# Patient Record
Sex: FEMALE | Race: WHITE | NOT HISPANIC OR LATINO | Employment: OTHER | URBAN - METROPOLITAN AREA
[De-identification: names, ages, dates, MRNs, and addresses within clinical notes are randomized per-mention and may not be internally consistent; named-entity substitution may affect disease eponyms.]

---

## 2017-11-16 ENCOUNTER — HOSPITAL ENCOUNTER (OUTPATIENT)
Dept: OTHER | Facility: MEDICAL CENTER | Age: 72
End: 2017-11-16
Payer: MEDICARE

## 2017-11-16 PROCEDURE — 94726 PLETHYSMOGRAPHY LUNG VOLUMES: CPT

## 2017-11-16 PROCEDURE — 94726 PLETHYSMOGRAPHY LUNG VOLUMES: CPT | Mod: 26 | Performed by: INTERNAL MEDICINE

## 2017-11-16 PROCEDURE — 94060 EVALUATION OF WHEEZING: CPT | Mod: 26 | Performed by: INTERNAL MEDICINE

## 2017-11-16 PROCEDURE — 94060 EVALUATION OF WHEEZING: CPT

## 2017-11-16 PROCEDURE — 94729 DIFFUSING CAPACITY: CPT | Mod: 26 | Performed by: INTERNAL MEDICINE

## 2017-11-16 PROCEDURE — 94729 DIFFUSING CAPACITY: CPT

## 2017-11-16 ASSESSMENT — PULMONARY FUNCTION TESTS
FEV1/FVC_PERCENT_CHANGE: 73
FEV1: 1.92
FEV1/FVC: 82.24
FEV1/FVC_PERCENT_PREDICTED: 77
FEV1_PERCENT_CHANGE: -11
FVC: 2.41
FEV1_PREDICTED: 2.41
FVC_PREDICTED: 3.13
FEV1/FVC: 80
FEV1/FVC_PERCENT_PREDICTED: 106
FEV1_PERCENT_PREDICTED: 72
FEV1_PERCENT_PREDICTED: 79
FVC: 2.14
FEV1: 1.76
FEV1_PERCENT_CHANGE: -8
FVC_PERCENT_PREDICTED: 77
FEV1/FVC_PERCENT_PREDICTED: 103
FVC_PERCENT_PREDICTED: 68

## 2017-11-17 NOTE — PROCEDURES
REASON FOR STUDY:  Patient has pulmonary hypertension and dyspnea after any   exertion.    SPIROMETRY:  FVC is 2.41, 77% predicted.  FEV1 is 1.92, 79% predicted.  There   is no response to bronchodilators.  FEV1/FVC is 80.    LUNG VOLUMES:  Vital capacity is 86% predicted.  Total lung capacity is 93%   predicted.  ERV is 18% predicted.  DLCO is 98% predicted.  DL/VA is 120%   predicted.  Flow volume loops are normal.    IMPRESSION:  There is suggestion of mild restrictive disease.  Her low ERV   probably reflects the patient's obesity.  Oxygen transfer is normal.       ____________________________________     MD CHAD SAENZ / BREANNA    DD:  11/17/2017 09:01:09  DT:  11/17/2017 09:38:39    D#:  [id number]  Job#:  [id number]

## 2018-12-12 ENCOUNTER — HOSPITAL ENCOUNTER (OUTPATIENT)
Dept: LAB | Facility: MEDICAL CENTER | Age: 73
End: 2018-12-12
Attending: INTERNAL MEDICINE
Payer: MEDICARE

## 2018-12-12 LAB
INR PPP: 1.47 (ref 0.87–1.13)
PROTHROMBIN TIME: 17.9 SEC (ref 12–14.6)

## 2018-12-12 PROCEDURE — 36415 COLL VENOUS BLD VENIPUNCTURE: CPT

## 2018-12-12 PROCEDURE — 85610 PROTHROMBIN TIME: CPT

## 2019-01-25 ENCOUNTER — HOSPITAL ENCOUNTER (OUTPATIENT)
Dept: LAB | Facility: MEDICAL CENTER | Age: 74
End: 2019-01-25
Attending: INTERNAL MEDICINE
Payer: MEDICARE

## 2019-01-25 LAB
INR PPP: 2.07 (ref 0.87–1.13)
PROTHROMBIN TIME: 23.4 SEC (ref 12–14.6)

## 2019-01-25 PROCEDURE — 85610 PROTHROMBIN TIME: CPT

## 2019-01-25 PROCEDURE — 36415 COLL VENOUS BLD VENIPUNCTURE: CPT

## 2019-03-14 ENCOUNTER — HOSPITAL ENCOUNTER (OUTPATIENT)
Dept: LAB | Facility: MEDICAL CENTER | Age: 74
End: 2019-03-14
Attending: INTERNAL MEDICINE
Payer: MEDICARE

## 2019-03-14 ENCOUNTER — HOSPITAL ENCOUNTER (OUTPATIENT)
Dept: LAB | Facility: MEDICAL CENTER | Age: 74
End: 2019-03-14
Attending: FAMILY MEDICINE
Payer: MEDICARE

## 2019-03-14 LAB
ALBUMIN SERPL BCP-MCNC: 4.6 G/DL (ref 3.2–4.9)
ALBUMIN/GLOB SERPL: 1.8 G/DL
ALP SERPL-CCNC: 70 U/L (ref 30–99)
ALT SERPL-CCNC: 17 U/L (ref 2–50)
ANION GAP SERPL CALC-SCNC: 9 MMOL/L (ref 0–11.9)
AST SERPL-CCNC: 17 U/L (ref 12–45)
BASOPHILS # BLD AUTO: 0.6 % (ref 0–1.8)
BASOPHILS # BLD: 0.03 K/UL (ref 0–0.12)
BILIRUB SERPL-MCNC: 0.5 MG/DL (ref 0.1–1.5)
BUN SERPL-MCNC: 23 MG/DL (ref 8–22)
CALCIUM SERPL-MCNC: 11.2 MG/DL (ref 8.5–10.5)
CHLORIDE SERPL-SCNC: 97 MMOL/L (ref 96–112)
CHOLEST SERPL-MCNC: 194 MG/DL (ref 100–199)
CO2 SERPL-SCNC: 33 MMOL/L (ref 20–33)
CREAT SERPL-MCNC: 0.68 MG/DL (ref 0.5–1.4)
CREAT UR-MCNC: 160 MG/DL
EOSINOPHIL # BLD AUTO: 0.14 K/UL (ref 0–0.51)
EOSINOPHIL NFR BLD: 3 % (ref 0–6.9)
ERYTHROCYTE [DISTWIDTH] IN BLOOD BY AUTOMATED COUNT: 47.9 FL (ref 35.9–50)
EST. AVERAGE GLUCOSE BLD GHB EST-MCNC: 117 MG/DL
FASTING STATUS PATIENT QL REPORTED: NORMAL
GLOBULIN SER CALC-MCNC: 2.6 G/DL (ref 1.9–3.5)
GLUCOSE SERPL-MCNC: 113 MG/DL (ref 65–99)
HBA1C MFR BLD: 5.7 % (ref 0–5.6)
HCT VFR BLD AUTO: 42.8 % (ref 37–47)
HDLC SERPL-MCNC: 88 MG/DL
HGB BLD-MCNC: 13.9 G/DL (ref 12–16)
IMM GRANULOCYTES # BLD AUTO: 0.01 K/UL (ref 0–0.11)
IMM GRANULOCYTES NFR BLD AUTO: 0.2 % (ref 0–0.9)
INR PPP: 1.22 (ref 0.87–1.13)
LDLC SERPL CALC-MCNC: 92 MG/DL
LYMPHOCYTES # BLD AUTO: 1.2 K/UL (ref 1–4.8)
LYMPHOCYTES NFR BLD: 26 % (ref 22–41)
MCH RBC QN AUTO: 32.5 PG (ref 27–33)
MCHC RBC AUTO-ENTMCNC: 32.5 G/DL (ref 33.6–35)
MCV RBC AUTO: 100 FL (ref 81.4–97.8)
MICROALBUMIN UR-MCNC: 1.9 MG/DL
MICROALBUMIN/CREAT UR: 12 MG/G (ref 0–30)
MONOCYTES # BLD AUTO: 0.49 K/UL (ref 0–0.85)
MONOCYTES NFR BLD AUTO: 10.6 % (ref 0–13.4)
NEUTROPHILS # BLD AUTO: 2.75 K/UL (ref 2–7.15)
NEUTROPHILS NFR BLD: 59.6 % (ref 44–72)
NRBC # BLD AUTO: 0 K/UL
NRBC BLD-RTO: 0 /100 WBC
PLATELET # BLD AUTO: 227 K/UL (ref 164–446)
PMV BLD AUTO: 8.8 FL (ref 9–12.9)
POTASSIUM SERPL-SCNC: 3.8 MMOL/L (ref 3.6–5.5)
PROT SERPL-MCNC: 7.2 G/DL (ref 6–8.2)
PROTHROMBIN TIME: 15.5 SEC (ref 12–14.6)
RBC # BLD AUTO: 4.28 M/UL (ref 4.2–5.4)
SODIUM SERPL-SCNC: 139 MMOL/L (ref 135–145)
T4 SERPL-MCNC: 6.1 UG/DL (ref 4–12)
TRIGL SERPL-MCNC: 69 MG/DL (ref 0–149)
TSH SERPL DL<=0.005 MIU/L-ACNC: 1.23 UIU/ML (ref 0.38–5.33)
WBC # BLD AUTO: 4.6 K/UL (ref 4.8–10.8)

## 2019-03-14 PROCEDURE — 36415 COLL VENOUS BLD VENIPUNCTURE: CPT

## 2019-03-14 PROCEDURE — 80053 COMPREHEN METABOLIC PANEL: CPT

## 2019-03-14 PROCEDURE — 84436 ASSAY OF TOTAL THYROXINE: CPT

## 2019-03-14 PROCEDURE — 83036 HEMOGLOBIN GLYCOSYLATED A1C: CPT | Mod: GA

## 2019-03-14 PROCEDURE — 85025 COMPLETE CBC W/AUTO DIFF WBC: CPT

## 2019-03-14 PROCEDURE — 80061 LIPID PANEL: CPT

## 2019-03-14 PROCEDURE — 82043 UR ALBUMIN QUANTITATIVE: CPT

## 2019-03-14 PROCEDURE — 84443 ASSAY THYROID STIM HORMONE: CPT

## 2019-03-14 PROCEDURE — 82570 ASSAY OF URINE CREATININE: CPT

## 2019-03-14 PROCEDURE — 85610 PROTHROMBIN TIME: CPT

## 2019-03-29 ENCOUNTER — HOSPITAL ENCOUNTER (OUTPATIENT)
Dept: LAB | Facility: MEDICAL CENTER | Age: 74
End: 2019-03-29
Attending: INTERNAL MEDICINE
Payer: MEDICARE

## 2019-03-29 LAB
INR PPP: 1.43 (ref 0.87–1.13)
PROTHROMBIN TIME: 17.6 SEC (ref 12–14.6)

## 2019-03-29 PROCEDURE — 85610 PROTHROMBIN TIME: CPT

## 2019-03-29 PROCEDURE — 36415 COLL VENOUS BLD VENIPUNCTURE: CPT

## 2019-04-24 ENCOUNTER — HOSPITAL ENCOUNTER (OUTPATIENT)
Dept: LAB | Facility: MEDICAL CENTER | Age: 74
End: 2019-04-24
Attending: INTERNAL MEDICINE
Payer: MEDICARE

## 2019-04-24 LAB
INR PPP: 1.87 (ref 0.87–1.13)
PROTHROMBIN TIME: 21.6 SEC (ref 12–14.6)

## 2019-04-24 PROCEDURE — 85610 PROTHROMBIN TIME: CPT

## 2019-04-24 PROCEDURE — 36415 COLL VENOUS BLD VENIPUNCTURE: CPT

## 2019-05-17 ENCOUNTER — HOSPITAL ENCOUNTER (OUTPATIENT)
Dept: LAB | Facility: MEDICAL CENTER | Age: 74
End: 2019-05-17
Attending: INTERNAL MEDICINE
Payer: MEDICARE

## 2019-05-17 LAB
INR PPP: 1.56 (ref 0.87–1.13)
PROTHROMBIN TIME: 18.8 SEC (ref 12–14.6)

## 2019-05-17 PROCEDURE — 85610 PROTHROMBIN TIME: CPT

## 2019-05-17 PROCEDURE — 36415 COLL VENOUS BLD VENIPUNCTURE: CPT

## 2019-05-31 ENCOUNTER — HOSPITAL ENCOUNTER (OUTPATIENT)
Dept: LAB | Facility: MEDICAL CENTER | Age: 74
End: 2019-05-31
Attending: INTERNAL MEDICINE
Payer: MEDICARE

## 2019-05-31 LAB
INR PPP: 1.84 (ref 0.87–1.13)
PROTHROMBIN TIME: 21.8 SEC (ref 12–14.6)

## 2019-05-31 PROCEDURE — 36415 COLL VENOUS BLD VENIPUNCTURE: CPT

## 2019-05-31 PROCEDURE — 85610 PROTHROMBIN TIME: CPT

## 2019-06-27 ENCOUNTER — HOSPITAL ENCOUNTER (OUTPATIENT)
Dept: LAB | Facility: MEDICAL CENTER | Age: 74
End: 2019-06-27
Attending: INTERNAL MEDICINE
Payer: MEDICARE

## 2019-06-27 LAB
INR PPP: 1.69 (ref 0.87–1.13)
PROTHROMBIN TIME: 20.4 SEC (ref 12–14.6)

## 2019-06-27 PROCEDURE — 85610 PROTHROMBIN TIME: CPT

## 2019-07-05 ENCOUNTER — HOSPITAL ENCOUNTER (OUTPATIENT)
Facility: MEDICAL CENTER | Age: 74
DRG: 552 | End: 2019-07-10
Attending: EMERGENCY MEDICINE | Admitting: HOSPITALIST
Payer: MEDICARE

## 2019-07-05 ENCOUNTER — OFFICE VISIT (OUTPATIENT)
Dept: URGENT CARE | Facility: PHYSICIAN GROUP | Age: 74
End: 2019-07-05
Payer: MEDICARE

## 2019-07-05 VITALS
BODY MASS INDEX: 38.45 KG/M2 | HEART RATE: 83 BPM | HEIGHT: 67 IN | TEMPERATURE: 100.6 F | RESPIRATION RATE: 16 BRPM | WEIGHT: 245 LBS | OXYGEN SATURATION: 93 % | SYSTOLIC BLOOD PRESSURE: 124 MMHG | DIASTOLIC BLOOD PRESSURE: 62 MMHG

## 2019-07-05 DIAGNOSIS — M54.10 BACK PAIN WITH RADICULOPATHY: ICD-10-CM

## 2019-07-05 DIAGNOSIS — M54.41 ACUTE BILATERAL LOW BACK PAIN WITH BILATERAL SCIATICA: ICD-10-CM

## 2019-07-05 DIAGNOSIS — M54.42 ACUTE BILATERAL LOW BACK PAIN WITH BILATERAL SCIATICA: ICD-10-CM

## 2019-07-05 DIAGNOSIS — R50.9 FEVER, UNSPECIFIED FEVER CAUSE: ICD-10-CM

## 2019-07-05 DIAGNOSIS — R50.9 FEVER, LOW GRADE: ICD-10-CM

## 2019-07-05 DIAGNOSIS — R51.9 ACUTE NONINTRACTABLE HEADACHE, UNSPECIFIED HEADACHE TYPE: ICD-10-CM

## 2019-07-05 PROCEDURE — 99203 OFFICE O/P NEW LOW 30 MIN: CPT | Performed by: FAMILY MEDICINE

## 2019-07-05 PROCEDURE — 99285 EMERGENCY DEPT VISIT HI MDM: CPT

## 2019-07-05 RX ORDER — SOTALOL HYDROCHLORIDE 120 MG/1
80 TABLET ORAL DAILY
Status: ON HOLD | COMMUNITY
End: 2019-07-06

## 2019-07-05 RX ORDER — FUROSEMIDE 20 MG/1
40 TABLET ORAL 2 TIMES DAILY
COMMUNITY
End: 2019-08-20

## 2019-07-05 RX ORDER — AMBRISENTAN 10 MG/1
10 TABLET, FILM COATED ORAL DAILY
COMMUNITY
End: 2020-09-29

## 2019-07-05 RX ORDER — TADALAFIL 10 MG/1
40 TABLET ORAL DAILY
COMMUNITY
End: 2019-09-08

## 2019-07-05 ASSESSMENT — ENCOUNTER SYMPTOMS
NAUSEA: 1
HEADACHES: 1
SHORTNESS OF BREATH: 1
VOMITING: 0
FEVER: 1

## 2019-07-06 ENCOUNTER — APPOINTMENT (OUTPATIENT)
Dept: RADIOLOGY | Facility: MEDICAL CENTER | Age: 74
DRG: 552 | End: 2019-07-06
Attending: EMERGENCY MEDICINE
Payer: MEDICARE

## 2019-07-06 ENCOUNTER — APPOINTMENT (OUTPATIENT)
Dept: RADIOLOGY | Facility: MEDICAL CENTER | Age: 74
DRG: 552 | End: 2019-07-06
Attending: HOSPITALIST
Payer: MEDICARE

## 2019-07-06 PROBLEM — I48.91 AF (ATRIAL FIBRILLATION) (HCC): Status: ACTIVE | Noted: 2019-07-06

## 2019-07-06 PROBLEM — M54.10 BACK PAIN WITH RADICULOPATHY: Status: ACTIVE | Noted: 2019-07-06

## 2019-07-06 PROBLEM — J96.10 CHRONIC RESPIRATORY FAILURE (HCC): Status: ACTIVE | Noted: 2019-07-06

## 2019-07-06 PROBLEM — R50.9 FEVER, LOW GRADE: Status: ACTIVE | Noted: 2019-07-06

## 2019-07-06 PROBLEM — N17.9 AKI (ACUTE KIDNEY INJURY) (HCC): Status: ACTIVE | Noted: 2019-07-06

## 2019-07-06 PROBLEM — I27.21 PAH (PULMONARY ARTERY HYPERTENSION) (HCC): Status: ACTIVE | Noted: 2019-07-06

## 2019-07-06 LAB
ALBUMIN SERPL BCP-MCNC: 4.4 G/DL (ref 3.2–4.9)
ALBUMIN/GLOB SERPL: 1.7 G/DL
ALP SERPL-CCNC: 48 U/L (ref 30–99)
ALT SERPL-CCNC: 13 U/L (ref 2–50)
ANION GAP SERPL CALC-SCNC: 13 MMOL/L (ref 0–11.9)
AST SERPL-CCNC: 15 U/L (ref 12–45)
BILIRUB SERPL-MCNC: 0.5 MG/DL (ref 0.1–1.5)
BUN SERPL-MCNC: 54 MG/DL (ref 8–22)
CALCIUM SERPL-MCNC: 10.4 MG/DL (ref 8.5–10.5)
CHLORIDE SERPL-SCNC: 104 MMOL/L (ref 96–112)
CO2 SERPL-SCNC: 27 MMOL/L (ref 20–33)
CREAT SERPL-MCNC: 1.11 MG/DL (ref 0.5–1.4)
GLOBULIN SER CALC-MCNC: 2.6 G/DL (ref 1.9–3.5)
GLUCOSE SERPL-MCNC: 102 MG/DL (ref 65–99)
INR PPP: 1.88 (ref 0.87–1.13)
MAGNESIUM SERPL-MCNC: 2.5 MG/DL (ref 1.5–2.5)
POTASSIUM SERPL-SCNC: 4.9 MMOL/L (ref 3.6–5.5)
PROCALCITONIN SERPL-MCNC: <0.05 NG/ML
PROT SERPL-MCNC: 7 G/DL (ref 6–8.2)
PROTHROMBIN TIME: 22.3 SEC (ref 12–14.6)
SODIUM SERPL-SCNC: 144 MMOL/L (ref 135–145)

## 2019-07-06 PROCEDURE — 700111 HCHG RX REV CODE 636 W/ 250 OVERRIDE (IP): Performed by: EMERGENCY MEDICINE

## 2019-07-06 PROCEDURE — 700102 HCHG RX REV CODE 250 W/ 637 OVERRIDE(OP): Performed by: HOSPITALIST

## 2019-07-06 PROCEDURE — 700105 HCHG RX REV CODE 258: Performed by: HOSPITALIST

## 2019-07-06 PROCEDURE — 700105 HCHG RX REV CODE 258: Performed by: EMERGENCY MEDICINE

## 2019-07-06 PROCEDURE — 700102 HCHG RX REV CODE 250 W/ 637 OVERRIDE(OP): Mod: JG | Performed by: HOSPITALIST

## 2019-07-06 PROCEDURE — 99358 PROLONG SERVICE W/O CONTACT: CPT | Performed by: HOSPITALIST

## 2019-07-06 PROCEDURE — A9585 GADOBUTROL INJECTION: HCPCS | Performed by: EMERGENCY MEDICINE

## 2019-07-06 PROCEDURE — 700117 HCHG RX CONTRAST REV CODE 255: Performed by: EMERGENCY MEDICINE

## 2019-07-06 PROCEDURE — 302135 SEQUENTIAL COMPRESSION MACHINE: Performed by: HOSPITALIST

## 2019-07-06 PROCEDURE — 770006 HCHG ROOM/CARE - MED/SURG/GYN SEMI*

## 2019-07-06 PROCEDURE — 84145 PROCALCITONIN (PCT): CPT

## 2019-07-06 PROCEDURE — 83735 ASSAY OF MAGNESIUM: CPT

## 2019-07-06 PROCEDURE — 85610 PROTHROMBIN TIME: CPT

## 2019-07-06 PROCEDURE — 93005 ELECTROCARDIOGRAM TRACING: CPT | Performed by: HOSPITALIST

## 2019-07-06 PROCEDURE — 700111 HCHG RX REV CODE 636 W/ 250 OVERRIDE (IP): Performed by: HOSPITALIST

## 2019-07-06 PROCEDURE — 87040 BLOOD CULTURE FOR BACTERIA: CPT | Mod: 91

## 2019-07-06 PROCEDURE — A9270 NON-COVERED ITEM OR SERVICE: HCPCS | Performed by: HOSPITALIST

## 2019-07-06 PROCEDURE — 72158 MRI LUMBAR SPINE W/O & W/DYE: CPT

## 2019-07-06 PROCEDURE — 70450 CT HEAD/BRAIN W/O DYE: CPT

## 2019-07-06 PROCEDURE — G0378 HOSPITAL OBSERVATION PER HR: HCPCS

## 2019-07-06 PROCEDURE — 36415 COLL VENOUS BLD VENIPUNCTURE: CPT

## 2019-07-06 PROCEDURE — 99220 PR INITIAL OBSERVATION CARE,LEVL III: CPT | Mod: 25 | Performed by: HOSPITALIST

## 2019-07-06 PROCEDURE — 80053 COMPREHEN METABOLIC PANEL: CPT

## 2019-07-06 PROCEDURE — 96365 THER/PROPH/DIAG IV INF INIT: CPT | Mod: XU

## 2019-07-06 PROCEDURE — A9270 NON-COVERED ITEM OR SERVICE: HCPCS | Mod: JG | Performed by: HOSPITALIST

## 2019-07-06 RX ORDER — AMBRISENTAN 10 MG/1
10 TABLET, FILM COATED ORAL DAILY
Status: DISCONTINUED | OUTPATIENT
Start: 2019-07-06 | End: 2019-07-06

## 2019-07-06 RX ORDER — LISINOPRIL 20 MG/1
20 TABLET ORAL
Status: DISCONTINUED | OUTPATIENT
Start: 2019-07-06 | End: 2019-07-07

## 2019-07-06 RX ORDER — PAROXETINE HYDROCHLORIDE 20 MG/1
20 TABLET, FILM COATED ORAL DAILY
Status: DISCONTINUED | OUTPATIENT
Start: 2019-07-06 | End: 2019-07-10 | Stop reason: HOSPADM

## 2019-07-06 RX ORDER — BISACODYL 10 MG
10 SUPPOSITORY, RECTAL RECTAL
Status: DISCONTINUED | OUTPATIENT
Start: 2019-07-06 | End: 2019-07-10 | Stop reason: HOSPADM

## 2019-07-06 RX ORDER — SOTALOL HYDROCHLORIDE 80 MG/1
80 TABLET ORAL 2 TIMES DAILY
Status: DISCONTINUED | OUTPATIENT
Start: 2019-07-06 | End: 2019-07-07

## 2019-07-06 RX ORDER — WARFARIN SODIUM 7.5 MG/1
7.5 TABLET ORAL
Status: DISCONTINUED | OUTPATIENT
Start: 2019-07-06 | End: 2019-07-06

## 2019-07-06 RX ORDER — PROPAFENONE HYDROCHLORIDE 150 MG/1
150 TABLET, COATED ORAL EVERY 8 HOURS
Status: DISCONTINUED | OUTPATIENT
Start: 2019-07-06 | End: 2019-07-06

## 2019-07-06 RX ORDER — ROSUVASTATIN CALCIUM 10 MG/1
10 TABLET, COATED ORAL EVERY EVENING
COMMUNITY
End: 2020-10-12

## 2019-07-06 RX ORDER — AMOXICILLIN 250 MG
2 CAPSULE ORAL 2 TIMES DAILY
Status: DISCONTINUED | OUTPATIENT
Start: 2019-07-06 | End: 2019-07-10 | Stop reason: HOSPADM

## 2019-07-06 RX ORDER — OXYCODONE HYDROCHLORIDE 10 MG/1
10 TABLET ORAL
Status: DISCONTINUED | OUTPATIENT
Start: 2019-07-06 | End: 2019-07-09

## 2019-07-06 RX ORDER — DIPHENHYDRAMINE HCL 25 MG
25 TABLET ORAL EVERY 6 HOURS PRN
Status: DISCONTINUED | OUTPATIENT
Start: 2019-07-06 | End: 2019-07-10 | Stop reason: HOSPADM

## 2019-07-06 RX ORDER — SODIUM CHLORIDE 9 MG/ML
INJECTION, SOLUTION INTRAVENOUS CONTINUOUS
Status: DISCONTINUED | OUTPATIENT
Start: 2019-07-06 | End: 2019-07-09

## 2019-07-06 RX ORDER — LISINOPRIL 20 MG/1
20 TABLET ORAL DAILY
Status: DISCONTINUED | OUTPATIENT
Start: 2019-07-06 | End: 2019-07-06

## 2019-07-06 RX ORDER — GADOBUTROL 604.72 MG/ML
10 INJECTION INTRAVENOUS ONCE
Status: COMPLETED | OUTPATIENT
Start: 2019-07-06 | End: 2019-07-06

## 2019-07-06 RX ORDER — ROSUVASTATIN CALCIUM 5 MG/1
10 TABLET, COATED ORAL EVERY EVENING
Status: DISCONTINUED | OUTPATIENT
Start: 2019-07-06 | End: 2019-07-10 | Stop reason: HOSPADM

## 2019-07-06 RX ORDER — POLYETHYLENE GLYCOL 3350 17 G/17G
1 POWDER, FOR SOLUTION ORAL
Status: DISCONTINUED | OUTPATIENT
Start: 2019-07-06 | End: 2019-07-10 | Stop reason: HOSPADM

## 2019-07-06 RX ORDER — SOTALOL HYDROCHLORIDE 120 MG/1
120 TABLET ORAL 2 TIMES DAILY
Status: DISCONTINUED | OUTPATIENT
Start: 2019-07-06 | End: 2019-07-06

## 2019-07-06 RX ORDER — PAROXETINE HYDROCHLORIDE 20 MG/1
20 TABLET, FILM COATED ORAL EVERY MORNING
COMMUNITY

## 2019-07-06 RX ORDER — HEPARIN SODIUM 5000 [USP'U]/ML
5000 INJECTION, SOLUTION INTRAVENOUS; SUBCUTANEOUS EVERY 8 HOURS
Status: DISCONTINUED | OUTPATIENT
Start: 2019-07-06 | End: 2019-07-06

## 2019-07-06 RX ORDER — LEVOTHYROXINE SODIUM 0.07 MG/1
75 TABLET ORAL
Status: DISCONTINUED | OUTPATIENT
Start: 2019-07-06 | End: 2019-07-10 | Stop reason: HOSPADM

## 2019-07-06 RX ORDER — PROPAFENONE HYDROCHLORIDE 150 MG/1
150 TABLET, COATED ORAL EVERY 8 HOURS
Status: CANCELLED | OUTPATIENT
Start: 2019-07-06

## 2019-07-06 RX ORDER — TADALAFIL 10 MG/1
10 TABLET ORAL PRN
Status: DISCONTINUED | OUTPATIENT
Start: 2019-07-06 | End: 2019-07-06

## 2019-07-06 RX ORDER — OXYCODONE HYDROCHLORIDE 5 MG/1
5 TABLET ORAL
Status: DISCONTINUED | OUTPATIENT
Start: 2019-07-06 | End: 2019-07-09

## 2019-07-06 RX ORDER — AMLODIPINE BESYLATE 5 MG/1
5 TABLET ORAL DAILY
Status: DISCONTINUED | OUTPATIENT
Start: 2019-07-06 | End: 2019-07-06

## 2019-07-06 RX ORDER — ASPIRIN 325 MG
325 TABLET ORAL EVERY 6 HOURS PRN
Status: CANCELLED | OUTPATIENT
Start: 2019-07-06

## 2019-07-06 RX ORDER — MORPHINE SULFATE 4 MG/ML
4 INJECTION, SOLUTION INTRAMUSCULAR; INTRAVENOUS
Status: DISCONTINUED | OUTPATIENT
Start: 2019-07-06 | End: 2019-07-09

## 2019-07-06 RX ORDER — FUROSEMIDE 40 MG/1
20 TABLET ORAL 2 TIMES DAILY
Status: DISCONTINUED | OUTPATIENT
Start: 2019-07-06 | End: 2019-07-06

## 2019-07-06 RX ORDER — WARFARIN SODIUM 7.5 MG/1
7.5 TABLET ORAL DAILY
COMMUNITY
End: 2019-08-20

## 2019-07-06 RX ORDER — SODIUM CHLORIDE 9 MG/ML
INJECTION, SOLUTION INTRAVENOUS CONTINUOUS
Status: DISCONTINUED | OUTPATIENT
Start: 2019-07-06 | End: 2019-07-06

## 2019-07-06 RX ORDER — TADALAFIL 10 MG/1
40 TABLET ORAL DAILY
Status: DISCONTINUED | OUTPATIENT
Start: 2019-07-06 | End: 2019-07-10 | Stop reason: HOSPADM

## 2019-07-06 RX ORDER — AMBRISENTAN 10 MG/1
10 TABLET, FILM COATED ORAL DAILY
Status: DISCONTINUED | OUTPATIENT
Start: 2019-07-06 | End: 2019-07-10 | Stop reason: HOSPADM

## 2019-07-06 RX ORDER — SOTALOL HYDROCHLORIDE 80 MG/1
120 TABLET ORAL 2 TIMES DAILY
COMMUNITY
End: 2019-09-30

## 2019-07-06 RX ADMIN — OXYCODONE HYDROCHLORIDE 5 MG: 5 TABLET ORAL at 13:08

## 2019-07-06 RX ADMIN — LEVOTHYROXINE SODIUM 75 MCG: 75 TABLET ORAL at 06:16

## 2019-07-06 RX ADMIN — AMBRISENTAN 10 MG: 10 TABLET, FILM COATED ORAL at 11:15

## 2019-07-06 RX ADMIN — OXYCODONE HYDROCHLORIDE 5 MG: 5 TABLET ORAL at 01:46

## 2019-07-06 RX ADMIN — SOTALOL HYDROCHLORIDE 120 MG: 120 TABLET ORAL at 06:16

## 2019-07-06 RX ADMIN — CEFTRIAXONE SODIUM 2 G: 2 INJECTION, POWDER, FOR SOLUTION INTRAMUSCULAR; INTRAVENOUS at 17:30

## 2019-07-06 RX ADMIN — LISINOPRIL 20 MG: 20 TABLET ORAL at 12:01

## 2019-07-06 RX ADMIN — TADALAFIL 40 MG: 10 TABLET ORAL at 11:15

## 2019-07-06 RX ADMIN — VANCOMYCIN HYDROCHLORIDE 2800 MG: 500 INJECTION, POWDER, LYOPHILIZED, FOR SOLUTION INTRAVENOUS at 03:42

## 2019-07-06 RX ADMIN — MORPHINE SULFATE 4 MG: 4 INJECTION INTRAVENOUS at 03:42

## 2019-07-06 RX ADMIN — SODIUM CHLORIDE: 9 INJECTION, SOLUTION INTRAVENOUS at 17:14

## 2019-07-06 RX ADMIN — SOTALOL HYDROCHLORIDE 80 MG: 80 TABLET ORAL at 17:29

## 2019-07-06 RX ADMIN — DIPHENHYDRAMINE HCL 25 MG: 25 TABLET ORAL at 12:00

## 2019-07-06 RX ADMIN — OXYCODONE HYDROCHLORIDE 5 MG: 5 TABLET ORAL at 09:45

## 2019-07-06 RX ADMIN — OXYCODONE HYDROCHLORIDE 10 MG: 10 TABLET ORAL at 17:27

## 2019-07-06 RX ADMIN — SENNOSIDES, DOCUSATE SODIUM 2 TABLET: 50; 8.6 TABLET, FILM COATED ORAL at 17:29

## 2019-07-06 RX ADMIN — DIPHENHYDRAMINE HCL 25 MG: 25 TABLET ORAL at 17:29

## 2019-07-06 RX ADMIN — OXYCODONE HYDROCHLORIDE 5 MG: 5 TABLET ORAL at 06:22

## 2019-07-06 RX ADMIN — PAROXETINE HYDROCHLORIDE 20 MG: 20 TABLET, FILM COATED ORAL at 11:59

## 2019-07-06 RX ADMIN — SENNOSIDES, DOCUSATE SODIUM 2 TABLET: 50; 8.6 TABLET, FILM COATED ORAL at 06:17

## 2019-07-06 RX ADMIN — ROSUVASTATIN CALCIUM 10 MG: 5 TABLET, FILM COATED ORAL at 17:27

## 2019-07-06 RX ADMIN — GADOBUTROL 10 ML: 604.72 INJECTION INTRAVENOUS at 10:24

## 2019-07-06 RX ADMIN — CEFTRIAXONE SODIUM 2 G: 2 INJECTION, POWDER, FOR SOLUTION INTRAMUSCULAR; INTRAVENOUS at 01:46

## 2019-07-06 ASSESSMENT — ENCOUNTER SYMPTOMS
WHEEZING: 0
HEMOPTYSIS: 0
ABDOMINAL PAIN: 0
FEVER: 1
EYE DISCHARGE: 0
SHORTNESS OF BREATH: 0
CHILLS: 1
FEVER: 0
BACK PAIN: 1
SENSORY CHANGE: 0
DIZZINESS: 0
BRUISES/BLEEDS EASILY: 0
CHILLS: 0
NAUSEA: 0
PALPITATIONS: 0
FLANK PAIN: 0
FOCAL WEAKNESS: 0
EYE REDNESS: 0
SPEECH CHANGE: 0
STRIDOR: 0
NECK PAIN: 1
BLURRED VISION: 0
DEPRESSION: 0
WEAKNESS: 1
COUGH: 0
TREMORS: 0
HEARTBURN: 0
DOUBLE VISION: 0
DIAPHORESIS: 0
MYALGIAS: 0
PHOTOPHOBIA: 0
DIARRHEA: 0
VOMITING: 0
HALLUCINATIONS: 0
HEADACHES: 1

## 2019-07-06 ASSESSMENT — CHA2DS2 SCORE
CHA2DS2 VASC SCORE: 3
PRIOR STROKE OR TIA OR THROMBOEMBOLISM: NO
AGE 65 TO 74: YES
DIABETES: NO
CHF OR LEFT VENTRICULAR DYSFUNCTION: NO
VASCULAR DISEASE: NO
AGE 75 OR GREATER: NO
HYPERTENSION: YES
SEX: FEMALE

## 2019-07-06 ASSESSMENT — PATIENT HEALTH QUESTIONNAIRE - PHQ9
2. FEELING DOWN, DEPRESSED, IRRITABLE, OR HOPELESS: NOT AT ALL
1. LITTLE INTEREST OR PLEASURE IN DOING THINGS: NOT AT ALL
SUM OF ALL RESPONSES TO PHQ9 QUESTIONS 1 AND 2: 0

## 2019-07-06 ASSESSMENT — COGNITIVE AND FUNCTIONAL STATUS - GENERAL
SUGGESTED CMS G CODE MODIFIER MOBILITY: CJ
WALKING IN HOSPITAL ROOM: A LITTLE
DAILY ACTIVITIY SCORE: 24
MOBILITY SCORE: 22
CLIMB 3 TO 5 STEPS WITH RAILING: A LITTLE
SUGGESTED CMS G CODE MODIFIER DAILY ACTIVITY: CH

## 2019-07-06 ASSESSMENT — LIFESTYLE VARIABLES
HAVE YOU EVER FELT YOU SHOULD CUT DOWN ON YOUR DRINKING: NO
TOTAL SCORE: 0
ON A TYPICAL DAY WHEN YOU DRINK ALCOHOL HOW MANY DRINKS DO YOU HAVE: 2
EVER HAD A DRINK FIRST THING IN THE MORNING TO STEADY YOUR NERVES TO GET RID OF A HANGOVER: NO
ALCOHOL_USE: YES
TOTAL SCORE: 0
EVER_SMOKED: NEVER
SUBSTANCE_ABUSE: 0
AVERAGE NUMBER OF DAYS PER WEEK YOU HAVE A DRINK CONTAINING ALCOHOL: 1
TOTAL SCORE: 0
HAVE PEOPLE ANNOYED YOU BY CRITICIZING YOUR DRINKING: NO
CONSUMPTION TOTAL: NEGATIVE
HOW MANY TIMES IN THE PAST YEAR HAVE YOU HAD 5 OR MORE DRINKS IN A DAY: 0
EVER_SMOKED: NEVER
EVER FELT BAD OR GUILTY ABOUT YOUR DRINKING: NO

## 2019-07-06 NOTE — PROGRESS NOTES
Subjective:     Zeinab Loza is a 74 y.o. female who presents for Headache (on and off fever/ headache/ x6days)    HPI  Pt presents for evaluation of a new problem   Headache for the past 6 days   Having fevers on and off for the past 6 days  Feeling fatigued  Had an epidural a week before the headaches started  +Nausea   No vomiting   Temp 100.6 today in office today    Review of Systems   Constitutional: Positive for fever.   Respiratory: Positive for shortness of breath (Chronic).    Cardiovascular: Negative for chest pain.   Gastrointestinal: Positive for nausea. Negative for vomiting.   Skin: Negative for rash.   Neurological: Positive for headaches.       PMH:  has no past medical history on file.  MEDS:   Current Outpatient Prescriptions:   •  sotalol (BETAPACE) 120 MG tablet, Take 120 mg by mouth 2 times a day., Disp: , Rfl:   •  tadalafil (CIALIS) 10 MG tablet, Take 10 mg by mouth as needed for Erectile Dysfunction., Disp: , Rfl:   •  ambrisentan (LETAIRIS) 10 MG tablet, Take 10 mg by mouth every day., Disp: , Rfl:   •  furosemide (LASIX) 20 MG Tab, Take 20 mg by mouth 2 times a day., Disp: , Rfl:   •  levothyroxine (SYNTHROID) 75 MCG Tab, Take 75 mcg by mouth Every morning on an empty stomach., Disp: , Rfl:   •  lisinopril (PRINIVIL) 20 MG Tab, Take 20 mg by mouth every day., Disp: , Rfl:   •  vitamin D (CHOLECALCIFEROL) 1000 UNIT Tab, Take 1,000 Units by mouth every day., Disp: , Rfl:   •  lisinopril (PRINIVIL) 20 MG Tab, Take 20 mg by mouth every day., Disp: , Rfl:   •  metoprolol (LOPRESSOR) 25 MG Tab, Take 25 mg by mouth 2 times a day., Disp: , Rfl:   •  propafenone (RYTHMOL) 150 MG Tab, Take 150 mg by mouth every 8 hours., Disp: , Rfl:   •  amlodipine (NORVASC) 5 MG Tab, Take 5 mg by mouth every day., Disp: , Rfl:   •  aspirin (ASA) 325 MG Tab, Take 325 mg by mouth every 6 hours as needed., Disp: , Rfl:   •  oxyCODONE CR (OXYCONTIN) 10 MG Tablet Extended Release 12 hour Abuse-Deterrent, Take  "10 mg by mouth every 12 hours., Disp: , Rfl:   •  doxycycline (VIBRAMYCIN) 100 MG Tab, Take 1 Tab by mouth 2 times a day. (Patient not taking: Reported on 7/5/2019), Disp: 20 Tab, Rfl: 0  •  methylPREDNISolone (MEDROL DOSPACK) 4 MG Tab, Take as directed (Patient not taking: Reported on 7/5/2019), Disp: 21 Tab, Rfl: 0  ALLERGIES: No Known Allergies  SURGHX: No past surgical history on file.  SOCHX:  reports that she has never smoked. She has never used smokeless tobacco. She reports that she drinks about 4.2 oz of alcohol per week . She reports that she does not use drugs.  FH: Family history was reviewed, not contributing to acute illness     Objective:   /62 (BP Location: Right arm, Patient Position: Sitting, BP Cuff Size: Adult)   Pulse 83   Temp (!) 38.1 °C (100.6 °F) (Temporal)   Resp 16   Ht 1.702 m (5' 7\")   Wt 111.1 kg (245 lb)   SpO2 93%   BMI 38.37 kg/m²     Physical Exam   Constitutional: She is oriented to person, place, and time. She appears well-developed and well-nourished. No distress.   HENT:   Head: Normocephalic and atraumatic.   Right Ear: External ear normal.   Left Ear: External ear normal.   Nose: Nose normal.   Eyes: Conjunctivae and EOM are normal. Right eye exhibits no discharge. Left eye exhibits no discharge. No scleral icterus.   Neck: Normal range of motion. No tracheal deviation present.   Cardiovascular: Normal rate and regular rhythm.    Pulmonary/Chest: Effort normal and breath sounds normal. No respiratory distress. She has no wheezes. She has no rales.   Musculoskeletal:   Neck  General: No asymmetry, bruising, or erythema appreciated  ROM: FROM flex/extend/lateral bend/lateral rotation  Palpation: +TTP of spinous processes in the neck and thoracic spine, no step-offs appreciated, no TTP of paraspinal muscles, no significant scoliosis appreciated  Neuro: Sensation intact and equal bilaterally in UE's  Vascular: Radial pulse 2+    Neurological: She is alert and oriented " to person, place, and time.   Skin: Skin is warm and dry. She is not diaphoretic.   Psychiatric: She has a normal mood and affect. Her behavior is normal. Judgment and thought content normal.     Assessment/Plan:   Assessment    1. Acute nonintractable headache, unspecified headache type  2. Fever, unspecified fever cause  Patient is a 74-year-old man female with headaches for the past 6 days as well as fevers.  Had an epidural approximately 1 week prior to symptoms starting.  Her presentation is highly suspicious for meningitis versus epidural abscess.  Patient transferred to ER for further work-up and management.

## 2019-07-06 NOTE — PROGRESS NOTES
"Pharmacy Kinetics 74 y.o. female on vancomycin day # 1 2019    Received vancomycin loading dose    Indication for Treatment: Rule out epidural abscess     Pertinent history per medical record: Admitted on 2019. PMH of pulmonary artery hypertension, atrial fibrillation on Coumadin, hypertension, hyperlipidemia who presents with back pain with radiculopathy.  This patient has had low back pain with fevers for approximately 1 week.  She had an epidural 2 weeks ago for steroid injection of her lumbar spine.  She states that she is been having low-grade fevers up to 100 °F at home.  She is also had some chills.  Labs at outside facility. BUN 62 creatinine 1.4  Lasix, Lisinopril held due to MILAN    Other antibiotics: Ceftriaxone    Allergies: Patient has no known allergies.     List concerns for renal function: MILAN, BUN/SCr ratio > 20:1, age, obesity (41), will receive significant IV contrast with MRI    Pertinent cultures to date:   19 Blood cultures x2 - in process     No results for input(s): WBC, NEUTSPOLYS, BANDSSTABS in the last 72 hours.  No results for input(s): BUN, CREATININE, ALBUMIN in the last 72 hours.  No results for input(s): VANCOTROUGH, VANCOPEAK, VANCORANDOM in the last 72 hours.  Intake/Output Summary (Last 24 hours) at 19 0432  Last data filed at 19 0216   Gross per 24 hour   Intake              100 ml   Output                0 ml   Net              100 ml      /50   Pulse 62   Temp 36.7 °C (98.1 °F) (Temporal)   Resp 19   Ht 1.702 m (5' 7\")   Wt 114.1 kg (251 lb 8.7 oz)   SpO2 97%  Temp (24hrs), Av.7 °C (98 °F), Min:36.6 °C (97.9 °F), Max:36.7 °C (98.1 °F)      A/P   1. Vancomycin dose change: Start vancomycin pulse dosing   2. Next vancomycin level: 24 hour random level @0400 on   3. Goal trough: 18-22 mcg/ml - target lower end of goal for now.  4. Comments: Due to above risk factors will not start scheduled dosing at this time. Level as above. Monitor renal " closely. Pharmacy will follow. Narrow therapy as able. Consider ID consult if MRI is positive.    Derick sIsa, PharmD, BCPS

## 2019-07-06 NOTE — ED PROVIDER NOTES
ED Provider Note    ED Provider Note      Primary care provider: Ashia Tidwell M.D.    CHIEF COMPLAINT  Chief Complaint   Patient presents with   • Headache     pt transfer from [facility] for MRI to r/o epidural abscess,    • Back Pain       HPI  Zeinab Loza is a 74 y.o. female who presents to the Emergency Department with chief complaint of low back pain fevers chills.  Patient transferred from outside hospital for evaluation possible epidural abscess.  Patient had epidural injection approximately 2 weeks prior for chronic low back pain chronic radiculopathy.  Over the last couple days she states she is had worsening pain and also had subjective chills was noted to have a fever just over 100 this evening.  Went to outside hospital patient had obvious concern for epidural abscess and was transferred here due to in availability of MRI/neurosurgery at outside hospital she states that the pain has been severe at times she reports no numbness no tingling no weakness in her history extremities.  She reports urinary hesitancy at times feels as though she needs to urinate and then simply has dribbling when she goes to the bathroom she is had no fecal incontinence no other stooling complaints no cough congestion chest pain shortness of breath she does report bifrontal headache that somewhat radiates in the occiput she denies any neck pain at this time.  She also reports that over the last couple days she has had profound pruritus over the upper back.    REVIEW OF SYSTEMS  10 systems reviewed and otherwise negative, pertinent positives and negatives listed in the history of present illness.    PAST MEDICAL HISTORY   Chronic low back pain hypertension    SURGICAL HISTORY  patient denies any surgical history    SOCIAL HISTORY  Social History   Substance Use Topics   • Smoking status: Never Smoker   • Smokeless tobacco: Never Used   • Alcohol use 4.2 oz/week     7 Shots of liquor per week      Comment: 1 day  "     History   Drug Use No       FAMILY HISTORY  Non-Contributory    CURRENT MEDICATIONS  Home Medications     Reviewed by Blair Gillespie R.N. (Registered Nurse) on 07/05/19 at 2325  Med List Status: Not Addressed   Medication Last Dose Status   ambrisentan (LETAIRIS) 10 MG tablet  Active   amlodipine (NORVASC) 5 MG Tab  Active   aspirin (ASA) 325 MG Tab  Active   doxycycline (VIBRAMYCIN) 100 MG Tab  Active   furosemide (LASIX) 20 MG Tab  Active   levothyroxine (SYNTHROID) 75 MCG Tab  Active   lisinopril (PRINIVIL) 20 MG Tab  Active   lisinopril (PRINIVIL) 20 MG Tab  Active   methylPREDNISolone (MEDROL DOSPACK) 4 MG Tab  Active   metoprolol (LOPRESSOR) 25 MG Tab  Active   oxyCODONE CR (OXYCONTIN) 10 MG Tablet Extended Release 12 hour Abuse-Deterrent  Active   propafenone (RYTHMOL) 150 MG Tab  Active   sotalol (BETAPACE) 120 MG tablet  Active   tadalafil (CIALIS) 10 MG tablet  Active   vitamin D (CHOLECALCIFEROL) 1000 UNIT Tab  Active                ALLERGIES  No Known Allergies    PHYSICAL EXAM  VITAL SIGNS: /49   Pulse 65   Temp 36.6 °C (97.9 °F) (Oral)   Resp 18   Ht 1.702 m (5' 7\")   Wt 111.1 kg (245 lb)   SpO2 96%   BMI 38.37 kg/m²   Pulse ox interpretation: I interpret this pulse ox as normal.  Constitutional: Alert and oriented x 3, minimal distress  HEENT: Atraumatic normocephalic, pupils are equal round reactive to light extraocular movements are intact. The nares is clear, external ears are normal, mouth shows moist mucous membranes  Neck: Supple, no JVD no tracheal deviation  Cardiovascular: Regular rate and rhythm no murmur rub or gallop 2+ pulses peripherally x4  Thorax & Lungs: No respiratory distress, no wheezes rales or rhonchi, No chest tenderness.   GI: Soft nontender nondistended positive bowel sounds, no peritoneal signs  Skin: Warm dry no acute rash or lesion  Musculoskeletal: Moving all extremities with full range and 5 of 5 strength, no acute  deformity, patient has tenderness in " the lumbar distribution both paraspinally and midline there is no erythema no warmth no induration  Neurologic: Cranial nerves III through XII are grossly intact, no sensory deficit, no cerebellar dysfunction 2+ DTRs bilateral patellar and Achilles tendons  Psychiatric: Appropriate affect for situation at this time      DIAGNOSTIC STUDIES / PROCEDURES  LABS      Results for orders placed or performed during the hospital encounter of 19   PROCALCITONIN   Result Value Ref Range    Procalcitonin <0.05 <0.25 ng/mL   MAGNESIUM   Result Value Ref Range    Magnesium 2.5 1.5 - 2.5 mg/dL   PROTHROMBIN TIME   Result Value Ref Range    PT 22.3 (H) 12.0 - 14.6 sec    INR 1.88 (H) 0.87 - 1.13   EKG   Result Value Ref Range    Report       [facility] Emergency Dept.    Test Date:  2019  Pt Name:    NATALIE STAPLES                Department: ER  MRN:        [id number]                      Room:       [facility]  Gender:     Female                       Technician: [id number]  :        [id number]                   Requested By:MENDY PINTO  Order #:    [id number]                    Reading MD:    Measurements  Intervals                                Axis  Rate:       94                           P:  CT:                                      QRS:        -22  QRSD:       100                          T:          40  QT:         392  QTc:        491    Interpretive Statements  ATRIAL FIBRILLATION, V-RATE    MULTIFORM VENTRICULAR PREMATURE COMPLEXES  LEFT VENTRICULAR HYPERTROPHY  BORDERLINE PROLONGED QT INTERVAL  No previous ECG available for comparison         All labs reviewed by me.      RADIOLOGY  MR-LUMBAR SPINE-WITH & W/O    (Results Pending)     The radiologist's interpretation of all radiological studies have been reviewed by me.    COURSE & MEDICAL DECISION MAKING  Pertinent Labs & Imaging studies reviewed. (See chart for details)    12:32 AM - Patient seen and examined at bedside.     Patient noted  "to have slightly elevated blood pressure likely circumstantial secondary to presenting complaint. Referred to primary care physician for further evaluation.      Medical Decision Making: Patient with significant concern of epidural abscess MRI of the lumbar spine with and without contrast has been ordered on stat basis patient had elevated ESR and CRP at outside hospital she had a normal white blood cell count she is afebrile now with otherwise normal vital signs.  With elevated inflammatory markers and concerning history patient will be presumably covered with vancomycin and Rocephin until MRI has ruled out epidural abscess.  I discussed case with hospitalist Dr. Castrejon patient's admitted in guarded condition for further evaluation and treatment.    /49   Pulse 65   Temp 36.6 °C (97.9 °F) (Oral)   Resp 18   Ht 1.702 m (5' 7\")   Wt 111.1 kg (245 lb)   SpO2 96%   BMI 38.37 kg/m²             FINAL IMPRESSION  1. Acute bilateral low back pain with bilateral sciatica Active   2.  Elevated inflammatory markers  3.  Concern for spinal epidural abscess    This dictation has been created using voice recognition software and/or scribes. The accuracy of the dictation is limited by the abilities of the software and the expertise of the scribes. I expect there may be some errors of grammar and possibly content. I made every attempt to manually correct the errors within my dictation. However, errors related to voice recognition software and/or scribes may still exist and should be interpreted within the appropriate context.            "

## 2019-07-06 NOTE — CARE PLAN
Problem: Safety  Goal: Will remain free from injury  Outcome: PROGRESSING AS EXPECTED  Call bell within reach. Bed locked and in lowest position. Bed alarm on. Non skid socks on. Room located near nurses station. Rounding maintained.     Problem: Pain Management  Goal: Pain level will decrease to patient's comfort goal  Outcome: PROGRESSING AS EXPECTED  Pt c/o 7/10 low back pain. Medicated per MAR. Will monitor.

## 2019-07-06 NOTE — THERAPY
consult received; concern for epidural abscess and was transferred here due to in availability of MRI/neurosurgery; awaiting MRI and neuro POC/precuations

## 2019-07-06 NOTE — PROGRESS NOTES
Hospital Medicine Daily Progress Note    Date of Service  7/6/2019    Chief Complaint  74 y.o. female admitted 7/5/2019 with back pain, fevers, headache.  Hospital Course      74-year-old female history of pulmonary hypertension, chronic respiratory failure O2 dependent 5 L, A. fib on Coumadin, hypertension, dyslipidemia, chronic back pain, obesity, lumbar epidural steroid injection proxy 2 weeks ago admitted with back pain with headaches fever.  Started on empiric IV antibiotics for possible infection.    Interval Problem Update    MRI lumbar spine without acute findings, chronic spondylolisthesis osteoarthritis changes, arthropathy.  Reports headache improved.  No new fevers.  Increased BUN/creatinine consistent with acute kidney injury.  Findings, plan of care discussed with daughter at bedside.    CT head:  1.  Asymmetric low attenuation area in the white matter in the anterior aspect of left frontal convexity measuring approximately 2.2 cm in diameter. Differential diagnosis includes an area of prior ischemia or hemorrhage.    2.  Correlation with enhanced MRI or CT may be of value to exclude acute process.    3.  Underlying mild atrophy and small vessel ischemic changes.    4.  No hemorrhage or mass effect.    Consultants/Specialty    None     Code Status  Full     Disposition  Plan additional workup .    Review of Systems  Review of Systems   Constitutional: Positive for chills, fever and malaise/fatigue. Negative for diaphoresis.   HENT: Negative for congestion.    Eyes: Negative for blurred vision, double vision, photophobia, discharge and redness.   Respiratory: Negative for cough, shortness of breath, wheezing and stridor.    Cardiovascular: Negative for chest pain, palpitations and leg swelling.   Gastrointestinal: Negative for abdominal pain, diarrhea and vomiting.   Genitourinary: Negative for flank pain and hematuria.   Musculoskeletal: Positive for back pain and neck pain. Negative for joint pain.         Chronic pain    Neurological: Positive for headaches. Negative for tremors, sensory change, speech change and focal weakness.   Psychiatric/Behavioral: Negative for hallucinations and substance abuse.        Physical Exam  Temp:  [36.1 °C (96.9 °F)-36.9 °C (98.5 °F)] 36.1 °C (96.9 °F)  Pulse:  [62-84] 84  Resp:  [16-20] 16  BP: (104-121)/(42-58) 121/48  SpO2:  [93 %-97 %] 96 %    Physical Exam   Constitutional: She is oriented to person, place, and time. No distress.   HENT:   Head: Normocephalic and atraumatic.   Right Ear: External ear normal.   Left Ear: External ear normal.   Nose: Nose normal.   Eyes: EOM are normal. Right eye exhibits no discharge. Left eye exhibits no discharge. No scleral icterus.   Neck: Neck supple.   No nuchal rigidity    Cardiovascular: Normal rate and regular rhythm.    No murmur heard.  Pulmonary/Chest: Effort normal. No stridor. She has no wheezes. She has no rales.   Abdominal: Soft. Bowel sounds are normal. She exhibits no distension. There is no tenderness.   Musculoskeletal: She exhibits tenderness (lower back - no localized redness, warmth swelling or induration). She exhibits no edema.   Neurological: She is alert and oriented to person, place, and time. No cranial nerve deficit.   No focal extremity weakness   Skin: Skin is warm and dry. She is not diaphoretic. No pallor.   Psychiatric: She has a normal mood and affect. Her behavior is normal.   Vitals reviewed.      Fluids    Intake/Output Summary (Last 24 hours) at 07/06/19 1636  Last data filed at 07/06/19 0813   Gross per 24 hour   Intake              580 ml   Output                0 ml   Net              580 ml       Laboratory      Recent Labs      07/06/19   0146   SODIUM  144   POTASSIUM  4.9   CHLORIDE  104   CO2  27   GLUCOSE  102*   BUN  54*   CREATININE  1.11   CALCIUM  10.4     Recent Labs      07/06/19   0146   INR  1.88*               Imaging  CT-HEAD W/O   Final Result      1.  Asymmetric low attenuation  area in the white matter in the anterior aspect of left frontal convexity measuring approximately 2.2 cm in diameter. Differential diagnosis includes an area of prior ischemia or hemorrhage.      2.  Correlation with enhanced MRI or CT may be of value to exclude acute process.      3.  Underlying mild atrophy and small vessel ischemic changes.      4.  No hemorrhage or mass effect.      Findings were discussed with BOB GIANG M.D. on 7/6/2019 4:16 PM.      MR-LUMBAR SPINE-WITH & W/O   Final Result      1.  L3-4 grade 1 spondylolisthesis. L5-S1 slight grade 1 spondylolisthesis.   2.  Incidental hemangiomas in the T11 and L4 vertebral bodies. No clinical significance.   3.  L3-4 mild-moderate hypertrophic facet arthropathy. No central or foraminal stenosis.   4.  L4-5 minimal facet arthropathy.   5.  Incidental small perineural cyst at the S2 segment level. Doubtful clinical significance.   6.  No evidence of osteomyelitis, discitis, or epidural abscess.      MR-BRAIN-WITH & W/O    (Results Pending)        Assessment/Plan  * Fever, low grade   Assessment & Plan    Unclear source.  She had urinary symptoms of hesitancy  Check UA for signs of infection.  Follow up blood cultures.   Continue IV antibiotics for now.   Abnormal CT H scan with asymmetric low attenuation.-Check MRI head with and without contrast for ischemic process, acute inflammatory changes.  Although no photophobia, leukocytosis, meningismus signs or high-grade fevers hold warfarin for now in consideration of  LP if increasing clinical suspicion.       Back pain with radiculopathy   Assessment & Plan    High risk given recent epidural injection -MRI without acute inflammatory changes, signs of discitis, osteomyelitis or abscess.  Fever at .8, elevated esr/crp   Follow blood cultures.  Patient with headache and low-grade fever although without photophobia, elevated white count, meningismus signs.  Hold warfarin for now.  Follow-up INR.  Consider  LP if persistent headache, fevers.       Chronic respiratory failure ([facility])   Assessment & Plan    Due to pulmonary hypertension.  Stable respiratory symptoms.  Continue outpatient medications.     MILAN (acute kidney injury) ([facility])   Assessment & Plan    Start IV fluids.  Avoid nephrotoxic medications   Will hold ace and lasix for now  Follow renal function, electrolytes, urine output.     PAH (pulmonary artery hypertension) ([facility])   Assessment & Plan    Stable respiratory symptoms.    resume home letairis, cialis   O2 support.     AF (atrial fibrillation) ([facility])   Assessment & Plan    Sotalol dose incorrect.  I reviewed her home medication list.  Adjust to 80 mg twice daily.  Follow-up potassium, magnesium level.  Plan to hold warfarin for possible LP.  Discussed risks with patient, daughter   Monitor heart rate.     Hypothyroidism- (present on admission)   Assessment & Plan    Resume levothyroxine      Essential hypertension- (present on admission)   Assessment & Plan    Resume home amlodpine   Hold diuretics, ACE inhibitor.  Follow-up renal function.  Monitor blood pressure.          VTE prophylaxis: warfarin     Throughout day I reassessed patient, discussed findings, plan of care, ordered imaging and followed-up on studies, reviewed meds with patient..  Discussed with daughter, patient plan of care at bedside throughout the day.    Total additional face-to-face time spent 65 minutes

## 2019-07-06 NOTE — PROGRESS NOTES
Inpatient Anticoagulation Service Note    Date: 7/6/2019  Reason for Anticoagulation: Atrial Fibrillation (Chadsvasc 3, pulmonary arterial htn additional risk factor)  Target INR: 2.0 to 3.0  INR from last 7 days     Date/Time INR Value    07/06/19 0146 (!)  1.88        Dose from last 7 days     Date/Time Dose (mg)    07/06/19 0701  7.5        Average Dose (mg):  (10mg on Fri, 7.5mg AOD)  Significant Interactions: Antibiotics, Thyroid Medications  Bridge Therapy: No       Comments:   Patient on warfarin for afib. Patient confirmed the above home regimen. Last dose 10mg on 7/5 taken in the AM.  States INR tends to run low. Labs on file confirm this trend.  INR at outside facility 2.2. Repeat INR here 1.88, likely some lab variation. INR slightly subtherapeutic.   Expect INR will uptrend since patient is on antibiotics and diet is likely different inpatient.   Will give home dose tonight. May need higher dose tomorrow.  H/H 10.8 on outside hospital labs, no signs/symptoms of bleeding.  May need to hold warfarin for intervention if found to have epidural abscess.    Plan:  Warfairn 7.5mg po x1, trend INR. Pharmacy will follow.  Education Material Provided?: No  Pharmacist suggested discharge dosing: To be determined, likely home dose     Derick Issa, PharmD, BCPS, BCCCP

## 2019-07-06 NOTE — H&P
Hospital Medicine History & Physical Note    Date of Service  7/6/2019    Primary Care Physician  Ashia Tidwell M.D.    Consultants  none    Code Status  full    Chief Complaint  Back pain     History of Presenting Illness  74 y.o. female who presented 7/5/2019 with past medical history of pulmonary artery hypertension, atrial fibrillation on Coumadin, hypertension, hyperlipidemia who presents with back pain with radiculopathy.  This patient has had low back pain with fevers for approximately 1 week.  She had an epidural 2 weeks ago for steroid injection of her lumbar spine.  She states that she is been having low-grade fevers up to 100 °F at home.  She is also had some chills.  Her abdominal back pain radiates down her legs.  No weakness.  No numbness no tingling.  No saddle anesthesia.  No history of IV drug use.  Upon review of outside hospital records patient has a WBC count 4.5 hemoglobin 10.8 platelet count 187 ESR elevated at 33 INR 2.2 sodium 140 potassium 5.1 chloride 103 CO2 30 anion gap 7.0 glucose 187 BUN 62 creatinine 1.4 LFTs unremarkable CRP 9.0 urinalysis is unremarkable except for moderate bacteria 1+ leuk esterase outside hospital temperature 100.8 °F respiratory rate 18 pulse rate 82 blood pressure 114/54    Review of Systems  Review of Systems   Constitutional: Negative for chills and fever.   HENT: Negative for congestion, hearing loss and tinnitus.    Eyes: Negative for blurred vision, double vision and discharge.   Respiratory: Negative for cough, hemoptysis and shortness of breath.    Cardiovascular: Negative for chest pain, palpitations and leg swelling.   Gastrointestinal: Negative for abdominal pain, heartburn, nausea and vomiting.   Genitourinary: Negative for dysuria and flank pain.   Musculoskeletal: Positive for back pain. Negative for joint pain and myalgias.   Skin: Negative for rash.   Neurological: Positive for weakness. Negative for dizziness, sensory change, speech change and  focal weakness.   Endo/Heme/Allergies: Negative for environmental allergies. Does not bruise/bleed easily.   Psychiatric/Behavioral: Negative for depression, hallucinations and substance abuse.       Past Medical History  Pah, afibb, htn, hld     Surgical History  Reviewed and not pertinent     Family History  Reviewed and not pertinent    Social History   reports that she has never smoked. She has never used smokeless tobacco. She reports that she drinks about 4.2 oz of alcohol per week . She reports that she does not use drugs.    Allergies  No Known Allergies    Medications  Prior to Admission Medications   Prescriptions Last Dose Informant Patient Reported? Taking?   ambrisentan (LETAIRIS) 10 MG tablet   Yes No   Sig: Take 10 mg by mouth every day.   amlodipine (NORVASC) 5 MG Tab   Yes No   Sig: Take 5 mg by mouth every day.   aspirin (ASA) 325 MG Tab   Yes No   Sig: Take 325 mg by mouth every 6 hours as needed.   doxycycline (VIBRAMYCIN) 100 MG Tab   No No   Sig: Take 1 Tab by mouth 2 times a day.   Patient not taking: Reported on 7/5/2019   furosemide (LASIX) 20 MG Tab   Yes No   Sig: Take 20 mg by mouth 2 times a day.   levothyroxine (SYNTHROID) 75 MCG Tab   Yes No   Sig: Take 75 mcg by mouth Every morning on an empty stomach.   lisinopril (PRINIVIL) 20 MG Tab   Yes No   Sig: Take 20 mg by mouth every day.   lisinopril (PRINIVIL) 20 MG Tab   Yes No   Sig: Take 20 mg by mouth every day.   methylPREDNISolone (MEDROL DOSPACK) 4 MG Tab   No No   Sig: Take as directed   Patient not taking: Reported on 7/5/2019   metoprolol (LOPRESSOR) 25 MG Tab   Yes No   Sig: Take 25 mg by mouth 2 times a day.   oxyCODONE CR (OXYCONTIN) 10 MG Tablet Extended Release 12 hour Abuse-Deterrent   Yes No   Sig: Take 10 mg by mouth every 12 hours.   propafenone (RYTHMOL) 150 MG Tab   Yes No   Sig: Take 150 mg by mouth every 8 hours.   sotalol (BETAPACE) 120 MG tablet   Yes No   Sig: Take 120 mg by mouth 2 times a day.   tadalafil  (CIALIS) 10 MG tablet   Yes No   Sig: Take 10 mg by mouth as needed for Erectile Dysfunction.   vitamin D (CHOLECALCIFEROL) 1000 UNIT Tab   Yes No   Sig: Take 1,000 Units by mouth every day.      Facility-Administered Medications: None       Physical Exam  Temp:  [36.6 °C (97.9 °F)] 36.6 °C (97.9 °F)  Pulse:  [65] 65  Resp:  [18] 18  BP: (116)/(49) 116/49  SpO2:  [96 %] 96 %    Physical Exam   Constitutional: She is oriented to person, place, and time. She appears well-developed and well-nourished. She appears distressed.   HENT:   Head: Normocephalic and atraumatic.   Eyes: Pupils are equal, round, and reactive to light. Conjunctivae and EOM are normal.   Neck: Normal range of motion. Neck supple. No JVD present.   Cardiovascular: Normal rate, regular rhythm, normal heart sounds and intact distal pulses.    No murmur heard.  Pulmonary/Chest: Effort normal and breath sounds normal. No respiratory distress. She has no wheezes.   Abdominal: Soft. Bowel sounds are normal. She exhibits no distension. There is no tenderness.   Musculoskeletal: Normal range of motion. She exhibits edema.   Lumbar ttp with mild edema   Neurological: She is alert and oriented to person, place, and time. She exhibits normal muscle tone.   Skin: Skin is warm and dry.   Psychiatric: She has a normal mood and affect. Her behavior is normal. Judgment and thought content normal.   Nursing note and vitals reviewed.      Laboratory:     WBC count 4.5 hemoglobin 10.8 platelet count 187 ESR elevated at 33 INR 2.2 sodium 140 potassium 5.1 chloride 103 CO2 30 anion gap 7.0 glucose 187 BUN 62 creatinine 1.4 LFTs unremarkable CRP 9.0      No results for input(s): ALTSGPT, ASTSGOT, ALKPHOSPHAT, TBILIRUBIN, DBILIRUBIN, GAMMAGT, AMYLASE, LIPASE, ALB, PREALBUMIN, GLUCOSE in the last 72 hours.              No results for input(s): TROPONINI in the last 72 hours.    Urinalysis:    No results found     Imaging:  MR-LUMBAR SPINE-WITH & W/O    (Results Pending)          Assessment/Plan:  I anticipate this patient is appropriate for observation status at this time.    * Back pain with radiculopathy   Assessment & Plan    High risk given recent epidural injection   Fever at .8, elevated esr/crp   Admit for MRI stat  IV abx for now   Cultures  Consider NSG consultation as appropriate     MILAN (acute kidney injury) ([facility])   Assessment & Plan    Cont with IVF   Avoid nephrotoxic medications   Will hold ace and lasix for now  Monitor renal funcition closely as patient will receive contrast with MRI     PAH (pulmonary artery hypertension) ([facility])   Assessment & Plan    Resume home letairis, cialis      AF (atrial fibrillation) ([facility])   Assessment & Plan    On sotalol and coumadin will continue for now     Hypothyroidism- (present on admission)   Assessment & Plan    Resume levothyroxine      Essential hypertension- (present on admission)   Assessment & Plan    Resume home amlodpine   Hold home ace and lasix for now resume as appropriate         VTE prophylaxis: coumadin     I spent a total of 35 minutes of non face to face time performing additional research, reviewing old medical records, provided on going management by following up on labs, placing orders, discussing plan of care with other healthcare providers and nursing staff. Start time: 1:33 am. End time: 2:08 am

## 2019-07-07 ENCOUNTER — APPOINTMENT (OUTPATIENT)
Dept: RADIOLOGY | Facility: MEDICAL CENTER | Age: 74
DRG: 552 | End: 2019-07-07
Attending: HOSPITALIST
Payer: MEDICARE

## 2019-07-07 LAB
ALBUMIN SERPL BCP-MCNC: 3.5 G/DL (ref 3.2–4.9)
ALBUMIN/GLOB SERPL: 1.5 G/DL
ALP SERPL-CCNC: 45 U/L (ref 30–99)
ALT SERPL-CCNC: 13 U/L (ref 2–50)
ANION GAP SERPL CALC-SCNC: 6 MMOL/L (ref 0–11.9)
APPEARANCE UR: CLEAR
AST SERPL-CCNC: 13 U/L (ref 12–45)
BASOPHILS # BLD AUTO: 0.3 % (ref 0–1.8)
BASOPHILS # BLD AUTO: 0.6 % (ref 0–1.8)
BASOPHILS # BLD: 0.01 K/UL (ref 0–0.12)
BASOPHILS # BLD: 0.02 K/UL (ref 0–0.12)
BILIRUB SERPL-MCNC: 0.3 MG/DL (ref 0.1–1.5)
BILIRUB UR QL STRIP.AUTO: NEGATIVE
BUN SERPL-MCNC: 39 MG/DL (ref 8–22)
CALCIUM SERPL-MCNC: 9.9 MG/DL (ref 8.5–10.5)
CHLORIDE SERPL-SCNC: 107 MMOL/L (ref 96–112)
CO2 SERPL-SCNC: 29 MMOL/L (ref 20–33)
COLOR UR: YELLOW
CREAT SERPL-MCNC: 1.21 MG/DL (ref 0.5–1.4)
EOSINOPHIL # BLD AUTO: 0.1 K/UL (ref 0–0.51)
EOSINOPHIL # BLD AUTO: 0.11 K/UL (ref 0–0.51)
EOSINOPHIL NFR BLD: 2.6 % (ref 0–6.9)
EOSINOPHIL NFR BLD: 3.1 % (ref 0–6.9)
ERYTHROCYTE [DISTWIDTH] IN BLOOD BY AUTOMATED COUNT: 52.7 FL (ref 35.9–50)
ERYTHROCYTE [DISTWIDTH] IN BLOOD BY AUTOMATED COUNT: 53.1 FL (ref 35.9–50)
GLOBULIN SER CALC-MCNC: 2.3 G/DL (ref 1.9–3.5)
GLUCOSE SERPL-MCNC: 146 MG/DL (ref 65–99)
GLUCOSE UR STRIP.AUTO-MCNC: NEGATIVE MG/DL
HCT VFR BLD AUTO: 29.4 % (ref 37–47)
HCT VFR BLD AUTO: 29.6 % (ref 37–47)
HGB BLD-MCNC: 8.9 G/DL (ref 12–16)
HGB BLD-MCNC: 9.1 G/DL (ref 12–16)
IMM GRANULOCYTES # BLD AUTO: 0.01 K/UL (ref 0–0.11)
IMM GRANULOCYTES # BLD AUTO: 0.01 K/UL (ref 0–0.11)
IMM GRANULOCYTES NFR BLD AUTO: 0.3 % (ref 0–0.9)
IMM GRANULOCYTES NFR BLD AUTO: 0.3 % (ref 0–0.9)
INR PPP: 1.71 (ref 0.87–1.13)
KETONES UR STRIP.AUTO-MCNC: NEGATIVE MG/DL
LEUKOCYTE ESTERASE UR QL STRIP.AUTO: NEGATIVE
LYMPHOCYTES # BLD AUTO: 0.75 K/UL (ref 1–4.8)
LYMPHOCYTES # BLD AUTO: 0.86 K/UL (ref 1–4.8)
LYMPHOCYTES NFR BLD: 19.1 % (ref 22–41)
LYMPHOCYTES NFR BLD: 24 % (ref 22–41)
MCH RBC QN AUTO: 32.7 PG (ref 27–33)
MCH RBC QN AUTO: 32.9 PG (ref 27–33)
MCHC RBC AUTO-ENTMCNC: 30.3 G/DL (ref 33.6–35)
MCHC RBC AUTO-ENTMCNC: 30.7 G/DL (ref 33.6–35)
MCV RBC AUTO: 106.9 FL (ref 81.4–97.8)
MCV RBC AUTO: 108.1 FL (ref 81.4–97.8)
MICRO URNS: NORMAL
MONOCYTES # BLD AUTO: 0.41 K/UL (ref 0–0.85)
MONOCYTES # BLD AUTO: 0.42 K/UL (ref 0–0.85)
MONOCYTES NFR BLD AUTO: 10.7 % (ref 0–13.4)
MONOCYTES NFR BLD AUTO: 11.4 % (ref 0–13.4)
NEUTROPHILS # BLD AUTO: 2.18 K/UL (ref 2–7.15)
NEUTROPHILS # BLD AUTO: 2.63 K/UL (ref 2–7.15)
NEUTROPHILS NFR BLD: 60.6 % (ref 44–72)
NEUTROPHILS NFR BLD: 67 % (ref 44–72)
NITRITE UR QL STRIP.AUTO: NEGATIVE
NRBC # BLD AUTO: 0 K/UL
NRBC # BLD AUTO: 0 K/UL
NRBC BLD-RTO: 0 /100 WBC
NRBC BLD-RTO: 0 /100 WBC
PH UR STRIP.AUTO: 6 [PH]
PLATELET # BLD AUTO: 148 K/UL (ref 164–446)
PLATELET # BLD AUTO: 149 K/UL (ref 164–446)
PMV BLD AUTO: 8.8 FL (ref 9–12.9)
PMV BLD AUTO: 9 FL (ref 9–12.9)
POTASSIUM SERPL-SCNC: 4.5 MMOL/L (ref 3.6–5.5)
PROT SERPL-MCNC: 5.8 G/DL (ref 6–8.2)
PROT UR QL STRIP: NEGATIVE MG/DL
PROTHROMBIN TIME: 20.6 SEC (ref 12–14.6)
RBC # BLD AUTO: 2.72 M/UL (ref 4.2–5.4)
RBC # BLD AUTO: 2.77 M/UL (ref 4.2–5.4)
RBC UR QL AUTO: NEGATIVE
SODIUM SERPL-SCNC: 142 MMOL/L (ref 135–145)
SP GR UR STRIP.AUTO: 1.01
UROBILINOGEN UR STRIP.AUTO-MCNC: 0.2 MG/DL
VANCOMYCIN SERPL-MCNC: 34.9 UG/ML
WBC # BLD AUTO: 3.6 K/UL (ref 4.8–10.8)
WBC # BLD AUTO: 3.9 K/UL (ref 4.8–10.8)

## 2019-07-07 PROCEDURE — 80202 ASSAY OF VANCOMYCIN: CPT

## 2019-07-07 PROCEDURE — 700117 HCHG RX CONTRAST REV CODE 255: Performed by: HOSPITALIST

## 2019-07-07 PROCEDURE — 770006 HCHG ROOM/CARE - MED/SURG/GYN SEMI*

## 2019-07-07 PROCEDURE — A9270 NON-COVERED ITEM OR SERVICE: HCPCS | Mod: JG | Performed by: HOSPITALIST

## 2019-07-07 PROCEDURE — A9585 GADOBUTROL INJECTION: HCPCS | Performed by: HOSPITALIST

## 2019-07-07 PROCEDURE — G0378 HOSPITAL OBSERVATION PER HR: HCPCS

## 2019-07-07 PROCEDURE — 700102 HCHG RX REV CODE 250 W/ 637 OVERRIDE(OP): Mod: JG | Performed by: HOSPITALIST

## 2019-07-07 PROCEDURE — 36415 COLL VENOUS BLD VENIPUNCTURE: CPT

## 2019-07-07 PROCEDURE — A9270 NON-COVERED ITEM OR SERVICE: HCPCS | Performed by: HOSPITALIST

## 2019-07-07 PROCEDURE — 70553 MRI BRAIN STEM W/O & W/DYE: CPT

## 2019-07-07 PROCEDURE — 85025 COMPLETE CBC W/AUTO DIFF WBC: CPT

## 2019-07-07 PROCEDURE — 85610 PROTHROMBIN TIME: CPT

## 2019-07-07 PROCEDURE — 97165 OT EVAL LOW COMPLEX 30 MIN: CPT

## 2019-07-07 PROCEDURE — 81003 URINALYSIS AUTO W/O SCOPE: CPT

## 2019-07-07 PROCEDURE — 700111 HCHG RX REV CODE 636 W/ 250 OVERRIDE (IP): Performed by: HOSPITALIST

## 2019-07-07 PROCEDURE — 80053 COMPREHEN METABOLIC PANEL: CPT

## 2019-07-07 PROCEDURE — 99226 PR SUBSEQUENT OBSERVATION CARE,LEVEL III: CPT | Performed by: HOSPITALIST

## 2019-07-07 PROCEDURE — 700105 HCHG RX REV CODE 258: Performed by: HOSPITALIST

## 2019-07-07 PROCEDURE — 700102 HCHG RX REV CODE 250 W/ 637 OVERRIDE(OP): Performed by: HOSPITALIST

## 2019-07-07 PROCEDURE — 97161 PT EVAL LOW COMPLEX 20 MIN: CPT

## 2019-07-07 RX ORDER — SOTALOL HYDROCHLORIDE 80 MG/1
80 TABLET ORAL
Status: DISCONTINUED | OUTPATIENT
Start: 2019-07-08 | End: 2019-07-09

## 2019-07-07 RX ORDER — LISINOPRIL 5 MG/1
5 TABLET ORAL
Status: DISCONTINUED | OUTPATIENT
Start: 2019-07-08 | End: 2019-07-10 | Stop reason: HOSPADM

## 2019-07-07 RX ORDER — GADOBUTROL 604.72 MG/ML
10 INJECTION INTRAVENOUS ONCE
Status: COMPLETED | OUTPATIENT
Start: 2019-07-07 | End: 2019-07-07

## 2019-07-07 RX ADMIN — AMBRISENTAN 10 MG: 10 TABLET, FILM COATED ORAL at 06:00

## 2019-07-07 RX ADMIN — OXYCODONE HYDROCHLORIDE 10 MG: 10 TABLET ORAL at 09:09

## 2019-07-07 RX ADMIN — SODIUM CHLORIDE: 9 INJECTION, SOLUTION INTRAVENOUS at 05:14

## 2019-07-07 RX ADMIN — OXYCODONE HYDROCHLORIDE 10 MG: 10 TABLET ORAL at 01:57

## 2019-07-07 RX ADMIN — SENNOSIDES, DOCUSATE SODIUM 2 TABLET: 50; 8.6 TABLET, FILM COATED ORAL at 04:34

## 2019-07-07 RX ADMIN — OXYCODONE HYDROCHLORIDE 10 MG: 10 TABLET ORAL at 20:14

## 2019-07-07 RX ADMIN — DIPHENHYDRAMINE HCL 25 MG: 25 TABLET ORAL at 09:13

## 2019-07-07 RX ADMIN — SENNOSIDES, DOCUSATE SODIUM 2 TABLET: 50; 8.6 TABLET, FILM COATED ORAL at 17:44

## 2019-07-07 RX ADMIN — GADOBUTROL 10 ML: 604.72 INJECTION INTRAVENOUS at 11:39

## 2019-07-07 RX ADMIN — DIPHENHYDRAMINE HCL 25 MG: 25 TABLET ORAL at 03:35

## 2019-07-07 RX ADMIN — DIPHENHYDRAMINE HCL 25 MG: 25 TABLET ORAL at 21:38

## 2019-07-07 RX ADMIN — TADALAFIL 40 MG: 10 TABLET ORAL at 06:00

## 2019-07-07 RX ADMIN — PAROXETINE HYDROCHLORIDE 20 MG: 20 TABLET, FILM COATED ORAL at 04:35

## 2019-07-07 RX ADMIN — ROSUVASTATIN CALCIUM 10 MG: 5 TABLET, FILM COATED ORAL at 17:44

## 2019-07-07 RX ADMIN — DIPHENHYDRAMINE HCL 25 MG: 25 TABLET ORAL at 15:16

## 2019-07-07 RX ADMIN — CEFTRIAXONE SODIUM 2 G: 2 INJECTION, POWDER, FOR SOLUTION INTRAMUSCULAR; INTRAVENOUS at 17:44

## 2019-07-07 RX ADMIN — OXYCODONE HYDROCHLORIDE 10 MG: 10 TABLET ORAL at 15:16

## 2019-07-07 RX ADMIN — SOTALOL HYDROCHLORIDE 80 MG: 80 TABLET ORAL at 04:34

## 2019-07-07 RX ADMIN — LEVOTHYROXINE SODIUM 75 MCG: 75 TABLET ORAL at 04:35

## 2019-07-07 RX ADMIN — LISINOPRIL 20 MG: 20 TABLET ORAL at 04:35

## 2019-07-07 ASSESSMENT — COGNITIVE AND FUNCTIONAL STATUS - GENERAL
HELP NEEDED FOR BATHING: A LITTLE
DAILY ACTIVITIY SCORE: 23
SUGGESTED CMS G CODE MODIFIER DAILY ACTIVITY: CI
SUGGESTED CMS G CODE MODIFIER MOBILITY: CI
MOBILITY SCORE: 23
CLIMB 3 TO 5 STEPS WITH RAILING: A LITTLE

## 2019-07-07 ASSESSMENT — GAIT ASSESSMENTS
ASSISTIVE DEVICE: FRONT WHEEL WALKER
DISTANCE (FEET): 75
GAIT LEVEL OF ASSIST: SUPERVISED

## 2019-07-07 ASSESSMENT — ENCOUNTER SYMPTOMS
FEVER: 1
VOMITING: 0
EYE DISCHARGE: 0
PHOTOPHOBIA: 0
SHORTNESS OF BREATH: 0
NECK PAIN: 1
PALPITATIONS: 0
FOCAL WEAKNESS: 0
EYE REDNESS: 0
COUGH: 0
DIARRHEA: 0
HEADACHES: 1
ABDOMINAL PAIN: 0
BACK PAIN: 1
WHEEZING: 0
SPEECH CHANGE: 0
FLANK PAIN: 0
DOUBLE VISION: 0
STRIDOR: 0
CHILLS: 1
BLURRED VISION: 0
DIAPHORESIS: 0

## 2019-07-07 ASSESSMENT — ACTIVITIES OF DAILY LIVING (ADL): TOILETING: INDEPENDENT

## 2019-07-07 NOTE — PROGRESS NOTES
"Pharmacy Kinetics 74 y.o. female on vancomycin day # 2 2019    Received vancomycin loading dose     Indication for Treatment: Rule out epidural abscess      Pertinent history per medical record: Admitted on 2019. PMH of pulmonary artery hypertension, atrial fibrillation on Coumadin, hypertension, hyperlipidemia who presents with back pain with radiculopathy.  This patient has had low back pain with fevers for approximately 1 week.  She had an epidural 2 weeks ago for steroid injection of her lumbar spine.  She states that she is been having low-grade fevers up to 100 °F at home.  She is also had some chills.  Labs at outside facility. BUN 62 creatinine 1.4  Lasix, Lisinopril held due to MILAN     Other antibiotics: Ceftriaxone     Allergies: Patient has no known allergies.      List concerns for renal function: MILAN, BUN/SCr ratio > 20:1, age, obesity (39), will receive significant IV contrast with MRI     Pertinent cultures to date:   19 Blood cultures x2 - NGTD    MRSA nares swab if pneumonia is a concern (ordered/positive/negative/n-a): N/A    Recent Labs      19   0408  19   1329   WBC  3.9*  3.6*   NEUTSPOLYS  67.00  60.60     Recent Labs      19   0146  19   0408   BUN  54*  39*   CREATININE  1.11  1.21   ALBUMIN  4.4  3.5     Recent Labs      19   0408   VANCORANDOM  34.9     Intake/Output Summary (Last 24 hours) at 19 1600  Last data filed at 19 1300   Gross per 24 hour   Intake             1900 ml   Output               60 ml   Net             1840 ml      BP (!) 94/60   Pulse 63   Temp 36.7 °C (98.1 °F) (Temporal)   Resp 16   Ht 1.702 m (5' 7\")   Wt 114.1 kg (251 lb 8.7 oz)   SpO2 96%  Temp (24hrs), Av.1 °C (98.7 °F), Min:36.1 °C (96.9 °F), Max:37.7 °C (99.8 °F)      A/P   1. Vancomycin dose change: none; continue to pulse dose  2. Next vancomycin level: ~48hr random level at 0400 on   3. Goal trough: 18-22 mcg/mL  4. Comments: Vancomycin " continued as brain MRI continues to be pending.  MRI of spine negative for epidural abscess or osteomyelitis.  Due to BMI, patient is likely retaining vancomycin.  Will assess random 48hr level in AM.    Jb Rodarte, UrmilaD, BCPS

## 2019-07-07 NOTE — THERAPY
"Physical Therapy Evaluation completed.   Bed Mobility:  Supine to Sit: Supervised  Transfers: Sit to Stand: Supervised  Gait: Level Of Assist: Supervised with Front-Wheel Walker       Plan of Care: Patient with no further skilled PT needs in the acute care setting at this time and Patient demonstrates safety with mobility in this environment at this time.   Discharge Recommendations: Equipment: No Equipment Needed. Post-acute therapy Discharge to home with home health for additional skilled therapy services.    See \"Rehab Therapy-Acute\" Patient Summary Report for complete documentation.       Pt is a 75 y/o female presenting to acute setting with back pain, fevers and HA. Pt currently mobilizing well with a FWW and is able to perform household level mobility without SOB while on her prior level of 5L O2. Pt with no further acute PT needs at this time and appears safe to DC home and resume home health therapies once medically able to do so. PT to remain available for DC needs only.  "

## 2019-07-07 NOTE — PROGRESS NOTES
Hospital Medicine Daily Progress Note    Date of Service  7/7/2019    Chief Complaint  74 y.o. female admitted 7/5/2019 with back pain, fevers, headache.  Hospital Course      74-year-old female history of pulmonary hypertension, chronic respiratory failure O2 dependent 5 L, A. fib on Coumadin, hypertension, dyslipidemia, chronic back pain, obesity, lumbar epidural steroid injection proxy 2 weeks ago admitted with back pain with headaches fever.  Started on empiric IV antibiotics for possible infection.    Interval Problem Update    Mildly hypotensive sbp 90s. No fever this morning.  Blood cultures NGTD. [place] MRI pending.     Consultants/Specialty    None     Code Status  Full     Disposition  Plan additional workup .    Review of Systems  Review of Systems   Constitutional: Positive for chills, fever and malaise/fatigue. Negative for diaphoresis.   HENT: Negative for congestion.         Horse  Voice sore throat.    Eyes: Negative for blurred vision, double vision, photophobia, discharge and redness.   Respiratory: Negative for cough, shortness of breath, wheezing and stridor.    Cardiovascular: Negative for chest pain, palpitations and leg swelling.   Gastrointestinal: Negative for abdominal pain, diarrhea and vomiting.   Genitourinary: Negative for flank pain and hematuria.   Musculoskeletal: Positive for back pain and neck pain. Negative for joint pain.        Chronic pain    Neurological: Positive for headaches. Negative for speech change and focal weakness.        Physical Exam  Temp:  [36.1 °C (96.9 °F)-37.7 °C (99.8 °F)] 36.7 °C (98.1 °F)  Pulse:  [53-84] 63  Resp:  [16-20] 16  BP: ()/(39-65) 94/60  SpO2:  [91 %-96 %] 96 %    Physical Exam   Constitutional: She is oriented to person, place, and time. No distress.   HENT:   Head: Normocephalic and atraumatic.   Right Ear: External ear normal.   Left Ear: External ear normal.   Nose: Nose normal.   Eyes: EOM are normal. Right eye exhibits no discharge.  Left eye exhibits no discharge. No scleral icterus.   Neck:   No nuchal rigidity    Cardiovascular: Normal rate and regular rhythm.    No murmur heard.  Pulmonary/Chest: She has no wheezes. She has no rales.   Abdominal: Soft. Bowel sounds are normal. She exhibits no distension. There is no tenderness.   Musculoskeletal: She exhibits tenderness (lower back ). She exhibits no edema.   Neurological: She is alert and oriented to person, place, and time. No cranial nerve deficit.   No focal extremity weakness   Skin: Skin is warm and dry. She is not diaphoretic. No pallor.   Psychiatric: She has a normal mood and affect. Her behavior is normal.   Vitals reviewed.      Fluids    Intake/Output Summary (Last 24 hours) at 07/07/19 1133  Last data filed at 07/07/19 0600   Gross per 24 hour   Intake             1660 ml   Output               60 ml   Net             1600 ml       Laboratory  Recent Labs      07/07/19   0408   WBC  3.9*   RBC  2.77*   HEMOGLOBIN  9.1*   HEMATOCRIT  29.6*   MCV  106.9*   MCH  32.9   MCHC  30.7*   RDW  52.7*   PLATELETCT  149*   MPV  9.0     Recent Labs      07/06/19   0146  07/07/19   0408   SODIUM  144  142   POTASSIUM  4.9  4.5   CHLORIDE  104  107   CO2  27  29   GLUCOSE  102*  146*   BUN  54*  39*   CREATININE  1.11  1.21   CALCIUM  10.4  9.9     Recent Labs      07/06/19   0146  07/07/19   0408   INR  1.88*  1.71*               Imaging  CT-HEAD W/O   Final Result      1.  Asymmetric low attenuation area in the white matter in the anterior aspect of left frontal convexity measuring approximately 2.2 cm in diameter. Differential diagnosis includes an area of prior ischemia or hemorrhage.      2.  Correlation with enhanced MRI or CT may be of value to exclude acute process.      3.  Underlying mild atrophy and small vessel ischemic changes.      4.  No hemorrhage or mass effect.      Findings were discussed with BOB GIANG M.D. on 7/6/2019 4:16 PM.      MR-LUMBAR SPINE-WITH & W/O   Final  Result      1.  L3-4 grade 1 spondylolisthesis. L5-S1 slight grade 1 spondylolisthesis.   2.  Incidental hemangiomas in the T11 and L4 vertebral bodies. No clinical significance.   3.  L3-4 mild-moderate hypertrophic facet arthropathy. No central or foraminal stenosis.   4.  L4-5 minimal facet arthropathy.   5.  Incidental small perineural cyst at the S2 segment level. Doubtful clinical significance.   6.  No evidence of osteomyelitis, discitis, or epidural abscess.      MR-BRAIN-WITH & W/O    (Results Pending)        Assessment/Plan  * Fever, low grade   Assessment & Plan    Unclear source. UA neg.  Blood Cxs NGTD.  Continue IV antibiotics for now.   Abnormal CT H scan with asymmetric low attenuation.-fu MRI head with and without contrast for ischemic process, acute inflammatory changes.  Although no photophobia, leukocytosis, meningismus signs or high-grade fevers hold warfarin for now in consideration of  LP   Prn tylenol.      Back pain with radiculopathy   Assessment & Plan    High risk given recent epidural injection -MRI without acute inflammatory changes, signs of discitis, osteomyelitis or abscess.  Fever at .8, elevated esr/crp - Fevers resolving. CT head abnl- no reported hx of CVA-- fu MRI Brain.  Patient with headache and low-grade fever although without photophobia, elevated white count, meningismus signs.  Hold warfarin for now.  Follow-up INR 1.77. Consider LP if persistent headache, fevers and depending on results of brain MRI.       Chronic respiratory failure (HCC)   Assessment & Plan    Due to pulmonary hypertension.  Stable respiratory symptoms.  Continue outpatient medications.     MILAN (acute kidney injury) (HCC)   Assessment & Plan    Suspect hypovolemic   Holding diuretics / ace inh   Avoid nephrotoxic medications   Follow renal function, electrolytes, urine output.  Continue IVFs, encourage intake.      PAH (pulmonary artery hypertension) (HCC)   Assessment & Plan    Stable  respiratory symptoms.    resume home letairis, cialis   O2 support.     AF (atrial fibrillation) (HCC)   Assessment & Plan    Controlled   Mg, K ok- Continue Sotalol   Plan to hold warfarin for possible LP.  Discussed risks with patient, daughter   Monitor heart rate.     Hypothyroidism- (present on admission)   Assessment & Plan    Resume levothyroxine      Essential hypertension- (present on admission)   Assessment & Plan    BP low side, asympt   Holding  diuretics, ACE inh  Decrease lisinopril w holding parameters  IVF  Monitor BP      Anemia  w mild thrombocytopenia- fu hgb , plts     VTE prophylaxis: warfarin     Discussed with daughter and  plan of care.

## 2019-07-07 NOTE — PROGRESS NOTES
Incentive Spirometer education completed and pt observed for 10 inhalations. Pt able to reach 1500 meals. Also had patient TCDB to clear mild expiratory wheeze in lower right lobe.

## 2019-07-07 NOTE — CARE PLAN
Problem: Communication  Goal: The ability to communicate needs accurately and effectively will improve  Outcome: PROGRESSING AS EXPECTED  Discussed POC with patient and family at bedside, whiteboard updated, all questions answered and patient encouraged to express concerns and questions.     Problem: Respiratory:  Goal: Respiratory status will improve  Outcome: PROGRESSING AS EXPECTED  Pt educated on turning, coughing, deep breathing and IS use. Supplement oxygen as needed.

## 2019-07-07 NOTE — CARE PLAN
Problem: Communication  Goal: The ability to communicate needs accurately and effectively will improve  Outcome: PROGRESSING AS EXPECTED      Problem: Safety  Goal: Will remain free from injury  Outcome: PROGRESSING AS EXPECTED      Problem: Pain Management  Goal: Pain level will decrease to patient's comfort goal  Outcome: PROGRESSING AS EXPECTED  Educated pt on pain scale and appropriate pain management.

## 2019-07-07 NOTE — THERAPY
"Occupational Therapy Evaluation completed.   Functional Status:  SPV level BADLs in this setting  Plan of Care: Patient with no further skilled OT needs in the acute care setting at this time  Discharge Recommendations:  Equipment: No Equipment Needed. Post-acute therapy Recommend home health transitional care for continued occupational therapy services.     See \"Rehab Therapy-Acute\" Patient Summary Report for complete documentation.      Pt is a 73 y/o female who presents to acute due to back pain, fevers and headach. PMH includes pulmonary HTN, chronic respiratory failure 02 dependent, A-fib, and chronic back pain. Pt reports she has been receiving home health services w/ emphasis on energy conservation. Pt appears to be at functional baseline performing BADLs at SPV level. Recommend DC home w/ HH. No further Acute OT needs noted at this time.   "

## 2019-07-08 ENCOUNTER — APPOINTMENT (OUTPATIENT)
Dept: RADIOLOGY | Facility: MEDICAL CENTER | Age: 74
DRG: 552 | End: 2019-07-08
Attending: HOSPITALIST
Payer: MEDICARE

## 2019-07-08 ENCOUNTER — APPOINTMENT (OUTPATIENT)
Dept: RADIOLOGY | Facility: MEDICAL CENTER | Age: 74
DRG: 552 | End: 2019-07-08
Attending: NURSE PRACTITIONER
Payer: MEDICARE

## 2019-07-08 PROBLEM — G93.9 BRAIN LESION: Status: ACTIVE | Noted: 2019-07-08

## 2019-07-08 PROBLEM — N39.0 UTI (URINARY TRACT INFECTION): Status: ACTIVE | Noted: 2019-07-08

## 2019-07-08 LAB
ANION GAP SERPL CALC-SCNC: 5 MMOL/L (ref 0–11.9)
BASOPHILS # BLD AUTO: 0.3 % (ref 0–1.8)
BASOPHILS # BLD: 0.01 K/UL (ref 0–0.12)
BUN SERPL-MCNC: 30 MG/DL (ref 8–22)
CALCIUM SERPL-MCNC: 10.1 MG/DL (ref 8.5–10.5)
CHLORIDE SERPL-SCNC: 110 MMOL/L (ref 96–112)
CO2 SERPL-SCNC: 27 MMOL/L (ref 20–33)
CREAT SERPL-MCNC: 1.02 MG/DL (ref 0.5–1.4)
EOSINOPHIL # BLD AUTO: 0.12 K/UL (ref 0–0.51)
EOSINOPHIL NFR BLD: 3.5 % (ref 0–6.9)
ERYTHROCYTE [DISTWIDTH] IN BLOOD BY AUTOMATED COUNT: 52.6 FL (ref 35.9–50)
GLUCOSE SERPL-MCNC: 106 MG/DL (ref 65–99)
HCT VFR BLD AUTO: 28.1 % (ref 37–47)
HGB BLD-MCNC: 8.7 G/DL (ref 12–16)
IMM GRANULOCYTES # BLD AUTO: 0.02 K/UL (ref 0–0.11)
IMM GRANULOCYTES NFR BLD AUTO: 0.6 % (ref 0–0.9)
INR PPP: 1.3 (ref 0.87–1.13)
LYMPHOCYTES # BLD AUTO: 0.79 K/UL (ref 1–4.8)
LYMPHOCYTES NFR BLD: 23.1 % (ref 22–41)
MCH RBC QN AUTO: 33.3 PG (ref 27–33)
MCHC RBC AUTO-ENTMCNC: 31 G/DL (ref 33.6–35)
MCV RBC AUTO: 107.7 FL (ref 81.4–97.8)
MONOCYTES # BLD AUTO: 0.39 K/UL (ref 0–0.85)
MONOCYTES NFR BLD AUTO: 11.4 % (ref 0–13.4)
NEUTROPHILS # BLD AUTO: 2.09 K/UL (ref 2–7.15)
NEUTROPHILS NFR BLD: 61.1 % (ref 44–72)
NRBC # BLD AUTO: 0 K/UL
NRBC BLD-RTO: 0 /100 WBC
PLATELET # BLD AUTO: 141 K/UL (ref 164–446)
PMV BLD AUTO: 8.9 FL (ref 9–12.9)
POTASSIUM SERPL-SCNC: 4.8 MMOL/L (ref 3.6–5.5)
PROTHROMBIN TIME: 16.5 SEC (ref 12–14.6)
RBC # BLD AUTO: 2.61 M/UL (ref 4.2–5.4)
SODIUM SERPL-SCNC: 142 MMOL/L (ref 135–145)
VANCOMYCIN SERPL-MCNC: 13.3 UG/ML
WBC # BLD AUTO: 3.4 K/UL (ref 4.8–10.8)

## 2019-07-08 PROCEDURE — 770006 HCHG ROOM/CARE - MED/SURG/GYN SEMI*

## 2019-07-08 PROCEDURE — 700117 HCHG RX CONTRAST REV CODE 255: Performed by: HOSPITALIST

## 2019-07-08 PROCEDURE — 700102 HCHG RX REV CODE 250 W/ 637 OVERRIDE(OP): Performed by: HOSPITALIST

## 2019-07-08 PROCEDURE — 82042 OTHER SOURCE ALBUMIN QUAN EA: CPT

## 2019-07-08 PROCEDURE — 700111 HCHG RX REV CODE 636 W/ 250 OVERRIDE (IP): Performed by: HOSPITALIST

## 2019-07-08 PROCEDURE — A9270 NON-COVERED ITEM OR SERVICE: HCPCS | Performed by: HOSPITALIST

## 2019-07-08 PROCEDURE — 36415 COLL VENOUS BLD VENIPUNCTURE: CPT

## 2019-07-08 PROCEDURE — 85610 PROTHROMBIN TIME: CPT

## 2019-07-08 PROCEDURE — 99204 OFFICE O/P NEW MOD 45 MIN: CPT | Mod: GC | Performed by: PSYCHIATRY & NEUROLOGY

## 2019-07-08 PROCEDURE — 87798 DETECT AGENT NOS DNA AMP: CPT

## 2019-07-08 PROCEDURE — 99226 PR SUBSEQUENT OBSERVATION CARE,LEVEL III: CPT | Performed by: HOSPITALIST

## 2019-07-08 PROCEDURE — 82040 ASSAY OF SERUM ALBUMIN: CPT

## 2019-07-08 PROCEDURE — 700105 HCHG RX REV CODE 258: Performed by: HOSPITALIST

## 2019-07-08 PROCEDURE — 80202 ASSAY OF VANCOMYCIN: CPT

## 2019-07-08 PROCEDURE — 83916 OLIGOCLONAL BANDS: CPT

## 2019-07-08 PROCEDURE — 70496 CT ANGIOGRAPHY HEAD: CPT

## 2019-07-08 PROCEDURE — 82784 ASSAY IGA/IGD/IGG/IGM EACH: CPT

## 2019-07-08 PROCEDURE — 80048 BASIC METABOLIC PNL TOTAL CA: CPT

## 2019-07-08 PROCEDURE — 85025 COMPLETE CBC W/AUTO DIFF WBC: CPT

## 2019-07-08 PROCEDURE — G0378 HOSPITAL OBSERVATION PER HR: HCPCS

## 2019-07-08 RX ADMIN — CEFTRIAXONE SODIUM 2 G: 2 INJECTION, POWDER, FOR SOLUTION INTRAMUSCULAR; INTRAVENOUS at 10:46

## 2019-07-08 RX ADMIN — PAROXETINE HYDROCHLORIDE 20 MG: 20 TABLET, FILM COATED ORAL at 04:41

## 2019-07-08 RX ADMIN — SENNOSIDES, DOCUSATE SODIUM 2 TABLET: 50; 8.6 TABLET, FILM COATED ORAL at 04:40

## 2019-07-08 RX ADMIN — LISINOPRIL 5 MG: 5 TABLET ORAL at 04:40

## 2019-07-08 RX ADMIN — SOTALOL HYDROCHLORIDE 80 MG: 80 TABLET ORAL at 04:42

## 2019-07-08 RX ADMIN — IOHEXOL 100 ML: 350 INJECTION, SOLUTION INTRAVENOUS at 15:28

## 2019-07-08 RX ADMIN — DIPHENHYDRAMINE HCL 25 MG: 25 TABLET ORAL at 17:00

## 2019-07-08 RX ADMIN — OXYCODONE HYDROCHLORIDE 10 MG: 10 TABLET ORAL at 09:23

## 2019-07-08 RX ADMIN — ROSUVASTATIN CALCIUM 10 MG: 5 TABLET, FILM COATED ORAL at 18:41

## 2019-07-08 RX ADMIN — OXYCODONE HYDROCHLORIDE 10 MG: 10 TABLET ORAL at 12:48

## 2019-07-08 RX ADMIN — AMBRISENTAN 10 MG: 10 TABLET, FILM COATED ORAL at 04:44

## 2019-07-08 RX ADMIN — LEVOTHYROXINE SODIUM 75 MCG: 75 TABLET ORAL at 04:41

## 2019-07-08 RX ADMIN — DIPHENHYDRAMINE HCL 25 MG: 25 TABLET ORAL at 04:40

## 2019-07-08 RX ADMIN — OXYCODONE HYDROCHLORIDE 10 MG: 10 TABLET ORAL at 03:03

## 2019-07-08 RX ADMIN — DIPHENHYDRAMINE HCL 25 MG: 25 TABLET ORAL at 10:41

## 2019-07-08 RX ADMIN — OXYCODONE HYDROCHLORIDE 10 MG: 10 TABLET ORAL at 17:00

## 2019-07-08 RX ADMIN — SODIUM CHLORIDE: 9 INJECTION, SOLUTION INTRAVENOUS at 20:55

## 2019-07-08 RX ADMIN — OXYCODONE HYDROCHLORIDE 10 MG: 10 TABLET ORAL at 20:55

## 2019-07-08 RX ADMIN — TADALAFIL 40 MG: 10 TABLET ORAL at 04:43

## 2019-07-08 RX ADMIN — SODIUM CHLORIDE: 9 INJECTION, SOLUTION INTRAVENOUS at 06:16

## 2019-07-08 RX ADMIN — SENNOSIDES, DOCUSATE SODIUM 2 TABLET: 50; 8.6 TABLET, FILM COATED ORAL at 18:40

## 2019-07-08 ASSESSMENT — ENCOUNTER SYMPTOMS
EYE DISCHARGE: 0
BACK PAIN: 1
WEIGHT LOSS: 0
HEADACHES: 1
FEVER: 1
EYE REDNESS: 0
VOMITING: 0
COUGH: 0
FOCAL WEAKNESS: 0
SHORTNESS OF BREATH: 0
FLANK PAIN: 0
DIAPHORESIS: 0
NECK PAIN: 1
DIZZINESS: 0
SPEECH CHANGE: 0
ABDOMINAL PAIN: 0
DOUBLE VISION: 0
BLURRED VISION: 0
CHILLS: 1
HALLUCINATIONS: 0
STRIDOR: 0
PHOTOPHOBIA: 0
WHEEZING: 0
PALPITATIONS: 0
DIARRHEA: 0

## 2019-07-08 ASSESSMENT — LIFESTYLE VARIABLES: SUBSTANCE_ABUSE: 0

## 2019-07-08 NOTE — PROGRESS NOTES
RN from [facility] called with positive UA with Ecoli. Dr Nichols informed of results. No new orders at this time

## 2019-07-08 NOTE — PROGRESS NOTES
Mid back pain managed w/ PRN medication per MAR, good results. Reports itching when taking pain meds, PRN Benedryl given. AOx4, lung sounds clear. NS at 100cc/hr. Standby assist. Calls appropriately.

## 2019-07-08 NOTE — CARE PLAN
Problem: Safety  Goal: Will remain free from falls  Outcome: PROGRESSING AS EXPECTED  Reviewed patient's mobility status, discussed with care team of patient needs, verifying appropriate safety precautions in place, providing patient education, ensuring call lights are within reach, non-slip socks in use, evaluating needs alarms & monitoring every shift, continuing with current plan of care.      Problem: Knowledge Deficit  Goal: Knowledge of disease process/condition, treatment plan, diagnostic tests, and medications will improve  Outcome: PROGRESSING AS EXPECTED  Educated patient about POC, activities, encouraging patient to ask questions, providing answers to patient's questions, educating patient about medications, encouraging patient involvement in care process. Continuing with current POC.

## 2019-07-08 NOTE — DISCHARGE PLANNING
Care Transition Team Assessment    Information Source  Orientation : Oriented x 4  Information Given By: Patient  Informant's Name: Zeinab  Who is responsible for making decisions for patient? : Patient    Readmission Evaluation  Is this a readmission?: No    Elopement Risk  Legal Hold: No  Ambulatory or Self Mobile in Wheelchair: Yes  Disoriented: No  Psychiatric Symptoms: None  History of Wandering: No  Elopement this Admit: No  Vocalizing Wanting to Leave: No  Displays Behaviors, Body Language Wanting to Leave: No-Not at Risk for Elopement  Elopement Risk: Not at Risk for Elopement    Interdisciplinary Discharge Planning  Does Admitting Nurse Feel This Could be a Complex Discharge?: No  Primary Care Physician: Ashia Tidwell  Lives with - Patient's Self Care Capacity: Spouse, Adult Children  Patient or legal guardian wants to designate a caregiver (see row info): No  Support Systems: Spouse / Significant Other, Children  Housing / Facility: [address] [place] [address]  Do You Take your Prescribed Medications Regularly: Yes  Able to Return to Previous ADL's: No  Mobility Issues: Yes  Prior Services: Skilled Home Health Services, Other (Comments) (Home Oxygen)  Patient Expects to be Discharged to: Home with Home Health Care  Assistance Needed: Yes  Durable Medical Equipment: Home Oxygen, Other - Specify (physical therapy )  DME Provider / Phone: [facility] [phone] and [facility] for oxygen [phone]    Discharge Preparedness  What is your plan after discharge?: Home health care  What are your discharge supports?: Spouse, Child  Prior Functional Level: Needs Assist with Activities of Daily Living, Independent with Medication Management  Difficulity with ADLs: Walking  Difficulty with ADLs Comment: Using [facility] for physical therapy  Difficulity with IADLs: Laundry, Driving, Cooking  Difficulity with IADL Comments: Family helps with needs prior to admission    Functional Assesment  Prior Functional  Level: Needs Assist with Activities of Daily Living, Independent with Medication Management    Finances  Financial Barriers to Discharge: No  Prescription Coverage: Yes    Vision / Hearing Impairment  Vision Impairment : Yes  Right Eye Vision: Impaired, Wears Glasses  Left Eye Vision: Impaired, Wears Glasses  Hearing Impairment : No    Values / Beliefs / Concerns  Values / Beliefs Concerns : No    Advance Directive  Advance Directive?: Living Will    Domestic Abuse  Have you ever been the victim of abuse or violence?: No  Physical Abuse or Sexual Abuse: No  Verbal Abuse or Emotional Abuse: No  Possible Abuse Reported to:: Not Applicable    Psychological Assessment  History of Substance Abuse: None  History of Psychiatric Problems: No  Non-compliant with Treatment: No  Newly Diagnosed Illness: No    Discharge Risks or Barriers  Discharge risks or barriers?: No    Anticipated Discharge Information  Anticipated discharge disposition: Home, [facility]  Discharge Address: [address] [place] [id number]  Discharge Contact Phone Number: [phone]

## 2019-07-08 NOTE — CONSULTS
Neurology Consult Note:  Resident:  Lobo Sierra M.D.  Attending:  Dr Giles Holcomb    Date of Note:  2019    Referring MD:  Dr Luis Miguel Nichols    Patient ID:  Zeinab Jacome 1945   Age/Sex 74 y.o. female   MRN [id number]       Chief Complaint / Reason for Consult:  Acute on chronic worsening headaches with fevers for 7 days and back pain    History of Presenting Illness:  Zeinab Loza is a 74 y.o. with past medical history of pulmonary artery hypertension, atrial fibrillation on Coumadin, hypertension, hyperlipidemia, migraines who presented on 2019 with back pain associated with radiculopathy. Patient had reported low back pain with fevers for approximately 1 week, with epidural 2 weeks ago for steroid injection of her lumbar spine. Stated that she had been having low-grade fevers up to 100 °F at home with some chills. Reported that her abdominal back pain radiated down her legs, no weakness, no numbness no tingling, no saddle anesthesia, no history of IV drug use. Per chart review, outside hospital records patient showed WBC count 4.5, hemoglobin 10.8, platelet count 187, ESR elevated at 33, INR 2.2, sodium 140 potassium 5.1, chloride 103, CO2 30, anion gap 7.0, glucose 187, BUN 62, creatinine 1.4, LFTs unremarkable, CRP 9.0, urinalysis was unremarkable except for moderate bacteria 1+ leukocyte esterase, and outside hospital temperature was 100.8 °F, respiratory rate 18, pulse rate 82, blood pressure 114/54. Patient was admitted for back pain with headaches and fever and started on empiric IV antibiotics for possible infection.      Review of Symptoms  GEN/CONST:   States fevers for past 7 days with some chills.  H/E:     States worsening severity of headaches for past 7 days duration, front of her head and throbbing in character. Denies blurry vision or eye pain.   ENT:   Denies nasal congestion, sore throat, or ear pain.   CARDIO:   Denies chest pain, palpitations,  orthopnea, or edema.  RESP:  Denies shortness of breath, wheezing, or coughing.   GI:    Denies nausea, vomiting, diarrhea, constipation, or abdominal pain.   :   Denies dysuria or frequency.    HEME:  Denies easy bruising or bleeding  ALLG:  Denies allergies, asthma, or hives.    MSK:  Denies focal weakness, joint pains, or swellings,   SKIN:  Denies rashes, lumps/bumps, or sores.   NEURO:  Denies headache, confusion, memory loss, or paresthesias.   ENDO:  Denies heat or cold intolerance, polyuria, or polydipsia.  PSYCH:  Denies mood disorders or substance abuse.      Past Medical History:   History reviewed. No pertinent past medical history.    Past Surgical History:  History reviewed. No pertinent surgical history.      Family History:  History reviewed. No pertinent family history.    Social History:  Social History     Social History   • Marital status:      Spouse name: N/A   • Number of children: N/A   • Years of education: N/A     Occupational History   • Not on file.     Social History Main Topics   • Smoking status: Never Smoker   • Smokeless tobacco: Never Used   • Alcohol use 4.2 oz/week     7 Shots of liquor per week      Comment: 1 day   • Drug use: No   • Sexual activity: Not on file     Other Topics Concern   • Not on file     Social History Narrative   • No narrative on file       Medication Allergy/Sensitivities:  No Known Allergies      Current Outpatient Medications:  Home Medications     Reviewed by Jb Rodarte, PharmD (Pharmacist) on 07/06/19 at 1106  Med List Status: Complete   Medication Last Dose Status   ambrisentan (LETAIRIS) 10 MG tablet 7/5/2019 Active   furosemide (LASIX) 20 MG Tab 7/5/2019 Active   levothyroxine (SYNTHROID) 75 MCG Tab 7/5/2019 Active   lisinopril (PRINIVIL) 20 MG Tab 7/5/2019 Active   metoprolol (LOPRESSOR) 25 MG Tab 7/5/2019 Active   PARoxetine (PAXIL) 20 MG Tab 7/5/2019 Active   rosuvastatin (CRESTOR) 10 MG Tab 7/5/2019 Active   sotalol (BETAPACE) 80  MG Tab  Active   tadalafil (CIALIS) 10 MG tablet  Active   vitamin D (CHOLECALCIFEROL) 1000 UNIT Tab  Active   warfarin (COUMADIN) 7.5 MG Tab 7/5/2019 Active                Current Hospital Medications:    Current Facility-Administered Medications:   •  cefTRIAXone (ROCEPHIN) 2 g in  mL IVPB, 2 g, Intravenous, Q24HRS, Luis Miguel Nichols M.D., Last Rate: 200 mL/hr at 07/08/19 1046, 2 g at 07/08/19 1046  •  lisinopril (PRINIVIL) tablet 5 mg, 5 mg, Oral, Q DAY, Luis Miguel Nichols M.D., 5 mg at 07/08/19 0440  •  sotalol (BETAPACE) tablet 80 mg, 80 mg, Oral, Q DAY, Luis Miguel Nichols M.D., 80 mg at 07/08/19 0442  •  senna-docusate (PERICOLACE or SENOKOT S) 8.6-50 MG per tablet 2 Tab, 2 Tab, Oral, BID, 2 Tab at 07/08/19 0440 **AND** polyethylene glycol/lytes (MIRALAX) PACKET 1 Packet, 1 Packet, Oral, QDAY PRN **AND** magnesium hydroxide (MILK OF MAGNESIA) suspension 30 mL, 30 mL, Oral, QDAY PRN **AND** bisacodyl (DULCOLAX) suppository 10 mg, 10 mg, Rectal, QDAY PRN, Gilma Castrejon M.D.  •  Notify provider if pain remains uncontrolled, , , CONTINUOUS **AND** Use the numeric rating scale (NRS-11) on regular floors and Critical-Care Pain Observation Tool (CPOT) on ICUs/Trauma to assess pain, , , CONTINUOUS **AND** Pulse Ox (Oximetry), , , CONTINUOUS **AND** Pharmacy Consult Request ...Pain Management Review 1 Each, 1 Each, Other, PHARMACY TO DOSE **AND** If patient difficult to arouse and/or has respiratory depression, stop any opiates that are currently infusing and call a Rapid Response., , , CONTINUOUS **AND** oxyCODONE immediate-release (ROXICODONE) tablet 5 mg, 5 mg, Oral, Q3HRS PRN, 5 mg at 07/06/19 1308 **AND** oxyCODONE immediate-release (ROXICODONE) tablet 10 mg, 10 mg, Oral, Q3HRS PRN, 10 mg at 07/08/19 0923 **AND** morphine (pf) 4 mg/ml injection 4 mg, 4 mg, Intravenous, Q3HRS PRN, Gilma Castrejon M.D., 4 mg at 07/06/19 0342  •  levothyroxine (SYNTHROID) tablet 75 mcg, 75 mcg, Oral, AM ES, Gilma Castrejon M.D., 75  "mcg at 07/08/19 0441  •  tadalafil (CIALIS) TABS 40 mg, 40 mg, Oral, DAILY, Gilma Castrejon M.D., 40 mg at 07/08/19 0443  •  ambrisentan (LETAIRIS) TABS 10 mg, 10 mg, Oral, DAILY, Gilma Castrejon M.D., 10 mg at 07/08/19 0444  •  PARoxetine (PAXIL) tablet 20 mg, 20 mg, Oral, DAILY, Luis Miguel Nichols M.D., 20 mg at 07/08/19 0441  •  rosuvastatin (CRESTOR) tablet 10 mg, 10 mg, Oral, Q EVENING, Luis Miguel Nichols M.D., 10 mg at 07/07/19 1744  •  diphenhydrAMINE (BENADRYL) tablet/capsule 25 mg, 25 mg, Oral, Q6HRS PRN, Luis Miguel Nichols M.D., 25 mg at 07/08/19 1041  •  NS infusion, , Intravenous, Continuous, Luis Miguel Nichols M.D., Last Rate: 100 mL/hr at 07/08/19 0616        Physical Exam     Vitals:    07/07/19 1900 07/07/19 2000 07/08/19 0400 07/08/19 0800   BP:   110/50 (!) 100/37   Pulse:  79 70 68   Resp:  16 17 17   Temp:  37.4 °C (99.3 °F) 37.1 °C (98.8 °F) 37.2 °C (99 °F)   TempSrc:  Temporal Temporal Temporal   SpO2:  92% 95% 95%   Weight: 117.7 kg (259 lb 7.7 oz)      Height:         Body mass index is 40.64 kg/m².  BP (!) 100/37 Comment: nurse notified  Pulse 68   Temp 37.2 °C (99 °F) (Temporal)   Resp 17   Ht 1.702 m (5' 7\")   Wt 117.7 kg (259 lb 7.7 oz)   SpO2 95%   BMI 40.64 kg/m²   O2 therapy: Pulse Oximetry: 95 %, O2 (LPM): 5, O2 Delivery: Silicone Nasal Cannula    Physical Exam  GEN:   Alert and oriented, No apparent distress.   H / E:   Normocephalic, Atraumatic.  No scleral icterus.   ENT:   No erythema.  No exudates or discharges.   Trachea midline.  No stridor.  Supple neck.   HEART:  Regular rate and rhythm. No murmurs, rubs or gallops.   LUNGS:   Clear to auscultation and percussion bilaterally.   ABD/GI:  Benign. No rebound or guarding noted.  EXT/MSK:  No clubbing, cyanosis, edema.    NEURO:    Mental status: Awake, alert, oriented x3.  Fund of knowledge - within normal.   Cranial nerves:  CN II-XII - intact.  PERRLA.  EOMI.  No nystagmus   Motor - UE - 5/5 , bilaterally symmetrical.  LE - 5/5, " bilaterally symmetrical.   Sensory - UE-  good sensation, bilaterally.  LE -  Left foot toes reduced to minimal sensation, RLE normal sensation.  Coordination/Gait - Normal coordination   Reflexes - DTR - UE - normal, and bilaterally symmetrical.  LE - absent ankle reflexes bilaterally  Babinski - Negative.  No ataxia, No tremor  PSYCH:  Normal thought process.         Data Review       Records Reviewed       Lab Data Review:  Recent Results (from the past 24 hour(s))   CBC WITH DIFFERENTIAL    Collection Time: 07/07/19  1:29 PM   Result Value Ref Range    WBC 3.6 (L) 4.8 - 10.8 K/uL    RBC 2.72 (L) 4.20 - 5.40 M/uL    Hemoglobin 8.9 (L) 12.0 - 16.0 g/dL    Hematocrit 29.4 (L) 37.0 - 47.0 %    .1 (H) 81.4 - 97.8 fL    MCH 32.7 27.0 - 33.0 pg    MCHC 30.3 (L) 33.6 - 35.0 g/dL    RDW 53.1 (H) 35.9 - 50.0 fL    Platelet Count 148 (L) 164 - 446 K/uL    MPV 8.8 (L) 9.0 - 12.9 fL    Neutrophils-Polys 60.60 44.00 - 72.00 %    Lymphocytes 24.00 22.00 - 41.00 %    Monocytes 11.40 0.00 - 13.40 %    Eosinophils 3.10 0.00 - 6.90 %    Basophils 0.60 0.00 - 1.80 %    Immature Granulocytes 0.30 0.00 - 0.90 %    Nucleated RBC 0.00 /100 WBC    Neutrophils (Absolute) 2.18 2.00 - 7.15 K/uL    Lymphs (Absolute) 0.86 (L) 1.00 - 4.80 K/uL    Monos (Absolute) 0.41 0.00 - 0.85 K/uL    Eos (Absolute) 0.11 0.00 - 0.51 K/uL    Baso (Absolute) 0.02 0.00 - 0.12 K/uL    Immature Granulocytes (abs) 0.01 0.00 - 0.11 K/uL    NRBC (Absolute) 0.00 K/uL   CBC WITH DIFFERENTIAL    Collection Time: 07/08/19  3:57 AM   Result Value Ref Range    WBC 3.4 (L) 4.8 - 10.8 K/uL    RBC 2.61 (L) 4.20 - 5.40 M/uL    Hemoglobin 8.7 (L) 12.0 - 16.0 g/dL    Hematocrit 28.1 (L) 37.0 - 47.0 %    .7 (H) 81.4 - 97.8 fL    MCH 33.3 (H) 27.0 - 33.0 pg    MCHC 31.0 (L) 33.6 - 35.0 g/dL    RDW 52.6 (H) 35.9 - 50.0 fL    Platelet Count 141 (L) 164 - 446 K/uL    MPV 8.9 (L) 9.0 - 12.9 fL    Neutrophils-Polys 61.10 44.00 - 72.00 %    Lymphocytes 23.10 22.00 -  "41.00 %    Monocytes 11.40 0.00 - 13.40 %    Eosinophils 3.50 0.00 - 6.90 %    Basophils 0.30 0.00 - 1.80 %    Immature Granulocytes 0.60 0.00 - 0.90 %    Nucleated RBC 0.00 /100 WBC    Neutrophils (Absolute) 2.09 2.00 - 7.15 K/uL    Lymphs (Absolute) 0.79 (L) 1.00 - 4.80 K/uL    Monos (Absolute) 0.39 0.00 - 0.85 K/uL    Eos (Absolute) 0.12 0.00 - 0.51 K/uL    Baso (Absolute) 0.01 0.00 - 0.12 K/uL    Immature Granulocytes (abs) 0.02 0.00 - 0.11 K/uL    NRBC (Absolute) 0.00 K/uL   VANCOMYCIN TIMED (RANDOM) LEVEL    Collection Time: 07/08/19  3:57 AM   Result Value Ref Range    Vancomycin Unknown Level 13.3 ug/mL   Basic Metabolic Panel    Collection Time: 07/08/19  3:57 AM   Result Value Ref Range    Sodium 142 135 - 145 mmol/L    Potassium 4.8 3.6 - 5.5 mmol/L    Chloride 110 96 - 112 mmol/L    Co2 27 20 - 33 mmol/L    Glucose 106 (H) 65 - 99 mg/dL    Bun 30 (H) 8 - 22 mg/dL    Creatinine 1.02 0.50 - 1.40 mg/dL    Calcium 10.1 8.5 - 10.5 mg/dL    Anion Gap 5.0 0.0 - 11.9   ESTIMATED GFR    Collection Time: 07/08/19  3:57 AM   Result Value Ref Range    GFR If African American >60 >60 mL/min/1.73 m 2    GFR If Non  53 (A) >60 mL/min/1.73 m 2   Prothrombin Time    Collection Time: 07/08/19  8:29 AM   Result Value Ref Range    PT 16.5 (H) 12.0 - 14.6 sec    INR 1.30 (H) 0.87 - 1.13       Imaging/Procedures Review:    Imaging Review: Completed  MR-BRAIN-WITH & W/O   Final Result      1.  Mild supratentorial white matter disease most consistent with microvascular ischemic change.   2.  27 mm geographic area of FLAIR hyperintensity in the left frontal white matter. No enhancement, hemorrhage, or restricted diffusion. Among the differential considerations would be low-grade/nonenhancing primary brain tumor (however, high-grade    gliomas can also be nonenhancing), CNS lymphoma (which may not enhance if the patient has recently received steroids), a so-called \"tumefactive\" demyelinating lesion, atypical or " opportunistic infection such as PML, or perhaps a rare inflammatory    condition such as lymphocytic vasculitis or other vasculopathy. There is no restricted diffusion to suggest acute infarction. Subacute infarction would likely show some enhancement. As mentioned above, ancillary findings to support cortical venous    thrombosis/venous infarct are not present.   3.  Follow-up imaging in the short-term (in the next week or so) may be of interest to determine whether this is an evolving active lesion. MR spectroscopy may or may not be helpful.      CT-HEAD W/O   Final Result      1.  Asymmetric low attenuation area in the white matter in the anterior aspect of left frontal convexity measuring approximately 2.2 cm in diameter. Differential diagnosis includes an area of prior ischemia or hemorrhage.      2.  Correlation with enhanced MRI or CT may be of value to exclude acute process.      3.  Underlying mild atrophy and small vessel ischemic changes.      4.  No hemorrhage or mass effect.      Findings were discussed with BOB GIANG M.D. on 7/6/2019 4:16 PM.      MR-LUMBAR SPINE-WITH & W/O   Final Result      1.  L3-4 grade 1 spondylolisthesis. L5-S1 slight grade 1 spondylolisthesis.   2.  Incidental hemangiomas in the T11 and L4 vertebral bodies. No clinical significance.   3.  L3-4 mild-moderate hypertrophic facet arthropathy. No central or foraminal stenosis.   4.  L4-5 minimal facet arthropathy.   5.  Incidental small perineural cyst at the S2 segment level. Doubtful clinical significance.   6.  No evidence of osteomyelitis, discitis, or epidural abscess.      DX-LUMBAR PUNCTURE FOR DIAGNOSIS    (Results Pending)   CT-CTA HEAD WITH & W/O-POST PROCESS    (Results Pending)        EKG:   EKG  Review: Completed  Results for orders placed or performed during the hospital encounter of 07/05/19   EKG   Result Value Ref Range    Report       [facility]    Test Date:  2019-07-06  Pt  Name:    NATALIE STAPLES                Department: ER  MRN:        [id number]                      Room:       [facility]  Gender:     Female                       Technician: [id number]  :        [id number]                   Requested By:MENDY PINTO  Order #:    [id number]                    Reading MD:    Measurements  Intervals                                Axis  Rate:       94                           P:  TN:                                      QRS:        -22  QRSD:       100                          T:          40  QT:         392  QTc:        491    Interpretive Statements  ATRIAL FIBRILLATION, V-RATE    MULTIFORM VENTRICULAR PREMATURE COMPLEXES  LEFT VENTRICULAR HYPERTROPHY  BORDERLINE PROLONGED QT INTERVAL  No previous ECG available for comparison                Assessment & Plan     On 19, neurology team was consulted. Patient seen and examined at bedside by neurology team. Patient states back pain with headaches and fevers for past 7 days duration, went to outside hospital, CT scan brain showed lesion, was sent to [facility] for further management. Of note, patient states she had a prolonged episode of unconsciousness at age 18 which lasted for 3 days duration. Denies any history of diabetes, but states uncontrolled hypertension history and that the highest she remembers her blood pressure being was 180s/110s in past. She denies any previous strokes, TIAs, or seizures. Admitted for back pain with headaches and fever and started on empiric IV antibiotics for possible infection. Blood cultures have been negative.    MRI brain on 19 demonstrated mild supratentorial white matter disease most consistent with microvascular ischemic change. 27 mm geographic area of FLAIR hyperintensity in the left frontal white matter. No enhancement, hemorrhage, or restricted diffusion. Among the differential considerations would be low-grade/nonenhancing primary brain tumor (however, high-grade gliomas can also be  "nonenhancing), CNS lymphoma (which may not enhance if the patient has recently received steroids), a so-called \"tumefactive\" demyelinating lesion, atypical or opportunistic infection such as PML, or perhaps a rare inflammatory condition such as lymphocytic vasculitis or other vasculopathy. There is no restricted diffusion to suggest acute infarction. Subacute infarction would likely show some enhancement. As mentioned above, ancillary findings to support cortical venous thrombosis/venous infarct are not present.    At this time, differential diagnoses include  infectious etiology (however unknown source at this time given negative blood cultures to date, important to rule out endocarditis), primary CNS tumor (which is likely chronic, but incidentally discovered due to presentation of headaches, vasculitis given chronic history of headaches, multiple sclerosis (however unlikely given lack of consistent signs and symptoms). Given patient's history of headaches and migraines, a secondary infectious cause, could exacerbate headache symptoms, therefore important to rule out all infectious causes. History of uncontrolled hypertension may have contributed to small vessel changes seen on MRI imaging.      Recommendation/Plan  - Lumbar puncture to rule out underlying infectious etiology  - CTA brain study (with Iodine contrast) to look for small vessel changes  - Recommend TTE study to rule out cardiac etiology (i.e infectious endocarditis)  - Recommend repeat MRI brain with and without contrast in 3 months after discharge  - Follow-up neurology appointment outpatient after discharge in 2-4 weeks      A computerized dictation system may have been used for this note.    Despite review, there may be some spelling or grammatical errors.    Lobo Sierra M.D.  7/8/2019   Service:  Neurology   Attending: Dr Giles Holcomb        "

## 2019-07-08 NOTE — PROGRESS NOTES
"Hospital Medicine Daily Progress Note    Date of Service  7/8/2019    Chief Complaint  74 y.o. female admitted 7/5/2019 with back pain, fevers, headache.  Hospital Course      74-year-old female history of pulmonary hypertension, chronic respiratory failure O2 dependent 5 L, A. fib on Coumadin, hypertension, dyslipidemia, chronic back pain, obesity, lumbar epidural steroid injection proxy 2 weeks ago admitted with back pain with headaches fever.  Started on empiric IV antibiotics for possible infection.    Interval Problem Update    Reports occasional low-grade fevers.  Headache and back pain slightly improved.  Received call from [facility]-urine culture there return E. coli, pansensitive.  MRI with left frontal brain lesion.      MRI brain  1.  Mild supratentorial white matter disease most consistent with microvascular ischemic change.  2.  27 mm geographic area of FLAIR hyperintensity in the left frontal white matter. No enhancement, hemorrhage, or restricted diffusion. Among the differential considerations would be low-grade/nonenhancing primary brain tumor (however, high-grade   gliomas can also be nonenhancing), CNS lymphoma (which may not enhance if the patient has recently received steroids), a so-called \"tumefactive\" demyelinating lesion, atypical or opportunistic infection such as PML, or perhaps a rare inflammatory   condition such as lymphocytic vasculitis or other vasculopathy. There is no restricted diffusion to suggest acute infarction. Subacute infarction would likely show some enhancement. As mentioned above, ancillary findings to support cortical venous   thrombosis/venous infarct are not present.  3.  Follow-up imaging in the short-term (in the next week or so) may be of interest to determine whether this is an evolving active lesion. MR spectroscopy may or may not be helpful.    Consultants/Specialty    Neurology    Code Status  Full     Disposition  Plan additional workup " .    Review of Systems  Review of Systems   Constitutional: Positive for chills, fever and malaise/fatigue. Negative for diaphoresis and weight loss.   HENT: Negative for congestion.         Horse  Voice sore throat.    Eyes: Negative for blurred vision, double vision, photophobia, discharge and redness.   Respiratory: Negative for cough, shortness of breath, wheezing and stridor.    Cardiovascular: Negative for chest pain, palpitations and leg swelling.   Gastrointestinal: Negative for abdominal pain, diarrhea and vomiting.   Genitourinary: Negative for flank pain and hematuria.   Musculoskeletal: Positive for back pain and neck pain. Negative for joint pain.        Chronic pain    Skin: Negative for itching and rash.   Neurological: Positive for headaches. Negative for dizziness, speech change and focal weakness.   Psychiatric/Behavioral: Negative for hallucinations and substance abuse.        Physical Exam  Temp:  [37.1 °C (98.8 °F)-37.7 °C (99.8 °F)] 37.1 °C (98.8 °F)  Pulse:  [66-79] 70  Resp:  [16-18] 17  BP: (110-112)/(50-57) 110/50  SpO2:  [92 %-95 %] 95 %    Physical Exam   Constitutional: She is oriented to person, place, and time. No distress.   HENT:   Head: Normocephalic and atraumatic.   Right Ear: External ear normal.   Left Ear: External ear normal.   Nose: Nose normal.   Eyes: EOM are normal. Right eye exhibits no discharge. Left eye exhibits no discharge. No scleral icterus.   Neck:   Neck no rigidity   Cardiovascular: Normal rate and regular rhythm.    No murmur heard.  Pulmonary/Chest: No stridor. She has no wheezes. She has no rales.   Abdominal: Soft. Bowel sounds are normal. She exhibits no distension. There is no tenderness.   Musculoskeletal: She exhibits tenderness (lower back ). She exhibits no edema.   Neurological: She is alert and oriented to person, place, and time. No cranial nerve deficit.   No focal extremity weakness   Skin: Skin is warm and dry. She is not diaphoretic. No pallor.  "  Psychiatric: She has a normal mood and affect. Her behavior is normal.   Vitals reviewed.      Fluids    Intake/Output Summary (Last 24 hours) at 07/08/19 0831  Last data filed at 07/07/19 2200   Gross per 24 hour   Intake              240 ml   Output              600 ml   Net             -360 ml       Laboratory  Recent Labs      07/07/19   0408  07/07/19   1329  07/08/19   0357   WBC  3.9*  3.6*  3.4*   RBC  2.77*  2.72*  2.61*   HEMOGLOBIN  9.1*  8.9*  8.7*   HEMATOCRIT  29.6*  29.4*  28.1*   MCV  106.9*  108.1*  107.7*   MCH  32.9  32.7  33.3*   MCHC  30.7*  30.3*  31.0*   RDW  52.7*  53.1*  52.6*   PLATELETCT  149*  148*  141*   MPV  9.0  8.8*  8.9*     Recent Labs      07/06/19   0146  07/07/19   0408  07/08/19   0357   SODIUM  144  142  142   POTASSIUM  4.9  4.5  4.8   CHLORIDE  104  107  110   CO2  27  29  27   GLUCOSE  102*  146*  106*   BUN  54*  39*  30*   CREATININE  1.11  1.21  1.02   CALCIUM  10.4  9.9  10.1     Recent Labs      07/06/19   0146  07/07/19   0408   INR  1.88*  1.71*               Imaging  MR-BRAIN-WITH & W/O   Final Result      1.  Mild supratentorial white matter disease most consistent with microvascular ischemic change.   2.  27 mm geographic area of FLAIR hyperintensity in the left frontal white matter. No enhancement, hemorrhage, or restricted diffusion. Among the differential considerations would be low-grade/nonenhancing primary brain tumor (however, high-grade    gliomas can also be nonenhancing), CNS lymphoma (which may not enhance if the patient has recently received steroids), a so-called \"tumefactive\" demyelinating lesion, atypical or opportunistic infection such as PML, or perhaps a rare inflammatory    condition such as lymphocytic vasculitis or other vasculopathy. There is no restricted diffusion to suggest acute infarction. Subacute infarction would likely show some enhancement. As mentioned above, ancillary findings to support cortical venous    thrombosis/venous " infarct are not present.   3.  Follow-up imaging in the short-term (in the next week or so) may be of interest to determine whether this is an evolving active lesion. MR spectroscopy may or may not be helpful.      CT-HEAD W/O   Final Result      1.  Asymmetric low attenuation area in the white matter in the anterior aspect of left frontal convexity measuring approximately 2.2 cm in diameter. Differential diagnosis includes an area of prior ischemia or hemorrhage.      2.  Correlation with enhanced MRI or CT may be of value to exclude acute process.      3.  Underlying mild atrophy and small vessel ischemic changes.      4.  No hemorrhage or mass effect.      Findings were discussed with BOB GIANG M.D. on 7/6/2019 4:16 PM.      MR-LUMBAR SPINE-WITH & W/O   Final Result      1.  L3-4 grade 1 spondylolisthesis. L5-S1 slight grade 1 spondylolisthesis.   2.  Incidental hemangiomas in the T11 and L4 vertebral bodies. No clinical significance.   3.  L3-4 mild-moderate hypertrophic facet arthropathy. No central or foraminal stenosis.   4.  L4-5 minimal facet arthropathy.   5.  Incidental small perineural cyst at the S2 segment level. Doubtful clinical significance.   6.  No evidence of osteomyelitis, discitis, or epidural abscess.           Assessment/Plan  * Fever, low grade   Assessment & Plan    Unclear source, blood Cxs NGTD.  U culture at outlying facility with E. coli although would not explain headache back pain  Abnormal CT H scan with asymmetric low attenuation.  Follow-up MRI with not enhancing left frontal lobe lesion-consider l demyelinating disease, old CVA, TBI, lymphoma or other mass or vasculitis, viral.  No signs of other bacterial infection-DC vancomycin.  Continue IV Rocephin for E. coli UTI  Discussed with Dr. Holcomb - neurology to consult--check CT angio to evaluate for signs of vasculitis.   Obtain diagnostic LP  Discussed with IR -we will plan for IR LP as anticipate difficult procedure due to prior  back surgeries, body habitus and degenerative disease.   Prn tylenol.   Will need follow-up MRI of the brain outpatient for interval comparison  Neurology input appreciated     Back pain with radiculopathy   Assessment & Plan    High risk given recent epidural injection -MRI without acute inflammatory changes, signs of discitis, osteomyelitis or abscess.  Fever at .8, elevated esr/crp - Fevers resolving. CT head abnl- no reported hx of CVA-- fu MRI Brain.  Patient with headache and low-grade fever although without photophobia, elevated white count, meningismus signs.  Less likely bacterial meningitis. Hold warfarin . Fu INR.  Plan diagnostic LP-to evaluate brain lesion       Brain lesion   Assessment & Plan    Unclear cause  Plan workup as above  Neurology input appreciated  Check CT chest / abd / pelvis for primary source.      UTI (urinary tract infection)   Assessment & Plan    Reported E. coli urine growth from [facility].  Initially had urinary hesitancy.  Continue IV Rocephin, complete 3-day course.     Chronic respiratory failure (HCC)   Assessment & Plan    Due to pulmonary hypertension.  Stable respiratory symptoms.  Continue outpatient medications.     MILAN (acute kidney injury) ([facility])   Assessment & Plan    Suspect hypovolemic .  Patient was on diuretics  Holding Lasix  Avoid nephrotoxic medications   Renal function improved-- Continue IVFs, encourage intake.      PAH (pulmonary artery hypertension) ([facility])   Assessment & Plan    Stable respiratory symptoms.    resume home letairis, cialis   O2 support.     AF (atrial fibrillation) ([facility])   Assessment & Plan    Controlled   Mg, K ok- Continue Sotalol   Plan to hold warfarin for possible LP.  Discussed risks with patient, daughter .   Monitor telemetry     Hypothyroidism- (present on admission)   Assessment & Plan    Resume levothyroxine      Essential hypertension- (present on admission)   Assessment & Plan    Controlled  Continue  lisinopril , beta-blocker  Monitor BP     Anemia w mild thrombocytopenia-  .  Check CT chest / abd/ pelvis for signs of lymphoma/ malignancy. Fu CBC.  Consider Heme onc eval. depending on findings.     VTE prophylaxis: warfarin     Discussed with daughter plan of care.

## 2019-07-09 ENCOUNTER — APPOINTMENT (OUTPATIENT)
Dept: RADIOLOGY | Facility: MEDICAL CENTER | Age: 74
DRG: 552 | End: 2019-07-09
Attending: HOSPITALIST
Payer: MEDICARE

## 2019-07-09 ENCOUNTER — APPOINTMENT (OUTPATIENT)
Dept: RADIOLOGY | Facility: MEDICAL CENTER | Age: 74
DRG: 552 | End: 2019-07-09
Attending: INTERNAL MEDICINE
Payer: MEDICARE

## 2019-07-09 DIAGNOSIS — R90.89 ABNORMAL FINDING ON MRI OF BRAIN: ICD-10-CM

## 2019-07-09 LAB
ANION GAP SERPL CALC-SCNC: 7 MMOL/L (ref 0–11.9)
ANISOCYTOSIS BLD QL SMEAR: ABNORMAL
BASOPHILS # BLD AUTO: 0.5 % (ref 0–1.8)
BASOPHILS # BLD: 0.02 K/UL (ref 0–0.12)
BUN SERPL-MCNC: 21 MG/DL (ref 8–22)
BURR CELLS/RBC NFR CSF MANUAL: 0 %
CALCIUM SERPL-MCNC: 10.7 MG/DL (ref 8.5–10.5)
CHLORIDE SERPL-SCNC: 108 MMOL/L (ref 96–112)
CLARITY CSF: CLEAR
CO2 SERPL-SCNC: 25 MMOL/L (ref 20–33)
COLOR CSF: COLORLESS
COLOR SPUN CSF: COLORLESS
COMMENT 1642: NORMAL
CREAT SERPL-MCNC: 0.98 MG/DL (ref 0.5–1.4)
CYTOLOGY REG CYTOL: NORMAL
EOSINOPHIL # BLD AUTO: 0.17 K/UL (ref 0–0.51)
EOSINOPHIL NFR BLD: 4.4 % (ref 0–6.9)
ERYTHROCYTE [DISTWIDTH] IN BLOOD BY AUTOMATED COUNT: 51.1 FL (ref 35.9–50)
GLUCOSE CSF-MCNC: 68 MG/DL (ref 40–80)
GLUCOSE SERPL-MCNC: 115 MG/DL (ref 65–99)
GRAM STN SPEC: NORMAL
HCT VFR BLD AUTO: 33.2 % (ref 37–47)
HGB BLD-MCNC: 9.9 G/DL (ref 12–16)
HSV1 DNA SPEC QL NAA+PROBE: NEGATIVE
HSV2 DNA SPEC QL NAA+PROBE: NEGATIVE
IMM GRANULOCYTES # BLD AUTO: 0.01 K/UL (ref 0–0.11)
IMM GRANULOCYTES NFR BLD AUTO: 0.3 % (ref 0–0.9)
LYMPHOCYTES # BLD AUTO: 0.9 K/UL (ref 1–4.8)
LYMPHOCYTES NFR BLD: 23.3 % (ref 22–41)
LYMPHOCYTES NFR CSF: 16 %
MACROCYTES BLD QL SMEAR: ABNORMAL
MCH RBC QN AUTO: 31.7 PG (ref 27–33)
MCHC RBC AUTO-ENTMCNC: 29.8 G/DL (ref 33.6–35)
MCV RBC AUTO: 106.4 FL (ref 81.4–97.8)
MONOCYTES # BLD AUTO: 0.42 K/UL (ref 0–0.85)
MONOCYTES NFR BLD AUTO: 10.9 % (ref 0–13.4)
MONONUC CELLS NFR CSF: 11 %
MORPHOLOGY BLD-IMP: NORMAL
NEUTROPHILS # BLD AUTO: 2.34 K/UL (ref 2–7.15)
NEUTROPHILS NFR BLD: 60.6 % (ref 44–72)
NRBC # BLD AUTO: 0 K/UL
NRBC BLD-RTO: 0 /100 WBC
OVALOCYTES BLD QL SMEAR: NORMAL
PLATELET # BLD AUTO: 174 K/UL (ref 164–446)
PLATELET BLD QL SMEAR: NORMAL
PMV BLD AUTO: 8.7 FL (ref 9–12.9)
POIKILOCYTOSIS BLD QL SMEAR: NORMAL
POTASSIUM SERPL-SCNC: 4.4 MMOL/L (ref 3.6–5.5)
PROT CSF-MCNC: 38 MG/DL (ref 15–45)
RBC # BLD AUTO: 3.12 M/UL (ref 4.2–5.4)
RBC # CSF: 73 CELLS/UL
RBC BLD AUTO: PRESENT
SIGNIFICANT IND 70042: NORMAL
SITE SITE: NORMAL
SODIUM SERPL-SCNC: 140 MMOL/L (ref 135–145)
SOURCE SOURCE: NORMAL
SPECIMEN SOURCE: NORMAL
SPECIMEN VOL CSF: 12 ML
TUBE # CSF: 3
TUBE # CSF: 3
WBC # BLD AUTO: 3.9 K/UL (ref 4.8–10.8)
WBC # CSF: <1 CELLS/UL (ref 0–10)

## 2019-07-09 PROCEDURE — 99225 PR SUBSEQUENT OBSERVATION CARE,LEVEL II: CPT | Performed by: INTERNAL MEDICINE

## 2019-07-09 PROCEDURE — 87205 SMEAR GRAM STAIN: CPT

## 2019-07-09 PROCEDURE — 87529 HSV DNA AMP PROBE: CPT

## 2019-07-09 PROCEDURE — 87483 CNS DNA AMP PROBE TYPE 12-25: CPT

## 2019-07-09 PROCEDURE — 71260 CT THORAX DX C+: CPT

## 2019-07-09 PROCEDURE — 84157 ASSAY OF PROTEIN OTHER: CPT

## 2019-07-09 PROCEDURE — 80048 BASIC METABOLIC PNL TOTAL CA: CPT

## 2019-07-09 PROCEDURE — 700102 HCHG RX REV CODE 250 W/ 637 OVERRIDE(OP): Performed by: PSYCHIATRY & NEUROLOGY

## 2019-07-09 PROCEDURE — A9270 NON-COVERED ITEM OR SERVICE: HCPCS | Performed by: INTERNAL MEDICINE

## 2019-07-09 PROCEDURE — 62270 DX LMBR SPI PNXR: CPT

## 2019-07-09 PROCEDURE — 700102 HCHG RX REV CODE 250 W/ 637 OVERRIDE(OP): Performed by: HOSPITALIST

## 2019-07-09 PROCEDURE — 84166 PROTEIN E-PHORESIS/URINE/CSF: CPT

## 2019-07-09 PROCEDURE — 89051 BODY FLUID CELL COUNT: CPT

## 2019-07-09 PROCEDURE — 86788 WEST NILE VIRUS AB IGM: CPT

## 2019-07-09 PROCEDURE — 770006 HCHG ROOM/CARE - MED/SURG/GYN SEMI*

## 2019-07-09 PROCEDURE — A9270 NON-COVERED ITEM OR SERVICE: HCPCS | Performed by: HOSPITALIST

## 2019-07-09 PROCEDURE — 700117 HCHG RX CONTRAST REV CODE 255: Performed by: HOSPITALIST

## 2019-07-09 PROCEDURE — 700111 HCHG RX REV CODE 636 W/ 250 OVERRIDE (IP): Performed by: HOSPITALIST

## 2019-07-09 PROCEDURE — 88108 CYTOPATH CONCENTRATE TECH: CPT

## 2019-07-09 PROCEDURE — 700102 HCHG RX REV CODE 250 W/ 637 OVERRIDE(OP): Performed by: INTERNAL MEDICINE

## 2019-07-09 PROCEDURE — 86789 WEST NILE VIRUS ANTIBODY: CPT

## 2019-07-09 PROCEDURE — 85025 COMPLETE CBC W/AUTO DIFF WBC: CPT

## 2019-07-09 PROCEDURE — 87070 CULTURE OTHR SPECIMN AEROBIC: CPT

## 2019-07-09 PROCEDURE — 36415 COLL VENOUS BLD VENIPUNCTURE: CPT

## 2019-07-09 PROCEDURE — 700105 HCHG RX REV CODE 258: Performed by: HOSPITALIST

## 2019-07-09 PROCEDURE — A9270 NON-COVERED ITEM OR SERVICE: HCPCS | Performed by: PSYCHIATRY & NEUROLOGY

## 2019-07-09 PROCEDURE — G0378 HOSPITAL OBSERVATION PER HR: HCPCS

## 2019-07-09 PROCEDURE — 82945 GLUCOSE OTHER FLUID: CPT

## 2019-07-09 PROCEDURE — 99215 OFFICE O/P EST HI 40 MIN: CPT | Mod: GC | Performed by: PSYCHIATRY & NEUROLOGY

## 2019-07-09 RX ORDER — METHYLPREDNISOLONE 4 MG/1
4 TABLET ORAL
Status: DISCONTINUED | OUTPATIENT
Start: 2019-07-12 | End: 2019-07-10 | Stop reason: HOSPADM

## 2019-07-09 RX ORDER — METHYLPREDNISOLONE 4 MG/1
4 TABLET ORAL
Status: DISCONTINUED | OUTPATIENT
Start: 2019-07-11 | End: 2019-07-10 | Stop reason: HOSPADM

## 2019-07-09 RX ORDER — METHYLPREDNISOLONE 4 MG/1
4 TABLET ORAL DAILY
Status: DISCONTINUED | OUTPATIENT
Start: 2019-07-09 | End: 2019-07-09

## 2019-07-09 RX ORDER — METHYLPREDNISOLONE 4 MG/1
8 TABLET ORAL
Status: DISCONTINUED | OUTPATIENT
Start: 2019-07-10 | End: 2019-07-10 | Stop reason: HOSPADM

## 2019-07-09 RX ORDER — METHYLPREDNISOLONE 4 MG/1
4 TABLET ORAL
Status: DISCONTINUED | OUTPATIENT
Start: 2019-07-13 | End: 2019-07-10 | Stop reason: HOSPADM

## 2019-07-09 RX ORDER — GABAPENTIN 100 MG/1
200 CAPSULE ORAL 2 TIMES DAILY
Status: DISCONTINUED | OUTPATIENT
Start: 2019-07-09 | End: 2019-07-10 | Stop reason: HOSPADM

## 2019-07-09 RX ORDER — METHYLPREDNISOLONE 4 MG/1
4 TABLET ORAL
Status: DISCONTINUED | OUTPATIENT
Start: 2019-07-14 | End: 2019-07-10 | Stop reason: HOSPADM

## 2019-07-09 RX ORDER — OXYCODONE HYDROCHLORIDE 5 MG/1
5 TABLET ORAL
Status: DISCONTINUED | OUTPATIENT
Start: 2019-07-09 | End: 2019-07-10 | Stop reason: HOSPADM

## 2019-07-09 RX ORDER — SOTALOL HYDROCHLORIDE 80 MG/1
80 TABLET ORAL TWICE DAILY
Status: DISCONTINUED | OUTPATIENT
Start: 2019-07-09 | End: 2019-07-10 | Stop reason: HOSPADM

## 2019-07-09 RX ORDER — OXYCODONE HYDROCHLORIDE 10 MG/1
10 TABLET ORAL
Status: DISCONTINUED | OUTPATIENT
Start: 2019-07-09 | End: 2019-07-10 | Stop reason: HOSPADM

## 2019-07-09 RX ORDER — METHYLPREDNISOLONE 4 MG/1
4 TABLET ORAL
Status: COMPLETED | OUTPATIENT
Start: 2019-07-10 | End: 2019-07-10

## 2019-07-09 RX ORDER — MORPHINE SULFATE 4 MG/ML
2 INJECTION, SOLUTION INTRAMUSCULAR; INTRAVENOUS EVERY 4 HOURS PRN
Status: DISCONTINUED | OUTPATIENT
Start: 2019-07-09 | End: 2019-07-10 | Stop reason: HOSPADM

## 2019-07-09 RX ORDER — WARFARIN SODIUM 10 MG/1
10 TABLET ORAL
Status: COMPLETED | OUTPATIENT
Start: 2019-07-09 | End: 2019-07-09

## 2019-07-09 RX ADMIN — ROSUVASTATIN CALCIUM 10 MG: 5 TABLET, FILM COATED ORAL at 16:29

## 2019-07-09 RX ADMIN — MORPHINE SULFATE 4 MG: 4 INJECTION INTRAVENOUS at 11:25

## 2019-07-09 RX ADMIN — SENNOSIDES, DOCUSATE SODIUM 2 TABLET: 50; 8.6 TABLET, FILM COATED ORAL at 18:36

## 2019-07-09 RX ADMIN — LISINOPRIL 5 MG: 5 TABLET ORAL at 05:08

## 2019-07-09 RX ADMIN — SOTALOL HYDROCHLORIDE 80 MG: 80 TABLET ORAL at 18:36

## 2019-07-09 RX ADMIN — DIPHENHYDRAMINE HCL 25 MG: 25 TABLET ORAL at 23:02

## 2019-07-09 RX ADMIN — OXYCODONE HYDROCHLORIDE 10 MG: 10 TABLET ORAL at 10:15

## 2019-07-09 RX ADMIN — WARFARIN SODIUM 10 MG: 10 TABLET ORAL at 18:36

## 2019-07-09 RX ADMIN — GABAPENTIN 200 MG: 100 CAPSULE ORAL at 18:35

## 2019-07-09 RX ADMIN — OXYCODONE HYDROCHLORIDE 10 MG: 10 TABLET ORAL at 03:40

## 2019-07-09 RX ADMIN — TADALAFIL 40 MG: 10 TABLET ORAL at 05:09

## 2019-07-09 RX ADMIN — IOHEXOL 100 ML: 350 INJECTION, SOLUTION INTRAVENOUS at 08:41

## 2019-07-09 RX ADMIN — SOTALOL HYDROCHLORIDE 80 MG: 80 TABLET ORAL at 05:07

## 2019-07-09 RX ADMIN — OXYCODONE HYDROCHLORIDE 10 MG: 10 TABLET ORAL at 21:11

## 2019-07-09 RX ADMIN — PAROXETINE HYDROCHLORIDE 20 MG: 20 TABLET, FILM COATED ORAL at 05:08

## 2019-07-09 RX ADMIN — CEFTRIAXONE SODIUM 2 G: 2 INJECTION, POWDER, FOR SOLUTION INTRAMUSCULAR; INTRAVENOUS at 05:10

## 2019-07-09 RX ADMIN — AMBRISENTAN 10 MG: 10 TABLET, FILM COATED ORAL at 06:00

## 2019-07-09 RX ADMIN — LEVOTHYROXINE SODIUM 75 MCG: 75 TABLET ORAL at 05:08

## 2019-07-09 RX ADMIN — OXYCODONE HYDROCHLORIDE 10 MG: 10 TABLET ORAL at 13:23

## 2019-07-09 RX ADMIN — OXYCODONE HYDROCHLORIDE 10 MG: 10 TABLET ORAL at 00:20

## 2019-07-09 RX ADMIN — OXYCODONE HYDROCHLORIDE 10 MG: 10 TABLET ORAL at 16:30

## 2019-07-09 RX ADMIN — METHYLPREDNISOLONE 24 MG: 16 TABLET ORAL at 18:36

## 2019-07-09 RX ADMIN — DIPHENHYDRAMINE HCL 25 MG: 25 TABLET ORAL at 00:20

## 2019-07-09 RX ADMIN — SENNOSIDES, DOCUSATE SODIUM 2 TABLET: 50; 8.6 TABLET, FILM COATED ORAL at 05:08

## 2019-07-09 ASSESSMENT — ENCOUNTER SYMPTOMS
DIZZINESS: 0
CONSTIPATION: 0
EYE PAIN: 0
SENSORY CHANGE: 0
DIARRHEA: 0
SPUTUM PRODUCTION: 0
WEIGHT LOSS: 0
FOCAL WEAKNESS: 0
TINGLING: 0
NAUSEA: 0
NECK PAIN: 0
HEADACHES: 1
SPEECH CHANGE: 0
SHORTNESS OF BREATH: 0
MYALGIAS: 0
HALLUCINATIONS: 0
FEVER: 0
PHOTOPHOBIA: 0
PALPITATIONS: 0
BLURRED VISION: 0
TREMORS: 0
BACK PAIN: 1
VOMITING: 0
DOUBLE VISION: 0
COUGH: 0
CHILLS: 0
ABDOMINAL PAIN: 0
ORTHOPNEA: 0

## 2019-07-09 ASSESSMENT — LIFESTYLE VARIABLES: SUBSTANCE_ABUSE: 0

## 2019-07-09 NOTE — CONSULTS
Neurology Consult - Progress Note:  Resident:  Lobo Sierra M.D.  Attending:  Dr Giles Holcomb  Date:  2019     Patient ID:  Zeinab Jacome 1945   Age/Sex 74 y.o. female   MRN [id number]       Reason for Consult:  Acute on chronic worsening headaches with fevers for 7 days and back pain      Interval Problem, Daily Status Update:  Patient seen and examined at bedside by neurology team, has no new complaints, improving clinically, but states persistent headaches.  CTA imaging performed 19 was unremarkable and negative for vasculitis, no acute intracranial findings, exam unchanged from recent CT imaging, no large vessel occlusion, and was negative.  Lumbar puncture was negative for meningitis and had no other findings.      Review of Symptoms  GEN/CONST:    No new fever spikes or chills overnight  H/E:      States persistent headaches. Denies blurry vision or eye pain.   ENT:   Denies nasal congestion, sore throat, or ear pain.   CARDIO:   Denies chest pain, palpitations, orthopnea, or edema.  RESP:  Denies shortness of breath, wheezing, or coughing.   GI:    Denies nausea, vomiting, diarrhea, constipation, or abdominal pain.   :   Denies dysuria or frequency.    HEME:  Denies easy bruising or bleeding  ALLG:  Denies allergies, asthma, or hives.    MSK:  Denies focal weakness, joint pains, or swellings,   SKIN:  Denies rashes, lumps/bumps, or sores.   NEURO:  Denies headache, confusion, memory loss, or paresthesias.   ENDO:  Denies heat or cold intolerance, polyuria, or polydipsia.  PSYCH:  Denies mood disorders or substance abuse.      Physical Exam     Vitals:    19 2000 19 2100 19 0400 19 0800   BP: (!) 95/43 (!) 116/37 140/61 116/61   Pulse: 90  96 93   Resp: 18  18 17   Temp: 36.1 °C (97 °F)  36.7 °C (98 °F) 37.3 °C (99.2 °F)   TempSrc: Temporal  Temporal Temporal   SpO2: 94%  95% 93%   Weight:       Height:         Body mass index is 40.64 kg/m².     Oxygen Therapy:  Pulse Oximetry: 93 %, O2 (LPM): 5, O2 Delivery: Silicone Nasal Cannula    Physical Exam  GEN:   Alert and oriented, No apparent distress.   H / E:   Normocephalic, Atraumatic.  No scleral icterus.   ENT:   No erythema.  No exudates or discharges.   Trachea midline.  No stridor.  Supple neck.   HEART:  Regular rate and rhythm. No murmurs, rubs or gallops.   LUNGS:   Clear to auscultation and percussion bilaterally.   ABD/GI:  Benign. No rebound or guarding noted.  EXT/MSK:  No clubbing, cyanosis, edema.    NEURO:    Mental status: Awake, alert, oriented x3.  Fund of knowledge - within normal.   Cranial nerves:  CN II-XII - intact.  PERRLA.  EOMI.  No nystagmus   Motor - UE - 5/5 , bilaterally symmetrical.  LE - 5/5, bilaterally symmetrical.   Sensory - UE-  good sensation, bilaterally.  LE -  Left foot toes reduced to minimal sensation, RLE normal sensation.  Coordination/Gait - Normal coordination   Reflexes - DTR - UE - normal, and bilaterally symmetrical.  LE - absent ankle reflexes bilaterally  Babinski - Negative.  No ataxia, No tremor  PSYCH:  Normal thought process.     Lab Data Review:   7/9/2019  1:11 PM    Recent Labs      07/07/19 0408 07/07/19 1329 07/08/19 0357 07/09/19   0411   WBC  3.9*  3.6*  3.4*  3.9*   NEUTSPOLYS  67.00  60.60  61.10  60.60   LYMPHOCYTES  19.10*  24.00  23.10  23.30   MONOCYTES  10.70  11.40  11.40  10.90   EOSINOPHILS  2.60  3.10  3.50  4.40   BASOPHILS  0.30  0.60  0.30  0.50   ASTSGOT  13   --    --    --    ALTSGPT  13   --    --    --    ALKPHOSPHAT  45   --    --    --    TBILIRUBIN  0.3   --    --    --      Recent Labs      07/07/19 0408 07/07/19 1329 07/08/19   0357  07/08/19   0829  07/09/19   0411   RBC  2.77*  2.72*  2.61*   --   3.12*   HEMOGLOBIN  9.1*  8.9*  8.7*   --   9.9*   HEMATOCRIT  29.6*  29.4*  28.1*   --   33.2*   PLATELETCT  149*  148*  141*   --   174   PROTHROMBTM  20.6*   --    --   16.5*   --    INR  1.71*   --    --    1.30*   --        Recent Labs      07/07/19   0408  07/08/19   0357  07/09/19   0411   SODIUM  142  142  140   POTASSIUM  4.5  4.8  4.4   CHLORIDE  107  110  108   CO2  29  27  25   BUN  39*  30*  21   CREATININE  1.21  1.02  0.98   CALCIUM  9.9  10.1  10.7*       Recent Labs      07/07/19   0408  07/08/19   0357  07/09/19   0411   ALTSGPT  13   --    --    ASTSGOT  13   --    --    ALKPHOSPHAT  45   --    --    TBILIRUBIN  0.3   --    --    GLUCOSE  146*  106*  115*             Assessment & Plan     On 7/9/19, neurology follow-up, patient seen and examined at bedside. Has no new complaints, improving clinically, states persistent headaches.  CTA imaging performed 7/8/19 was unremarkable and negative for vasculitis, no acute intracranial findings, exam unchanged from recent CT imaging, no large vessel occlusion, and was negative.  Lumbar puncture was negative for meningitis and had no other findings. Patient's work-up demonstrates no evidence of vasculitis or CNS infection.  Patient would like to be started on medication to help with her headaches, agreed to be started on Neurontin, in addition to Medrol Dosepak steroid taper for 6 days to help acutely for her headaches.  At this time, it is important for patient to follow-up with MRI brain with and without contrast in 3 months after discharge, and neurology appointment outpatient as scheduled after MRI brain imaging in 3 months.      Recommendation/Plan  - Neurontin 200 mg twice daily for headaches  - Medrol Dosepak PO taper for 6 days to help acutely for her headaches  - Follow-up MRI brain with and without contrast in 3 months  - Neurology appointment outpatient follow-up after discharge in 3 to 3-1/2 months after MRI brain imaging as scheduled        A computerized dictation system may have been used for this note.    Despite review, there may be some spelling or grammatical errors.    Lobo Sierra M.D.  7/9/2019   Service:  Neurology   Attending:    Giles Aicha

## 2019-07-09 NOTE — PROGRESS NOTES
Discussed POC and CT w/ contrast of abdomen and LP for today, pt understands procedures. H/A and mid back pain managed w/ PRN medication per MAR, good results. Reports itching when taking pain meds, PRN Benedryl given. AOx4, lung sounds clear. NS at 75cc/hr. Standby assist. Calls appropriately.

## 2019-07-09 NOTE — CARE PLAN
Problem: Infection  Goal: Will remain free from infection  Outcome: PROGRESSING AS EXPECTED  No s/s of infection. Standard precautions in place.     Problem: Pain Management  Goal: Pain level will decrease to patient's comfort goal  Outcome: PROGRESSING AS EXPECTED  Assess pain every two hours and PRN.  Alternative interventions implemented if needed to reduce pain level.  Will continue to assess and monitor

## 2019-07-09 NOTE — OR SURGEON
Immediate Postoperative Note    PostOp Diagnosis: Brain lesions, possible MS    Procedure(s): lumbar puncture    Estimated Blood Loss: Less than 5 ml    Complications: None

## 2019-07-09 NOTE — CARE PLAN
Problem: Communication  Goal: The ability to communicate needs accurately and effectively will improve    Intervention: [place] patient and significant other/support system to call light to alert staff of needs  Call light in reach of patient. Remind patient and family to call with any needs. Remind patient to not get out of bed with staff present       Problem: Pain Management  Goal: Pain level will decrease to patient's comfort goal    Intervention: Follow pain managment plan developed in collaboration with patient and Interdisciplinary Team  Pain assessed every two hours and PRN.  Alternative interventions implemented if needed to reduce pain level.  PRN medications also an intervention if needed to reduce pain.  Will continue to assess and monitor.

## 2019-07-10 ENCOUNTER — APPOINTMENT (OUTPATIENT)
Dept: RADIOLOGY | Facility: MEDICAL CENTER | Age: 74
DRG: 552 | End: 2019-07-10
Attending: INTERNAL MEDICINE
Payer: MEDICARE

## 2019-07-10 ENCOUNTER — PATIENT OUTREACH (OUTPATIENT)
Dept: HEALTH INFORMATION MANAGEMENT | Facility: OTHER | Age: 74
End: 2019-07-10

## 2019-07-10 VITALS
HEART RATE: 70 BPM | WEIGHT: 259.48 LBS | BODY MASS INDEX: 40.73 KG/M2 | RESPIRATION RATE: 17 BRPM | TEMPERATURE: 99 F | OXYGEN SATURATION: 94 % | HEIGHT: 67 IN | SYSTOLIC BLOOD PRESSURE: 104 MMHG | DIASTOLIC BLOOD PRESSURE: 50 MMHG

## 2019-07-10 DIAGNOSIS — I71.40 ABDOMINAL AORTIC ANEURYSM (AAA) WITHOUT RUPTURE (HCC): Primary | ICD-10-CM

## 2019-07-10 PROBLEM — R50.9 FEVER, LOW GRADE: Status: RESOLVED | Noted: 2019-07-06 | Resolved: 2019-07-10

## 2019-07-10 PROBLEM — N39.0 UTI (URINARY TRACT INFECTION): Status: RESOLVED | Noted: 2019-07-08 | Resolved: 2019-07-10

## 2019-07-10 PROBLEM — M54.10 BACK PAIN WITH RADICULOPATHY: Status: RESOLVED | Noted: 2019-07-06 | Resolved: 2019-07-10

## 2019-07-10 PROBLEM — N17.9 AKI (ACUTE KIDNEY INJURY) (HCC): Status: RESOLVED | Noted: 2019-07-06 | Resolved: 2019-07-10

## 2019-07-10 PROBLEM — G93.9 BRAIN LESION: Status: RESOLVED | Noted: 2019-07-08 | Resolved: 2019-07-10

## 2019-07-10 LAB
ALBUMIN CSF ELPH-MCNC: 19.4 MG/DL (ref 8.4–34.2)
ALPHA1 GLOB CSF ELPH-MCNC: 1.3 MG/DL (ref 0–3.1)
ALPHA2 GLOB CSF ELPH-MCNC: 2.5 MG/DL (ref 0–5.4)
ANION GAP SERPL CALC-SCNC: 4 MMOL/L (ref 0–11.9)
B-GLOBULIN CSF ELPH-MCNC: 4.7 MG/DL (ref 0–8.1)
BUN SERPL-MCNC: 22 MG/DL (ref 8–22)
C GATTII+NEOFOR DNA CSF QL NAA+NON-PROBE: NOT DETECTED
CALCIUM SERPL-MCNC: 10.2 MG/DL (ref 8.5–10.5)
CHLORIDE SERPL-SCNC: 107 MMOL/L (ref 96–112)
CMV DNA CSF QL NAA+NON-PROBE: NOT DETECTED
CO2 SERPL-SCNC: 26 MMOL/L (ref 20–33)
CREAT SERPL-MCNC: 0.96 MG/DL (ref 0.5–1.4)
E COLI K1 DNA CSF QL NAA+NON-PROBE: NOT DETECTED
ERYTHROCYTE [DISTWIDTH] IN BLOOD BY AUTOMATED COUNT: 50.1 FL (ref 35.9–50)
EV RNA CSF QL NAA+NON-PROBE: NOT DETECTED
FOLATE SERPL-MCNC: 23.8 NG/ML
GAMMA GLOB CSF ELPH-MCNC: 0.9 MG/DL (ref 0–5.4)
GLUCOSE SERPL-MCNC: 180 MG/DL (ref 65–99)
GP B STREP DNA CSF QL NAA+NON-PROBE: NOT DETECTED
HAEM INFLU DNA CSF QL NAA+NON-PROBE: NOT DETECTED
HCT VFR BLD AUTO: 26 % (ref 37–47)
HCT VFR BLD AUTO: 26.7 % (ref 37–47)
HGB BLD-MCNC: 8.1 G/DL (ref 12–16)
HGB BLD-MCNC: 8.2 G/DL (ref 12–16)
HHV6 DNA CSF QL NAA+NON-PROBE: NOT DETECTED
HSV1 DNA CSF QL NAA+NON-PROBE: NOT DETECTED
HSV2 DNA CSF QL NAA+NON-PROBE: NOT DETECTED
INR PPP: 1.22 (ref 0.87–1.13)
L MONOCYTOG DNA CSF QL NAA+NON-PROBE: NOT DETECTED
MAGNESIUM SERPL-MCNC: 2.1 MG/DL (ref 1.5–2.5)
MCH RBC QN AUTO: 32.9 PG (ref 27–33)
MCHC RBC AUTO-ENTMCNC: 31.2 G/DL (ref 33.6–35)
MCV RBC AUTO: 105.7 FL (ref 81.4–97.8)
N MEN DNA CSF QL NAA+NON-PROBE: NOT DETECTED
PARECHOVIRUS A RNA CSF QL NAA+NON-PROBE: NOT DETECTED
PLATELET # BLD AUTO: 131 K/UL (ref 164–446)
PMV BLD AUTO: 8.8 FL (ref 9–12.9)
POTASSIUM SERPL-SCNC: 4.8 MMOL/L (ref 3.6–5.5)
PREALB CSF ELPH-MCNC: 1.4 MG/DL (ref 0–3.1)
PROT CSF-MCNC: 30.2 MG/DL (ref 15–45)
PROTHROMBIN TIME: 15.7 SEC (ref 12–14.6)
RBC # BLD AUTO: 2.46 M/UL (ref 4.2–5.4)
S PNEUM DNA CSF QL NAA+NON-PROBE: NOT DETECTED
SODIUM SERPL-SCNC: 137 MMOL/L (ref 135–145)
TSH SERPL DL<=0.005 MIU/L-ACNC: 0.41 UIU/ML (ref 0.38–5.33)
VIT B12 SERPL-MCNC: 348 PG/ML (ref 211–911)
VZV DNA CSF QL NAA+NON-PROBE: NOT DETECTED
WBC # BLD AUTO: 3.9 K/UL (ref 4.8–10.8)

## 2019-07-10 PROCEDURE — 700102 HCHG RX REV CODE 250 W/ 637 OVERRIDE(OP): Performed by: INTERNAL MEDICINE

## 2019-07-10 PROCEDURE — 85610 PROTHROMBIN TIME: CPT

## 2019-07-10 PROCEDURE — 85014 HEMATOCRIT: CPT | Mod: XU

## 2019-07-10 PROCEDURE — 700102 HCHG RX REV CODE 250 W/ 637 OVERRIDE(OP): Performed by: PSYCHIATRY & NEUROLOGY

## 2019-07-10 PROCEDURE — 84443 ASSAY THYROID STIM HORMONE: CPT

## 2019-07-10 PROCEDURE — 700102 HCHG RX REV CODE 250 W/ 637 OVERRIDE(OP): Performed by: HOSPITALIST

## 2019-07-10 PROCEDURE — 74183 MRI ABD W/O CNTR FLWD CNTR: CPT

## 2019-07-10 PROCEDURE — A9270 NON-COVERED ITEM OR SERVICE: HCPCS | Performed by: HOSPITALIST

## 2019-07-10 PROCEDURE — 85018 HEMOGLOBIN: CPT | Mod: XU

## 2019-07-10 PROCEDURE — 82607 VITAMIN B-12: CPT

## 2019-07-10 PROCEDURE — G0378 HOSPITAL OBSERVATION PER HR: HCPCS

## 2019-07-10 PROCEDURE — 36415 COLL VENOUS BLD VENIPUNCTURE: CPT

## 2019-07-10 PROCEDURE — A9270 NON-COVERED ITEM OR SERVICE: HCPCS | Performed by: INTERNAL MEDICINE

## 2019-07-10 PROCEDURE — 82746 ASSAY OF FOLIC ACID SERUM: CPT

## 2019-07-10 PROCEDURE — 83735 ASSAY OF MAGNESIUM: CPT

## 2019-07-10 PROCEDURE — 99217 PR OBSERVATION CARE DISCHARGE: CPT | Performed by: INTERNAL MEDICINE

## 2019-07-10 PROCEDURE — 80048 BASIC METABOLIC PNL TOTAL CA: CPT

## 2019-07-10 PROCEDURE — A9270 NON-COVERED ITEM OR SERVICE: HCPCS | Performed by: PSYCHIATRY & NEUROLOGY

## 2019-07-10 PROCEDURE — 85027 COMPLETE CBC AUTOMATED: CPT

## 2019-07-10 RX ORDER — WARFARIN SODIUM 10 MG/1
10 TABLET ORAL
Status: COMPLETED | OUTPATIENT
Start: 2019-07-10 | End: 2019-07-10

## 2019-07-10 RX ORDER — METHYLPREDNISOLONE 4 MG/1
4 TABLET ORAL DAILY
Qty: 1 KIT | Refills: 0 | Status: SHIPPED | OUTPATIENT
Start: 2019-07-10 | End: 2019-08-20

## 2019-07-10 RX ORDER — WARFARIN SODIUM 10 MG/1
10 TABLET ORAL
Status: DISCONTINUED | OUTPATIENT
Start: 2019-07-12 | End: 2019-07-10 | Stop reason: HOSPADM

## 2019-07-10 RX ORDER — WARFARIN SODIUM 7.5 MG/1
7.5 TABLET ORAL
Status: DISCONTINUED | OUTPATIENT
Start: 2019-07-11 | End: 2019-07-10 | Stop reason: HOSPADM

## 2019-07-10 RX ORDER — GABAPENTIN 100 MG/1
200 CAPSULE ORAL 2 TIMES DAILY
Qty: 90 CAP | Refills: 0 | Status: SHIPPED | OUTPATIENT
Start: 2019-07-10 | End: 2019-08-20

## 2019-07-10 RX ORDER — OXYCODONE HYDROCHLORIDE 5 MG/1
5 TABLET ORAL EVERY 8 HOURS PRN
Qty: 15 TAB | Refills: 0 | Status: SHIPPED | OUTPATIENT
Start: 2019-07-10 | End: 2019-07-15

## 2019-07-10 RX ADMIN — LEVOTHYROXINE SODIUM 75 MCG: 75 TABLET ORAL at 04:14

## 2019-07-10 RX ADMIN — OXYCODONE HYDROCHLORIDE 10 MG: 10 TABLET ORAL at 08:32

## 2019-07-10 RX ADMIN — METHYLPREDNISOLONE 4 MG: 4 TABLET ORAL at 13:03

## 2019-07-10 RX ADMIN — SENNOSIDES, DOCUSATE SODIUM 2 TABLET: 50; 8.6 TABLET, FILM COATED ORAL at 17:03

## 2019-07-10 RX ADMIN — POLYETHYLENE GLYCOL 3350 1 PACKET: 17 POWDER, FOR SOLUTION ORAL at 08:40

## 2019-07-10 RX ADMIN — AMBRISENTAN 10 MG: 10 TABLET, FILM COATED ORAL at 04:15

## 2019-07-10 RX ADMIN — ROSUVASTATIN CALCIUM 10 MG: 5 TABLET, FILM COATED ORAL at 17:03

## 2019-07-10 RX ADMIN — OXYCODONE HYDROCHLORIDE 10 MG: 10 TABLET ORAL at 04:14

## 2019-07-10 RX ADMIN — SENNOSIDES, DOCUSATE SODIUM 2 TABLET: 50; 8.6 TABLET, FILM COATED ORAL at 04:21

## 2019-07-10 RX ADMIN — GABAPENTIN 200 MG: 100 CAPSULE ORAL at 17:03

## 2019-07-10 RX ADMIN — OXYCODONE HYDROCHLORIDE 10 MG: 10 TABLET ORAL at 13:03

## 2019-07-10 RX ADMIN — METHYLPREDNISOLONE 4 MG: 4 TABLET ORAL at 06:24

## 2019-07-10 RX ADMIN — WARFARIN SODIUM 10 MG: 10 TABLET ORAL at 17:04

## 2019-07-10 RX ADMIN — GABAPENTIN 200 MG: 100 CAPSULE ORAL at 04:14

## 2019-07-10 RX ADMIN — LISINOPRIL 5 MG: 5 TABLET ORAL at 04:16

## 2019-07-10 RX ADMIN — METHYLPREDNISOLONE 4 MG: 4 TABLET ORAL at 17:03

## 2019-07-10 RX ADMIN — TADALAFIL 40 MG: 10 TABLET ORAL at 04:16

## 2019-07-10 RX ADMIN — SOTALOL HYDROCHLORIDE 80 MG: 80 TABLET ORAL at 04:17

## 2019-07-10 RX ADMIN — OXYCODONE HYDROCHLORIDE 10 MG: 10 TABLET ORAL at 15:56

## 2019-07-10 RX ADMIN — PAROXETINE HYDROCHLORIDE 20 MG: 20 TABLET, FILM COATED ORAL at 04:14

## 2019-07-10 RX ADMIN — SOTALOL HYDROCHLORIDE 80 MG: 80 TABLET ORAL at 17:03

## 2019-07-10 NOTE — PROGRESS NOTES
"Asked by Dr. 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  With patient having multiple medical problems, she is not a candidate for open repair.    If patient would like to have intervention done for the aneurysm, I recommend IR consultation for possible coil embolization.    Discussed with Dr. Paez.  "

## 2019-07-10 NOTE — CARE PLAN
Problem: Safety  Goal: Will remain free from falls    Intervention: Implement fall precautions  Up ad juanpablo with steady gait.       Problem: Pain Management  Goal: Pain level will decrease to patient's comfort goal    Intervention: Follow pain managment plan developed in collaboration with patient and Interdisciplinary Team  Medicated per MAR.

## 2019-07-10 NOTE — DISCHARGE PLANNING
Received Choice form at 8410  Agency/Facility Name: [facility]  Referral sent per Choice form @ [id number]

## 2019-07-10 NOTE — DISCHARGE PLANNING
Anticipated Discharge Disposition:   Home with [facility]    Action:    Spoke with patient and she would like to resume home health services with [facility].  Choice obtained and faxed to [facility].    Pt receiving O25LNC and uses oxygen at home through [facility].       Barriers to Discharge:    Home health acceptance  Medical clearance    Plan:    Wait for home health determination.

## 2019-07-10 NOTE — PROGRESS NOTES
Inpatient Anticoagulation Service Note    Date: 7/9/2019    Reason for Anticoagulation: Atrial Fibrillation   Target INR: 2.0 to 3.0  [id number] VASc Score: 3  HAS-BLED Score: 2   Hemoglobin Value: (!) 9.9  Hematocrit Value: (!) 33.2    INR from last 7 days     Date/Time INR Value    07/08/19 0829 (!)  1.3    07/07/19 0408 (!)  1.71    07/06/19 0146 (!)  1.88        Dose from last 7 days     Date/Time Dose (mg)    07/09/19 1713  10    07/06/19 0701  7.5        Average Dose (mg):  (10mg on Fri, 7.5mg AOD)  Significant Interactions: Antibiotics, Thyroid Medications, Corticosteroids  Bridge Therapy: No     Reversal Agent Administered: Not Applicable  Comments: Resuming home warfarin for afib management after LP was completed earlier today on 7/9/19.  INR has been subtherapeutic d/t being held for past 3 days.  DDI continued, along with new start Medrol.  H/H low but stable with no s/sx of bleeding.  CHADSVASC 3 does not indicate need for bridge therapy at this time.  Will give 10mg tonight and assess INR in AM.    Plan:  Warfarin 10mg.  INR in AM.  Education Material Provided?: No  Pharmacist suggested discharge dosing: Resume home dosing of 10mg on Fri, 7.5mg AOD with follow up within 4-5 days of discharge.     Jb Rodarte

## 2019-07-10 NOTE — PROGRESS NOTES
Hospital Medicine Daily Progress Note    Date of Service  7/9/2019    Chief Complaint  74 y.o. female admitted 7/5/2019 with back pain, fevers, headache.  Hospital Course      74-year-old female history of pulmonary hypertension, chronic respiratory failure O2 dependent 5 L, A. fib on Coumadin, hypertension, dyslipidemia, chronic back pain, obesity, lumbar epidural steroid injection proxy 2 weeks ago admitted with back pain with headaches fever.  Started on empiric IV antibiotics for possible infection.    Interval Problem Update  I evaluated and examined this patient at bedside.  She reported that she continued to have headache and back pain.  Neurology evaluated her and made recommendations.  She underwent lumbar puncture which did not show any significant acute abnormalities.  She found to have lesion in her pancreas and liver and I ordered MRI to evaluate it further.  Consultants/Specialty    Neurology    Code Status  Full     Disposition  Home with home health when medically stable.    Review of Systems  Review of Systems   Constitutional: Positive for malaise/fatigue. Negative for chills, fever and weight loss.   HENT: Negative for hearing loss and tinnitus.    Eyes: Negative for blurred vision, double vision, photophobia and pain.   Respiratory: Negative for cough, sputum production and shortness of breath.    Cardiovascular: Negative for chest pain, palpitations, orthopnea and leg swelling.   Gastrointestinal: Negative for abdominal pain, constipation, diarrhea, nausea and vomiting.   Genitourinary: Negative for dysuria, frequency and urgency.   Musculoskeletal: Positive for back pain. Negative for joint pain, myalgias and neck pain.   Skin: Negative for rash.   Neurological: Positive for headaches. Negative for dizziness, tingling, tremors, sensory change, speech change and focal weakness.   Psychiatric/Behavioral: Negative for hallucinations and substance abuse.   All other systems reviewed and are  negative.       Physical Exam  Temp:  [36.1 °C (97 °F)-37.3 °C (99.2 °F)] 37.3 °C (99.2 °F)  Pulse:  [87-96] 87  Resp:  [17-18] 18  BP: ()/(37-66) 106/66  SpO2:  [91 %-95 %] 91 %    Physical Exam   Constitutional: She is oriented to person, place, and time.   Obese  She is laying in bed without any acute distress.   HENT:   Head: Normocephalic and atraumatic.   Eyes: Pupils are equal, round, and reactive to light. Right eye exhibits no discharge. Left eye exhibits no discharge.   Neck: Normal range of motion. Neck supple.   Cardiovascular: Normal rate, regular rhythm and normal heart sounds.  Exam reveals no friction rub.    No murmur heard.  Pulmonary/Chest: Effort normal and breath sounds normal. No respiratory distress. She has no wheezes. She has no rales.   Abdominal: Soft. Bowel sounds are normal. She exhibits no distension. There is no tenderness. There is no rebound.   Musculoskeletal: Normal range of motion. She exhibits no edema or tenderness.   Neurological: She is alert and oriented to person, place, and time. No cranial nerve deficit.   No focal neurological deficit.   Skin: Skin is warm and dry. No rash noted. She is not diaphoretic. No erythema.   Psychiatric: She has a normal mood and affect. Her behavior is normal.       Fluids    Intake/Output Summary (Last 24 hours) at 07/09/19 1845  Last data filed at 07/09/19 0000   Gross per 24 hour   Intake              240 ml   Output                0 ml   Net              240 ml       Laboratory  Recent Labs      07/07/19   1329  07/08/19   0357  07/09/19   0411   WBC  3.6*  3.4*  3.9*   RBC  2.72*  2.61*  3.12*   HEMOGLOBIN  8.9*  8.7*  9.9*   HEMATOCRIT  29.4*  28.1*  33.2*   MCV  108.1*  107.7*  106.4*   MCH  32.7  33.3*  31.7   MCHC  30.3*  31.0*  29.8*   RDW  53.1*  52.6*  51.1*   PLATELETCT  148*  141*  174   MPV  8.8*  8.9*  8.7*     Recent Labs      07/07/19   0408  07/08/19   0357  07/09/19   0411   SODIUM  142  142  140   POTASSIUM  4.5  4.8   4.4   CHLORIDE  107  110  108   CO2  29  27  25   GLUCOSE  146*  106*  115*   BUN  39*  30*  21   CREATININE  1.21  1.02  0.98   CALCIUM  9.9  10.1  10.7*     Recent Labs      07/07/19   0408  07/08/19   0829   INR  1.71*  1.30*               Imaging  IR-US GUIDED PIV   Final Result    Ultrasound-guided PERIPHERAL IV INSERTION performed by    qualified nursing staff as above.            DX-LUMBAR PUNCTURE FOR DIAGNOSIS   Final Result      Fluoroscopic-guided lumbar puncture as described above.      CT-CHEST,ABDOMEN,PELVIS WITH   Final Result      1.  Mildly enlarged subcarinal lymph node   2.  BILATERAL dependent atelectasis with superimposed pulmonary edema or pneumonia not excluded   3.  10 and 16 mm hepatic lesions, incompletely characterized. These do not have the appearance of simple cysts. They could be hemangiomas or neoplasms. Further assessment could be performed with multiphase MRI.   4.  16 and 5 mm hypodense pancreatic lesions which could be IPMNs, cysts or cystic masses. These could also be further assessed with multiphase MRI.   5.  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 This would be an unusual appearance for a pancreatic head tumor.   6.  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  Prior hysterectomy      CT-CTA HEAD WITH & W/O-POST PROCESS   Final Result      1.  No acute intracranial findings. Exam is unchanged from the recent CT.      2.  No large vessel occlusion.      MR-BRAIN-WITH & W/O   Final Result      1.  Mild supratentorial white matter disease most consistent with microvascular ischemic change.   2.  27 mm geographic area of FLAIR hyperintensity in the left frontal white matter. No enhancement, hemorrhage, or restricted diffusion. Among the differential considerations would be low-grade/nonenhancing primary brain tumor (however, high-grade    gliomas can also be nonenhancing), CNS lymphoma (which  "may not enhance if the patient has recently received steroids), a so-called \"tumefactive\" demyelinating lesion, atypical or opportunistic infection such as PML, or perhaps a rare inflammatory    condition such as lymphocytic vasculitis or other vasculopathy. There is no restricted diffusion to suggest acute infarction. Subacute infarction would likely show some enhancement. As mentioned above, ancillary findings to support cortical venous    thrombosis/venous infarct are not present.   3.  Follow-up imaging in the short-term (in the next week or so) may be of interest to determine whether this is an evolving active lesion. MR spectroscopy may or may not be helpful.      CT-HEAD W/O   Final Result      1.  Asymmetric low attenuation area in the white matter in the anterior aspect of left frontal convexity measuring approximately 2.2 cm in diameter. Differential diagnosis includes an area of prior ischemia or hemorrhage.      2.  Correlation with enhanced MRI or CT may be of value to exclude acute process.      3.  Underlying mild atrophy and small vessel ischemic changes.      4.  No hemorrhage or mass effect.      Findings were discussed with BOB GIANG M.D. on 7/6/2019 4:16 PM.      MR-LUMBAR SPINE-WITH & W/O   Final Result      1.  L3-4 grade 1 spondylolisthesis. L5-S1 slight grade 1 spondylolisthesis.   2.  Incidental hemangiomas in the T11 and L4 vertebral bodies. No clinical significance.   3.  L3-4 mild-moderate hypertrophic facet arthropathy. No central or foraminal stenosis.   4.  L4-5 minimal facet arthropathy.   5.  Incidental small perineural cyst at the S2 segment level. Doubtful clinical significance.   6.  No evidence of osteomyelitis, discitis, or epidural abscess.           Assessment/Plan  * Fever, low grade   Assessment & Plan    Unclear source, blood Cxs NGTD.  U culture at outlying facility with E. coli although would not explain headache back pain  Abnormal CT H scan with asymmetric low " attenuation.  Follow-up MRI with not enhancing left frontal lobe lesion-consider l demyelinating disease, old CVA, TBI, lymphoma or other mass or vasculitis, viral.  No signs of other bacterial infection-DC vancomycin.  Continue IV Rocephin for E. coli UTI  Neurology evaluated her and recommended outpatient follow-up.  She underwent lumbar puncture on July 9, 2019 and fluid analysis did not show signs of acute infection.  Neurology would like to follow her as outpatient.  Appreciate neurology recommendations.     Back pain with radiculopathy   Assessment & Plan    High risk given recent epidural injection   She underwent MRI lumbar spine on July 6, 2019 and it showed without acute inflammatory changes, signs of discitis, osteomyelitis or abscess.  She underwent lumbar puncture and fluid analysis did not show any signs of meningitis.  Neurology evaluated her and made recommendations.     Brain lesion   Assessment & Plan    Unclear cause  Plan workup as above  Neurology evaluated her and recommended outpatient follow-up  CT scan abdomen pelvis showed possible lesion in pancreas and liver.  Ordered MRI to evaluate it further.     UTI (urinary tract infection)   Assessment & Plan    Reported E. coli urine growth from [facility].  Initially had urinary hesitancy.  Continue IV Rocephin, complete 3-day course.  She denies any acute symptoms of urinary tract infection.     Chronic respiratory failure (HCC)   Assessment & Plan    Due to pulmonary hypertension.  Stable respiratory symptoms.  Continue outpatient medications.  Continue to provide her oxygen as needed.     MILAN (acute kidney injury) ([facility])   Assessment & Plan    Suspect hypovolemic .  Patient was on diuretics  Holding Lasix  Avoid nephrotoxic medications   Renal function has been improving.  Continue to monitor     PAH (pulmonary artery hypertension) ([facility])   Assessment & Plan    Stable respiratory symptoms.    resume home letairis, cialis   Continue  to provide her oxygen.     AF (atrial fibrillation) (HCC)   Assessment & Plan    Controlled   Continue Coumadin and dose adjustment as per INR  Continue cardiac monitoring     Hypothyroidism- (present on admission)   Assessment & Plan    Resume levothyroxine      Essential hypertension- (present on admission)   Assessment & Plan    Well-controlled  Continue lisinopril , beta-blocker  Continue to monitor       I discussed plan of care during multidisciplinary rounds.    VTE prophylaxis: warfarin

## 2019-07-10 NOTE — DISCHARGE INSTRUCTIONS
Discharge Instructions    Discharged to home by car with relative. Discharged via wheelchair, hospital escort: Yes.  Special equipment needed: Not Applicable    Be sure to schedule a follow-up appointment with your primary care doctor or any specialists as instructed.     Discharge Plan:   Diet Plan: Discussed  Activity Level: Discussed  Confirmed Follow up Appointment: Patient to Call and Schedule Appointment  Confirmed Symptoms Management: Discussed  Medication Reconciliation Updated: Yes  Pneumococcal Vaccine Administered/Refused: Not given - Patient refused pneumococcal vaccine  Influenza Vaccine Indication: Not indicated: Previously immunized this influenza season and > 8 years of age    I understand that a diet low in cholesterol, fat, and sodium is recommended for good health. Unless I have been given specific instructions below for another diet, I accept this instruction as my diet prescription.       Special Instructions:     Abdominal Aortic Aneurysm  Blood pumps away from the heart through tubes (blood vessels) called arteries. Aneurysms are weak or damaged places in the wall of an artery. It bulges out like a balloon. An abdominal aortic aneurysm happens in the main artery of the body (aorta). It can burst or tear, causing bleeding inside the body. This is an emergency. It needs treatment right away.  What are the causes?  The exact cause is unknown. Things that could cause this problem include:  · Fat and other substances building up in the lining of a tube.  · Swelling of the walls of a blood vessel.  · Certain tissue diseases.  · Belly (abdominal) trauma.  · An infection in the main artery of the body.  What increases the risk?  There are things that make it more likely for you to have an aneurysm. These include:  · Being over the age of 60 years old.  · Having high blood pressure (hypertension).  · Being a male.  · Being white.  · Being very overweight (obese).  · Having a family history of  aneurysm.  · Using tobacco products.  What are the signs or symptoms?  Symptoms depend on the size of the aneurysm and how fast it grows. There may not be symptoms. If symptoms occur, they can include:  · Pain (belly, side, lower back, or groin).  · Feeling full after eating a small amount of food.  · Feeling sick to your stomach (nauseous), throwing up (vomiting), or both.  · Feeling a lump in your belly that feels like it is beating (pulsating).  · Feeling like you will pass out (faint).  How is this treated?  · Medicine to control blood pressure and pain.  · Imaging tests to see if the aneurysm gets bigger.  · Surgery.  How is this prevented?  To lessen your chance of getting this condition:  · Stop smoking. Stop chewing tobacco.  · Limit or avoid alcohol.  · Keep your blood pressure, blood sugar, and cholesterol within normal limits.  · Eat less salt.  · Eat foods low in saturated fats and cholesterol. These are found in animal and whole dairy products.  · Eat more fiber. Fiber is found in whole grains, vegetables, and fruits.  · Keep a healthy weight.  · Stay active and exercise often.  This information is not intended to replace advice given to you by your health care provider. Make sure you discuss any questions you have with your health care provider.  Document Released: 04/14/2014 Document Revised: 05/25/2017 Document Reviewed: 01/17/2014  [employer] Patient Education © 2017 [employer]      · Is patient discharged on Warfarin / Coumadin?   Yes    You are receiving the drug warfarin. Please understand the importance of monitoring warfarin with scheduled PT/INR blood draws.  Follow-up with a call to your personal Doctor's office in 3 days to schedule a PT/INR. .    IMPORTANT: HOW TO USE THIS INFORMATION:  This is a summary and does NOT have all possible information about this product. This information does not assure that this product is safe, effective, or appropriate for you. This information  "is not individual medical advice and does not substitute for the advice of your health care professional. Always ask your health care professional for complete information about this product and your specific health needs.      WARFARIN - ORAL (WARF-uh-rin)      COMMON BRAND NAME(S): Coumadin      WARNING:  Warfarin can cause very serious (possibly fatal) bleeding. This is more likely to occur when you first start taking this medication or if you take too much warfarin. To decrease your risk for bleeding, your doctor or other health care provider will monitor you closely and check your lab results (INR test) to make sure you are not taking too much warfarin. Keep all medical and laboratory appointments. Tell your doctor right away if you notice any signs of serious bleeding. See also Side Effects section.      USES:  This medication is used to treat blood clots (such as in deep vein thrombosis-DVT or pulmonary embolus-PE) and/or to prevent new clots from forming in your body. Preventing harmful blood clots helps to reduce the risk of a stroke or heart attack. Conditions that increase your risk of developing blood clots include a certain type of irregular heart rhythm (atrial fibrillation), heart valve replacement, recent heart attack, and certain surgeries (such as hip/knee replacement). Warfarin is commonly called a \"blood thinner,\" but the more correct term is \"anticoagulant.\" It helps to keep blood flowing smoothly in your body by decreasing the amount of certain substances (clotting proteins) in your blood.      HOW TO USE:  Read the Medication Guide provided by your pharmacist before you start taking warfarin and each time you get a refill. If you have any questions, ask your doctor or pharmacist. Take this medication by mouth with or without food as directed by your doctor or other health care professional, usually once a day. It is very important to take it exactly as directed. Do not increase the dose, take " it more frequently, or stop using it unless directed by your doctor. Dosage is based on your medical condition, laboratory tests (such as INR), and response to treatment. Your doctor or other health care provider will monitor you closely while you are taking this medication to determine the right dose for you. Use this medication regularly to get the most benefit from it. To help you remember, take it at the same time each day. It is important to eat a balanced, consistent diet while taking warfarin. Some foods can affect how warfarin works in your body and may affect your treatment and dose. Avoid sudden large increases or decreases in your intake of foods high in vitamin K (such as broccoli, cauliflower, cabbage, brussels sprouts, kale, spinach, and other green leafy vegetables, liver, green tea, certain vitamin supplements). If you are trying to lose weight, check with your doctor before you try to go on a diet. Cranberry products may also affect how your warfarin works. Limit the amount of cranberry juice (16 ounces/480 milliliters a day) or other cranberry products you may drink or eat.      SIDE EFFECTS:  Nausea, loss of appetite, or stomach/abdominal pain may occur. If any of these effects persist or worsen, tell your doctor or pharmacist promptly. Remember that your doctor has prescribed this medication because he or she has judged that the benefit to you is greater than the risk of side effects. Many people using this medication do not have serious side effects. This medication can cause serious bleeding if it affects your blood clotting proteins too much (shown by unusually high INR lab results). Even if your doctor stops your medication, this risk of bleeding can continue for up to a week. Tell your doctor right away if you have any signs of serious bleeding, including: unusual pain/swelling/discomfort, unusual/easy bruising, prolonged bleeding from cuts or gums, persistent/frequent nosebleeds, unusually  heavy/prolonged menstrual flow, pink/dark urine, coughing up blood, vomit that is bloody or looks like coffee grounds, severe headache, dizziness/fainting, unusual or persistent tiredness/weakness, bloody/black/tarry stools, chest pain, shortness of breath, difficulty swallowing. Tell your doctor right away if any of these unlikely but serious side effects occur: persistent nausea/vomiting, severe stomach/abdominal pain, yellowing eyes/skin. This drug rarely has caused very serious (possibly fatal) problems if its effects lead to small blood clots (usually at the beginning of treatment). This can lead to severe skin/tissue damage that may require surgery or amputation if left untreated. Patients with certain blood conditions (protein C or S deficiency) may be at greater risk. Get medical help right away if any of these rare but serious side effects occur: painful/red/purplish patches on the skin (such as on the toe, breast, abdomen), change in the amount of urine, vision changes, confusion, slurred speech, weakness on one side of the body. A very serious allergic reaction to this drug is rare. However, get medical help right away if you notice any symptoms of a serious allergic reaction, including: rash, itching/swelling (especially of the face/tongue/throat), severe dizziness, trouble breathing. This is not a complete list of possible side effects. If you notice other effects not listed above, contact your doctor or pharmacist. In the [place] - Call your doctor for medical advice about side effects. You may report side effects to FDA at 7-382-SSL-3909. In [place] - Call your doctor for medical advice about side effects. You may report side effects to [facility] [place] at 1-746.411.8638.      PRECAUTIONS:  Before taking warfarin, tell your doctor or pharmacist if you are allergic to it; or if you have any other allergies. This product may contain inactive ingredients, which can cause allergic reactions or other problems. Talk  to your pharmacist for more details. Before using this medication, tell your doctor or pharmacist your medical history, especially of: blood disorders (such as anemia, hemophilia), bleeding problems (such as bleeding of the stomach/intestines, bleeding in the brain), blood vessel disorders (such as aneurysms), recent major injury/surgery, liver disease, alcohol use, mental/mood disorders (including memory problems), frequent falls/injuries. It is important that all your doctors and dentists know that you take warfarin. Before having surgery or any medical/dental procedures, tell your doctor or dentist that you are taking this medication and about all the products you use (including prescription drugs, nonprescription drugs, and herbal products). Avoid getting injections into the muscles. If you must have an injection into a muscle (for example, a flu shot), it should be given in the arm. This way, it will be easier to check for bleeding and/or apply pressure bandages. This medication may cause stomach bleeding. Daily use of alcohol while using this medicine will increase your risk for stomach bleeding and may also affect how this medication works. Limit or avoid alcoholic beverages. If you have not been eating well, if you have an illness or infection that causes fever, vomiting, or diarrhea for more than 2 days, or if you start using any antibiotic medications, contact your doctor or pharmacist immediately because these conditions can affect how warfarin works. This medication can cause heavy bleeding. To lower the chance of getting cut, bruised, or injured, use great caution with sharp objects like safety razors and nail cutters. Use an electric razor when shaving and a soft toothbrush when brushing your teeth. Avoid activities such as contact sports. If you fall or injure yourself, especially if you hit your head, call your doctor immediately. Your doctor may need to check you. The Food & Drug Administration has  "stated that generic warfarin products are interchangeable. However, consult your doctor or pharmacist before switching warfarin products. Be careful not to take more than one medication that contains warfarin unless specifically directed by the doctor or health care provider who is monitoring your warfarin treatment. Older adults may be at greater risk for bleeding while using this drug. This medication is not recommended for use during pregnancy because of serious (possibly fatal) harm to an unborn baby. Discuss the use of reliable forms of birth control with your doctor. If you become pregnant or think you may be pregnant, tell your doctor immediately. If you are planning pregnancy, discuss a plan for managing your condition with your doctor before you become pregnant. Your doctor may switch the type of medication you use during pregnancy. Very small amounts of this medication may pass into breast milk but is unlikely to harm a nursing infant. Consult your doctor before breast-feeding.      DRUG INTERACTIONS:  Drug interactions may change how your medications work or increase your risk for serious side effects. This document does not contain all possible drug interactions. Keep a list of all the products you use (including prescription/nonprescription drugs and herbal products) and share it with your doctor and pharmacist. Do not start, stop, or change the dosage of any medicines without your doctor's approval. Warfarin interacts with many prescription, nonprescription, vitamin, and herbal products. This includes medications that are applied to the skin or inside the vagina or rectum. The interactions with warfarin usually result in an increase or decrease in the \"blood-thinning\" (anticoagulant) effect. Your doctor or other health care professional should closely monitor you to prevent serious bleeding or clotting problems. While taking warfarin, it is very important to tell your doctor or pharmacist of any " changes in medications, vitamins, or herbal products that you are taking. Some products that may interact with this drug include: capecitabine, imatinib, mifepristone. Aspirin, aspirin-like drugs (salicylates), and nonsteroidal anti-inflammatory drugs (NSAIDs such as ibuprofen, naproxen, celecoxib) may have effects similar to warfarin. These drugs may increase the risk of bleeding problems if taken during treatment with warfarin. Carefully check all prescription/nonprescription product labels (including drugs applied to the skin such as pain-relieving creams) since the products may contain NSAIDs or salicylates. Talk to your doctor about using a different medication (such as acetaminophen) to treat pain/fever. Low-dose aspirin and related drugs (such as clopidogrel, ticlopidine) should be continued if prescribed by your doctor for specific medical reasons such as heart attack or stroke prevention. Consult your doctor or pharmacist for more details. Many herbal products interact with warfarin. Tell your doctor before taking any herbal products, especially bromelains, coenzyme Q10, cranberry, danshen, dong quai, fenugreek, garlic, ginkgo biloba, ginseng, and Luis Enrique's wort, among others. This medication may interfere with a certain laboratory test to measure theophylline levels, possibly causing false test results. Make sure laboratory personnel and all your doctors know you use this drug.      OVERDOSE:  If overdose is suspected, contact a poison control center or emergency room immediately. [place] residents can call the [place] National Poison Hotline at 1-671.748.7633. [place] residents can call a provincial poison control center. Symptoms of overdose may include: bloody/black/tarry stools, pink/dark urine, unusual/prolonged bleeding.      NOTES:  Do not share this medication with others. Laboratory and/or medical tests (such as INR, complete blood count) must be performed periodically to monitor your progress or check for  side effects. Consult your doctor for more details.      MISSED DOSE:  For the best possible benefit, do not miss any doses. If you do miss a dose and remember on the same day, take it as soon as you remember. If you remember on the next day, skip the missed dose and resume your usual dosing schedule. Do not double the dose to catch up because this could increase your risk for bleeding. Keep a record of missed doses to give to your doctor or pharmacist. Contact your doctor or pharmacist if you miss 2 or more doses in a row.      STORAGE:  Store at room temperature away from light and moisture. Do not store in the bathroom. Keep all medications away from children and pets. Do not flush medications down the toilet or pour them into a drain unless instructed to do so. Properly discard this product when it is  or no longer needed. Consult your pharmacist or local waste disposal company for more details about how to safely discard your product.      MEDICAL ALERT:  Your condition and medication can cause complications in a medical emergency. For information about enrolling in MedicAlert, call 1-378.431.4143 ([place]) or 1-265.708.1160 ([place]).      Information last revised 2010 Copyright(c) 2010 [employer]             Depression / Suicide Risk    As you are discharged from this [facility] facility, it is important to learn how to keep safe from harming yourself.    Recognize the warning signs:  · Abrupt changes in personality, positive or negative- including increase in energy   · Giving away possessions  · Change in eating patterns- significant weight changes-  positive or negative  · Change in sleeping patterns- unable to sleep or sleeping all the time   · Unwillingness or inability to communicate  · Depression  · Unusual sadness, discouragement and loneliness  · Talk of wanting to die  · Neglect of personal appearance   · Rebelliousness- reckless behavior  · Withdrawal from people/activities  they love  · Confusion- inability to concentrate     If you or a loved one observes any of these behaviors or has concerns about self-harm, here's what you can do:  · Talk about it- your feelings and reasons for harming yourself  · Remove any means that you might use to hurt yourself (examples: pills, rope, extension cords, firearm)  · Get professional help from the community (Mental Health, Substance Abuse, psychological counseling)  · Do not be alone:Call your Safe Contact- someone whom you trust who will be there for you.  · Call your local CRISIS HOTLINE 005-4867 or 816-106-2127  · Call your local Children's Mobile Crisis Response Team [place] (690) 771-5947 or www.farmbuy  · Call the toll free National Suicide Prevention Hotlines   · National Suicide Prevention Lifeline 474-688-HPZG (0788)  · National Hope Line Network 800-SUICIDE (832-3134)              Please follow-up with interventional radiology for aneurysm as I explained you that it is very important due to potential rupture of aneurysm.  Also please follow-up with primary care provider, neurology and with pulmonologist.    Roxana Ocasio M.D.

## 2019-07-10 NOTE — PROGRESS NOTES
Discussed POC and MRI for today, pt understands procedure. H/A pain managed w/ PRN medication per MAR, good results. RN educated pt on gabapentin and solumedrol. AOx4, lung sounds clear. Standby assist. Calls appropriately.

## 2019-07-10 NOTE — PROGRESS NOTES
Inpatient Anticoagulation Service Note    Date: 7/10/2019    Reason for Anticoagulation: Atrial Fibrillation   Target INR: 2.0 to 3.0  [id number] VASc Score: 3  HAS-BLED Score: 2   Hemoglobin Value: (!) 8.1  Hematocrit Value: (!) 26    INR from last 7 days     Date/Time INR Value    07/10/19 0405 (!)  1.22    07/08/19 0829 (!)  1.3    07/07/19 0408 (!)  1.71    07/06/19 0146 (!)  1.88        Dose from last 7 days     Date/Time Dose (mg)    07/10/19 0917  10    07/09/19 1713  10    07/06/19 0701  7.5        Average Dose (mg):  (10mg on Fri, 7.5mg AOD)  Significant Interactions: Antibiotics, Thyroid Medications, Corticosteroids  Bridge Therapy: No   Reversal Agent Administered: Not Applicable  Comments: Resuming warfarin for afib after previously being held for LP that was completed on 7/9/19.  No new DDI other than Medrol dose alexander started yesterday.  No bridge therapy as CHADSVASC 3.  Will bolus again tonight then resume home regimen of 7.5mg.    Plan:  Warfarin 10mg. INR in AM.  Education Material Provided?: No  Pharmacist suggested discharge dosing: Warfarin 10mg on Fri, 7.5mg AOD with follow up within 1 week of discharge.     Jb Rodarte

## 2019-07-10 NOTE — PROGRESS NOTES
Aox4. YANI. Reports back pain and headache. Medicated with Oxycodone. Voiding. Miralax given for BM. Updated on plan of care, MRI. Able to make needs known.

## 2019-07-10 NOTE — PROGRESS NOTES
Pt AAOx4, ambulatory x1.   No acute changes. VSS.   Pt complaining of headache, medicated as needed.   Nonpharmacologic techniques in place.   Dicussed POC w/pt and family. All questions/needs met at this time.   Fall precautions in place.   Call bell within reach.

## 2019-07-10 NOTE — FACE TO FACE
Face to Face Supporting Documentation - Home Health    The encounter with this patient was in whole or in part the primary reason for home health admission.    Date of encounter:   Patient:                    MRN:                       YOB: 2017  Zeinab Loza  [id number]  1945     Home health to see patient for:  Skilled Nursing care for assessment, interventions & education, Physical Therapy evaluation and treatment and Occupational therapy evaluation and treatment    Skilled need for:  Comment: Headache and generalized weakness    Skilled nursing interventions to include:  Comment: Physical therapy    Homebound status evidenced by:  Need the aid of supportive devices such as crutches, canes, wheelchairs or walkers. Leaving home requires a considerable and taxing effort. There is a normal inability to leave the home.    Community Physician to provide follow up care: Ashia Tidwell M.D.     Optional Interventions? No      I certify the face to face encounter for this home health care referral meets the CMS requirements and the encounter/clinical assessment with the patient was, in whole, or in part, for the medical condition(s) listed above, which is the primary reason for home health care. Based on my clinical findings: the service(s) are medically necessary, support the need for home health care, and the homebound criteria are met.  I certify that this patient has had a face to face encounter by myself.  Roxana Ocasio M.D. - NPI: [id number]

## 2019-07-10 NOTE — DISCHARGE SUMMARY
Discharge Summary    CHIEF COMPLAINT ON ADMISSION  Chief Complaint   Patient presents with   • Headache     pt transfer from [facility] for MRI to r/o epidural abscess,    • Back Pain       Reason for Admission  EMS     Admission Date  7/5/2019    CODE STATUS  Full Code    HPI & HOSPITAL COURSE  This is a 74-year-old female admitted to the hospital on July 5, 2019 with past medical history of history of pulmonary hypertension, chronic respiratory failure O2 dependent 5 L, A. fib on Coumadin, hypertension, dyslipidemia, chronic back pain, obesity, lumbar epidural steroid injection proxy 2 weeks ago admitted with back pain with headaches fever.  She was Started on empiric IV antibiotics for possible infection.  She found to have urine culture positive for E. coli and she received ceftriaxone.  She was started on vancomycin on presentation and later it was discontinued as she did not show signs of acute bacterial infection.  She underwent lumbar puncture which did not show any signs of acute infection.  She underwent MRI lumbar spine on July 6, 2019 and it did not show signs of acute inflammatory changes, signs of discitis, osteomyelitis or abscess.  Neurology was consulted and they recommended to start her on gabapentin for her headache and pain.  Her symptoms of headache and pain has improved during the course of her hospitalization.  She underwent CT scan abdominal pelvis and she found to have possible lesion in her liver and pancreas and to evaluate it further I ordered MRI abdomen with contrast and it showed cystic lesion and she found to have aneurysm.  Due to her aneurysm I discussed with vascular surgeon Dr. Chu and he recommended that patient is too high risk for open surgical procedure and he recommended IR consult for intervention.  I spoke with Dr. Negro interventional radiologist who recommended outpatient consult with interventional radiology with Dr. Lynch.  I called the  to arrange an  appointment with outpatient with interventional radiologist.  I had a lengthy discussion with patient and her daughter at the bedside and regarding importance of close follow-up with interventional radiology and I explained them by making diagrams that aneurysm can rupture and can cause bleeding and there is life-threatening condition.  Also, patient requested oxycodone for her pain control and I prescribed her 5 days of oxycodone I recommended her not to drive and not to take it with alcohol and she expressed understanding.  I called the  to schedule an appointment with primary care provider as well.  I had multiple discussion on the day of discharge with patient and her daughter at the bedside.  I ordered home health for patient and it was arranged prior to her discharge.  I discharged her on Medrol Doxy pack as recommended by neurology.         Therefore, she is discharged in fair and stable condition to home with organized home healthcare and close outpatient follow-up.    The patient met 2-midnight criteria for an inpatient stay at the time of discharge.    Discharge Date  07/10/19      FOLLOW UP ITEMS POST DISCHARGE  Interventional radiology.  Neurology  Primary care provider    DISCHARGE DIAGNOSES  Principal Problem (Resolved):    Fever, low grade POA: Unknown  Active Problems:    Essential hypertension POA: Yes      Overview: Overview:       IMO Load 2014 1.1    Hypothyroidism POA: Yes      Overview: Overview:       IMO Load 2014 1.1    AF (atrial fibrillation) (HCC) POA: Unknown    PAH (pulmonary artery hypertension) (HCC) POA: Unknown    Chronic respiratory failure (HCC) POA: Unknown  Resolved Problems:    Back pain with radiculopathy POA: Unknown    MILAN (acute kidney injury) (HCC) POA: Unknown    UTI (urinary tract infection) POA: Unknown    Brain lesion POA: Unknown      FOLLOW UP    Ashia Tidwell M.D.  [address] [facility] [facility]  [place] [address]  [phone]    Schedule an appointment as  soon as possible for a visit in 1 week  Hospital follow-up appointment with PCP      MEDICATIONS ON DISCHARGE     Medication List      START taking these medications      Instructions   gabapentin 100 MG Caps  Commonly known as:  NEURONTIN   Take 2 Caps by mouth 2 Times a Day.  Dose:  200 mg     methylPREDNISolone 4 MG Tbpk  Commonly known as:  MEDROL   Take 1 Tab by mouth every day.  Dose:  4 mg        CONTINUE taking these medications      Instructions   ambrisentan 10 MG tablet  Commonly known as:  LETAIRIS   Take 10 mg by mouth every day.  Dose:  10 mg     furosemide 20 MG Tabs  Commonly known as:  LASIX   Take 40 mg by mouth 2 times a day.  Dose:  40 mg     levothyroxine 75 MCG Tabs  Commonly known as:  SYNTHROID   Take 75 mcg by mouth Every morning on an empty stomach.  Dose:  75 mcg     lisinopril 20 MG Tabs  Commonly known as:  PRINIVIL   Take 20 mg by mouth every day.  Dose:  20 mg     metoprolol 25 MG Tabs  Commonly known as:  LOPRESSOR   Take 25 mg by mouth 2 times a day.  Dose:  25 mg     PARoxetine 20 MG Tabs  Commonly known as:  PAXIL   Take 20 mg by mouth every day.  Dose:  20 mg     rosuvastatin 10 MG Tabs  Commonly known as:  CRESTOR   Take 10 mg by mouth every evening.  Dose:  10 mg     sotalol 80 MG Tabs  Commonly known as:  BETAPACE   Take 80 mg by mouth 2 times a day.  Dose:  80 mg     tadalafil 10 MG tablet  Commonly known as:  CIALIS   Take 40 mg by mouth every day.  Dose:  40 mg     vitamin D 1000 UNIT Tabs  Commonly known as:  cholecalciferol   Take 1,000 Units by mouth every day.  Dose:  1000 Units     warfarin 7.5 MG Tabs  Commonly known as:  COUMADIN   Take 7.5 mg by mouth every day. Reports taking 7.5mg all days except 10mg on Fridays  Dose:  7.5 mg            Allergies  No Known Allergies    DIET  Orders Placed This Encounter   Procedures   • Diet Order Regular     Standing Status:   Standing     Number of Occurrences:   1     Order Specific Question:   Diet:     Answer:   Regular [1]        ACTIVITY  As tolerated.  Weight bearing as tolerated    CONSULTATIONS  Neurology  Vascular surgery  Interventional radiology    PROCEDURES  Lumbar puncture    LABORATORY  Lab Results   Component Value Date    SODIUM 137 07/10/2019    POTASSIUM 4.8 07/10/2019    CHLORIDE 107 07/10/2019    CO2 26 07/10/2019    GLUCOSE 180 (H) 07/10/2019    BUN 22 07/10/2019    CREATININE 0.96 07/10/2019        Lab Results   Component Value Date    WBC 3.9 (L) 07/10/2019    HEMOGLOBIN 8.2 (L) 07/10/2019    HEMATOCRIT 26.7 (L) 07/10/2019    PLATELETCT 131 (L) 07/10/2019      MR-ABDOMEN-WITH & W/O   Final Result         1. The 1.5 cm lesion in the left hepatic lobe and 1.0 cm lesion in the right posterior hepatic lobe are cysts.      2. A 2.5 cm pedunculated aneurysm arising from the celiac/common hepatic artery      3. A 1.7 cm pedunculated right renal aneurysm.      4. A cystic lesion without solid component in the uncinate process of the pancreas. No pancreatic duct dilatation.      5. Cholelithiasis.      IR-US GUIDED PIV   Final Result    Ultrasound-guided PERIPHERAL IV INSERTION performed by    qualified nursing staff as above.            DX-LUMBAR PUNCTURE FOR DIAGNOSIS   Final Result      Fluoroscopic-guided lumbar puncture as described above.      CT-CHEST,ABDOMEN,PELVIS WITH   Final Result      1.  Mildly enlarged subcarinal lymph node   2.  BILATERAL dependent atelectasis with superimposed pulmonary edema or pneumonia not excluded   3.  10 and 16 mm hepatic lesions, incompletely characterized. These do not have the appearance of simple cysts. They could be hemangiomas or neoplasms. Further assessment could be performed with multiphase MRI.   4.  16 and 5 mm hypodense pancreatic lesions which could be IPMNs, cysts or cystic masses. These could also be further assessed with multiphase MRI.   5.  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  "This would be an unusual appearance for a pancreatic head tumor.   6.  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  Prior hysterectomy      CT-CTA HEAD WITH & W/O-POST PROCESS   Final Result      1.  No acute intracranial findings. Exam is unchanged from the recent CT.      2.  No large vessel occlusion.      MR-BRAIN-WITH & W/O   Final Result      1.  Mild supratentorial white matter disease most consistent with microvascular ischemic change.   2.  27 mm geographic area of FLAIR hyperintensity in the left frontal white matter. No enhancement, hemorrhage, or restricted diffusion. Among the differential considerations would be low-grade/nonenhancing primary brain tumor (however, high-grade    gliomas can also be nonenhancing), CNS lymphoma (which may not enhance if the patient has recently received steroids), a so-called \"tumefactive\" demyelinating lesion, atypical or opportunistic infection such as PML, or perhaps a rare inflammatory    condition such as lymphocytic vasculitis or other vasculopathy. There is no restricted diffusion to suggest acute infarction. Subacute infarction would likely show some enhancement. As mentioned above, ancillary findings to support cortical venous    thrombosis/venous infarct are not present.   3.  Follow-up imaging in the short-term (in the next week or so) may be of interest to determine whether this is an evolving active lesion. MR spectroscopy may or may not be helpful.      CT-HEAD W/O   Final Result      1.  Asymmetric low attenuation area in the white matter in the anterior aspect of left frontal convexity measuring approximately 2.2 cm in diameter. Differential diagnosis includes an area of prior ischemia or hemorrhage.      2.  Correlation with enhanced MRI or CT may be of value to exclude acute process.      3.  Underlying mild atrophy and small vessel ischemic changes.      4.  No hemorrhage or mass effect.      Findings were discussed with BOB GIANG M.D. on 7/6/2019 " 4:16 PM.      MR-LUMBAR SPINE-WITH & W/O   Final Result      1.  L3-4 grade 1 spondylolisthesis. L5-S1 slight grade 1 spondylolisthesis.   2.  Incidental hemangiomas in the T11 and L4 vertebral bodies. No clinical significance.   3.  L3-4 mild-moderate hypertrophic facet arthropathy. No central or foraminal stenosis.   4.  L4-5 minimal facet arthropathy.   5.  Incidental small perineural cyst at the S2 segment level. Doubtful clinical significance.   6.  No evidence of osteomyelitis, discitis, or epidural abscess.          Total time of the discharge process exceeds 47 minutes.

## 2019-07-10 NOTE — DISCHARGE PLANNING
Agency/Facility Name: [facility]  Spoke To: Kristie  Outcome: Patient accepted. Roseanne(ABDOUL CM) notified.

## 2019-07-11 LAB
ALB CSF/SERPL: 4.7 RATIO (ref 0–9)
ALBUMIN CSF-MCNC: 19 MG/DL (ref 0–35)
ALBUMIN SERPL-MCNC: 4080 MG/DL (ref 3500–5200)
BACTERIA BLD CULT: NORMAL
BACTERIA BLD CULT: NORMAL
IGG CSF-MCNC: 1.3 MG/DL (ref 0–6)
IGG SERPL-MCNC: 599 MG/DL (ref 768–1632)
IGG SYNTH RATE SER+CSF CALC-MRATE: <0 MG/D
IGG/ALB CLEAR SER+CSF-RTO: 0.47 RATIO (ref 0.28–0.66)
IGG/ALB CSF: 0.07 RATIO (ref 0.09–0.25)
JC VIRUS SOURCE  Q4278: NORMAL
JCPYV DNA SERPL QL NAA+PROBE: NOT DETECTED
SIGNIFICANT IND 70042: NORMAL
SIGNIFICANT IND 70042: NORMAL
SITE SITE: NORMAL
SITE SITE: NORMAL
SOURCE SOURCE: NORMAL
SOURCE SOURCE: NORMAL

## 2019-07-11 NOTE — PROGRESS NOTES
Pt and family educated on home care instructions, follow up, home health, seeking medical attention. Per Dr. Ocasio IR scheduling to contact pt for appointment.  Discharged via WC. Belongings with pt. Pt has home O2 already.

## 2019-07-12 LAB
BACTERIA CSF CULT: NORMAL
GRAM STN SPEC: NORMAL
OLIGOCLONAL BANDS CSF ELPH-IMP: NORMAL
OLIGOCLONAL BANDS CSF IEF: 0 BANDS (ref 0–1)
OLIGOCLONAL BANDS.IT SER+CSF QL: NEGATIVE
SIGNIFICANT IND 70042: NORMAL
SITE SITE: NORMAL
SOURCE SOURCE: NORMAL
WNV IGG CSF IA-ACNC: 0.07 IV
WNV IGM CSF IA-ACNC: 0 IV

## 2019-07-22 ENCOUNTER — HOSPITAL ENCOUNTER (OUTPATIENT)
Dept: RADIOLOGY | Facility: MEDICAL CENTER | Age: 74
End: 2019-07-22
Attending: INTERNAL MEDICINE
Payer: MEDICARE

## 2019-07-22 DIAGNOSIS — I71.40 ABDOMINAL AORTIC ANEURYSM (AAA) WITHOUT RUPTURE (HCC): ICD-10-CM

## 2019-07-22 ASSESSMENT — ENCOUNTER SYMPTOMS
BACK PAIN: 1
BRUISES/BLEEDS EASILY: 1
FEVER: 0
BLOOD IN STOOL: 0
SHORTNESS OF BREATH: 1
HEMOPTYSIS: 0

## 2019-07-22 NOTE — CONSULTS
Neuro Interventional Service Consultation      Re: Zeinab Loza     MRN: [id number]   : 1945    Zeinab Loza was referred to our service by oRxana Ocasio MD.  She is a 74 y.o. female seen in clinic for evaluation and possible intervention for a celiac artery aneurysm. She is also under the care of Oliverio John DO, Ashia Tidwell MD, Michele Fuller MD, Giles Holcomb MD, and Joey Fuentes MD.    History of Present Illness:   presented to the [facility] ED on  with complaints of back pain, headaches, and fever 2 weeks after an epidural steroid injection. Additional clinical history includes pulmonary hypertension on 5 lpm oxygen, diastolic heart failure, atrial fibrillation on warfarin, essential hypertension, and obesity. Imaging revealed no evidence of osteomyelitis, however an incidental 2.5 cm celiac artery aneurysm was noted as well as an incidental 1.7 cm right renal artery aneurysm. She was evaluated by a vascular surgeon and is not a surgical candidate due to her comorbid conditions. The workup for the abdominal findings as well as an abnormal brain MRI requiring a lumbar puncture were performed on an inpatient basis and no acute findings were noted. She was ultimately diagnosed with a UTI and released to home on July 10. She has been referred to the Neurointerventional Service for evaluation and management of the celiac artery aneurysm. Today, she reports dyspnea with exertion. She is on oxygen 5 lpm at home but on a lesser amount today due to the level in her portable tank. She reports easy bruising on warfarin but denies significant bleeding with epistaxis, gastrointestinal bleeding, and hemoptysis. Her warfarin is managed by [facility].     She is seen today for review of imaging studies and discussion of possible celiac artery aneurysm intervention.     No past medical history on file.  No past surgical history on file.  Social History     Social History      • Marital status:      Spouse name: N/A   • Number of children: N/A   • Years of education: N/A     Occupational History   • Not on file.     Social History Main Topics   • Smoking status: Never Smoker   • Smokeless tobacco: Never Used   • Alcohol use 4.2 oz/week     7 Shots of liquor per week      Comment: 1 day   • Drug use: No   • Sexual activity: Not on file     Other Topics Concern   • Not on file     Social History Narrative   • No narrative on file     No family history on file.    Review of Systems   Constitutional: Negative for fever.   Respiratory: Positive for shortness of breath. Negative for hemoptysis.    Gastrointestinal: Negative for blood in stool and melena.   Genitourinary: Negative for dysuria and hematuria.   Musculoskeletal: Positive for back pain.   Endo/Heme/Allergies: Bruises/bleeds easily (on warfarin).     A comprehensive 14-point review of systems was negative except as described above.     Labs:      Ref. Range 7/10/2019 04:05   WBC Latest Ref Range: 4.8 - 10.8 K/uL 3.9 (L)   RBC Latest Ref Range: 4.20 - 5.40 M/uL 2.46 (L)   Hemoglobin Latest Ref Range: 12.0 - 16.0 g/dL 8.1 (L)   Hematocrit Latest Ref Range: 37.0 - 47.0 % 26.0 (L)   MCV Latest Ref Range: 81.4 - 97.8 fL 105.7 (H)   MCH Latest Ref Range: 27.0 - 33.0 pg 32.9   MCHC Latest Ref Range: 33.6 - 35.0 g/dL 31.2 (L)   RDW Latest Ref Range: 35.9 - 50.0 fL 50.1 (H)   Platelet Count Latest Ref Range: 164 - 446 K/uL 131 (L)   MPV Latest Ref Range: 9.0 - 12.9 fL 8.8 (L)   Sodium Latest Ref Range: 135 - 145 mmol/L 137   Potassium Latest Ref Range: 3.6 - 5.5 mmol/L 4.8   Chloride Latest Ref Range: 96 - 112 mmol/L 107   Co2 Latest Ref Range: 20 - 33 mmol/L 26   Anion Gap Latest Ref Range: 0.0 - 11.9  4.0   Glucose Latest Ref Range: 65 - 99 mg/dL 180 (H)   Bun Latest Ref Range: 8 - 22 mg/dL 22   Creatinine Latest Ref Range: 0.50 - 1.40 mg/dL 0.96   GFR If  Latest Ref Range: >60 mL/min/1.73 m 2 >60   GFR If Non   Latest Ref Range: >60 mL/min/1.73 m 2 57 (A)   Calcium Latest Ref Range: 8.5 - 10.5 mg/dL 10.2   Magnesium Latest Ref Range: 1.5 - 2.5 mg/dL 2.1   INR Latest Ref Range: 0.87 - 1.13  1.22 (H)       Radiology:   MRI abdomen on July 10, 2019 at [facility]:  1. The 1.5 cm lesion in the left hepatic lobe and 1.0 cm lesion in the right posterior hepatic lobe are cysts.  2. A 2.5 cm pedunculated aneurysm arising from the celiac/common hepatic artery  3. A 1.7 cm pedunculated right renal aneurysm.  4. A cystic lesion without solid component in the uncinate process of the pancreas. No pancreatic duct dilatation.  5. Cholelithiasis.      CT CAP July 9, 2019 at [facility]:  1.  Mildly enlarged subcarinal lymph node  2.  BILATERAL dependent atelectasis with superimposed pulmonary edema or pneumonia not excluded  3.  10 and 16 mm hepatic lesions, incompletely characterized. These do not have the appearance of simple cysts. They could be hemangiomas or neoplasms. Further assessment could be performed with multiphase MRI.  4.  16 and 5 mm hypodense pancreatic lesions which could be IPMNs, cysts or cystic masses. These could also be further assessed with multiphase MRI.  5.  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 This would be an unusual appearance for a pancreatic head tumor.  6.  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  Prior hysterectomy      Current Outpatient Prescriptions   Medication Sig Dispense Refill   • gabapentin (NEURONTIN) 100 MG Cap Take 2 Caps by mouth 2 Times a Day. 90 Cap 0   • methylPREDNISolone (MEDROL) 4 MG Tablet Therapy Pack Take 1 Tab by mouth every day. 1 Kit 0   • warfarin (COUMADIN) 7.5 MG Tab Take 7.5 mg by mouth every day. Reports taking 7.5mg all days except 10mg on Fridays     • PARoxetine (PAXIL) 20 MG Tab Take 20 mg by mouth every day.     • rosuvastatin (CRESTOR) 10 MG Tab Take 10 mg by mouth  every evening.     • sotalol (BETAPACE) 80 MG Tab Take 80 mg by mouth 2 times a day.     • tadalafil (CIALIS) 10 MG tablet Take 40 mg by mouth every day.     • ambrisentan (LETAIRIS) 10 MG tablet Take 10 mg by mouth every day.     • furosemide (LASIX) 20 MG Tab Take 40 mg by mouth 2 times a day.     • levothyroxine (SYNTHROID) 75 MCG Tab Take 75 mcg by mouth Every morning on an empty stomach.     • lisinopril (PRINIVIL) 20 MG Tab Take 20 mg by mouth every day.     • metoprolol (LOPRESSOR) 25 MG Tab Take 25 mg by mouth 2 times a day.     • vitamin D (CHOLECALCIFEROL) 1000 UNIT Tab Take 1,000 Units by mouth every day.       No current facility-administered medications for this encounter.        No Known Allergies    Physical Exam   Constitutional: She is oriented to person, place, and time and well-developed, well-nourished, and in no distress. No distress.   HENT:   Head: Normocephalic.   Eyes: No scleral icterus.   Pulmonary/Chest: Effort normal. No respiratory distress.   Abdominal: Soft.   Neurological: She is alert and oriented to person, place, and time. She has normal sensation and normal strength. She is not agitated and not disoriented. She displays no weakness, no tremor, facial symmetry, normal stance and normal speech. No cranial nerve deficit. Gait normal. Coordination and gait normal.   Skin: Skin is warm and dry. No rash noted. She is not diaphoretic. No erythema. No pallor.   Psychiatric: Mood, memory, affect and judgment normal.     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  2. 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  3. Pulmonary hypertension, on oxygen 5 lpm.  4. Chronic diastolic heart failure.  5. Hypertension.  6. Atrial fibrillation, on warfarin.  7. Hypothyroid.    Plan:   John Pappas MD has reviewed 's history and imaging studies, examined the patient, and discussed  treatment options. We explained that consideration for intervention of visceral aneurysms is indicated when they either increase in size on surveillance or are greater than 2 cm in size, at which point bleeding risk outweighs risk of intervention.  is a candidate for coil embolization of the celiac artery aneurysm. It will likely require a stent. We recommend surveillance of the renal artery aneurysm, which can be monitored along with post intervention surveillance of the celiac artery aneurysm.     We discussed the method of the procedure at length including the possibility of the use of stents or balloons to facilitate aneurysm coiling and associated risks of those devices. We additionally discussed the procedure risks, including bleeding and infection, damage to the arteries, reaction to any medications given during the procedure, side effects of contrast, radiation exposure, in stent stenosis, coil or stent migration, mechanical failure, thrombus and visceral ischemia or infarct, hemorrhage, and death. There is a chance the aneurysm may ultimately not be amenable to endovascular intervention. After embolization, there is a chance that the aneurysm could recur. We discussed alternatives of the procedure including surveillance. She is not a surgical candidate per the inpatient vascular surgery consultation. The patient verbalizes understanding of the plan and elects to proceed. We discussed the need for aspirin and Plavix prior to the coiling procedure and for 3 months post procedure if a stent is placed. Side effects of antiplatelet therapy, specifically bleeding, were discussed with instructions given should the patient develop minor or major bleeding. We explained that she may need to be on both clopidogrel and warfarin post procedure. Written pre- and post- procedure care instructions were provided. We discussed signs of a visceral aneurysm rupture with instructions to call an ambulance for the  onset of a sudden severe abdominal pain. The patient will be scheduled pending coordination of medical therapy with clopidogrel and warfarin. We will call in clopidogrel #90 to her pharmacy of choice [facility] on [address].     DAVID Burnette with John Pappas MD  Neuro Interventional Service   [address] ([id number])  [place] [address]  [phone]

## 2019-07-30 ENCOUNTER — HOSPITAL ENCOUNTER (OUTPATIENT)
Dept: LAB | Facility: MEDICAL CENTER | Age: 74
End: 2019-07-30
Attending: FAMILY MEDICINE
Payer: MEDICARE

## 2019-07-30 ENCOUNTER — TELEPHONE (OUTPATIENT)
Dept: NEUROLOGY | Facility: MEDICAL CENTER | Age: 74
End: 2019-07-30

## 2019-07-30 LAB
ALBUMIN SERPL BCP-MCNC: 4.1 G/DL (ref 3.2–4.9)
ALBUMIN/GLOB SERPL: 1.9 G/DL
ALP SERPL-CCNC: 46 U/L (ref 30–99)
ALT SERPL-CCNC: 12 U/L (ref 2–50)
ANION GAP SERPL CALC-SCNC: 8 MMOL/L (ref 0–11.9)
AST SERPL-CCNC: 14 U/L (ref 12–45)
BASOPHILS # BLD AUTO: 0.5 % (ref 0–1.8)
BASOPHILS # BLD: 0.02 K/UL (ref 0–0.12)
BILIRUB SERPL-MCNC: 0.4 MG/DL (ref 0.1–1.5)
BUN SERPL-MCNC: 33 MG/DL (ref 8–22)
CALCIUM SERPL-MCNC: 10 MG/DL (ref 8.5–10.5)
CHLORIDE SERPL-SCNC: 108 MMOL/L (ref 96–112)
CHOLEST SERPL-MCNC: 154 MG/DL (ref 100–199)
CO2 SERPL-SCNC: 28 MMOL/L (ref 20–33)
CREAT SERPL-MCNC: 1.06 MG/DL (ref 0.5–1.4)
CREAT UR-MCNC: 112.9 MG/DL
EOSINOPHIL # BLD AUTO: 0.08 K/UL (ref 0–0.51)
EOSINOPHIL NFR BLD: 1.9 % (ref 0–6.9)
ERYTHROCYTE [DISTWIDTH] IN BLOOD BY AUTOMATED COUNT: 49.5 FL (ref 35.9–50)
FASTING STATUS PATIENT QL REPORTED: NORMAL
GLOBULIN SER CALC-MCNC: 2.2 G/DL (ref 1.9–3.5)
GLUCOSE SERPL-MCNC: 116 MG/DL (ref 65–99)
HCT VFR BLD AUTO: 30.7 % (ref 37–47)
HDLC SERPL-MCNC: 51 MG/DL
HGB BLD-MCNC: 9.5 G/DL (ref 12–16)
IMM GRANULOCYTES # BLD AUTO: 0.01 K/UL (ref 0–0.11)
IMM GRANULOCYTES NFR BLD AUTO: 0.2 % (ref 0–0.9)
LDLC SERPL CALC-MCNC: 81 MG/DL
LYMPHOCYTES # BLD AUTO: 0.74 K/UL (ref 1–4.8)
LYMPHOCYTES NFR BLD: 17.9 % (ref 22–41)
MCH RBC QN AUTO: 32.8 PG (ref 27–33)
MCHC RBC AUTO-ENTMCNC: 30.9 G/DL (ref 33.6–35)
MCV RBC AUTO: 105.9 FL (ref 81.4–97.8)
MICROALBUMIN UR-MCNC: 1.3 MG/DL
MICROALBUMIN/CREAT UR: 12 MG/G (ref 0–30)
MONOCYTES # BLD AUTO: 0.35 K/UL (ref 0–0.85)
MONOCYTES NFR BLD AUTO: 8.5 % (ref 0–13.4)
NEUTROPHILS # BLD AUTO: 2.94 K/UL (ref 2–7.15)
NEUTROPHILS NFR BLD: 71 % (ref 44–72)
NRBC # BLD AUTO: 0 K/UL
NRBC BLD-RTO: 0 /100 WBC
PLATELET # BLD AUTO: 186 K/UL (ref 164–446)
PMV BLD AUTO: 9.1 FL (ref 9–12.9)
POTASSIUM SERPL-SCNC: 4.7 MMOL/L (ref 3.6–5.5)
PROT SERPL-MCNC: 6.3 G/DL (ref 6–8.2)
RBC # BLD AUTO: 2.9 M/UL (ref 4.2–5.4)
SODIUM SERPL-SCNC: 144 MMOL/L (ref 135–145)
T4 SERPL-MCNC: 7.5 UG/DL (ref 4–12)
TRIGL SERPL-MCNC: 110 MG/DL (ref 0–149)
TSH SERPL DL<=0.005 MIU/L-ACNC: 0.74 UIU/ML (ref 0.38–5.33)
WBC # BLD AUTO: 4.1 K/UL (ref 4.8–10.8)

## 2019-07-30 PROCEDURE — 82570 ASSAY OF URINE CREATININE: CPT

## 2019-07-30 PROCEDURE — 85025 COMPLETE CBC W/AUTO DIFF WBC: CPT

## 2019-07-30 PROCEDURE — 80053 COMPREHEN METABOLIC PANEL: CPT

## 2019-07-30 PROCEDURE — 82043 UR ALBUMIN QUANTITATIVE: CPT

## 2019-07-30 PROCEDURE — 80061 LIPID PANEL: CPT

## 2019-07-30 PROCEDURE — 36415 COLL VENOUS BLD VENIPUNCTURE: CPT

## 2019-07-30 PROCEDURE — 84436 ASSAY OF TOTAL THYROXINE: CPT

## 2019-07-30 PROCEDURE — 84443 ASSAY THYROID STIM HORMONE: CPT

## 2019-08-06 ENCOUNTER — HOSPITAL ENCOUNTER (OUTPATIENT)
Dept: RADIOLOGY | Facility: MEDICAL CENTER | Age: 74
End: 2019-08-06
Attending: PSYCHIATRY & NEUROLOGY
Payer: MEDICARE

## 2019-08-06 DIAGNOSIS — R90.89 ABNORMAL FINDING ON MRI OF BRAIN: ICD-10-CM

## 2019-08-06 PROCEDURE — 70553 MRI BRAIN STEM W/O & W/DYE: CPT

## 2019-08-06 PROCEDURE — 700117 HCHG RX CONTRAST REV CODE 255: Performed by: PSYCHIATRY & NEUROLOGY

## 2019-08-06 PROCEDURE — A9585 GADOBUTROL INJECTION: HCPCS | Performed by: PSYCHIATRY & NEUROLOGY

## 2019-08-06 RX ORDER — GADOBUTROL 604.72 MG/ML
10 INJECTION INTRAVENOUS ONCE
Status: DISCONTINUED | OUTPATIENT
Start: 2019-08-06 | End: 2019-08-07 | Stop reason: HOSPADM

## 2019-08-06 RX ADMIN — GADOBUTROL 10 ML: 604.72 INJECTION INTRAVENOUS at 16:00

## 2019-08-20 ENCOUNTER — OFFICE VISIT (OUTPATIENT)
Dept: URGENT CARE | Facility: PHYSICIAN GROUP | Age: 74
End: 2019-08-20
Payer: MEDICARE

## 2019-08-20 ENCOUNTER — HOSPITAL ENCOUNTER (OUTPATIENT)
Facility: MEDICAL CENTER | Age: 74
End: 2019-08-22
Attending: EMERGENCY MEDICINE | Admitting: INTERNAL MEDICINE
Payer: MEDICARE

## 2019-08-20 VITALS
HEART RATE: 70 BPM | RESPIRATION RATE: 16 BRPM | BODY MASS INDEX: 38.45 KG/M2 | SYSTOLIC BLOOD PRESSURE: 96 MMHG | DIASTOLIC BLOOD PRESSURE: 58 MMHG | TEMPERATURE: 98.5 F | HEIGHT: 67 IN | OXYGEN SATURATION: 92 % | WEIGHT: 245 LBS

## 2019-08-20 DIAGNOSIS — D64.9 CHRONIC ANEMIA: ICD-10-CM

## 2019-08-20 DIAGNOSIS — I95.1 ORTHOSTATIC HYPOTENSION: ICD-10-CM

## 2019-08-20 DIAGNOSIS — R55 NEAR SYNCOPE: ICD-10-CM

## 2019-08-20 DIAGNOSIS — Z79.01 CHRONIC ANTICOAGULATION: ICD-10-CM

## 2019-08-20 DIAGNOSIS — R23.1 PALLOR: ICD-10-CM

## 2019-08-20 DIAGNOSIS — R03.1 LOW BLOOD PRESSURE READING: ICD-10-CM

## 2019-08-20 DIAGNOSIS — R40.0 SOMNOLENCE: ICD-10-CM

## 2019-08-20 DIAGNOSIS — R53.1 WEAKNESS: ICD-10-CM

## 2019-08-20 PROBLEM — K59.00 CONSTIPATION: Status: ACTIVE | Noted: 2019-08-20

## 2019-08-20 PROBLEM — D53.9 MACROCYTIC ANEMIA: Status: ACTIVE | Noted: 2019-08-20

## 2019-08-20 PROBLEM — N30.01 ACUTE CYSTITIS WITH HEMATURIA: Status: ACTIVE | Noted: 2019-08-20

## 2019-08-20 PROBLEM — I48.0 PAROXYSMAL ATRIAL FIBRILLATION (HCC): Status: ACTIVE | Noted: 2019-07-06

## 2019-08-20 LAB
ALBUMIN SERPL BCP-MCNC: 3.4 G/DL (ref 3.2–4.9)
ALBUMIN/GLOB SERPL: 1.5 G/DL
ALP SERPL-CCNC: 34 U/L (ref 30–99)
ALT SERPL-CCNC: 7 U/L (ref 2–50)
ANION GAP SERPL CALC-SCNC: 7 MMOL/L (ref 0–11.9)
APPEARANCE UR: CLEAR
AST SERPL-CCNC: 13 U/L (ref 12–45)
BACTERIA #/AREA URNS HPF: ABNORMAL /HPF
BASOPHILS # BLD AUTO: 0.3 % (ref 0–1.8)
BASOPHILS # BLD: 0.02 K/UL (ref 0–0.12)
BILIRUB SERPL-MCNC: 0.4 MG/DL (ref 0.1–1.5)
BILIRUB UR QL STRIP.AUTO: NEGATIVE
BUN SERPL-MCNC: 45 MG/DL (ref 8–22)
CALCIUM SERPL-MCNC: 9.4 MG/DL (ref 8.5–10.5)
CHLORIDE SERPL-SCNC: 110 MMOL/L (ref 96–112)
CO2 SERPL-SCNC: 24 MMOL/L (ref 20–33)
COLOR UR: YELLOW
CREAT SERPL-MCNC: 0.99 MG/DL (ref 0.5–1.4)
EKG IMPRESSION: NORMAL
EKG IMPRESSION: NORMAL
EOSINOPHIL # BLD AUTO: 0.07 K/UL (ref 0–0.51)
EOSINOPHIL NFR BLD: 1.1 % (ref 0–6.9)
EPI CELLS #/AREA URNS HPF: ABNORMAL /HPF
ERYTHROCYTE [DISTWIDTH] IN BLOOD BY AUTOMATED COUNT: 50.7 FL (ref 35.9–50)
GLOBULIN SER CALC-MCNC: 2.2 G/DL (ref 1.9–3.5)
GLUCOSE SERPL-MCNC: 97 MG/DL (ref 65–99)
GLUCOSE UR STRIP.AUTO-MCNC: NEGATIVE MG/DL
HCT VFR BLD AUTO: 28 % (ref 37–47)
HGB BLD-MCNC: 8.6 G/DL (ref 12–16)
HYALINE CASTS #/AREA URNS LPF: ABNORMAL /LPF
IMM GRANULOCYTES # BLD AUTO: 0.03 K/UL (ref 0–0.11)
IMM GRANULOCYTES NFR BLD AUTO: 0.5 % (ref 0–0.9)
INR PPP: 1.98 (ref 0.87–1.13)
KETONES UR STRIP.AUTO-MCNC: NEGATIVE MG/DL
LEUKOCYTE ESTERASE UR QL STRIP.AUTO: ABNORMAL
LYMPHOCYTES # BLD AUTO: 0.88 K/UL (ref 1–4.8)
LYMPHOCYTES NFR BLD: 14.4 % (ref 22–41)
MCH RBC QN AUTO: 33 PG (ref 27–33)
MCHC RBC AUTO-ENTMCNC: 30.7 G/DL (ref 33.6–35)
MCV RBC AUTO: 107.3 FL (ref 81.4–97.8)
MICRO URNS: ABNORMAL
MONOCYTES # BLD AUTO: 0.55 K/UL (ref 0–0.85)
MONOCYTES NFR BLD AUTO: 9 % (ref 0–13.4)
NEUTROPHILS # BLD AUTO: 4.55 K/UL (ref 2–7.15)
NEUTROPHILS NFR BLD: 74.7 % (ref 44–72)
NITRITE UR QL STRIP.AUTO: POSITIVE
NRBC # BLD AUTO: 0 K/UL
NRBC BLD-RTO: 0 /100 WBC
PH UR STRIP.AUTO: 5 [PH] (ref 5–8)
PLATELET # BLD AUTO: 179 K/UL (ref 164–446)
PMV BLD AUTO: 9.2 FL (ref 9–12.9)
POTASSIUM SERPL-SCNC: 4.1 MMOL/L (ref 3.6–5.5)
PROT SERPL-MCNC: 5.6 G/DL (ref 6–8.2)
PROT UR QL STRIP: NEGATIVE MG/DL
PROTHROMBIN TIME: 23.1 SEC (ref 12–14.6)
RBC # BLD AUTO: 2.61 M/UL (ref 4.2–5.4)
RBC UR QL AUTO: NEGATIVE
SODIUM SERPL-SCNC: 141 MMOL/L (ref 135–145)
SP GR UR STRIP.AUTO: 1.01
TROPONIN T SERPL-MCNC: 13 NG/L (ref 6–19)
UROBILINOGEN UR STRIP.AUTO-MCNC: 0.2 MG/DL
WBC # BLD AUTO: 6.1 K/UL (ref 4.8–10.8)
WBC #/AREA URNS HPF: ABNORMAL /HPF

## 2019-08-20 PROCEDURE — 700111 HCHG RX REV CODE 636 W/ 250 OVERRIDE (IP): Performed by: INTERNAL MEDICINE

## 2019-08-20 PROCEDURE — 87086 URINE CULTURE/COLONY COUNT: CPT

## 2019-08-20 PROCEDURE — 96375 TX/PRO/DX INJ NEW DRUG ADDON: CPT

## 2019-08-20 PROCEDURE — 99220 PR INITIAL OBSERVATION CARE,LEVL III: CPT | Performed by: INTERNAL MEDICINE

## 2019-08-20 PROCEDURE — G0378 HOSPITAL OBSERVATION PER HR: HCPCS

## 2019-08-20 PROCEDURE — 87186 SC STD MICRODIL/AGAR DIL: CPT

## 2019-08-20 PROCEDURE — 85610 PROTHROMBIN TIME: CPT

## 2019-08-20 PROCEDURE — 93005 ELECTROCARDIOGRAM TRACING: CPT | Performed by: EMERGENCY MEDICINE

## 2019-08-20 PROCEDURE — 96365 THER/PROPH/DIAG IV INF INIT: CPT

## 2019-08-20 PROCEDURE — 700105 HCHG RX REV CODE 258: Performed by: INTERNAL MEDICINE

## 2019-08-20 PROCEDURE — 81001 URINALYSIS AUTO W/SCOPE: CPT

## 2019-08-20 PROCEDURE — 80053 COMPREHEN METABOLIC PANEL: CPT

## 2019-08-20 PROCEDURE — 700111 HCHG RX REV CODE 636 W/ 250 OVERRIDE (IP): Performed by: STUDENT IN AN ORGANIZED HEALTH CARE EDUCATION/TRAINING PROGRAM

## 2019-08-20 PROCEDURE — 87077 CULTURE AEROBIC IDENTIFY: CPT

## 2019-08-20 PROCEDURE — 700102 HCHG RX REV CODE 250 W/ 637 OVERRIDE(OP): Performed by: INTERNAL MEDICINE

## 2019-08-20 PROCEDURE — 85025 COMPLETE CBC W/AUTO DIFF WBC: CPT

## 2019-08-20 PROCEDURE — A9270 NON-COVERED ITEM OR SERVICE: HCPCS | Performed by: INTERNAL MEDICINE

## 2019-08-20 PROCEDURE — 99215 OFFICE O/P EST HI 40 MIN: CPT | Performed by: NURSE PRACTITIONER

## 2019-08-20 PROCEDURE — 99285 EMERGENCY DEPT VISIT HI MDM: CPT

## 2019-08-20 PROCEDURE — 93005 ELECTROCARDIOGRAM TRACING: CPT

## 2019-08-20 PROCEDURE — 84484 ASSAY OF TROPONIN QUANT: CPT

## 2019-08-20 RX ORDER — ONDANSETRON 2 MG/ML
4 INJECTION INTRAMUSCULAR; INTRAVENOUS EVERY 4 HOURS PRN
Status: DISCONTINUED | OUTPATIENT
Start: 2019-08-20 | End: 2019-08-22 | Stop reason: HOSPADM

## 2019-08-20 RX ORDER — AMOXICILLIN 250 MG
2 CAPSULE ORAL 2 TIMES DAILY
Status: DISCONTINUED | OUTPATIENT
Start: 2019-08-20 | End: 2019-08-22 | Stop reason: HOSPADM

## 2019-08-20 RX ORDER — SODIUM CHLORIDE, SODIUM LACTATE, POTASSIUM CHLORIDE, CALCIUM CHLORIDE 600; 310; 30; 20 MG/100ML; MG/100ML; MG/100ML; MG/100ML
250 INJECTION, SOLUTION INTRAVENOUS ONCE
Status: DISCONTINUED | OUTPATIENT
Start: 2019-08-20 | End: 2019-08-20

## 2019-08-20 RX ORDER — OXYCODONE AND ACETAMINOPHEN 10; 325 MG/1; MG/1
1 TABLET ORAL 4 TIMES DAILY
Status: DISCONTINUED | OUTPATIENT
Start: 2019-08-20 | End: 2019-08-22 | Stop reason: HOSPADM

## 2019-08-20 RX ORDER — FUROSEMIDE 40 MG/1
40 TABLET ORAL 2 TIMES DAILY
COMMUNITY
End: 2020-01-27

## 2019-08-20 RX ORDER — AMBRISENTAN 10 MG/1
10 TABLET, FILM COATED ORAL DAILY
Status: DISCONTINUED | OUTPATIENT
Start: 2019-08-21 | End: 2019-08-21

## 2019-08-20 RX ORDER — OXYCODONE AND ACETAMINOPHEN 10; 325 MG/1; MG/1
1 TABLET ORAL 4 TIMES DAILY
Refills: 0 | COMMUNITY
Start: 2019-08-17 | End: 2019-09-08

## 2019-08-20 RX ORDER — FUROSEMIDE 40 MG/1
40 TABLET ORAL 2 TIMES DAILY
Status: DISCONTINUED | OUTPATIENT
Start: 2019-08-20 | End: 2019-08-22 | Stop reason: HOSPADM

## 2019-08-20 RX ORDER — TADALAFIL 10 MG/1
40 TABLET ORAL DAILY
Status: DISCONTINUED | OUTPATIENT
Start: 2019-08-21 | End: 2019-08-21

## 2019-08-20 RX ORDER — WARFARIN SODIUM 5 MG/1
5-10 TABLET ORAL EVERY EVENING
Status: DISCONTINUED | OUTPATIENT
Start: 2019-08-20 | End: 2019-08-20

## 2019-08-20 RX ORDER — POTASSIUM CHLORIDE 20 MEQ/1
20 TABLET, EXTENDED RELEASE ORAL DAILY
Status: DISCONTINUED | OUTPATIENT
Start: 2019-08-20 | End: 2019-08-22 | Stop reason: HOSPADM

## 2019-08-20 RX ORDER — KETOROLAC TROMETHAMINE 30 MG/ML
15 INJECTION, SOLUTION INTRAMUSCULAR; INTRAVENOUS ONCE
Status: COMPLETED | OUTPATIENT
Start: 2019-08-20 | End: 2019-08-20

## 2019-08-20 RX ORDER — PAROXETINE HYDROCHLORIDE 20 MG/1
20 TABLET, FILM COATED ORAL DAILY
Status: DISCONTINUED | OUTPATIENT
Start: 2019-08-21 | End: 2019-08-22 | Stop reason: HOSPADM

## 2019-08-20 RX ORDER — ROSUVASTATIN CALCIUM 20 MG/1
10 TABLET, COATED ORAL EVERY EVENING
Status: DISCONTINUED | OUTPATIENT
Start: 2019-08-20 | End: 2019-08-22 | Stop reason: HOSPADM

## 2019-08-20 RX ORDER — LEVOTHYROXINE SODIUM 0.07 MG/1
75 TABLET ORAL
Status: DISCONTINUED | OUTPATIENT
Start: 2019-08-20 | End: 2019-08-22 | Stop reason: HOSPADM

## 2019-08-20 RX ORDER — WARFARIN SODIUM 5 MG/1
5-10 TABLET ORAL EVERY EVENING
Status: ON HOLD | COMMUNITY
End: 2019-09-11

## 2019-08-20 RX ORDER — M-VIT,TX,IRON,MINS/CALC/FOLIC 27MG-0.4MG
1 TABLET ORAL DAILY
COMMUNITY
End: 2021-01-13

## 2019-08-20 RX ORDER — GABAPENTIN 100 MG/1
200 CAPSULE ORAL 2 TIMES DAILY
COMMUNITY
End: 2019-08-26

## 2019-08-20 RX ORDER — ONDANSETRON 4 MG/1
4 TABLET, ORALLY DISINTEGRATING ORAL EVERY 4 HOURS PRN
Status: DISCONTINUED | OUTPATIENT
Start: 2019-08-20 | End: 2019-08-22 | Stop reason: HOSPADM

## 2019-08-20 RX ORDER — POTASSIUM CHLORIDE 20 MEQ/1
20 TABLET, EXTENDED RELEASE ORAL DAILY
Status: ON HOLD | COMMUNITY
End: 2020-01-16 | Stop reason: SDUPTHER

## 2019-08-20 RX ORDER — ACETAMINOPHEN 325 MG/1
650 TABLET ORAL EVERY 6 HOURS PRN
Status: DISCONTINUED | OUTPATIENT
Start: 2019-08-20 | End: 2019-08-22 | Stop reason: HOSPADM

## 2019-08-20 RX ORDER — GABAPENTIN 100 MG/1
200 CAPSULE ORAL 2 TIMES DAILY
Status: DISCONTINUED | OUTPATIENT
Start: 2019-08-20 | End: 2019-08-22 | Stop reason: HOSPADM

## 2019-08-20 RX ORDER — POLYETHYLENE GLYCOL 3350 17 G/17G
1 POWDER, FOR SOLUTION ORAL
Status: DISCONTINUED | OUTPATIENT
Start: 2019-08-20 | End: 2019-08-22 | Stop reason: HOSPADM

## 2019-08-20 RX ORDER — SOTALOL HYDROCHLORIDE 80 MG/1
80 TABLET ORAL 2 TIMES DAILY
Status: DISCONTINUED | OUTPATIENT
Start: 2019-08-20 | End: 2019-08-22 | Stop reason: HOSPADM

## 2019-08-20 RX ORDER — BISACODYL 10 MG
10 SUPPOSITORY, RECTAL RECTAL
Status: DISCONTINUED | OUTPATIENT
Start: 2019-08-20 | End: 2019-08-22 | Stop reason: HOSPADM

## 2019-08-20 RX ADMIN — SENNOSIDES, DOCUSATE SODIUM 2 TABLET: 50; 8.6 TABLET, FILM COATED ORAL at 19:54

## 2019-08-20 RX ADMIN — POTASSIUM CHLORIDE 20 MEQ: 20 TABLET, EXTENDED RELEASE ORAL at 19:12

## 2019-08-20 RX ADMIN — CEFTRIAXONE SODIUM 2 G: 2 INJECTION, POWDER, FOR SOLUTION INTRAMUSCULAR; INTRAVENOUS at 19:54

## 2019-08-20 RX ADMIN — ROSUVASTATIN CALCIUM 10 MG: 20 TABLET, FILM COATED ORAL at 19:12

## 2019-08-20 RX ADMIN — KETOROLAC TROMETHAMINE 15 MG: 30 INJECTION, SOLUTION INTRAMUSCULAR; INTRAVENOUS at 16:41

## 2019-08-20 RX ADMIN — FUROSEMIDE 40 MG: 40 TABLET ORAL at 19:11

## 2019-08-20 RX ADMIN — GABAPENTIN 200 MG: 100 CAPSULE ORAL at 19:12

## 2019-08-20 RX ADMIN — OXYCODONE HYDROCHLORIDE AND ACETAMINOPHEN 1 TABLET: 10; 325 TABLET ORAL at 19:11

## 2019-08-20 RX ADMIN — SOTALOL HYDROCHLORIDE 80 MG: 80 TABLET ORAL at 19:54

## 2019-08-20 ASSESSMENT — LIFESTYLE VARIABLES
EVER HAD A DRINK FIRST THING IN THE MORNING TO STEADY YOUR NERVES TO GET RID OF A HANGOVER: NO
HOW MANY TIMES IN THE PAST YEAR HAVE YOU HAD 5 OR MORE DRINKS IN A DAY: 0
EVER FELT BAD OR GUILTY ABOUT YOUR DRINKING: NO
TOTAL SCORE: 0
DOES PATIENT WANT TO STOP DRINKING: NO
CONSUMPTION TOTAL: NEGATIVE
TOTAL SCORE: 0
HAVE PEOPLE ANNOYED YOU BY CRITICIZING YOUR DRINKING: NO
EVER_SMOKED: NEVER
AVERAGE NUMBER OF DAYS PER WEEK YOU HAVE A DRINK CONTAINING ALCOHOL: 0
TOTAL SCORE: 0
ALCOHOL_USE: NO
ON A TYPICAL DAY WHEN YOU DRINK ALCOHOL HOW MANY DRINKS DO YOU HAVE: 0
HAVE YOU EVER FELT YOU SHOULD CUT DOWN ON YOUR DRINKING: NO

## 2019-08-20 ASSESSMENT — PATIENT HEALTH QUESTIONNAIRE - PHQ9
1. LITTLE INTEREST OR PLEASURE IN DOING THINGS: NOT AT ALL
2. FEELING DOWN, DEPRESSED, IRRITABLE, OR HOPELESS: NOT AT ALL
SUM OF ALL RESPONSES TO PHQ9 QUESTIONS 1 AND 2: 0

## 2019-08-20 ASSESSMENT — CHA2DS2 SCORE
AGE 75 OR GREATER: NO
HYPERTENSION: YES
PRIOR STROKE OR TIA OR THROMBOEMBOLISM: NO
CHA2DS2 VASC SCORE: 3
AGE 75 OR GREATER: NO
HYPERTENSION: YES
PRIOR STROKE OR TIA OR THROMBOEMBOLISM: NO
CHA2DS2 VASC SCORE: 3
VASCULAR DISEASE: NO
AGE 65 TO 74: YES
SEX: FEMALE
VASCULAR DISEASE: NO
DIABETES: NO
AGE 65 TO 74: YES
SEX: FEMALE
DIABETES: NO
CHF OR LEFT VENTRICULAR DYSFUNCTION: NO

## 2019-08-20 ASSESSMENT — ENCOUNTER SYMPTOMS
COUGH: 0
MYALGIAS: 0
INSOMNIA: 0
DIARRHEA: 0
CONSTIPATION: 0
COUGH: 0
EYE REDNESS: 0
WEAKNESS: 1
HEARTBURN: 0
PALPITATIONS: 0
MYALGIAS: 0
SEIZURES: 0
NERVOUS/ANXIOUS: 0
FEVER: 1
VOMITING: 0
NECK PAIN: 0
HEADACHES: 0
DIZZINESS: 0
FOCAL WEAKNESS: 0
SEIZURES: 0
DIARRHEA: 0
VOMITING: 0
CONSTIPATION: 1
WEIGHT LOSS: 0
BLURRED VISION: 0
ABDOMINAL PAIN: 0
NAUSEA: 0
NAUSEA: 0
SPUTUM PRODUCTION: 0
CHILLS: 0
BLOOD IN STOOL: 0
EYE PAIN: 0
BACK PAIN: 0
SHORTNESS OF BREATH: 0
FEVER: 0
DEPRESSION: 0
FLANK PAIN: 0
SHORTNESS OF BREATH: 0
TINGLING: 0
ABDOMINAL PAIN: 0
SENSORY CHANGE: 0
SPEECH CHANGE: 0
ORTHOPNEA: 0
ORTHOPNEA: 0
PALPITATIONS: 0
EYE DISCHARGE: 0
STRIDOR: 0

## 2019-08-20 NOTE — ED NOTES
Pt resting quietly.  Updated on POC,  Requesting percocet for clavicle pain- ERP notified.    Deny other needs at this time.

## 2019-08-20 NOTE — ED TRIAGE NOTES
Zeinab Loza  Chief Complaint   Patient presents with   • Weakness     generalized   • Dizziness   • Hypotension     Pt biba from UC, generalized weakness and dizziness increasing since yesterday.  VSS, no acute distress.  (R) UE in sling from clavicle fx last week.   EKG done on arrival.    Pt received approx 500cc IVF PTA  On monitor.  Chart up for ERP

## 2019-08-20 NOTE — ED NOTES
Med rec updated and complete. Allergies reviewed.  Pt denies antibiotic use in last 14 days.  Home pharmacy [facility].

## 2019-08-20 NOTE — ED PROVIDER NOTES
ED Provider Note    Scribed for Stephanie Warren M.D. by Светлана Ross. 8/20/2019, 2:53 PM.    Primary care provider: Ashia Tidwell M.D.  Means of arrival: EMS  History obtained from: Patient  History limited by: Drowsiness    CHIEF COMPLAINT  Chief Complaint   Patient presents with   • Weakness     generalized   • Dizziness   • Hypotension       HPI  Zeinab Loza is a 74 y.o. female who presents to the Emergency Department for dizziness and generalized weakness increasing over the past 2 days. She reports associated light-headedness without loss of consciousness. Her daughter notes that she has been more drowsy. Patient has history of pulmonary hypertension and atrial fibrillation currently prescribed Gabapentin, multiple hypertensive medications, and beta blockers. Patient notes that she broke her clavicle 5 days ago and has also been prescribed oxycodone. She denies dysuria.     Patient was given IV fluids prior to arrival for hypotension.    REVIEW OF SYSTEMS  Pertinent positives include weakness, dizziness, and drowsiness. Pertinent negatives include no dysuria or loss of conciousness. As above, all other systems reviewed and are negative.   See HPI for further details.     PAST MEDICAL HISTORY  Past Medical History:   Diagnosis Date   • Aneurysm artery, celiac (HCC) 2019   • Aneurysm of right renal artery (HCC)    • Atrial fibrillation (HCC)    • Diastolic heart failure (HCC)    • Hypertension, essential    • Pulmonary hypertension (HCC)    • Warfarin anticoagulation        SURGICAL HISTORY  History reviewed. No pertinent surgical history.    SOCIAL HISTORY  Social History     Tobacco Use   • Smoking status: Never Smoker   • Smokeless tobacco: Never Used   Substance Use Topics   • Alcohol use: Yes     Alcohol/week: 4.2 oz     Types: 7 Shots of liquor per week     Comment: 1 day   • Drug use: No      Social History     Substance and Sexual Activity   Drug Use No       FAMILY HISTORY  Not  "pertinent.    CURRENT MEDICATIONS  Home Medications     Reviewed by Kalie Moulton (Pharmacy Tech) on 08/20/19 at 1550  Med List Status: Complete   Medication Last Dose Status   ambrisentan (LETAIRIS) 10 MG tablet 8/20/2019 Active   furosemide (LASIX) 40 MG Tab 8/20/2019 Active   gabapentin (NEURONTIN) 100 MG Cap 8/20/2019 Active   levothyroxine (SYNTHROID) 75 MCG Tab 8/20/2019 Active   oxyCODONE-acetaminophen (PERCOCET-10)  MG Tab 8/20/2019 Active   PARoxetine (PAXIL) 20 MG Tab 8/20/2019 Active   potassium chloride SA (KDUR) 20 MEQ Tab CR 8/20/2019 Active   rosuvastatin (CRESTOR) 10 MG Tab 8/19/2019 Active   sotalol (BETAPACE) 80 MG Tab 8/20/2019 Active   tadalafil (CIALIS) 10 MG tablet 8/20/2019 Active   therapeutic multivitamin-minerals (THERAGRAN-M) Tab 8/20/2019 Active   vitamin D (CHOLECALCIFEROL) 1000 UNIT Tab 8/20/2019 Active   warfarin (COUMADIN) 5 MG Tab 8/19/2019 Active                ALLERGIES  No Known Allergies    PHYSICAL EXAM  VITAL SIGNS: /66   Pulse (!) 112   Temp 37.2 °C (99 °F) (Temporal)   Resp 18   Ht 1.702 m (5' 7\")   Wt 111.1 kg (245 lb)   SpO2 94%   BMI 38.37 kg/m²   Vitals reviewed.    Consitutional: Well-developed, obese. Negative for: distress.  HENT: Normocephalic, right external ear normal, left external ear normal, oropharynx clear and moist.  Eyes: Conjunctivae normal, extraocular movements normal. Negative for: discharge in right and left eye, icterus.  Neck: Range of motion normal, supple. Negative for cervical adenopathy.  Cardiovascular: Tachycardic, regular rhythm, heart sounds normal, intact distal pulses. Negative for: murmur, rub, gallop.  Pulmonary/Chest Wall: Decreased breath sounds throughout.  No rales or rhonchi.  Abdominal: Soft, bowel sounds normal. Negative for: distention, tenderness, rebound, guarding.  Musculoskeletal: Normal range of motion. Negative for edema.  Neurological: Alert and oriented x3. unable to cooperate with a full exam  Skin: " Warm, dry. Negative for rash.  Psych: Mildly drowsy.  Questionable judgment      DIAGNOSTIC STUDIES / PROCEDURES    LABS  Results for orders placed or performed during the hospital encounter of 08/20/19   CBC WITH DIFFERENTIAL   Result Value Ref Range    WBC 6.1 4.8 - 10.8 K/uL    RBC 2.61 (L) 4.20 - 5.40 M/uL    Hemoglobin 8.6 (L) 12.0 - 16.0 g/dL    Hematocrit 28.0 (L) 37.0 - 47.0 %    .3 (H) 81.4 - 97.8 fL    MCH 33.0 27.0 - 33.0 pg    MCHC 30.7 (L) 33.6 - 35.0 g/dL    RDW 50.7 (H) 35.9 - 50.0 fL    Platelet Count 179 164 - 446 K/uL    MPV 9.2 9.0 - 12.9 fL    Neutrophils-Polys 74.70 (H) 44.00 - 72.00 %    Lymphocytes 14.40 (L) 22.00 - 41.00 %    Monocytes 9.00 0.00 - 13.40 %    Eosinophils 1.10 0.00 - 6.90 %    Basophils 0.30 0.00 - 1.80 %    Immature Granulocytes 0.50 0.00 - 0.90 %    Nucleated RBC 0.00 /100 WBC    Neutrophils (Absolute) 4.55 2.00 - 7.15 K/uL    Lymphs (Absolute) 0.88 (L) 1.00 - 4.80 K/uL    Monos (Absolute) 0.55 0.00 - 0.85 K/uL    Eos (Absolute) 0.07 0.00 - 0.51 K/uL    Baso (Absolute) 0.02 0.00 - 0.12 K/uL    Immature Granulocytes (abs) 0.03 0.00 - 0.11 K/uL    NRBC (Absolute) 0.00 K/uL   COMP METABOLIC PANEL   Result Value Ref Range    Sodium 141 135 - 145 mmol/L    Potassium 4.1 3.6 - 5.5 mmol/L    Chloride 110 96 - 112 mmol/L    Co2 24 20 - 33 mmol/L    Anion Gap 7.0 0.0 - 11.9    Glucose 97 65 - 99 mg/dL    Bun 45 (H) 8 - 22 mg/dL    Creatinine 0.99 0.50 - 1.40 mg/dL    Calcium 9.4 8.5 - 10.5 mg/dL    AST(SGOT) 13 12 - 45 U/L    ALT(SGPT) 7 2 - 50 U/L    Alkaline Phosphatase 34 30 - 99 U/L    Total Bilirubin 0.4 0.1 - 1.5 mg/dL    Albumin 3.4 3.2 - 4.9 g/dL    Total Protein 5.6 (L) 6.0 - 8.2 g/dL    Globulin 2.2 1.9 - 3.5 g/dL    A-G Ratio 1.5 g/dL   TROPONIN   Result Value Ref Range    Troponin T 13 6 - 19 ng/L   PROTHROMBIN TIME (INR)   Result Value Ref Range    PT 23.1 (H) 12.0 - 14.6 sec    INR 1.98 (H) 0.87 - 1.13   ESTIMATED GFR   Result Value Ref Range    GFR If   American >60 >60 mL/min/1.73 m 2    GFR If Non African American 55 (A) >60 mL/min/1.73 m 2   URINALYSIS,CULTURE IF INDICATED   Result Value Ref Range    Color Yellow     Character Clear     Specific Gravity 1.009 <1.035    Ph 5.0 5.0 - 8.0    Glucose Negative Negative mg/dL    Ketones Negative Negative mg/dL    Protein Negative Negative mg/dL    Bilirubin Negative Negative    Urobilinogen, Urine 0.2 Negative    Nitrite Positive (A) Negative    Leukocyte Esterase Moderate (A) Negative    Occult Blood Negative Negative    Micro Urine Req Microscopic    URINE MICROSCOPIC (W/UA)   Result Value Ref Range    WBC  (A) /hpf    Bacteria Many (A) None /hpf    Epithelial Cells Few /hpf    Hyaline Cast 6-10 (A) /lpf   EKG (NOW)   Result Value Ref Range    Report       [facility] Emergency Dept.    Test Date:  2019  Pt Name:    NATALIE STAPLES                Department: ER  MRN:        [id number]                      Room:       TRAUMA - EXAM 1  Gender:     Female                       Technician: [id number]  :        [id number]                   Requested By:ER TRIAGE PROTOCOL  Order #:    [id number]                    Reading MD: KOBY ASHFORD MD    Measurements  Intervals                                Axis  Rate:       113                          P:  MN:                                      QRS:        -16  QRSD:       78                           T:          89  QT:         336  QTc:        461    Interpretive Statements  ATRIAL FIBRILLATION  MULTIPLE PREMATURE COMPLEXES, VENT & SUPRAVEN  BORDERLINE LEFT AXIS DEVIATION  NONSPECIFIC T ABNORMALITIES, LATERAL LEADS  Compared to ECG 2019 02:14:33  T-wave abnormality now present  Ventricular premature complex(es) no longer present      Electronically Signed On 2019 14: 00:06 PDT by KOBY ASHFORD MD         All labs reviewed by me.    EKG Interpretation:  EKG interpreted by me as stated above.  Twelve-lead with no ST segment changes to  indicate ischemia or infarct      COURSE & MEDICAL DECISION MAKING  Nursing notes, VS, PMSFHx reviewed in chart.    2:53 PM Patient seen and examined at bedside. I informed the patient the need for labs to rule out any emergent processes. Currently awaiting labs results before deciding if intervention is necessary. Patient verbalizes understanding and agreement to this plan of care. The patient presents with dizziness, weakness, and drowsiness and the differential diagnosis includes but is not limited to pleural effusion, CHF, MI, anemia, hypovolemia. Ordered INR, CBC, DMP, and troponin.  Gentle IV fluid hydration given for near syncope and likely orthostasis.    4:54 PM Patient was reevaluated by medical resident and plan for admission was discussed. Spoke with Dr. Porter, CDU hospitalist, about the patient's condition. Agrees to admit for observation.      DISPOSITION:  Patient will be admitted to CDU in stable condition.    FINAL IMPRESSION  1. Near syncope    2. Orthostatic hypotension    3. Somnolence    4. Chronic anticoagulation    5. Chronic anemia          Светлана FONTANA (Sharon), am scribing for, and in the presence of, Stephanie Warren M.D..    Electronically signed by: Светлана Ross (Sharon), 8/20/2019    IStephanie M.D. personally performed the services described in this documentation, as scribed by Светлана Ross in my presence, and it is both accurate and complete. C    The note accurately reflects work and decisions made by me.  Stephanie Warren  8/20/2019  7:30 PM

## 2019-08-20 NOTE — PROGRESS NOTES
Subjective:      Zeinab Loza is a 74 y.o. female who presents with Weakness (shakey, fever, low blood pressure, heart rate elevated xyesterday)            HPI  States low blood pressure, weakness, pale, slight abdominal pain, nausea. Denies HA, dizziness, confusion. 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 MRI from 7/9/19. H/o PAH, a-fib. Takes warfarin. Denies CP/pressure, SOB or recent cough. Recent fall with fracture to clavicle 1 week ago, arm sling. Recent hospitalization on 7/5/19. Last blood work in chart shows H/H to be 9.5/30.7 in hospital. Denies changes in abdominal or back pain. No changes in meds. H/o HTN, hyperlipidemia, DHF, hypothyroidism. Denies excessive bruising, epistaxis. Family present. States sleeping more.    PMH:  has a past medical history of Aneurysm artery, celiac (HCC) (2019), Aneurysm of right renal artery (HCC), Atrial fibrillation (HCC), Diastolic heart failure (HCC), Hypertension, essential, Pulmonary hypertension (HCC), and Warfarin anticoagulation.  MEDS:   Current Outpatient Medications:   •  oxyCODONE-acetaminophen (PERCOCET-10)  MG Tab, Take 1 Tab by mouth 4 times a day., Disp: , Rfl: 0  •  gabapentin (NEURONTIN) 100 MG Cap, Take 2 Caps by mouth 2 Times a Day., Disp: 90 Cap, Rfl: 0  •  methylPREDNISolone (MEDROL) 4 MG Tablet Therapy Pack, Take 1 Tab by mouth every day., Disp: 1 Kit, Rfl: 0  •  warfarin (COUMADIN) 7.5 MG Tab, Take 7.5 mg by mouth every day. Reports taking 7.5mg all days except 10mg on Fridays, Disp: , Rfl:   •  PARoxetine (PAXIL) 20 MG Tab, Take 20 mg by mouth every day., Disp: , Rfl:   •  rosuvastatin (CRESTOR) 10 MG Tab, Take 10 mg by mouth every evening., Disp: , Rfl:   •  sotalol (BETAPACE) 80 MG Tab, Take 80 mg by mouth 2 times a day., Disp: , Rfl:   •  tadalafil (CIALIS) 10 MG tablet, Take 40 mg by mouth every day., Disp: , Rfl:   •  ambrisentan (LETAIRIS) 10 MG tablet, Take 10 mg by mouth every day., Disp: , Rfl:  "  •  furosemide (LASIX) 20 MG Tab, Take 40 mg by mouth 2 times a day., Disp: , Rfl:   •  levothyroxine (SYNTHROID) 75 MCG Tab, Take 75 mcg by mouth Every morning on an empty stomach., Disp: , Rfl:   •  lisinopril (PRINIVIL) 20 MG Tab, Take 20 mg by mouth every day., Disp: , Rfl:   •  metoprolol (LOPRESSOR) 25 MG Tab, Take 25 mg by mouth 2 times a day., Disp: , Rfl:   •  vitamin D (CHOLECALCIFEROL) 1000 UNIT Tab, Take 1,000 Units by mouth every day., Disp: , Rfl:   ALLERGIES: No Known Allergies  SURGHX: History reviewed. No pertinent surgical history.  SOCHX:  reports that she has never smoked. She has never used smokeless tobacco. She reports that she drinks about 4.2 oz of alcohol per week. She reports that she does not use drugs.  FH: Family history was reviewed, no pertinent findings to report    Review of Systems   Constitutional: Positive for fever and malaise/fatigue.   Respiratory: Negative for cough and shortness of breath.    Cardiovascular: Negative for chest pain, palpitations, orthopnea and leg swelling.   Gastrointestinal: Negative for abdominal pain, blood in stool, constipation, diarrhea, nausea and vomiting.   Genitourinary: Negative for dysuria, flank pain, frequency, hematuria and urgency.   Musculoskeletal: Negative for myalgias.   Neurological: Positive for weakness. Negative for tingling, sensory change, speech change and seizures.   All other systems reviewed and are negative.         Objective:     BP (!) 96/58 (BP Location: Left arm, Patient Position: Sitting, BP Cuff Size: Large adult)   Pulse 70   Temp 36.9 °C (98.5 °F) (Temporal)   Resp 16   Ht 1.702 m (5' 7\")   Wt 111.1 kg (245 lb)   SpO2 92%   BMI 38.37 kg/m²      Physical Exam   Constitutional: She is oriented to person, place, and time. She appears well-developed and well-nourished. She is active and cooperative.  Non-toxic appearance. She does not have a sickly appearance. She appears ill. No distress.   Pallor.   HENT:   Head: " Normocephalic.   Eyes: Pupils are equal, round, and reactive to light. Conjunctivae and EOM are normal.   Cardiovascular: Normal rate and normal pulses. A regularly irregular rhythm present. Exam reveals no gallop and no friction rub.   No murmur heard.  Pulses:       Radial pulses are 2+ on the right side, and 2+ on the left side.   Pulmonary/Chest: Effort normal and breath sounds normal. No accessory muscle usage or stridor. No tachypnea and no bradypnea. No respiratory distress. She has no decreased breath sounds. She has no wheezes. She has no rhonchi. She has no rales.   Neurological: She is alert and oriented to person, place, and time. No cranial nerve deficit or sensory deficit. GCS eye subscore is 4. GCS verbal subscore is 5. GCS motor subscore is 6.   Patient in wheelchair.   Skin: Skin is warm and dry. She is not diaphoretic.   Psychiatric: She has a normal mood and affect. Her speech is normal and behavior is normal. Judgment and thought content normal. She is not actively hallucinating. Cognition and memory are normal. She is attentive.   Vitals reviewed.              Assessment/Plan:     1. Weakness    2. Pallor    3. Low blood pressure reading    Refer to ER for further evaluation, family requests [facility] transport  Patient stable, hypotensive

## 2019-08-21 ENCOUNTER — APPOINTMENT (OUTPATIENT)
Dept: CARDIOLOGY | Facility: MEDICAL CENTER | Age: 74
End: 2019-08-21
Attending: NURSE PRACTITIONER
Payer: MEDICARE

## 2019-08-21 PROBLEM — K59.03 DRUG-INDUCED CONSTIPATION: Status: ACTIVE | Noted: 2019-08-20

## 2019-08-21 PROBLEM — Z79.01 CHRONIC ANTICOAGULATION: Status: ACTIVE | Noted: 2019-08-21

## 2019-08-21 PROBLEM — N30.00 ACUTE CYSTITIS WITHOUT HEMATURIA: Status: ACTIVE | Noted: 2019-08-20

## 2019-08-21 LAB
ALBUMIN SERPL BCP-MCNC: 3.7 G/DL (ref 3.2–4.9)
ALBUMIN/GLOB SERPL: 1.6 G/DL
ALP SERPL-CCNC: 40 U/L (ref 30–99)
ALT SERPL-CCNC: 6 U/L (ref 2–50)
ANION GAP SERPL CALC-SCNC: 7 MMOL/L (ref 0–11.9)
AST SERPL-CCNC: 9 U/L (ref 12–45)
BILIRUB SERPL-MCNC: 0.4 MG/DL (ref 0.1–1.5)
BUN SERPL-MCNC: 46 MG/DL (ref 8–22)
CALCIUM SERPL-MCNC: 10 MG/DL (ref 8.5–10.5)
CHLORIDE SERPL-SCNC: 109 MMOL/L (ref 96–112)
CO2 SERPL-SCNC: 26 MMOL/L (ref 20–33)
CREAT SERPL-MCNC: 1.25 MG/DL (ref 0.5–1.4)
ERYTHROCYTE [DISTWIDTH] IN BLOOD BY AUTOMATED COUNT: 50.5 FL (ref 35.9–50)
GLOBULIN SER CALC-MCNC: 2.3 G/DL (ref 1.9–3.5)
GLUCOSE SERPL-MCNC: 109 MG/DL (ref 65–99)
HCT VFR BLD AUTO: 31.4 % (ref 37–47)
HGB BLD-MCNC: 9.4 G/DL (ref 12–16)
LV EJECT FRACT  99904: 55
LV EJECT FRACT MOD 2C 99903: 60.8
LV EJECT FRACT MOD 4C 99902: 52.92
LV EJECT FRACT MOD BP 99901: 56.46
MCH RBC QN AUTO: 32 PG (ref 27–33)
MCHC RBC AUTO-ENTMCNC: 29.9 G/DL (ref 33.6–35)
MCV RBC AUTO: 106.8 FL (ref 81.4–97.8)
PLATELET # BLD AUTO: 169 K/UL (ref 164–446)
PMV BLD AUTO: 8.8 FL (ref 9–12.9)
POTASSIUM SERPL-SCNC: 4.1 MMOL/L (ref 3.6–5.5)
PROT SERPL-MCNC: 6 G/DL (ref 6–8.2)
RBC # BLD AUTO: 2.94 M/UL (ref 4.2–5.4)
SODIUM SERPL-SCNC: 142 MMOL/L (ref 135–145)
WBC # BLD AUTO: 4.9 K/UL (ref 4.8–10.8)

## 2019-08-21 PROCEDURE — 96366 THER/PROPH/DIAG IV INF ADDON: CPT

## 2019-08-21 PROCEDURE — 99226 PR SUBSEQUENT OBSERVATION CARE,LEVEL III: CPT | Performed by: INTERNAL MEDICINE

## 2019-08-21 PROCEDURE — A9270 NON-COVERED ITEM OR SERVICE: HCPCS | Performed by: INTERNAL MEDICINE

## 2019-08-21 PROCEDURE — 700105 HCHG RX REV CODE 258: Performed by: INTERNAL MEDICINE

## 2019-08-21 PROCEDURE — 93306 TTE W/DOPPLER COMPLETE: CPT | Mod: 26 | Performed by: INTERNAL MEDICINE

## 2019-08-21 PROCEDURE — 93306 TTE W/DOPPLER COMPLETE: CPT

## 2019-08-21 PROCEDURE — G0378 HOSPITAL OBSERVATION PER HR: HCPCS

## 2019-08-21 PROCEDURE — 700111 HCHG RX REV CODE 636 W/ 250 OVERRIDE (IP): Performed by: INTERNAL MEDICINE

## 2019-08-21 PROCEDURE — 700102 HCHG RX REV CODE 250 W/ 637 OVERRIDE(OP): Performed by: INTERNAL MEDICINE

## 2019-08-21 PROCEDURE — 85027 COMPLETE CBC AUTOMATED: CPT

## 2019-08-21 PROCEDURE — 80053 COMPREHEN METABOLIC PANEL: CPT

## 2019-08-21 RX ORDER — AMBRISENTAN 10 MG/1
10 TABLET, FILM COATED ORAL DAILY
Status: DISCONTINUED | OUTPATIENT
Start: 2019-08-21 | End: 2019-08-22 | Stop reason: HOSPADM

## 2019-08-21 RX ORDER — WARFARIN SODIUM 7.5 MG/1
7.5 TABLET ORAL
Status: COMPLETED | OUTPATIENT
Start: 2019-08-21 | End: 2019-08-21

## 2019-08-21 RX ORDER — WARFARIN SODIUM 5 MG/1
5 TABLET ORAL
Status: DISCONTINUED | OUTPATIENT
Start: 2019-08-22 | End: 2019-08-22 | Stop reason: HOSPADM

## 2019-08-21 RX ORDER — WARFARIN SODIUM 7.5 MG/1
7.5 TABLET ORAL
Status: DISCONTINUED | OUTPATIENT
Start: 2019-08-24 | End: 2019-08-22 | Stop reason: HOSPADM

## 2019-08-21 RX ORDER — TADALAFIL 10 MG/1
40 TABLET ORAL DAILY
Status: DISCONTINUED | OUTPATIENT
Start: 2019-08-21 | End: 2019-08-22 | Stop reason: HOSPADM

## 2019-08-21 RX ORDER — LISINOPRIL 10 MG/1
10 TABLET ORAL 2 TIMES DAILY
Status: ON HOLD | COMMUNITY
End: 2019-08-22

## 2019-08-21 RX ORDER — WARFARIN SODIUM 10 MG/1
10 TABLET ORAL
Status: DISCONTINUED | OUTPATIENT
Start: 2019-08-23 | End: 2019-08-22 | Stop reason: HOSPADM

## 2019-08-21 RX ADMIN — OXYCODONE HYDROCHLORIDE AND ACETAMINOPHEN 1 TABLET: 10; 325 TABLET ORAL at 09:51

## 2019-08-21 RX ADMIN — SOTALOL HYDROCHLORIDE 80 MG: 80 TABLET ORAL at 17:32

## 2019-08-21 RX ADMIN — LEVOTHYROXINE SODIUM 75 MCG: 75 TABLET ORAL at 05:06

## 2019-08-21 RX ADMIN — GABAPENTIN 200 MG: 100 CAPSULE ORAL at 05:06

## 2019-08-21 RX ADMIN — GABAPENTIN 200 MG: 100 CAPSULE ORAL at 17:33

## 2019-08-21 RX ADMIN — SOTALOL HYDROCHLORIDE 80 MG: 80 TABLET ORAL at 05:06

## 2019-08-21 RX ADMIN — OXYCODONE HYDROCHLORIDE AND ACETAMINOPHEN 1 TABLET: 10; 325 TABLET ORAL at 21:16

## 2019-08-21 RX ADMIN — ROSUVASTATIN CALCIUM 10 MG: 20 TABLET, FILM COATED ORAL at 17:33

## 2019-08-21 RX ADMIN — FUROSEMIDE 40 MG: 40 TABLET ORAL at 06:59

## 2019-08-21 RX ADMIN — OXYCODONE HYDROCHLORIDE AND ACETAMINOPHEN 1 TABLET: 10; 325 TABLET ORAL at 17:33

## 2019-08-21 RX ADMIN — SENNOSIDES, DOCUSATE SODIUM 2 TABLET: 50; 8.6 TABLET, FILM COATED ORAL at 05:05

## 2019-08-21 RX ADMIN — CEFTRIAXONE SODIUM 2 G: 2 INJECTION, POWDER, FOR SOLUTION INTRAMUSCULAR; INTRAVENOUS at 05:08

## 2019-08-21 RX ADMIN — WARFARIN SODIUM 7.5 MG: 7.5 TABLET ORAL at 09:51

## 2019-08-21 RX ADMIN — SENNOSIDES, DOCUSATE SODIUM 2 TABLET: 50; 8.6 TABLET, FILM COATED ORAL at 17:33

## 2019-08-21 RX ADMIN — OXYCODONE HYDROCHLORIDE AND ACETAMINOPHEN 1 TABLET: 10; 325 TABLET ORAL at 13:28

## 2019-08-21 RX ADMIN — POTASSIUM CHLORIDE 20 MEQ: 20 TABLET, EXTENDED RELEASE ORAL at 05:06

## 2019-08-21 RX ADMIN — AMBRISENTAN 10 MG: 10 TABLET, FILM COATED ORAL at 18:00

## 2019-08-21 RX ADMIN — PAROXETINE HYDROCHLORIDE 20 MG: 20 TABLET, FILM COATED ORAL at 05:08

## 2019-08-21 RX ADMIN — TADALAFIL 40 MG: 10 TABLET ORAL at 18:00

## 2019-08-21 RX ADMIN — FUROSEMIDE 40 MG: 40 TABLET ORAL at 17:33

## 2019-08-21 ASSESSMENT — ENCOUNTER SYMPTOMS
ABDOMINAL PAIN: 0
SPEECH CHANGE: 0
WEAKNESS: 0
DIZZINESS: 1
INSOMNIA: 0
PALPITATIONS: 0
NERVOUS/ANXIOUS: 0
PHOTOPHOBIA: 0
CONSTIPATION: 0
SPUTUM PRODUCTION: 0
DIARRHEA: 0
FOCAL WEAKNESS: 0
CHILLS: 0
NAUSEA: 0
FEVER: 0
DOUBLE VISION: 0
MEMORY LOSS: 0
VOMITING: 0
SHORTNESS OF BREATH: 1
FALLS: 1
BLURRED VISION: 0
FLANK PAIN: 1
COUGH: 0
WHEEZING: 0

## 2019-08-21 ASSESSMENT — PATIENT HEALTH QUESTIONNAIRE - PHQ9
SUM OF ALL RESPONSES TO PHQ9 QUESTIONS 1 AND 2: 0
1. LITTLE INTEREST OR PLEASURE IN DOING THINGS: NOT AT ALL
2. FEELING DOWN, DEPRESSED, IRRITABLE, OR HOPELESS: NOT AT ALL

## 2019-08-21 NOTE — PROGRESS NOTES
PT ARRIVED TO THE  UNIT ORIENTED TO ROOM DISCUSSED PLAN OF CARE  NOTIFIED OF ARRIVAL TO ROOM CALL LIGHT WITHIN REACH NURSING HISTORY AND ASSESSMENT DONE CONDITION STABLE

## 2019-08-21 NOTE — DISCHARGE PLANNING
Care Transition Team Assessment    Spoke with patient at bedside. Patient is on service with [facility]. Spoke with Grace @ [facility] and she confirmed. Has Home O2 24/7 @ 5L with [facility].  Spouse will be ride @ D/C.      Information Source  Orientation : Oriented x 4  Information Given By: Patient    Readmission Evaluation  Is this a readmission?: No    Interdisciplinary Discharge Planning  Does Admitting Nurse Feel This Could be a Complex Discharge?: No  Primary Care Physician: None  Lives with - Patient's Self Care Capacity: Spouse  Patient or legal guardian wants to designate a caregiver (see row info): No  Support Systems: Spouse / Significant Other  Housing / Facility: [address]  Do You Take your Prescribed Medications Regularly: Yes  Able to Return to Previous ADL's: Yes  Mobility Issues: No  Prior Services: Skilled Home Health Services  Patient Expects to be Discharged to:: Home  Assistance Needed: No  Durable Medical Equipment: Home Oxygen  DME Provider / Phone: [facility]     Discharge Preparedness  What are your discharge supports?: Child, Spouse  Prior Functional Level: Uses Cane, Uses Walker    Functional Assesment  Prior Functional Level: Uses Cane, Uses Walker    Finances  Prescription Coverage: Yes      Anticipated Discharge Information  Anticipated discharge disposition: [facility], Home  Discharge Address: [address]  Discharge Contact Phone Number: [phone]

## 2019-08-21 NOTE — CARE PLAN
Problem: Safety  Goal: Will remain free from injury  Outcome: PROGRESSING AS EXPECTED  Goal: Will remain free from falls  Outcome: PROGRESSING AS EXPECTED     Problem: Communication  Goal: The ability to communicate needs accurately and effectively will improve  Outcome: PROGRESSING AS EXPECTED     Problem: Pain Management  Goal: Pain level will decrease to patient's comfort goal  Outcome: PROGRESSING AS EXPECTED

## 2019-08-21 NOTE — PROGRESS NOTES
"Hospital Medicine Daily Progress Note    Date of Service  8/21/2019    Chief Complaint  Weakness and constipation    Hospital Course   This is a 74-year-old female with pulmonary hypertension followed by pulmonary at [facility], chronic hypoxia on 5 L home O2, PAF rate controlled with sotalol and anticoagulated with warfarin, and chronic anemia who presented 8/20/2019 and constipation.  She recently sustained a fall at home 1 week ago resulting in right clavicle fracture. She has been taking narcotics and developed worsening constipation.  She was hospitalized here in July for possible epidural abscess following epidural injection.  Work-up was negative for epidural abscess but positive for UTI.  Urine culture grew out E. coli at that time.      Interval Problem Update  -Episodes of tachycardia, A. fib, during ambulation.  She does have some increased SOB from her baseline as well as increased pedal edema from her baseline. ECHO pending.  -She has had 2 small bowel movements.  -urine growing lactose fermenting gram-negative sofia    Consultants/Specialty  -NONE    Code Status  FULL    Disposition  Likely to DC home tomorrow pending final urine culture results.    Review of Systems  Review of Systems   Constitutional: Positive for malaise/fatigue. Negative for chills and fever.   HENT: Negative.    Eyes: Negative for blurred vision, double vision and photophobia.   Respiratory: Positive for shortness of breath. Negative for cough, sputum production and wheezing.    Cardiovascular: Positive for leg swelling (\"a little worse than normal\"). Negative for chest pain and palpitations.   Gastrointestinal: Negative for abdominal pain, constipation, diarrhea, nausea and vomiting.   Genitourinary: Positive for dysuria and flank pain. Negative for hematuria.   Musculoskeletal: Positive for falls (one week ago).   Neurological: Positive for dizziness (intermittent). Negative for speech change, focal weakness and weakness. "   Psychiatric/Behavioral: Negative for memory loss. The patient is not nervous/anxious and does not have insomnia.    All other systems reviewed and are negative.       Physical Exam  Temp:  [35.8 °C (96.4 °F)-36.8 °C (98.2 °F)] 36.6 °C (97.8 °F)  Pulse:  [] 117  Resp:  [16-19] 19  BP: ()/(50-85) 105/70  SpO2:  [92 %-95 %] 94 %    Physical Exam   Constitutional: She is oriented to person, place, and time. Vital signs are normal. She appears well-developed and well-nourished. She is cooperative.  Non-toxic appearance. No distress. Nasal cannula in place.   HENT:   Head: Normocephalic.   Right Ear: Hearing normal.   Left Ear: Hearing normal.   Nose: Nose normal.   Mouth/Throat: Oropharynx is clear and moist and mucous membranes are normal.   Eyes: Conjunctivae and EOM are normal. No scleral icterus.   Neck: Phonation normal.   Cardiovascular: Normal heart sounds and intact distal pulses. An irregularly irregular rhythm present. Tachycardia present.   1+ ankle/pedal edema   Pulmonary/Chest: Effort normal. Tachypnea (with exertion) noted. She has decreased breath sounds. She has no wheezes.   Abdominal:   Obese, soft, nontender, (+) BS   Genitourinary:   Genitourinary Comments: Voiding    Musculoskeletal: Normal range of motion.   Generalized weakness   Neurological: She is alert and oriented to person, place, and time. She has normal strength. She displays a negative Romberg sign. GCS eye subscore is 4. GCS verbal subscore is 5. GCS motor subscore is 6.   Skin: Skin is warm and dry. No cyanosis. Nails show no clubbing.   Psychiatric: She has a normal mood and affect. Her behavior is normal. Judgment normal. Cognition and memory are normal.   Nursing note and vitals reviewed.      Fluids  No intake or output data in the 24 hours ending 08/21/19 1544    Laboratory  Recent Labs     08/20/19  1340 08/21/19  0520   WBC 6.1 4.9   RBC 2.61* 2.94*   HEMOGLOBIN 8.6* 9.4*   HEMATOCRIT 28.0* 31.4*   .3* 106.8*    MCH 33.0 32.0   MCHC 30.7* 29.9*   RDW 50.7* 50.5*   PLATELETCT 179 169   MPV 9.2 8.8*     Recent Labs     08/20/19  1340 08/21/19  0520   SODIUM 141 142   POTASSIUM 4.1 4.1   CHLORIDE 110 109   CO2 24 26   GLUCOSE 97 109*   BUN 45* 46*   CREATININE 0.99 1.25   CALCIUM 9.4 10.0     Recent Labs     08/20/19  1340   INR 1.98*               Imaging  EC-ECHOCARDIOGRAM COMPLETE W/O CONT              Assessment/Plan  Chronic anticoagulation- (present on admission)  Assessment & Plan  -For PAF  -Warfarin  -Subtherapeutic INR on presentation.  Patient reports compliance  -Pharmacy following    Macrocytic anemia- (present on admission)  Assessment & Plan  -Chronic and stable at her baseline    Drug-induced constipation- (present on admission)  Assessment & Plan  -She had a fall 1 week ago sustaining clavicle fracture.  She has been taking narcotics.  -Normal bowel movements only every 2 to 4 days.  She has had 2 bowel movements today  -Continue bowel protocol      Acute cystitis without hematuria- (present on admission)  Assessment & Plan  -(+) UA. (+) dysuria (-) hematuria (+) flank pain  -urine culture growing lactose fermenting gram-negative rods.  Final culture pending.  Urine culture in July grew out E. Coli  -Dysuria improving.  Pain improving.  No hematuria.  -Hemodynamically stable  -Afebrile.  No leukocytosis      Chronic respiratory failure (HCC)- (present on admission)  Assessment & Plan  -She is on 5 L home O2 for pulmonary hypertension    PAH (pulmonary artery hypertension) (HCC)- (present on admission)  Assessment & Plan  -With home O2 dependence  -Ambrisentan and Cialis  -Follow[facility] at [facility] with pulmonology      Paroxysmal atrial fibrillation (HCC)- (present on admission)  Assessment & Plan  -With episodes of tachycardia during activity  -Continue home sotalol for rate control  -Warfarin for anticoagulation  -Telemetry         VTE prophylaxis: Warfarin and SCD    KIRILL Lake

## 2019-08-21 NOTE — ASSESSMENT & PLAN NOTE
-For PAF  -Warfarin  -Subtherapeutic INR on presentation.  Patient reports compliance  -Pharmacy following

## 2019-08-21 NOTE — ASSESSMENT & PLAN NOTE
-With home O2 dependence  -Ambrisentan and Cialis  -Follow-up at [facility] with pulmonology

## 2019-08-21 NOTE — ASSESSMENT & PLAN NOTE
-She had a fall 1 week ago sustaining clavicle fracture.  She has been taking narcotics.  -Normal bowel movements only every 2 to 4 days.  She has had 2 bowel movements today  -Continue bowel protocol

## 2019-08-21 NOTE — ASSESSMENT & PLAN NOTE
-With episodes of tachycardia during activity  -Continue home sotalol for rate control  -Warfarin for anticoagulation  -Telemetry

## 2019-08-21 NOTE — PROGRESS NOTES
Inpatient Anticoagulation Service Note    Date: 8/20/2019  Reason for Anticoagulation: Atrial Fibrillation   Target INR: 2.0 to 3.0  [id number] VASc Score: 3  HAS-BLED Score: 2   Hemoglobin Value: (!) 8.6  Hematocrit Value: (!) 28    INR from last 7 days     Date/Time INR Value    08/20/19 1340  (!) 1.98        Dose from last 7 days     Date/Time Dose (mg)    08/20/19 2005  5        Average Dose (mg): (5 mg on Tues, Thurs, Sun. 7.5 mg on Mon, Wed, Sat. 10 mg on Fri)  Significant Interactions: Thyroid Medications  Bridge Therapy: No     Comments/plan: Patient admitted for observation due to syncopal episode and UTI. INR relatively stable on admission. H/H low but right around baseline. No concerns for bleeding at this time. No new meds or concerns for DDI. Next INR to be drawn on 8/22/19 due to time of recent INR.     Education Material Provided?: No (chronic therapy)    Pharmacist suggested discharge dosing: Discharge on home regimen on 5 mg Tuesday, Thursday, Sunday. 7.5 mg Monday, Wednesday, Saturday. 10 mg on Friday.    Salo Arzola, PharmD   D/w POC and a/w POC.   Tyrell Rodríguez, UrmilaD, BCPS

## 2019-08-21 NOTE — ASSESSMENT & PLAN NOTE
-(+) UA. (+) dysuria (-) hematuria (+) flank pain  -urine culture growing lactose fermenting gram-negative rods.  Final culture pending.  Urine culture in July grew out E. Coli  -Dysuria improving.  Pain improving.  No hematuria.  -Hemodynamically stable  -Afebrile.  No leukocytosis

## 2019-08-21 NOTE — H&P
Hospital Medicine History & Physical Note    Date of Service  8/20/2019    Primary Care Physician  Ashai Tidwell M.D.    Consultants  none    Code Status  full    Chief Complaint  Weakness, constipation    History of Presenting Illness  74 y.o. female with recent history of clavicle fracture, Afib, HTN who presented 8/20/2019 with above.  Its been going on for the past 2 days associated with lightheadedness.   The patient has history of pulmonary hypertension on 5L of home o2. She also complaint about urinary frequency and urgency. In the ER. She was found to have UTI.  In addition, she has been having constipation and her last BM was 3 days ago. Explained to her about narcotics associated constipation.  She will be admitted for observation.    Review of Systems  Review of Systems   Constitutional: Negative for chills, fever and weight loss.   HENT: Negative for congestion and nosebleeds.    Eyes: Negative for blurred vision, pain, discharge and redness.   Respiratory: Negative for cough, sputum production, shortness of breath and stridor.    Cardiovascular: Negative for chest pain, palpitations and orthopnea.   Gastrointestinal: Positive for constipation. Negative for abdominal pain, diarrhea, heartburn, nausea and vomiting.   Genitourinary: Negative for dysuria, frequency and urgency.   Musculoskeletal: Negative for back pain, myalgias and neck pain.   Skin: Negative for itching and rash.   Neurological: Negative for dizziness, focal weakness, seizures and headaches.   Psychiatric/Behavioral: Negative for depression. The patient is not nervous/anxious and does not have insomnia.        Past Medical History   has a past medical history of Aneurysm artery, celiac (HCC) (2019), Aneurysm of right renal artery (HCC), Atrial fibrillation (HCC), Diastolic heart failure (HCC), Hypertension, essential, Pulmonary hypertension (HCC), and Warfarin anticoagulation.    Surgical History  Reviewed, no pertinent past surgical  history     Family History  Reviewed, no pertinent family history     Social History   reports that she has never smoked. She has never used smokeless tobacco. She reports that she drinks about 4.2 oz of alcohol per week. She reports that she does not use drugs.    Allergies  No Known Allergies    Medications  Prior to Admission Medications   Prescriptions Last Dose Informant Patient Reported? Taking?   PARoxetine (PAXIL) 20 MG Tab 8/20/2019 at 0800 Patient Yes No   Sig: Take 20 mg by mouth every day.   ambrisentan (LETAIRIS) 10 MG tablet 8/20/2019 at 0800 Patient Yes No   Sig: Take 10 mg by mouth every day.   furosemide (LASIX) 40 MG Tab 8/20/2019 at 0800 Patient Yes Yes   Sig: Take 40 mg by mouth 2 Times a Day.   gabapentin (NEURONTIN) 100 MG Cap 8/20/2019 at 0800 Patient Yes Yes   Sig: Take 200 mg by mouth 2 Times a Day.   levothyroxine (SYNTHROID) 75 MCG Tab 8/20/2019 at 0700 Patient Yes No   Sig: Take 75 mcg by mouth Every morning on an empty stomach.   oxyCODONE-acetaminophen (PERCOCET-10)  MG Tab 8/20/2019 at 0800 Patient Yes No   Sig: Take 1 Tab by mouth 4 times a day.   potassium chloride SA (KDUR) 20 MEQ Tab CR 8/20/2019 at 0800 Patient Yes Yes   Sig: Take 20 mEq by mouth every day.   rosuvastatin (CRESTOR) 10 MG Tab 8/19/2019 at 1930 Patient Yes No   Sig: Take 10 mg by mouth every evening.   sotalol (BETAPACE) 80 MG Tab 8/20/2019 at 0800 Patient Yes No   Sig: Take 80 mg by mouth 2 times a day.   tadalafil (CIALIS) 10 MG tablet 8/20/2019 at 0800 Patient Yes No   Sig: Take 40 mg by mouth every day. Pulmonary hypertension   therapeutic multivitamin-minerals (THERAGRAN-M) Tab 8/20/2019 at 0800 Patient Yes Yes   Sig: Take 1 Tab by mouth every day.   vitamin D (CHOLECALCIFEROL) 1000 UNIT Tab 8/20/2019 at 0800 Patient Yes No   Sig: Take 1,000 Units by mouth every day.   warfarin (COUMADIN) 5 MG Tab 8/19/2019 at 1900 Patient Yes Yes   Sig: Take 5-10 mg by mouth every evening. 5 mg on Tuesday, Thursday ,  Sunday  7.5 mg on Monday, Wednesday, Saturday  10 mg on Friday      Facility-Administered Medications: None       Physical Exam  Temp:  [37.2 °C (99 °F)] 37.2 °C (99 °F)  Pulse:  [] 95  Resp:  [16-18] 16  BP: ()/(50-66) 114/55  SpO2:  [93 %-96 %] 94 %    Physical Exam   Constitutional: She is oriented to person, place, and time. No distress.   HENT:   Head: Normocephalic and atraumatic.   Mouth/Throat: Oropharynx is clear and moist.   Eyes: Pupils are equal, round, and reactive to light. Conjunctivae and EOM are normal.   Neck: Normal range of motion. Neck supple. No tracheal deviation present. No thyromegaly present.   Cardiovascular: Normal rate and regular rhythm.   No murmur heard.  Pulmonary/Chest: Effort normal and breath sounds normal. No respiratory distress. She has no wheezes.   Abdominal: Soft. Bowel sounds are normal. She exhibits no distension. There is no tenderness.   Musculoskeletal: She exhibits no edema or tenderness.   Neurological: She is alert and oriented to person, place, and time. No cranial nerve deficit.   Skin: Skin is warm and dry. She is not diaphoretic. No erythema.   Psychiatric: She has a normal mood and affect. Her behavior is normal. Thought content normal.   Nursing note and vitals reviewed.      Laboratory:  Recent Labs     08/20/19  1340   WBC 6.1   RBC 2.61*   HEMOGLOBIN 8.6*   HEMATOCRIT 28.0*   .3*   MCH 33.0   MCHC 30.7*   RDW 50.7*   PLATELETCT 179   MPV 9.2     Recent Labs     08/20/19  1340   SODIUM 141   POTASSIUM 4.1   CHLORIDE 110   CO2 24   GLUCOSE 97   BUN 45*   CREATININE 0.99   CALCIUM 9.4     Recent Labs     08/20/19  1340   ALTSGPT 7   ASTSGOT 13   ALKPHOSPHAT 34   TBILIRUBIN 0.4   GLUCOSE 97     Recent Labs     08/20/19  1340   INR 1.98*     No results for input(s): NTPROBNP in the last 72 hours.      Recent Labs     08/20/19  1340   TROPONINT 13       Urinalysis:    Recent Labs     08/20/19  1350   SPECGRAVITY 1.009   GLUCOSEUR Negative    KETONES Negative   NITRITE Positive*   LEUKESTERAS Moderate*   WBCURINE *   BACTERIA Many*   EPITHELCELL Few        Imaging:  No orders to display         Assessment/Plan:  I anticipate this patient is appropriate for observation status at this time.    Macrocytic anemia  Assessment & Plan  Stable at baseline    Constipation  Assessment & Plan  Related to narcotics  Bowel regime  Educated about narcotics complication    Acute cystitis with hematuria- (present on admission)  Assessment & Plan  Symptomatic  On IV ceftriaxone  Follow culture    PAH (pulmonary artery hypertension) (HCC)- (present on admission)  Assessment & Plan  On 5L of o2  Continue ambrisentan, cialis  Follow up with pulmonologist    Paroxysmal atrial fibrillation (HCC)- (present on admission)  Assessment & Plan  Rate controlled  Continue sotalol, warfarin      VTE prophylaxis: lovenox

## 2019-08-22 ENCOUNTER — PATIENT OUTREACH (OUTPATIENT)
Dept: HEALTH INFORMATION MANAGEMENT | Facility: OTHER | Age: 74
End: 2019-08-22

## 2019-08-22 VITALS
RESPIRATION RATE: 18 BRPM | DIASTOLIC BLOOD PRESSURE: 51 MMHG | HEART RATE: 115 BPM | WEIGHT: 246.25 LBS | SYSTOLIC BLOOD PRESSURE: 92 MMHG | OXYGEN SATURATION: 94 % | HEIGHT: 67 IN | TEMPERATURE: 97.3 F | BODY MASS INDEX: 38.65 KG/M2

## 2019-08-22 PROBLEM — N39.0 E. COLI UTI: Status: ACTIVE | Noted: 2019-08-22

## 2019-08-22 PROBLEM — N30.00 ACUTE CYSTITIS WITHOUT HEMATURIA: Status: RESOLVED | Noted: 2019-08-20 | Resolved: 2019-08-22

## 2019-08-22 PROBLEM — B96.20 E. COLI UTI: Status: ACTIVE | Noted: 2019-08-22

## 2019-08-22 LAB
ANION GAP SERPL CALC-SCNC: 7 MMOL/L (ref 0–11.9)
ANISOCYTOSIS BLD QL SMEAR: ABNORMAL
BACTERIA UR CULT: ABNORMAL
BACTERIA UR CULT: ABNORMAL
BASO STIPL BLD QL SMEAR: NORMAL
BASOPHILS # BLD AUTO: 0.4 % (ref 0–1.8)
BASOPHILS # BLD: 0.02 K/UL (ref 0–0.12)
BUN SERPL-MCNC: 38 MG/DL (ref 8–22)
CALCIUM SERPL-MCNC: 9.2 MG/DL (ref 8.5–10.5)
CHLORIDE SERPL-SCNC: 110 MMOL/L (ref 96–112)
CO2 SERPL-SCNC: 25 MMOL/L (ref 20–33)
COMMENT 1642: NORMAL
CREAT SERPL-MCNC: 0.95 MG/DL (ref 0.5–1.4)
EOSINOPHIL # BLD AUTO: 0.13 K/UL (ref 0–0.51)
EOSINOPHIL NFR BLD: 2.9 % (ref 0–6.9)
ERYTHROCYTE [DISTWIDTH] IN BLOOD BY AUTOMATED COUNT: 50.2 FL (ref 35.9–50)
GLUCOSE SERPL-MCNC: 136 MG/DL (ref 65–99)
HCT VFR BLD AUTO: 29.1 % (ref 37–47)
HGB BLD-MCNC: 8.6 G/DL (ref 12–16)
IMM GRANULOCYTES # BLD AUTO: 0.01 K/UL (ref 0–0.11)
IMM GRANULOCYTES NFR BLD AUTO: 0.2 % (ref 0–0.9)
INR PPP: 1.88 (ref 0.87–1.13)
LYMPHOCYTES # BLD AUTO: 0.6 K/UL (ref 1–4.8)
LYMPHOCYTES NFR BLD: 13.2 % (ref 22–41)
MACROCYTES BLD QL SMEAR: ABNORMAL
MAGNESIUM SERPL-MCNC: 2.2 MG/DL (ref 1.5–2.5)
MCH RBC QN AUTO: 31.5 PG (ref 27–33)
MCHC RBC AUTO-ENTMCNC: 29.6 G/DL (ref 33.6–35)
MCV RBC AUTO: 106.6 FL (ref 81.4–97.8)
MONOCYTES # BLD AUTO: 0.45 K/UL (ref 0–0.85)
MONOCYTES NFR BLD AUTO: 9.9 % (ref 0–13.4)
MORPHOLOGY BLD-IMP: NORMAL
NEUTROPHILS # BLD AUTO: 3.35 K/UL (ref 2–7.15)
NEUTROPHILS NFR BLD: 73.4 % (ref 44–72)
NRBC # BLD AUTO: 0 K/UL
NRBC BLD-RTO: 0 /100 WBC
OVALOCYTES BLD QL SMEAR: NORMAL
PLATELET # BLD AUTO: 163 K/UL (ref 164–446)
PLATELET BLD QL SMEAR: NORMAL
PMV BLD AUTO: 9.1 FL (ref 9–12.9)
POIKILOCYTOSIS BLD QL SMEAR: NORMAL
POTASSIUM SERPL-SCNC: 4.5 MMOL/L (ref 3.6–5.5)
PROTHROMBIN TIME: 22.2 SEC (ref 12–14.6)
RBC # BLD AUTO: 2.73 M/UL (ref 4.2–5.4)
RBC BLD AUTO: PRESENT
SIGNIFICANT IND 70042: ABNORMAL
SITE SITE: ABNORMAL
SODIUM SERPL-SCNC: 142 MMOL/L (ref 135–145)
SOURCE SOURCE: ABNORMAL
WBC # BLD AUTO: 4.6 K/UL (ref 4.8–10.8)

## 2019-08-22 PROCEDURE — 80048 BASIC METABOLIC PNL TOTAL CA: CPT

## 2019-08-22 PROCEDURE — 99217 PR OBSERVATION CARE DISCHARGE: CPT | Performed by: INTERNAL MEDICINE

## 2019-08-22 PROCEDURE — 700111 HCHG RX REV CODE 636 W/ 250 OVERRIDE (IP): Performed by: INTERNAL MEDICINE

## 2019-08-22 PROCEDURE — 83735 ASSAY OF MAGNESIUM: CPT

## 2019-08-22 PROCEDURE — 85025 COMPLETE CBC W/AUTO DIFF WBC: CPT

## 2019-08-22 PROCEDURE — G0378 HOSPITAL OBSERVATION PER HR: HCPCS

## 2019-08-22 PROCEDURE — 85610 PROTHROMBIN TIME: CPT

## 2019-08-22 PROCEDURE — 700102 HCHG RX REV CODE 250 W/ 637 OVERRIDE(OP): Performed by: INTERNAL MEDICINE

## 2019-08-22 PROCEDURE — 700105 HCHG RX REV CODE 258: Performed by: INTERNAL MEDICINE

## 2019-08-22 PROCEDURE — 96366 THER/PROPH/DIAG IV INF ADDON: CPT

## 2019-08-22 PROCEDURE — A9270 NON-COVERED ITEM OR SERVICE: HCPCS | Performed by: INTERNAL MEDICINE

## 2019-08-22 RX ADMIN — SOTALOL HYDROCHLORIDE 80 MG: 80 TABLET ORAL at 05:27

## 2019-08-22 RX ADMIN — POTASSIUM CHLORIDE 20 MEQ: 20 TABLET, EXTENDED RELEASE ORAL at 05:27

## 2019-08-22 RX ADMIN — PAROXETINE HYDROCHLORIDE 20 MG: 20 TABLET, FILM COATED ORAL at 05:27

## 2019-08-22 RX ADMIN — OXYCODONE HYDROCHLORIDE AND ACETAMINOPHEN 1 TABLET: 10; 325 TABLET ORAL at 08:40

## 2019-08-22 RX ADMIN — GABAPENTIN 200 MG: 100 CAPSULE ORAL at 05:27

## 2019-08-22 RX ADMIN — CEFTRIAXONE SODIUM 2 G: 2 INJECTION, POWDER, FOR SOLUTION INTRAMUSCULAR; INTRAVENOUS at 05:27

## 2019-08-22 RX ADMIN — FUROSEMIDE 40 MG: 40 TABLET ORAL at 05:35

## 2019-08-22 RX ADMIN — LEVOTHYROXINE SODIUM 75 MCG: 75 TABLET ORAL at 05:27

## 2019-08-22 NOTE — DISCHARGE INSTRUCTIONS
Discharge Instructions    Discharged to home by car with relative. Discharged via wheelchair, hospital escort: Yes.  Special equipment needed: Not Applicable    Be sure to schedule a follow-up appointment with your primary care doctor or any specialists as instructed.     Discharge Plan:   Diet Plan: Discussed  Activity Level: Discussed  Confirmed Follow up Appointment: Patient to Call and Schedule Appointment  Confirmed Symptoms Management: Discussed  Medication Reconciliation Updated: Yes  Influenza Vaccine Indication: Indicated: Not available from distributor/    I understand that a diet low in cholesterol, fat, and sodium is recommended for good health. Unless I have been given specific instructions below for another diet, I accept this instruction as my diet prescription.   Other diet: Regular    Special Instructions:   FU with PCP   FU with Pulmonologist at [facility]    · Is patient discharged on Warfarin / Coumadin?   No     Depression / Suicide Risk    As you are discharged from this [facility] facility, it is important to learn how to keep safe from harming yourself.    Recognize the warning signs:  · Abrupt changes in personality, positive or negative- including increase in energy   · Giving away possessions  · Change in eating patterns- significant weight changes-  positive or negative  · Change in sleeping patterns- unable to sleep or sleeping all the time   · Unwillingness or inability to communicate  · Depression  · Unusual sadness, discouragement and loneliness  · Talk of wanting to die  · Neglect of personal appearance   · Rebelliousness- reckless behavior  · Withdrawal from people/activities they love  · Confusion- inability to concentrate     If you or a loved one observes any of these behaviors or has concerns about self-harm, here's what you can do:  · Talk about it- your feelings and reasons for harming yourself  · Remove any means that you might use to hurt yourself (examples:  pills, rope, extension cords, firearm)  · Get professional help from the community (Mental Health, Substance Abuse, psychological counseling)  · Do not be alone:Call your Safe Contact- someone whom you trust who will be there for you.  · Call your local CRISIS HOTLINE 453-0237 or 309-854-8473  · Call your local Children's Mobile Crisis Response Team [place] (519) 402-3697 or www.Labs on the Go  · Call the toll free National Suicide Prevention Hotlines   · National Suicide Prevention Lifeline 123-672-IUKQ (3797)  · [employer] Line Network 800-SUICIDE (640-8000)        Discharge Instructions per Sharmin Rodarte, FARIDEH.R.N.    -You have completed 3 day course of ABX for UTI. If your symptoms return or worsen (painful urination, blood in urine, flank pain) return to the nearest UC or ED.  -Resume medications as previously prescribed. RX recommends Warfarin 5 mg Tuesday, Thursday, Sunday; 7.5 mg Monday, Wednesday, Saturday and 10 mg Friday    DIET: As Tolerated    ACTIVITY: As tolerated    DIAGNOSIS: UTI (Urinary Tract Infection)    Return to ER if your symptoms return or worsen.

## 2019-08-22 NOTE — DISCHARGE SUMMARY
Discharge Summary    CHIEF COMPLAINT ON ADMISSION  Chief Complaint   Patient presents with   • Weakness     generalized   • Dizziness   • Hypotension       Reason for Admission  Transfer     Admission Date  8/20/2019    Discharge Date  08/22/19    CODE STATUS  Prior    HPI & HOSPITAL COURSE  This is a 74-year-old female with pulmonary hypertension followed by pulmonary at [facility], CKD 3, chronic hypoxia on 5 L home O2, PAF rate controlled with sotalol and anticoagulated with warfarin, and chronic anemia who presented 8/20/2019 weakness and constipation.  She recently sustained a fall at home 1 week ago resulting in right clavicle fracture. She has been taking narcotics and developed worsening constipation.  She was hospitalized here in July for possible epidural abscess following epidural injection.  Work-up was negative for epidural abscess but positive for UTI.  Urine culture grew out E. coli at that time.      She was noted to have episodes of tachycardia, A. fib, during ambulation with associated SOB and increased pedal edema from her baseline.  ECHO showed EF 55% (down from 65% in March) with mild to moderate mitral regurg and RVSP 45 mmHg.  Agitated bubble study showed no evidence of right to left shunt.    Urine culture grew out E. coli again this hospitalization.  She did complain of dysuria and flank pain without hematuria.  She completed 3-day course of IV Rocephin.    She was treated with bowel protocol and had multiple bowel movements with resolution of her constipation.    She was seen and examined prior to discharge.  She is ambulatory in her room on her home 5 L O2 without any increased SOB, chest pain, palpitations, dizziness or weakness.  She reports feeling back to her baseline level of function and is eager for discharge home today.    Therefore, she is discharged in good and stable condition to home with close outpatient follow-up.    FOLLOW UP ITEMS POST DISCHARGE  Discharge Instructions per  TEODORO [place].    -You have completed 3 day course of ABX for UTI. If your symptoms return or worsen (painful urination, blood in urine, flank pain) return to the nearest UC or ED.  -Resume medications as previously prescribed. RX recommends Warfarin 5 mg Tuesday, Thursday, Sunday; 7.5 mg Monday, Wednesday, Saturday and 10 mg Friday    DIET: As Tolerated    ACTIVITY: As tolerated    DIAGNOSIS: UTI (Urinary Tract Infection)    Return to ER if your symptoms return or worsen.     DISCHARGE DIAGNOSES  Active Problems:    Paroxysmal atrial fibrillation (HCC) POA: Yes    PAH (pulmonary artery hypertension) (HCC) POA: Yes    Chronic respiratory failure (HCC) POA: Yes    Drug-induced constipation POA: Yes    Macrocytic anemia POA: Yes    Chronic anticoagulation POA: Yes    E. coli UTI POA: Unknown  Resolved Problems:    Acute cystitis without hematuria POA: Yes      FOLLOW UP  Future Appointments   Date Time Provider Department Center   8/26/2019 11:00 AM [facility] None     Ashia Tidwell M.D.  [address] [facility] [address]  [phone]      Please set up an appointment with  within 1-2 weeks of discharge.Thank you       MEDICATIONS ON DISCHARGE     Medication List      CONTINUE taking these medications      Instructions   ambrisentan 10 MG tablet  Commonly known as:  LETAIRIS   Take 10 mg by mouth every day.  Dose:  10 mg     furosemide 40 MG Tabs  Commonly known as:  LASIX   Take 40 mg by mouth 2 Times a Day.  Dose:  40 mg     gabapentin 100 MG Caps  Commonly known as:  NEURONTIN   Take 200 mg by mouth 2 Times a Day.  Dose:  200 mg     levothyroxine 75 MCG Tabs  Commonly known as:  SYNTHROID   Take 75 mcg by mouth Every morning on an empty stomach.  Dose:  75 mcg     oxyCODONE-acetaminophen  MG Tabs  Commonly known as:  PERCOCET-10   Take 1 Tab by mouth 4 times a day.  Dose:  1 Tab     PARoxetine 20 MG Tabs  Commonly known as:  PAXIL   Take 20 mg by mouth  every day.  Dose:  20 mg     potassium chloride SA 20 MEQ Tbcr  Commonly known as:  Kdur   Take 20 mEq by mouth every day.  Dose:  20 mEq     rosuvastatin 10 MG Tabs  Commonly known as:  CRESTOR   Take 10 mg by mouth every evening.  Dose:  10 mg     sotalol 80 MG Tabs  Commonly known as:  BETAPACE   Take 80 mg by mouth 2 times a day.  Dose:  80 mg     tadalafil 10 MG tablet  Commonly known as:  CIALIS   Take 40 mg by mouth every day. Pulmonary hypertension  Dose:  40 mg     therapeutic multivitamin-minerals Tabs   Take 1 Tab by mouth every day.  Dose:  1 Tab     vitamin D 1000 UNIT Tabs  Commonly known as:  cholecalciferol   Take 1,000 Units by mouth every day.  Dose:  1,000 Units     warfarin 5 MG Tabs  Commonly known as:  COUMADIN   Take 5-10 mg by mouth every evening. 5 mg on Tuesday, Thursday , Sunday  7.5 mg on Monday, Wednesday, Saturday  10 mg on Friday  Dose:  5-10 mg        STOP taking these medications    lisinopril 10 MG Tabs  Commonly known as:  PRINIVIL            Allergies  No Known Allergies    DIET  No orders of the defined types were placed in this encounter.      ACTIVITY  As tolerated.  Weight bearing as tolerated    CONSULTATIONS  NONE    PROCEDURES  NONE    LABORATORY  Lab Results   Component Value Date    SODIUM 142 08/22/2019    POTASSIUM 4.5 08/22/2019    CHLORIDE 110 08/22/2019    CO2 25 08/22/2019    GLUCOSE 136 (H) 08/22/2019    BUN 38 (H) 08/22/2019    CREATININE 0.95 08/22/2019        Lab Results   Component Value Date    WBC 4.6 (L) 08/22/2019    HEMOGLOBIN 8.6 (L) 08/22/2019    HEMATOCRIT 29.1 (L) 08/22/2019    PLATELETCT 163 (L) 08/22/2019        [place]O [place].

## 2019-08-22 NOTE — PROGRESS NOTES
Discharge instructions, medications and follow-up reviewed with pt, pt verbalized understanding and denies questions. Discharge paperwork given to pt. PIV removed, TeleBox removed, armband removed. Pt daughter at bedside and is pt ride home. Pt transported off unit via wheelchair with hospital escort.

## 2019-08-22 NOTE — PROGRESS NOTES
Pt resting in bed eating dinner with no c/o pain, cp, or sob at this time. Family at bedside. Will CTM.

## 2019-08-22 NOTE — PROGRESS NOTES
Inpatient Anticoagulation Service Note    Date: 2019    Reason for Anticoagulation: Atrial Fibrillation   Target INR: 2.0 to 3.0  [id number] VASc Score: 3  HAS-BLED Score: 2   Hemoglobin Value: (!) 9.4  Hematocrit Value: (!) 31.4    INR from last 7 days     Date/Time INR Value    19 0616  (!) 1.88    19 1340  (!) 1.98        Dose from last 7 days     Date/Time Dose (mg)    19 0657  5    19 0835  7.5    19 2005  5    19 1959  5        Average Dose (mg): (5 mg on Tues, Thurs, Sun. 7.5 mg on Mon, Wed, Sat. 10 mg on Fri)  Significant Interactions: Thyroid Medications  Bridge Therapy: No     Comments/ plan: Patient admitted for observation due to syncopal episode and UTI. INR today 1.88, possibly reflective of missed dose on . No concerns for bleeding at this time. No new meds or concerns for DDI.  No new INR scheduled, will continue to monitor. Continue home regimen of 5 mg on Tues, Thurs, Sun. 7.5 mg on Mon, Wed, Sat. 10 mg on Fri      Education Material Provided?: No    Pharmacist suggested discharge dosin mg on Tues, Thurs, Sun.   7.5 mg on Mon, Wed, Sat.  10 mg on Fri.     Donna William, PharmD  Reviewed by Gloria Torres

## 2019-08-26 ENCOUNTER — OFFICE VISIT (OUTPATIENT)
Dept: NEUROLOGY | Facility: MEDICAL CENTER | Age: 74
End: 2019-08-26
Payer: MEDICARE

## 2019-08-26 VITALS
HEIGHT: 67 IN | HEART RATE: 86 BPM | OXYGEN SATURATION: 96 % | WEIGHT: 244 LBS | BODY MASS INDEX: 38.3 KG/M2 | DIASTOLIC BLOOD PRESSURE: 68 MMHG | SYSTOLIC BLOOD PRESSURE: 122 MMHG | TEMPERATURE: 96.6 F | RESPIRATION RATE: 14 BRPM

## 2019-08-26 DIAGNOSIS — G62.9 NEUROPATHY: ICD-10-CM

## 2019-08-26 DIAGNOSIS — R51.9 CHRONIC DAILY HEADACHE: ICD-10-CM

## 2019-08-26 DIAGNOSIS — R41.3 MEMORY LOSS: ICD-10-CM

## 2019-08-26 DIAGNOSIS — R93.0 ABNORMAL MRI OF HEAD: ICD-10-CM
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
## 2019-08-26 DIAGNOSIS — E53.8 VITAMIN B12 DEFICIENCY: ICD-10-CM

## 2019-08-26 DIAGNOSIS — M54.17 RADICULOPATHY, LUMBOSACRAL REGION: ICD-10-CM

## 2019-08-26 PROCEDURE — 99205 OFFICE O/P NEW HI 60 MIN: CPT | Performed by: NURSE PRACTITIONER

## 2019-08-26 RX ORDER — GABAPENTIN 400 MG/1
400 CAPSULE ORAL 2 TIMES DAILY
Qty: 60 CAP | Refills: 11 | Status: SHIPPED | OUTPATIENT
Start: 2019-08-26 | End: 2019-09-08

## 2019-08-26 RX ORDER — PREDNISONE 10 MG/1
TABLET ORAL
Qty: 20 TAB | Refills: 0 | Status: SHIPPED | OUTPATIENT
Start: 2019-08-26 | End: 2019-09-07

## 2019-08-26 ASSESSMENT — PATIENT HEALTH QUESTIONNAIRE - PHQ9: CLINICAL INTERPRETATION OF PHQ2 SCORE: 0
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
## 2019-08-27 ENCOUNTER — HOSPITAL ENCOUNTER (OUTPATIENT)
Dept: LAB | Facility: MEDICAL CENTER | Age: 74
End: 2019-08-27
Attending: NURSE PRACTITIONER
Payer: MEDICARE

## 2019-08-27 DIAGNOSIS — R93.0 ABNORMAL MRI OF HEAD: ICD-10-CM

## 2019-08-27 DIAGNOSIS — R51.9 CHRONIC DAILY HEADACHE: ICD-10-CM

## 2019-08-27 LAB
CRP SERPL HS-MCNC: 0.69 MG/DL (ref 0–0.75)
ERYTHROCYTE [SEDIMENTATION RATE] IN BLOOD BY WESTERGREN METHOD: 19 MM/HOUR (ref 0–30)

## 2019-08-27 PROCEDURE — 86140 C-REACTIVE PROTEIN: CPT

## 2019-08-27 PROCEDURE — 85652 RBC SED RATE AUTOMATED: CPT

## 2019-08-27 PROCEDURE — 36415 COLL VENOUS BLD VENIPUNCTURE: CPT

## 2019-09-06 ENCOUNTER — APPOINTMENT (OUTPATIENT)
Dept: RADIOLOGY | Facility: MEDICAL CENTER | Age: 74
End: 2019-09-06
Attending: NURSE PRACTITIONER
Payer: MEDICARE

## 2019-09-08 ENCOUNTER — APPOINTMENT (OUTPATIENT)
Dept: RADIOLOGY | Facility: MEDICAL CENTER | Age: 74
DRG: 309 | End: 2019-09-08
Attending: EMERGENCY MEDICINE
Payer: MEDICARE

## 2019-09-08 ENCOUNTER — HOSPITAL ENCOUNTER (EMERGENCY)
Facility: MEDICAL CENTER | Age: 74
DRG: 309 | End: 2019-09-09
Attending: EMERGENCY MEDICINE
Payer: MEDICARE

## 2019-09-08 DIAGNOSIS — Z86.79 HX OF PULMONARY HYPERTENSION: ICD-10-CM

## 2019-09-08 DIAGNOSIS — I48.0 PAROXYSMAL ATRIAL FIBRILLATION (HCC): ICD-10-CM

## 2019-09-08 DIAGNOSIS — R53.1 WEAKNESS: ICD-10-CM

## 2019-09-08 DIAGNOSIS — E86.0 DEHYDRATION: ICD-10-CM

## 2019-09-08 LAB
ALBUMIN SERPL BCP-MCNC: 4.1 G/DL (ref 3.2–4.9)
ALBUMIN/GLOB SERPL: 1.9 G/DL
ALP SERPL-CCNC: 59 U/L (ref 30–99)
ALT SERPL-CCNC: 11 U/L (ref 2–50)
ANION GAP SERPL CALC-SCNC: 11 MMOL/L (ref 0–11.9)
APPEARANCE UR: CLEAR
AST SERPL-CCNC: 14 U/L (ref 12–45)
BACTERIA #/AREA URNS HPF: NEGATIVE /HPF
BASOPHILS # BLD AUTO: 0.4 % (ref 0–1.8)
BASOPHILS # BLD: 0.02 K/UL (ref 0–0.12)
BILIRUB SERPL-MCNC: 0.2 MG/DL (ref 0.1–1.5)
BILIRUB UR QL STRIP.AUTO: NEGATIVE
BUN SERPL-MCNC: 35 MG/DL (ref 8–22)
CALCIUM SERPL-MCNC: 10 MG/DL (ref 8.5–10.5)
CHLORIDE SERPL-SCNC: 104 MMOL/L (ref 96–112)
CO2 SERPL-SCNC: 27 MMOL/L (ref 20–33)
COLOR UR: YELLOW
CREAT SERPL-MCNC: 0.99 MG/DL (ref 0.5–1.4)
EKG IMPRESSION: NORMAL
EOSINOPHIL # BLD AUTO: 0.13 K/UL (ref 0–0.51)
EOSINOPHIL NFR BLD: 2.4 % (ref 0–6.9)
EPI CELLS #/AREA URNS HPF: NEGATIVE /HPF
ERYTHROCYTE [DISTWIDTH] IN BLOOD BY AUTOMATED COUNT: 48.9 FL (ref 35.9–50)
GLOBULIN SER CALC-MCNC: 2.2 G/DL (ref 1.9–3.5)
GLUCOSE SERPL-MCNC: 94 MG/DL (ref 65–99)
GLUCOSE UR STRIP.AUTO-MCNC: NEGATIVE MG/DL
HCT VFR BLD AUTO: 35.4 % (ref 37–47)
HGB BLD-MCNC: 11 G/DL (ref 12–16)
HYALINE CASTS #/AREA URNS LPF: NORMAL /LPF
IMM GRANULOCYTES # BLD AUTO: 0.02 K/UL (ref 0–0.11)
IMM GRANULOCYTES NFR BLD AUTO: 0.4 % (ref 0–0.9)
INR PPP: 1.44 (ref 0.87–1.13)
KETONES UR STRIP.AUTO-MCNC: NEGATIVE MG/DL
LACTATE BLD-SCNC: 2.3 MMOL/L (ref 0.5–2)
LACTATE BLD-SCNC: 2.3 MMOL/L (ref 0.5–2)
LEUKOCYTE ESTERASE UR QL STRIP.AUTO: ABNORMAL
LYMPHOCYTES # BLD AUTO: 1.7 K/UL (ref 1–4.8)
LYMPHOCYTES NFR BLD: 31.3 % (ref 22–41)
MCH RBC QN AUTO: 31.9 PG (ref 27–33)
MCHC RBC AUTO-ENTMCNC: 31.1 G/DL (ref 33.6–35)
MCV RBC AUTO: 102.6 FL (ref 81.4–97.8)
MICRO URNS: ABNORMAL
MONOCYTES # BLD AUTO: 0.72 K/UL (ref 0–0.85)
MONOCYTES NFR BLD AUTO: 13.2 % (ref 0–13.4)
NEUTROPHILS # BLD AUTO: 2.85 K/UL (ref 2–7.15)
NEUTROPHILS NFR BLD: 52.3 % (ref 44–72)
NITRITE UR QL STRIP.AUTO: NEGATIVE
NRBC # BLD AUTO: 0 K/UL
NRBC BLD-RTO: 0 /100 WBC
PH UR STRIP.AUTO: 5.5 [PH] (ref 5–8)
PLATELET # BLD AUTO: 208 K/UL (ref 164–446)
PMV BLD AUTO: 8.5 FL (ref 9–12.9)
POTASSIUM SERPL-SCNC: 3.9 MMOL/L (ref 3.6–5.5)
PROT SERPL-MCNC: 6.3 G/DL (ref 6–8.2)
PROT UR QL STRIP: NEGATIVE MG/DL
PROTHROMBIN TIME: 17.9 SEC (ref 12–14.6)
RBC # BLD AUTO: 3.45 M/UL (ref 4.2–5.4)
RBC # URNS HPF: NORMAL /HPF
RBC UR QL AUTO: NEGATIVE
SODIUM SERPL-SCNC: 142 MMOL/L (ref 135–145)
SP GR UR STRIP.AUTO: 1.01
TROPONIN T SERPL-MCNC: 17 NG/L (ref 6–19)
UROBILINOGEN UR STRIP.AUTO-MCNC: 0.2 MG/DL
WBC # BLD AUTO: 5.4 K/UL (ref 4.8–10.8)
WBC #/AREA URNS HPF: NORMAL /HPF

## 2019-09-08 PROCEDURE — 85025 COMPLETE CBC W/AUTO DIFF WBC: CPT

## 2019-09-08 PROCEDURE — 99285 EMERGENCY DEPT VISIT HI MDM: CPT

## 2019-09-08 PROCEDURE — 94760 N-INVAS EAR/PLS OXIMETRY 1: CPT

## 2019-09-08 PROCEDURE — 304561 HCHG STAT O2

## 2019-09-08 PROCEDURE — 85610 PROTHROMBIN TIME: CPT

## 2019-09-08 PROCEDURE — 87086 URINE CULTURE/COLONY COUNT: CPT

## 2019-09-08 PROCEDURE — 36415 COLL VENOUS BLD VENIPUNCTURE: CPT

## 2019-09-08 PROCEDURE — 93005 ELECTROCARDIOGRAM TRACING: CPT | Performed by: EMERGENCY MEDICINE

## 2019-09-08 PROCEDURE — 83605 ASSAY OF LACTIC ACID: CPT

## 2019-09-08 PROCEDURE — 83880 ASSAY OF NATRIURETIC PEPTIDE: CPT

## 2019-09-08 PROCEDURE — 81001 URINALYSIS AUTO W/SCOPE: CPT

## 2019-09-08 PROCEDURE — 84484 ASSAY OF TROPONIN QUANT: CPT

## 2019-09-08 PROCEDURE — 93005 ELECTROCARDIOGRAM TRACING: CPT

## 2019-09-08 PROCEDURE — 700105 HCHG RX REV CODE 258: Performed by: EMERGENCY MEDICINE

## 2019-09-08 PROCEDURE — 80053 COMPREHEN METABOLIC PANEL: CPT

## 2019-09-08 PROCEDURE — 71045 X-RAY EXAM CHEST 1 VIEW: CPT

## 2019-09-08 RX ORDER — TADALAFIL 20 MG/1
20 TABLET ORAL 2 TIMES DAILY
COMMUNITY
End: 2019-09-08

## 2019-09-08 RX ORDER — LISINOPRIL 10 MG/1
10 TABLET ORAL 2 TIMES DAILY
Status: ON HOLD | COMMUNITY
End: 2019-09-11

## 2019-09-08 RX ORDER — GABAPENTIN 400 MG/1
400 CAPSULE ORAL 2 TIMES DAILY
COMMUNITY
End: 2019-09-09

## 2019-09-08 RX ORDER — SODIUM CHLORIDE, SODIUM LACTATE, POTASSIUM CHLORIDE, AND CALCIUM CHLORIDE .6; .31; .03; .02 G/100ML; G/100ML; G/100ML; G/100ML
1000 INJECTION, SOLUTION INTRAVENOUS ONCE
Status: COMPLETED | OUTPATIENT
Start: 2019-09-08 | End: 2019-09-08

## 2019-09-08 RX ORDER — TADALAFIL 20 MG/1
40 TABLET ORAL
Status: SHIPPED | COMMUNITY
End: 2022-04-05

## 2019-09-08 RX ADMIN — SODIUM CHLORIDE, POTASSIUM CHLORIDE, SODIUM LACTATE AND CALCIUM CHLORIDE 1000 ML: 600; 310; 30; 20 INJECTION, SOLUTION INTRAVENOUS at 21:19

## 2019-09-09 ENCOUNTER — HOSPITAL ENCOUNTER (INPATIENT)
Facility: MEDICAL CENTER | Age: 74
LOS: 2 days | DRG: 309 | End: 2019-09-11
Attending: EMERGENCY MEDICINE | Admitting: HOSPITALIST
Payer: MEDICARE

## 2019-09-09 VITALS
WEIGHT: 239 LBS | HEIGHT: 67 IN | TEMPERATURE: 98.4 F | RESPIRATION RATE: 20 BRPM | HEART RATE: 94 BPM | SYSTOLIC BLOOD PRESSURE: 109 MMHG | OXYGEN SATURATION: 96 % | BODY MASS INDEX: 37.51 KG/M2 | DIASTOLIC BLOOD PRESSURE: 59 MMHG

## 2019-09-09 DIAGNOSIS — I48.21 PERMANENT ATRIAL FIBRILLATION (HCC): ICD-10-CM

## 2019-09-09 DIAGNOSIS — I48.91 ATRIAL FIBRILLATION WITH RVR (HCC): ICD-10-CM

## 2019-09-09 PROBLEM — G47.39 OTHER SLEEP APNEA: Status: ACTIVE | Noted: 2019-09-09

## 2019-09-09 PROBLEM — I27.20 PULMONARY HYPERTENSION (HCC): Status: ACTIVE | Noted: 2019-09-09

## 2019-09-09 LAB
ALBUMIN SERPL BCP-MCNC: 4 G/DL (ref 3.2–4.9)
ALBUMIN/GLOB SERPL: 1.7 G/DL
ALP SERPL-CCNC: 50 U/L (ref 30–99)
ALT SERPL-CCNC: 11 U/L (ref 2–50)
ANION GAP SERPL CALC-SCNC: 9 MMOL/L (ref 0–11.9)
AST SERPL-CCNC: 14 U/L (ref 12–45)
BASOPHILS # BLD AUTO: 0.4 % (ref 0–1.8)
BASOPHILS # BLD: 0.02 K/UL (ref 0–0.12)
BILIRUB SERPL-MCNC: 0.5 MG/DL (ref 0.1–1.5)
BUN SERPL-MCNC: 27 MG/DL (ref 8–22)
CALCIUM SERPL-MCNC: 9.9 MG/DL (ref 8.5–10.5)
CHLORIDE SERPL-SCNC: 108 MMOL/L (ref 96–112)
CO2 SERPL-SCNC: 29 MMOL/L (ref 20–33)
CREAT SERPL-MCNC: 0.74 MG/DL (ref 0.5–1.4)
EKG IMPRESSION: NORMAL
EOSINOPHIL # BLD AUTO: 0.11 K/UL (ref 0–0.51)
EOSINOPHIL NFR BLD: 2.3 % (ref 0–6.9)
ERYTHROCYTE [DISTWIDTH] IN BLOOD BY AUTOMATED COUNT: 49.1 FL (ref 35.9–50)
GLOBULIN SER CALC-MCNC: 2.4 G/DL (ref 1.9–3.5)
GLUCOSE SERPL-MCNC: 100 MG/DL (ref 65–99)
HCT VFR BLD AUTO: 35.5 % (ref 37–47)
HGB BLD-MCNC: 11.2 G/DL (ref 12–16)
IMM GRANULOCYTES # BLD AUTO: 0.01 K/UL (ref 0–0.11)
IMM GRANULOCYTES NFR BLD AUTO: 0.2 % (ref 0–0.9)
INR PPP: 1.49 (ref 0.87–1.13)
LYMPHOCYTES # BLD AUTO: 1.23 K/UL (ref 1–4.8)
LYMPHOCYTES NFR BLD: 25.6 % (ref 22–41)
MAGNESIUM SERPL-MCNC: 2.4 MG/DL (ref 1.5–2.5)
MCH RBC QN AUTO: 32.3 PG (ref 27–33)
MCHC RBC AUTO-ENTMCNC: 31.5 G/DL (ref 33.6–35)
MCV RBC AUTO: 102.3 FL (ref 81.4–97.8)
MONOCYTES # BLD AUTO: 0.65 K/UL (ref 0–0.85)
MONOCYTES NFR BLD AUTO: 13.5 % (ref 0–13.4)
NEUTROPHILS # BLD AUTO: 2.79 K/UL (ref 2–7.15)
NEUTROPHILS NFR BLD: 58 % (ref 44–72)
NRBC # BLD AUTO: 0 K/UL
NRBC BLD-RTO: 0 /100 WBC
NT-PROBNP SERPL IA-MCNC: 2428 PG/ML (ref 0–125)
PLATELET # BLD AUTO: 202 K/UL (ref 164–446)
PMV BLD AUTO: 8.9 FL (ref 9–12.9)
POTASSIUM SERPL-SCNC: 4.7 MMOL/L (ref 3.6–5.5)
PROT SERPL-MCNC: 6.4 G/DL (ref 6–8.2)
PROTHROMBIN TIME: 18.4 SEC (ref 12–14.6)
RBC # BLD AUTO: 3.47 M/UL (ref 4.2–5.4)
SODIUM SERPL-SCNC: 146 MMOL/L (ref 135–145)
TROPONIN T SERPL-MCNC: 17 NG/L (ref 6–19)
WBC # BLD AUTO: 4.8 K/UL (ref 4.8–10.8)

## 2019-09-09 PROCEDURE — 700111 HCHG RX REV CODE 636 W/ 250 OVERRIDE (IP): Performed by: EMERGENCY MEDICINE

## 2019-09-09 PROCEDURE — 93005 ELECTROCARDIOGRAM TRACING: CPT

## 2019-09-09 PROCEDURE — 80053 COMPREHEN METABOLIC PANEL: CPT

## 2019-09-09 PROCEDURE — 304561 HCHG STAT O2

## 2019-09-09 PROCEDURE — 99285 EMERGENCY DEPT VISIT HI MDM: CPT

## 2019-09-09 PROCEDURE — A9270 NON-COVERED ITEM OR SERVICE: HCPCS | Performed by: HOSPITALIST

## 2019-09-09 PROCEDURE — 700111 HCHG RX REV CODE 636 W/ 250 OVERRIDE (IP): Performed by: STUDENT IN AN ORGANIZED HEALTH CARE EDUCATION/TRAINING PROGRAM

## 2019-09-09 PROCEDURE — 770020 HCHG ROOM/CARE - TELE (206)

## 2019-09-09 PROCEDURE — 99221 1ST HOSP IP/OBS SF/LOW 40: CPT | Mod: GC | Performed by: INTERNAL MEDICINE

## 2019-09-09 PROCEDURE — 85025 COMPLETE CBC W/AUTO DIFF WBC: CPT

## 2019-09-09 PROCEDURE — 96374 THER/PROPH/DIAG INJ IV PUSH: CPT

## 2019-09-09 PROCEDURE — 93010 ELECTROCARDIOGRAM REPORT: CPT | Performed by: INTERNAL MEDICINE

## 2019-09-09 PROCEDURE — 700102 HCHG RX REV CODE 250 W/ 637 OVERRIDE(OP): Performed by: HOSPITALIST

## 2019-09-09 PROCEDURE — 84484 ASSAY OF TROPONIN QUANT: CPT

## 2019-09-09 PROCEDURE — 93005 ELECTROCARDIOGRAM TRACING: CPT | Performed by: EMERGENCY MEDICINE

## 2019-09-09 PROCEDURE — 99223 1ST HOSP IP/OBS HIGH 75: CPT | Mod: AI | Performed by: HOSPITALIST

## 2019-09-09 PROCEDURE — 83735 ASSAY OF MAGNESIUM: CPT

## 2019-09-09 PROCEDURE — 93005 ELECTROCARDIOGRAM TRACING: CPT | Performed by: STUDENT IN AN ORGANIZED HEALTH CARE EDUCATION/TRAINING PROGRAM

## 2019-09-09 PROCEDURE — 85610 PROTHROMBIN TIME: CPT

## 2019-09-09 PROCEDURE — 94660 CPAP INITIATION&MGMT: CPT

## 2019-09-09 RX ORDER — TRAMADOL HYDROCHLORIDE 50 MG/1
50-100 TABLET ORAL EVERY 6 HOURS PRN
Refills: 0 | COMMUNITY
Start: 2019-08-27 | End: 2019-09-09

## 2019-09-09 RX ORDER — TADALAFIL 20 MG/1
40 TABLET ORAL DAILY
Status: DISCONTINUED | OUTPATIENT
Start: 2019-09-09 | End: 2019-09-09

## 2019-09-09 RX ORDER — BISACODYL 10 MG
10 SUPPOSITORY, RECTAL RECTAL
Status: DISCONTINUED | OUTPATIENT
Start: 2019-09-09 | End: 2019-09-11 | Stop reason: HOSPADM

## 2019-09-09 RX ORDER — PAROXETINE 10 MG/1
20 TABLET, FILM COATED ORAL DAILY
Status: DISCONTINUED | OUTPATIENT
Start: 2019-09-10 | End: 2019-09-11 | Stop reason: HOSPADM

## 2019-09-09 RX ORDER — DILTIAZEM HYDROCHLORIDE 5 MG/ML
10 INJECTION INTRAVENOUS EVERY 4 HOURS PRN
Status: DISCONTINUED | OUTPATIENT
Start: 2019-09-09 | End: 2019-09-10

## 2019-09-09 RX ORDER — DIGOXIN 0.25 MG/ML
250 INJECTION INTRAMUSCULAR; INTRAVENOUS ONCE
Status: COMPLETED | OUTPATIENT
Start: 2019-09-09 | End: 2019-09-09

## 2019-09-09 RX ORDER — FLUTICASONE PROPIONATE 50 MCG
1 SPRAY, SUSPENSION (ML) NASAL
COMMUNITY
End: 2020-01-15

## 2019-09-09 RX ORDER — SOTALOL HYDROCHLORIDE 80 MG/1
80 TABLET ORAL 2 TIMES DAILY
Status: DISCONTINUED | OUTPATIENT
Start: 2019-09-09 | End: 2019-09-11 | Stop reason: HOSPADM

## 2019-09-09 RX ORDER — ONDANSETRON 2 MG/ML
4 INJECTION INTRAMUSCULAR; INTRAVENOUS EVERY 4 HOURS PRN
Status: DISCONTINUED | OUTPATIENT
Start: 2019-09-09 | End: 2019-09-10

## 2019-09-09 RX ORDER — AMOXICILLIN 250 MG
2 CAPSULE ORAL 2 TIMES DAILY
Status: DISCONTINUED | OUTPATIENT
Start: 2019-09-09 | End: 2019-09-11 | Stop reason: HOSPADM

## 2019-09-09 RX ORDER — PREDNISONE 10 MG/1
10 TABLET ORAL DAILY
Status: ON HOLD | COMMUNITY
End: 2019-09-11

## 2019-09-09 RX ORDER — ROSUVASTATIN CALCIUM 10 MG/1
10 TABLET, COATED ORAL EVERY EVENING
Status: DISCONTINUED | OUTPATIENT
Start: 2019-09-09 | End: 2019-09-11 | Stop reason: HOSPADM

## 2019-09-09 RX ORDER — FUROSEMIDE 40 MG/1
40 TABLET ORAL 2 TIMES DAILY
Status: DISCONTINUED | OUTPATIENT
Start: 2019-09-10 | End: 2019-09-10

## 2019-09-09 RX ORDER — ACETAMINOPHEN 325 MG/1
650 TABLET ORAL EVERY 6 HOURS PRN
Status: DISCONTINUED | OUTPATIENT
Start: 2019-09-09 | End: 2019-09-11 | Stop reason: HOSPADM

## 2019-09-09 RX ORDER — DIGOXIN 0.25 MG/ML
500 INJECTION INTRAMUSCULAR; INTRAVENOUS ONCE
Status: COMPLETED | OUTPATIENT
Start: 2019-09-09 | End: 2019-09-09

## 2019-09-09 RX ORDER — DILTIAZEM HYDROCHLORIDE 5 MG/ML
10 INJECTION INTRAVENOUS ONCE
Status: DISCONTINUED | OUTPATIENT
Start: 2019-09-09 | End: 2019-09-10

## 2019-09-09 RX ORDER — POLYETHYLENE GLYCOL 3350 17 G/17G
1 POWDER, FOR SOLUTION ORAL
Status: DISCONTINUED | OUTPATIENT
Start: 2019-09-09 | End: 2019-09-11 | Stop reason: HOSPADM

## 2019-09-09 RX ORDER — POTASSIUM CHLORIDE 20 MEQ/1
20 TABLET, EXTENDED RELEASE ORAL DAILY
Status: DISCONTINUED | OUTPATIENT
Start: 2019-09-10 | End: 2019-09-11 | Stop reason: HOSPADM

## 2019-09-09 RX ORDER — WARFARIN SODIUM 2.5 MG/1
2.5 TABLET ORAL
Status: COMPLETED | OUTPATIENT
Start: 2019-09-09 | End: 2019-09-09

## 2019-09-09 RX ORDER — DIGOXIN 0.25 MG/ML
250 INJECTION INTRAMUSCULAR; INTRAVENOUS ONCE
Status: COMPLETED | OUTPATIENT
Start: 2019-09-10 | End: 2019-09-10

## 2019-09-09 RX ORDER — LEVOTHYROXINE SODIUM 0.07 MG/1
75 TABLET ORAL
Status: DISCONTINUED | OUTPATIENT
Start: 2019-09-10 | End: 2019-09-11 | Stop reason: HOSPADM

## 2019-09-09 RX ORDER — FUROSEMIDE 40 MG/1
40 TABLET ORAL 2 TIMES DAILY
Status: DISCONTINUED | OUTPATIENT
Start: 2019-09-09 | End: 2019-09-09

## 2019-09-09 RX ORDER — FUROSEMIDE 10 MG/ML
10 INJECTION INTRAMUSCULAR; INTRAVENOUS ONCE
Status: COMPLETED | OUTPATIENT
Start: 2019-09-09 | End: 2019-09-09

## 2019-09-09 RX ORDER — ONDANSETRON 4 MG/1
4 TABLET, ORALLY DISINTEGRATING ORAL EVERY 4 HOURS PRN
Status: DISCONTINUED | OUTPATIENT
Start: 2019-09-09 | End: 2019-09-11 | Stop reason: HOSPADM

## 2019-09-09 RX ORDER — ACETAMINOPHEN 500 MG
1000 TABLET ORAL EVERY 6 HOURS PRN
Status: ON HOLD | COMMUNITY
End: 2020-10-24

## 2019-09-09 RX ORDER — AMBRISENTAN 10 MG/1
10 TABLET, FILM COATED ORAL DAILY
Status: DISCONTINUED | OUTPATIENT
Start: 2019-09-09 | End: 2019-09-09

## 2019-09-09 RX ADMIN — VITAMIN D, TAB 1000IU (100/BT) 2000 UNITS: 25 TAB at 17:54

## 2019-09-09 RX ADMIN — DILTIAZEM HYDROCHLORIDE 30 MG: 30 TABLET, FILM COATED ORAL at 17:55

## 2019-09-09 RX ADMIN — ACETAMINOPHEN 650 MG: 325 TABLET, FILM COATED ORAL at 18:25

## 2019-09-09 RX ADMIN — WARFARIN SODIUM 2.5 MG: 2.5 TABLET ORAL at 20:50

## 2019-09-09 RX ADMIN — ROSUVASTATIN CALCIUM 10 MG: 10 TABLET, FILM COATED ORAL at 17:56

## 2019-09-09 RX ADMIN — SOTALOL HYDROCHLORIDE 80 MG: 80 TABLET ORAL at 17:55

## 2019-09-09 RX ADMIN — ENOXAPARIN SODIUM 100 MG: 100 INJECTION SUBCUTANEOUS at 18:25

## 2019-09-09 RX ADMIN — DIGOXIN 250 MCG: 0.25 INJECTION INTRAMUSCULAR; INTRAVENOUS at 20:53

## 2019-09-09 RX ADMIN — FUROSEMIDE 10 MG: 10 INJECTION, SOLUTION INTRAMUSCULAR; INTRAVENOUS at 18:26

## 2019-09-09 RX ADMIN — DIGOXIN 500 MCG: 0.25 INJECTION INTRAMUSCULAR; INTRAVENOUS at 14:07

## 2019-09-09 ASSESSMENT — CHA2DS2 SCORE
AGE 65 TO 74: YES
PRIOR STROKE OR TIA OR THROMBOEMBOLISM: NO
AGE 75 OR GREATER: NO
DIABETES: NO
CHA2DS2 VASC SCORE: 3
VASCULAR DISEASE: NO
SEX: FEMALE
HYPERTENSION: YES
CHF OR LEFT VENTRICULAR DYSFUNCTION: NO

## 2019-09-09 ASSESSMENT — COGNITIVE AND FUNCTIONAL STATUS - GENERAL
DAILY ACTIVITIY SCORE: 24
SUGGESTED CMS G CODE MODIFIER DAILY ACTIVITY: CH
MOBILITY SCORE: 23
MOVING TO AND FROM BED TO CHAIR: A LITTLE
SUGGESTED CMS G CODE MODIFIER MOBILITY: CI

## 2019-09-09 ASSESSMENT — LIFESTYLE VARIABLES
AVERAGE NUMBER OF DAYS PER WEEK YOU HAVE A DRINK CONTAINING ALCOHOL: 5
CONSUMPTION TOTAL: NEGATIVE
EVER HAD A DRINK FIRST THING IN THE MORNING TO STEADY YOUR NERVES TO GET RID OF A HANGOVER: NO
EVER FELT BAD OR GUILTY ABOUT YOUR DRINKING: NO
HAVE PEOPLE ANNOYED YOU BY CRITICIZING YOUR DRINKING: NO
HAVE YOU EVER FELT YOU SHOULD CUT DOWN ON YOUR DRINKING: NO
TOTAL SCORE: 0
ALCOHOL_USE: YES
HOW MANY TIMES IN THE PAST YEAR HAVE YOU HAD 5 OR MORE DRINKS IN A DAY: 0
TOTAL SCORE: 0
ON A TYPICAL DAY WHEN YOU DRINK ALCOHOL HOW MANY DRINKS DO YOU HAVE: 1
DOES PATIENT WANT TO STOP DRINKING: NO
EVER_SMOKED: NEVER
TOTAL SCORE: 0

## 2019-09-09 ASSESSMENT — PATIENT HEALTH QUESTIONNAIRE - PHQ9
SUM OF ALL RESPONSES TO PHQ9 QUESTIONS 1 AND 2: 0
2. FEELING DOWN, DEPRESSED, IRRITABLE, OR HOPELESS: NOT AT ALL
1. LITTLE INTEREST OR PLEASURE IN DOING THINGS: NOT AT ALL

## 2019-09-09 NOTE — ED NOTES
Patient for discharge. Unable to discharge at this time due to oxygen issue. Daughter going back home to get patient's oxygen.

## 2019-09-09 NOTE — ED TRIAGE NOTES
Corrina Dago  74 y.o.  female  Chief Complaint   Patient presents with   • Weakness     Brought in by germánsa from home. C/o weakness and not feeling well for the past 2 days. Denies N/V/D. Denies CP. Feeling tired. Hx of Afib.on 5 liters of oxygen 24/7 for pulmonary HTN.  Alert and oriented.

## 2019-09-09 NOTE — ED NOTES
Daughter arrived with oxygen with her. Patient discharged with instructions. Verbalized understanding.

## 2019-09-09 NOTE — ASSESSMENT & PLAN NOTE
HR is better controlled now, defer DCCV, unlikely to be successful long term  -on eliquis  Cards following  -on digoxin loading  -continue sotalol  -continue dilt 120 daily

## 2019-09-09 NOTE — ED NOTES
Med Rec completed per patient   Allergies reviewed  No ORAL antibiotics in last 14 days    Patient completed a 13 day course of Prednisone a few days ago

## 2019-09-09 NOTE — ED PROVIDER NOTES
ED Provider Note    Scribed for Rodrigo Lopez M.D. by Jong Cuellar. 9/8/2019, 8:53 PM.    Primary care provider: Ashia Tidwell M.D.  Means of arrival: EMS  History obtained from: Patient  History limited by: None    CHIEF COMPLAINT  Chief Complaint   Patient presents with   • Weakness       HPI  Zeinab Loza is a 74 y.o. female with a history of pulmonary hypertension who presents to the Emergency Department for generalized aches and malaise onset this morning. Patient states that she has felt especially unwell today, and her daughter was prompted to check her vitals at around 6:45 when the patient wanted to go to bed abnormally early. Daughter reports BP was 75/53 and pulse was , and called her nursing service, [facility], who advised her to come here. There are no known alleviating or exacerbating factors. Patient denies any associated chest pain, acute shortness of breath, acute leg pain, nausea, dysuria, or hematuria. She has a medical history of Afib, heart surgery, and chronic pain secondary to arthritis. Patient follow with Dr. John, cardiology at [facility], and is scheduled to see an electrophysiologist for her Afib on Wednesday.     Review of records show that: She was admitted from 8/20-8/22 for UTI and afib. She was treated with 3 days rocephin for UTI.    REVIEW OF SYSTEMS  Pertinent positives include generalized aches and malaise. Pertinent negatives include chest pain, acute shortness of breath, acute leg pain, nausea, dysuria, or hematuria. All other systems negative.    PAST MEDICAL HISTORY   has a past medical history of Aneurysm artery, celiac (HCC) (2019), Aneurysm of right renal artery (HCC), Atrial fibrillation (HCC), Diastolic heart failure (HCC), Hypertension, essential, Pulmonary hypertension (HCC), and Warfarin anticoagulation.    SURGICAL HISTORY  patient denies any surgical history    SOCIAL HISTORY  Social History     Tobacco Use   • Smoking status:  "Never Smoker   • Smokeless tobacco: Never Used   Substance Use Topics   • Alcohol use: Yes     Alcohol/week: 4.2 oz     Types: 7 Shots of liquor per week     Comment: 1 day   • Drug use: No      Social History     Substance and Sexual Activity   Drug Use No       FAMILY HISTORY  History reviewed. No pertinent family history.    CURRENT MEDICATIONS  Current Outpatient Medications:   •  furosemide (LASIX) 40 MG Tab, Take 40 mg by mouth 2 Times a Day., Disp: , Rfl:   •  potassium chloride SA (KDUR) 20 MEQ Tab CR, Take 20 mEq by mouth every day., Disp: , Rfl:   •  warfarin (COUMADIN) 5 MG Tab, Take 5-10 mg by mouth every evening. 5 mg on Tuesday, Thursday , Sunday 7.5 mg on Monday, Wednesday, Saturday 10 mg on Friday, Disp: , Rfl:   •  therapeutic multivitamin-minerals (THERAGRAN-M) Tab, Take 1 Tab by mouth every day., Disp: , Rfl:   •  PARoxetine (PAXIL) 20 MG Tab, Take 20 mg by mouth every day., Disp: , Rfl:   •  rosuvastatin (CRESTOR) 10 MG Tab, Take 10 mg by mouth every evening., Disp: , Rfl:   •  sotalol (BETAPACE) 80 MG Tab, Take 80 mg by mouth 2 times a day., Disp: , Rfl:   •  tadalafil (CIALIS) 10 MG tablet, Take 40 mg by mouth every day. Pulmonary hypertension, Disp: , Rfl:   •  ambrisentan (LETAIRIS) 10 MG tablet, Take 10 mg by mouth every day., Disp: , Rfl:   •  levothyroxine (SYNTHROID) 75 MCG Tab, Take 75 mcg by mouth Every morning on an empty stomach., Disp: , Rfl:   •  vitamin D (CHOLECALCIFEROL) 1000 UNIT Tab, Take 1,000 Units by mouth every day., Disp: , Rfl:     ALLERGIES  No Known Allergies    PHYSICAL EXAM  VITAL SIGNS: BP (!) 97/57   Pulse (!) 106   Temp 36.9 °C (98.4 °F) (Temporal)   Resp (!) 98   Ht 1.702 m (5' 7\")   Wt 108.4 kg (239 lb)   SpO2 92%   BMI 37.43 kg/m²     Constitutional: Well developed, Well nourished, mild distress.   HENT: Normocephalic, Atraumatic, Oropharynx moist.   Eyes: Conjunctiva normal, No discharge.    Cardiovascular: Normal heart rate, Irregularly irregular " rhythm, No murmurs, equal pulses.   Pulmonary: Slightly diminished breath sounds, No respiratory distress, No wheezing, No rales, No rhonchi.  Chest: No chest wall tenderness or deformity.   Abdomen:Soft, morbidly obese, No tenderness, No masses, no rebound, no guarding.   Back: No CVA tenderness.   Musculoskeletal: No major deformities noted, No tenderness, no edema, no calf tenderness  Skin: Warm, Dry, No erythema, No rash.   Neurologic: Alert & oriented x 3, Normal motor function,  No focal deficits noted.   Psychiatric: Affect normal, Judgment normal, Mood normal.       LABS  Results for orders placed or performed during the hospital encounter of 09/08/19   CBC WITH DIFFERENTIAL   Result Value Ref Range    WBC 5.4 4.8 - 10.8 K/uL    RBC 3.45 (L) 4.20 - 5.40 M/uL    Hemoglobin 11.0 (L) 12.0 - 16.0 g/dL    Hematocrit 35.4 (L) 37.0 - 47.0 %    .6 (H) 81.4 - 97.8 fL    MCH 31.9 27.0 - 33.0 pg    MCHC 31.1 (L) 33.6 - 35.0 g/dL    RDW 48.9 35.9 - 50.0 fL    Platelet Count 208 164 - 446 K/uL    MPV 8.5 (L) 9.0 - 12.9 fL    Neutrophils-Polys 52.30 44.00 - 72.00 %    Lymphocytes 31.30 22.00 - 41.00 %    Monocytes 13.20 0.00 - 13.40 %    Eosinophils 2.40 0.00 - 6.90 %    Basophils 0.40 0.00 - 1.80 %    Immature Granulocytes 0.40 0.00 - 0.90 %    Nucleated RBC 0.00 /100 WBC    Neutrophils (Absolute) 2.85 2.00 - 7.15 K/uL    Lymphs (Absolute) 1.70 1.00 - 4.80 K/uL    Monos (Absolute) 0.72 0.00 - 0.85 K/uL    Eos (Absolute) 0.13 0.00 - 0.51 K/uL    Baso (Absolute) 0.02 0.00 - 0.12 K/uL    Immature Granulocytes (abs) 0.02 0.00 - 0.11 K/uL    NRBC (Absolute) 0.00 K/uL   COMP METABOLIC PANEL   Result Value Ref Range    Sodium 142 135 - 145 mmol/L    Potassium 3.9 3.6 - 5.5 mmol/L    Chloride 104 96 - 112 mmol/L    Co2 27 20 - 33 mmol/L    Anion Gap 11.0 0.0 - 11.9    Glucose 94 65 - 99 mg/dL    Bun 35 (H) 8 - 22 mg/dL    Creatinine 0.99 0.50 - 1.40 mg/dL    Calcium 10.0 8.5 - 10.5 mg/dL    AST(SGOT) 14 12 - 45 U/L     ALT(SGPT) 11 2 - 50 U/L    Alkaline Phosphatase 59 30 - 99 U/L    Total Bilirubin 0.2 0.1 - 1.5 mg/dL    Albumin 4.1 3.2 - 4.9 g/dL    Total Protein 6.3 6.0 - 8.2 g/dL    Globulin 2.2 1.9 - 3.5 g/dL    A-G Ratio 1.9 g/dL   LACTIC ACID   Result Value Ref Range    Lactic Acid 2.3 (H) 0.5 - 2.0 mmol/L   LACTIC ACID   Result Value Ref Range    Lactic Acid 2.3 (H) 0.5 - 2.0 mmol/L   TROPONIN   Result Value Ref Range    Troponin T 17 6 - 19 ng/L   URINALYSIS   Result Value Ref Range    Color Yellow     Character Clear     Specific Gravity 1.009 <1.035    Ph 5.5 5.0 - 8.0    Glucose Negative Negative mg/dL    Ketones Negative Negative mg/dL    Protein Negative Negative mg/dL    Bilirubin Negative Negative    Urobilinogen, Urine 0.2 Negative    Nitrite Negative Negative    Leukocyte Esterase Trace (A) Negative    Occult Blood Negative Negative    Micro Urine Req Microscopic    ESTIMATED GFR   Result Value Ref Range    GFR If African American >60 >60 mL/min/1.73 m 2    GFR If Non African American 55 (A) >60 mL/min/1.73 m 2   URINE MICROSCOPIC (W/UA)   Result Value Ref Range    WBC 2-5 /hpf    RBC 0-2 /hpf    Bacteria Negative None /hpf    Epithelial Cells Negative /hpf    Hyaline Cast 0-2 /lpf   proBrain Natriuretic Peptide, NT   Result Value Ref Range    NT-proBNP 2428 (H) 0 - 125 pg/mL   PT/INR   Result Value Ref Range    PT 17.9 (H) 12.0 - 14.6 sec    INR 1.44 (H) 0.87 - 1.13   EKG   Result Value Ref Range    Report       [facility] Emergency Dept.    Test Date:  2019  Pt Name:    NATALIE STAPLES                Department: ER  MRN:        [id number]                      Room:        27  Gender:     Female                       Technician: [id number]  :        [id number]                   Requested By:ER TRIAGE PROTOCOL  Order #:    [id number]                    Reading MD: ASHWIN DAVIS MD    Measurements  Intervals                                Axis  Rate:       110                           P:  ID:                                      QRS:        -29  QRSD:       90                           T:          32  QT:         356  QTc:        482    Interpretive Statements  ATRIAL FLUTTER, A-RATE 272  LEFT VENTRICULAR HYPERTROPHY  PROBABLE INFERIOR INFARCT, AGE INDETERMINATE  Compared to ECG 08/20/2019 13:29:07  Left ventricular hypertrophy now present  Myocardial infarct finding now present  Atrial fibrillation no longer present  T-wave abnormality no longer present    Electronic ally Signed On 9-8-2019 22:52:38 PDT by ASHWIN DAVIS MD       All labs reviewed by me.    EKG  12 Lead EKG interpreted by me as shown above.    RADIOLOGY  DX-CHEST-PORTABLE (1 VIEW)   Final Result      1.  Persistently enlarged cardiac silhouette   2.  Atherosclerosis   3.  Bibasilar and perihilar underinflation atelectasis which could obscure an additional process.        The radiologist's interpretation of all radiological studies have been reviewed by me.    COURSE & MEDICAL DECISION MAKING  Pertinent Labs & Imaging studies reviewed. (See chart for details)    8:53 PM - Patient seen and examined at bedside. Patient will be treated with Bolus. Ordered DX-chest, lactic acid, CBC with diff, CMP, is negative, urinalysis, urine culture, and EKG to evaluate her symptoms. The differential diagnoses include but are not limited to: Sepsis, electrolyte abnormality, dehydration, myocardial infarction, pneumonia    10:51 PM - Patient was reevaluated at bedside. Discussed lab and radiology results with the patient and informed them they are reassuring, but I would consult with cardiology before discharging her.    10:57 PM - Cardiology called.     11:18 PM - I discussed the patient's case and the above findings with Dr. Eng (Cardiology) who informs me that he will come evaluated the patient.    12:02 AM - Patient seen at bedside. I recommended that she follow up with Dr. Eng and her electrophysiologist, and that she was clear for  discharge at this time. Patient was understanding and agreeable to discharge.    Medical Decision Making: At this point time I think the patient most likely was dehydrated.  After 1 L of fluid her blood pressure is much improved.  At this point in time her A. fib rate is more controlled.  I think she can be discharged home to follow-up with cardiology.  Patient was given a liter of IV fluids and that feels much better.  I do not find any signs of an infectious process.    The patient will return for new or worsening symptoms and is stable at the time of discharge.    The patient is referred to a primary physician for blood pressure management, diabetic screening, and for all other preventative health concerns.    DISPOSITION:  Patient will be discharged home in stable condition.    FOLLOW UP:  Ashia Tidwell M.D.  [address] [facility] [id number] [phone]    Schedule an appointment as soon as possible for a visit in 3 days      Ramiro Eng M.D.  [address]   [address]  [facility] [id number] [phone]    Schedule an appointment as soon as possible for a visit         OUTPATIENT MEDICATIONS:  Discharge Medication List as of 9/9/2019 12:07 AM           FINAL IMPRESSION  1. Weakness    2. Paroxysmal atrial fibrillation (HCC)    3. Dehydration    4. Hx of pulmonary hypertension          Jong FONTANA (Sharon), am scribing for, and in the presence of, Rodrigo Lopez M.D.    Electronically signed by: Jong Hopkins), 9/8/2019    Rodrigo FONTANA M.D. personally performed the services described in this documentation, as scribed by Jong Cuellar in my presence, and it is both accurate and complete.    C.    The note accurately reflects work and decisions made by me.  Rodrigo Lopez  9/9/2019  2:43 AM

## 2019-09-09 NOTE — H&P
Hospital Medicine History & Physical Note    Date of Service  9/9/2019    Primary Care Physician  Ashia Tidwell M.D.    Consultants  Cardiology    Code Status  Full code    Chief Complaint  Palpitations dyspnea    History of Presenting Illness  74 y.o. female who presented 9/9/2019 with history of idiopathic pulmonary hypertension sleep apnea paroxysmal atrial fibrillation presented to the emergency room last night for evaluation of generalized malaise and fatigue was noted to be in A. fib with RVR and had low blood pressure responded to IV fluid bolus she was evaluated by Dr. Eng and discharged home.  She continued to have palpitations and increased dyspnea from her baseline when her home health physical therapist presented today she was noted to be tachycardic with a heart rate in the 130s to 140s sustained and was transferred to the emergency room.  At the time of my evaluation she is in A. fib with a heart rate of 135 sustained.  She denies any chest pain.  No orthopnea.  She has been compliant with all her medications including warfarin.  She reports that her last INR was 2.1 one week ago last night her INR was 1.4.  She was previously on Cardizem and subsequently switched to sotalol by her cardiologist  about a year ago.  She sees a pulmonology specialist at [facility] and has been compliant with her pulmonary hypertension medications.  She has a BiPAP and uses it nightly.  She is on oxygen chronically at 4 to 5 L.  She fell 3 weeks ago and broke her right clavicle.  She attributes her fall to dizziness from gabapentin which has been discontinued.  She denies any loss of consciousness or head injuries.    Review of Systems  Review of Systems   All other systems reviewed and are negative.      Past Medical History   has a past medical history of Aneurysm artery, celiac (HCC) (2019), Aneurysm of right renal artery (HCC), Atrial fibrillation (HCC), Diastolic heart failure (HCC), Hypertension,  essential, Pulmonary hypertension (HCC), and Warfarin anticoagulation.    Surgical History  Reviewed not pertinent to the presenting problem    Family History  Reviewed and not pertinent to the presenting problem    Social History   reports that she has never smoked. She has never used smokeless tobacco. She reports that she drinks about 4.2 oz of alcohol per week. She reports that she does not use drugs.    Allergies  No Known Allergies    Medications  Prior to Admission Medications   Prescriptions Last Dose Informant Patient Reported? Taking?   Cholecalciferol (VITAMIN D) 2000 units Cap 9/9/2019 at AM Patient Yes No   Sig: Take 2,000 Units by mouth every day.   PARoxetine (PAXIL) 20 MG Tab 9/9/2019 at AM Patient Yes No   Sig: Take 20 mg by mouth every day.   Tadalafil, PAH, (ADCIRCA) 20 MG Tab 9/8/2019 at PM Patient Yes No   Sig: Take 40 mg by mouth every day. Indications: Pulmonary Arterial Hypertension   acetaminophen (TYLENOL) 500 MG Tab 9/9/2019 at 1000 Patient Yes Yes   Sig: Take 500-1,000 mg by mouth every four hours as needed.   ambrisentan (LETAIRIS) 10 MG tablet 9/8/2019 at PM Patient Yes No   Sig: Take 10 mg by mouth every day.   fluticasone (FLONASE) 50 MCG/ACT nasal spray PRN at PRN Patient Yes Yes   Sig: Spray 1 Spray in nose 1 time daily as needed.   furosemide (LASIX) 40 MG Tab 9/9/2019 at AM Patient Yes No   Sig: Take 40 mg by mouth 2 Times a Day.   levothyroxine (SYNTHROID) 75 MCG Tab 9/9/2019 at AM Patient Yes No   Sig: Take 75 mcg by mouth Every morning on an empty stomach.   lisinopril (PRINIVIL) 10 MG Tab 9/9/2019 at AM Patient Yes No   Sig: Take 10 mg by mouth 2 times a day.   potassium chloride SA (KDUR) 20 MEQ Tab CR 9/9/2019 at AM Patient Yes No   Sig: Take 20 mEq by mouth every day.   predniSONE (DELTASONE) 10 MG Tab FEW DAYS AGO at COMPLETED Patient Yes Yes   Sig: Take 10 mg by mouth every day. 13 day course   rosuvastatin (CRESTOR) 10 MG Tab 9/8/2019 at PM Patient Yes No   Sig: Take 10  mg by mouth every evening.   sotalol (BETAPACE) 80 MG Tab 9/9/2019 at AM Patient Yes No   Sig: Take 80 mg by mouth 2 times a day.   therapeutic multivitamin-minerals (THERAGRAN-M) Tab 9/9/2019 at AM Patient Yes No   Sig: Take 1 Tab by mouth every day.   warfarin (COUMADIN) 5 MG Tab 9/9/2019 at AM Patient Yes No   Sig: Take 5-10 mg by mouth every evening. 10 mg Monday, Wednesday, and Friday   5 mg all other days      Facility-Administered Medications: None       Physical Exam  Temp:  [36.9 °C (98.4 °F)-37 °C (98.6 °F)] 37 °C (98.6 °F)  Pulse:  [] 85  Resp:  [13-24] 18  BP: ()/(48-91) 121/72  SpO2:  [92 %-97 %] 95 %    Physical Exam   Constitutional: She is oriented to person, place, and time. She appears well-developed and well-nourished.   HENT:   Head: Normocephalic and atraumatic.   Right Ear: External ear normal.   Left Ear: External ear normal.   Mouth/Throat: No oropharyngeal exudate.   Eyes: Conjunctivae are normal. Right eye exhibits no discharge. Left eye exhibits no discharge. No scleral icterus.   Neck: Neck supple. No JVD present. No tracheal deviation present.   Cardiovascular: Exam reveals no gallop and no friction rub.   Murmur heard.  Tachycardia  Irregularly irregular   Pulmonary/Chest: Effort normal. No stridor. No respiratory distress. She has decreased breath sounds. She has no wheezes. She has no rales. She exhibits no tenderness.   Abdominal: Soft. Bowel sounds are normal. She exhibits no distension and no mass. There is no tenderness. There is no rebound and no guarding.   Musculoskeletal: She exhibits edema. She exhibits no tenderness.   Neurological: She is alert and oriented to person, place, and time. No cranial nerve deficit. She exhibits normal muscle tone.   Skin: Skin is warm and dry. She is not diaphoretic. No cyanosis. Nails show no clubbing.   Psychiatric: She has a normal mood and affect. Her behavior is normal. Thought content normal.   Nursing note and vitals  reviewed.      Laboratory:  Recent Labs     09/08/19 2112 09/09/19  1311   WBC 5.4 4.8   RBC 3.45* 3.47*   HEMOGLOBIN 11.0* 11.2*   HEMATOCRIT 35.4* 35.5*   .6* 102.3*   MCH 31.9 32.3   MCHC 31.1* 31.5*   RDW 48.9 49.1   PLATELETCT 208 202   MPV 8.5* 8.9*     Recent Labs     09/08/19 2112 09/09/19  1311   SODIUM 142 146*   POTASSIUM 3.9 4.7   CHLORIDE 104 108   CO2 27 29   GLUCOSE 94 100*   BUN 35* 27*   CREATININE 0.99 0.74   CALCIUM 10.0 9.9     Recent Labs     09/08/19 2112 09/09/19  1311   ALTSGPT 11 11   ASTSGOT 14 14   ALKPHOSPHAT 59 50   TBILIRUBIN 0.2 0.5   GLUCOSE 94 100*     Recent Labs     09/08/19 2112 09/09/19  1311   INR 1.44* 1.49*     Recent Labs     09/08/19 2112   NTPROBNP 2428*         Recent Labs     09/08/19 2112 09/09/19  1311   TROPONINT 17 17       Urinalysis:    Recent Labs     09/08/19  2213   SPECGRAVITY 1.009   GLUCOSEUR Negative   KETONES Negative   NITRITE Negative   LEUKESTERAS Trace*   WBCURINE 2-5   RBCURINE 0-2   BACTERIA Negative   EPITHELCELL Negative        Imaging:  No orders to display         Assessment/Plan:  I anticipate this patient will require at least two midnights for appropriate medical management, necessitating inpatient admission.    * Paroxysmal atrial fibrillation (HCC)- (present on admission)  Assessment & Plan  With RVR    I will give her Cardizem 10 mg IV x1 now and start her on oral Cardizem  We will adjust her warfarin and monitor her INR  We will order as needed IV Cardizem and consider Cardizem drip if remains tachycardic  Dr. Liao from cardiology has been consulted will await for his evaluation and further recommendations  Consider switching sotalol to another beta-blocker or trial of another antiarrhythmic agent but will defer to cardiology  I reviewed her echocardiogram from August 2019 revealing mild to moderate MR EF 55% RVSP 45     Pulmonary hypertension (HCC)  Assessment & Plan  Continue Lasix ambrisentan and tadalafil  Continue  anticoagulation    Other sleep apnea  Assessment & Plan  Continue nocturnal BiPAP    Chronic anticoagulation- (present on admission)  Assessment & Plan  INR is subtherapeutic  Increase warfarin and monitor daily    Chronic respiratory failure (HCC)- (present on admission)  Assessment & Plan  Continue home oxygen    Hypothyroidism- (present on admission)  Assessment & Plan  Continue levothyroxine  Recent TSH 1 month ago was therapeutic 0.7    Essential hypertension- (present on admission)  Assessment & Plan  I will hold her lisinopril for now as we are adding Cardizem and since her blood pressure was low on presentation yesterday  Monitor blood pressure and adjust accordingly      Of care reviewed with patient and daughter at bedside and questions answered    VTE prophylaxis: warfarin SCD

## 2019-09-09 NOTE — ED NOTES
Med rec updated and complete. Allergies reviewed.  Pt denies antibiotic use in last  14 days.  All morning doses taken.  Home pharmacy [facility]

## 2019-09-09 NOTE — ASSESSMENT & PLAN NOTE
I will hold her lisinopril for now as we are adding Cardizem and since her blood pressure was low on presentation yesterday  Monitor blood pressure and adjust accordingly

## 2019-09-09 NOTE — ED PROVIDER NOTES
"ED Provider Note    CHIEF COMPLAINT  Chief Complaint   Patient presents with   • Palpitations       HPI  Zeinab Loza is a 74 y.o. female who presents with a fast heart rate again.  The patient has a history of atrial fibrillation.  However typically she is rate controlled.  She was seen last night for a low blood pressure and atrial fibrillation in the setting of \"not feeling great.\"  She was given IV fluids, and her symptoms improved.  She returns today really feeling unchanged but having found to have tachycardia.  She was sent here for further evaluation.  She denies any chest pain.  She has no change in her shortness of breath, she is maintained on nasal cannula all the time.  She denies any leg pain or swelling.  There is no other complaint.  Feels that she has had A. fib for quite a while, has been anticoagulated with Coumadin for some time.  There is no fever.  No belly pain.  No change in bowel or bladder.  There is no other complaint.  Last night it sounds as if there is some discussion about adding a rate control agent to what she is already on, sotalol.    PAST MEDICAL HISTORY  Past Medical History:   Diagnosis Date   • Aneurysm artery, celiac (HCC) 2019   • Aneurysm of right renal artery (HCC)    • Atrial fibrillation (HCC)    • Diastolic heart failure (HCC)    • Hypertension, essential    • Pulmonary hypertension (HCC)    • Warfarin anticoagulation        FAMILY HISTORY  No family history on file.    SOCIAL HISTORY  Social History     Tobacco Use   • Smoking status: Never Smoker   • Smokeless tobacco: Never Used   Substance Use Topics   • Alcohol use: Yes     Alcohol/week: 4.2 oz     Types: 7 Shots of liquor per week     Comment: 1 day   • Drug use: No         SURGICAL HISTORY  History reviewed. No pertinent surgical history.    CURRENT MEDICATIONS  Home Medications     Reviewed by Kalie Zee (Pharmacy Tech) on 09/09/19 at 1407  Med List Status: Complete   Medication Last Dose Status " "  acetaminophen (TYLENOL) 500 MG Tab 9/9/2019 Active   ambrisentan (LETAIRIS) 10 MG tablet 9/8/2019 Active   Cholecalciferol (VITAMIN D) 2000 units Cap 9/9/2019 Active   fluticasone (FLONASE) 50 MCG/ACT nasal spray PRN Active   furosemide (LASIX) 40 MG Tab 9/9/2019 Active   levothyroxine (SYNTHROID) 75 MCG Tab 9/9/2019 Active   lisinopril (PRINIVIL) 10 MG Tab 9/9/2019 Active   PARoxetine (PAXIL) 20 MG Tab 9/9/2019 Active   potassium chloride SA (KDUR) 20 MEQ Tab CR 9/9/2019 Active   predniSONE (DELTASONE) 10 MG Tab FEW DAYS AGO Active   rosuvastatin (CRESTOR) 10 MG Tab 9/8/2019 Active   sotalol (BETAPACE) 80 MG Tab 9/9/2019 Active   Tadalafil, PAH, (ADCIRCA) 20 MG Tab 9/8/2019 Active   therapeutic multivitamin-minerals (THERAGRAN-M) Tab 9/9/2019 Active   warfarin (COUMADIN) 5 MG Tab 9/9/2019 Active                I have reviewed the nurses notes and/or the list brought with the patient.    ALLERGIES  No Known Allergies    REVIEW OF SYSTEMS  See HPI for further details. Review of systems as above, otherwise all other systems are negative.     PHYSICAL EXAM  VITAL SIGNS: /70   Pulse (!) 116   Temp 37 °C (98.6 °F) (Temporal)   Resp 18   Ht 1.702 m (5' 7\")   Wt 108 kg (238 lb 1.6 oz)   SpO2 93%   BMI 37.29 kg/m²     Constitutional: Well appearing patient in no acute distress.  Not toxic, nor ill in appearance.  HENT: Mucus membranes moist.  Oropharynx is clear.  Eyes: Pupils equally round.  No scleral icterus.   Neck: Full nontender range of motion.  Lymphatic: No cervical lymphadenopathy noted.   Cardiovascular: Regular heart rate and rhythm.  No murmurs, rubs, nor gallop appreciated.   Thorax & Lungs: Chest is nontender.  Lungs are clear to auscultation with good air movement bilaterally.  No wheeze, rhonchi, nor rales.   Abdomen: Soft, with no tenderness, rebound nor guarding.  No mass, pulsatile mass, nor hepatosplenomegaly appreciated.  Skin: No purpura nor petechia noted.  Extremities/Musculoskeletal: " No sign of trauma.  Calves are nontender with no cords nor edema.  No Houston's sign.  Pulses are intact all around.   Neurologic: Alert & oriented.  Strength and sensation is intact all around.  Gait is normal.  Psychiatric: Normal affect appropriate for the clinical situation.    EKG  I interpreted this EKG myself.  This is a 12-lead study.  There is a poor baseline but the rhythm is likely atrial fibrillation with a rate of 133.  There are no ST segment nor T wave abnormalities.  Interpretation: No ST segment elevation myocardial infarction.  A. fib with RVR.    LABS  Labs Reviewed   CBC WITH DIFFERENTIAL - Abnormal; Notable for the following components:       Result Value    RBC 3.47 (*)     Hemoglobin 11.2 (*)     Hematocrit 35.5 (*)     .3 (*)     MCHC 31.5 (*)     MPV 8.9 (*)     Monocytes 13.50 (*)     All other components within normal limits   COMP METABOLIC PANEL - Abnormal; Notable for the following components:    Sodium 146 (*)     Glucose 100 (*)     Bun 27 (*)     All other components within normal limits   PROTHROMBIN TIME - Abnormal; Notable for the following components:    PT 18.4 (*)     INR 1.49 (*)     All other components within normal limits    Narrative:     Indicate which anticoagulants the patient is on:->COUMADIN   TROPONIN   ESTIMATED GFR         MEDICAL RECORD  I have reviewed patient's medical record and pertinent results are listed above.    COURSE & MEDICAL DECISION MAKING  I have reviewed any medical record information, laboratory studies and radiographic results as noted above.  This patient presents with ongoing tachycardia, what appears to be atrial fibrillation with RVR.  I do note that her INR is subtherapeutic.  I discussed the patient's case with Dr. Liao, cardiology.  We will start her on digoxin for rate control.  Put in the hospital for further work-up.  Case discussed with the [facility] hospitalist, Dr. Linares, and she is admitted.    FINAL IMPRESSION  1. Permanent  atrial fibrillation (HCC)    2. Atrial fibrillation with RVR (HCC)    3.  Subtherapeutic anticoagulation       This dictation was created using voice recognition software.    Electronically signed by: Darion Owen, 9/9/2019 3:34 PM

## 2019-09-09 NOTE — ED TRIAGE NOTES
Chief Complaint   Patient presents with   • Palpitations     Had symptoms last night and seen at this ED for same complaint.  States they have not resolved.  Denies c/p, sob, n/v, diaphoresis.  Triage process explained to patient.  Pt back to waiting room.  Pt instructed to inform RN if any changes or questions arise.

## 2019-09-10 PROBLEM — G89.29 CHRONIC BACK PAIN: Status: ACTIVE | Noted: 2019-07-06

## 2019-09-10 PROBLEM — M54.9 CHRONIC BACK PAIN: Status: ACTIVE | Noted: 2019-07-06

## 2019-09-10 LAB
ANION GAP SERPL CALC-SCNC: 7 MMOL/L (ref 0–11.9)
BACTERIA UR CULT: NORMAL
BASOPHILS # BLD AUTO: 0.4 % (ref 0–1.8)
BASOPHILS # BLD: 0.02 K/UL (ref 0–0.12)
BUN SERPL-MCNC: 29 MG/DL (ref 8–22)
CALCIUM SERPL-MCNC: 10.1 MG/DL (ref 8.5–10.5)
CHLORIDE SERPL-SCNC: 106 MMOL/L (ref 96–112)
CO2 SERPL-SCNC: 31 MMOL/L (ref 20–33)
CREAT SERPL-MCNC: 0.84 MG/DL (ref 0.5–1.4)
EKG IMPRESSION: NORMAL
EOSINOPHIL # BLD AUTO: 0.14 K/UL (ref 0–0.51)
EOSINOPHIL NFR BLD: 2.8 % (ref 0–6.9)
ERYTHROCYTE [DISTWIDTH] IN BLOOD BY AUTOMATED COUNT: 49.8 FL (ref 35.9–50)
GLUCOSE SERPL-MCNC: 151 MG/DL (ref 65–99)
HCT VFR BLD AUTO: 34.9 % (ref 37–47)
HGB BLD-MCNC: 10.7 G/DL (ref 12–16)
IMM GRANULOCYTES # BLD AUTO: 0.02 K/UL (ref 0–0.11)
IMM GRANULOCYTES NFR BLD AUTO: 0.4 % (ref 0–0.9)
INR PPP: 1.59 (ref 0.87–1.13)
LYMPHOCYTES # BLD AUTO: 1.31 K/UL (ref 1–4.8)
LYMPHOCYTES NFR BLD: 26.1 % (ref 22–41)
MCH RBC QN AUTO: 31.9 PG (ref 27–33)
MCHC RBC AUTO-ENTMCNC: 30.7 G/DL (ref 33.6–35)
MCV RBC AUTO: 104.2 FL (ref 81.4–97.8)
MONOCYTES # BLD AUTO: 0.61 K/UL (ref 0–0.85)
MONOCYTES NFR BLD AUTO: 12.2 % (ref 0–13.4)
NEUTROPHILS # BLD AUTO: 2.92 K/UL (ref 2–7.15)
NEUTROPHILS NFR BLD: 58.1 % (ref 44–72)
NRBC # BLD AUTO: 0 K/UL
NRBC BLD-RTO: 0 /100 WBC
PLATELET # BLD AUTO: 166 K/UL (ref 164–446)
PMV BLD AUTO: 8.7 FL (ref 9–12.9)
POTASSIUM SERPL-SCNC: 4.1 MMOL/L (ref 3.6–5.5)
PROTHROMBIN TIME: 19.4 SEC (ref 12–14.6)
RBC # BLD AUTO: 3.35 M/UL (ref 4.2–5.4)
SIGNIFICANT IND 70042: NORMAL
SITE SITE: NORMAL
SODIUM SERPL-SCNC: 144 MMOL/L (ref 135–145)
SOURCE SOURCE: NORMAL
WBC # BLD AUTO: 5 K/UL (ref 4.8–10.8)

## 2019-09-10 PROCEDURE — 80048 BASIC METABOLIC PNL TOTAL CA: CPT

## 2019-09-10 PROCEDURE — 700102 HCHG RX REV CODE 250 W/ 637 OVERRIDE(OP): Performed by: INTERNAL MEDICINE

## 2019-09-10 PROCEDURE — 99233 SBSQ HOSP IP/OBS HIGH 50: CPT | Mod: GC | Performed by: INTERNAL MEDICINE

## 2019-09-10 PROCEDURE — 700111 HCHG RX REV CODE 636 W/ 250 OVERRIDE (IP): Performed by: STUDENT IN AN ORGANIZED HEALTH CARE EDUCATION/TRAINING PROGRAM

## 2019-09-10 PROCEDURE — 700102 HCHG RX REV CODE 250 W/ 637 OVERRIDE(OP): Performed by: STUDENT IN AN ORGANIZED HEALTH CARE EDUCATION/TRAINING PROGRAM

## 2019-09-10 PROCEDURE — 700102 HCHG RX REV CODE 250 W/ 637 OVERRIDE(OP): Performed by: HOSPITALIST

## 2019-09-10 PROCEDURE — A9270 NON-COVERED ITEM OR SERVICE: HCPCS | Performed by: HOSPITALIST

## 2019-09-10 PROCEDURE — 85610 PROTHROMBIN TIME: CPT

## 2019-09-10 PROCEDURE — 85025 COMPLETE CBC W/AUTO DIFF WBC: CPT

## 2019-09-10 PROCEDURE — 94660 CPAP INITIATION&MGMT: CPT

## 2019-09-10 PROCEDURE — A9270 NON-COVERED ITEM OR SERVICE: HCPCS | Performed by: INTERNAL MEDICINE

## 2019-09-10 PROCEDURE — A9270 NON-COVERED ITEM OR SERVICE: HCPCS | Performed by: STUDENT IN AN ORGANIZED HEALTH CARE EDUCATION/TRAINING PROGRAM

## 2019-09-10 PROCEDURE — 770020 HCHG ROOM/CARE - TELE (206)

## 2019-09-10 PROCEDURE — 36415 COLL VENOUS BLD VENIPUNCTURE: CPT

## 2019-09-10 PROCEDURE — 99232 SBSQ HOSP IP/OBS MODERATE 35: CPT | Performed by: INTERNAL MEDICINE

## 2019-09-10 RX ORDER — HYDROCODONE BITARTRATE AND ACETAMINOPHEN 5; 325 MG/1; MG/1
1 TABLET ORAL
Status: DISCONTINUED | OUTPATIENT
Start: 2019-09-10 | End: 2019-09-11 | Stop reason: HOSPADM

## 2019-09-10 RX ORDER — DILTIAZEM HYDROCHLORIDE 120 MG/1
120 CAPSULE, COATED, EXTENDED RELEASE ORAL
Status: DISCONTINUED | OUTPATIENT
Start: 2019-09-11 | End: 2019-09-11 | Stop reason: HOSPADM

## 2019-09-10 RX ORDER — WARFARIN SODIUM 7.5 MG/1
7.5 TABLET ORAL
Status: DISCONTINUED | OUTPATIENT
Start: 2019-09-12 | End: 2019-09-10

## 2019-09-10 RX ORDER — ACETAMINOPHEN 500 MG
1000 TABLET ORAL ONCE
Status: COMPLETED | OUTPATIENT
Start: 2019-09-10 | End: 2019-09-10

## 2019-09-10 RX ORDER — TADALAFIL 20 MG/1
40 TABLET ORAL DAILY
Status: DISCONTINUED | OUTPATIENT
Start: 2019-09-10 | End: 2019-09-11 | Stop reason: HOSPADM

## 2019-09-10 RX ORDER — AMBRISENTAN 10 MG/1
10 TABLET, FILM COATED ORAL DAILY
Status: DISCONTINUED | OUTPATIENT
Start: 2019-09-10 | End: 2019-09-11 | Stop reason: HOSPADM

## 2019-09-10 RX ORDER — WARFARIN SODIUM 10 MG/1
10 TABLET ORAL
Status: DISCONTINUED | OUTPATIENT
Start: 2019-09-10 | End: 2019-09-10

## 2019-09-10 RX ORDER — FUROSEMIDE 40 MG/1
40 TABLET ORAL 2 TIMES DAILY
Status: DISCONTINUED | OUTPATIENT
Start: 2019-09-11 | End: 2019-09-11 | Stop reason: HOSPADM

## 2019-09-10 RX ORDER — FUROSEMIDE 10 MG/ML
40 INJECTION INTRAMUSCULAR; INTRAVENOUS ONCE
Status: COMPLETED | OUTPATIENT
Start: 2019-09-10 | End: 2019-09-10

## 2019-09-10 RX ORDER — DIGOXIN 125 MCG
125 TABLET ORAL DAILY
Status: DISCONTINUED | OUTPATIENT
Start: 2019-09-10 | End: 2019-09-11 | Stop reason: HOSPADM

## 2019-09-10 RX ORDER — DILTIAZEM HYDROCHLORIDE 120 MG/1
120 CAPSULE, COATED, EXTENDED RELEASE ORAL
Status: DISCONTINUED | OUTPATIENT
Start: 2019-09-10 | End: 2019-09-10

## 2019-09-10 RX ORDER — TRAZODONE HYDROCHLORIDE 50 MG/1
25 TABLET ORAL
Status: DISCONTINUED | OUTPATIENT
Start: 2019-09-10 | End: 2019-09-11 | Stop reason: HOSPADM

## 2019-09-10 RX ADMIN — VITAMIN D, TAB 1000IU (100/BT) 2000 UNITS: 25 TAB at 05:01

## 2019-09-10 RX ADMIN — PAROXETINE HYDROCHLORIDE 20 MG: 10 TABLET, FILM COATED ORAL at 05:01

## 2019-09-10 RX ADMIN — SOTALOL HYDROCHLORIDE 80 MG: 80 TABLET ORAL at 05:01

## 2019-09-10 RX ADMIN — ENOXAPARIN SODIUM 100 MG: 100 INJECTION SUBCUTANEOUS at 05:00

## 2019-09-10 RX ADMIN — ACETAMINOPHEN 650 MG: 325 TABLET, FILM COATED ORAL at 14:36

## 2019-09-10 RX ADMIN — LEVOTHYROXINE SODIUM 75 MCG: 75 TABLET ORAL at 05:01

## 2019-09-10 RX ADMIN — TADALAFIL 40 MG: 20 TABLET ORAL at 14:45

## 2019-09-10 RX ADMIN — ACETAMINOPHEN 650 MG: 325 TABLET, FILM COATED ORAL at 21:13

## 2019-09-10 RX ADMIN — ACETAMINOPHEN 1000 MG: 500 TABLET ORAL at 01:08

## 2019-09-10 RX ADMIN — FUROSEMIDE 40 MG: 40 TABLET ORAL at 05:01

## 2019-09-10 RX ADMIN — POTASSIUM CHLORIDE 20 MEQ: 20 TABLET, EXTENDED RELEASE ORAL at 05:01

## 2019-09-10 RX ADMIN — SENNOSIDES, DOCUSATE SODIUM 2 TABLET: 50; 8.6 TABLET, FILM COATED ORAL at 05:01

## 2019-09-10 RX ADMIN — DILTIAZEM HYDROCHLORIDE 30 MG: 30 TABLET, FILM COATED ORAL at 05:01

## 2019-09-10 RX ADMIN — DILTIAZEM HYDROCHLORIDE 30 MG: 30 TABLET, FILM COATED ORAL at 00:20

## 2019-09-10 RX ADMIN — FUROSEMIDE 40 MG: 10 INJECTION, SOLUTION INTRAMUSCULAR; INTRAVENOUS at 14:31

## 2019-09-10 RX ADMIN — SOTALOL HYDROCHLORIDE 80 MG: 80 TABLET ORAL at 17:20

## 2019-09-10 RX ADMIN — DIGOXIN 250 MCG: 0.25 INJECTION INTRAMUSCULAR; INTRAVENOUS at 09:18

## 2019-09-10 RX ADMIN — SENNOSIDES, DOCUSATE SODIUM 2 TABLET: 50; 8.6 TABLET, FILM COATED ORAL at 17:20

## 2019-09-10 RX ADMIN — DILTIAZEM HYDROCHLORIDE 30 MG: 30 TABLET, FILM COATED ORAL at 12:45

## 2019-09-10 RX ADMIN — TRAZODONE HYDROCHLORIDE 25 MG: 50 TABLET ORAL at 20:30

## 2019-09-10 RX ADMIN — AMBRISENTAN 10 MG: 10 TABLET, FILM COATED ORAL at 14:45

## 2019-09-10 RX ADMIN — ROSUVASTATIN CALCIUM 10 MG: 10 TABLET, FILM COATED ORAL at 17:20

## 2019-09-10 RX ADMIN — APIXABAN 5 MG: 5 TABLET, FILM COATED ORAL at 17:20

## 2019-09-10 RX ADMIN — DILTIAZEM HYDROCHLORIDE 30 MG: 30 TABLET, FILM COATED ORAL at 17:21

## 2019-09-10 RX ADMIN — DIGOXIN 125 MCG: 125 TABLET ORAL at 17:20

## 2019-09-10 ASSESSMENT — ENCOUNTER SYMPTOMS
PALPITATIONS: 0
CONFUSION: 0
DIARRHEA: 0
HEADACHES: 0
NAUSEA: 1
WHEEZING: 0
FEVER: 0
BACK PAIN: 1
NAUSEA: 0
SHORTNESS OF BREATH: 0
DIZZINESS: 0
LIGHT-HEADEDNESS: 0
CHILLS: 0
ABDOMINAL PAIN: 0
VOMITING: 0
COUGH: 0
CHEST TIGHTNESS: 0

## 2019-09-10 NOTE — PROGRESS NOTES
Cardiology Consult Note:  Resident:  Tello Monroe M.D.  Attending:  Dr. Liao    Date of Note:  2019   Referring MD:  Dr. Wilson    Patient ID:  Zeinab Jacome 1945   Age/Sex 74 y.o. female   MRN [id number]       Chief Complaint / Reason for Consult:  Palpitations    History of Presenting Illness:  Zeinab Loza is a 74 y.o. female with prior history of A. fib (on warfarin), with a history of 3 ablations and one cardioversion, idiopathic pulmonary hypertension (on continuous home O2 5 L), FEI (on BiPAP), mitral valve repair and possible ASD, hypertension, and hypothyroidism, was sent to the ED today by home health nurse for BP 75/53, pulse 120.  Patient reports feeling unwell over the past few days, and endorsed palpitations.  She denied recent illness, no sick contacts or recent travel.  Reports taking all medications as prescribed, no missed doses.    RE cardiology: Follows , but is in the process to switching to Dr. Eng.  Was on diltiazem, but switched to sotalol MG twice daily.    RE pulmonary hypertension: Sees specialist at [facility].  BiPAP at night.  Takes tadalafil and Lateris.     Initial Troponin: 17  Follow-up Troponin:  17  BNP: proBNP at ED visit yesterday (2019) was 2428      Review of Symptoms  GEN/CONST:   Denies fever, or significant weight change.   H/E:     Denies blurry vision or eye pain.  ENT:   Denies nasal congestion, sore throat, or ear pain.   CARDIO:  Denies chest pain, orthopnea.  Endorses palpitations and lower extremity edema  RESP: Denies shortness of breath, wheezing, or coughing.  GI:    Denies nausea, vomiting, diarrhea, constipation, or abdominal pain.   :   Denies dysuria or frequency.    HEME:  Denies easy bruising or bleeding,    ALLG:  Denies allergies, asthma, or hives.    MSK:  Denies focal weakness, joint pains, or swellings.  Recent right clavicle fracture  SKIN:  Denies rashes, lumps/bumps, or sores.   NEURO:  Denies  headache, confusion, memory loss, or paresthesias.   ENDO:  Denies heat or cold intolerance, polyuria, or polydipsia.  PSYCH:  Denies mood disorders or substance abuse.    Past Medical History:   Past Medical History:   Diagnosis Date   • Aneurysm artery, celiac (HCC) 2019   • Aneurysm of right renal artery (HCC)    • Atrial fibrillation (HCC)    • Diastolic heart failure (HCC)    • Hypertension, essential    • Pulmonary hypertension (HCC)    • Warfarin anticoagulation        Past Surgical History:  History reviewed. No pertinent surgical history.      Family History:  No family history on file.    Social History:  Social History     Socioeconomic History   • Marital status:      Spouse name: Not on file   • Number of children: Not on file   • Years of education: Not on file   • Highest education level: Not on file   Occupational History   • Not on file   Social Needs   • Financial resource strain: Not on file   • Food insecurity:     Worry: Not on file     Inability: Not on file   • Transportation needs:     Medical: Not on file     Non-medical: Not on file   Tobacco Use   • Smoking status: Never Smoker   • Smokeless tobacco: Never Used   Substance and Sexual Activity   • Alcohol use: Yes     Alcohol/week: 4.2 oz     Types: 7 Shots of liquor per week     Comment: 1 day   • Drug use: No   • Sexual activity: Not on file   Lifestyle   • Physical activity:     Days per week: Not on file     Minutes per session: Not on file   • Stress: Not on file   Relationships   • Social connections:     Talks on phone: Not on file     Gets together: Not on file     Attends Holiness service: Not on file     Active member of club or organization: Not on file     Attends meetings of clubs or organizations: Not on file     Relationship status: Not on file   • Intimate partner violence:     Fear of current or ex partner: Not on file     Emotionally abused: Not on file     Physically abused: Not on file     Forced sexual activity:  Not on file   Other Topics Concern   • Not on file   Social History Narrative   • Not on file       Medication Allergy/Sensitivities:  No Known Allergies      Current Outpatient Medications:  Home Medications     Reviewed by Amina Sommer PhT (Pharmacy Tech) on 09/09/19 at 1407  Med List Status: Complete   Medication Last Dose Status   acetaminophen (TYLENOL) 500 MG Tab 9/9/2019 Active   ambrisentan (LETAIRIS) 10 MG tablet 9/8/2019 Active   Cholecalciferol (VITAMIN D) 2000 units Cap 9/9/2019 Active   fluticasone (FLONASE) 50 MCG/ACT nasal spray PRN Active   furosemide (LASIX) 40 MG Tab 9/9/2019 Active   levothyroxine (SYNTHROID) 75 MCG Tab 9/9/2019 Active   lisinopril (PRINIVIL) 10 MG Tab 9/9/2019 Active   PARoxetine (PAXIL) 20 MG Tab 9/9/2019 Active   potassium chloride SA (KDUR) 20 MEQ Tab CR 9/9/2019 Active   predniSONE (DELTASONE) 10 MG Tab FEW DAYS AGO Active   rosuvastatin (CRESTOR) 10 MG Tab 9/8/2019 Active   sotalol (BETAPACE) 80 MG Tab 9/9/2019 Active   Tadalafil, PAH, (ADCIRCA) 20 MG Tab 9/8/2019 Active   therapeutic multivitamin-minerals (THERAGRAN-M) Tab 9/9/2019 Active   warfarin (COUMADIN) 5 MG Tab 9/9/2019 Active                Current Hospital Medications:    Current Facility-Administered Medications:   •  DILTIAZem (CARDIZEM) injection 10 mg, 10 mg, Intravenous, Once, Jaren Cotter M.D., Stopped at 09/09/19 1515  •  vitamin D (cholecalciferol) tablet 2,000 Units, 2,000 Units, Oral, DAILY, Jaren Cotter M.D.  •  [START ON 9/10/2019] levothyroxine (SYNTHROID) tablet 75 mcg, 75 mcg, Oral, AM ES, Jaren Cotter M.D.  •  [START ON 9/10/2019] PARoxetine (PAXIL) tablet 20 mg, 20 mg, Oral, DAILY, Jaren Cotter M.D.  •  [START ON 9/10/2019] potassium chloride SA (Kdur) tablet 20 mEq, 20 mEq, Oral, DAILY, Jaren Cotter M.D.  •  rosuvastatin (CRESTOR) tablet 10 mg, 10 mg, Oral, Q EVENING, Jaren Cotter M.D.  •  sotalol (BETAPACE) tablet 80 mg, 80 mg, Oral, BID, Jaren  SUSHANT Cotter M.D.  •  MD Alert...Warfarin per Pharmacy, , Other, PHARMACY TO DOSE, Jaren Cotter M.D.  •  senna-docusate (PERICOLACE or SENOKOT S) 8.6-50 MG per tablet 2 Tab, 2 Tab, Oral, BID **AND** polyethylene glycol/lytes (MIRALAX) PACKET 1 Packet, 1 Packet, Oral, QDAY PRN **AND** magnesium hydroxide (MILK OF MAGNESIA) suspension 30 mL, 30 mL, Oral, QDAY PRN **AND** bisacodyl (DULCOLAX) suppository 10 mg, 10 mg, Rectal, QDAY PRN, Jaren Cotter M.D.  •  acetaminophen (TYLENOL) tablet 650 mg, 650 mg, Oral, Q6HRS PRN, Jaren Cotter M.D.  •  ondansetron (ZOFRAN) syringe/vial injection 4 mg, 4 mg, Intravenous, Q4HRS PRN, Jaren Cotter M.D.  •  ondansetron (ZOFRAN ODT) dispertab 4 mg, 4 mg, Oral, Q4HRS PRN, Jaren Cotter M.D.  •  DILTIAZem (CARDIZEM) tablet 30 mg, 30 mg, Oral, Q6HRS, Jaren Cotter M.D.  •  DILTIAZem (CARDIZEM) injection 10 mg, 10 mg, Intravenous, Q4HRS PRN, Jaren Cotter M.D.  •  warfarin (COUMADIN) tablet 2.5 mg, 2.5 mg, Oral, COUMADIN-ONCE, Jaren Cotter M.D.  •  furosemide (LASIX) injection 10 mg, 10 mg, Intravenous, Once, Tello Monroe M.D.  •  [START ON 9/10/2019] furosemide (LASIX) tablet 40 mg, 40 mg, Oral, BID, Tello Monroe M.D.  •  digoxin (LANOXIN) injection 250 mcg, 250 mcg, Intravenous, Once, Tello Monroe M.D.  •  [START ON 9/10/2019] digoxin (LANOXIN) injection 250 mcg, 250 mcg, Intravenous, Once, Tello Monroe M.D.    Current Outpatient Medications:   •  acetaminophen (TYLENOL) 500 MG Tab, Take 500-1,000 mg by mouth every four hours as needed., Disp: , Rfl:   •  fluticasone (FLONASE) 50 MCG/ACT nasal spray, Spray 1 Spray in nose 1 time daily as needed., Disp: , Rfl:   •  predniSONE (DELTASONE) 10 MG Tab, Take 10 mg by mouth every day. 13 day course, Disp: , Rfl:   •  lisinopril (PRINIVIL) 10 MG Tab, Take 10 mg by mouth 2 times a day., Disp: , Rfl:   •  Tadalafil, PAH, (ADCIRCA) 20 MG Tab, Take 40 mg by mouth every day. Indications:  "Pulmonary Arterial Hypertension, Disp: , Rfl:   •  furosemide (LASIX) 40 MG Tab, Take 40 mg by mouth 2 Times a Day., Disp: , Rfl:   •  potassium chloride SA (KDUR) 20 MEQ Tab CR, Take 20 mEq by mouth every day., Disp: , Rfl:   •  warfarin (COUMADIN) 5 MG Tab, Take 5-10 mg by mouth every evening. 10 mg Monday, Wednesday, and Friday  5 mg all other days, Disp: , Rfl:   •  therapeutic multivitamin-minerals (THERAGRAN-M) Tab, Take 1 Tab by mouth every day., Disp: , Rfl:   •  PARoxetine (PAXIL) 20 MG Tab, Take 20 mg by mouth every day., Disp: , Rfl:   •  rosuvastatin (CRESTOR) 10 MG Tab, Take 10 mg by mouth every evening., Disp: , Rfl:   •  sotalol (BETAPACE) 80 MG Tab, Take 80 mg by mouth 2 times a day., Disp: , Rfl:   •  ambrisentan (LETAIRIS) 10 MG tablet, Take 10 mg by mouth every day., Disp: , Rfl:   •  levothyroxine (SYNTHROID) 75 MCG Tab, Take 75 mcg by mouth Every morning on an empty stomach., Disp: , Rfl:   •  Cholecalciferol (VITAMIN D) 2000 units Cap, Take 2,000 Units by mouth every day., Disp: , Rfl:         Physical Exam     Vitals:    09/09/19 1457 09/09/19 1530 09/09/19 1600 09/09/19 1630   BP:  124/70 111/73 126/66   Pulse: 85 (!) 116 (!) 109 99   Resp: 16 16 16 16   Temp:       TempSrc:       SpO2: 95% 93% 96% 93%   Weight:       Height:         Body mass index is 37.29 kg/m².  /66   Pulse 99   Temp 37 °C (98.6 °F) (Temporal)   Resp 16   Ht 1.702 m (5' 7\")   Wt 108 kg (238 lb 1.6 oz)   SpO2 93%   BMI 37.29 kg/m²    O2 therapy: Pulse Oximetry: 93 %, O2 (LPM): 4, O2 Delivery: Nasal Cannula    Physical Exam  GEN:   Alert and oriented, No apparent distress.  Sitting up in bed with oxygen nasal cannula.  Daughter present at bedside  H / E:   Normocephalic, Atraumatic.  EOMI. No scleral icterus.    ENT:   No erythema.  No exudates or discharges.     Trachea midline.  No stridor.  Supple neck.   HEART: Tachycardia.  No murmurs.  Apical impulse nondisplaced.  No heaves on palpation.    LUNGS:   " Clear to auscultation bilaterally, but limited airflow.  Decreased in both bases.  No crackles or wheezing.  No cough.  ABD/GI:  Benign. No rebound or guarding noted.  No hepatojugular reflux  EXT/MSK:  No clubbing, cyanosis.  1+ bilateral pitting edema lower extremities up to the midshin  Good general strength in all four extremities.  NEURO:  No focal weakness, bilaterally symmetrical sensation.   Gait not assessed  SKIN: Clear. No rash or suspicious skin lesions noted.  PSYCH:  Normal thought process.  Normal mood      Data Review       Records Reviewed       Lab Data Review:  Recent Results (from the past 24 hour(s))   EKG    Collection Time: 19  8:22 PM   Result Value Ref Range    Report       [facility] Emergency Dept.    Test Date:  2019  Pt Name:    NATALIE STAPLES                Department: ER  MRN:        [id number]                      Room:       [facility]  Gender:     Female                       Technician: [id number]  :        1945                   Requested By:ER TRIAGE PROTOCOL  Order #:    [id number]                    Reading MD: ASHWIN DAVIS MD    Measurements  Intervals                                Axis  Rate:       110                          P:  AZ:                                      QRS:        -29  QRSD:       90                           T:          32  QT:         356  QTc:        482    Interpretive Statements  ATRIAL FLUTTER, A-RATE 272  LEFT VENTRICULAR HYPERTROPHY  PROBABLE INFERIOR INFARCT, AGE INDETERMINATE  Compared to ECG 2019 13:29:07  Left ventricular hypertrophy now present  Myocardial infarct finding now present  Atrial fibrillation no longer present  T-wave abnormality no longer present    Electronic ally Signed On 2019 22:52:38 PDT by ASHWIN DAVIS MD     CBC WITH DIFFERENTIAL    Collection Time: 19  9:12 PM   Result Value Ref Range    WBC 5.4 4.8 - 10.8 K/uL    RBC 3.45 (L) 4.20 - 5.40 M/uL    Hemoglobin 11.0  (L) 12.0 - 16.0 g/dL    Hematocrit 35.4 (L) 37.0 - 47.0 %    .6 (H) 81.4 - 97.8 fL    MCH 31.9 27.0 - 33.0 pg    MCHC 31.1 (L) 33.6 - 35.0 g/dL    RDW 48.9 35.9 - 50.0 fL    Platelet Count 208 164 - 446 K/uL    MPV 8.5 (L) 9.0 - 12.9 fL    Neutrophils-Polys 52.30 44.00 - 72.00 %    Lymphocytes 31.30 22.00 - 41.00 %    Monocytes 13.20 0.00 - 13.40 %    Eosinophils 2.40 0.00 - 6.90 %    Basophils 0.40 0.00 - 1.80 %    Immature Granulocytes 0.40 0.00 - 0.90 %    Nucleated RBC 0.00 /100 WBC    Neutrophils (Absolute) 2.85 2.00 - 7.15 K/uL    Lymphs (Absolute) 1.70 1.00 - 4.80 K/uL    Monos (Absolute) 0.72 0.00 - 0.85 K/uL    Eos (Absolute) 0.13 0.00 - 0.51 K/uL    Baso (Absolute) 0.02 0.00 - 0.12 K/uL    Immature Granulocytes (abs) 0.02 0.00 - 0.11 K/uL    NRBC (Absolute) 0.00 K/uL   COMP METABOLIC PANEL    Collection Time: 09/08/19  9:12 PM   Result Value Ref Range    Sodium 142 135 - 145 mmol/L    Potassium 3.9 3.6 - 5.5 mmol/L    Chloride 104 96 - 112 mmol/L    Co2 27 20 - 33 mmol/L    Anion Gap 11.0 0.0 - 11.9    Glucose 94 65 - 99 mg/dL    Bun 35 (H) 8 - 22 mg/dL    Creatinine 0.99 0.50 - 1.40 mg/dL    Calcium 10.0 8.5 - 10.5 mg/dL    AST(SGOT) 14 12 - 45 U/L    ALT(SGPT) 11 2 - 50 U/L    Alkaline Phosphatase 59 30 - 99 U/L    Total Bilirubin 0.2 0.1 - 1.5 mg/dL    Albumin 4.1 3.2 - 4.9 g/dL    Total Protein 6.3 6.0 - 8.2 g/dL    Globulin 2.2 1.9 - 3.5 g/dL    A-G Ratio 1.9 g/dL   LACTIC ACID    Collection Time: 09/08/19  9:12 PM   Result Value Ref Range    Lactic Acid 2.3 (H) 0.5 - 2.0 mmol/L   TROPONIN    Collection Time: 09/08/19  9:12 PM   Result Value Ref Range    Troponin T 17 6 - 19 ng/L   ESTIMATED GFR    Collection Time: 09/08/19  9:12 PM   Result Value Ref Range    GFR If African American >60 >60 mL/min/1.73 m 2    GFR If Non African American 55 (A) >60 mL/min/1.73 m 2   proBrain Natriuretic Peptide, NT    Collection Time: 09/08/19  9:12 PM   Result Value Ref Range    NT-proBNP 2428 (H) 0 - 125  pg/mL   PT/INR    Collection Time: 19  9:12 PM   Result Value Ref Range    PT 17.9 (H) 12.0 - 14.6 sec    INR 1.44 (H) 0.87 - 1.13   URINALYSIS    Collection Time: 19 10:13 PM   Result Value Ref Range    Color Yellow     Character Clear     Specific Gravity 1.009 <1.035    Ph 5.5 5.0 - 8.0    Glucose Negative Negative mg/dL    Ketones Negative Negative mg/dL    Protein Negative Negative mg/dL    Bilirubin Negative Negative    Urobilinogen, Urine 0.2 Negative    Nitrite Negative Negative    Leukocyte Esterase Trace (A) Negative    Occult Blood Negative Negative    Micro Urine Req Microscopic    URINE CULTURE(NEW)    Collection Time: 19 10:13 PM   Result Value Ref Range    Significant Indicator NEG     Source UR     Site URINE, CLEAN CATCH     Culture Result Mixed skin matias 10-50,000 cfu/mL    URINE MICROSCOPIC (W/UA)    Collection Time: 19 10:13 PM   Result Value Ref Range    WBC 2-5 /hpf    RBC 0-2 /hpf    Bacteria Negative None /hpf    Epithelial Cells Negative /hpf    Hyaline Cast 0-2 /lpf   LACTIC ACID    Collection Time: 19 11:05 PM   Result Value Ref Range    Lactic Acid 2.3 (H) 0.5 - 2.0 mmol/L   EKG    Collection Time: 19  1:08 PM   Result Value Ref Range    Report       [facility] Emergency Dept.    Test Date:  2019  Pt Name:    NATALIE STAPLES                Department: ER  MRN:        [id number]                      Room:       [facility]  Gender:     Female                       Technician: [id number]  :        1945                   Requested By:ER TRIAGE PROTOCOL  Order #:    [id number]                    Reading MD:    Measurements  Intervals                                Axis  Rate:       133                          P:          0  LA:         152                          QRS:        -22  QRSD:       90                           T:          46  QT:         300  QTc:        447    Interpretive Statements  SINUS TACHYCARDIA  PROBABLE INFERIOR  INFARCT, AGE INDETERMINATE  CONSIDER POSTERIOR WALL INVOLVEMENT  Compared to ECG 09/08/2019 20:22:43  Atrial flutter no longer present  Left ventricular hypertrophy no longer present  Myocardial infarct finding still present     CBC with Differential    Collection Time: 09/09/19  1:11 PM   Result Value Ref Range    WBC 4.8 4.8 - 10.8 K/uL    RBC 3.47 (L) 4.20 - 5.40 M/uL    Hemoglobin 11.2 (L) 12.0 - 16.0 g/dL    Hematocrit 35.5 (L) 37.0 - 47.0 %    .3 (H) 81.4 - 97.8 fL    MCH 32.3 27.0 - 33.0 pg    MCHC 31.5 (L) 33.6 - 35.0 g/dL    RDW 49.1 35.9 - 50.0 fL    Platelet Count 202 164 - 446 K/uL    MPV 8.9 (L) 9.0 - 12.9 fL    Neutrophils-Polys 58.00 44.00 - 72.00 %    Lymphocytes 25.60 22.00 - 41.00 %    Monocytes 13.50 (H) 0.00 - 13.40 %    Eosinophils 2.30 0.00 - 6.90 %    Basophils 0.40 0.00 - 1.80 %    Immature Granulocytes 0.20 0.00 - 0.90 %    Nucleated RBC 0.00 /100 WBC    Neutrophils (Absolute) 2.79 2.00 - 7.15 K/uL    Lymphs (Absolute) 1.23 1.00 - 4.80 K/uL    Monos (Absolute) 0.65 0.00 - 0.85 K/uL    Eos (Absolute) 0.11 0.00 - 0.51 K/uL    Baso (Absolute) 0.02 0.00 - 0.12 K/uL    Immature Granulocytes (abs) 0.01 0.00 - 0.11 K/uL    NRBC (Absolute) 0.00 K/uL   Complete Metabolic Panel (CMP)    Collection Time: 09/09/19  1:11 PM   Result Value Ref Range    Sodium 146 (H) 135 - 145 mmol/L    Potassium 4.7 3.6 - 5.5 mmol/L    Chloride 108 96 - 112 mmol/L    Co2 29 20 - 33 mmol/L    Anion Gap 9.0 0.0 - 11.9    Glucose 100 (H) 65 - 99 mg/dL    Bun 27 (H) 8 - 22 mg/dL    Creatinine 0.74 0.50 - 1.40 mg/dL    Calcium 9.9 8.5 - 10.5 mg/dL    AST(SGOT) 14 12 - 45 U/L    ALT(SGPT) 11 2 - 50 U/L    Alkaline Phosphatase 50 30 - 99 U/L    Total Bilirubin 0.5 0.1 - 1.5 mg/dL    Albumin 4.0 3.2 - 4.9 g/dL    Total Protein 6.4 6.0 - 8.2 g/dL    Globulin 2.4 1.9 - 3.5 g/dL    A-G Ratio 1.7 g/dL   Troponin STAT    Collection Time: 09/09/19  1:11 PM   Result Value Ref Range    Troponin T 17 6 - 19 ng/L   Prothrombin Time     Collection Time: 09/09/19  1:11 PM   Result Value Ref Range    PT 18.4 (H) 12.0 - 14.6 sec    INR 1.49 (H) 0.87 - 1.13   ESTIMATED GFR    Collection Time: 09/09/19  1:11 PM   Result Value Ref Range    GFR If African American >60 >60 mL/min/1.73 m 2    GFR If Non African American >60 >60 mL/min/1.73 m 2   MAGNESIUM    Collection Time: 09/09/19  1:11 PM   Result Value Ref Range    Magnesium 2.4 1.5 - 2.5 mg/dL       Imaging/Procedures Review:    Imaging Review: Completed  No orders to display            EKG:   EKG  Review: Completed  QTc:447, HR: 133, atypical a flutter, nonspecific ST/T changes            Assessment & Plan     No new Assessment & Plan notes have been filed under this hospital service since the last note was generated.  Service: Cardiology    1.  Atypical flutter/long-standing atrial fibrillation  Multiple ablations and cardioversion in the past.  -Will start digoxin with loading dose 500 MCG, and 250×2, to complete 1000 MCG in 24hrs. repeat EKG for reassessment  -N.p.o. after midnight for possible TELLO, cardioversion  -Lasix 10 mg IV once for mild volume up.  Reassess in the a.m.  -Continue home diltiazem, sotalol    2.  Subtherapeutic INR  -On admission INR 1.49  -Will start lovenox and recommend transition to DOAC upon DC    3.  Pulmonary hypertension  -Continue home meds    4.  History of mitral valve repair  5.  Mild/moderate mitral regurg  6.  LVH  7.  Hypothyroidism  8.  EFI on BPAP      A computerized dictation system may have been used for this note.    Despite review, there may be some spelling or grammatical errors.    Tello Monroe M.D.  9/9/2019   Service:  Cardiology    Attending: Keshawn

## 2019-09-10 NOTE — PROGRESS NOTES
Inpatient Anticoagulation Service Note    Date: 9/10/2019    Reason for Anticoagulation: Atrial Fibrillation   Target INR: 2.0 to 3.0  [id number] VASc Score: 3  HAS-BLED Score: 2   Hemoglobin Value: (!) 10.7  Hematocrit Value: (!) 34.9    INR from last 7 days     Date/Time INR Value    09/10/19 0027  (!) 1.59    09/09/19 1311  (!) 1.49        Dose from last 7 days     Date/Time Dose (mg)    09/09/19 2300  12.5        Average Dose (mg): 10 mg PO on M,W,F and 5 mg PO all other days   Significant Interactions: Thyroid Medications, Statin, Paroxetine   Bridge Therapy: Yes  Bridge Therapy Start Date: 09/09/19  Days of Overlap Therapy: 0   INR Value Greater than 2 Prior to Discontinuation of Parenteral Anticoagulation: Yes   Reversal Agent Administered: Not Applicable    Comments: Pt on home warfarin for hx of AFIb. INR is subtherapeutic today and an enoxaparin 1mg/kg SQ Q12h bridge was initiated 9/9 by Cardiology, as patient may undergo possible HANNAH cardioversion. The patient's H/H is low but appears to be at baseline. She took her last dose of warfarin 10 mg on 9/9 in AM. No new DDIs noted.     Plan:  Will give an additional 2.5 mg PO warfarin on 9/9 PM (total daily dose for 9/9 12.5 mg) and obtain an INR with AM labs. Pharmacy will continue to follow.      Pharmacist suggested discharge dosing: TBD based on subsequent INR readings. May be able to resume aforementioned home dose of 10 mg PO on M,W,F and 5 mg PO all other days with a follow up INR ~72h after discharge.         Yancy Hernandez, PharmD, BCPS

## 2019-09-10 NOTE — PROGRESS NOTES
Has declined flu and pneumo vac. Report received from ED RN. Updated on POC.  Assumed care of patient upon arrival to unit. Patient currently A & O x 4; on 4 L O2 nasal cannula; up standby assist; without complaints of acute pain. Pt placed on monitor, monitor room notified, A-fib 88. Patient oriented to unit and to call light system. Call light within reach. Pt educated to fall risk. Fall precautions in place. Pt provided with personal grooming items. Bed locked and in lowest position. All questions answered. No other needs indicated at this time.

## 2019-09-10 NOTE — ED NOTES
How Severe Is Your Skin Lesion?: moderate Orthostatic VS done and charted,  Urine sent  Pt and family updated on POC,  Denies needs.  Will cont to monitor.    Has Your Skin Lesion Been Treated?: not been treated Is This A New Presentation, Or A Follow-Up?: Skin Lesion

## 2019-09-10 NOTE — PROGRESS NOTES
Hospital Medicine Daily Progress Note    Date of Service  9/10/2019    Chief Complaint  74 y.o. female admitted 9/9/2019 with pAF with RVR     Hospital Course    see below      Interval Problem Update  A fib-HR is well controlled. Defer DCCV at this time. Patient feels less anxious now    CBP-has been that way for 30 years. Takes 4 grams of APAP at home, is interested in seeking chronic pain doctor outpatient. No new neurological issues    Consultants/Specialty  Cards    Code Status  TELE    Disposition  FCFC    Review of Systems  Review of Systems   Constitutional: Negative for chills and fever.   Respiratory: Negative for cough and shortness of breath.    Cardiovascular: Negative for chest pain and palpitations.   Gastrointestinal: Negative for abdominal pain, nausea and vomiting.   Musculoskeletal: Positive for back pain (chronic, worsened with beds here).   Neurological: Negative for dizziness and headaches.   All other systems reviewed and are negative.       Physical Exam  Temp:  [36.2 °C (97.1 °F)-36.9 °C (98.5 °F)] 36.9 °C (98.5 °F)  Pulse:  [] 94  Resp:  [14-16] 15  BP: (109-138)/(52-81) 121/64  SpO2:  [93 %-97 %] 95 %    Physical Exam   Constitutional: She is oriented to person, place, and time. She appears well-developed and well-nourished. No distress.   HENT:   Head: Normocephalic and atraumatic.   Mouth/Throat: No oropharyngeal exudate.   Eyes: Pupils are equal, round, and reactive to light. No scleral icterus.   Neck: Normal range of motion. Neck supple.   Cardiovascular: Normal rate, normal heart sounds and intact distal pulses.   No murmur heard.  Irregular irregular   Pulmonary/Chest: Effort normal and breath sounds normal. No stridor. No respiratory distress. She has no wheezes.   Abdominal: Soft. Bowel sounds are normal. She exhibits no distension. There is no tenderness.   Musculoskeletal: Normal range of motion. She exhibits no edema or tenderness.   Neurological: She is alert and  oriented to person, place, and time. No cranial nerve deficit.   Skin: Skin is warm and dry. No rash noted.   Psychiatric: She has a normal mood and affect.   Nursing note and vitals reviewed.      Fluids  No intake or output data in the 24 hours ending 09/10/19 1440    Laboratory  Recent Labs     09/08/19 2112 09/09/19  1311 09/10/19  0027   WBC 5.4 4.8 5.0   RBC 3.45* 3.47* 3.35*   HEMOGLOBIN 11.0* 11.2* 10.7*   HEMATOCRIT 35.4* 35.5* 34.9*   .6* 102.3* 104.2*   MCH 31.9 32.3 31.9   MCHC 31.1* 31.5* 30.7*   RDW 48.9 49.1 49.8   PLATELETCT 208 202 166   MPV 8.5* 8.9* 8.7*     Recent Labs     09/08/19 2112 09/09/19  1311 09/10/19  0027   SODIUM 142 146* 144   POTASSIUM 3.9 4.7 4.1   CHLORIDE 104 108 106   CO2 27 29 31   GLUCOSE 94 100* 151*   BUN 35* 27* 29*   CREATININE 0.99 0.74 0.84   CALCIUM 10.0 9.9 10.1     Recent Labs     09/08/19 2112 09/09/19  1311 09/10/19  0027   INR 1.44* 1.49* 1.59*               Imaging  No orders to display        Assessment/Plan  * Paroxysmal atrial fibrillation (HCC)- (present on admission)  Assessment & Plan  HR is better controlled now, defer DCCV, unlikely to be successful long term  -on eliquis  Cards following  -on digoxin loading  -continue sotalol  -continue dilt 120 daily    Chronic back pain- (present on admission)  Assessment & Plan  -worsened with bedding here but no red flag symptoms  -aPAP PRN  -avoid narcotics    Pulmonary hypertension (HCC)- (present on admission)  Assessment & Plan  Continue Lasix ambrisentan and tadalafil  Continue anticoagulation    Other sleep apnea- (present on admission)  Assessment & Plan  Continue nocturnal BiPAP    Chronic anticoagulation- (present on admission)  Assessment & Plan  -on eliquis    Chronic respiratory failure (HCC)- (present on admission)  Assessment & Plan  Continue home oxygen    Hypothyroidism- (present on admission)  Assessment & Plan  Continue levothyroxine  Recent TSH 1 month ago was therapeutic 0.7    Essential  hypertension- (present on admission)  Assessment & Plan  I will hold her lisinopril for now as we are adding Cardizem and since her blood pressure was low on presentation yesterday  Monitor blood pressure and adjust accordingly       VTE prophylaxis: eliquis

## 2019-09-10 NOTE — PROGRESS NOTES
Assumed care of pt, she is alert and oriented. Discussed POC with pt and NOC RN. Discussed safety. Bed is locked in lowest position and call light/personal belongings are within reach.

## 2019-09-10 NOTE — PROGRESS NOTES
Inpatient Anticoagulation Service Note    Date: 9/10/2019    Reason for Anticoagulation: Atrial Fibrillation   Target INR: 2.0 to 3.0  [id number] VASc Score: 3  HAS-BLED Score: 2   Hemoglobin Value: (!) 10.7  Hematocrit Value: (!) 34.9    INR from last 7 days     Date/Time INR Value    09/10/19 0027  (!) 1.59    09/09/19 1311  (!) 1.49        Dose from last 7 days     Date/Time Dose (mg)    09/10/19 0840  10    09/09/19 2300  12.5        Average Dose (mg): 10 mg PO on M,W,F and 5 mg PO all other days  Significant Interactions: Statin, Thyroid Medications, Paroxetine  Bridge Therapy: Yes  Bridge Therapy Start Date: 09/09/19  Days of Overlap Therapy: 1  INR Value Greater than 2 Prior to Discontinuation of Parenteral Anticoagulation: No  Reversal Agent Administered: Not Applicable    Comments: Warfarin continued from home for afib. INR sub-therapeutic. Bridge remains in place for anticipation of HANNAH cardioversion today. Will give another dose of warfarin 10 mg today and tomorrow and monitor with an INR tomorrow AM. No new DDIs, H/H low but stable with no s/s of overt bleeding, and renal function stable. Pharmacy will continue to monitor.    Plan:  Warfarin 10 mg  Education Material Provided?: No(Chronic warfarin patient)  Pharmacist suggested discharge dosing: Warfarin 5 mg PO MWF and 10 mg AOD with close follow-up post-discharge with an INR within 48 to 72 hours.    Camille Keita, PharmD

## 2019-09-10 NOTE — PROGRESS NOTES
Cardiology follow-up:      Progress Note:  Resident:  Tello Monroe M.D.  Attending:  Keshawn  Date:  9/10/2019     Patient ID:  Zeinab Jacome 1945   Age/Sex 74 y.o. female   MRN [id number]       Reason for Consult:  Afib      Interval Problem, Daily Status Update:    This is a 74-year-old female with prior history of A. fib (on home warfarin), with history of 3 ablations one cardioversion, idiopathic pulmonary hypertension (on continuous home O2 5 L), FEI (on BiPAP), admitted 1 day ago for A. fib sustained to the 130s.    Overnight, A. fib 84-93.  No acute events.  N.p.o. anticipating TELLO then cardioversion  This morning, patient denied palpitations, chest pain, dyspnea.  She requested pain medication for chronic back pain.      Review of Symptoms  GEN/CONST:   Denies fever, or significant weight change.   H/E:     Denies blurry vision or eye pain.  ENT:   Denies nasal congestion, sore throat, or ear pain.   CARDIO:    Denies chest pain, palpitations, orthopnea, or edema.   RESP:    Denies shortness of breath, wheezing, or coughing.    GI:    Denies nausea, vomiting, diarrhea, constipation, or abdominal pain.   :   Denies dysuria or frequency.    HEME:  Denies easy bruising or bleeding,    ALLG:  Denies allergies, asthma, or hives.    MSK: Chronic back and neck pain  SKIN:  Denies rashes, lumps/bumps, or sores.   NEURO:  Denies headache, confusion, memory loss, or paresthesias.   ENDO:  Denies heat or cold intolerance, polyuria, or polydipsia.  PSYCH:  Denies mood disorders or substance abuse.        Physical Exam     Vitals:    09/10/19 0003 09/10/19 0357 09/10/19 0813 09/10/19 1224   BP: 109/60 138/81 128/59 121/64   Pulse: 95 77 67 94   Resp: 16 16 14 15   Temp: 36.4 °C (97.6 °F) 36.2 °C (97.1 °F) 36.9 °C (98.4 °F) 36.9 °C (98.5 °F)   TempSrc: Temporal Temporal Temporal Temporal   SpO2: 97% 97% 96% 95%   Weight:       Height:         Body mass index is 37.46 kg/m². Weight: 108.5 kg (239  lb 3.2 oz)  Oxygen Therapy:  Pulse Oximetry: 95 %, O2 (LPM): 5, O2 Delivery: Nasal Cannula    GEN:   Alert and oriented, No apparent distress.  Pleasant and friendly.  Milk tip.  No family members present at this time.  H / E:   Normocephalic, Atraumatic.  No scleral icterus.     ENT:   No erythema.  No exudates or discharges.     Trachea midline.  No stridor.  Supple neck.   HEART: Irregular rate.  No murmur.  No appreciatable JVD.   LUNGS:   Clear to auscultation and percussion bilaterally.  No crackles  ABD/GI:  Benign. No rebound or guarding noted.  EXT/MSK:  No clubbing, cyanosis.  Trace pedal edema  NEURO:  Alert and oriented x3.  No obvious focal deficits.  Gait not assessed  PSYCH:  Normal thought process.  Normal mood    Lab Data Review:     Recent Labs     09/08/19  2112 09/09/19  1311 09/10/19  0027   WBC 5.4 4.8 5.0   NEUTSPOLYS 52.30 58.00 58.10   LYMPHOCYTES 31.30 25.60 26.10   MONOCYTES 13.20 13.50* 12.20   EOSINOPHILS 2.40 2.30 2.80   BASOPHILS 0.40 0.40 0.40   ASTSGOT 14 14  --    ALTSGPT 11 11  --    ALKPHOSPHAT 59 50  --    TBILIRUBIN 0.2 0.5  --      Recent Labs     09/08/19  2112 09/09/19  1311 09/10/19  0027   RBC 3.45* 3.47* 3.35*   HEMOGLOBIN 11.0* 11.2* 10.7*   HEMATOCRIT 35.4* 35.5* 34.9*   PLATELETCT 208 202 166   PROTHROMBTM 17.9* 18.4* 19.4*   INR 1.44* 1.49* 1.59*       Recent Labs     09/08/19  2112 09/09/19  1311 09/10/19  0027   SODIUM 142 146* 144   POTASSIUM 3.9 4.7 4.1   CHLORIDE 104 108 106   CO2 27 29 31   BUN 35* 27* 29*   CREATININE 0.99 0.74 0.84   MAGNESIUM  --  2.4  --    CALCIUM 10.0 9.9 10.1       Recent Labs     09/08/19  2112 09/09/19  1311 09/10/19  0027   ALTSGPT 11 11  --    ASTSGOT 14 14  --    ALKPHOSPHAT 59 50  --    TBILIRUBIN 0.2 0.5  --    GLUCOSE 94 100* 151*             Assessment & Plan     1.  A. fib/atypical flutter  -Loading dose of digoxin given.  Start digoxin 125 MCG at night  -Used shared decision-making and decided to forego the HANNAH and  cardioversion, in light of previous cardioversions which have been unsuccessful  -Continue diuresis with Lasix 40 mg IV 1 time today, then back to home dose 40 mg p.o. twice daily  -Recheck BMP in the a.m.  Replete electrolytes if indicated  -Continue diltiazem.  Will switch over to extended release 120 mg daily starting tomorrow morning  -Continue home sotalol 80 mg twice daily  -Ordered digoxin level for the morning, also need to check 1 week post DC    2.  Subtherapeutic INR  -INR on admission 1.49, on home warfarin.  Added Lovenox yesterday.  -We will DC Lovenox and start the patient on Eliquis 5 mg twice daily with the first dose tonight.  -Of note patient does have a history of mitral valve repair, which is not a contraindication for NOAC    3.  Pulmonary hypertension  -Continue home tadalafil 40 mg daily, Letaris 10 mg daily    4.  Mitral valve repair (2010)  5.  Moderate mitral regurg  6.  LVH  7.  Hypothyroidism  -Continue home dose levothyroxine 75 MCG daily  8.  FEI   -Continue BiPAP  9.  Microcytic anemia  10.  Thrombocytopenia, mild  11.  Hyperglycemia  12.  Hepatic nodules  13.  Pancreatic mass  14.  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  Status post hysterectomy  17.  Chronic neck and back pain      A computerized dictation system may have been used for this note.    Despite review, there may be some spelling or grammatical errors.    Tello Monroe M.D.  9/10/2019   Service:  Cardiology   Attending: Dr. Liao

## 2019-09-10 NOTE — CARE PLAN
Problem: Communication  Goal: The ability to communicate needs accurately and effectively will improve  Outcome: PROGRESSING AS EXPECTED  Intervention: [place] patient and significant other/support system to call light to alert staff of needs  Flowsheets (Taken 9/9/2019 8250)  Oriented to:: All of the Following : Location of Bathroom, Visiting Policy, Unit Routine, Call Light and Bedside Controls, Bedside Rail Policy, Smoking Policy, Rights and Responsibilities, Bedside Report, and Patient Education Notebook  Intervention: Educate patient and significant other/support system about the plan of care, procedures, treatments, medications and allow for questions  Note:   Discussed daily POC with pt and family. Whiteboard updated.

## 2019-09-10 NOTE — PROGRESS NOTES
· 2 RN skin check complete with ABDOUL Almazan.  · Devices in place: O2 nasal cannula; tele-leads; PIV  · Skin assessed under devices: Intact and blanching  · Confirmed pressure ulcers found on: NONE  · New potential pressure ulcers noted on: NONE  · Skin Issues:  · Partial thickness wounds noted R buttock; R gluteal fold; and sacrum   ·  Wound consult placed; pictures uploaded  · The following interventions in place: Q2 prompting; moisturizer offered at bedside; barrier cream offered

## 2019-09-10 NOTE — NON-PROVIDER
Cardiology Follow Up Progress Note    Date of Service  9/10/2019    Attending Physician  Cail Elliott M.D.    Chief Complaint   Palpitations, malaise    HPI  Zeinab Loza is a 74 y.o. female admitted 9/9/2019 for paroxysmal a-fib, with rate in 120's and hypotensive. Pt has longstanding hx of a-fib, with prior ablations x3 and cardioversion x1, has been followed by Dr. John but is switching to Dr. Eng, her symptoms had been moderately controlled with sotolol prior to this event.  She has been on warfarin and reports close monitoring of her levels, but INR was subtherapeutic on presentation.     PMHx also significant for pulmonary htn, FEI, HTN, hyperthyroid.    Interim Events    No acute events overnight, tele reported a-fib with rate of 80-90, BP stable b/t 140/80 to 130/60. Pt reports she feels well this morning though does note mild nausea since yesterday PM. This morning she denies any CP, palpitations, SOB, vomiting, lightheadedness.     Review of Systems  Review of Systems   Constitutional: Negative for chills and fever.   Respiratory: Negative for cough, chest tightness, shortness of breath and wheezing.    Cardiovascular: Negative for chest pain, palpitations and leg swelling.   Gastrointestinal: Positive for nausea. Negative for abdominal pain, diarrhea and vomiting.   Neurological: Negative for light-headedness.   Psychiatric/Behavioral: Negative for confusion.       Vital signs in last 24 hours  Temp:  [36.2 °C (97.1 °F)-37 °C (98.6 °F)] 36.9 °C (98.4 °F)  Pulse:  [] 67  Resp:  [14-18] 14  BP: (109-138)/(52-91) 128/59  SpO2:  [93 %-97 %] 96 %    Physical Exam  Physical Exam   Constitutional: She is oriented to person, place, and time. She appears well-nourished. No distress.   HENT:   Head: Normocephalic and atraumatic.   Neck: Normal range of motion. No JVD present.   JVP estimated 13 cmH2O   Cardiovascular: Normal rate, normal heart sounds and intact distal pulses. Exam reveals no  gallop and no friction rub.   No murmur heard.  No LV/RV heaves.   Pulmonary/Chest: Effort normal and breath sounds normal. No respiratory distress. She has no wheezes. She has no rales.   Abdominal: There is no tenderness.   Musculoskeletal: Edema: trace, bilateral.   Neurological: She is alert and oriented to person, place, and time.   Skin: Skin is warm and dry. She is not diaphoretic.       Lab Review  Lab Results   Component Value Date/Time    WBC 5.0 09/10/2019 12:27 AM    RBC 3.35 (L) 09/10/2019 12:27 AM    HEMOGLOBIN 10.7 (L) 09/10/2019 12:27 AM    HEMATOCRIT 34.9 (L) 09/10/2019 12:27 AM    .2 (H) 09/10/2019 12:27 AM    MCH 31.9 09/10/2019 12:27 AM    MCHC 30.7 (L) 09/10/2019 12:27 AM    MPV 8.7 (L) 09/10/2019 12:27 AM      Lab Results   Component Value Date/Time    SODIUM 144 09/10/2019 12:27 AM    POTASSIUM 4.1 09/10/2019 12:27 AM    CHLORIDE 106 09/10/2019 12:27 AM    CO2 31 09/10/2019 12:27 AM    GLUCOSE 151 (H) 09/10/2019 12:27 AM    BUN 29 (H) 09/10/2019 12:27 AM    CREATININE 0.84 09/10/2019 12:27 AM      Lab Results   Component Value Date/Time    ASTSGOT 14 09/09/2019 01:11 PM    ALTSGPT 11 09/09/2019 01:11 PM     Lab Results   Component Value Date/Time    CHOLSTRLTOT 154 07/30/2019 10:03 AM    LDL 81 07/30/2019 10:03 AM    HDL 51 07/30/2019 10:03 AM    TRIGLYCERIDE 110 07/30/2019 10:03 AM    TROPONINT 17 09/09/2019 01:11 PM       Recent Labs     09/08/19  2112   NTPROBNP 2428*        Ref. Range 9/8/2019 23:05 9/9/2019 13:08 9/9/2019 13:11 9/9/2019 17:40 9/10/2019 00:27   INR Latest Ref Range: 0.87 - 1.13    1.49 (H)  1.59 (H)   PT Latest Ref Range: 12.0 - 14.6 sec   18.4 (H)  19.4 (H)       Cardiac Imaging and Procedures Review  EKG:    Results for orders placed or performed during the hospital encounter of 09/09/19   EKG   Result Value Ref Range    Report       [facility] Emergency Dept.    Test Date:  2019-09-09  Pt Name:    NATALIE STAPLES                Department:  ER  MRN:        [id number]                      Room:       RD 03  Gender:     Female                       Technician: [id number]  :        [id number]                   Requested By:ER TRIAGE PROTOCOL  Order #:    [id number]                    Reading MD:    Measurements  Intervals                                Axis  Rate:       133                          P:          0  CT:         152                          QRS:        -22  QRSD:       90                           T:          46  QT:         300  QTc:        447    Interpretive Statements  SINUS TACHYCARDIA  PROBABLE INFERIOR INFARCT, AGE INDETERMINATE  CONSIDER POSTERIOR WALL INVOLVEMENT  Compared to ECG 2019 20:22:43  Atrial flutter no longer present  Left ventricular hypertrophy no longer present  Myocardial infarct finding still present     EKG   Result Value Ref Range    Report       [facility]    Test Date:  2019  Pt Name:    NATALIE STAPLES                Department: ER  MRN:        [id number]                      Room:       T804  Gender:     Female                       Technician: [id number]  :        [id number]                   Requested By:HANNAH DEL REAL  Order #:    [id number]                    Reading MD: Roger Tejeda MD    Measurements  Intervals                                Axis  Rate:       96                           P:  CT:                                      QRS:        -23  QRSD:       98                           T:          41  QT:         376  QTc:        476    Interpretive Statements  ATRIAL FIBRILLATION  BORDERLINE LEFT AXIS DEVIATION  Compared to ECG 2019 13:08:05  AF IS NOW PRESENT  Electronically Signed On 9- 5:50:47 PDT by Roger Tejeda MD               Assessment/Plan  Service: Cardiology     1.  Atypical flutter/long-standing atrial fibrillation  Multiple ablations and cardioversion in the past, pt reports she is feeling well on digoxin and diltiazem and would prefer to forego the HANNAH and  cardioversion if possible. We will continue to monitor her today to ensure she is symptom free on medical therapy, with plan to sign off tomorrow AM.   -Recommend Digoxin 0.125 mg PO at night, start tonight.  Also recommend f/u lab morning digoxin level in 1 wk.   - Recommend transition from Diltiazem 30 mg q6 to Diltiazem  mg qD prior to d/c.  -Continue Sotolol 80 mg BID  -Lasix 40 mg IV with K+ supplement x1 this afternoon for mild volume up, then continue home lasix (40mg BID)       2.  Subtherapeutic INR  -On admission INR 1.49, last was 1.59 today AM.  -Recommend transition from warfarin to Eliquis 5mg BID, with first dose tonight.      3.  Pulmonary hypertension  -Continue home meds     4.  History of mitral valve repair  5.  Mild/moderate mitral regurg  6.  LVH  7.  Hypothyroidism  8.  FEI on BPAP    Cardiology will continue to follow this patient.     Thank you for allowing me to participate in the care of this patient.      Please contact me with any questions.    Robert Moser, Student   Cardiologist, [facility]  [phone]

## 2019-09-11 ENCOUNTER — TELEPHONE (OUTPATIENT)
Dept: CARDIOLOGY | Facility: MEDICAL CENTER | Age: 74
End: 2019-09-11

## 2019-09-11 VITALS
RESPIRATION RATE: 18 BRPM | DIASTOLIC BLOOD PRESSURE: 64 MMHG | HEART RATE: 80 BPM | SYSTOLIC BLOOD PRESSURE: 120 MMHG | BODY MASS INDEX: 36.85 KG/M2 | WEIGHT: 234.79 LBS | HEIGHT: 67 IN | OXYGEN SATURATION: 96 % | TEMPERATURE: 98 F

## 2019-09-11 DIAGNOSIS — I48.0 PAROXYSMAL ATRIAL FIBRILLATION (HCC): ICD-10-CM

## 2019-09-11 LAB
ANION GAP SERPL CALC-SCNC: 7 MMOL/L (ref 0–11.9)
BUN SERPL-MCNC: 24 MG/DL (ref 8–22)
CALCIUM SERPL-MCNC: 10.3 MG/DL (ref 8.5–10.5)
CHLORIDE SERPL-SCNC: 104 MMOL/L (ref 96–112)
CO2 SERPL-SCNC: 31 MMOL/L (ref 20–33)
CREAT SERPL-MCNC: 0.95 MG/DL (ref 0.5–1.4)
EKG IMPRESSION: NORMAL
GLUCOSE SERPL-MCNC: 111 MG/DL (ref 65–99)
MAGNESIUM SERPL-MCNC: 2.2 MG/DL (ref 1.5–2.5)
POTASSIUM SERPL-SCNC: 4 MMOL/L (ref 3.6–5.5)
SODIUM SERPL-SCNC: 142 MMOL/L (ref 135–145)

## 2019-09-11 PROCEDURE — 80048 BASIC METABOLIC PNL TOTAL CA: CPT

## 2019-09-11 PROCEDURE — 90471 IMMUNIZATION ADMIN: CPT

## 2019-09-11 PROCEDURE — 99239 HOSP IP/OBS DSCHRG MGMT >30: CPT | Performed by: INTERNAL MEDICINE

## 2019-09-11 PROCEDURE — 83735 ASSAY OF MAGNESIUM: CPT

## 2019-09-11 PROCEDURE — A9270 NON-COVERED ITEM OR SERVICE: HCPCS | Performed by: STUDENT IN AN ORGANIZED HEALTH CARE EDUCATION/TRAINING PROGRAM

## 2019-09-11 PROCEDURE — 94660 CPAP INITIATION&MGMT: CPT

## 2019-09-11 PROCEDURE — 90662 IIV NO PRSV INCREASED AG IM: CPT | Performed by: INTERNAL MEDICINE

## 2019-09-11 PROCEDURE — 36415 COLL VENOUS BLD VENIPUNCTURE: CPT

## 2019-09-11 PROCEDURE — 700102 HCHG RX REV CODE 250 W/ 637 OVERRIDE(OP): Performed by: HOSPITALIST

## 2019-09-11 PROCEDURE — 700102 HCHG RX REV CODE 250 W/ 637 OVERRIDE(OP): Performed by: STUDENT IN AN ORGANIZED HEALTH CARE EDUCATION/TRAINING PROGRAM

## 2019-09-11 PROCEDURE — 93005 ELECTROCARDIOGRAM TRACING: CPT | Performed by: STUDENT IN AN ORGANIZED HEALTH CARE EDUCATION/TRAINING PROGRAM

## 2019-09-11 PROCEDURE — 700111 HCHG RX REV CODE 636 W/ 250 OVERRIDE (IP): Performed by: INTERNAL MEDICINE

## 2019-09-11 PROCEDURE — A9270 NON-COVERED ITEM OR SERVICE: HCPCS | Performed by: HOSPITALIST

## 2019-09-11 PROCEDURE — 99232 SBSQ HOSP IP/OBS MODERATE 35: CPT | Mod: GC | Performed by: INTERNAL MEDICINE

## 2019-09-11 RX ORDER — DIGOXIN 125 MCG
125 TABLET ORAL DAILY
Qty: 30 TAB | Refills: 1 | Status: SHIPPED | OUTPATIENT
Start: 2019-09-11 | End: 2019-10-04 | Stop reason: SDUPTHER

## 2019-09-11 RX ORDER — DILTIAZEM HYDROCHLORIDE 120 MG/1
120 CAPSULE, COATED, EXTENDED RELEASE ORAL DAILY
Qty: 30 CAP | Refills: 1 | Status: SHIPPED | OUTPATIENT
Start: 2019-09-12 | End: 2019-10-04 | Stop reason: SDUPTHER

## 2019-09-11 RX ADMIN — APIXABAN 5 MG: 5 TABLET, FILM COATED ORAL at 04:39

## 2019-09-11 RX ADMIN — DIGOXIN 125 MCG: 125 TABLET ORAL at 17:31

## 2019-09-11 RX ADMIN — APIXABAN 5 MG: 5 TABLET, FILM COATED ORAL at 17:31

## 2019-09-11 RX ADMIN — VITAMIN D, TAB 1000IU (100/BT) 2000 UNITS: 25 TAB at 04:39

## 2019-09-11 RX ADMIN — ACETAMINOPHEN 650 MG: 325 TABLET, FILM COATED ORAL at 04:45

## 2019-09-11 RX ADMIN — LEVOTHYROXINE SODIUM 75 MCG: 75 TABLET ORAL at 04:39

## 2019-09-11 RX ADMIN — FUROSEMIDE 40 MG: 40 TABLET ORAL at 04:40

## 2019-09-11 RX ADMIN — INFLUENZA A VIRUS A/MICHIGAN/45/2015 X-275 (H1N1) ANTIGEN (FORMALDEHYDE INACTIVATED), INFLUENZA A VIRUS A/SINGAPORE/INFIMH-16-0019/2016 IVR-186 (H3N2) ANTIGEN (FORMALDEHYDE INACTIVATED), AND INFLUENZA B VIRUS B/MARYLAND/15/2016 BX-69A (A B/COLORADO/6/2017-LIKE VIRUS) ANTIGEN (FORMALDEHYDE INACTIVATED) 0.5 ML: 60; 60; 60 INJECTION, SUSPENSION INTRAMUSCULAR at 12:44

## 2019-09-11 RX ADMIN — SOTALOL HYDROCHLORIDE 80 MG: 80 TABLET ORAL at 04:40

## 2019-09-11 RX ADMIN — PAROXETINE HYDROCHLORIDE 20 MG: 10 TABLET, FILM COATED ORAL at 04:38

## 2019-09-11 RX ADMIN — ROSUVASTATIN CALCIUM 10 MG: 10 TABLET, FILM COATED ORAL at 17:31

## 2019-09-11 RX ADMIN — FUROSEMIDE 40 MG: 40 TABLET ORAL at 17:32

## 2019-09-11 RX ADMIN — SOTALOL HYDROCHLORIDE 80 MG: 80 TABLET ORAL at 17:32

## 2019-09-11 RX ADMIN — DILTIAZEM HYDROCHLORIDE 120 MG: 120 CAPSULE, COATED, EXTENDED RELEASE ORAL at 04:40

## 2019-09-11 RX ADMIN — TADALAFIL 40 MG: 20 TABLET ORAL at 06:00

## 2019-09-11 RX ADMIN — POTASSIUM CHLORIDE 20 MEQ: 20 TABLET, EXTENDED RELEASE ORAL at 04:39

## 2019-09-11 RX ADMIN — ACETAMINOPHEN 650 MG: 325 TABLET, FILM COATED ORAL at 15:16

## 2019-09-11 RX ADMIN — AMBRISENTAN 10 MG: 10 TABLET, FILM COATED ORAL at 06:00

## 2019-09-11 RX ADMIN — SENNOSIDES, DOCUSATE SODIUM 2 TABLET: 50; 8.6 TABLET, FILM COATED ORAL at 04:38

## 2019-09-11 NOTE — DISCHARGE PLANNING
Hospital Care Management Discharge Planning       Action:   · This RN CM spoke to patient's pharmacy.  · Eliquis Co-Pay is $23.34  · BS RN and MD updated.      Thank you for allowing me the pleasure of participating in this patient's care coordination and discharge planning.

## 2019-09-11 NOTE — PROGRESS NOTES
Assumed care of patient and beside report received from ABDOUL Case. Patient aaox4 and daughter at bedside. VSS. Safety precautions in place

## 2019-09-11 NOTE — DISCHARGE SUMMARY
Discharge Summary    CHIEF COMPLAINT ON ADMISSION  Chief Complaint   Patient presents with   • Palpitations       Reason for Admission  EMS     Admission Date  9/9/2019    CODE STATUS  Full Code    HPI & HOSPITAL COURSE  This is a 74 y.o. female here with above medical issues. She was found to be in atrial fibrillation with RVR. She was admitted to the telemetry unit and cardiology was consulted. Patient had some outpatient cardiac medications stopped.She was digoxin loaded and then placed on maintenance digoxin dosing along with transition from coumadin to eliquis which she tolerated well. She was also started on diltiazem with good conversion to SR. DCCV deferred given low likelihood of sustaining SR. Patient has failed multiple ablations in the past.    Additionally patient had a mild shingles outbreak with minimal symptoms. She would benefit from outpatient valtrex treatment for this issue.    see below     Therefore, she is discharged in fair and stable condition to home with close outpatient follow-up.    The patient met 2-midnight criteria for an inpatient stay at the time of discharge.    Discharge Date  9/11/19    FOLLOW UP ITEMS POST DISCHARGE  Repeat digoxin serum level in 1 week and F/U with cards in 2 weeks    DISCHARGE DIAGNOSES  Principal Problem:    Paroxysmal atrial fibrillation (HCC) POA: Yes  Active Problems:    Chronic back pain POA: Yes    Essential hypertension POA: Yes      Overview: Overview:       IMO Load 2014 1.1    Hypothyroidism POA: Yes      Overview: Overview:       IMO Load 2014 1.1    Chronic respiratory failure (HCC) POA: Yes    Chronic anticoagulation POA: Yes    Other sleep apnea POA: Yes    Pulmonary hypertension (HCC) POA: Yes  Resolved Problems:    * No resolved hospital problems. *      FOLLOW UP  Future Appointments   Date Time Provider Department Center   10/29/2019  1:00 PM Ramiro Eng M.D. [facility] None   11/1/2019 10:00 AM NEURODIAGNOSTIC [facility] None    11/15/2019 11:00 AM KIRILL Charles [facility] None   12/5/2019 10:30 AM NEURODIAGNOSTIC LAB [facility] None   2/3/2020  1:00 PM [facility]     No follow-up provider specified.    MEDICATIONS ON DISCHARGE     Medication List      START taking these medications      Instructions   apixaban 5mg Tabs  Commonly known as:  ELIQUIS   Take 1 Tab by mouth 2 Times a Day.  Dose:  5 mg     digoxin 125 MCG Tabs  Commonly known as:  LANOXIN   Take 1 Tab by mouth every day at 6 PM.  Dose:  125 mcg     DILTIAZem  MG Cp24  Start taking on:  9/12/2019  Commonly known as:  CARDIZEM CD   Take 1 Cap by mouth every day.  Dose:  120 mg        CONTINUE taking these medications      Instructions   acetaminophen 500 MG Tabs  Commonly known as:  TYLENOL   Take 500-1,000 mg by mouth every four hours as needed.  Dose:  500-1,000 mg     ADCIRCA 20 MG Tabs  Generic drug:  Tadalafil (PAH)   Take 40 mg by mouth every day. Indications: Pulmonary Arterial Hypertension  Dose:  40 mg     ambrisentan 10 MG tablet  Commonly known as:  LETAIRIS   Take 10 mg by mouth every day.  Dose:  10 mg     fluticasone 50 MCG/ACT nasal spray  Commonly known as:  FLONASE   Spray 1 Spray in nose 1 time daily as needed.  Dose:  1 Spray     furosemide 40 MG Tabs  Commonly known as:  LASIX   Take 40 mg by mouth 2 Times a Day.  Dose:  40 mg     levothyroxine 75 MCG Tabs  Commonly known as:  SYNTHROID   Take 75 mcg by mouth Every morning on an empty stomach.  Dose:  75 mcg     PARoxetine 20 MG Tabs  Commonly known as:  PAXIL   Take 20 mg by mouth every day.  Dose:  20 mg     potassium chloride SA 20 MEQ Tbcr  Commonly known as:  Kdur   Take 20 mEq by mouth every day.  Dose:  20 mEq     rosuvastatin 10 MG Tabs  Commonly known as:  CRESTOR   Take 10 mg by mouth every evening.  Dose:  10 mg     sotalol 80 MG Tabs  Commonly known as:  BETAPACE   Take 80 mg by mouth 2 times a day.  Dose:  80 mg     therapeutic multivitamin-minerals Tabs   Take 1 Tab by mouth  every day.  Dose:  1 Tab     Vitamin D 2000 units Caps   Take 2,000 Units by mouth every day.  Dose:  2,000 Units        STOP taking these medications    lisinopril 10 MG Tabs  Commonly known as:  PRINIVIL     predniSONE 10 MG Tabs  Commonly known as:  DELTASONE     warfarin 5 MG Tabs  Commonly known as:  COUMADIN            Allergies  Allergies   Allergen Reactions   • Zolpidem Tartrate Unspecified     Lightheaded and confusion       DIET  Orders Placed This Encounter   Procedures   • Diet Order Cardiac     Standing Status:   Standing     Number of Occurrences:   1     Order Specific Question:   Diet:     Answer:   Cardiac [6]       ACTIVITY  As tolerated.  Weight bearing as tolerated    CONSULTATIONS  Cards    PROCEDURES  No orders to display         LABORATORY  Lab Results   Component Value Date    SODIUM 142 09/11/2019    POTASSIUM 4.0 09/11/2019    CHLORIDE 104 09/11/2019    CO2 31 09/11/2019    GLUCOSE 111 (H) 09/11/2019    BUN 24 (H) 09/11/2019    CREATININE 0.95 09/11/2019        Lab Results   Component Value Date    WBC 5.0 09/10/2019    HEMOGLOBIN 10.7 (L) 09/10/2019    HEMATOCRIT 34.9 (L) 09/10/2019    PLATELETCT 166 09/10/2019        Total time of the discharge process exceeds 45 minutes.

## 2019-09-11 NOTE — TELEPHONE ENCOUNTER
Julio C Liao M.D.  [facility] Schedulers Pool   Cc: Padmini Membreno R.N.             Good afternoon! Can we schedule a new patient EP appointment for Ms. Loza? Thank you!   -Julio C        Task sent to 's pool and Nicole Turner to call pt and schedule appt

## 2019-09-11 NOTE — PROGRESS NOTES
Assumed care of pt, she is alert and oriented. Discussed POC with pt and NOC RN. Discussed safety. Bed is locked in lowest position and call light/personal belongings are within reach.    Droplet precautions in place. Open blisters on buttocks. Will address with MD.

## 2019-09-11 NOTE — DISCHARGE PLANNING
Hospital Care Management Assessment    In the case of an emergency, pt's DPOA is Eder Loza (Spouse) [phone].     This RNCM spoke with pt at bedside and obtained the information used in this assessment. Pt verified accuracy of facesheet. Pt lives in a single story house with her  and their adult daughter. Pt uses the [facility] pharmacy on [address]. Prior to current hospitalization, pt was completely independent with ADLS/IADLS and utilizes a cane when necessary. Pt wears 5 liters oxygen via NC at baseline and her DME is provided by [facility]. Pt drives herself to and from her doctors appoitnments. Pt collects approximately $1700/month in social security. Pt denies hx of SA or MH. Pt reports having an incredible support system at home. Pt has filed and AD and appointed Eder as her primary POA with Vernell, her daughter, as her secondary. Pt's plan is to discharge home when medically cleared.     Upon D/C, pt states that her  Eder will provide transport home and bring her portable O2 tanks in with him for the transport.     Information Source  Orientation : Oriented x 4  Information Given By: Patient  Informant's Name: Zeinab  Who is responsible for making decisions for patient? : Patient    Readmission Evaluation  Is this a readmission?: Yes - unplanned readmission  Why do you think you were readmitted?: Atrial Fibrillation uncontrolled.  Were there new prescriptions you were supposed to fill after you were discharged?: Yes, prescriptions filled  Did you understand your discharge instructions?: Yes  Did you have enough support after your last discharge?: Yes    Elopement Risk  Legal Hold: No  Ambulatory or Self Mobile in Wheelchair: Yes  Disoriented: No  Psychiatric Symptoms: None  History of Wandering: No  Elopement this Admit: No  Vocalizing Wanting to Leave: No  Displays Behaviors, Body Language Wanting to Leave: No-Not at Risk for Elopement  Elopement Risk: Not at Risk for  Elopement    Interdisciplinary Discharge Planning  Primary Care Physician: Ashia Tidwell  Lives with - Patient's Self Care Capacity: Spouse, Adult Children  Patient or legal guardian wants to designate a caregiver (see row info): Yes  Caregiver name: Vernell Loza  Caregiver relationship to patient: daughter  Caregiver contact info: [phone]  ([facility] [place] Authorization for Release of Health Information has been completed: Yes  Support Systems: Spouse / Significant Other, Children  Housing / Facility: [address]  Do You Take your Prescribed Medications Regularly: Yes  Able to Return to Previous ADL's: Yes  Mobility Issues: Yes(Uses Cane.)  Prior Services: None  Patient Expects to be Discharged to:: Home  Assistance Needed: No  Durable Medical Equipment: Home Oxygen(5 liters continuous)  DME Provider / Phone: [facility]    Discharge Preparedness  What is your plan after discharge?: Home with help  What are your discharge supports?: Spouse, Child  Prior Functional Level: Uses Cane, Ambulatory, Drives Self, Independent with Activities of Daily Living, Independent with Medication Management  Difficulity with ADLs: None  Difficulity with IADLs: None    Functional Assesment  Prior Functional Level: Uses Cane, Ambulatory, Drives Self, Independent with Activities of Daily Living, Independent with Medication Management    Finances  Financial Barriers to Discharge: No  Prescription Coverage: Yes    Advance Directive  Advance Directive?: DPOA for Health Care  Durable Power of  Name and Contact : Eder Loza, [phone]    Domestic Abuse  Have you ever been the victim of abuse or violence?: No  Physical Abuse or Sexual Abuse: No  Verbal Abuse or Emotional Abuse: No  Possible Abuse Reported to:: Not Applicable    Psychological Assessment  History of Substance Abuse: None  History of Psychiatric Problems: No  Non-compliant with Treatment: No  Newly Diagnosed Illness: Yes    Discharge Risks or Barriers  Discharge  risks or barriers?: No    Anticipated Discharge Information  Anticipated discharge disposition: DME, Home  Discharge Address: [address] [place] [address] maureen [place], [id number]  Discharge Contact Phone Number: [phone]

## 2019-09-11 NOTE — PROGRESS NOTES
Cardiology follow-up:      Progress Note:  Resident:  Tello Monroe M.D.  Attending:  Keshawn  Date:  9/10/2019     Patient ID:  Name Zeinab Loza 1945   Age/Sex 74 y.o. female   MRN [id number]       Reason for Consult:  Afib      Interval Problem, Daily Status Update:  This is a 74-year-old female with prior history of A. fib (on home warfarin), with history of 3 ablations one cardioversion, idiopathic pulmonary hypertension (on continuous home O2 5 L), FEI (on BiPAP), admitted 19  for A. Fib with HR sustained to the 130s.    Overnight: Patient converted to SR with rate of 61-78.     This AM: Patient placed on precautions for possible zoster.  Complaints of exquisite pain right gluteal region.  Otherwise, denies palpitations, chest pain or shortness of breath.  Limited ambulation secondary to dyspnea, but reports at baseline.    Review of Symptoms  GEN/CONST:   Denies fever, or significant weight change.   H/E:     Denies blurry vision or eye pain.  ENT:   Denies nasal congestion, sore throat, or ear pain.   CARDIO:    Denies chest pain, palpitations, orthopnea, or edema.   RESP:    Denies shortness of breath, wheezing, or coughing.    GI:    Denies nausea, vomiting, diarrhea, constipation, or abdominal pain.   :   Denies dysuria or frequency.    HEME:  Denies easy bruising or bleeding,    ALLG:  Denies allergies, asthma, or hives.    MSK: Chronic back and neck pain  SKIN:  Denies rashes, lumps/bump.  Pain in gluteal region with lesions  NEURO:  Denies headache, confusion, memory loss, or paresthesias.   ENDO:  Denies heat or cold intolerance, polyuria, or polydipsia.  PSYCH:  Denies mood disorders or substance abuse.      Physical Exam     Vitals:    09/10/19 2026 09/10/19 2300 19 0000 19 0432   BP: 101/49  119/52 115/60   Pulse: 89  (!) 59 67   Resp: 16  16 17   Temp: 36.6 °C (97.9 °F)  36.8 °C (98.3 °F) 36.5 °C (97.7 °F)   TempSrc: Temporal  Temporal Temporal   SpO2: 94%  95%  97%   Weight:  106.5 kg (234 lb 12.6 oz)     Height:         Body mass index is 36.77 kg/m². Weight: 106.5 kg (234 lb 12.6 oz)  Oxygen Therapy:  Pulse Oximetry: 97 %, O2 (LPM): 4, O2 Delivery: Nasal CPAP    GEN:   Alert and oriented, No apparent distress.  Pleasant and friendly.  Milk tip.  No family members present at this time.  H / E:   Normocephalic, Atraumatic.  No scleral icterus.     ENT:   No erythema.  No exudates or discharges.     Trachea midline.  No stridor.  Supple neck.   HEART: Irregular rate.  No murmur.  No appreciatable JVD.   LUNGS:   Clear to auscultation and percussion bilaterally.  No crackles  ABD/GI:  Benign. No rebound or guarding noted.  EXT/MSK:  No clubbing, cyanosis.  Trace pedal edema  NEURO:  Alert and oriented x3.  No obvious focal deficits.  Gait not assessed  PSYCH:  Normal thought process.  Normal mood    Lab Data Review:     Recent Labs     09/08/19  2112 09/09/19  1311 09/10/19  0027   WBC 5.4 4.8 5.0   NEUTSPOLYS 52.30 58.00 58.10   LYMPHOCYTES 31.30 25.60 26.10   MONOCYTES 13.20 13.50* 12.20   EOSINOPHILS 2.40 2.30 2.80   BASOPHILS 0.40 0.40 0.40   ASTSGOT 14 14  --    ALTSGPT 11 11  --    ALKPHOSPHAT 59 50  --    TBILIRUBIN 0.2 0.5  --      Recent Labs     09/08/19  2112 09/09/19  1311 09/10/19  0027   RBC 3.45* 3.47* 3.35*   HEMOGLOBIN 11.0* 11.2* 10.7*   HEMATOCRIT 35.4* 35.5* 34.9*   PLATELETCT 208 202 166   PROTHROMBTM 17.9* 18.4* 19.4*   INR 1.44* 1.49* 1.59*       Recent Labs     09/09/19  1311 09/10/19  0027 09/11/19  0155   SODIUM 146* 144 142   POTASSIUM 4.7 4.1 4.0   CHLORIDE 108 106 104   CO2 29 31 31   BUN 27* 29* 24*   CREATININE 0.74 0.84 0.95   MAGNESIUM 2.4  --  2.2   CALCIUM 9.9 10.1 10.3       Recent Labs     09/08/19  2112 09/09/19  1311 09/10/19  0027 09/11/19  0155   ALTSGPT 11 11  --   --    ASTSGOT 14 14  --   --    ALKPHOSPHAT 59 50  --   --    TBILIRUBIN 0.2 0.5  --   --    GLUCOSE 94 100* 151* 111*             Assessment & Plan     1.  A.  fib/atypical flutter, paroxysmal  -Patient has converted to normal sinus rhythm, rate controlled  - Upon DC, continue Lasix 40 mg p.o. twice daily, start digoxin 125 MCG taken once at night, start diltiazem 125 mg daily, continue sotalol 80 mg twice daily  - 7 days post DC, need digoxin trough  -Follow-up with EP    2.  Subtherapeutic INR  -INR on admission 1.49, on home warfarin.    -New med, continue upon dc: Eliquis 5 mg twice daily   -Of note patient does have a history of mitral valve repair, which is not a contraindication for NOAC    3.  Pulmonary hypertension  -Continue home tadalafil 40 mg daily, Letaris 10 mg daily    4.  Mitral valve repair (2010)  5.  Moderate mitral regurg  6.  LVH  7.  Hypothyroidism  -Continue home dose levothyroxine 75 MCG daily  8.  FEI   -Continue BiPAP  9.  Microcytic anemia  10.  Thrombocytopenia, mild  11.  Hyperglycemia  12.  Hepatic nodules  13.  Pancreatic mass  14.  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  Status post hysterectomy  17.  Chronic neck and back pain  18.  Probable zoster      A computerized dictation system may have been used for this note.    Despite review, there may be some spelling or grammatical errors.    Tello Monroe M.D.  9/10/2019   Service:  Cardiology   Attending: Dr. Liao

## 2019-09-11 NOTE — NON-PROVIDER
Cardiology Follow Up Progress Note    Date of Service  9/11/2019    Attending Physician  Cali Elliott M.D.    Chief Complaint   Paroxysmal a-fib    HPI  Zeinab Loza is a 74 y.o. female admitted 9/9/2019 for a-fib with HR in 130's and hypotension, with prior hx of ablations and one cardioversion, on home warfarin.     PMHx significant for idiopathic pulmonary hypertension (on continuous home O2 5 L), FEI (on BiPAP).   Interim Events  Overnight there were no acute events. Tele showed afib with rate of  yesterday evening, but pt converted to SR with rate of 61-78 at 21:22 last night.  At 10am today tele showed MAR with rate in 80s.     This morning pt was resting comfortably and states she feels well and wants to go home. She does note moderate SOB with getting out of bed to go to the restroom, which is somewhat worse than her baseline. She has also noted pain and ulcers to R gluteal region and has been placed on precautions for possible zoster, + prior hx of zoster.     Review of Systems  Gen:  No fever or chills  ENT:  No congestion  CVS:  No CP/ palpitations/ orthopnea/ edema;   + mild LAST  Resp:  No SOB at rest; BS equal bilaterally, no wheezes/rales/rhonchi  GI: No n/v/d, no abd pain, no distension  MSK:  No edema  SKIN: warm and dry; multiple small painful vesicular lesions to R gluteal region  NEURO: A&Ox4    Vital signs in last 24 hours  Temp:  [36.5 °C (97.7 °F)-36.9 °C (98.5 °F)] 36.7 °C (98 °F)  Pulse:  [59-89] 80  Resp:  [15-18] 18  BP: (101-126)/(7-64) 120/64  SpO2:  [94 %-97 %] 96 %    Physical Exam  Physical Exam   Constitutional: She is oriented to person, place, and time. She appears well-developed and well-nourished. No distress.   HENT:   Head: Normocephalic.   Neck: No JVD present. No tracheal deviation present.   JVP 8 cm H2O   Cardiovascular: Normal rate, regular rhythm and normal heart sounds. Exam reveals no gallop and no friction rub.   No murmur heard.  Pulmonary/Chest:  Effort normal and breath sounds normal. No respiratory distress. She has no wheezes. She has no rales.   Abdominal: She exhibits no distension. There is no tenderness.   Musculoskeletal: She exhibits no edema.   Neurological: She is alert and oriented to person, place, and time.   Skin: Skin is warm and dry. Rash noted. She is not diaphoretic.       Lab Review  Results for NATALIE STAPLES (MRN [id number]) as of 9/11/2019 13:01   Ref. Range 9/9/2019 13:11 9/9/2019 17:40 9/10/2019 00:27 9/11/2019 01:01 9/11/2019 01:55   Sodium Latest Ref Range: 135 - 145 mmol/L 146 (H)  144  142   Potassium Latest Ref Range: 3.6 - 5.5 mmol/L 4.7  4.1  4.0   Chloride Latest Ref Range: 96 - 112 mmol/L 108  106  104   Co2 Latest Ref Range: 20 - 33 mmol/L 29  31  31   Anion Gap Latest Ref Range: 0.0 - 11.9  9.0  7.0  7.0   Glucose Latest Ref Range: 65 - 99 mg/dL 100 (H)  151 (H)  111 (H)   Bun Latest Ref Range: 8 - 22 mg/dL 27 (H)  29 (H)  24 (H)   Creatinine Latest Ref Range: 0.50 - 1.40 mg/dL 0.74  0.84  0.95   GFR If  Latest Ref Range: >60 mL/min/1.73 m 2 >60  >60  >60   GFR If Non  Latest Ref Range: >60 mL/min/1.73 m 2 >60  >60  58 (A)   Calcium Latest Ref Range: 8.5 - 10.5 mg/dL 9.9  10.1  10.3     Magnesium Latest Ref Range: 1.5 - 2.5 mg/dL 2.4    2.2     Cardiac Imaging and Procedures Review  EKG:  My personal interpretation of the EKG dated 9/11/19 0101 is sinus rhythm @60bpm with occasional PAC. SOO wnl.  Q waves in III, aVR, aVF, V1. QRS wnl.  Mild ST changes not indicative of ischemia. QTc wnl.       Assessment/Plan  Service: Cardiology  1. Paroxysmal afib/atypical fultter   - Rate is well controlled on current regimen, patient denies CP or palpitations; cardiology will sign off on pt.    - On DC, please continue the following meds:     Digoxin 125mcg PO q night    Diltiazem 125g PO qD    Sotalol 80mg PO BID    Lasix 40mg PO BID  - Schedule digoxin level check in 1wk in the AM (need trough)    - Follow up with EP for further management of a-fib    2. Subtherapeutic INR  - Pt was on home warfarin prior to admin and was subtherapeutic at INR 1.49 despite reporting she thought her INR was well controlled.  - Patient has been switched to Eliquis 5mg BID, please continue this on DC.     3. Pulmonary hypertension   - continue home medical regime (tadalafil 40mg qD, Letaris 10mg qD)   4.  Mitral valve repair (2010)  5.  Moderate mitral regurg  6.  LVH  7.  Hypothyroidism  -Continue home dose levothyroxine 75 MCG daily  8.  FEI   -Continue BiPAP  9.  Microcytic anemia  10.  Thrombocytopenia, mild  11.  Hyperglycemia  12.  Hepatic nodules  13.  Pancreatic mass  14.  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  Status post hysterectomy  17.  Chronic neck and back pain  18.  Probable zoster-  Recommend start valacyclovir 1000mg TID x 7 days.      Thank you for allowing me to participate in the care of this patient.    Cardiology will sign off on pt.     Please contact me with any questions.    Robert Moser, Student   Cardiologist, [facility]  [phone]

## 2019-09-11 NOTE — PROGRESS NOTES
Monitor summary    Afib   (F) PVC    Converted to SR at 21:22  SR 61-78  (R) PVC  0.20/0.08/0.44      12 hour CC

## 2019-09-12 RX ORDER — VALACYCLOVIR HYDROCHLORIDE 1 G/1
1000 TABLET, FILM COATED ORAL 3 TIMES DAILY
Qty: 21 TAB | Refills: 0 | Status: SHIPPED | OUTPATIENT
Start: 2019-09-12 | End: 2019-09-19

## 2019-09-12 NOTE — DISCHARGE INSTRUCTIONS
Discharge Instructions    Discharged to home by car with relative. Discharged via wheelchair, hospital escort: Yes.  Special equipment needed: Oxygen    Be sure to schedule a follow-up appointment with your primary care doctor or any specialists as instructed.     Discharge Plan:   Diet Plan: Discussed  Activity Level: Discussed  Confirmed Follow up Appointment: Appointment Scheduled  Confirmed Symptoms Management: Discussed  Medication Reconciliation Updated: Yes  Influenza Vaccine Indication: Indicated: 65 years and older  Influenza Vaccine Given - only chart on this line when given: Influenza Vaccine Given (See MAR)    I understand that a diet low in cholesterol, fat, and sodium is recommended for good health. Unless I have been given specific instructions below for another diet, I accept this instruction as my diet prescription.   Other diet: Cardiac     Special Instructions:N/a    · Is patient discharged on Warfarin / Coumadin?   No     Depression / Suicide Risk    As you are discharged from this [facility] facility, it is important to learn how to keep safe from harming yourself.    Recognize the warning signs:  · Abrupt changes in personality, positive or negative- including increase in energy   · Giving away possessions  · Change in eating patterns- significant weight changes-  positive or negative  · Change in sleeping patterns- unable to sleep or sleeping all the time   · Unwillingness or inability to communicate  · Depression  · Unusual sadness, discouragement and loneliness  · Talk of wanting to die  · Neglect of personal appearance   · Rebelliousness- reckless behavior  · Withdrawal from people/activities they love  · Confusion- inability to concentrate     If you or a loved one observes any of these behaviors or has concerns about self-harm, here's what you can do:  · Talk about it- your feelings and reasons for harming yourself  · Remove any means that you might use to hurt yourself (examples:  pills, rope, extension cords, firearm)  · Get professional help from the community (Mental Health, Substance Abuse, psychological counseling)  · Do not be alone:Call your Safe Contact- someone whom you trust who will be there for you.  · Call your local CRISIS HOTLINE 291-9171 or 084-672-5953  · Call your local Children's Mobile Crisis Response Team [place] (675) 613-7581 or www.[employer]  · Call the toll free National Suicide Prevention Hotlines   · National Suicide Prevention Lifeline 991-635-SHYP (9201)  · [employer] Hope Line Network 800-SUICIDE (556-9586)          Atrial Fibrillation  Introduction  Atrial fibrillation is a type of heartbeat that is irregular or fast (rapid). If you have this condition, your heart keeps quivering in a weird (chaotic) way. This condition can make it so your heart cannot pump blood normally. Having this condition gives a person more risk for stroke, heart failure, and other heart problems. There are different types of atrial fibrillation. Talk with your doctor to learn about the type that you have.  Follow these instructions at home:  · Take over-the-counter and prescription medicines only as told by your doctor.  · If your doctor prescribed a blood-thinning medicine, take it exactly as told. Taking too much of it can cause bleeding. If you do not take enough of it, you will not have the protection that you need against stroke and other problems.  · Do not use any tobacco products. These include cigarettes, chewing tobacco, and e-cigarettes. If you need help quitting, ask your doctor.  · If you have apnea (obstructive sleep apnea), manage it as told by your doctor.  · Do not drink alcohol.  · Do not drink beverages that have caffeine. These include coffee, soda, and tea.  · Maintain a healthy weight. Do not use diet pills unless your doctor says they are safe for you. Diet pills may make heart problems worse.  · Follow diet instructions as told by your  doctor.  · Exercise regularly as told by your doctor.  · Keep all follow-up visits as told by your doctor. This is important.  Contact a doctor if:  · You notice a change in the speed, rhythm, or strength of your heartbeat.  · You are taking a blood-thinning medicine and you notice more bruising.  · You get tired more easily when you move or exercise.  Get help right away if:  · You have pain in your chest or your belly (abdomen).  · You have sweating or weakness.  · You feel sick to your stomach (nauseous).  · You notice blood in your throw up (vomit), poop (stool), or pee (urine).  · You are short of breath.  · You suddenly have swollen feet and ankles.  · You feel dizzy.  · Your suddenly get weak or numb in your face, arms, or legs, especially if it happens on one side of your body.  · You have trouble talking, trouble understanding, or both.  · Your face or your eyelid droops on one side.  These symptoms may be an emergency. Do not wait to see if the symptoms will go away. Get medical help right away. Call your local emergency services (911 in the [place] Do not drive yourself to the hospital.   This information is not intended to replace advice given to you by your health care provider. Make sure you discuss any questions you have with your health care provider.  Document Released: 09/26/2009 Document Revised: 05/25/2017 Document Reviewed: 04/13/2016  © 2017 Elsevier      Apixaban oral tablets  What is this medicine?  APIXABAN (a PIX a ban) is an anticoagulant (blood thinner). It is used to lower the chance of stroke in people with a medical condition called atrial fibrillation. It is also used to treat or prevent blood clots in the lungs or in the veins.  This medicine may be used for other purposes; ask your health care provider or pharmacist if you have questions.  COMMON BRAND NAME(S): Eliquis  What should I tell my health care provider before I take this medicine?  They need to know if you have any of these  conditions:  -bleeding disorders  -bleeding in the brain  -blood in your stools (black or tarry stools) or if you have blood in your vomit  -history of stomach bleeding  -kidney disease  -liver disease  -mechanical heart valve  -an unusual or allergic reaction to apixaban, other medicines, foods, dyes, or preservatives  -pregnant or trying to get pregnant  -breast-feeding  How should I use this medicine?  Take this medicine by mouth with a glass of water. Follow the directions on the prescription label. You can take it with or without food. If it upsets your stomach, take it with food. Take your medicine at regular intervals. Do not take it more often than directed. Do not stop taking except on your doctor's advice. Stopping this medicine may increase your risk of a blot clot. Be sure to refill your prescription before you run out of medicine.  Talk to your pediatrician regarding the use of this medicine in children. Special care may be needed.  Overdosage: If you think you have taken too much of this medicine contact a poison control center or emergency room at once.  NOTE: This medicine is only for you. Do not share this medicine with others.  What if I miss a dose?  If you miss a dose, take it as soon as you can. If it is almost time for your next dose, take only that dose. Do not take double or extra doses.  What may interact with this medicine?  This medicine may interact with the following:  -aspirin and aspirin-like medicines  -certain medicines for fungal infections like ketoconazole and itraconazole  -certain medicines for seizures like carbamazepine and phenytoin  -certain medicines that treat or prevent blood clots like warfarin, enoxaparin, and dalteparin  -clarithromycin  -NSAIDs, medicines for pain and inflammation, like ibuprofen or naproxen  -rifampin  -ritonavir  -Hester's wort  This list may not describe all possible interactions. Give your health care provider a list of all the medicines, herbs,  non-prescription drugs, or dietary supplements you use. Also tell them if you smoke, drink alcohol, or use illegal drugs. Some items may interact with your medicine.  What should I watch for while using this medicine?  Visit your doctor or health care professional for regular checks on your progress.  Notify your doctor or health care professional and seek emergency treatment if you develop breathing problems; changes in vision; chest pain; severe, sudden headache; pain, swelling, warmth in the leg; trouble speaking; sudden numbness or weakness of the face, arm or leg. These can be signs that your condition has gotten worse.  If you are going to have surgery or other procedure, tell your doctor that you are taking this medicine.  What side effects may I notice from receiving this medicine?  Side effects that you should report to your doctor or health care professional as soon as possible:  -allergic reactions like skin rash, itching or hives, swelling of the face, lips, or tongue  -signs and symptoms of bleeding such as bloody or black, tarry stools; red or dark-brown urine; spitting up blood or brown material that looks like coffee grounds; red spots on the skin; unusual bruising or bleeding from the eye, gums, or nose  This list may not describe all possible side effects. Call your doctor for medical advice about side effects. You may report side effects to FDA at 6-480-QRB-1445.  Where should I keep my medicine?  Keep out of the reach of children.  Store at room temperature between 20 and 25 degrees C (68 and 77 degrees F). Throw away any unused medicine after the expiration date.  NOTE: This sheet is a summary. It may not cover all possible information. If you have questions about this medicine, talk to your doctor, pharmacist, or health care provider.  © 2018 Elsevier/[employer] (2017-07-10 11:54:23)        Digoxin tablets or capsules  What is this medicine?  DIGOXIN (di JOX in) is used to treat congestive  heart failure and heart rhythm problems.  This medicine may be used for other purposes; ask your health care provider or pharmacist if you have questions.  COMMON BRAND NAME(S): Digitek, Lanoxicaps, Lanoxin  What should I tell my health care provider before I take this medicine?  They need to know if you have any of these conditions:  -certain heart rhythm disorders  -heart disease or recent heart attack  -kidney or liver disease  -an unusual or allergic reaction to digoxin, other medicines, foods, dyes, or preservatives  -pregnant or trying to get pregnant  -breast-feeding  How should I use this medicine?  Take this medicine by mouth with a glass of water. Follow the directions on the prescription label. Take your doses at regular intervals. Do not take your medicine more often than directed.  Talk to your pediatrician regarding the use of this medicine in children. Special care may be needed.  Overdosage: If you think you have taken too much of this medicine contact a poison control center or emergency room at once.  NOTE: This medicine is only for you. Do not share this medicine with others.  What if I miss a dose?  If you miss a dose, take it as soon as you can. If it is almost time for your next dose, take only that dose. Do not take double or extra doses.  What may interact with this medicine?  -activated charcoal  -albuterol  -alprazolam  -antacids  -antiviral medicines for HIV or AIDS like ritonavir and saquinavir  -calcium  -certain antibiotics like azithromycin, clarithromycin, erythromycin, gentamicin, neomycin, trimethoprim, and tetracycline  -certain medicines for blood pressure, heart disease, irregular heart beat  -certain medicines for cancer  -certain medicines for cholesterol like atorvastatin, cholestyramine, and colestipol  -certain medicines for diabetes, like acarbose, exenatide, miglitol, and metformin  -certain medicines for fungal infections like ketoconazole and itraconazole  -certain  medicines for stomach problems like omeprazole, esomeprazole, lansoprazole, rabeprazole, metoclopramide, and sucralfate  -cyclosporine  -diphenoxylate  -epinephrine  -kaolin; pectin  -nefazodone  -NSAIDS, medicines for pain and inflammation, like celecoxib, ibuprofen, or naproxen  -penicillamine  -phenytoin  -propantheline  -quinine  -phenytoin  -rifampin  -succinylcholine  -Luis Enrique's Wort  -sulfasalazine  -teriparatide  -thyroid hormones  -tolvaptan  This list may not describe all possible interactions. Give your health care provider a list of all the medicines, herbs, non-prescription drugs, or dietary supplements you use. Also tell them if you smoke, drink alcohol, or use illegal drugs. Some items may interact with your medicine.  What should I watch for while using this medicine?  Visit your doctor or health care professional for regular checks on your progress. Do not stop taking this medicine without the advice of your doctor or health care professional, even if you feel better. Do not change the brand you are taking, other brands may affect you differently.  Check your heart rate and blood pressure regularly while you are taking this medicine. Ask your doctor or health care professional what your heart rate and blood pressure should be, and when you should contact him or her. Your doctor or health care professional also may schedule regular blood tests and electrocardiograms to check your progress.  Watch your diet. Less digoxin may be absorbed from the stomach if you have a diet high in bran fiber.  Do not treat yourself for coughs, colds or allergies without asking your doctor or health care professional for advice. Some ingredients can increase possible side effects.  What side effects may I notice from receiving this medicine?  Side effects that you should report to your doctor or health care professional as soon as possible:  -allergic reactions like skin rash, itching or hives, swelling of the face,  lips, or tongue  -changes in behavior, mood, or mental ability  -changes in vision  -confusion  -fast, irregular heartbeat  -feeling faint or lightheaded, falls  -headache  -nausea, vomiting  -unusual bleeding, bruising  -unusually weak or tired  Side effects that usually do not require medical attention (report to your doctor or health care professional if they continue or are bothersome):  -breast enlargement in men and women  -diarrhea  This list may not describe all possible side effects. Call your doctor for medical advice about side effects. You may report side effects to FDA at 4-426-FDA-1376.  Where should I keep my medicine?  Keep out of the reach of children.  Store at room temperature between 15 and 30 degrees C (59 and 86 degrees F). Protect from light and moisture. Throw away any unused medicine after the expiration date.  NOTE: This sheet is a summary. It may not cover all possible information. If you have questions about this medicine, talk to your doctor, pharmacist, or health care provider.  © 2018 [employer] (2014-02-27 12:40:27)

## 2019-09-12 NOTE — PROGRESS NOTES
Pt dc'd home. IV and monitor removed; monitor room notified. Pt left unit via wheelchair with . Personal belongings with pt when leaving unit. Pt given discharge instructions prior to leaving unit including prescription and when to visit with physician; verbalizes understanding. Copy of discharge instructions with pt and in the chart.

## 2019-09-17 ENCOUNTER — TELEPHONE (OUTPATIENT)
Dept: CARDIOLOGY | Facility: MEDICAL CENTER | Age: 74
End: 2019-09-17

## 2019-09-17 DIAGNOSIS — I48.0 PAROXYSMAL ATRIAL FIBRILLATION (HCC): ICD-10-CM

## 2019-09-17 NOTE — TELEPHONE ENCOUNTER
Padmini Taylor, RN with [facility] calling, pt was rounded on JM while in hospital. Claudia wants to know if she should check pt's digoxin level, as she does not appt in office until 10/29. Please call Claudia to advise at [phone].

## 2019-09-17 NOTE — TELEPHONE ENCOUNTER
Called Claudia SCHREIBER from [facility] back and informed blood test will be ordered, she requested the order to be faxed to her at [phone].     BMP and Digoxin level ordered faxed to Claudia SCHREIBER

## 2019-09-17 NOTE — TELEPHONE ENCOUNTER
Nicole Turner, Med Ass't  Padmini Membreno, R.N.   Cc: Rosie Reed, Med Ass't             Sorry I thought I sent the message back. I need a referral entered before I can schedule with EP.     Thanks      Referral to EP placed

## 2019-09-20 ENCOUNTER — HOSPITAL ENCOUNTER (OUTPATIENT)
Facility: MEDICAL CENTER | Age: 74
End: 2019-09-20
Attending: INTERNAL MEDICINE
Payer: MEDICARE

## 2019-09-20 LAB
ANION GAP SERPL CALC-SCNC: 11 MMOL/L (ref 0–11.9)
BUN SERPL-MCNC: 17 MG/DL (ref 8–22)
CALCIUM SERPL-MCNC: 10.8 MG/DL (ref 8.5–10.5)
CHLORIDE SERPL-SCNC: 106 MMOL/L (ref 96–112)
CO2 SERPL-SCNC: 26 MMOL/L (ref 20–33)
CREAT SERPL-MCNC: 0.88 MG/DL (ref 0.5–1.4)
DIGOXIN SERPL-MCNC: 0.6 NG/ML (ref 0.8–2)
GLUCOSE SERPL-MCNC: 120 MG/DL (ref 65–99)
POTASSIUM SERPL-SCNC: 3.9 MMOL/L (ref 3.6–5.5)
SODIUM SERPL-SCNC: 143 MMOL/L (ref 135–145)

## 2019-09-20 PROCEDURE — 80162 ASSAY OF DIGOXIN TOTAL: CPT

## 2019-09-20 PROCEDURE — 80048 BASIC METABOLIC PNL TOTAL CA: CPT

## 2019-09-23 ENCOUNTER — TELEPHONE (OUTPATIENT)
Dept: CARDIOLOGY | Facility: MEDICAL CENTER | Age: 74
End: 2019-09-23

## 2019-09-23 NOTE — TELEPHONE ENCOUNTER
Labs stable, no changes to labs at this time. Let's ensure EP f/u is scheduled by the end of this week. Also, we should offer a sooner NP f/u within the next 1-2 weeks if she can't be seen by EP within 2 weeks. Thanks!

## 2019-09-27 NOTE — TELEPHONE ENCOUNTER
Nicole Turner, Med Ass't  You 13 minutes ago (12:50 PM)      Patient is scheduled with  on Monday 9/30       Noted.

## 2019-09-30 ENCOUNTER — OFFICE VISIT (OUTPATIENT)
Dept: CARDIOLOGY | Facility: MEDICAL CENTER | Age: 74
End: 2019-09-30
Payer: MEDICARE

## 2019-09-30 VITALS
DIASTOLIC BLOOD PRESSURE: 82 MMHG | HEART RATE: 72 BPM | WEIGHT: 240 LBS | SYSTOLIC BLOOD PRESSURE: 154 MMHG | OXYGEN SATURATION: 93 % | HEIGHT: 67 IN | BODY MASS INDEX: 37.67 KG/M2

## 2019-09-30 DIAGNOSIS — I48.0 PAROXYSMAL ATRIAL FIBRILLATION (HCC): Primary | ICD-10-CM

## 2019-09-30 DIAGNOSIS — I27.20 PULMONARY HYPERTENSION (HCC): ICD-10-CM

## 2019-09-30 DIAGNOSIS — I48.0 PAF (PAROXYSMAL ATRIAL FIBRILLATION) (HCC): ICD-10-CM

## 2019-09-30 LAB — EKG IMPRESSION: NORMAL

## 2019-09-30 PROCEDURE — 93000 ELECTROCARDIOGRAM COMPLETE: CPT | Performed by: INTERNAL MEDICINE

## 2019-09-30 PROCEDURE — 99205 OFFICE O/P NEW HI 60 MIN: CPT | Performed by: INTERNAL MEDICINE

## 2019-09-30 RX ORDER — SOTALOL HYDROCHLORIDE 120 MG/1
120 TABLET ORAL 2 TIMES DAILY
Qty: 60 TAB | Refills: 11 | Status: SHIPPED | OUTPATIENT
Start: 2019-09-30 | End: 2019-09-30 | Stop reason: SDUPTHER

## 2019-09-30 RX ORDER — SOTALOL HYDROCHLORIDE 80 MG/1
120 TABLET ORAL 2 TIMES DAILY
Qty: 60 TAB | Refills: 11 | Status: SHIPPED | OUTPATIENT
Start: 2019-09-30 | End: 2019-09-30

## 2019-09-30 NOTE — PROGRESS NOTES
"Arrhythmia Clinic Note (New patient)     DOS: 9/30/2019    Referring physician: Dr. Liao palpitations    Chief complaint/Reason for consult: Palpitations    HPI: Patient is a 74-year-old female with a history of paroxysmal atrial fibrillation status post 2 ablations, mitral valve repair in 2011 with left atrial maze, pulmonary hypertension followed at [facility] on home oxygen, who was admitted to the hospital with RVR, shortness of breath, and palpitations.  She was referred to EP clinic upon discharge.  Digoxin was added to her regimen during her hospitalization.  She says since discharge her heart rate has been \"all over the place\".  It can be in the 80s over the 130s.  Wh O did that at 1240 not sure en she was initially presented to the hospital, it was in the 140s and was \"stuck at that\".  She has no chest pain.  She does get short of breath with minimal exertion.  She has no syncope.    ROS (+ highlighted in bold):  Constitutional: Fevers/chills/fatigue/weightloss  HEENT: Blurry vision/eye pain/sore throat/hearing loss  Respiratory: Shortness of breath/cough  Cardiovascular: Chest pain/palpitations/edema/orthopnea/syncope  GI: Nausea/vomitting/diarrhea  MSK: Arthralgias/myagias/muscle weakness  Skin: Rash/sores  Neurological: Numbness/tremors/vertigo  Endocrine: Excessive thirst/polyuria/cold intolerance/heat intolerance  Psych: Depression/anxiety    Past Medical History:   Diagnosis Date   • Aneurysm artery, celiac (HCC) 2019   • Aneurysm of right renal artery (HCC)    • Atrial fibrillation (HCC)    • Diastolic heart failure (HCC)    • Hypertension, essential    • Pulmonary hypertension (HCC)    • Warfarin anticoagulation        History reviewed. No pertinent surgical history.    Social History     Socioeconomic History   • Marital status:      Spouse name: Not on file   • Number of children: Not on file   • Years of education: Not on file   • Highest education level: Not on file   Occupational History "   • Not on file   Social Needs   • Financial resource strain: Not on file   • Food insecurity:     Worry: Not on file     Inability: Not on file   • Transportation needs:     Medical: Not on file     Non-medical: Not on file   Tobacco Use   • Smoking status: Never Smoker   • Smokeless tobacco: Never Used   Substance and Sexual Activity   • Alcohol use: Yes     Alcohol/week: 4.2 oz     Types: 7 Shots of liquor per week     Comment: 1 day   • Drug use: No   • Sexual activity: Not on file   Lifestyle   • Physical activity:     Days per week: Not on file     Minutes per session: Not on file   • Stress: Not on file   Relationships   • Social connections:     Talks on phone: Not on file     Gets together: Not on file     Attends Yazidism service: Not on file     Active member of club or organization: Not on file     Attends meetings of clubs or organizations: Not on file     Relationship status: Not on file   • Intimate partner violence:     Fear of current or ex partner: Not on file     Emotionally abused: Not on file     Physically abused: Not on file     Forced sexual activity: Not on file   Other Topics Concern   • Not on file   Social History Narrative   • Not on file       History reviewed. No pertinent family history.   No family history of sudden cardiac death    Allergies   Allergen Reactions   • Zolpidem Tartrate Unspecified     Lightheaded and confusion       Current Outpatient Medications   Medication Sig Dispense Refill   • sotalol (BETAPACE) 120 MG tablet Take 1 Tab by mouth 2 times a day. 60 Tab 11   • apixaban (ELIQUIS) 5mg Tab Take 1 Tab by mouth 2 Times a Day. 60 Tab 1   • digoxin (LANOXIN) 125 MCG Tab Take 1 Tab by mouth every day at 6 PM. 30 Tab 1   • DILTIAZem CD (CARDIZEM CD) 120 MG CAPSULE SR 24 HR Take 1 Cap by mouth every day. 30 Cap 1   • acetaminophen (TYLENOL) 500 MG Tab Take 500-1,000 mg by mouth every four hours as needed.     • fluticasone (FLONASE) 50 MCG/ACT nasal spray Spray 1 Spray in  "nose 1 time daily as needed.     • Tadalafil, PAH, (ADCIRCA) 20 MG Tab Take 40 mg by mouth every day. Indications: Pulmonary Arterial Hypertension     • furosemide (LASIX) 40 MG Tab Take 40 mg by mouth 2 Times a Day.     • potassium chloride SA (KDUR) 20 MEQ Tab CR Take 20 mEq by mouth every day.     • therapeutic multivitamin-minerals (THERAGRAN-M) Tab Take 1 Tab by mouth every day.     • PARoxetine (PAXIL) 20 MG Tab Take 20 mg by mouth every day.     • rosuvastatin (CRESTOR) 10 MG Tab Take 10 mg by mouth every evening.     • ambrisentan (LETAIRIS) 10 MG tablet Take 10 mg by mouth every day.     • levothyroxine (SYNTHROID) 75 MCG Tab Take 75 mcg by mouth Every morning on an empty stomach.     • Cholecalciferol (VITAMIN D) 2000 units Cap Take 2,000 Units by mouth every day.       No current facility-administered medications for this visit.        Physical Exam:  Vitals:    09/30/19 1110   BP: 154/82   BP Location: Left arm   Patient Position: Sitting   BP Cuff Size: Large adult   Pulse: 72   SpO2: 93%   Weight: 108.9 kg (240 lb)   Height: 1.702 m (5' 7\")     General appearance: NAD, conversant   Eyes: anicteric sclerae, moist conjunctivae; no lid-lag; PERRLA  HENT: Atraumatic; oropharynx clear with moist mucous membranes and no mucosal ulcerations; normal hard and soft palate  Neck: Trachea midline; FROM, supple, no thyromegaly or lymphadenopathy  Lungs: CTA, with normal respiratory effort and no intercostal retractions  CV: Tachycardic, irregular, no MRGs, no JVD   Abdomen: Soft, non-tender; no masses or HSM  Extremities: No peripheral edema or extremity lymphadenopathy  Skin: Normal temperature, turgor and texture; no rash, ulcers or subcutaneous nodules  Psych: Appropriate affect, alert and oriented to person, place and time    Data:  Lipids:   Lab Results   Component Value Date/Time    CHOLSTRLTOT 154 07/30/2019 10:03 AM    TRIGLYCERIDE 110 07/30/2019 10:03 AM    HDL 51 07/30/2019 10:03 AM    LDL 81 07/30/2019 " 10:03 AM        BMP:  Lab Results   Component Value Date/Time    SODIUM 143 09/20/2019 1230    POTASSIUM 3.9 09/20/2019 1230    CHLORIDE 106 09/20/2019 1230    CO2 26 09/20/2019 1230    GLUCOSE 120 (H) 09/20/2019 1230    BUN 17 09/20/2019 1230    CREATININE 0.88 09/20/2019 1230    CALCIUM 10.8 (H) 09/20/2019 1230    ANION 11.0 09/20/2019 1230        TSH:   Lab Results   Component Value Date/Time    TSHULTRASEN 0.740 07/30/2019 1003        THYROXINE (T4):   No results found for: OGIR     CBC:   Lab Results   Component Value Date/Time    WBC 5.0 09/10/2019 12:27 AM    RBC 3.35 (L) 09/10/2019 12:27 AM    HEMOGLOBIN 10.7 (L) 09/10/2019 12:27 AM    HEMATOCRIT 34.9 (L) 09/10/2019 12:27 AM    .2 (H) 09/10/2019 12:27 AM    MCH 31.9 09/10/2019 12:27 AM    MCHC 30.7 (L) 09/10/2019 12:27 AM    RDW 49.8 09/10/2019 12:27 AM    PLATELETCT 166 09/10/2019 12:27 AM    MPV 8.7 (L) 09/10/2019 12:27 AM    NEUTSPOLYS 58.10 09/10/2019 12:27 AM    LYMPHOCYTES 26.10 09/10/2019 12:27 AM    MONOCYTES 12.20 09/10/2019 12:27 AM    EOSINOPHILS 2.80 09/10/2019 12:27 AM    BASOPHILS 0.40 09/10/2019 12:27 AM    IMMGRAN 0.40 09/10/2019 12:27 AM    NRBC 0.00 09/10/2019 12:27 AM    NEUTS 2.92 09/10/2019 12:27 AM    LYMPHS 1.31 09/10/2019 12:27 AM    LYMPHS 16 07/09/2019 09:30 AM    MONOS 0.61 09/10/2019 12:27 AM    EOS 0.14 09/10/2019 12:27 AM    BASO 0.02 09/10/2019 12:27 AM    IMMGRANAB 0.02 09/10/2019 12:27 AM    NRBCAB 0.00 09/10/2019 12:27 AM        CBC w/o DIFF  Lab Results   Component Value Date/Time    WBC 5.0 09/10/2019 12:27 AM    RBC 3.35 (L) 09/10/2019 12:27 AM    HEMOGLOBIN 10.7 (L) 09/10/2019 12:27 AM    .2 (H) 09/10/2019 12:27 AM    MCH 31.9 09/10/2019 12:27 AM    MCHC 30.7 (L) 09/10/2019 12:27 AM    RDW 49.8 09/10/2019 12:27 AM    MPV 8.7 (L) 09/10/2019 12:27 AM       Prior echo/stress results reviewed: Normal ejection fraction, normal right ventricle, mitral valve replaced with mild to moderate regurgitation, dilated  atria    EKG interpreted by me: Atrial fibrillation with RVR, possiblly in and out of sinus rhythm    Impression/Plan:  1. PAF (paroxysmal atrial fibrillation) ([facility])  EKG   2. Pulmonary hypertension (HCC)       1.  Atrial fibrillation with RVR.  This is becoming difficult to control with sotalol.  Addition of rate control agents has not helped very much.  We did discuss a repeat ablation, which I believe would be beneficial.  However this is complicated by her pulmonary arterial hypertension.  I would like her physicians at [facility] to evaluate her pulmonary arterial hypertension and if she is safe to go undergo general anesthesia.  A repeat A. fib/atypical flutter ablation may take 2 to 4 hours.  We discussed the risks of this procedure including a 1 to 2% risk of major cardiac complications, including but not limited to stroke, heart attack, dangerous heart rhythms, perforation, tamponade, major bleeding, and death.  Patient understands these risks and is willing to undergo the procedure.  However, the risks of general anesthesia in this patient with pulmonary arterial hypertension are higher than the average patient.  As such, I would like her evaluated for her surgical risk by her pulmonary hypertension team before scheduling an ablation procedure.  In the meantime, I am going to increase her sotalol to 120 mg twice daily.  I will check serial EKGs for the next 3 days to make sure her QTC is within acceptable limits.    2.  Pulmonary hypertension.  She is on tadalafil and ambrisentan for management.  Right heart cath seen on care everywhere from 2019 looks to show well treated pulmonary hypertension.  It is a little perplexing however that she still has such a high oxygen requirement despite well treated pulmonary hypertension.  I am not sure of the status of her pulmonary work-up otherwise.  I do wonder, however, if she has some sort of primary lung pathology that is otherwise explaining her significant  hypoxia.    Follow-up in 6 weeks.  At that time, we will see how her arrhythmias are controlled on increased sotalol.  If her pulmonary hypertension team has evaluated her as a an acceptable risk for general anesthesia, we can tentatively discuss scheduling an ablation procedure at that time.    Thee Galindo MD  Cardiac Electrophysiology

## 2019-10-01 ENCOUNTER — NON-PROVIDER VISIT (OUTPATIENT)
Dept: CARDIOLOGY | Facility: MEDICAL CENTER | Age: 74
End: 2019-10-01
Payer: MEDICARE

## 2019-10-01 VITALS
BODY MASS INDEX: 37.98 KG/M2 | SYSTOLIC BLOOD PRESSURE: 138 MMHG | HEIGHT: 67 IN | WEIGHT: 242 LBS | DIASTOLIC BLOOD PRESSURE: 78 MMHG | OXYGEN SATURATION: 92 % | HEART RATE: 72 BPM

## 2019-10-01 DIAGNOSIS — I48.0 PAROXYSMAL ATRIAL FIBRILLATION (HCC): ICD-10-CM

## 2019-10-01 DIAGNOSIS — I48.0 PAROXYSMAL ATRIAL FIBRILLATION (HCC): Primary | ICD-10-CM

## 2019-10-01 DIAGNOSIS — I27.20 PULMONARY HYPERTENSION (HCC): ICD-10-CM

## 2019-10-01 LAB — EKG IMPRESSION: NORMAL

## 2019-10-01 PROCEDURE — 93000 ELECTROCARDIOGRAM COMPLETE: CPT | Performed by: INTERNAL MEDICINE

## 2019-10-01 RX ORDER — SOTALOL HYDROCHLORIDE 80 MG/1
TABLET ORAL
Refills: 11 | OUTPATIENT
Start: 2019-10-01

## 2019-10-01 RX ORDER — SOTALOL HYDROCHLORIDE 120 MG/1
TABLET ORAL
Qty: 180 TAB | Refills: 3 | Status: SHIPPED | OUTPATIENT
Start: 2019-10-01 | End: 2019-11-08

## 2019-10-02 ENCOUNTER — NON-PROVIDER VISIT (OUTPATIENT)
Dept: CARDIOLOGY | Facility: MEDICAL CENTER | Age: 74
End: 2019-10-02
Payer: MEDICARE

## 2019-10-02 VITALS
WEIGHT: 242 LBS | BODY MASS INDEX: 37.98 KG/M2 | DIASTOLIC BLOOD PRESSURE: 72 MMHG | OXYGEN SATURATION: 95 % | SYSTOLIC BLOOD PRESSURE: 114 MMHG | HEIGHT: 67 IN | HEART RATE: 76 BPM

## 2019-10-02 DIAGNOSIS — I48.0 PAF (PAROXYSMAL ATRIAL FIBRILLATION) (HCC): ICD-10-CM

## 2019-10-02 PROCEDURE — 93000 ELECTROCARDIOGRAM COMPLETE: CPT | Performed by: INTERNAL MEDICINE

## 2019-10-02 RX ORDER — SOTALOL HYDROCHLORIDE 80 MG/1
TABLET ORAL
Refills: 11 | COMMUNITY
Start: 2019-09-30 | End: 2019-11-08 | Stop reason: SDUPTHER

## 2019-10-02 RX ORDER — POTASSIUM CHLORIDE 20 MEQ/1
TABLET, EXTENDED RELEASE ORAL
COMMUNITY
Start: 2019-09-30 | End: 2019-11-26

## 2019-10-02 NOTE — NON-PROVIDER
PT states sh is feeling good but alittle tired this morning. She also states her Pulse was 56 in the AM.  EKG ready for review in cardioserver and vitals in chart.  No change in Rx other than Sotalol Increase per [facility] in chart.

## 2019-10-03 ENCOUNTER — NON-PROVIDER VISIT (OUTPATIENT)
Dept: CARDIOLOGY | Facility: MEDICAL CENTER | Age: 74
End: 2019-10-03
Payer: MEDICARE

## 2019-10-03 DIAGNOSIS — I48.0 PAROXYSMAL ATRIAL FIBRILLATION (HCC): ICD-10-CM

## 2019-10-03 LAB
EKG IMPRESSION: NORMAL
EKG IMPRESSION: NORMAL

## 2019-10-03 PROCEDURE — 93000 ELECTROCARDIOGRAM COMPLETE: CPT | Performed by: INTERNAL MEDICINE

## 2019-10-04 DIAGNOSIS — I10 ESSENTIAL HYPERTENSION: ICD-10-CM

## 2019-10-04 DIAGNOSIS — I48.0 PAROXYSMAL ATRIAL FIBRILLATION (HCC): ICD-10-CM

## 2019-10-04 RX ORDER — DILTIAZEM HYDROCHLORIDE 120 MG/1
120 CAPSULE, COATED, EXTENDED RELEASE ORAL DAILY
Qty: 90 CAP | Refills: 3 | Status: SHIPPED | OUTPATIENT
Start: 2019-10-04 | End: 2020-01-15

## 2019-10-04 RX ORDER — DIGOXIN 125 MCG
125 TABLET ORAL DAILY
Qty: 90 TAB | Refills: 3 | Status: SHIPPED | OUTPATIENT
Start: 2019-10-04 | End: 2020-01-15

## 2019-10-15 ENCOUNTER — HOSPITAL ENCOUNTER (OUTPATIENT)
Dept: LAB | Facility: MEDICAL CENTER | Age: 74
End: 2019-10-15
Attending: FAMILY MEDICINE
Payer: MEDICARE

## 2019-10-15 ENCOUNTER — HOSPITAL ENCOUNTER (OUTPATIENT)
Dept: LAB | Facility: MEDICAL CENTER | Age: 74
End: 2019-10-15
Attending: SURGERY
Payer: MEDICARE

## 2019-10-15 LAB
ALBUMIN SERPL BCP-MCNC: 4.2 G/DL (ref 3.2–4.9)
ALBUMIN/GLOB SERPL: 1.3 G/DL
ALP SERPL-CCNC: 67 U/L (ref 30–99)
ALT SERPL-CCNC: 17 U/L (ref 2–50)
ANION GAP SERPL CALC-SCNC: 12 MMOL/L (ref 0–11.9)
ANION GAP SERPL CALC-SCNC: 9 MMOL/L (ref 0–11.9)
AST SERPL-CCNC: 19 U/L (ref 12–45)
BASOPHILS # BLD AUTO: 0.4 % (ref 0–1.8)
BASOPHILS # BLD: 0.02 K/UL (ref 0–0.12)
BILIRUB SERPL-MCNC: 0.5 MG/DL (ref 0.1–1.5)
BUN SERPL-MCNC: 16 MG/DL (ref 8–22)
BUN SERPL-MCNC: 17 MG/DL (ref 8–22)
CALCIUM SERPL-MCNC: 11.6 MG/DL (ref 8.5–10.5)
CALCIUM SERPL-MCNC: 11.7 MG/DL (ref 8.5–10.5)
CHLORIDE SERPL-SCNC: 101 MMOL/L (ref 96–112)
CHLORIDE SERPL-SCNC: 102 MMOL/L (ref 96–112)
CO2 SERPL-SCNC: 29 MMOL/L (ref 20–33)
CO2 SERPL-SCNC: 29 MMOL/L (ref 20–33)
CREAT SERPL-MCNC: 0.84 MG/DL (ref 0.5–1.4)
CREAT SERPL-MCNC: 0.87 MG/DL (ref 0.5–1.4)
EOSINOPHIL # BLD AUTO: 0.14 K/UL (ref 0–0.51)
EOSINOPHIL NFR BLD: 2.8 % (ref 0–6.9)
ERYTHROCYTE [DISTWIDTH] IN BLOOD BY AUTOMATED COUNT: 50.4 FL (ref 35.9–50)
ERYTHROCYTE [DISTWIDTH] IN BLOOD BY AUTOMATED COUNT: 50.7 FL (ref 35.9–50)
GLOBULIN SER CALC-MCNC: 3.2 G/DL (ref 1.9–3.5)
GLUCOSE SERPL-MCNC: 108 MG/DL (ref 65–99)
GLUCOSE SERPL-MCNC: 110 MG/DL (ref 65–99)
HCT VFR BLD AUTO: 39.7 % (ref 37–47)
HCT VFR BLD AUTO: 39.8 % (ref 37–47)
HGB BLD-MCNC: 12.6 G/DL (ref 12–16)
HGB BLD-MCNC: 12.7 G/DL (ref 12–16)
IMM GRANULOCYTES # BLD AUTO: 0.03 K/UL (ref 0–0.11)
IMM GRANULOCYTES NFR BLD AUTO: 0.6 % (ref 0–0.9)
IRON SATN MFR SERPL: 17 % (ref 15–55)
IRON SERPL-MCNC: 71 UG/DL (ref 40–170)
LYMPHOCYTES # BLD AUTO: 1.34 K/UL (ref 1–4.8)
LYMPHOCYTES NFR BLD: 26.6 % (ref 22–41)
MCH RBC QN AUTO: 32.3 PG (ref 27–33)
MCH RBC QN AUTO: 32.5 PG (ref 27–33)
MCHC RBC AUTO-ENTMCNC: 31.7 G/DL (ref 33.6–35)
MCHC RBC AUTO-ENTMCNC: 31.9 G/DL (ref 33.6–35)
MCV RBC AUTO: 101.8 FL (ref 81.4–97.8)
MCV RBC AUTO: 101.8 FL (ref 81.4–97.8)
MONOCYTES # BLD AUTO: 0.57 K/UL (ref 0–0.85)
MONOCYTES NFR BLD AUTO: 11.3 % (ref 0–13.4)
NEUTROPHILS # BLD AUTO: 2.93 K/UL (ref 2–7.15)
NEUTROPHILS NFR BLD: 58.3 % (ref 44–72)
NRBC # BLD AUTO: 0 K/UL
NRBC BLD-RTO: 0 /100 WBC
PLATELET # BLD AUTO: 226 K/UL (ref 164–446)
PLATELET # BLD AUTO: 229 K/UL (ref 164–446)
PMV BLD AUTO: 9.3 FL (ref 9–12.9)
PMV BLD AUTO: 9.4 FL (ref 9–12.9)
POTASSIUM SERPL-SCNC: 4.2 MMOL/L (ref 3.6–5.5)
POTASSIUM SERPL-SCNC: 4.2 MMOL/L (ref 3.6–5.5)
PROT SERPL-MCNC: 7.4 G/DL (ref 6–8.2)
RBC # BLD AUTO: 3.9 M/UL (ref 4.2–5.4)
RBC # BLD AUTO: 3.91 M/UL (ref 4.2–5.4)
SODIUM SERPL-SCNC: 140 MMOL/L (ref 135–145)
SODIUM SERPL-SCNC: 142 MMOL/L (ref 135–145)
TIBC SERPL-MCNC: 430 UG/DL (ref 250–450)
WBC # BLD AUTO: 4.9 K/UL (ref 4.8–10.8)
WBC # BLD AUTO: 5 K/UL (ref 4.8–10.8)

## 2019-10-15 PROCEDURE — 80053 COMPREHEN METABOLIC PANEL: CPT

## 2019-10-15 PROCEDURE — 83550 IRON BINDING TEST: CPT

## 2019-10-15 PROCEDURE — 36415 COLL VENOUS BLD VENIPUNCTURE: CPT

## 2019-10-15 PROCEDURE — 80048 BASIC METABOLIC PNL TOTAL CA: CPT

## 2019-10-15 PROCEDURE — 85025 COMPLETE CBC W/AUTO DIFF WBC: CPT

## 2019-10-15 PROCEDURE — 83540 ASSAY OF IRON: CPT

## 2019-10-15 PROCEDURE — 85027 COMPLETE CBC AUTOMATED: CPT | Mod: XU

## 2019-10-28 ENCOUNTER — HOSPITAL ENCOUNTER (OUTPATIENT)
Dept: LAB | Facility: MEDICAL CENTER | Age: 74
End: 2019-10-28
Attending: SURGERY
Payer: MEDICARE

## 2019-10-28 LAB
ANION GAP SERPL CALC-SCNC: 9 MMOL/L (ref 0–11.9)
BUN SERPL-MCNC: 19 MG/DL (ref 8–22)
CALCIUM SERPL-MCNC: 11 MG/DL (ref 8.5–10.5)
CHLORIDE SERPL-SCNC: 102 MMOL/L (ref 96–112)
CO2 SERPL-SCNC: 34 MMOL/L (ref 20–33)
CREAT SERPL-MCNC: 0.7 MG/DL (ref 0.5–1.4)
GLUCOSE SERPL-MCNC: 126 MG/DL (ref 65–99)
POTASSIUM SERPL-SCNC: 3.8 MMOL/L (ref 3.6–5.5)
SODIUM SERPL-SCNC: 145 MMOL/L (ref 135–145)

## 2019-10-28 PROCEDURE — 36415 COLL VENOUS BLD VENIPUNCTURE: CPT

## 2019-10-28 PROCEDURE — 80048 BASIC METABOLIC PNL TOTAL CA: CPT

## 2019-10-31 ENCOUNTER — TELEPHONE (OUTPATIENT)
Dept: CARDIOLOGY | Facility: MEDICAL CENTER | Age: 74
End: 2019-10-31

## 2019-10-31 NOTE — TELEPHONE ENCOUNTER
Pt was on sotalol, digoxin, and diltiazem prior to my visit with her last month. Only change made was increasing sotalol, it appears. That may be contributing to her fatigue, and we can discuss decreasing this at the time of our next visit.    If she is feeling unwell, it may be related to her pulmonary hypertension. She should follow up with her pulmonary hypertension doctors to see if she needs to be more urgently evaluated.

## 2019-10-31 NOTE — TELEPHONE ENCOUNTER
[facility]    Hello, patient has some questions re her medications digoxin (LANOXIN) 125 MCG & DILTIAZem CD (CARDIZEM CD) 120 MG. Her blood pressure is low and she hasn't been feeling well, she would like a call back at [phone]

## 2019-10-31 NOTE — TELEPHONE ENCOUNTER
Called pt, spoke with daughter and pt, pt and daughter reports that pt's SBP is been in the low 100s with reported range between 104-110, today it is 125/63, reassure them that this BP is normal and where we want it to be, daughter reports that pt has been no energy, tired, fatigue ever since she started the Diltiazem, Sotalol and Digoxin. Pt denies being back on Afib. Daughter also reports that pt takes Lasix 20mg BID and Metalazone 5mg PRN daily ordered by pt's Pulmonologist from [facility]. Advise daughter to cont monitoring pt's BP and if systolic dropped in the 90s to let us know, she is mainly concern on the symptoms pt is having.     To Dr Galindo, please advice. Thank You!

## 2019-11-01 ENCOUNTER — NON-PROVIDER VISIT (OUTPATIENT)
Dept: NEUROLOGY | Facility: MEDICAL CENTER | Age: 74
End: 2019-11-01
Payer: MEDICARE

## 2019-11-01 ENCOUNTER — HOSPITAL ENCOUNTER (OUTPATIENT)
Dept: LAB | Facility: MEDICAL CENTER | Age: 74
End: 2019-11-01
Attending: INTERNAL MEDICINE
Payer: MEDICARE

## 2019-11-01 DIAGNOSIS — M54.17 RADICULOPATHY, LUMBOSACRAL REGION: ICD-10-CM

## 2019-11-01 DIAGNOSIS — G62.9 NEUROPATHY: ICD-10-CM

## 2019-11-01 LAB
ALBUMIN SERPL BCP-MCNC: 4.4 G/DL (ref 3.2–4.9)
ALBUMIN/GLOB SERPL: 1.8 G/DL
ALP SERPL-CCNC: 67 U/L (ref 30–99)
ALT SERPL-CCNC: 18 U/L (ref 2–50)
ANION GAP SERPL CALC-SCNC: 9 MMOL/L (ref 0–11.9)
AST SERPL-CCNC: 21 U/L (ref 12–45)
BASOPHILS # BLD AUTO: 0.6 % (ref 0–1.8)
BASOPHILS # BLD: 0.03 K/UL (ref 0–0.12)
BILIRUB SERPL-MCNC: 0.4 MG/DL (ref 0.1–1.5)
BUN SERPL-MCNC: 13 MG/DL (ref 8–22)
CALCIUM SERPL-MCNC: 10.2 MG/DL (ref 8.5–10.5)
CHLORIDE SERPL-SCNC: 100 MMOL/L (ref 96–112)
CO2 SERPL-SCNC: 31 MMOL/L (ref 20–33)
CREAT SERPL-MCNC: 0.79 MG/DL (ref 0.5–1.4)
EOSINOPHIL # BLD AUTO: 0.15 K/UL (ref 0–0.51)
EOSINOPHIL NFR BLD: 2.9 % (ref 0–6.9)
ERYTHROCYTE [DISTWIDTH] IN BLOOD BY AUTOMATED COUNT: 46.5 FL (ref 35.9–50)
FASTING STATUS PATIENT QL REPORTED: NORMAL
GLOBULIN SER CALC-MCNC: 2.4 G/DL (ref 1.9–3.5)
GLUCOSE SERPL-MCNC: 117 MG/DL (ref 65–99)
HCT VFR BLD AUTO: 36.2 % (ref 37–47)
HGB BLD-MCNC: 11.7 G/DL (ref 12–16)
IMM GRANULOCYTES # BLD AUTO: 0.02 K/UL (ref 0–0.11)
IMM GRANULOCYTES NFR BLD AUTO: 0.4 % (ref 0–0.9)
LYMPHOCYTES # BLD AUTO: 1.06 K/UL (ref 1–4.8)
LYMPHOCYTES NFR BLD: 20.3 % (ref 22–41)
MCH RBC QN AUTO: 31.9 PG (ref 27–33)
MCHC RBC AUTO-ENTMCNC: 32.3 G/DL (ref 33.6–35)
MCV RBC AUTO: 98.6 FL (ref 81.4–97.8)
MONOCYTES # BLD AUTO: 0.58 K/UL (ref 0–0.85)
MONOCYTES NFR BLD AUTO: 11.1 % (ref 0–13.4)
NEUTROPHILS # BLD AUTO: 3.37 K/UL (ref 2–7.15)
NEUTROPHILS NFR BLD: 64.7 % (ref 44–72)
NRBC # BLD AUTO: 0 K/UL
NRBC BLD-RTO: 0 /100 WBC
NT-PROBNP SERPL IA-MCNC: 455 PG/ML (ref 0–125)
PLATELET # BLD AUTO: 268 K/UL (ref 164–446)
PMV BLD AUTO: 9.1 FL (ref 9–12.9)
POTASSIUM SERPL-SCNC: 3.7 MMOL/L (ref 3.6–5.5)
PROT SERPL-MCNC: 6.8 G/DL (ref 6–8.2)
RBC # BLD AUTO: 3.67 M/UL (ref 4.2–5.4)
SODIUM SERPL-SCNC: 140 MMOL/L (ref 135–145)
WBC # BLD AUTO: 5.2 K/UL (ref 4.8–10.8)

## 2019-11-01 PROCEDURE — 95909 NRV CNDJ TST 5-6 STUDIES: CPT | Performed by: PSYCHIATRY & NEUROLOGY

## 2019-11-01 PROCEDURE — 83880 ASSAY OF NATRIURETIC PEPTIDE: CPT

## 2019-11-01 PROCEDURE — 95886 MUSC TEST DONE W/N TEST COMP: CPT | Mod: LT | Performed by: PSYCHIATRY & NEUROLOGY

## 2019-11-01 PROCEDURE — 80053 COMPREHEN METABOLIC PANEL: CPT

## 2019-11-01 PROCEDURE — 36415 COLL VENOUS BLD VENIPUNCTURE: CPT

## 2019-11-01 PROCEDURE — 85025 COMPLETE CBC W/AUTO DIFF WBC: CPT

## 2019-11-01 NOTE — PROCEDURES
"NERVE CONDUCTION STUDIES AND ELECTROMYOGRAPHY REPORT  [facility]  11/01/19           IMPRESSION:  This is an abnormal yet limited electrodiagnostic study due to body habitus and anticoagulation.  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  Abnormalities on nerve conduction study are likely secondary to technical factors more so than evidence of a peripheral neuropathy.  Recommend clinical correlation of the above.    Delai Kasper MD  Neurology - Neurophysiology  [facility]        REASON FOR REFERRAL:  Ms. Zeinab Loza 74 y.o. referred by DAVID Charles for an evaluation of pain in both hips possibly from her back.  She has associated left first through third toe numbness for approximately 1 year.  The right leg is without numbness.  There is a concern for neuropathy versus radiculopathy.  She has lower extremity edema, left worse than right.    Height: 5'7\"  Weight: 231 lbs      ELECTRODIAGNOSTIC EXAMINATION:  Nerve conduction studies (NCS) and electromyography (EMG) are utilized to evaluate direct or indirect damage to the peripheral nervous system. NCS are performed to measure the nerve(s) response(s) to electrostimulation across a given nerve segment. EMG evaluates the passive and active electrical activity of the muscle(s) in question.  Muscles are innervated by specific peripheral nerves and roots. Often times, several nerves the muscle to be examined in order to determine the presence or absence of the disease process. Furthermore, nerves and muscles may need to be tested in a [id number] comparison, as well as in additional extremities, as this may be crucial in characterizing the extent of the disease process, which may be diffuse or isolated and of varying degree of severity. The extent of the neurodiagnostic exam is justified as it may " help arrive to a proper diagnosis, which ultimately may contribute to better management of the patient. Therefore, the nerves to muscles examined during the study were medically necessary.    Unless otherwise noted, temperature of the extremity(s) study was monitored before and during the examination and remained between 32 and 36 degrees C for the upper extremities, and between 30 and 36 degrees C for the lower extremities. The patient tolerated testing well, without any complications.       NERVE CONDUCTION STUDY SUMMARY:  Selected nerves of the bilateral lower extremity are studied.    Unobtainable bilateral sural sensory responses.  Abnormal bilateral common peroneal motor responses at the extensor digitorum brevis due to low amplitude.  This is likely technical secondary to body habitus.  The left common peroneal motor response below the fibular head was unobtainable due to technical factors.  Normal bilateral common peroneal motor responses at the tibialis anteriors.  Abnormal or unobtainable motor responses with distal stimulation likely secondary to body habitus.  Abnormal left tibial motor response at the abductor hallucis brevis due to low amplitude and minimal slowing which is likely secondary to technical factors.    Left tibial F wave is within normal limits.  Abnormal right tibial motor response at the abductor hallucis brevis due to low amplitude and mildly prolonged distal latency of uncertain significance.  The low amplitude is likely secondary to technical factors.    NEEDLE EMG SUMMARY:  Concentric needle study of selected left lower extremity muscles is performed.     Insertion is normal in all muscles sampled unless otherwise documented below.   Large amplitude prolonged duration motor unit potentials in the left vastus lateralis and medialis.  EMG study of the left gluteus medius was attempted however due to limitations in needle size (patient anticoagulated) and body habitus it was difficult  to adequately appreciate motor unit potentials and insertion activity.  Otherwise with activation, there are normal morphology (amplitude/duration) motor unit action potentials firing with normal recruitment and remaining muscles tested.       PATIENT DATA TABLES     Nerve Conduction Studies      Stim Site NR Onset (ms) Norm Onset (ms) O-P Amp (µV) Norm O-P Amp Site1 Site2 Delta-P (ms) Dist (cm) Burt (m/s) Norm Burt (m/s)   Left Sural Anti Sensory (Lat Mall)   Calf *NR   <4.6   >3 Calf Lat Mall   14.0   >40   Right Sural Anti Sensory (Lat Mall)   Calf *NR   <4.6   >3 Calf Lat Mall   14.0   >40          Stim Site NR Onset (ms) Norm Onset (ms) O-P Amp (mV) Norm O-P Amp Site1 Site2 Delta-0 (ms) Dist (cm) Burt (m/s) Norm Burt (m/s)   Left Peroneal EDB Motor (Ext Dig Brev)   Ankle    4.1 <6 *1.6 >2.5 B Fib Ankle   34.0   >40   B Fib *NR         Poplt B Fib   10.0       Poplt    12.7   1.5                 Right Peroneal EDB Motor (Ext Dig Brev)   Ankle    3.5 <6 *1.2 >2.5 B Fib Ankle 7.8 34.0 44 >40   B Fib    11.3   0.8   Poplt B Fib 1.8 10.0 56     Poplt    13.1   0.8                 Left Peroneal TA Motor (AntTibialis)   Fib Head *NR   <4.5   >3 Poplit Fib Head   10.0   >40   Poplit    3.8   3.5                 Right Peroneal TA Motor (AntTibialis)   Fib Head    2.9 <4.5 *2.5 >3 Poplit Fib Head 1.3 10.0 77 >40   Poplit    4.2   4.6                 Left Tibial Motor (Abd Gillespie Brev)   Ankle    5.5 <6 *2.8 >4 Knee Ankle 10.3 39.0 *38 >40   Knee    15.8   2.4                 Right Tibial Motor (Abd Gillespie Brev)   Ankle    *6.6 <6 *1.7 >4 Knee Ankle 7.9 39.0 49 >40   Knee    14.5   1.4                    F Wave Studies      NR F-Lat (ms) Lat Norm (ms)   Left Tibial (Abd Hallucis)      55.54 <57                                    Electromyography      Side Muscle Nerve Root Ins Act Fibs Psw Amp Dur Poly Recrt Int Pat Comment   Left AntTibialis Dp Br Fibular L4-5 Nml Nml Nml Nml Nml 0 Nml Nml     Left Gastroc Tibial S1-2 Nml Nml  Nml Nml Nml 0 Nml Nml     Left VastusLat Femoral L2-4 Nml Nml Nml Incr Incr 0 Nml Nml     Left GluteusMed SupGluteal L5-S1                 Attempted  Body habitus   Left VastusMed Femoral L2-4 Nml Nml Nml Incr Incr 0 Nml Nml

## 2019-11-07 ENCOUNTER — TELEPHONE (OUTPATIENT)
Dept: CARDIOLOGY | Facility: MEDICAL CENTER | Age: 74
End: 2019-11-07

## 2019-11-07 DIAGNOSIS — I48.0 PAROXYSMAL ATRIAL FIBRILLATION (HCC): ICD-10-CM

## 2019-11-08 RX ORDER — SOTALOL HYDROCHLORIDE 80 MG/1
80 TABLET ORAL 2 TIMES DAILY
Qty: 60 TAB | Refills: 11 | Status: ON HOLD | COMMUNITY
Start: 2019-11-08 | End: 2020-10-01 | Stop reason: SDUPTHER

## 2019-11-08 NOTE — TELEPHONE ENCOUNTER
Thee Galindo M.D.  You 14 hours ago (5:25 PM)      If they want to just continue with 80mg that's fine, we increased because pt and family were worried about RVR episodes. If her HRs are fine then it isn't mandatory to increase it. But if they call worried about AF with RVR and want to increase to 120mg, then we would need to do serial ECGs.    Routing comment      Attempted to call pt's daughter (Vernell) back, no answer, left vm to call back     Called pt, discussed Dr Galindo's recommendations, she agreed and would like to stay on Sotalol 80mg BID for now, she will give us a call if there is any changed on her condition, pt reports she still have Rx for Sotalol 80mg and does not need a new one.     MAR updated

## 2019-11-08 NOTE — TELEPHONE ENCOUNTER
Patients daughter Vernell has some questions re pt medication sotalol (BETAPACE) 120 MG tablet. She would like a call back at [phone]

## 2019-11-08 NOTE — TELEPHONE ENCOUNTER
Called pt, spoke with daughter (Vernell), she reports that they realized that pt never increased her Sotalol dose, she has been taking Sotalol 80mg BID, they didn't realized until they  the new prescription. Daughter reports pt is been doing good, heart rate wise, denies any palpitations or heart racing. Daughter reports pt did the serial EKGs and was told all looks good, she is wondering now if pt should increased the dose. Informed daughter will discussed with Dr Galindo and will let them know of his recommendations. Daughter verbalizes understanding    To Dr Galindo, please advice. Thank You!

## 2019-11-15 ENCOUNTER — APPOINTMENT (OUTPATIENT)
Dept: NEUROLOGY | Facility: MEDICAL CENTER | Age: 74
End: 2019-11-15
Payer: MEDICARE

## 2019-11-18 ENCOUNTER — TELEPHONE (OUTPATIENT)
Dept: CARDIOLOGY | Facility: MEDICAL CENTER | Age: 74
End: 2019-11-18

## 2019-11-19 NOTE — TELEPHONE ENCOUNTER
Received clearance request from [facility] for pt's upcoming Bilateral SI joint injection with IV sedation scheduled on 12/10/19, they are also requesting to hold pt's Eliquis 3 days prior to procedure.     To Dr Galindo, please advice. Thank You!

## 2019-11-19 NOTE — TELEPHONE ENCOUNTER
Dr Galindo signed clearance form, clearance form faxed back to [facility] w/ Dr Galindo recommendations, F#[phone].     Called pt and notified, pt verbalizes understanding    Copy of clearance to scanning
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
## 2019-11-26 ENCOUNTER — OFFICE VISIT (OUTPATIENT)
Dept: NEUROLOGY | Facility: MEDICAL CENTER | Age: 74
End: 2019-11-26
Payer: MEDICARE

## 2019-11-26 VITALS
DIASTOLIC BLOOD PRESSURE: 68 MMHG | WEIGHT: 218 LBS | RESPIRATION RATE: 16 BRPM | BODY MASS INDEX: 34.14 KG/M2 | HEART RATE: 76 BPM | OXYGEN SATURATION: 96 % | SYSTOLIC BLOOD PRESSURE: 132 MMHG | TEMPERATURE: 95 F

## 2019-11-26 DIAGNOSIS — R41.3 MEMORY LOSS: ICD-10-CM

## 2019-11-26 DIAGNOSIS — R93.0 ABNORMAL MRI OF HEAD: ICD-10-CM

## 2019-11-26 DIAGNOSIS — R51.9 CHRONIC DAILY HEADACHE: ICD-10-CM

## 2019-11-26 DIAGNOSIS — G62.9 NEUROPATHY: ICD-10-CM

## 2019-11-26 DIAGNOSIS — M54.41 CHRONIC MIDLINE LOW BACK PAIN WITH BILATERAL SCIATICA: ICD-10-CM

## 2019-11-26 DIAGNOSIS — M54.42 CHRONIC MIDLINE LOW BACK PAIN WITH BILATERAL SCIATICA: ICD-10-CM

## 2019-11-26 DIAGNOSIS — G89.29 CHRONIC MIDLINE LOW BACK PAIN WITH BILATERAL SCIATICA: ICD-10-CM

## 2019-11-26 DIAGNOSIS — M54.17 RADICULOPATHY, LUMBOSACRAL REGION: ICD-10-CM

## 2019-11-26 PROCEDURE — 99215 OFFICE O/P EST HI 40 MIN: CPT | Performed by: NURSE PRACTITIONER

## 2019-11-26 RX ORDER — AMITRIPTYLINE HYDROCHLORIDE 10 MG/1
10 TABLET, FILM COATED ORAL
Qty: 30 TAB | Refills: 5 | Status: SHIPPED | OUTPATIENT
Start: 2019-11-26 | End: 2020-01-14

## 2019-12-05 ENCOUNTER — NON-PROVIDER VISIT (OUTPATIENT)
Dept: NEUROLOGY | Facility: MEDICAL CENTER | Age: 74
End: 2019-12-05
Payer: MEDICARE

## 2019-12-05 DIAGNOSIS — R93.0 ABNORMAL HEAD MRI: ICD-10-CM

## 2019-12-05 DIAGNOSIS — R41.3 MEMORY LOSS: ICD-10-CM

## 2019-12-05 PROCEDURE — 95951 EEG: CPT | Mod: 52 | Performed by: PSYCHIATRY & NEUROLOGY

## 2019-12-05 NOTE — PROCEDURES
VIDEO ELECTROENCEPHALOGRAM REPORT      Referring provider: SUKI Charles.     DOS:12/5/2019  (total recording of 23 minutes).     INDICATION:  Zeinab Loza 74 y.o. female presenting with headache, dizziness, memory loss, abnormal mri brain.     CURRENT ANTIEPILEPTIC REGIMEN: None.     TECHNIQUE: 30 channel video electroencephalogram (EEG) was performed in accordance with the international 10-20 system. The study was reviewed in bipolar and referential montages. The recording examined the patient during wakeful and drowsy state(s).     DESCRIPTION OF THE RECORD:  During the wakefulness, the background showed a symmetrical 9 Hz alpha activity posteriorly with amplitude of 70 mV.  There was reactivity to eye closure/opening.  A normal anterior-posterior gradient was noted with faster beta frequencies seen anteriorly.  During drowsiness, theta/delta frequencies were seen.    ACTIVATION PROCEDURES:    Intermittent Photic stimulation was performed in a stepwise fashion from 1 to 30 Hz and elicited a normal response (photic driving), most noticeable in the posterior leads.      ICTAL AND/OR INTERICTAL FINDINGS:   There is intermittent slowing in the left frontotemporal region.  No clinical events or seizures were reported or recorded during the study.     EKG: sampling of the EKG recording demonstrated sinus rhythm.     EVENTS:  None.     INTERPRETATION:  This is an abnormal video EEG recording in the awake, and drowsy state(s). There is intermittent slowing in the left frontotemporal region, likely to suggest a structural abnormality. No seizures captured. Clinical and radiological correlation is recommended.    Note: this EEG does not rule out epilepsy.  If the clinical suspicion remains high for seizures, a prolonged recording to capture clinical or subclinical events may be helpful.        Giles Holcomb MD   Epilepsy and Neurodiagnostics.   Clinical  of Neurology [place]  [facility].   Diplomate in Neurology, Epilepsy, and Electrodiagnostic Medicine.   Office: [phone]  Fax: [phone]

## 2019-12-10 ENCOUNTER — OFFICE VISIT (OUTPATIENT)
Dept: CARDIOLOGY | Facility: MEDICAL CENTER | Age: 74
End: 2019-12-10
Payer: MEDICARE

## 2019-12-10 VITALS
DIASTOLIC BLOOD PRESSURE: 80 MMHG | WEIGHT: 228 LBS | HEART RATE: 71 BPM | HEIGHT: 67 IN | SYSTOLIC BLOOD PRESSURE: 128 MMHG | OXYGEN SATURATION: 94 % | BODY MASS INDEX: 35.79 KG/M2

## 2019-12-10 DIAGNOSIS — I48.0 PAROXYSMAL ATRIAL FIBRILLATION (HCC): ICD-10-CM

## 2019-12-10 DIAGNOSIS — I27.20 PULMONARY HYPERTENSION (HCC): ICD-10-CM

## 2019-12-10 LAB — EKG IMPRESSION: NORMAL

## 2019-12-10 PROCEDURE — 99214 OFFICE O/P EST MOD 30 MIN: CPT | Performed by: INTERNAL MEDICINE

## 2019-12-10 PROCEDURE — 93000 ELECTROCARDIOGRAM COMPLETE: CPT | Performed by: INTERNAL MEDICINE

## 2019-12-10 RX ORDER — HYDROCODONE BITARTRATE AND ACETAMINOPHEN 7.5; 325 MG/1; MG/1
1 TABLET ORAL 2 TIMES DAILY
Refills: 0 | Status: ON HOLD | COMMUNITY
Start: 2019-12-06 | End: 2020-08-09 | Stop reason: SDUPTHER

## 2019-12-10 RX ORDER — METHOCARBAMOL 500 MG/1
500 TABLET, FILM COATED ORAL DAILY
Refills: 0 | COMMUNITY
End: 2020-09-29

## 2019-12-10 RX ORDER — METOLAZONE 5 MG/1
5 TABLET ORAL DAILY
COMMUNITY
End: 2020-09-29

## 2019-12-10 NOTE — PROGRESS NOTES
Arrhythmia Clinic Note (Established patient)    DOS: 12/10/2019    Chief complaint/Reason for consult: Atrial fibrillation/flutter    Interval History: 74-year-old female with a history of paroxysmal atrial fibrillation status post multiple ablations including a surgical left atrial maze, severe pulmonary hypertension on triple therapy, who I followed for recurrent paroxysmal atrial fibrillation and flutter.  We discussed that she is too high risk for repeat ablation procedure given her severe pulmonary hypertension for undergoing general anesthesia.  She is on sotalol 80 mg twice daily.  Last visit we discussed increasing this to 120 mg twice daily, but this prescription never made its way to her and she remains on 80 mg twice daily.  However, since last visit she feels much better and has had far fewer palpitations.  She denies chest pain syncope, or presyncope.  Her shortness of breath is at baseline.  She continues to follow with pulmonary hypertension at [facility].  She is requesting to see a general cardiologist and requests Dr. Eng.    ROS (+ highlighted in bold):  Constitutional: Fevers/chills/fatigue/weightloss  HEENT: Blurry vision/eye pain/sore throat/hearing loss  Respiratory: Shortness of breath/cough  Cardiovascular: Chest pain/palpitations/edema/orthopnea/syncope  GI: Nausea/vomitting/diarrhea  MSK: Arthralgias/myagias/muscle weakness  Skin: Rash/sores  Neurological: Numbness/tremors/vertigo  Endocrine: Excessive thirst/polyuria/cold intolerance/heat intolerance  Psych: Depression/anxiety    Past Medical History:   Diagnosis Date   • Aneurysm artery, celiac (HCC) 2019   • Aneurysm of right renal artery (HCC)    • Atrial fibrillation (HCC)    • Diastolic heart failure (HCC)    • Hypertension, essential    • Pulmonary hypertension (HCC)    • Warfarin anticoagulation        No past surgical history on file.    Social History     Socioeconomic History   • Marital status:      Spouse name: Not on  file   • Number of children: Not on file   • Years of education: Not on file   • Highest education level: Not on file   Occupational History   • Not on file   Social Needs   • Financial resource strain: Not on file   • Food insecurity:     Worry: Not on file     Inability: Not on file   • Transportation needs:     Medical: Not on file     Non-medical: Not on file   Tobacco Use   • Smoking status: Never Smoker   • Smokeless tobacco: Never Used   Substance and Sexual Activity   • Alcohol use: Yes     Alcohol/week: 4.2 oz     Types: 7 Shots of liquor per week     Comment: 1 day   • Drug use: No   • Sexual activity: Not on file   Lifestyle   • Physical activity:     Days per week: Not on file     Minutes per session: Not on file   • Stress: Not on file   Relationships   • Social connections:     Talks on phone: Not on file     Gets together: Not on file     Attends Orthodoxy service: Not on file     Active member of club or organization: Not on file     Attends meetings of clubs or organizations: Not on file     Relationship status: Not on file   • Intimate partner violence:     Fear of current or ex partner: Not on file     Emotionally abused: Not on file     Physically abused: Not on file     Forced sexual activity: Not on file   Other Topics Concern   • Not on file   Social History Narrative   • Not on file       No family history on file.    Allergies   Allergen Reactions   • Zolpidem Tartrate Unspecified     Lightheaded and confusion       Current Outpatient Medications   Medication Sig Dispense Refill   • HYDROcodone-acetaminophen (NORCO) 7.5-325 MG per tablet 2 Times a Day.  0   • methocarbamol (ROBAXIN) 500 MG Tab TK 1 TO 2 TS PO TID  0   • metOLazone (ZAROXOLYN) 5 MG Tab Take 5 mg by mouth.     • amitriptyline (ELAVIL) 10 MG Tab Take 1 Tab by mouth every bedtime. 30 Tab 5   • sotalol (BETAPACE) 80 MG Tab Take 1 Tab by mouth 2 times a day. 60 Tab 11   • digoxin (LANOXIN) 125 MCG Tab Take 1 Tab by mouth every day  "at 6 PM. 90 Tab 3   • DILTIAZem CD (CARDIZEM CD) 120 MG CAPSULE SR 24 HR Take 1 Cap by mouth every day. 90 Cap 3   • apixaban (ELIQUIS) 5mg Tab Take 1 Tab by mouth 2 Times a Day. 180 Tab 3   • acetaminophen (TYLENOL) 500 MG Tab Take 500-1,000 mg by mouth every four hours as needed.     • fluticasone (FLONASE) 50 MCG/ACT nasal spray Spray 1 Spray in nose 1 time daily as needed.     • Tadalafil, PAH, (ADCIRCA) 20 MG Tab Take 40 mg by mouth every day. Indications: Pulmonary Arterial Hypertension     • furosemide (LASIX) 40 MG Tab Take 40 mg by mouth 2 Times a Day.     • potassium chloride SA (KDUR) 20 MEQ Tab CR Take 20 mEq by mouth every day.     • therapeutic multivitamin-minerals (THERAGRAN-M) Tab Take 1 Tab by mouth every day.     • PARoxetine (PAXIL) 20 MG Tab Take 20 mg by mouth every day.     • rosuvastatin (CRESTOR) 10 MG Tab Take 10 mg by mouth every evening.     • ambrisentan (LETAIRIS) 10 MG tablet Take 10 mg by mouth every day.     • levothyroxine (SYNTHROID) 75 MCG Tab Take 75 mcg by mouth Every morning on an empty stomach.     • Cholecalciferol (VITAMIN D) 2000 units Cap Take 2,000 Units by mouth every day.       No current facility-administered medications for this visit.        Physical Exam:  Vitals:    12/10/19 1125   BP: 128/80   BP Location: Left arm   Patient Position: Sitting   BP Cuff Size: Adult   Pulse: 71   SpO2: 94%   Weight: 103.4 kg (228 lb)   Height: 1.702 m (5' 7\")     General appearance: NAD, conversant   Eyes: anicteric sclerae, moist conjunctivae; no lid-lag; PERRLA  HENT: Atraumatic; oropharynx clear with moist mucous membranes and no mucosal ulcerations; normal hard and soft palate  Neck: Trachea midline; FROM, supple, no thyromegaly or lymphadenopathy  Lungs: CTA, with normal respiratory effort and no intercostal retractions  CV: RRR, no MRGs, no JVD  Abdomen: Soft, non-tender; no masses or HSM  Extremities: No peripheral edema or extremity lymphadenopathy  Skin: Normal temperature, " turgor and texture; no rash, ulcers or subcutaneous nodules  Psych: Appropriate affect, alert and oriented to person, place and time    Data:  Lipids:   Lab Results   Component Value Date/Time    CHOLSTRLTOT 154 07/30/2019 10:03 AM    TRIGLYCERIDE 110 07/30/2019 10:03 AM    HDL 51 07/30/2019 10:03 AM    LDL 81 07/30/2019 10:03 AM        BMP:  Lab Results   Component Value Date/Time    SODIUM 140 11/01/2019 1429    POTASSIUM 3.7 11/01/2019 1429    CHLORIDE 100 11/01/2019 1429    CO2 31 11/01/2019 1429    GLUCOSE 117 (H) 11/01/2019 1429    BUN 13 11/01/2019 1429    CREATININE 0.79 11/01/2019 1429    CALCIUM 10.2 11/01/2019 1429    ANION 9.0 11/01/2019 1429        TSH:   Lab Results   Component Value Date/Time    TSHULTRASEN 0.740 07/30/2019 1003        THYROXINE (T4):   No results found for: JANETTE     CBC:   Lab Results   Component Value Date/Time    WBC 5.2 11/01/2019 02:29 PM    RBC 3.67 (L) 11/01/2019 02:29 PM    HEMOGLOBIN 11.7 (L) 11/01/2019 02:29 PM    HEMATOCRIT 36.2 (L) 11/01/2019 02:29 PM    MCV 98.6 (H) 11/01/2019 02:29 PM    MCH 31.9 11/01/2019 02:29 PM    MCHC 32.3 (L) 11/01/2019 02:29 PM    RDW 46.5 11/01/2019 02:29 PM    PLATELETCT 268 11/01/2019 02:29 PM    MPV 9.1 11/01/2019 02:29 PM    NEUTSPOLYS 64.70 11/01/2019 02:29 PM    LYMPHOCYTES 20.30 (L) 11/01/2019 02:29 PM    MONOCYTES 11.10 11/01/2019 02:29 PM    EOSINOPHILS 2.90 11/01/2019 02:29 PM    BASOPHILS 0.60 11/01/2019 02:29 PM    IMMGRAN 0.40 11/01/2019 02:29 PM    NRBC 0.00 11/01/2019 02:29 PM    NEUTS 3.37 11/01/2019 02:29 PM    LYMPHS 1.06 11/01/2019 02:29 PM    LYMPHS 16 07/09/2019 09:30 AM    MONOS 0.58 11/01/2019 02:29 PM    EOS 0.15 11/01/2019 02:29 PM    BASO 0.03 11/01/2019 02:29 PM    IMMGRANAB 0.02 11/01/2019 02:29 PM    NRBCAB 0.00 11/01/2019 02:29 PM        CBC w/o DIFF  Lab Results   Component Value Date/Time    WBC 5.2 11/01/2019 02:29 PM    RBC 3.67 (L) 11/01/2019 02:29 PM    HEMOGLOBIN 11.7 (L) 11/01/2019 02:29 PM    MCV 98.6  (H) 11/01/2019 02:29 PM    MCH 31.9 11/01/2019 02:29 PM    MCHC 32.3 (L) 11/01/2019 02:29 PM    RDW 46.5 11/01/2019 02:29 PM    MPV 9.1 11/01/2019 02:29 PM       EKG interpreted by me: Sinus rhythm, QTC is around 500 ms    Impression/Plan:  1. Paroxysmal atrial fibrillation (HCC)  EKG   2. Pulmonary hypertension (HCC)  REFERRAL TO CARDIOLOGY     1.  Paroxysmal atrial fibrillation/flutter.  Too high risk for ablation.  Sotalol is managing arrhythmia well at this time.  Continue 80 mg twice daily.  Continue anticoagulation with Eliquis.    2.  Pulmonary hypertension.  Managed at [facility].  She would like a referral to general cardiology and would like to see Dr. Eng in clinic to continue to help follow her pulmonary hypertension.  I will place this referral.    Annual follow-up, return in 12 months.    Thee Galindo MD  Cardiac Electrophysiology

## 2019-12-11 NOTE — ASSESSMENT & PLAN NOTE
Controlled   Continue Coumadin and dose adjustment as per INR  Continue cardiac monitoring  
Due to pulmonary hypertension.  Stable respiratory symptoms.  Continue outpatient medications.  Continue to provide her oxygen as needed.  
High risk given recent epidural injection   She underwent MRI lumbar spine on July 6, 2019 and it showed without acute inflammatory changes, signs of discitis, osteomyelitis or abscess.  She underwent lumbar puncture and fluid analysis did not show any signs of meningitis.  Neurology evaluated her and made recommendations.  
Reported E. coli urine growth from [facility].  Initially had urinary hesitancy.  Continue IV Rocephin, complete 3-day course.  She denies any acute symptoms of urinary tract infection.  
Resume home statin therapy   
Resume levothyroxine   
Stable respiratory symptoms.    resume home letairis, cialis   Continue to provide her oxygen.  
Suspect hypovolemic .  Patient was on diuretics  Holding Lasix  Avoid nephrotoxic medications   Renal function has been improving.  Continue to monitor  
Unclear cause  Plan workup as above  Neurology evaluated her and recommended outpatient follow-up  CT scan abdomen pelvis showed possible lesion in pancreas and liver.  Ordered MRI to evaluate it further.  
Unclear source, blood Cxs NGTD.  U culture at outlying facility with E. coli although would not explain headache back pain  Abnormal CT H scan with asymmetric low attenuation.  Follow-up MRI with not enhancing left frontal lobe lesion-consider l demyelinating disease, old CVA, TBI, lymphoma or other mass or vasculitis, viral.  No signs of other bacterial infection-DC vancomycin.  Continue IV Rocephin for E. coli UTI  Neurology evaluated her and recommended outpatient follow-up.  She underwent lumbar puncture on July 9, 2019 and fluid analysis did not show signs of acute infection.  Neurology would like to follow her as outpatient.  Appreciate neurology recommendations.  
Well-controlled  Continue lisinopril , beta-blocker  Continue to monitor  
2

## 2020-01-09 ENCOUNTER — TELEPHONE (OUTPATIENT)
Dept: NEUROLOGY | Facility: MEDICAL CENTER | Age: 75
End: 2020-01-09

## 2020-01-09 DIAGNOSIS — I48.0 PAROXYSMAL ATRIAL FIBRILLATION (HCC): ICD-10-CM

## 2020-01-09 NOTE — TELEPHONE ENCOUNTER
Working on patient assistance application for Eliquis    Patient requested samples however there are no samples available at this time please advise

## 2020-01-09 NOTE — TELEPHONE ENCOUNTER
EEG was abnormal. We can discuss more about this during her f/u visit. Thanks     JH    I left a message with pt's  to return my call.

## 2020-01-09 NOTE — TELEPHONE ENCOUNTER
Called [facility] if we could use coupon to pt, per [facility], coupon does not work because pt has medicare.     Called pt's daughter, informed her Patient Assistance paper works are in process and [facility] is out of samples, advise daughter pt would need to pay 3% out of pocket anyway before patient assistance program kicks in, daughter will discussed it w/ patient, she requested a short script to be send to [facility] for a week for now and they will find out how much that will cost, if they are unable to afford it, per daughter pt might need to switch to Coumadin.
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
## 2020-01-14 ENCOUNTER — OFFICE VISIT (OUTPATIENT)
Dept: NEUROLOGY | Facility: MEDICAL CENTER | Age: 75
End: 2020-01-14
Payer: MEDICARE

## 2020-01-14 VITALS
TEMPERATURE: 97 F | SYSTOLIC BLOOD PRESSURE: 120 MMHG | WEIGHT: 217 LBS | RESPIRATION RATE: 16 BRPM | HEIGHT: 67 IN | OXYGEN SATURATION: 98 % | HEART RATE: 90 BPM | DIASTOLIC BLOOD PRESSURE: 72 MMHG | BODY MASS INDEX: 34.06 KG/M2

## 2020-01-14 DIAGNOSIS — R51.9 CHRONIC DAILY HEADACHE: ICD-10-CM

## 2020-01-14 DIAGNOSIS — R41.3 MEMORY LOSS: ICD-10-CM

## 2020-01-14 DIAGNOSIS — R94.01 ABNORMAL EEG: ICD-10-CM

## 2020-01-14 DIAGNOSIS — R93.0 ABNORMAL HEAD MRI: ICD-10-CM

## 2020-01-14 DIAGNOSIS — G62.9 NEUROPATHY: ICD-10-CM

## 2020-01-14 PROCEDURE — 99215 OFFICE O/P EST HI 40 MIN: CPT | Performed by: NURSE PRACTITIONER

## 2020-01-14 RX ORDER — AMITRIPTYLINE HYDROCHLORIDE 10 MG/1
20 TABLET, FILM COATED ORAL
Qty: 60 TAB | Refills: 11 | Status: SHIPPED | OUTPATIENT
Start: 2020-01-14 | End: 2020-01-15

## 2020-01-15 ENCOUNTER — HOSPITAL ENCOUNTER (OUTPATIENT)
Facility: MEDICAL CENTER | Age: 75
End: 2020-01-16
Attending: EMERGENCY MEDICINE | Admitting: HOSPITALIST
Payer: MEDICARE

## 2020-01-15 ENCOUNTER — APPOINTMENT (OUTPATIENT)
Dept: RADIOLOGY | Facility: MEDICAL CENTER | Age: 75
End: 2020-01-15
Attending: HOSPITALIST
Payer: MEDICARE

## 2020-01-15 ENCOUNTER — APPOINTMENT (OUTPATIENT)
Dept: RADIOLOGY | Facility: MEDICAL CENTER | Age: 75
End: 2020-01-15
Attending: EMERGENCY MEDICINE
Payer: MEDICARE

## 2020-01-15 DIAGNOSIS — E87.6 HYPOKALEMIA: ICD-10-CM

## 2020-01-15 DIAGNOSIS — G44.89 OTHER HEADACHE SYNDROME: ICD-10-CM

## 2020-01-15 DIAGNOSIS — R23.0 CYANOSIS: ICD-10-CM

## 2020-01-15 DIAGNOSIS — R11.2 NON-INTRACTABLE VOMITING WITH NAUSEA, UNSPECIFIED VOMITING TYPE: ICD-10-CM

## 2020-01-15 DIAGNOSIS — E83.52 HYPERCALCEMIA: ICD-10-CM

## 2020-01-15 PROBLEM — R06.89 HYPERCAPNIA: Status: ACTIVE | Noted: 2020-01-15

## 2020-01-15 PROBLEM — I73.89 ACROCYANOSIS (HCC): Status: ACTIVE | Noted: 2020-01-15

## 2020-01-15 LAB
ALBUMIN SERPL BCP-MCNC: 4.3 G/DL (ref 3.2–4.9)
ALBUMIN/GLOB SERPL: 1.4 G/DL
ALP SERPL-CCNC: 75 U/L (ref 30–99)
ALT SERPL-CCNC: 30 U/L (ref 2–50)
ANION GAP SERPL CALC-SCNC: 12 MMOL/L (ref 0–11.9)
APTT PPP: 40.9 SEC (ref 24.7–36)
AST SERPL-CCNC: 36 U/L (ref 12–45)
BASE EXCESS BLDA CALC-SCNC: 11 MMOL/L (ref -4–3)
BASOPHILS # BLD AUTO: 0.4 % (ref 0–1.8)
BASOPHILS # BLD: 0.03 K/UL (ref 0–0.12)
BILIRUB SERPL-MCNC: 0.8 MG/DL (ref 0.1–1.5)
BODY TEMPERATURE: ABNORMAL CENTIGRADE
BUN SERPL-MCNC: 30 MG/DL (ref 8–22)
CALCIUM SERPL-MCNC: 10.7 MG/DL (ref 8.5–10.5)
CHLORIDE SERPL-SCNC: 90 MMOL/L (ref 96–112)
CO2 SERPL-SCNC: 36 MMOL/L (ref 20–33)
CREAT SERPL-MCNC: 1.16 MG/DL (ref 0.5–1.4)
EOSINOPHIL # BLD AUTO: 0.07 K/UL (ref 0–0.51)
EOSINOPHIL NFR BLD: 1 % (ref 0–6.9)
ERYTHROCYTE [DISTWIDTH] IN BLOOD BY AUTOMATED COUNT: 47.2 FL (ref 35.9–50)
ERYTHROCYTE [SEDIMENTATION RATE] IN BLOOD BY WESTERGREN METHOD: 55 MM/HOUR (ref 0–30)
GLOBULIN SER CALC-MCNC: 3 G/DL (ref 1.9–3.5)
GLUCOSE SERPL-MCNC: 122 MG/DL (ref 65–99)
HCO3 BLDA-SCNC: 36 MMOL/L (ref 17–25)
HCT VFR BLD AUTO: 42.3 % (ref 37–47)
HGB BLD-MCNC: 14.2 G/DL (ref 12–16)
IMM GRANULOCYTES # BLD AUTO: 0.03 K/UL (ref 0–0.11)
IMM GRANULOCYTES NFR BLD AUTO: 0.4 % (ref 0–0.9)
INHALED O2 FLOW RATE: 4.5 L/MIN (ref 2–10)
INR PPP: 1.31 (ref 0.87–1.13)
LYMPHOCYTES # BLD AUTO: 1.36 K/UL (ref 1–4.8)
LYMPHOCYTES NFR BLD: 18.5 % (ref 22–41)
MAGNESIUM SERPL-MCNC: 2 MG/DL (ref 1.5–2.5)
MCH RBC QN AUTO: 32.6 PG (ref 27–33)
MCHC RBC AUTO-ENTMCNC: 33.6 G/DL (ref 33.6–35)
MCV RBC AUTO: 97 FL (ref 81.4–97.8)
METHGB MFR BLD: 0.5 % (ref 0.4–1.5)
MONOCYTES # BLD AUTO: 0.69 K/UL (ref 0–0.85)
MONOCYTES NFR BLD AUTO: 9.4 % (ref 0–13.4)
NEUTROPHILS # BLD AUTO: 5.17 K/UL (ref 2–7.15)
NEUTROPHILS NFR BLD: 70.3 % (ref 44–72)
NRBC # BLD AUTO: 0 K/UL
NRBC BLD-RTO: 0 /100 WBC
NT-PROBNP SERPL IA-MCNC: 289 PG/ML (ref 0–125)
PCO2 BLDA: 49.4 MMHG (ref 26–37)
PH BLDA: 7.48 [PH] (ref 7.4–7.5)
PLATELET # BLD AUTO: 254 K/UL (ref 164–446)
PMV BLD AUTO: 8.8 FL (ref 9–12.9)
PO2 BLDA: 86 MMHG (ref 64–87)
POTASSIUM SERPL-SCNC: 2.8 MMOL/L (ref 3.6–5.5)
PROT SERPL-MCNC: 7.3 G/DL (ref 6–8.2)
PROTHROMBIN TIME: 16.6 SEC (ref 12–14.6)
RBC # BLD AUTO: 4.36 M/UL (ref 4.2–5.4)
SAO2 % BLDA: 95.9 % (ref 93–99)
SODIUM SERPL-SCNC: 138 MMOL/L (ref 135–145)
TROPONIN T SERPL-MCNC: 19 NG/L (ref 6–19)
WBC # BLD AUTO: 7.4 K/UL (ref 4.8–10.8)

## 2020-01-15 PROCEDURE — 94660 CPAP INITIATION&MGMT: CPT

## 2020-01-15 PROCEDURE — 700102 HCHG RX REV CODE 250 W/ 637 OVERRIDE(OP)

## 2020-01-15 PROCEDURE — A9270 NON-COVERED ITEM OR SERVICE: HCPCS | Performed by: HOSPITALIST

## 2020-01-15 PROCEDURE — A9270 NON-COVERED ITEM OR SERVICE: HCPCS

## 2020-01-15 PROCEDURE — 93922 UPR/L XTREMITY ART 2 LEVELS: CPT

## 2020-01-15 PROCEDURE — G0378 HOSPITAL OBSERVATION PER HR: HCPCS

## 2020-01-15 PROCEDURE — 71045 X-RAY EXAM CHEST 1 VIEW: CPT

## 2020-01-15 PROCEDURE — 83880 ASSAY OF NATRIURETIC PEPTIDE: CPT

## 2020-01-15 PROCEDURE — 85025 COMPLETE CBC W/AUTO DIFF WBC: CPT

## 2020-01-15 PROCEDURE — 85730 THROMBOPLASTIN TIME PARTIAL: CPT

## 2020-01-15 PROCEDURE — 36415 COLL VENOUS BLD VENIPUNCTURE: CPT

## 2020-01-15 PROCEDURE — 700102 HCHG RX REV CODE 250 W/ 637 OVERRIDE(OP): Performed by: HOSPITALIST

## 2020-01-15 PROCEDURE — 99285 EMERGENCY DEPT VISIT HI MDM: CPT

## 2020-01-15 PROCEDURE — 94760 N-INVAS EAR/PLS OXIMETRY 1: CPT

## 2020-01-15 PROCEDURE — 99220 PR INITIAL OBSERVATION CARE,LEVL III: CPT | Performed by: HOSPITALIST

## 2020-01-15 PROCEDURE — 84484 ASSAY OF TROPONIN QUANT: CPT

## 2020-01-15 PROCEDURE — 85610 PROTHROMBIN TIME: CPT

## 2020-01-15 PROCEDURE — 82803 BLOOD GASES ANY COMBINATION: CPT

## 2020-01-15 PROCEDURE — 83735 ASSAY OF MAGNESIUM: CPT

## 2020-01-15 PROCEDURE — 700105 HCHG RX REV CODE 258: Performed by: HOSPITALIST

## 2020-01-15 PROCEDURE — 85652 RBC SED RATE AUTOMATED: CPT

## 2020-01-15 PROCEDURE — 70450 CT HEAD/BRAIN W/O DYE: CPT

## 2020-01-15 PROCEDURE — 83050 HGB METHEMOGLOBIN QUAN: CPT

## 2020-01-15 PROCEDURE — 80053 COMPREHEN METABOLIC PANEL: CPT

## 2020-01-15 PROCEDURE — 93005 ELECTROCARDIOGRAM TRACING: CPT | Performed by: EMERGENCY MEDICINE

## 2020-01-15 RX ORDER — TADALAFIL 20 MG/1
40 TABLET ORAL
Status: DISCONTINUED | OUTPATIENT
Start: 2020-01-16 | End: 2020-01-16 | Stop reason: HOSPADM

## 2020-01-15 RX ORDER — TADALAFIL 20 MG/1
20 TABLET ORAL
Status: DISCONTINUED | OUTPATIENT
Start: 2020-01-15 | End: 2020-01-15

## 2020-01-15 RX ORDER — DILTIAZEM HYDROCHLORIDE 120 MG/1
120 CAPSULE, COATED, EXTENDED RELEASE ORAL DAILY
Status: DISCONTINUED | OUTPATIENT
Start: 2020-01-16 | End: 2020-01-16 | Stop reason: HOSPADM

## 2020-01-15 RX ORDER — DIGOXIN 125 MCG
125 TABLET ORAL EVERY EVENING
Status: DISCONTINUED | OUTPATIENT
Start: 2020-01-15 | End: 2020-01-16 | Stop reason: HOSPADM

## 2020-01-15 RX ORDER — AMBRISENTAN 10 MG/1
10 TABLET, FILM COATED ORAL DAILY
Status: DISCONTINUED | OUTPATIENT
Start: 2020-01-15 | End: 2020-01-16 | Stop reason: HOSPADM

## 2020-01-15 RX ORDER — SODIUM CHLORIDE 9 MG/ML
1000 INJECTION, SOLUTION INTRAVENOUS ONCE
Status: COMPLETED | OUTPATIENT
Start: 2020-01-15 | End: 2020-01-16

## 2020-01-15 RX ORDER — DILTIAZEM HYDROCHLORIDE 120 MG/1
120 CAPSULE, COATED, EXTENDED RELEASE ORAL DAILY
COMMUNITY
End: 2020-03-20 | Stop reason: SDUPTHER

## 2020-01-15 RX ORDER — BISACODYL 10 MG
10 SUPPOSITORY, RECTAL RECTAL
Status: DISCONTINUED | OUTPATIENT
Start: 2020-01-15 | End: 2020-01-16 | Stop reason: HOSPADM

## 2020-01-15 RX ORDER — METOLAZONE 5 MG/1
5 TABLET ORAL DAILY
Status: DISCONTINUED | OUTPATIENT
Start: 2020-01-16 | End: 2020-01-16 | Stop reason: HOSPADM

## 2020-01-15 RX ORDER — POTASSIUM CHLORIDE 20 MEQ/1
40 TABLET, EXTENDED RELEASE ORAL ONCE
Status: COMPLETED | OUTPATIENT
Start: 2020-01-15 | End: 2020-01-15

## 2020-01-15 RX ORDER — SOTALOL HYDROCHLORIDE 80 MG/1
80 TABLET ORAL 2 TIMES DAILY
Status: DISCONTINUED | OUTPATIENT
Start: 2020-01-16 | End: 2020-01-16 | Stop reason: HOSPADM

## 2020-01-15 RX ORDER — AMITRIPTYLINE HYDROCHLORIDE 10 MG/1
20 TABLET, FILM COATED ORAL
Status: DISCONTINUED | OUTPATIENT
Start: 2020-01-15 | End: 2020-01-16 | Stop reason: HOSPADM

## 2020-01-15 RX ORDER — HYDROCODONE BITARTRATE AND ACETAMINOPHEN 5; 325 MG/1; MG/1
1 TABLET ORAL ONCE
Status: COMPLETED | OUTPATIENT
Start: 2020-01-15 | End: 2020-01-15

## 2020-01-15 RX ORDER — FUROSEMIDE 40 MG/1
40 TABLET ORAL 2 TIMES DAILY
Status: DISCONTINUED | OUTPATIENT
Start: 2020-01-16 | End: 2020-01-16 | Stop reason: HOSPADM

## 2020-01-15 RX ORDER — ONDANSETRON 2 MG/ML
4 INJECTION INTRAMUSCULAR; INTRAVENOUS EVERY 4 HOURS PRN
Status: DISCONTINUED | OUTPATIENT
Start: 2020-01-15 | End: 2020-01-16 | Stop reason: HOSPADM

## 2020-01-15 RX ORDER — METHOCARBAMOL 500 MG/1
500 TABLET, FILM COATED ORAL 3 TIMES DAILY
Status: DISCONTINUED | OUTPATIENT
Start: 2020-01-15 | End: 2020-01-16 | Stop reason: HOSPADM

## 2020-01-15 RX ORDER — ROSUVASTATIN CALCIUM 20 MG/1
10 TABLET, COATED ORAL EVERY EVENING
Status: DISCONTINUED | OUTPATIENT
Start: 2020-01-15 | End: 2020-01-16 | Stop reason: HOSPADM

## 2020-01-15 RX ORDER — AMITRIPTYLINE HYDROCHLORIDE 10 MG/1
20 TABLET, FILM COATED ORAL
Status: ON HOLD | COMMUNITY
End: 2020-10-24

## 2020-01-15 RX ORDER — LEVOTHYROXINE SODIUM 0.07 MG/1
75 TABLET ORAL
Status: DISCONTINUED | OUTPATIENT
Start: 2020-01-16 | End: 2020-01-16 | Stop reason: HOSPADM

## 2020-01-15 RX ORDER — PAROXETINE HYDROCHLORIDE 20 MG/1
20 TABLET, FILM COATED ORAL DAILY
Status: DISCONTINUED | OUTPATIENT
Start: 2020-01-16 | End: 2020-01-16 | Stop reason: HOSPADM

## 2020-01-15 RX ORDER — POTASSIUM CHLORIDE 20 MEQ/1
20 TABLET, EXTENDED RELEASE ORAL DAILY
Status: DISCONTINUED | OUTPATIENT
Start: 2020-01-16 | End: 2020-01-16 | Stop reason: HOSPADM

## 2020-01-15 RX ORDER — ACETAMINOPHEN 500 MG
1000 TABLET ORAL EVERY 6 HOURS PRN
Status: DISCONTINUED | OUTPATIENT
Start: 2020-01-15 | End: 2020-01-16 | Stop reason: HOSPADM

## 2020-01-15 RX ORDER — ONDANSETRON 4 MG/1
4 TABLET, ORALLY DISINTEGRATING ORAL EVERY 4 HOURS PRN
Status: DISCONTINUED | OUTPATIENT
Start: 2020-01-15 | End: 2020-01-16 | Stop reason: HOSPADM

## 2020-01-15 RX ORDER — POLYETHYLENE GLYCOL 3350 17 G/17G
1 POWDER, FOR SOLUTION ORAL
Status: DISCONTINUED | OUTPATIENT
Start: 2020-01-15 | End: 2020-01-16 | Stop reason: HOSPADM

## 2020-01-15 RX ORDER — DIGOXIN 125 MCG
125 TABLET ORAL EVERY EVENING
COMMUNITY
End: 2020-04-28 | Stop reason: SDUPTHER

## 2020-01-15 RX ORDER — AMOXICILLIN 250 MG
2 CAPSULE ORAL 2 TIMES DAILY
Status: DISCONTINUED | OUTPATIENT
Start: 2020-01-15 | End: 2020-01-16 | Stop reason: HOSPADM

## 2020-01-15 RX ORDER — HYDROCODONE BITARTRATE AND ACETAMINOPHEN 5; 325 MG/1; MG/1
1 TABLET ORAL EVERY 8 HOURS PRN
Status: DISCONTINUED | OUTPATIENT
Start: 2020-01-15 | End: 2020-01-16 | Stop reason: HOSPADM

## 2020-01-15 RX ADMIN — HYDROCODONE BITARTRATE AND ACETAMINOPHEN 1 TABLET: 5; 325 TABLET ORAL at 16:25

## 2020-01-15 RX ADMIN — DIGOXIN 125 MCG: 125 TABLET ORAL at 20:59

## 2020-01-15 RX ADMIN — POTASSIUM CHLORIDE 40 MEQ: 1500 TABLET, EXTENDED RELEASE ORAL at 19:55

## 2020-01-15 RX ADMIN — ACETAMINOPHEN 1000 MG: 500 TABLET, FILM COATED ORAL at 20:59

## 2020-01-15 RX ADMIN — AMITRIPTYLINE HYDROCHLORIDE 20 MG: 10 TABLET, FILM COATED ORAL at 21:11

## 2020-01-15 RX ADMIN — AMBRISENTAN 10 MG: 10 TABLET, FILM COATED ORAL at 21:04

## 2020-01-15 RX ADMIN — APIXABAN 5 MG: 5 TABLET, FILM COATED ORAL at 21:10

## 2020-01-15 RX ADMIN — TADALAFIL 40 MG: 20 TABLET ORAL at 22:05

## 2020-01-15 RX ADMIN — METHOCARBAMOL TABLETS 500 MG: 500 TABLET, COATED ORAL at 20:59

## 2020-01-15 RX ADMIN — ROSUVASTATIN CALCIUM 10 MG: 20 TABLET, FILM COATED ORAL at 19:55

## 2020-01-15 RX ADMIN — SODIUM CHLORIDE 1000 ML: 9 INJECTION, SOLUTION INTRAVENOUS at 21:00

## 2020-01-15 ASSESSMENT — LIFESTYLE VARIABLES
EVER FELT BAD OR GUILTY ABOUT YOUR DRINKING: NO
ON A TYPICAL DAY WHEN YOU DRINK ALCOHOL HOW MANY DRINKS DO YOU HAVE: 1
TOTAL SCORE: 0
ALCOHOL_USE: YES
DOES PATIENT WANT TO STOP DRINKING: NO
HAVE PEOPLE ANNOYED YOU BY CRITICIZING YOUR DRINKING: NO
HOW MANY TIMES IN THE PAST YEAR HAVE YOU HAD 5 OR MORE DRINKS IN A DAY: 0
EVER_SMOKED: NEVER
EVER_SMOKED: NEVER
HAVE YOU EVER FELT YOU SHOULD CUT DOWN ON YOUR DRINKING: NO
CONSUMPTION TOTAL: NEGATIVE
TOTAL SCORE: 0
TOTAL SCORE: 0
EVER HAD A DRINK FIRST THING IN THE MORNING TO STEADY YOUR NERVES TO GET RID OF A HANGOVER: NO
AVERAGE NUMBER OF DAYS PER WEEK YOU HAVE A DRINK CONTAINING ALCOHOL: 5

## 2020-01-15 ASSESSMENT — ENCOUNTER SYMPTOMS
FEVER: 0
SHORTNESS OF BREATH: 0
BLURRED VISION: 0
HEADACHES: 1
DIZZINESS: 0
HEMOPTYSIS: 0
CHILLS: 0
STRIDOR: 0
SORE THROAT: 0
BACK PAIN: 1
BLOOD IN STOOL: 0
ABDOMINAL PAIN: 0
SPEECH CHANGE: 0
NERVOUS/ANXIOUS: 0
COUGH: 0
VOMITING: 1
TINGLING: 0
NAUSEA: 1
EYE PAIN: 0
FOCAL WEAKNESS: 0
DIARRHEA: 0

## 2020-01-15 ASSESSMENT — COPD QUESTIONNAIRES
DURING THE PAST 4 WEEKS HOW MUCH DID YOU FEEL SHORT OF BREATH: NONE/LITTLE OF THE TIME
DO YOU EVER COUGH UP ANY MUCUS OR PHLEGM?: NO/ONLY WITH OCCASIONAL COLDS OR INFECTIONS
HAVE YOU SMOKED AT LEAST 100 CIGARETTES IN YOUR ENTIRE LIFE: NO/DON'T KNOW
COPD SCREENING SCORE: 2

## 2020-01-15 NOTE — ED PROVIDER NOTES
ED Provider Note    Scribed for Melchor Dalal M.D. by Janny Brunner. 1/15/2020, 1:46 PM.    Primary care provider: Ashia Tidwell M.D.  Means of arrival: Walk In   History obtained from: Patient   History limited by: None     CHIEF COMPLAINT  Chief Complaint   Patient presents with   • Cyanosis     (B) hands, since yesterday   • Sent by MD Woods   • N/V     intermittent over last 3 days       HPI  Zeinab Loza is a 74 y.o. female who presents to the Emergency Department for evaluation of persistent cyanosis in bilateral hands onset yesterday. Patient reports she was sent here from [facility] to get a doppler of bilateral hands done. She adds that she has history of cyanosis which lasts approximately two hours and resolves, however this episode has been persistent. Presents associated symptoms of intermittent nausea, intermittent emesis, dizziness with exertion, headache for the last 3 days. Denies upper extremity weakness or diarrhea.  Patient is anticoagulated on Eliquis.  History of cyanosis and Pulmonary arterial hypertension. Last summer, patient had a spinal tap and she began having headaches soon after. Surgical history of arterial catheter for mitral valve repair. Patient follows neurology which diagnosed her with a frontal lesion, she will have a 24 hour EEG done in March. Patient also follows up with Electrophysiologist and Cardiologist.     REVIEW OF SYSTEMS  Pertinent positives include cyanosis of bilateral hands, intermittent nausea, intermittent emesis, dizziness with exertion, headache for the last 3 days. Pertinent negatives include no upper extremity weakness, or diarrhea. As above, all other systems reviewed and are negative.   See HPI for further details.     PAST MEDICAL HISTORY   has a past medical history of Aneurysm artery, celiac (HCC) (2019), Aneurysm of right renal artery (HCC), Atrial fibrillation (HCC), Diastolic heart failure (HCC), Hypertension,  "essential, Pulmonary hypertension (HCC), and Warfarin anticoagulation.    SURGICAL HISTORY  patient denies any surgical history    SOCIAL HISTORY  Social History     Tobacco Use   • Smoking status: Never Smoker   • Smokeless tobacco: Never Used   Substance Use Topics   • Alcohol use: Yes     Alcohol/week: 4.2 oz     Types: 7 Shots of liquor per week     Comment: 1 day   • Drug use: No      Social History     Substance and Sexual Activity   Drug Use No       FAMILY HISTORY  No family history on file.    CURRENT MEDICATIONS  Home Medications     Reviewed by Kalie Moulton (Pharmacy Tech) on 01/15/20 at 1554  Med List Status: Complete   Medication Last Dose Status   acetaminophen (TYLENOL) 500 MG Tab 1/14/2020 Active   ambrisentan (LETAIRIS) 10 MG tablet 1/15/2020 Active   amitriptyline (ELAVIL) 10 MG Tab 1/14/2020 Active   apixaban (ELIQUIS) 5mg Tab 1/15/2020 Active   Cholecalciferol (VITAMIN D) 2000 units Cap 1/15/2020 Active   digoxin (LANOXIN) 125 MCG Tab 1/14/2020 Active   DILTIAZem CD (CARDIZEM CD) 120 MG CAPSULE SR 24 HR 1/15/2020 Active   furosemide (LASIX) 40 MG Tab 1/15/2020 Active   HYDROcodone-acetaminophen (NORCO) 7.5-325 MG per tablet 1/15/2020 Active   levothyroxine (SYNTHROID) 75 MCG Tab 1/15/2020 Active   methocarbamol (ROBAXIN) 500 MG Tab 1/15/2020 Active   metOLazone (ZAROXOLYN) 5 MG Tab 1/15/2020 Active   PARoxetine (PAXIL) 20 MG Tab 1/15/2020 Active   potassium chloride SA (KDUR) 20 MEQ Tab CR 1/15/2020 Active   rosuvastatin (CRESTOR) 10 MG Tab 1/14/2020 Active   sotalol (BETAPACE) 80 MG Tab 1/15/2020 Active   Tadalafil, PAH, (ADCIRCA) 20 MG Tab 1/15/2020 Active   therapeutic multivitamin-minerals (THERAGRAN-M) Tab 1/15/2020 Active                ALLERGIES  Allergies   Allergen Reactions   • Zolpidem Tartrate Unspecified     Lightheaded and confusion       PHYSICAL EXAM  VITAL SIGNS: /72   Pulse 80   Temp 36.7 °C (98.1 °F) (Temporal)   Resp 18   Ht 1.702 m (5' 7\")   Wt 99.2 kg (218 lb " 11.1 oz)   SpO2 93%   BMI 34.25 kg/m²   Vitals reviewed.  Constitutional: Alert.  HENT: No signs of trauma, Bilateral external ears normal, Nose normal.   Eyes: Pupils are equal and reactive, Conjunctiva normal, Non-icteric.   Neck: Normal range of motion, No tenderness, Supple, No stridor.   Lymphatic: No lymphadenopathy noted.   Cardiovascular: Irregularly irregular, no murmurs. Well healed sternotomy scar from mitral valve repair.   Thorax & Lungs: Normal breath sounds, No respiratory distress, No wheezing, No chest tenderness.   Abdomen: Bowel sounds normal, Soft, No tenderness, No peritoneal signs, No masses, No pulsatile masses.   Extremities: Severe cyanosis of bilateral hands.  Musculoskeletal: Good range of motion in all major joints. No tenderness to palpation or major deformities noted.   Neurologic: Alert , Normal motor function, Normal sensory function, No focal deficits noted.   Psychiatric: Affect normal, Judgment normal, Mood normal.     DIAGNOSTIC STUDIES / PROCEDURES    LABS  Labs Reviewed   CBC WITH DIFFERENTIAL - Abnormal; Notable for the following components:       Result Value    MPV 8.8 (*)     Lymphocytes 18.50 (*)     All other components within normal limits    Narrative:     Indicate which anticoagulants the patient is on:->UNKNOWN   COMP METABOLIC PANEL - Abnormal; Notable for the following components:    Potassium 2.8 (*)     Chloride 90 (*)     Co2 36 (*)     Anion Gap 12.0 (*)     Glucose 122 (*)     Bun 30 (*)     Calcium 10.7 (*)     All other components within normal limits    Narrative:     Indicate which anticoagulants the patient is on:->UNKNOWN   PROTHROMBIN TIME - Abnormal; Notable for the following components:    PT 16.6 (*)     INR 1.31 (*)     All other components within normal limits    Narrative:     Indicate which anticoagulants the patient is on:->UNKNOWN   APTT - Abnormal; Notable for the following components:    APTT 40.9 (*)     All other components within normal  limits    Narrative:     Indicate which anticoagulants the patient is on:->UNKNOWN   PROBRAIN NATRIURETIC PEPTIDE, NT - Abnormal; Notable for the following components:    NT-proBNP 289 (*)     All other components within normal limits    Narrative:     Indicate which anticoagulants the patient is on:->UNKNOWN   ARTERIAL BLOOD GAS - Abnormal; Notable for the following components:    Pco2 49.4 (*)     Hco3 36 (*)     Base Excess 11 (*)     All other components within normal limits   ESTIMATED GFR - Abnormal; Notable for the following components:    GFR If  55 (*)     GFR If Non  46 (*)     All other components within normal limits    Narrative:     Indicate which anticoagulants the patient is on:->UNKNOWN   TROPONIN    Narrative:     Indicate which anticoagulants the patient is on:->UNKNOWN      All labs reviewed by me.    EKG Interpretation:  Interpreted by me    12 Lead EKG interpreted by me to show:  AFIB  Rate 70  Axis: LAD -25  T wave inversion inferiorly  Normal ST segments  QT interval prolonged  New AFIB from previous EKG on 12/10/2019  LVH  My impression of this EKG: Nonspecific ST change does not meet STEMI requirements at this time.     RADIOLOGY  CT-HEAD W/O   Final Result      1.  No evidence of acute territorial infarct, intracranial hemorrhage or mass lesion.   2.  Mild diffuse cerebral substance loss.   3.  Moderate microangiopathic ischemic change versus demyelination or gliosis.   4.  Left posterior frontal encephalomalacia consistent with chronic infarct.      DX-CHEST-PORTABLE (1 VIEW)   Final Result      1.  Stable mild cardiomegaly.   2.  No evidence of pneumonia or pulmonary edema.        The radiologist's interpretation of all radiological studies have been reviewed by me.    COURSE & MEDICAL DECISION MAKING  Nursing notes, VS, PMSFHx reviewed in chart.          1:46 PM Patient seen and examined at bedside. Ordered for CT-head, DX-chest, PT/INR, APTT, BNP, CBC  with diff., CMP, Troponin, and EKG to evaluate.  Discussed with patient that her cyanosis is more likely due to pulmonary arterial hypertension and not a blood clot. Informed patient that I will consult with Dr. Eng, patient's cardiologist, for more information about patient's condition. Informed patient about lab work and radiology to rule out possible diagnoses. Patient does not want any medication at this time. Patient understands and agrees with plan of care.      The patient's head CT is unremarkable.    2:14 PM Paged Hospitalist     2:23 PM - I discussed the patient's case and the above findings with Dr. Owen ([facility]). Informed him about the conversation I had with Dr. Serrato. He will assess the patient for further evaluation.                FINAL IMPRESSION  1. Cyanosis    2. Other headache syndrome    3. Non-intractable vomiting with nausea, unspecified vomiting type          I, Janny Brunner (Sharon), am scribing for, and in the presence of, Melchor Dalal M.D..    Electronically signed by: Janny Brunner (Sharon), 1/15/2020    IMelchor M.D. personally performed the services described in this documentation, as scribed by Janny Brunner in my presence, and it is both accurate and complete.    C    The note accurately reflects work and decisions made by me.  Melchor Dalal M.D.  1/15/2020  4:49 PM

## 2020-01-15 NOTE — ED NOTES
Med rec updated and complete. Allergies reviewed. Met with pt at bedside. Dicussed current medications and last doses taken.  No antibiotic use in last 14 days.      Home pharmacy  [facility]

## 2020-01-15 NOTE — ED TRIAGE NOTES
Zeinab Yang Dago  Chief Complaint   Patient presents with   • Cyanosis     (B) hands, since yesterday   • Sent by MD Woods   • N/V     intermittent over last 3 days     Pt ambulatory to triage with above complaint.  VSS, no acute distress, although dizzy with exertion,  Hx of PAH,  And see [facility] who sent pt to ER for doppler/ U/S of (B) hands.  Pt has hx of cyanosis that usually only lasts an hour or two,  And never has lasted this long.  CMS intact.    Pt returned to lobby, educated on triage process, and to inform staff of any changes or concerns.

## 2020-01-16 ENCOUNTER — APPOINTMENT (OUTPATIENT)
Dept: CARDIOLOGY | Facility: MEDICAL CENTER | Age: 75
End: 2020-01-16
Attending: HOSPITALIST
Payer: MEDICARE

## 2020-01-16 ENCOUNTER — PATIENT OUTREACH (OUTPATIENT)
Dept: HEALTH INFORMATION MANAGEMENT | Facility: OTHER | Age: 75
End: 2020-01-16

## 2020-01-16 VITALS
HEIGHT: 67 IN | HEART RATE: 64 BPM | SYSTOLIC BLOOD PRESSURE: 116 MMHG | DIASTOLIC BLOOD PRESSURE: 55 MMHG | WEIGHT: 217.37 LBS | OXYGEN SATURATION: 95 % | TEMPERATURE: 98.1 F | BODY MASS INDEX: 34.12 KG/M2 | RESPIRATION RATE: 20 BRPM

## 2020-01-16 LAB
ANION GAP SERPL CALC-SCNC: 9 MMOL/L (ref 0–11.9)
BASOPHILS # BLD AUTO: 0.5 % (ref 0–1.8)
BASOPHILS # BLD: 0.03 K/UL (ref 0–0.12)
BUN SERPL-MCNC: 35 MG/DL (ref 8–22)
CALCIUM SERPL-MCNC: 10.8 MG/DL (ref 8.5–10.5)
CHLORIDE SERPL-SCNC: 95 MMOL/L (ref 96–112)
CO2 SERPL-SCNC: 36 MMOL/L (ref 20–33)
CREAT SERPL-MCNC: 1.2 MG/DL (ref 0.5–1.4)
EOSINOPHIL # BLD AUTO: 0.12 K/UL (ref 0–0.51)
EOSINOPHIL NFR BLD: 2.1 % (ref 0–6.9)
ERYTHROCYTE [DISTWIDTH] IN BLOOD BY AUTOMATED COUNT: 48.6 FL (ref 35.9–50)
GLUCOSE SERPL-MCNC: 121 MG/DL (ref 65–99)
HCT VFR BLD AUTO: 38.9 % (ref 37–47)
HGB BLD-MCNC: 12.8 G/DL (ref 12–16)
IMM GRANULOCYTES # BLD AUTO: 0.03 K/UL (ref 0–0.11)
IMM GRANULOCYTES NFR BLD AUTO: 0.5 % (ref 0–0.9)
LV EJECT FRACT  99904: 65
LV EJECT FRACT MOD 2C 99903: 53.41
LV EJECT FRACT MOD 4C 99902: 74.93
LV EJECT FRACT MOD BP 99901: 66.29
LYMPHOCYTES # BLD AUTO: 1.57 K/UL (ref 1–4.8)
LYMPHOCYTES NFR BLD: 27.3 % (ref 22–41)
MAGNESIUM SERPL-MCNC: 2.1 MG/DL (ref 1.5–2.5)
MCH RBC QN AUTO: 32.8 PG (ref 27–33)
MCHC RBC AUTO-ENTMCNC: 32.9 G/DL (ref 33.6–35)
MCV RBC AUTO: 99.7 FL (ref 81.4–97.8)
MONOCYTES # BLD AUTO: 0.66 K/UL (ref 0–0.85)
MONOCYTES NFR BLD AUTO: 11.5 % (ref 0–13.4)
NEUTROPHILS # BLD AUTO: 3.35 K/UL (ref 2–7.15)
NEUTROPHILS NFR BLD: 58.1 % (ref 44–72)
NRBC # BLD AUTO: 0 K/UL
NRBC BLD-RTO: 0 /100 WBC
PLATELET # BLD AUTO: 192 K/UL (ref 164–446)
PMV BLD AUTO: 8.5 FL (ref 9–12.9)
POTASSIUM SERPL-SCNC: 2.9 MMOL/L (ref 3.6–5.5)
POTASSIUM SERPL-SCNC: 3 MMOL/L (ref 3.6–5.5)
POTASSIUM SERPL-SCNC: 3.5 MMOL/L (ref 3.6–5.5)
RBC # BLD AUTO: 3.9 M/UL (ref 4.2–5.4)
SODIUM SERPL-SCNC: 140 MMOL/L (ref 135–145)
WBC # BLD AUTO: 5.8 K/UL (ref 4.8–10.8)

## 2020-01-16 PROCEDURE — 93306 TTE W/DOPPLER COMPLETE: CPT

## 2020-01-16 PROCEDURE — A9270 NON-COVERED ITEM OR SERVICE: HCPCS | Performed by: NURSE PRACTITIONER

## 2020-01-16 PROCEDURE — 84132 ASSAY OF SERUM POTASSIUM: CPT | Mod: 91,XU

## 2020-01-16 PROCEDURE — 93306 TTE W/DOPPLER COMPLETE: CPT | Mod: 26 | Performed by: INTERNAL MEDICINE

## 2020-01-16 PROCEDURE — 83735 ASSAY OF MAGNESIUM: CPT

## 2020-01-16 PROCEDURE — 700102 HCHG RX REV CODE 250 W/ 637 OVERRIDE(OP): Performed by: HOSPITALIST

## 2020-01-16 PROCEDURE — A9270 NON-COVERED ITEM OR SERVICE: HCPCS | Performed by: HOSPITALIST

## 2020-01-16 PROCEDURE — 99217 PR OBSERVATION CARE DISCHARGE: CPT | Performed by: INTERNAL MEDICINE

## 2020-01-16 PROCEDURE — 94660 CPAP INITIATION&MGMT: CPT

## 2020-01-16 PROCEDURE — 85025 COMPLETE CBC W/AUTO DIFF WBC: CPT

## 2020-01-16 PROCEDURE — 700102 HCHG RX REV CODE 250 W/ 637 OVERRIDE(OP): Performed by: NURSE PRACTITIONER

## 2020-01-16 PROCEDURE — 80048 BASIC METABOLIC PNL TOTAL CA: CPT

## 2020-01-16 PROCEDURE — G0378 HOSPITAL OBSERVATION PER HR: HCPCS

## 2020-01-16 RX ORDER — POTASSIUM CHLORIDE 20 MEQ/1
40 TABLET, EXTENDED RELEASE ORAL ONCE
Status: COMPLETED | OUTPATIENT
Start: 2020-01-16 | End: 2020-01-16

## 2020-01-16 RX ORDER — POTASSIUM CHLORIDE 20 MEQ/1
40 TABLET, EXTENDED RELEASE ORAL DAILY
Status: DISCONTINUED | OUTPATIENT
Start: 2020-01-16 | End: 2020-01-16

## 2020-01-16 RX ORDER — POTASSIUM CHLORIDE 20 MEQ/1
20 TABLET, EXTENDED RELEASE ORAL 2 TIMES DAILY
Qty: 60 TAB | Refills: 0 | Status: SHIPPED | OUTPATIENT
Start: 2020-01-16 | End: 2020-07-09

## 2020-01-16 RX ADMIN — POTASSIUM CHLORIDE 20 MEQ: 1500 TABLET, EXTENDED RELEASE ORAL at 05:50

## 2020-01-16 RX ADMIN — FUROSEMIDE 40 MG: 40 TABLET ORAL at 05:50

## 2020-01-16 RX ADMIN — DILTIAZEM HYDROCHLORIDE 120 MG: 120 CAPSULE, COATED, EXTENDED RELEASE ORAL at 05:52

## 2020-01-16 RX ADMIN — POTASSIUM CHLORIDE 40 MEQ: 1500 TABLET, EXTENDED RELEASE ORAL at 06:43

## 2020-01-16 RX ADMIN — HYDROCODONE BITARTRATE AND ACETAMINOPHEN 1 TABLET: 5; 325 TABLET ORAL at 00:52

## 2020-01-16 RX ADMIN — APIXABAN 5 MG: 5 TABLET, FILM COATED ORAL at 05:51

## 2020-01-16 RX ADMIN — POTASSIUM CHLORIDE 40 MEQ: 1500 TABLET, EXTENDED RELEASE ORAL at 10:03

## 2020-01-16 RX ADMIN — SOTALOL HYDROCHLORIDE 80 MG: 80 TABLET ORAL at 05:52

## 2020-01-16 RX ADMIN — LEVOTHYROXINE SODIUM 75 MCG: 75 TABLET ORAL at 05:51

## 2020-01-16 RX ADMIN — PAROXETINE HYDROCHLORIDE 20 MG: 20 TABLET, FILM COATED ORAL at 05:51

## 2020-01-16 RX ADMIN — METHOCARBAMOL TABLETS 500 MG: 500 TABLET, COATED ORAL at 14:16

## 2020-01-16 RX ADMIN — HYDROCODONE BITARTRATE AND ACETAMINOPHEN 1 TABLET: 5; 325 TABLET ORAL at 11:07

## 2020-01-16 RX ADMIN — SENNOSIDES AND DOCUSATE SODIUM 2 TABLET: 8.6; 5 TABLET ORAL at 05:50

## 2020-01-16 NOTE — DISCHARGE INSTRUCTIONS
Discharge Instructions    Discharged to home by car with relative. Discharged via wheelchair, hospital escort: Yes.  Special equipment needed: Not Applicable    Be sure to schedule a follow-up appointment with your primary care doctor or any specialists as instructed.     Discharge Plan:   Diet Plan: Discussed  Activity Level: Discussed  Confirmed Follow up Appointment: Appointment Scheduled  Confirmed Symptoms Management: Discussed  Medication Reconciliation Updated: Yes  Influenza Vaccine Indication: Not indicated: Previously immunized this influenza season and > 8 years of age    I understand that a diet low in cholesterol, fat, and sodium is recommended for good health. Unless I have been given specific instructions below for another diet, I accept this instruction as my diet prescription.   Other diet: Heart healthy    Special Instructions: None    · Is patient discharged on Warfarin / Coumadin?   No     Depression / Suicide Risk    As you are discharged from this [facility] facility, it is important to learn how to keep safe from harming yourself.    Recognize the warning signs:  · Abrupt changes in personality, positive or negative- including increase in energy   · Giving away possessions  · Change in eating patterns- significant weight changes-  positive or negative  · Change in sleeping patterns- unable to sleep or sleeping all the time   · Unwillingness or inability to communicate  · Depression  · Unusual sadness, discouragement and loneliness  · Talk of wanting to die  · Neglect of personal appearance   · Rebelliousness- reckless behavior  · Withdrawal from people/activities they love  · Confusion- inability to concentrate     If you or a loved one observes any of these behaviors or has concerns about self-harm, here's what you can do:  · Talk about it- your feelings and reasons for harming yourself  · Remove any means that you might use to hurt yourself (examples: pills, rope, extension cords,  firearm)  · Get professional help from the community (Mental Health, Substance Abuse, psychological counseling)  · Do not be alone:Call your Safe Contact- someone whom you trust who will be there for you.  · Call your local CRISIS HOTLINE 053-1370 or 877-307-0210  · Call your local Children's Mobile Crisis Response Team [place] (429) 277-6461 or www.Company  · Call the toll free National Suicide Prevention Hotlines   · National Suicide Prevention Lifeline 363-570-EXGU (1551)  · National Hope Line Network 800-SUICIDE (513-7811)

## 2020-01-16 NOTE — PROGRESS NOTES
Pt with potassium of 2.9 with AM labs. MD Gandhi was made aware of this. Per MD, ok to give additional dose of 40 MEQ of potassium and check her magnesium. Will monitor.

## 2020-01-16 NOTE — ASSESSMENT & PLAN NOTE
Rule out methemoglobinemia  Check echocardiogram to rule out thrombotic event (despite patient being on apixaban).  Check Upper extremity arterial flow.  Supportive oxygen via nasal canula.  I have ordered and reviewed an ABG

## 2020-01-16 NOTE — ED NOTES
Arterial studies in progress at bedside. Patient in no acute distress. Daughter remains at bedside. Will continue to monitor

## 2020-01-16 NOTE — DISCHARGE SUMMARY
Discharge Summary    CHIEF COMPLAINT ON ADMISSION  Chief Complaint   Patient presents with   • Cyanosis     (B) hands, since yesterday   • Sent by MD Woods   • N/V     intermittent over last 3 days     Reason for Admission  Blue hands    Admission Date  1/15/2020    CODE STATUS  Full Code    HPI & HOSPITAL COURSE  Ms. Loza is a 74-year-old female who presented to the emergency department on 1/15/2020 with bilateral hand cyanosis x1 day.  She states this has occurred in the past but usually lasts for approximately 2 hours and then resolves.  She does not have a history of Reynaud's syndrome.  On exam she had normal gross and fine motor skills, 2+ pulses bilaterally, and both hands were warm to the touch.  Her lab work was unremarkable outside of hypokalemia which resolved after supplementation.  Her methemoglobin level was also normal.  An echocardiogram was done and was unremarkable.  An ultrasound of both hands was also performed and showed normal brachial ABIs.  Medications were also reviewed and no adverse reactions related to her symptoms were found.  She is therefore being discharged today with a recommendation of close PCP follow-up. Of note, we have increased her daily amount of potassium supplementation and ordered a repeat potassium level in 2 days (1/18/2020). Results should be sent to her PCP.     Therefore, she is discharged in good and stable condition to home with close outpatient follow-up.    Discharge Date  1/16/2020    FOLLOW UP ITEMS POST DISCHARGE  PCP within 7 days    DISCHARGE DIAGNOSES  Active Problems:    Chronic back pain POA: Yes    Acrocyanosis (HCC) POA: Yes    Hypokalemia POA: Yes    Essential hypertension POA: Yes      Overview: Overview:       IMO Load 2014 1.1    Hypothyroidism POA: Yes      Overview: Overview:       IMO Load 2014 1.1    Paroxysmal atrial fibrillation (HCC) POA: Yes    PAH (pulmonary artery hypertension) (HCC) POA: Yes    Chronic respiratory failure (HCC)  POA: Yes    Hypercalcemia POA: Yes    Hypercapnia POA: Yes  Resolved Problems:    * No resolved hospital problems. *    FOLLOW UP  Future Appointments   Date Time Provider Department Center   1/27/2020  1:00 PM Ramiro Eng M.D. [facility] None   2/3/2020  1:00 PM [facility]   3/4/2020  2:30 PM NEURODIAGNOSTIC LAB [facility] None   3/5/2020  2:30 PM NEURODIAGNOSTIC LAB [facility] None   3/18/2020  2:00 PM [facility] None     MEDICATIONS ON DISCHARGE     Medication List      Change how you take these medications      Instructions   potassium chloride SA 20 MEQ Tbcr  What changed:  when to take this  Commonly known as:  Kdur   Take 1 Tab by mouth 2 times a day.  Dose:  20 mEq        Continue taking these medications      Instructions   acetaminophen 500 MG Tabs  Commonly known as:  TYLENOL   Take 1,000 mg by mouth every 6 hours as needed for Mild Pain.  Dose:  1,000 mg     ADCIRCA 20 MG Tabs  Generic drug:  Tadalafil (PAH)   Take 40 mg by mouth every bedtime. Indications: Pulmonary Arterial Hypertension  Dose:  40 mg     ambrisentan 10 MG tablet  Commonly known as:  LETAIRIS   Take 10 mg by mouth every day.  Dose:  10 mg     amitriptyline 10 MG Tabs  Commonly known as:  ELAVIL   Take 20 mg by mouth every bedtime.  Dose:  20 mg     apixaban 5mg Tabs  Commonly known as:  ELIQUIS   Take 5 mg by mouth 2 Times a Day.  Dose:  5 mg     digoxin 125 MCG Tabs  Commonly known as:  LANOXIN   Take 125 mcg by mouth every evening.  Dose:  125 mcg     DILTIAZem  MG Cp24  Commonly known as:  CARDIZEM CD   Take 120 mg by mouth every day.  Dose:  120 mg     furosemide 40 MG Tabs  Commonly known as:  LASIX   Take 40 mg by mouth 2 Times a Day.  Dose:  40 mg     HYDROcodone-acetaminophen 7.5-325 MG per tablet  Commonly known as:  NORCO   Take 1 Tab by mouth 2 Times a Day.  Dose:  1 Tab     levothyroxine 75 MCG Tabs  Commonly known as:  SYNTHROID   Take 75 mcg by mouth Every morning on an empty  stomach.  Dose:  75 mcg     methocarbamol 500 MG Tabs  Commonly known as:  ROBAXIN   500 mg 3 times a day.  Dose:  500 mg     metOLazone 5 MG Tabs  Commonly known as:  ZAROXOLYN   Take 5 mg by mouth every day.  Dose:  5 mg     PARoxetine 20 MG Tabs  Commonly known as:  PAXIL   Take 20 mg by mouth every day.  Dose:  20 mg     rosuvastatin 10 MG Tabs  Commonly known as:  CRESTOR   Take 10 mg by mouth every evening.  Dose:  10 mg     sotalol 80 MG Tabs  Commonly known as:  BETAPACE   Take 1 Tab by mouth 2 times a day.  Dose:  80 mg     therapeutic multivitamin-minerals Tabs   Take 1 Tab by mouth every day.  Dose:  1 Tab     Vitamin D 2000 units Caps   Take 2,000 Units by mouth every day.  Dose:  2,000 Units          Allergies  Allergies   Allergen Reactions   • Zolpidem Tartrate Unspecified     Lightheaded and confusion     DIET  Orders Placed This Encounter   Procedures   • Diet Order Regular     Standing Status:   Standing     Number of Occurrences:   1     Order Specific Question:   Diet:     Answer:   Regular [1]     ACTIVITY  As tolerated.  Exercise encouraged.  Weight bearing as tolerated    CONSULTATIONS  None    PROCEDURES  None    LABORATORY  Lab Results   Component Value Date    SODIUM 140 01/16/2020    POTASSIUM 3.0 (L) 01/16/2020    CHLORIDE 95 (L) 01/16/2020    CO2 36 (H) 01/16/2020    GLUCOSE 121 (H) 01/16/2020    BUN 35 (H) 01/16/2020    CREATININE 1.20 01/16/2020      Lab Results   Component Value Date    WBC 5.8 01/16/2020    HEMOGLOBIN 12.8 01/16/2020    HEMATOCRIT 38.9 01/16/2020    PLATELETCT 192 01/16/2020      Total time of the discharge process exceeds 32 minutes.        Electronically signed by:  Peri Toro, MSN, RN, APRN, ACNPC-AG, CCRN  Nurse Practitioner, [facility]  Work # [phone]    1/16/2020    9:45 AM

## 2020-01-16 NOTE — H&P
Hospital Medicine History & Physical Note    Date of Service  1/15/2020    Primary Care Physician  Ashia Tidwell M.D.    Consultants  None    Code Status  FULL    Chief Complaint  Bilateral hand cyanosis x 1 day    History of Presenting Illness  74 y.o. female history of pulmonary hypertension, atrial fibrillation rate controlled and on chronic anticoagulation, essential hypertension, diastolic heart failure, prior pancreatic pseudoaneurysm as well as aneurysm of right renal artery, obesity and chronic respiratory failure on home oxygen who presented 1/15/2020 with nonpainful bilateral hand cyanosis since approximately 1:30 PM yesterday.  She states that this is occurred in the past but only usually lasts for about 2 hours and resolves.  She usually notices it in one hand but not bilateral.  She has not had any new medications nor been out in the cold.  Bilateral hands are deep purple and warm to the touch.  She has no history of Loza syndrome.  The patient had called her [place] pulmonary hypertensive specialist he recommended that she come to the emergency room for further evaluation.  Currently the patient is alert and oriented x3.  She is able to talk in full sentences.  She is rate controlled with her atrial fibrillation.  Her daughter is at bedside.  Of note the patient does state having cataract surgery approximately 1 week ago she did not undergo any general anesthesia but did have fentanyl as well as some local anesthesia.  Patient lives with her  and daughter on a single-story home they have a gas stove but did not believe they have any carbon monoxide poisoning.  She does have chronic headaches but nobody else in the family has any new headaches or shortness of breath.  The patient states she has been consistently wearing her BiPAP and her oxygen at home.  She denies any chest discomfort.  She can readily move with good fine motor skills all of her individual fingers.  She has good grasp.   She is noted over the last 3 days some vomiting and is unsure what it is related to.      Review of Systems  Review of Systems   Constitutional: Negative for chills and fever.   HENT: Negative for congestion and sore throat.    Eyes: Negative for blurred vision and pain.   Respiratory: Negative for cough, hemoptysis, shortness of breath and stridor.    Cardiovascular: Negative for chest pain and leg swelling.   Gastrointestinal: Positive for nausea and vomiting. Negative for abdominal pain, blood in stool and diarrhea.   Genitourinary: Negative for dysuria and frequency.   Musculoskeletal: Positive for back pain (chronic). Negative for joint pain.   Neurological: Positive for headaches. Negative for dizziness, tingling, speech change and focal weakness.   Psychiatric/Behavioral: The patient is not nervous/anxious.        Past Medical History   has a past medical history of Aneurysm artery, celiac (HCC) (2019), Aneurysm of right renal artery (HCC), Atrial fibrillation (HCC), Diastolic heart failure (HCC), Hypertension, essential, Pulmonary hypertension (HCC), and Warfarin anticoagulation.    Surgical History   has no past surgical history on file.     Family History  family history is not on file.     Social History   reports that she has never smoked. She has never used smokeless tobacco. She reports current alcohol use of about 4.2 oz of alcohol per week. She reports that she does not use drugs.    Allergies  Allergies   Allergen Reactions   • Zolpidem Tartrate Unspecified     Lightheaded and confusion       Medications  Prior to Admission Medications   Prescriptions Last Dose Informant Patient Reported? Taking?   Cholecalciferol (VITAMIN D) 2000 units Cap 1/15/2020 at 0900 Patient Yes No   Sig: Take 2,000 Units by mouth every day.   DILTIAZem CD (CARDIZEM CD) 120 MG CAPSULE SR 24 HR 1/15/2020 at 0900 Patient Yes Yes   Sig: Take 120 mg by mouth every day.   HYDROcodone-acetaminophen (NORCO) 7.5-325 MG per tablet  1/15/2020 at 0900 Patient Yes No   Sig: Take 1 Tab by mouth 2 Times a Day.   PARoxetine (PAXIL) 20 MG Tab 1/15/2020 at 0900 Patient Yes No   Sig: Take 20 mg by mouth every day.   Tadalafil, PAH, (ADCIRCA) 20 MG Tab 1/15/2020 at 0900 Patient Yes No   Sig: Take 20 mg by mouth 2 Times a Day. Indications: Pulmonary Arterial Hypertension   acetaminophen (TYLENOL) 500 MG Tab 2020 at 2100 Patient Yes No   Sig: Take 1,000 mg by mouth every 6 hours as needed for Mild Pain.   ambrisentan (LETAIRIS) 10 MG tablet 1/15/2020 at 0900 Patient Yes No   Sig: Take 10 mg by mouth every day.   amitriptyline (ELAVIL) 10 MG Tab 2020 at 2200 Patient Yes Yes   Sig: Take 20 mg by mouth every bedtime.   apixaban (ELIQUIS) 5mg Tab 1/15/2020 at 0900 Patient Yes Yes   Sig: Take 5 mg by mouth 2 Times a Day.   digoxin (LANOXIN) 125 MCG Tab 2020 at 1830 Patient Yes Yes   Sig: Take 125 mcg by mouth every evening.   furosemide (LASIX) 40 MG Tab 1/15/2020 at 0900 Patient Yes No   Sig: Take 40 mg by mouth 2 Times a Day.   levothyroxine (SYNTHROID) 75 MCG Tab 1/15/2020 at 0800 Patient Yes No   Sig: Take 75 mcg by mouth Every morning on an empty stomach.   metOLazone (ZAROXOLYN) 5 MG Tab 1/15/2020 at 0900 Patient Yes No   Sig: Take 5 mg by mouth every day.   methocarbamol (ROBAXIN) 500 MG Tab 1/15/2020 at 0900 Patient Yes No   Si mg 3 times a day.   potassium chloride SA (KDUR) 20 MEQ Tab CR 1/15/2020 at 0900 Patient Yes No   Sig: Take 20 mEq by mouth every day.   rosuvastatin (CRESTOR) 10 MG Tab 2020 at 1930 Patient Yes No   Sig: Take 10 mg by mouth every evening.   sotalol (BETAPACE) 80 MG Tab 1/15/2020 at 0900 Patient Yes No   Sig: Take 1 Tab by mouth 2 times a day.   therapeutic multivitamin-minerals (THERAGRAN-M) Tab 1/15/2020 at 0900 Patient Yes No   Sig: Take 1 Tab by mouth every day.      Facility-Administered Medications: None       Physical Exam  Temp:  [36.7 °C (98.1 °F)] 36.7 °C (98.1 °F)  Pulse:  [71-82] 82  Resp:   [18-29] 24  BP: (114-126)/(49-72) 114/63  SpO2:  [93 %-100 %] 95 %    Physical Exam  Vitals signs reviewed.   Constitutional:       Appearance: Normal appearance. She is obese. She is not ill-appearing.   HENT:      Head: Normocephalic and atraumatic.      Nose: Nose normal.      Mouth/Throat:      Mouth: Mucous membranes are moist.      Pharynx: No oropharyngeal exudate.   Eyes:      General: No scleral icterus.     Extraocular Movements: Extraocular movements intact.      Conjunctiva/sclera:      Right eye: Hemorrhage present.   Neck:      Musculoskeletal: Normal range of motion. No muscular tenderness.   Cardiovascular:      Rate and Rhythm: Normal rate and regular rhythm.      Pulses:           Radial pulses are 1+ on the right side and 2+ on the left side.        Dorsalis pedis pulses are 2+ on the right side and 2+ on the left side.      Heart sounds: No murmur.   Pulmonary:      Effort: Pulmonary effort is normal. No respiratory distress.      Breath sounds: Normal breath sounds. No stridor. No wheezing.   Abdominal:      General: Bowel sounds are normal. There is no distension.      Palpations: Abdomen is soft.      Tenderness: There is no tenderness.   Musculoskeletal:         General: No swelling, tenderness or deformity.   Lymphadenopathy:      Cervical: No cervical adenopathy.   Skin:     General: Skin is warm.      Coloration: Skin is cyanotic (bilateral warm hands with R slighty worse than L hand cyanosis. Slight cyanosis of bilateral feet.  No cyanosis of lips/nosea). Skin is not jaundiced.      Findings: No bruising.   Neurological:      General: No focal deficit present.      Mental Status: She is alert and oriented to person, place, and time. Mental status is at baseline.      Cranial Nerves: No cranial nerve deficit.   Psychiatric:         Mood and Affect: Mood normal.         Thought Content: Thought content normal.         Judgment: Judgment normal.         Laboratory:  Recent Labs      01/15/20  1420   WBC 7.4   RBC 4.36   HEMOGLOBIN 14.2   HEMATOCRIT 42.3   MCV 97.0   MCH 32.6   MCHC 33.6   RDW 47.2   PLATELETCT 254   MPV 8.8*     Recent Labs     01/15/20  1420   SODIUM 138   POTASSIUM 2.8*   CHLORIDE 90*   CO2 36*   GLUCOSE 122*   BUN 30*   CREATININE 1.16   CALCIUM 10.7*     Recent Labs     01/15/20  1420   ALTSGPT 30   ASTSGOT 36   ALKPHOSPHAT 75   TBILIRUBIN 0.8   GLUCOSE 122*     Recent Labs     01/15/20  1420   APTT 40.9*   INR 1.31*     Recent Labs     01/15/20  1420   NTPROBNP 289*         Recent Labs     01/15/20  1420   TROPONINT 19       Urinalysis:    No results found     Imaging:  CT-HEAD W/O   Final Result      1.  No evidence of acute territorial infarct, intracranial hemorrhage or mass lesion.   2.  Mild diffuse cerebral substance loss.   3.  Moderate microangiopathic ischemic change versus demyelination or gliosis.   4.  Left posterior frontal encephalomalacia consistent with chronic infarct.      DX-CHEST-PORTABLE (1 VIEW)   Final Result      1.  Stable mild cardiomegaly.   2.  No evidence of pneumonia or pulmonary edema.      EC-ECHOCARDIOGRAM COMPLETE W/O CONT    (Results Pending)   US-EXTREMITY ARTERY UPPER UNILAT W/WBI (COMBO)    (Results Pending)   US-WBI SINGLE LEVEL BILAT    (Results Pending)         Assessment/Plan:  I anticipate this patient is appropriate for observation status at this time.    Chronic back pain- (present on admission)  Assessment & Plan  methacarbamol scheduled  Prn Norco 5-325mg     Hypercapnia  Assessment & Plan  Chronic due to PAH  Monitor labs and mentation  A+Ox3 with clear fluent speech 1/15/2020    Hypercalcemia  Assessment & Plan  IV fluids x 1 L only  Diuresis with lasix  Consider calcitonin if not improving.  Monitor BMP    Hypokalemia  Assessment & Plan  Replace and monitor  Check Mg level    Acrocyanosis (HCC)  Assessment & Plan  Rule out methemoglobinemia  Check echocardiogram to rule out thrombotic event (despite patient being on  apixaban).  Check Upper extremity arterial flow.  Supportive oxygen via nasal canula.  I have ordered and reviewed an ABG     Chronic respiratory failure (HCC)- (present on admission)  Assessment & Plan  On 5L NC chronically  As needed RT per protocol  Continue PAH medications.    PAH (pulmonary artery hypertension) (HCC)- (present on admission)  Assessment & Plan  Follows outpatient with [place] [facility] specialist  Continue ambrisentan, tadalafil  Supplemental oxygen    Paroxysmal atrial fibrillation (HCC)- (present on admission)  Assessment & Plan  On telemetry and rate controlled  On apixaban for anticoagulation  Follows with Dr Pelayo, EP here at [facility]    Hypothyroidism- (present on admission)  Assessment & Plan  Continue active treatment with synthroid   TSH:0.74 in past 6 months    Essential hypertension- (present on admission)  Assessment & Plan  Monitor vitals.  On metolazone, lasix, sotalol, tadalafil, and diltiazem        VTE prophylaxis: apixaban

## 2020-01-16 NOTE — ASSESSMENT & PLAN NOTE
On telemetry and rate controlled  On apixaban for anticoagulation  Follows with Dr Pelayo EP here at [facility]

## 2020-01-16 NOTE — ED NOTES
Report called up to RN assigned to patient. All questions answered. Patient has tele orders so he will be down shortly to transport patient.

## 2020-01-16 NOTE — ED NOTES
Patient is aware of plan for admission. Daughter is at bedside. Pain medication given and patient was provided with a lunchbox. Will continue to monitor

## 2020-01-16 NOTE — CARE PLAN
Problem: Safety  Goal: Will remain free from injury  Outcome: PROGRESSING AS EXPECTED     Problem: Pain Management  Goal: Pain level will decrease to patient's comfort goal  Outcome: PROGRESSING AS EXPECTED     Problem: Skin Integrity  Goal: Risk for impaired skin integrity will decrease  Outcome: PROGRESSING AS EXPECTED     Problem: Discharge Barriers/Planning  Goal: Patient's continuum of care needs will be met  Outcome: PROGRESSING AS EXPECTED     Problem: Hemodynamic Status  Goal: Stable Vital Signs and Fluid Balance  Outcome: PROGRESSING AS EXPECTED

## 2020-01-16 NOTE — ASSESSMENT & PLAN NOTE
Follows outpatient with [place] [facility] specialist  Continue ambrisentan, tadalafil  Supplemental oxygen

## 2020-01-16 NOTE — PROGRESS NOTES
Spiritual Care Note    Patient's Name: Zeinab Loza   MRN: [id number]    YOB: 1945   Age and Gender: 74 y.o. female   Service Area: CDU   Room (and Bed): [place] [address]   Ethnicity or Nationality: White   Primary Language: English   Sikh/Spiritual preference: Non-Judaism   Place of Residence: [place], [place]   Family/Friends/Others Present: Yes, daughter   Clinical Team Present: No   Medical Diagnosis(-es)/Procedure(s): Cyanosis   Code Status: Full Code    Date of Admission: 1/15/2020   Length of Stay: 0 days        Spiritual Care Provider Information    Name of Spiritual Care Provider:   Mary Jane Gaffney  Title of Spiritual Care Provider:     Phone Number:     [phone]  E-mail:      [email or web address]  Total time :     15 minutes    Spiritual Screen Results    Gen Nursing    Is your spiritual health or inner well-being important to you as you cope with your medical condition?: Yes  Would you like to receive a visit from our Spiritual Care team or your own Yazidism or spiritual leader?: Yes  Was spiritual care education provided to the patient?: Yes     Palliative Care    Was spiritual care education provided to the patient?: Yes      Encounter/Request Information    Visited With:      Patient and family together  Nature of the Visit:     Initial, On shift  Continue Visiting:     Yes  General Visit:      Yes  Referral From/ Origin of Request:   Epic nursing    Religous Needs/Values  Sikh Needs:     Visit  Sikh Needs:     Prayer    Spiritual Assessment     Observations/Symptoms:    Accepting    Interacton/Conversation:    PT was cheerful. Daughter at bedside.    Assessment:      Need   Need: Seeking Spiritual Assistance and Support    Methods:      Presence, listening, prayer    Plan:       Visit Upon Request    Notes:    Thank you for allowing Spiritual Care to support this patient and family. Contact [phone] for additional assistance, changes in patient status or with any  questions/concerns.

## 2020-01-16 NOTE — PROGRESS NOTES
Assumed pt care at 0700. Received report from Vinicio SCHREIBER. A&O x4. Pt denies pain at this time. Respirations even and unlabored on RA. Hands cyanotic, pulses 2+ bilaterally, warm to touch.  Updated on POC, communication board updated. Bed locked and in lowest position. Call light and belongings within reach. Non-skid socks in place. Needs met, will continue to monitor.

## 2020-01-22 ENCOUNTER — HOSPITAL ENCOUNTER (OUTPATIENT)
Dept: LAB | Facility: MEDICAL CENTER | Age: 75
End: 2020-01-22
Attending: NURSE PRACTITIONER
Payer: MEDICARE

## 2020-01-22 DIAGNOSIS — E87.6 HYPOKALEMIA: ICD-10-CM

## 2020-01-22 DIAGNOSIS — E83.52 HYPERCALCEMIA: ICD-10-CM

## 2020-01-22 LAB
ANION GAP SERPL CALC-SCNC: 8 MMOL/L (ref 0–11.9)
BUN SERPL-MCNC: 12 MG/DL (ref 8–22)
CALCIUM SERPL-MCNC: 10.1 MG/DL (ref 8.5–10.5)
CHLORIDE SERPL-SCNC: 99 MMOL/L (ref 96–112)
CO2 SERPL-SCNC: 33 MMOL/L (ref 20–33)
CREAT SERPL-MCNC: 0.74 MG/DL (ref 0.5–1.4)
FASTING STATUS PATIENT QL REPORTED: NORMAL
GLUCOSE SERPL-MCNC: 106 MG/DL (ref 65–99)
POTASSIUM SERPL-SCNC: 3.5 MMOL/L (ref 3.6–5.5)
SODIUM SERPL-SCNC: 140 MMOL/L (ref 135–145)

## 2020-01-22 PROCEDURE — 36415 COLL VENOUS BLD VENIPUNCTURE: CPT

## 2020-01-22 PROCEDURE — 80048 BASIC METABOLIC PNL TOTAL CA: CPT

## 2020-01-27 ENCOUNTER — OFFICE VISIT (OUTPATIENT)
Dept: CARDIOLOGY | Facility: MEDICAL CENTER | Age: 75
End: 2020-01-27
Payer: MEDICARE

## 2020-01-27 VITALS
HEIGHT: 67 IN | BODY MASS INDEX: 33.74 KG/M2 | DIASTOLIC BLOOD PRESSURE: 76 MMHG | SYSTOLIC BLOOD PRESSURE: 114 MMHG | OXYGEN SATURATION: 92 % | WEIGHT: 215 LBS | HEART RATE: 80 BPM

## 2020-01-27 DIAGNOSIS — G47.39 OTHER SLEEP APNEA: ICD-10-CM

## 2020-01-27 DIAGNOSIS — I27.21 PAH (PULMONARY ARTERY HYPERTENSION) (HCC): ICD-10-CM

## 2020-01-27 DIAGNOSIS — I48.0 PAROXYSMAL ATRIAL FIBRILLATION (HCC): ICD-10-CM

## 2020-01-27 DIAGNOSIS — K59.03 DRUG-INDUCED CONSTIPATION: ICD-10-CM

## 2020-01-27 DIAGNOSIS — E83.52 HYPERCALCEMIA: ICD-10-CM

## 2020-01-27 DIAGNOSIS — J96.11 CHRONIC RESPIRATORY FAILURE WITH HYPOXIA (HCC): ICD-10-CM

## 2020-01-27 DIAGNOSIS — I27.20 PULMONARY HYPERTENSION (HCC): ICD-10-CM

## 2020-01-27 DIAGNOSIS — Z79.01 CHRONIC ANTICOAGULATION: ICD-10-CM

## 2020-01-27 DIAGNOSIS — I10 ESSENTIAL HYPERTENSION: ICD-10-CM

## 2020-01-27 PROCEDURE — 99215 OFFICE O/P EST HI 40 MIN: CPT | Performed by: INTERNAL MEDICINE

## 2020-01-27 RX ORDER — SULFAMETHOXAZOLE AND TRIMETHOPRIM 800; 160 MG/1; MG/1
TABLET ORAL
Status: ON HOLD | COMMUNITY
Start: 2020-01-24 | End: 2020-08-09

## 2020-01-27 RX ORDER — TORSEMIDE 20 MG/1
20 TABLET ORAL 2 TIMES DAILY
Qty: 60 TAB | Refills: 11 | Status: SHIPPED | OUTPATIENT
Start: 2020-01-27 | End: 2020-01-28

## 2020-01-27 RX ORDER — TADALAFIL 20 MG/1
40 TABLET ORAL
COMMUNITY
Start: 2020-01-16 | End: 2020-05-05

## 2020-01-27 ASSESSMENT — ENCOUNTER SYMPTOMS
SHORTNESS OF BREATH: 1
CONSTITUTIONAL NEGATIVE: 1
BRUISES/BLEEDS EASILY: 0
ORTHOPNEA: 0
LOSS OF CONSCIOUSNESS: 0
MUSCULOSKELETAL NEGATIVE: 1
SORE THROAT: 0
WEAKNESS: 0
SPUTUM PRODUCTION: 0
GASTROINTESTINAL NEGATIVE: 1
COUGH: 0
WHEEZING: 0
CHILLS: 0
CLAUDICATION: 0
EYES NEGATIVE: 1
PALPITATIONS: 0
PND: 0
CARDIOVASCULAR NEGATIVE: 1
FEVER: 0
NEUROLOGICAL NEGATIVE: 1
HEMOPTYSIS: 0
STRIDOR: 0
DIZZINESS: 0

## 2020-01-27 NOTE — PROGRESS NOTES
Chief Complaint   Patient presents with   • Atrial Fibrillation   • Tachycardia     in pm        Subjective:   Zeinab Loza is a 74 y.o. female who presents today as a new consultation for cyanosis and pulmonary hypertension.  She had a mitral valve repair in 2005.  She had a steady downhill decline after that.  She was seen at [facility] about a year ago underwent a right heart catheterization.  She has been on tadalafil and Latera's ever since then.  I reviewed her outside records from [facility].  Most of them available.  The results of her right heart catheterization are not available.  It is unclear why she takes diltiazem.  She is on Bactrim for short course.      Past Medical History:   Diagnosis Date   • Aneurysm artery, celiac (HCC) 2019   • Aneurysm of right renal artery (HCC)    • Atrial fibrillation (HCC)    • Diastolic heart failure (HCC)    • Hypertension, essential    • Pulmonary hypertension (HCC)    • Warfarin anticoagulation      No past surgical history on file.  No family history on file.  Social History     Socioeconomic History   • Marital status:      Spouse name: Not on file   • Number of children: Not on file   • Years of education: Not on file   • Highest education level: Not on file   Occupational History   • Not on file   Social Needs   • Financial resource strain: Not on file   • Food insecurity:     Worry: Not on file     Inability: Not on file   • Transportation needs:     Medical: Not on file     Non-medical: Not on file   Tobacco Use   • Smoking status: Never Smoker   • Smokeless tobacco: Never Used   Substance and Sexual Activity   • Alcohol use: Yes     Alcohol/week: 4.2 oz     Types: 7 Shots of liquor per week     Comment: 1 day   • Drug use: No   • Sexual activity: Not on file   Lifestyle   • Physical activity:     Days per week: Not on file     Minutes per session: Not on file   • Stress: Not on file   Relationships   • Social connections:     Talks on phone: Not on  file     Gets together: Not on file     Attends Scientologist service: Not on file     Active member of club or organization: Not on file     Attends meetings of clubs or organizations: Not on file     Relationship status: Not on file   • Intimate partner violence:     Fear of current or ex partner: Not on file     Emotionally abused: Not on file     Physically abused: Not on file     Forced sexual activity: Not on file   Other Topics Concern   • Not on file   Social History Narrative   • Not on file     Allergies   Allergen Reactions   • Zolpidem Tartrate Unspecified     Lightheaded and confusion     Outpatient Encounter Medications as of 1/27/2020   Medication Sig Dispense Refill   • sulfamethoxazole-trimethoprim (BACTRIM DS) 800-160 MG tablet      • Tadalafil, PAH, 20 MG Tab Take 40 mg by mouth.     • torsemide (DEMADEX) 20 MG Tab Take 1 Tab by mouth 2 times a day. 60 Tab 11   • potassium chloride SA (KDUR) 20 MEQ Tab CR Take 1 Tab by mouth 2 times a day. 60 Tab 0   • apixaban (ELIQUIS) 5mg Tab Take 5 mg by mouth 2 Times a Day.     • digoxin (LANOXIN) 125 MCG Tab Take 125 mcg by mouth every evening.     • DILTIAZem CD (CARDIZEM CD) 120 MG CAPSULE SR 24 HR Take 120 mg by mouth every day.     • amitriptyline (ELAVIL) 10 MG Tab Take 20 mg by mouth every bedtime.     • HYDROcodone-acetaminophen (NORCO) 7.5-325 MG per tablet Take 1 Tab by mouth 2 Times a Day.  0   • metOLazone (ZAROXOLYN) 5 MG Tab Take 5 mg by mouth every day.     • sotalol (BETAPACE) 80 MG Tab Take 1 Tab by mouth 2 times a day. 60 Tab 11   • acetaminophen (TYLENOL) 500 MG Tab Take 1,000 mg by mouth every 6 hours as needed for Mild Pain.     • Tadalafil, PAH, (ADCIRCA) 20 MG Tab Take 40 mg by mouth every bedtime. Indications: Pulmonary Arterial Hypertension     • therapeutic multivitamin-minerals (THERAGRAN-M) Tab Take 1 Tab by mouth every day.     • PARoxetine (PAXIL) 20 MG Tab Take 20 mg by mouth every day.     • rosuvastatin (CRESTOR) 10 MG Tab Take 10  "mg by mouth every evening.     • ambrisentan (LETAIRIS) 10 MG tablet Take 10 mg by mouth every day.     • levothyroxine (SYNTHROID) 75 MCG Tab Take 75 mcg by mouth Every morning on an empty stomach.     • Cholecalciferol (VITAMIN D) 2000 units Cap Take 2,000 Units by mouth every day.     • methocarbamol (ROBAXIN) 500 MG Tab 500 mg 3 times a day.  0   • [DISCONTINUED] furosemide (LASIX) 40 MG Tab Take 40 mg by mouth 2 Times a Day.       No facility-administered encounter medications on file as of 1/27/2020.      Review of Systems   Constitutional: Negative.  Negative for chills, fever and malaise/fatigue.   HENT: Negative.  Negative for sore throat.    Eyes: Negative.    Respiratory: Positive for shortness of breath. Negative for cough, hemoptysis, sputum production, wheezing and stridor.    Cardiovascular: Negative.  Negative for chest pain, palpitations, orthopnea, claudication, leg swelling and PND.   Gastrointestinal: Negative.    Genitourinary: Negative.    Musculoskeletal: Negative.    Skin: Negative.    Neurological: Negative.  Negative for dizziness, loss of consciousness and weakness.   Endo/Heme/Allergies: Negative.  Does not bruise/bleed easily.   All other systems reviewed and are negative.       Objective:   /76 (BP Location: Left arm, Patient Position: Sitting, BP Cuff Size: Adult)   Pulse 80   Ht 1.702 m (5' 7\")   Wt 97.5 kg (215 lb)   SpO2 92%   BMI 33.67 kg/m²     Physical Exam   Constitutional: She appears well-developed and well-nourished. No distress.   HENT:   Head: Normocephalic and atraumatic.   Right Ear: External ear normal.   Left Ear: External ear normal.   Nose: Nose normal.   Mouth/Throat: No oropharyngeal exudate.   Eyes: Pupils are equal, round, and reactive to light. Conjunctivae and EOM are normal. Right eye exhibits no discharge. Left eye exhibits no discharge. No scleral icterus.   Neck: Neck supple. No JVD present.   Cardiovascular: Normal rate, regular rhythm and intact " distal pulses. Exam reveals no gallop and no friction rub.   No murmur heard.  Pulmonary/Chest: Effort normal. No stridor. No respiratory distress. She has no wheezes. She has no rales. She exhibits no tenderness.   Abdominal: Soft. She exhibits no distension. There is no guarding.   Musculoskeletal: Normal range of motion.         General: No tenderness, deformity or edema.   Neurological: She is alert. She has normal reflexes. She displays normal reflexes. No cranial nerve deficit. She exhibits normal muscle tone. Coordination normal.   Skin: Skin is warm and dry. No rash noted. She is not diaphoretic. No erythema. No pallor.   Psychiatric: She has a normal mood and affect. Her behavior is normal. Judgment and thought content normal.   Nursing note and vitals reviewed.    Summary of testing performed at [facility]: Personally viewed by myself showing:  Previous PFT reviewed   FEV1/FVC 74%  FEV1 83% (2.11L)  TLC 88% (4.82)  DLCO 88%  My interpretation: Normal PFT    Previous CT scan - Mosaic attenuation, no PE    ECHO: reviewed  RVSP 43 mmHg, EF 60-65%    RHC - Reviewed  RA mean 3 mmHg, PA sys 39 mmHg, PA kevin 9 mmHg, PA mean 20 mmHg, CO 4.1    Last six minute walk-reviewed  SpO2 sara 74%, up to 4L with some improvement in SpO2, walked 66% of predicted    Echocardiogram: 1/15/2020: Personally viewed by myself showing  CONCLUSIONS  Normal left ventricular systolic function. Left ventricular ejection   fraction is visually estimated to be 65%.  A reliable estimation of diastolic function cannot be made due to   mitral valve disease.  Normal inferior vena cava size and inspiratory collapse.  Known mitral valve repair which is functioning normally with   appropriate transvalvular gradient. Mean transvalvular gradient is 3   mmHg at a heart rate of 66 BPM.  Estimated right ventricular systolic pressure  is 32 mmHg.    Assessment:     1. Chronic anticoagulation     2. Chronic respiratory failure with hypoxia (HCC)     3.  Drug-induced constipation     4. Essential hypertension     5. Hypercalcemia  torsemide (DEMADEX) 20 MG Tab   6. Other sleep apnea  torsemide (DEMADEX) 20 MG Tab   7. PAH (pulmonary artery hypertension) (HCC)  torsemide (DEMADEX) 20 MG Tab   8. Paroxysmal atrial fibrillation (HCC)  torsemide (DEMADEX) 20 MG Tab   9. Pulmonary hypertension (HCC)         Medical Decision Making:  Today's Assessment / Status / Plan:     74-year-old female with peripheral cyanosis of unknown etiology with possible shunt presents with a negative right heart cath and borderline findings of pulmonary hypertension.  At this point I will stop her Lasix and switch her to torsemide 20 twice daily.  In terms of her cyanosis it does suggest a shunt it also is possible that she has a methemoglobinemia.  We may get an outpatient ABG at her next visit.  She is now status post IR embolization of her celiac artery.  She will stay on ambrisentan Latera's for her pulmonary hypertension.  I reviewed her labs for her high risk medications.  We will see her back in 6 weeks    1. PAH, Who type 1    - cont Tadalafil 40    - cont letaris 10    2. FEI    - om Bipap    3. Cyanposis    - consider cMRI at next visit    4. MVR    - clinical follow up    5. A-fib    - cont sotalol    - cont eliquis 5 BID    - cont dig    6. Edema    - stop lasix    - start torsemide 20 BID    7. HTN    - cont DILT , unclear if this is for HTN or + vasodilator response       Greater than 45 minutes were spent reviewing her outside records her current records and imaging studies.  Greater than 50% of this time was spent face-to-face with the patient counseling coordinating care.

## 2020-01-31 RX ORDER — TORSEMIDE 20 MG/1
20 TABLET ORAL 2 TIMES DAILY
Qty: 180 TAB | Refills: 3 | Status: SHIPPED | OUTPATIENT
Start: 2020-01-31 | End: 2020-02-10 | Stop reason: SDUPTHER

## 2020-02-03 ENCOUNTER — APPOINTMENT (OUTPATIENT)
Dept: RADIOLOGY | Facility: MEDICAL CENTER | Age: 75
End: 2020-02-03
Attending: NURSE PRACTITIONER
Payer: MEDICARE

## 2020-02-03 DIAGNOSIS — R93.0 ABNORMAL MRI OF HEAD: ICD-10-CM

## 2020-02-03 PROCEDURE — 70553 MRI BRAIN STEM W/O & W/DYE: CPT

## 2020-02-03 PROCEDURE — 700117 HCHG RX CONTRAST REV CODE 255: Performed by: SPECIALIST

## 2020-02-03 PROCEDURE — A9576 INJ PROHANCE MULTIPACK: HCPCS | Performed by: SPECIALIST

## 2020-02-03 RX ADMIN — GADOTERIDOL 20 ML: 279.3 INJECTION, SOLUTION INTRAVENOUS at 16:08

## 2020-02-05 ENCOUNTER — TELEPHONE (OUTPATIENT)
Dept: NEUROLOGY | Facility: MEDICAL CENTER | Age: 75
End: 2020-02-05

## 2020-02-06 NOTE — TELEPHONE ENCOUNTER
I spoke ww/ pt and daughter who wanted to know why pt needed to have the 24 amb eeg done if her recent mri came back normal. I informed pt the 24 amb is a longer study than the routine eeg she had previously done and that she is at risk for more sz that is why zara ordered.    I spoke with zara she stated pt does not have to have the 24 amb if she doesn't want to but the amb is to better capture any sz.

## 2020-02-08 LAB — EKG IMPRESSION: NORMAL

## 2020-02-10 DIAGNOSIS — I27.21 PAH (PULMONARY ARTERY HYPERTENSION) (HCC): ICD-10-CM

## 2020-02-10 DIAGNOSIS — G47.39 OTHER SLEEP APNEA: ICD-10-CM

## 2020-02-10 DIAGNOSIS — I48.0 PAROXYSMAL ATRIAL FIBRILLATION (HCC): ICD-10-CM

## 2020-02-10 DIAGNOSIS — E83.52 HYPERCALCEMIA: ICD-10-CM

## 2020-02-12 RX ORDER — TORSEMIDE 20 MG/1
20 TABLET ORAL 2 TIMES DAILY
Qty: 180 TAB | Refills: 3 | Status: SHIPPED | OUTPATIENT
Start: 2020-02-12 | End: 2020-09-29

## 2020-02-18 ENCOUNTER — PATIENT MESSAGE (OUTPATIENT)
Dept: CARDIOLOGY | Facility: MEDICAL CENTER | Age: 75
End: 2020-02-18

## 2020-02-19 NOTE — PATIENT COMMUNICATION
Pt called when My Chart messages were confusing. Pt offered an NANY appt for 2/19. Pt refuses at this time. Pt states she felt Lasix was working better. Pt has gained 4 lbs over 2 days. Pt states no dietary changes but her  was recently diagnosed with ALS and they have been very busy with those appointment. Pt states she has voided 2 x today and drinks approx 40 ozs of water per day. Pt encouraged to increase to approx 2 L or 68 ounces. Pt will check in with me tomorrow to report symptoms.

## 2020-02-19 NOTE — TELEPHONE ENCOUNTER
----- Message from Zeinab Loza sent at 2/18/2020  3:55 PM PST -----  Regarding: RE: Prescription Question  Contact: [phone]  What is the possible to call you tomorrow    ----- Message -----  From: Denia Suarez R.N.  Sent: 2/18/20 3:52 PM  To: Zeinab Loza  Subject: RE: Prescription Question    Zeinab,    If the increased dose is not working we should probably have you come back in to be seen and evaluated.    Can I schedule you to see a nurse practitioner?    ABDOUL Loza for Dr. Eng      ----- Message -----       From:Zeinab Yang Dago       Sent:2/18/2020  3:48 PM PST         To:Denia Suarez R.N.    Subject:RE: Prescription Question    I'm sorry I confused your message from another message that I was receiving from another doctor.   I have been taking 20 mg twice A-day. The dosage doesn't seem to be working because I am still retaining fluid. I was wondering if the doctor would increase dosage      ----- Message -----       From:Denia Suarez R.N.       Sent:2/18/2020  3:29 PM PST         To:Zeinab Yang Dago    Subject:RE: Prescription Question    Zeinab,    Please call your pharmacy.    Dr. Eng prescribed the following on 2/12    torsemide (DEMADEX) 20 MG Tab 180 Tab 3/3 2/12/2020     Sig - Route: Take 1 Tab by mouth 2 times a day. - Oral      I hope this helps.    ABDOUL Loza for Dr. Eng      ----- Message -----       From:Zeinab Loza       Sent:2/18/2020  3:28 PM PST         To:Denia Suarez R.N.    Subject:RE: Prescription Question    I have only received  10 mg I've been taking 10 mg. 1 day.      ----- Message -----       From:Denia Suarez R.N.       Sent:2/18/2020  3:25 PM PST         To:Corrina Reynolds    Subject:RE: Prescription Question    Dr. Albertina Arriola prescribed 20 mg twice daily.    Is this how you have been taking it?    ABDOUL Loza for Dr. Eng      ----- Message -----       From:Zeinab Loza       Sent:2/18/2020  11:44 AM PST         To:Ramiro Eng M.D.    Subject:Prescription Question    Dr. Mc prescribed Torsemide 20 mg.  I am retaining water about   4lbs.  How about 40mg.       - - - DISCLAIMER - - -  [email or web address][This message may contain formatting that cannot be shown on this site.]

## 2020-02-28 ENCOUNTER — TELEPHONE (OUTPATIENT)
Dept: NEUROLOGY | Facility: MEDICAL CENTER | Age: 75
End: 2020-02-28

## 2020-02-28 NOTE — TELEPHONE ENCOUNTER
Per pt pharmacy still has old script for elavil for 10 mg 1 tab daily. I called [facility] for new script elavil 10 mg take 2 tabs at every bedtime w/ 11 additional refills.

## 2020-03-04 ENCOUNTER — NON-PROVIDER VISIT (OUTPATIENT)
Dept: NEUROLOGY | Facility: MEDICAL CENTER | Age: 75
End: 2020-03-04
Payer: MEDICARE

## 2020-03-04 DIAGNOSIS — R94.01 ABNORMAL EEG: ICD-10-CM

## 2020-03-04 PROCEDURE — 95719 EEG PHYS/QHP EA INCR W/O VID: CPT | Performed by: PSYCHIATRY & NEUROLOGY

## 2020-03-04 PROCEDURE — 95708 EEG WO VID EA 12-26HR UNMNTR: CPT | Performed by: PSYCHIATRY & NEUROLOGY

## 2020-03-04 PROCEDURE — 95700 EEG CONT REC W/VID EEG TECH: CPT | Performed by: PSYCHIATRY & NEUROLOGY

## 2020-03-05 ENCOUNTER — NON-PROVIDER VISIT (OUTPATIENT)
Dept: NEUROLOGY | Facility: MEDICAL CENTER | Age: 75
End: 2020-03-05
Payer: MEDICARE

## 2020-03-05 NOTE — PROCEDURES
24 HR AMBULATORY ELECTROENCEPHALOGRAM REPORT        Referring provider: SUKI Charles.      DOS: 3/4/2020  (total recording of 22 hours and 5 minutes).      INDICATION:  Zeinab Loza 74 y.o. female presenting with headache, dizziness, memory loss, abnormal mri brain.      CURRENT ANTIEPILEPTIC REGIMEN: None.      TECHNIQUE: 30 channel 24 hrs ambulatory electroencephalogram (EEG) was performed in accordance with the international 10-20 system. The study was reviewed in bipolar and referential montages. The recording examined the patient during wakeful, drowsy, and sleep state(s).      DESCRIPTION OF THE RECORD:  During the wakefulness, the background showed a symmetrical 9 Hz alpha activity posteriorly with amplitude of 70 mV.  There was reactivity to eye closure/opening.  A normal anterior-posterior gradient was noted with faster beta frequencies seen anteriorly.  During drowsiness, theta/delta frequencies were seen.    During the sleep state, higher amplitude delta activity, as well as symmetric sleep spindles and vertex waves were noted.     ACTIVATION PROCEDURES:    Not performed.     ICTAL AND/OR INTERICTAL FINDINGS:   No epileptiform discharges or regional slowing noted.  Prior abnormality, no longer observed during this study.  No clinical events or seizures were reported or recorded during the study.      EKG: sampling of the EKG recording demonstrated sinus rhythm.      EVENTS:   An event of headache on the left reported by patient.  This was not epileptic.     INTERPRETATION:  This is a normal 24-hour ambulatory EEG recording in the awake, drowsy, and sleep state(s).  The previously noted abnormality is no longer visualized.  No seizures were captured during the study.  Clinical correlation is recommended.    Note: this EEG does not rule out epilepsy.  If the clinical suspicion remains high for seizures, a prolonged recording to capture clinical or subclinical events may be  helpful.           Giles Holcomb MD   Epilepsy and Neurodiagnostics.   Clinical  of Neurology [facility].   Diplomate in Neurology, Epilepsy, and Electrodiagnostic Medicine.   Office: [phone]  Fax: [phone]
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
## 2020-03-18 ENCOUNTER — APPOINTMENT (OUTPATIENT)
Dept: NEUROLOGY | Facility: MEDICAL CENTER | Age: 75
End: 2020-03-18
Payer: MEDICARE

## 2020-03-18 ENCOUNTER — TELEPHONE (OUTPATIENT)
Dept: NEUROLOGY | Facility: MEDICAL CENTER | Age: 75
End: 2020-03-18

## 2020-03-18 NOTE — TELEPHONE ENCOUNTER
Her 24hour EEG was normal. The previously seen abnormality was no longer seen in this long study.     Thanks,     JH    Pt's daughter verbally understood.

## 2020-03-20 RX ORDER — DILTIAZEM HYDROCHLORIDE 120 MG/1
120 CAPSULE, COATED, EXTENDED RELEASE ORAL DAILY
Qty: 90 CAP | Refills: 0 | Status: SHIPPED | OUTPATIENT
Start: 2020-03-20 | End: 2020-04-23

## 2020-03-25 ENCOUNTER — TELEPHONE (OUTPATIENT)
Dept: NEUROLOGY | Facility: MEDICAL CENTER | Age: 75
End: 2020-03-25

## 2020-03-25 NOTE — TELEPHONE ENCOUNTER
I spoke with Yayo the pharmacist at [facility] gave verbal to fill amitriptyline 10 mg #180 w/ 3 refills. Pharmacist verbally understood.

## 2020-04-22 DIAGNOSIS — I10 ESSENTIAL HYPERTENSION: Primary | ICD-10-CM

## 2020-04-23 RX ORDER — DILTIAZEM HYDROCHLORIDE 120 MG/1
CAPSULE, COATED, EXTENDED RELEASE ORAL
Qty: 90 CAP | Refills: 1 | Status: SHIPPED | OUTPATIENT
Start: 2020-04-23 | End: 2020-10-08

## 2020-04-28 ENCOUNTER — TELEPHONE (OUTPATIENT)
Dept: CARDIOLOGY | Facility: MEDICAL CENTER | Age: 75
End: 2020-04-28

## 2020-04-28 DIAGNOSIS — I48.0 PAROXYSMAL ATRIAL FIBRILLATION (HCC): Primary | ICD-10-CM

## 2020-04-28 RX ORDER — DIGOXIN 125 MCG
125 TABLET ORAL EVERY EVENING
Qty: 14 TAB | Refills: 0 | Status: SHIPPED | OUTPATIENT
Start: 2020-04-28 | End: 2020-05-05

## 2020-04-28 NOTE — TELEPHONE ENCOUNTER
"Pt. calls back. Morning BP last few weeks sometimes 90/60 about 3 out of 7 days.  Sometimes BP monitor reads \"too low\". No syncope but lightheaded and tired.  Wakes up at 11:30 a.m. feeling this way, before any meds. Seems to improve later. Only recent med change was switch from furosemide to torsemide 20 mg BID in January. Takes torsemide at 1130 and 1430. Drinks 60-75 oz fluid daily.   Next FV is 5/5 (video)   Pt will continue to record BP daily. Postural change safety precautions discussed.   To Dr. Eng to advise if sooner appt recommended.  "

## 2020-04-28 NOTE — TELEPHONE ENCOUNTER
ARBEN/nando    Pt's daughter Vernell calling to report low b/p, off and on, for last 2 weeks.     Please call Vernell

## 2020-04-28 NOTE — TELEPHONE ENCOUNTER
ARBEN/marc    Pt's daughter Vernell calling for renewal of digoxin and wants script sent to [email or web address], pt has about a week of medication remaining.      Vernell also wants 2 weeks short script sent [facility].    Please call Vernell  if questions

## 2020-04-29 RX ORDER — DIGOXIN 125 MCG
125 TABLET ORAL EVERY EVENING
Qty: 90 TAB | Refills: 3 | Status: ON HOLD | OUTPATIENT
Start: 2020-04-29 | End: 2021-04-23

## 2020-05-05 ENCOUNTER — TELEMEDICINE (OUTPATIENT)
Dept: CARDIOLOGY | Facility: MEDICAL CENTER | Age: 75
End: 2020-05-05
Payer: MEDICARE

## 2020-05-05 VITALS
HEIGHT: 67 IN | DIASTOLIC BLOOD PRESSURE: 75 MMHG | BODY MASS INDEX: 32.96 KG/M2 | SYSTOLIC BLOOD PRESSURE: 134 MMHG | WEIGHT: 210 LBS | HEART RATE: 65 BPM

## 2020-05-05 DIAGNOSIS — J96.11 CHRONIC RESPIRATORY FAILURE WITH HYPOXIA (HCC): ICD-10-CM

## 2020-05-05 DIAGNOSIS — I10 ESSENTIAL HYPERTENSION: ICD-10-CM

## 2020-05-05 DIAGNOSIS — I48.0 PAROXYSMAL ATRIAL FIBRILLATION (HCC): ICD-10-CM

## 2020-05-05 DIAGNOSIS — E83.52 HYPERCALCEMIA: ICD-10-CM

## 2020-05-05 DIAGNOSIS — I27.21 PAH (PULMONARY ARTERY HYPERTENSION) (HCC): ICD-10-CM

## 2020-05-05 DIAGNOSIS — Z79.899 HIGH RISK MEDICATION USE: ICD-10-CM

## 2020-05-05 DIAGNOSIS — Z79.01 CHRONIC ANTICOAGULATION: ICD-10-CM

## 2020-05-05 DIAGNOSIS — E87.6 HYPOKALEMIA: ICD-10-CM

## 2020-05-05 DIAGNOSIS — G47.39 OTHER SLEEP APNEA: ICD-10-CM

## 2020-05-05 DIAGNOSIS — D53.9 MACROCYTIC ANEMIA: ICD-10-CM

## 2020-05-05 DIAGNOSIS — I27.20 PULMONARY HYPERTENSION (HCC): ICD-10-CM

## 2020-05-05 DIAGNOSIS — R06.89 HYPERCAPNIA: ICD-10-CM

## 2020-05-05 PROCEDURE — 99214 OFFICE O/P EST MOD 30 MIN: CPT | Mod: 95,CR | Performed by: INTERNAL MEDICINE

## 2020-05-05 ASSESSMENT — ENCOUNTER SYMPTOMS
COUGH: 0
EYES NEGATIVE: 1
CHILLS: 0
ORTHOPNEA: 0
HEMOPTYSIS: 0
GASTROINTESTINAL NEGATIVE: 1
PND: 0
MUSCULOSKELETAL NEGATIVE: 1
SHORTNESS OF BREATH: 0
CARDIOVASCULAR NEGATIVE: 1
PALPITATIONS: 0
WEAKNESS: 0
NEUROLOGICAL NEGATIVE: 1
BRUISES/BLEEDS EASILY: 0
CLAUDICATION: 0
WHEEZING: 0
STRIDOR: 0
SPUTUM PRODUCTION: 0
FEVER: 0
RESPIRATORY NEGATIVE: 1
LOSS OF CONSCIOUSNESS: 0
SORE THROAT: 0
DIZZINESS: 0

## 2020-05-05 ASSESSMENT — FIBROSIS 4 INDEX: FIB4 SCORE: 2.5

## 2020-05-05 NOTE — PROGRESS NOTES
Chief Complaint   Patient presents with   • Atrial Fibrillation       Subjective:   Zeinab Loza is a 75 y.o. female who presents today as a follow-up for her mitral valve repair and pulmonary hypertension.  She was seen by  Gil[facility] couple of days ago as a follow-up.  They did not want make any changes to her medications.  Her blood pressures been running anywhere from 90-1 30 systolic.  On the days that her blood pressure is low she feels weak fatigued and lacking energy but no syncope or lightheadedness.  She continues on the same medications with no changes.  She is having no lower extremity edema or chest pain.    Past Medical History:   Diagnosis Date   • Aneurysm artery, celiac (HCC) 2019   • Aneurysm of right renal artery (HCC)    • Atrial fibrillation (HCC)    • Diastolic heart failure (HCC)    • Hypertension, essential    • Pulmonary hypertension (HCC)    • Warfarin anticoagulation      History reviewed. No pertinent surgical history.  History reviewed. No pertinent family history.  Social History     Socioeconomic History   • Marital status:      Spouse name: Not on file   • Number of children: Not on file   • Years of education: Not on file   • Highest education level: Not on file   Occupational History   • Not on file   Social Needs   • Financial resource strain: Not on file   • Food insecurity     Worry: Not on file     Inability: Not on file   • Transportation needs     Medical: Not on file     Non-medical: Not on file   Tobacco Use   • Smoking status: Never Smoker   • Smokeless tobacco: Never Used   Substance and Sexual Activity   • Alcohol use: Yes     Alcohol/week: 4.2 oz     Types: 7 Shots of liquor per week     Comment: 1 day   • Drug use: No   • Sexual activity: Not on file   Lifestyle   • Physical activity     Days per week: Not on file     Minutes per session: Not on file   • Stress: Not on file   Relationships   • Social connections     Talks on phone: Not on file     Gets  together: Not on file     Attends Christian service: Not on file     Active member of club or organization: Not on file     Attends meetings of clubs or organizations: Not on file     Relationship status: Not on file   • Intimate partner violence     Fear of current or ex partner: Not on file     Emotionally abused: Not on file     Physically abused: Not on file     Forced sexual activity: Not on file   Other Topics Concern   • Not on file   Social History Narrative   • Not on file     Allergies   Allergen Reactions   • Zolpidem Tartrate Unspecified     Lightheaded and confusion     Outpatient Encounter Medications as of 5/5/2020   Medication Sig Dispense Refill   • digoxin (LANOXIN) 125 MCG Tab Take 1 Tab by mouth every evening. 90 Tab 3   • DILTIAZem CD (CARDIZEM CD) 120 MG CAPSULE SR 24 HR TAKE 1 CAPSULE EVERY DAY 90 Cap 1   • torsemide (DEMADEX) 20 MG Tab Take 1 Tab by mouth 2 times a day. 180 Tab 3   • sulfamethoxazole-trimethoprim (BACTRIM DS) 800-160 MG tablet      • potassium chloride SA (KDUR) 20 MEQ Tab CR Take 1 Tab by mouth 2 times a day. 60 Tab 0   • apixaban (ELIQUIS) 5mg Tab Take 5 mg by mouth 2 Times a Day.     • amitriptyline (ELAVIL) 10 MG Tab Take 20 mg by mouth every bedtime.     • HYDROcodone-acetaminophen (NORCO) 7.5-325 MG per tablet Take 1 Tab by mouth 2 Times a Day.  0   • methocarbamol (ROBAXIN) 500 MG Tab 500 mg 3 times a day.  0   • metOLazone (ZAROXOLYN) 5 MG Tab Take 5 mg by mouth every day.     • sotalol (BETAPACE) 80 MG Tab Take 1 Tab by mouth 2 times a day. 60 Tab 11   • acetaminophen (TYLENOL) 500 MG Tab Take 1,000 mg by mouth every 6 hours as needed for Mild Pain.     • Tadalafil, PAH, (ADCIRCA) 20 MG Tab Take 40 mg by mouth every bedtime. Indications: Pulmonary Arterial Hypertension     • therapeutic multivitamin-minerals (THERAGRAN-M) Tab Take 1 Tab by mouth every day.     • PARoxetine (PAXIL) 20 MG Tab Take 20 mg by mouth every day.     • rosuvastatin (CRESTOR) 10 MG Tab Take 10  "mg by mouth every evening.     • ambrisentan (LETAIRIS) 10 MG tablet Take 10 mg by mouth every day.     • levothyroxine (SYNTHROID) 75 MCG Tab Take 75 mcg by mouth Every morning on an empty stomach.     • Cholecalciferol (VITAMIN D) 2000 units Cap Take 2,000 Units by mouth every day.     • [DISCONTINUED] digoxin (LANOXIN) 125 MCG Tab Take 1 Tab by mouth every evening. 14 Tab 0   • [DISCONTINUED] Tadalafil, PAH, 20 MG Tab Take 40 mg by mouth.       No facility-administered encounter medications on file as of 5/5/2020.      Review of Systems   Constitutional: Positive for malaise/fatigue. Negative for chills and fever.   HENT: Negative.  Negative for sore throat.    Eyes: Negative.    Respiratory: Negative.  Negative for cough, hemoptysis, sputum production, shortness of breath, wheezing and stridor.    Cardiovascular: Negative.  Negative for chest pain, palpitations, orthopnea, claudication, leg swelling and PND.   Gastrointestinal: Negative.    Genitourinary: Negative.    Musculoskeletal: Negative.    Skin: Negative.    Neurological: Negative.  Negative for dizziness, loss of consciousness and weakness.   Endo/Heme/Allergies: Negative.  Does not bruise/bleed easily.   All other systems reviewed and are negative.       Objective:   /75   Pulse 65   Ht 1.702 m (5' 7\")   Wt 95.3 kg (210 lb)   BMI 32.89 kg/m²     Physical Exam   Constitutional: She is oriented to person, place, and time. She appears well-developed and well-nourished. No distress.   HENT:   Head: Normocephalic and atraumatic.   Right Ear: External ear normal.   Left Ear: External ear normal.   Nose: Nose normal.   Mouth/Throat: No oropharyngeal exudate.   Eyes: Pupils are equal, round, and reactive to light. Conjunctivae and EOM are normal. Right eye exhibits no discharge. Left eye exhibits no discharge. No scleral icterus.   Neck: Normal range of motion. Neck supple. No JVD present. No tracheal deviation present.   Cardiovascular: Normal rate, " regular rhythm and intact distal pulses. Exam reveals no gallop and no friction rub.   No murmur heard.  Pulmonary/Chest: Effort normal and breath sounds normal. No stridor. No respiratory distress. She has no wheezes. She has no rales. She exhibits no tenderness.   Abdominal: Soft. Bowel sounds are normal. She exhibits no distension. There is no guarding.   Musculoskeletal: Normal range of motion.         General: No tenderness, deformity or edema.   Neurological: She is alert and oriented to person, place, and time. She has normal reflexes. She displays normal reflexes. No cranial nerve deficit. She exhibits normal muscle tone. Coordination normal.   Skin: Skin is warm and dry. No rash noted. She is not diaphoretic. No erythema. No pallor.   Psychiatric: She has a normal mood and affect. Her behavior is normal. Judgment and thought content normal.   Nursing note and vitals reviewed.      Assessment:     1. Chronic anticoagulation     2. Chronic respiratory failure with hypoxia (HCC)     3. Essential hypertension  Comp Metabolic Panel   4. Hypercalcemia  CBC WITHOUT DIFFERENTIAL   5. Hypercapnia     6. PAH (pulmonary artery hypertension) (HCC)  Comp Metabolic Panel    CBC WITHOUT DIFFERENTIAL   7. Other sleep apnea     8. Paroxysmal atrial fibrillation (HCC)     9. Pulmonary hypertension (HCC)     10. Hypokalemia     11. Macrocytic anemia     12. High risk medication use         Medical Decision Making:  Today's Assessment / Status / Plan:     75-year-old female with pulmonary hypertension from a mitral valve regurgitation status post mitral valve repair doing well.  No changes to her medical therapy today.  She will follow-up with her primary and her pulmonary sleep provider for her BiPAP.  Otherwise for her high risk medications we will check her labs today.  Her blood pressure continues to be low we will consider stopping the diltiazem.    1. PAH, Who type 1    - cont Tadalafil 40    - cont letaris 10     2.  FEI    - f/u Bipap     3. Cyanposis    - consider cMRI at next visit     4. MVR    - clinical follow up     5. A-fib    - cont sotalol    - cont eliquis 5 BID    - cont dig     6. Edema    - cont torsemide 20 BID     7. HTN    - cont DILT , unclear if this is for HTN or + vasodilator response    8. High Risk Meds    - labs today    Start time 3:28  Stop time 3:42    This encounter was conducted via Zoom .   Verbal consent was obtained. Patient's identity was verified.

## 2020-05-08 ENCOUNTER — HOSPITAL ENCOUNTER (OUTPATIENT)
Dept: LAB | Facility: MEDICAL CENTER | Age: 75
End: 2020-05-08
Attending: INTERNAL MEDICINE
Payer: MEDICARE

## 2020-05-08 ENCOUNTER — HOSPITAL ENCOUNTER (OUTPATIENT)
Dept: LAB | Facility: MEDICAL CENTER | Age: 75
End: 2020-05-08
Attending: FAMILY MEDICINE
Payer: MEDICARE

## 2020-05-08 DIAGNOSIS — E83.52 HYPERCALCEMIA: ICD-10-CM

## 2020-05-08 DIAGNOSIS — I10 ESSENTIAL HYPERTENSION: ICD-10-CM

## 2020-05-08 DIAGNOSIS — I27.21 PAH (PULMONARY ARTERY HYPERTENSION) (HCC): ICD-10-CM

## 2020-05-08 LAB
ALBUMIN SERPL BCP-MCNC: 4.1 G/DL (ref 3.2–4.9)
ALBUMIN SERPL BCP-MCNC: 4.2 G/DL (ref 3.2–4.9)
ALBUMIN SERPL BCP-MCNC: 4.2 G/DL (ref 3.2–4.9)
ALBUMIN/GLOB SERPL: 1.5 G/DL
ALBUMIN/GLOB SERPL: 1.6 G/DL
ALBUMIN/GLOB SERPL: 1.6 G/DL
ALP SERPL-CCNC: 68 U/L (ref 30–99)
ALP SERPL-CCNC: 69 U/L (ref 30–99)
ALP SERPL-CCNC: 72 U/L (ref 30–99)
ALT SERPL-CCNC: 19 U/L (ref 2–50)
ALT SERPL-CCNC: 21 U/L (ref 2–50)
ALT SERPL-CCNC: 21 U/L (ref 2–50)
ANION GAP SERPL CALC-SCNC: 11 MMOL/L (ref 7–16)
ANION GAP SERPL CALC-SCNC: 12 MMOL/L (ref 7–16)
ANION GAP SERPL CALC-SCNC: 13 MMOL/L (ref 7–16)
AST SERPL-CCNC: 24 U/L (ref 12–45)
AST SERPL-CCNC: 25 U/L (ref 12–45)
AST SERPL-CCNC: 25 U/L (ref 12–45)
BASOPHILS # BLD AUTO: 0.6 % (ref 0–1.8)
BASOPHILS # BLD AUTO: 0.6 % (ref 0–1.8)
BASOPHILS # BLD: 0.03 K/UL (ref 0–0.12)
BASOPHILS # BLD: 0.03 K/UL (ref 0–0.12)
BILIRUB SERPL-MCNC: 0.3 MG/DL (ref 0.1–1.5)
BUN SERPL-MCNC: 26 MG/DL (ref 8–22)
CALCIUM SERPL-MCNC: 11.2 MG/DL (ref 8.5–10.5)
CALCIUM SERPL-MCNC: 11.2 MG/DL (ref 8.5–10.5)
CALCIUM SERPL-MCNC: 11.3 MG/DL (ref 8.5–10.5)
CHLORIDE SERPL-SCNC: 97 MMOL/L (ref 96–112)
CHOLEST SERPL-MCNC: 208 MG/DL (ref 100–199)
CO2 SERPL-SCNC: 32 MMOL/L (ref 20–33)
CO2 SERPL-SCNC: 33 MMOL/L (ref 20–33)
CO2 SERPL-SCNC: 34 MMOL/L (ref 20–33)
CREAT SERPL-MCNC: 0.86 MG/DL (ref 0.5–1.4)
CREAT SERPL-MCNC: 0.86 MG/DL (ref 0.5–1.4)
CREAT SERPL-MCNC: 0.87 MG/DL (ref 0.5–1.4)
CREAT UR-MCNC: 41.49 MG/DL
EOSINOPHIL # BLD AUTO: 0.04 K/UL (ref 0–0.51)
EOSINOPHIL # BLD AUTO: 0.05 K/UL (ref 0–0.51)
EOSINOPHIL NFR BLD: 0.8 % (ref 0–6.9)
EOSINOPHIL NFR BLD: 1 % (ref 0–6.9)
ERYTHROCYTE [DISTWIDTH] IN BLOOD BY AUTOMATED COUNT: 46 FL (ref 35.9–50)
ERYTHROCYTE [DISTWIDTH] IN BLOOD BY AUTOMATED COUNT: 46.9 FL (ref 35.9–50)
ERYTHROCYTE [DISTWIDTH] IN BLOOD BY AUTOMATED COUNT: 47.5 FL (ref 35.9–50)
FASTING STATUS PATIENT QL REPORTED: NORMAL
FASTING STATUS PATIENT QL REPORTED: NORMAL
GLOBULIN SER CALC-MCNC: 2.7 G/DL (ref 1.9–3.5)
GLOBULIN SER CALC-MCNC: 2.7 G/DL (ref 1.9–3.5)
GLOBULIN SER CALC-MCNC: 2.8 G/DL (ref 1.9–3.5)
GLUCOSE SERPL-MCNC: 127 MG/DL (ref 65–99)
GLUCOSE SERPL-MCNC: 127 MG/DL (ref 65–99)
GLUCOSE SERPL-MCNC: 128 MG/DL (ref 65–99)
HCT VFR BLD AUTO: 34.3 % (ref 37–47)
HCT VFR BLD AUTO: 36.2 % (ref 37–47)
HCT VFR BLD AUTO: 36.4 % (ref 37–47)
HDLC SERPL-MCNC: 55 MG/DL
HGB BLD-MCNC: 11.4 G/DL (ref 12–16)
HGB BLD-MCNC: 11.4 G/DL (ref 12–16)
HGB BLD-MCNC: 11.5 G/DL (ref 12–16)
IMM GRANULOCYTES # BLD AUTO: 0.03 K/UL (ref 0–0.11)
IMM GRANULOCYTES # BLD AUTO: 0.03 K/UL (ref 0–0.11)
IMM GRANULOCYTES NFR BLD AUTO: 0.6 % (ref 0–0.9)
IMM GRANULOCYTES NFR BLD AUTO: 0.6 % (ref 0–0.9)
LDLC SERPL CALC-MCNC: 123 MG/DL
LYMPHOCYTES # BLD AUTO: 1.14 K/UL (ref 1–4.8)
LYMPHOCYTES # BLD AUTO: 1.18 K/UL (ref 1–4.8)
LYMPHOCYTES NFR BLD: 22.3 % (ref 22–41)
LYMPHOCYTES NFR BLD: 23 % (ref 22–41)
MCH RBC QN AUTO: 32.8 PG (ref 27–33)
MCH RBC QN AUTO: 33.3 PG (ref 27–33)
MCH RBC QN AUTO: 34 PG (ref 27–33)
MCHC RBC AUTO-ENTMCNC: 31.3 G/DL (ref 33.6–35)
MCHC RBC AUTO-ENTMCNC: 31.8 G/DL (ref 33.6–35)
MCHC RBC AUTO-ENTMCNC: 33.2 G/DL (ref 33.6–35)
MCV RBC AUTO: 102.4 FL (ref 81.4–97.8)
MCV RBC AUTO: 104.6 FL (ref 81.4–97.8)
MCV RBC AUTO: 104.9 FL (ref 81.4–97.8)
MICROALBUMIN UR-MCNC: <1.2 MG/DL
MICROALBUMIN/CREAT UR: NORMAL MG/G (ref 0–30)
MONOCYTES # BLD AUTO: 0.42 K/UL (ref 0–0.85)
MONOCYTES # BLD AUTO: 0.44 K/UL (ref 0–0.85)
MONOCYTES NFR BLD AUTO: 8.2 % (ref 0–13.4)
MONOCYTES NFR BLD AUTO: 8.6 % (ref 0–13.4)
NEUTROPHILS # BLD AUTO: 3.4 K/UL (ref 2–7.15)
NEUTROPHILS # BLD AUTO: 3.46 K/UL (ref 2–7.15)
NEUTROPHILS NFR BLD: 66.2 % (ref 44–72)
NEUTROPHILS NFR BLD: 67.5 % (ref 44–72)
NRBC # BLD AUTO: 0 K/UL
NRBC # BLD AUTO: 0 K/UL
NRBC BLD-RTO: 0 /100 WBC
NRBC BLD-RTO: 0 /100 WBC
NT-PROBNP SERPL IA-MCNC: 738 PG/ML (ref 0–125)
PLATELET # BLD AUTO: 232 K/UL (ref 164–446)
PLATELET # BLD AUTO: 241 K/UL (ref 164–446)
PLATELET # BLD AUTO: 246 K/UL (ref 164–446)
PMV BLD AUTO: 9.1 FL (ref 9–12.9)
PMV BLD AUTO: 9.2 FL (ref 9–12.9)
PMV BLD AUTO: 9.3 FL (ref 9–12.9)
POTASSIUM SERPL-SCNC: 3.5 MMOL/L (ref 3.6–5.5)
POTASSIUM SERPL-SCNC: 3.6 MMOL/L (ref 3.6–5.5)
POTASSIUM SERPL-SCNC: 3.6 MMOL/L (ref 3.6–5.5)
PROT SERPL-MCNC: 6.9 G/DL (ref 6–8.2)
RBC # BLD AUTO: 3.35 M/UL (ref 4.2–5.4)
RBC # BLD AUTO: 3.45 M/UL (ref 4.2–5.4)
RBC # BLD AUTO: 3.48 M/UL (ref 4.2–5.4)
SODIUM SERPL-SCNC: 142 MMOL/L (ref 135–145)
T4 SERPL-MCNC: 6 UG/DL (ref 4–12)
TRIGL SERPL-MCNC: 152 MG/DL (ref 0–149)
TSH SERPL DL<=0.005 MIU/L-ACNC: 1.68 UIU/ML (ref 0.38–5.33)
WBC # BLD AUTO: 5 K/UL (ref 4.8–10.8)
WBC # BLD AUTO: 5.1 K/UL (ref 4.8–10.8)
WBC # BLD AUTO: 5.1 K/UL (ref 4.8–10.8)

## 2020-05-08 PROCEDURE — 80053 COMPREHEN METABOLIC PANEL: CPT | Mod: 91

## 2020-05-08 PROCEDURE — 82570 ASSAY OF URINE CREATININE: CPT

## 2020-05-08 PROCEDURE — 83880 ASSAY OF NATRIURETIC PEPTIDE: CPT

## 2020-05-08 PROCEDURE — 84443 ASSAY THYROID STIM HORMONE: CPT

## 2020-05-08 PROCEDURE — 85025 COMPLETE CBC W/AUTO DIFF WBC: CPT

## 2020-05-08 PROCEDURE — 36415 COLL VENOUS BLD VENIPUNCTURE: CPT

## 2020-05-08 PROCEDURE — 85027 COMPLETE CBC AUTOMATED: CPT | Mod: XU

## 2020-05-08 PROCEDURE — 82043 UR ALBUMIN QUANTITATIVE: CPT

## 2020-05-08 PROCEDURE — 85025 COMPLETE CBC W/AUTO DIFF WBC: CPT | Mod: 91

## 2020-05-08 PROCEDURE — 80053 COMPREHEN METABOLIC PANEL: CPT

## 2020-05-08 PROCEDURE — 84436 ASSAY OF TOTAL THYROXINE: CPT

## 2020-05-08 PROCEDURE — 80061 LIPID PANEL: CPT

## 2020-05-15 ENCOUNTER — HOSPITAL ENCOUNTER (OUTPATIENT)
Dept: RADIOLOGY | Facility: MEDICAL CENTER | Age: 75
End: 2020-05-15
Attending: SURGERY
Payer: MEDICARE

## 2020-05-15 DIAGNOSIS — I72.8 CELIAC ARTERY ANEURYSM (HCC): ICD-10-CM

## 2020-05-15 DIAGNOSIS — I72.2 ANEURYSM OF RENAL ARTERY (HCC): ICD-10-CM

## 2020-05-15 PROCEDURE — 700117 HCHG RX CONTRAST REV CODE 255

## 2020-05-15 PROCEDURE — 74174 CTA ABD&PLVS W/CONTRAST: CPT

## 2020-05-15 RX ADMIN — IOHEXOL 100 ML: 350 INJECTION, SOLUTION INTRAVENOUS at 14:40

## 2020-06-22 ENCOUNTER — HOSPITAL ENCOUNTER (OUTPATIENT)
Dept: LAB | Facility: MEDICAL CENTER | Age: 75
End: 2020-06-22
Attending: INTERNAL MEDICINE
Payer: MEDICARE

## 2020-06-22 ENCOUNTER — APPOINTMENT (OUTPATIENT)
Dept: SLEEP MEDICINE | Facility: MEDICAL CENTER | Age: 75
End: 2020-06-22
Payer: MEDICARE

## 2020-06-22 LAB
ALBUMIN SERPL BCP-MCNC: 4.2 G/DL (ref 3.2–4.9)
ALBUMIN/GLOB SERPL: 1.5 G/DL
ALP SERPL-CCNC: 74 U/L (ref 30–99)
ALT SERPL-CCNC: 24 U/L (ref 2–50)
ANION GAP SERPL CALC-SCNC: 13 MMOL/L (ref 7–16)
AST SERPL-CCNC: 28 U/L (ref 12–45)
BASOPHILS # BLD AUTO: 0.9 % (ref 0–1.8)
BASOPHILS # BLD: 0.05 K/UL (ref 0–0.12)
BILIRUB SERPL-MCNC: 0.4 MG/DL (ref 0.1–1.5)
BUN SERPL-MCNC: 45 MG/DL (ref 8–22)
CALCIUM SERPL-MCNC: 11 MG/DL (ref 8.5–10.5)
CHLORIDE SERPL-SCNC: 89 MMOL/L (ref 96–112)
CO2 SERPL-SCNC: 37 MMOL/L (ref 20–33)
CREAT SERPL-MCNC: 1.45 MG/DL (ref 0.5–1.4)
EOSINOPHIL # BLD AUTO: 0.09 K/UL (ref 0–0.51)
EOSINOPHIL NFR BLD: 1.6 % (ref 0–6.9)
ERYTHROCYTE [DISTWIDTH] IN BLOOD BY AUTOMATED COUNT: 44.6 FL (ref 35.9–50)
FASTING STATUS PATIENT QL REPORTED: NORMAL
GLOBULIN SER CALC-MCNC: 2.8 G/DL (ref 1.9–3.5)
GLUCOSE SERPL-MCNC: 135 MG/DL (ref 65–99)
HCT VFR BLD AUTO: 35.7 % (ref 37–47)
HGB BLD-MCNC: 12.2 G/DL (ref 12–16)
IMM GRANULOCYTES # BLD AUTO: 0.03 K/UL (ref 0–0.11)
IMM GRANULOCYTES NFR BLD AUTO: 0.5 % (ref 0–0.9)
LYMPHOCYTES # BLD AUTO: 1.3 K/UL (ref 1–4.8)
LYMPHOCYTES NFR BLD: 22.9 % (ref 22–41)
MCH RBC QN AUTO: 34 PG (ref 27–33)
MCHC RBC AUTO-ENTMCNC: 34.2 G/DL (ref 33.6–35)
MCV RBC AUTO: 99.4 FL (ref 81.4–97.8)
MONOCYTES # BLD AUTO: 0.46 K/UL (ref 0–0.85)
MONOCYTES NFR BLD AUTO: 8.1 % (ref 0–13.4)
NEUTROPHILS # BLD AUTO: 3.74 K/UL (ref 2–7.15)
NEUTROPHILS NFR BLD: 66 % (ref 44–72)
NRBC # BLD AUTO: 0 K/UL
NRBC BLD-RTO: 0 /100 WBC
NT-PROBNP SERPL IA-MCNC: 706 PG/ML (ref 0–125)
PLATELET # BLD AUTO: 259 K/UL (ref 164–446)
PMV BLD AUTO: 9 FL (ref 9–12.9)
POTASSIUM SERPL-SCNC: 3 MMOL/L (ref 3.6–5.5)
PROT SERPL-MCNC: 7 G/DL (ref 6–8.2)
RBC # BLD AUTO: 3.59 M/UL (ref 4.2–5.4)
SODIUM SERPL-SCNC: 139 MMOL/L (ref 135–145)
WBC # BLD AUTO: 5.7 K/UL (ref 4.8–10.8)

## 2020-06-22 PROCEDURE — 83880 ASSAY OF NATRIURETIC PEPTIDE: CPT

## 2020-06-22 PROCEDURE — 80053 COMPREHEN METABOLIC PANEL: CPT

## 2020-06-22 PROCEDURE — 85025 COMPLETE CBC W/AUTO DIFF WBC: CPT

## 2020-06-29 ENCOUNTER — HOSPITAL ENCOUNTER (OUTPATIENT)
Dept: LAB | Facility: MEDICAL CENTER | Age: 75
End: 2020-06-29
Attending: INTERNAL MEDICINE
Payer: MEDICARE

## 2020-06-29 LAB
ANION GAP SERPL CALC-SCNC: 16 MMOL/L (ref 7–16)
BUN SERPL-MCNC: 49 MG/DL (ref 8–22)
CALCIUM SERPL-MCNC: 10.9 MG/DL (ref 8.5–10.5)
CHLORIDE SERPL-SCNC: 88 MMOL/L (ref 96–112)
CO2 SERPL-SCNC: 33 MMOL/L (ref 20–33)
CREAT SERPL-MCNC: 1.28 MG/DL (ref 0.5–1.4)
FASTING STATUS PATIENT QL REPORTED: NORMAL
GLUCOSE SERPL-MCNC: 134 MG/DL (ref 65–99)
NT-PROBNP SERPL IA-MCNC: 431 PG/ML (ref 0–125)
POTASSIUM SERPL-SCNC: 3.1 MMOL/L (ref 3.6–5.5)
SODIUM SERPL-SCNC: 137 MMOL/L (ref 135–145)

## 2020-06-29 PROCEDURE — 80048 BASIC METABOLIC PNL TOTAL CA: CPT

## 2020-06-29 PROCEDURE — 83880 ASSAY OF NATRIURETIC PEPTIDE: CPT

## 2020-06-29 PROCEDURE — 36415 COLL VENOUS BLD VENIPUNCTURE: CPT

## 2020-07-09 ENCOUNTER — OFFICE VISIT (OUTPATIENT)
Dept: CARDIOLOGY | Facility: MEDICAL CENTER | Age: 75
End: 2020-07-09
Payer: MEDICARE

## 2020-07-09 VITALS
BODY MASS INDEX: 34.53 KG/M2 | RESPIRATION RATE: 20 BRPM | SYSTOLIC BLOOD PRESSURE: 108 MMHG | HEART RATE: 72 BPM | HEIGHT: 67 IN | OXYGEN SATURATION: 99 % | DIASTOLIC BLOOD PRESSURE: 60 MMHG | WEIGHT: 220 LBS

## 2020-07-09 DIAGNOSIS — I10 ESSENTIAL HYPERTENSION: ICD-10-CM

## 2020-07-09 DIAGNOSIS — I27.21 PAH (PULMONARY ARTERY HYPERTENSION) (HCC): ICD-10-CM

## 2020-07-09 DIAGNOSIS — G47.39 OTHER SLEEP APNEA: ICD-10-CM

## 2020-07-09 DIAGNOSIS — K59.03 DRUG-INDUCED CONSTIPATION: ICD-10-CM

## 2020-07-09 DIAGNOSIS — Z79.01 CHRONIC ANTICOAGULATION: ICD-10-CM

## 2020-07-09 DIAGNOSIS — I48.0 PAROXYSMAL ATRIAL FIBRILLATION (HCC): ICD-10-CM

## 2020-07-09 DIAGNOSIS — I27.20 PULMONARY HYPERTENSION (HCC): ICD-10-CM

## 2020-07-09 DIAGNOSIS — E83.52 HYPERCALCEMIA: ICD-10-CM

## 2020-07-09 DIAGNOSIS — Z79.899 HIGH RISK MEDICATION USE: ICD-10-CM

## 2020-07-09 DIAGNOSIS — J96.11 CHRONIC RESPIRATORY FAILURE WITH HYPOXIA (HCC): ICD-10-CM

## 2020-07-09 DIAGNOSIS — R06.89 HYPERCAPNIA: ICD-10-CM

## 2020-07-09 DIAGNOSIS — E87.6 HYPOKALEMIA: ICD-10-CM

## 2020-07-09 LAB — EKG IMPRESSION: NORMAL

## 2020-07-09 PROCEDURE — 99215 OFFICE O/P EST HI 40 MIN: CPT | Performed by: INTERNAL MEDICINE

## 2020-07-09 PROCEDURE — 93000 ELECTROCARDIOGRAM COMPLETE: CPT | Performed by: INTERNAL MEDICINE

## 2020-07-09 RX ORDER — POTASSIUM CHLORIDE 20 MEQ/1
40 TABLET, EXTENDED RELEASE ORAL 2 TIMES DAILY
Qty: 120 TAB | Refills: 11 | Status: ON HOLD | OUTPATIENT
Start: 2020-07-09 | End: 2020-08-09 | Stop reason: SDUPTHER

## 2020-07-09 ASSESSMENT — ENCOUNTER SYMPTOMS
DIZZINESS: 0
EYES NEGATIVE: 1
COUGH: 0
WEAKNESS: 0
CHILLS: 0
GASTROINTESTINAL NEGATIVE: 1
LOSS OF CONSCIOUSNESS: 0
MUSCULOSKELETAL NEGATIVE: 1
CARDIOVASCULAR NEGATIVE: 1
SHORTNESS OF BREATH: 1
CLAUDICATION: 0
SPUTUM PRODUCTION: 0
PND: 0
WHEEZING: 0
BRUISES/BLEEDS EASILY: 0
PALPITATIONS: 0
SORE THROAT: 0
STRIDOR: 0
ORTHOPNEA: 0
FEVER: 0
NEUROLOGICAL NEGATIVE: 1
HEMOPTYSIS: 0

## 2020-07-09 ASSESSMENT — PATIENT HEALTH QUESTIONNAIRE - PHQ9: CLINICAL INTERPRETATION OF PHQ2 SCORE: 0

## 2020-07-09 ASSESSMENT — FIBROSIS 4 INDEX: FIB4 SCORE: 1.61

## 2020-07-09 NOTE — PROGRESS NOTES
Chief Complaint   Patient presents with   • Follow-Up     PAF       Subjective:   Zeinab Loza is a 75 y.o. female who presents today as a follow-up for her mitral valve repair pulmonary hypertension lower extremity edema hyperkalemia.  Since he was last seen she had her potassium rechecked which went from 3.0-3.1.  She is currently taking potassium 20 twice daily.  She was seen in consultation at [facility] who recommended backing off on some of the heart failure medications or her blood pressure medications.  He is back in atrial fibrillation today.  She is feeling weak and fatigued.  Her blood pressures are averaging about 100 at home.    Past Medical History:   Diagnosis Date   • Aneurysm artery, celiac (HCC) 2019   • Aneurysm of right renal artery (HCC)    • Atrial fibrillation (HCC)    • Diastolic heart failure (HCC)    • Hypertension, essential    • Pulmonary hypertension (HCC)    • Warfarin anticoagulation      No past surgical history on file.  No family history on file.  Social History     Socioeconomic History   • Marital status:      Spouse name: Not on file   • Number of children: Not on file   • Years of education: Not on file   • Highest education level: Not on file   Occupational History   • Not on file   Social Needs   • Financial resource strain: Not on file   • Food insecurity     Worry: Not on file     Inability: Not on file   • Transportation needs     Medical: Not on file     Non-medical: Not on file   Tobacco Use   • Smoking status: Never Smoker   • Smokeless tobacco: Never Used   Substance and Sexual Activity   • Alcohol use: Yes     Alcohol/week: 4.2 oz     Types: 7 Shots of liquor per week     Comment: 1 day   • Drug use: No   • Sexual activity: Not on file   Lifestyle   • Physical activity     Days per week: Not on file     Minutes per session: Not on file   • Stress: Not on file   Relationships   • Social connections     Talks on phone: Not on file     Gets together: Not on  file     Attends Tenriism service: Not on file     Active member of club or organization: Not on file     Attends meetings of clubs or organizations: Not on file     Relationship status: Not on file   • Intimate partner violence     Fear of current or ex partner: Not on file     Emotionally abused: Not on file     Physically abused: Not on file     Forced sexual activity: Not on file   Other Topics Concern   • Not on file   Social History Narrative   • Not on file     Allergies   Allergen Reactions   • Zolpidem Tartrate Unspecified     Lightheaded and confusion     Outpatient Encounter Medications as of 7/9/2020   Medication Sig Dispense Refill   • potassium chloride SA (KDUR) 20 MEQ Tab CR Take 2 Tabs by mouth 2 times a day. 120 Tab 11   • digoxin (LANOXIN) 125 MCG Tab Take 1 Tab by mouth every evening. 90 Tab 3   • DILTIAZem CD (CARDIZEM CD) 120 MG CAPSULE SR 24 HR TAKE 1 CAPSULE EVERY DAY 90 Cap 1   • torsemide (DEMADEX) 20 MG Tab Take 1 Tab by mouth 2 times a day. 180 Tab 3   • apixaban (ELIQUIS) 5mg Tab Take 5 mg by mouth 2 Times a Day.     • amitriptyline (ELAVIL) 10 MG Tab Take 20 mg by mouth every bedtime.     • HYDROcodone-acetaminophen (NORCO) 7.5-325 MG per tablet Take 1 Tab by mouth 2 Times a Day.  0   • methocarbamol (ROBAXIN) 500 MG Tab 500 mg every day.  0   • sotalol (BETAPACE) 80 MG Tab Take 1 Tab by mouth 2 times a day. 60 Tab 11   • Tadalafil, PAH, (ADCIRCA) 20 MG Tab Take 40 mg by mouth every bedtime. Indications: Pulmonary Arterial Hypertension     • therapeutic multivitamin-minerals (THERAGRAN-M) Tab Take 1 Tab by mouth every day.     • PARoxetine (PAXIL) 20 MG Tab Take 20 mg by mouth every day.     • rosuvastatin (CRESTOR) 10 MG Tab Take 10 mg by mouth every evening.     • ambrisentan (LETAIRIS) 10 MG tablet Take 10 mg by mouth every day.     • levothyroxine (SYNTHROID) 75 MCG Tab Take 75 mcg by mouth Every morning on an empty stomach.     • Cholecalciferol (VITAMIN D) 2000 units Cap Take  "2,000 Units by mouth every day.     • sulfamethoxazole-trimethoprim (BACTRIM DS) 800-160 MG tablet      • [DISCONTINUED] potassium chloride SA (KDUR) 20 MEQ Tab CR Take 1 Tab by mouth 2 times a day. 60 Tab 0   • metOLazone (ZAROXOLYN) 5 MG Tab Take 5 mg by mouth every day.     • acetaminophen (TYLENOL) 500 MG Tab Take 1,000 mg by mouth every 6 hours as needed for Mild Pain.       No facility-administered encounter medications on file as of 7/9/2020.      Review of Systems   Constitutional: Positive for malaise/fatigue. Negative for chills and fever.   HENT: Negative.  Negative for sore throat.    Eyes: Negative.    Respiratory: Positive for shortness of breath. Negative for cough, hemoptysis, sputum production, wheezing and stridor.    Cardiovascular: Negative.  Negative for chest pain, palpitations, orthopnea, claudication, leg swelling and PND.   Gastrointestinal: Negative.    Genitourinary: Negative.    Musculoskeletal: Negative.    Skin: Negative.    Neurological: Negative.  Negative for dizziness, loss of consciousness and weakness.   Endo/Heme/Allergies: Negative.  Does not bruise/bleed easily.   All other systems reviewed and are negative.       Objective:   /60 (BP Location: Left arm, Patient Position: Sitting, BP Cuff Size: Adult)   Pulse 72   Resp 20   Ht 1.702 m (5' 7\")   Wt 99.8 kg (220 lb)   SpO2 99%   BMI 34.46 kg/m²     Physical Exam   Constitutional: She appears well-developed and well-nourished. No distress.   HENT:   Head: Normocephalic and atraumatic.   Right Ear: External ear normal.   Left Ear: External ear normal.   Nose: Nose normal.   Mouth/Throat: No oropharyngeal exudate.   Eyes: Pupils are equal, round, and reactive to light. Conjunctivae and EOM are normal. Right eye exhibits no discharge. Left eye exhibits no discharge. No scleral icterus.   Neck: Neck supple. No JVD present.   Cardiovascular: Normal rate, regular rhythm and intact distal pulses. Exam reveals no gallop and no " friction rub.   No murmur heard.  Pulmonary/Chest: Effort normal. No stridor. No respiratory distress. She has no wheezes. She has no rales. She exhibits no tenderness.   Abdominal: Soft. She exhibits no distension. There is no guarding.   Musculoskeletal: Normal range of motion.         General: No tenderness, deformity or edema.   Neurological: She is alert. She has normal reflexes. She displays normal reflexes. No cranial nerve deficit. She exhibits normal muscle tone. Coordination normal.   Skin: Skin is warm and dry. No rash noted. She is not diaphoretic. No erythema. No pallor.   Psychiatric: She has a normal mood and affect. Her behavior is normal. Judgment and thought content normal.   Nursing note and vitals reviewed.    EKG: Dated 7/9/2020 personally viewed interpreted myself showing atrial fibrillation with controlled ventricular response.    Echocardiogram: Dated 1/16/2020 personally viewed interpreted by myself showing EF of 65% with no mitral valve repair and a pulmonary artery systolic pressure of 32.    Lab Results   Component Value Date/Time    CHOLSTRLTOT 208 (H) 05/08/2020 11:30 AM     (H) 05/08/2020 11:30 AM    HDL 55 05/08/2020 11:30 AM    TRIGLYCERIDE 152 (H) 05/08/2020 11:30 AM       Lab Results   Component Value Date/Time    SODIUM 137 06/29/2020 02:34 PM    POTASSIUM 3.1 (L) 06/29/2020 02:34 PM    CHLORIDE 88 (L) 06/29/2020 02:34 PM    CO2 33 06/29/2020 02:34 PM    GLUCOSE 134 (H) 06/29/2020 02:34 PM    BUN 49 (H) 06/29/2020 02:34 PM    CREATININE 1.28 06/29/2020 02:34 PM     Lab Results   Component Value Date/Time    ALKPHOSPHAT 74 06/22/2020 02:56 PM    ASTSGOT 28 06/22/2020 02:56 PM    ALTSGPT 24 06/22/2020 02:56 PM    TBILIRUBIN 0.4 06/22/2020 02:56 PM        Assessment:     1. Paroxysmal atrial fibrillation (HCC)  EKG   2. Chronic respiratory failure with hypoxia (HCC)     3. Chronic anticoagulation     4. Drug-induced constipation     5. Essential hypertension     6. High risk  medication use  potassium chloride SA (KDUR) 20 MEQ Tab CR    Basic Metabolic Panel   7. PAH (pulmonary artery hypertension) (HCC)  Basic Metabolic Panel   8. Other sleep apnea     9. Pulmonary hypertension (HCC)     10. Hypokalemia  potassium chloride SA (KDUR) 20 MEQ Tab CR   11. Hypercalcemia     12. Hypercapnia         Medical Decision Making:  Today's Assessment / Status / Plan:     75-year-old female with pulmonary hypertension sleep apnea low blood pressure and atrial fibrillation with hypokalemia.  For her hypokalemia I will have her increase her potassium to 40 mill equivalents twice daily.  I have asked her to cut down on her Latera's and stop it for a few days to see if her blood pressure improves and if she feels better.  Her most recent echocardiogram does support normalization of her pressure.  She is back in atrial fibrillation with controlled ventricular spots today and I want stop her diltiazem.  We will check labs in 1 week for her potassium.  She will stay on the torsemide and increase this as needed for edema.    1. PAH, Who type 1    - cont Tadalafil 40    - hold letaris 10     2. FEI    - f/u Bipap     3. Cyanposis    - consider cMRI at next visit     4. MVR    - clinical follow up     5. A-fib    - cont sotalol    - cont eliquis 5 BID    - cont dig     6. Edema    - cont torsemide 20 BID    - increase KCL to 40 mEQ BID     7. HTN    - cont DILT , unclear if this is for HTN or + vasodilator response     8. High Risk Meds    - labs in one week

## 2020-07-27 ENCOUNTER — HOSPITAL ENCOUNTER (OUTPATIENT)
Dept: LAB | Facility: MEDICAL CENTER | Age: 75
End: 2020-07-27
Attending: INTERNAL MEDICINE
Payer: MEDICARE

## 2020-07-27 DIAGNOSIS — I27.21 PAH (PULMONARY ARTERY HYPERTENSION) (HCC): ICD-10-CM

## 2020-07-27 DIAGNOSIS — Z79.899 HIGH RISK MEDICATION USE: ICD-10-CM

## 2020-07-27 LAB
ALBUMIN SERPL BCP-MCNC: 4.4 G/DL (ref 3.2–4.9)
ALBUMIN/GLOB SERPL: 1.6 G/DL
ALP SERPL-CCNC: 77 U/L (ref 30–99)
ALT SERPL-CCNC: 39 U/L (ref 2–50)
ANION GAP SERPL CALC-SCNC: 13 MMOL/L (ref 7–16)
AST SERPL-CCNC: 31 U/L (ref 12–45)
BILIRUB SERPL-MCNC: 0.4 MG/DL (ref 0.1–1.5)
BUN SERPL-MCNC: 46 MG/DL (ref 8–22)
CALCIUM SERPL-MCNC: 10.9 MG/DL (ref 8.5–10.5)
CHLORIDE SERPL-SCNC: 96 MMOL/L (ref 96–112)
CO2 SERPL-SCNC: 30 MMOL/L (ref 20–33)
CREAT SERPL-MCNC: 0.96 MG/DL (ref 0.5–1.4)
GLOBULIN SER CALC-MCNC: 2.8 G/DL (ref 1.9–3.5)
GLUCOSE SERPL-MCNC: 117 MG/DL (ref 65–99)
NT-PROBNP SERPL IA-MCNC: 298 PG/ML (ref 0–125)
POTASSIUM SERPL-SCNC: 4.4 MMOL/L (ref 3.6–5.5)
PROT SERPL-MCNC: 7.2 G/DL (ref 6–8.2)
SODIUM SERPL-SCNC: 139 MMOL/L (ref 135–145)

## 2020-07-27 PROCEDURE — 80053 COMPREHEN METABOLIC PANEL: CPT

## 2020-07-27 PROCEDURE — 83880 ASSAY OF NATRIURETIC PEPTIDE: CPT

## 2020-07-27 PROCEDURE — 36415 COLL VENOUS BLD VENIPUNCTURE: CPT

## 2020-08-04 ENCOUNTER — HOSPITAL ENCOUNTER (INPATIENT)
Facility: MEDICAL CENTER | Age: 75
LOS: 5 days | DRG: 493 | End: 2020-08-09
Attending: EMERGENCY MEDICINE | Admitting: HOSPITALIST
Payer: MEDICARE

## 2020-08-04 ENCOUNTER — APPOINTMENT (OUTPATIENT)
Dept: RADIOLOGY | Facility: MEDICAL CENTER | Age: 75
DRG: 493 | End: 2020-08-04
Attending: EMERGENCY MEDICINE
Payer: MEDICARE

## 2020-08-04 DIAGNOSIS — S82.842A CLOSED BIMALLEOLAR FRACTURE OF LEFT ANKLE, INITIAL ENCOUNTER: ICD-10-CM

## 2020-08-04 DIAGNOSIS — S82.892K: ICD-10-CM

## 2020-08-04 DIAGNOSIS — E87.6 HYPOKALEMIA: ICD-10-CM

## 2020-08-04 DIAGNOSIS — S93.402A SPRAIN OF LEFT ANKLE, UNSPECIFIED LIGAMENT, INITIAL ENCOUNTER: ICD-10-CM

## 2020-08-04 DIAGNOSIS — Z79.899 HIGH RISK MEDICATION USE: ICD-10-CM

## 2020-08-04 LAB — COVID ORDER STATUS COVID19: NORMAL

## 2020-08-04 PROCEDURE — 302875 HCHG BANDAGE ACE 4 OR 6""

## 2020-08-04 PROCEDURE — 73590 X-RAY EXAM OF LOWER LEG: CPT | Mod: LT

## 2020-08-04 PROCEDURE — 302874 HCHG BANDAGE ACE 2 OR 3""

## 2020-08-04 PROCEDURE — 700111 HCHG RX REV CODE 636 W/ 250 OVERRIDE (IP): Performed by: EMERGENCY MEDICINE

## 2020-08-04 PROCEDURE — 96376 TX/PRO/DX INJ SAME DRUG ADON: CPT

## 2020-08-04 PROCEDURE — 96375 TX/PRO/DX INJ NEW DRUG ADDON: CPT

## 2020-08-04 PROCEDURE — 96374 THER/PROPH/DIAG INJ IV PUSH: CPT

## 2020-08-04 PROCEDURE — 73610 X-RAY EXAM OF ANKLE: CPT | Mod: LT

## 2020-08-04 PROCEDURE — 99223 1ST HOSP IP/OBS HIGH 75: CPT | Mod: AI | Performed by: HOSPITALIST

## 2020-08-04 PROCEDURE — C9803 HOPD COVID-19 SPEC COLLECT: HCPCS | Performed by: HOSPITALIST

## 2020-08-04 PROCEDURE — 99285 EMERGENCY DEPT VISIT HI MDM: CPT

## 2020-08-04 PROCEDURE — 770006 HCHG ROOM/CARE - MED/SURG/GYN SEMI*

## 2020-08-04 PROCEDURE — 29515 APPLICATION SHORT LEG SPLINT: CPT

## 2020-08-04 PROCEDURE — U0003 INFECTIOUS AGENT DETECTION BY NUCLEIC ACID (DNA OR RNA); SEVERE ACUTE RESPIRATORY SYNDROME CORONAVIRUS 2 (SARS-COV-2) (CORONAVIRUS DISEASE [COVID-19]), AMPLIFIED PROBE TECHNIQUE, MAKING USE OF HIGH THROUGHPUT TECHNOLOGIES AS DESCRIBED BY CMS-2020-01-R: HCPCS

## 2020-08-04 PROCEDURE — 73630 X-RAY EXAM OF FOOT: CPT | Mod: LT

## 2020-08-04 RX ORDER — ONDANSETRON 4 MG/1
4 TABLET, ORALLY DISINTEGRATING ORAL EVERY 4 HOURS PRN
Status: DISCONTINUED | OUTPATIENT
Start: 2020-08-04 | End: 2020-08-09 | Stop reason: HOSPADM

## 2020-08-04 RX ORDER — M-VIT,TX,IRON,MINS/CALC/FOLIC 27MG-0.4MG
1 TABLET ORAL DAILY
Status: DISCONTINUED | OUTPATIENT
Start: 2020-08-05 | End: 2020-08-09 | Stop reason: HOSPADM

## 2020-08-04 RX ORDER — ONDANSETRON 2 MG/ML
4 INJECTION INTRAMUSCULAR; INTRAVENOUS EVERY 4 HOURS PRN
Status: DISCONTINUED | OUTPATIENT
Start: 2020-08-04 | End: 2020-08-06

## 2020-08-04 RX ORDER — HYDROMORPHONE HYDROCHLORIDE 1 MG/ML
1 INJECTION, SOLUTION INTRAMUSCULAR; INTRAVENOUS; SUBCUTANEOUS ONCE
Status: COMPLETED | OUTPATIENT
Start: 2020-08-04 | End: 2020-08-04

## 2020-08-04 RX ORDER — TADALAFIL 20 MG/1
40 TABLET ORAL
Status: DISCONTINUED | OUTPATIENT
Start: 2020-08-05 | End: 2020-08-05

## 2020-08-04 RX ORDER — METOLAZONE 5 MG/1
5 TABLET ORAL DAILY
Status: DISCONTINUED | OUTPATIENT
Start: 2020-08-05 | End: 2020-08-09 | Stop reason: HOSPADM

## 2020-08-04 RX ORDER — TORSEMIDE 20 MG/1
20 TABLET ORAL 2 TIMES DAILY
Status: DISCONTINUED | OUTPATIENT
Start: 2020-08-05 | End: 2020-08-09 | Stop reason: HOSPADM

## 2020-08-04 RX ORDER — OXYCODONE HYDROCHLORIDE 5 MG/1
2.5 TABLET ORAL
Status: DISCONTINUED | OUTPATIENT
Start: 2020-08-04 | End: 2020-08-05

## 2020-08-04 RX ORDER — SOTALOL HYDROCHLORIDE 80 MG/1
80 TABLET ORAL 2 TIMES DAILY
Status: DISCONTINUED | OUTPATIENT
Start: 2020-08-05 | End: 2020-08-09 | Stop reason: HOSPADM

## 2020-08-04 RX ORDER — AMOXICILLIN 250 MG
2 CAPSULE ORAL 2 TIMES DAILY
Status: DISCONTINUED | OUTPATIENT
Start: 2020-08-05 | End: 2020-08-06

## 2020-08-04 RX ORDER — SODIUM CHLORIDE 9 MG/ML
INJECTION, SOLUTION INTRAVENOUS CONTINUOUS
Status: DISCONTINUED | OUTPATIENT
Start: 2020-08-05 | End: 2020-08-06

## 2020-08-04 RX ORDER — OXYCODONE HYDROCHLORIDE 5 MG/1
5 TABLET ORAL
Status: DISCONTINUED | OUTPATIENT
Start: 2020-08-04 | End: 2020-08-05

## 2020-08-04 RX ORDER — DIGOXIN 125 MCG
125 TABLET ORAL EVERY EVENING
Status: DISCONTINUED | OUTPATIENT
Start: 2020-08-05 | End: 2020-08-09 | Stop reason: HOSPADM

## 2020-08-04 RX ORDER — POLYETHYLENE GLYCOL 3350 17 G/17G
1 POWDER, FOR SOLUTION ORAL
Status: DISCONTINUED | OUTPATIENT
Start: 2020-08-04 | End: 2020-08-06

## 2020-08-04 RX ORDER — LABETALOL HYDROCHLORIDE 5 MG/ML
10 INJECTION, SOLUTION INTRAVENOUS EVERY 4 HOURS PRN
Status: DISCONTINUED | OUTPATIENT
Start: 2020-08-04 | End: 2020-08-09 | Stop reason: HOSPADM

## 2020-08-04 RX ORDER — HYDROMORPHONE HYDROCHLORIDE 1 MG/ML
1 INJECTION, SOLUTION INTRAMUSCULAR; INTRAVENOUS; SUBCUTANEOUS
Status: COMPLETED | OUTPATIENT
Start: 2020-08-04 | End: 2020-08-04

## 2020-08-04 RX ORDER — HYDROMORPHONE HYDROCHLORIDE 1 MG/ML
0.25 INJECTION, SOLUTION INTRAMUSCULAR; INTRAVENOUS; SUBCUTANEOUS
Status: DISCONTINUED | OUTPATIENT
Start: 2020-08-04 | End: 2020-08-05

## 2020-08-04 RX ORDER — LEVOTHYROXINE SODIUM 0.07 MG/1
75 TABLET ORAL
Status: DISCONTINUED | OUTPATIENT
Start: 2020-08-05 | End: 2020-08-09 | Stop reason: HOSPADM

## 2020-08-04 RX ORDER — ROSUVASTATIN CALCIUM 10 MG/1
10 TABLET, COATED ORAL EVERY EVENING
Status: DISCONTINUED | OUTPATIENT
Start: 2020-08-05 | End: 2020-08-09 | Stop reason: HOSPADM

## 2020-08-04 RX ORDER — VITAMIN B COMPLEX
1000 TABLET ORAL DAILY
Status: DISCONTINUED | OUTPATIENT
Start: 2020-08-05 | End: 2020-08-09 | Stop reason: HOSPADM

## 2020-08-04 RX ORDER — ACETAMINOPHEN 325 MG/1
650 TABLET ORAL EVERY 6 HOURS PRN
Status: DISCONTINUED | OUTPATIENT
Start: 2020-08-04 | End: 2020-08-09 | Stop reason: HOSPADM

## 2020-08-04 RX ORDER — AMITRIPTYLINE HYDROCHLORIDE 10 MG/1
20 TABLET, FILM COATED ORAL
Status: DISCONTINUED | OUTPATIENT
Start: 2020-08-05 | End: 2020-08-09 | Stop reason: HOSPADM

## 2020-08-04 RX ORDER — BISACODYL 10 MG
10 SUPPOSITORY, RECTAL RECTAL
Status: DISCONTINUED | OUTPATIENT
Start: 2020-08-04 | End: 2020-08-06

## 2020-08-04 RX ORDER — PAROXETINE HYDROCHLORIDE 20 MG/1
20 TABLET, FILM COATED ORAL DAILY
Status: DISCONTINUED | OUTPATIENT
Start: 2020-08-05 | End: 2020-08-09 | Stop reason: HOSPADM

## 2020-08-04 RX ORDER — AMBRISENTAN 10 MG/1
10 TABLET, FILM COATED ORAL DAILY
Status: DISCONTINUED | OUTPATIENT
Start: 2020-08-05 | End: 2020-08-05

## 2020-08-04 RX ORDER — DILTIAZEM HYDROCHLORIDE 120 MG/1
120 CAPSULE, COATED, EXTENDED RELEASE ORAL
Status: DISCONTINUED | OUTPATIENT
Start: 2020-08-05 | End: 2020-08-09 | Stop reason: HOSPADM

## 2020-08-04 RX ORDER — MORPHINE SULFATE 4 MG/ML
4 INJECTION, SOLUTION INTRAMUSCULAR; INTRAVENOUS ONCE
Status: COMPLETED | OUTPATIENT
Start: 2020-08-04 | End: 2020-08-04

## 2020-08-04 RX ADMIN — HYDROMORPHONE HYDROCHLORIDE 1 MG: 1 INJECTION, SOLUTION INTRAMUSCULAR; INTRAVENOUS; SUBCUTANEOUS at 20:06

## 2020-08-04 RX ADMIN — HYDROMORPHONE HYDROCHLORIDE 1 MG: 1 INJECTION, SOLUTION INTRAMUSCULAR; INTRAVENOUS; SUBCUTANEOUS at 22:26

## 2020-08-04 RX ADMIN — HYDROMORPHONE HYDROCHLORIDE 1 MG: 1 INJECTION, SOLUTION INTRAMUSCULAR; INTRAVENOUS; SUBCUTANEOUS at 21:01

## 2020-08-04 RX ADMIN — MORPHINE SULFATE 4 MG: 4 INJECTION INTRAVENOUS at 19:15

## 2020-08-04 ASSESSMENT — FIBROSIS 4 INDEX: FIB4 SCORE: 1.4

## 2020-08-04 ASSESSMENT — LIFESTYLE VARIABLES: DO YOU DRINK ALCOHOL: NO

## 2020-08-05 ENCOUNTER — APPOINTMENT (OUTPATIENT)
Dept: CARDIOLOGY | Facility: MEDICAL CENTER | Age: 75
DRG: 493 | End: 2020-08-05
Attending: INTERNAL MEDICINE
Payer: MEDICARE

## 2020-08-05 ENCOUNTER — APPOINTMENT (OUTPATIENT)
Dept: RADIOLOGY | Facility: MEDICAL CENTER | Age: 75
DRG: 493 | End: 2020-08-05
Attending: ORTHOPAEDIC SURGERY
Payer: MEDICARE

## 2020-08-05 ENCOUNTER — ANESTHESIA (OUTPATIENT)
Dept: SURGERY | Facility: MEDICAL CENTER | Age: 75
DRG: 493 | End: 2020-08-05
Payer: MEDICARE

## 2020-08-05 ENCOUNTER — ANESTHESIA EVENT (OUTPATIENT)
Dept: SURGERY | Facility: MEDICAL CENTER | Age: 75
DRG: 493 | End: 2020-08-05
Payer: MEDICARE

## 2020-08-05 PROBLEM — S82.892K: Status: ACTIVE | Noted: 2020-08-05

## 2020-08-05 LAB
ANION GAP SERPL CALC-SCNC: 17 MMOL/L (ref 7–16)
BUN SERPL-MCNC: 39 MG/DL (ref 8–22)
CALCIUM SERPL-MCNC: 10.6 MG/DL (ref 8.5–10.5)
CHLORIDE SERPL-SCNC: 92 MMOL/L (ref 96–112)
CO2 SERPL-SCNC: 30 MMOL/L (ref 20–33)
CREAT SERPL-MCNC: 1.06 MG/DL (ref 0.5–1.4)
ERYTHROCYTE [DISTWIDTH] IN BLOOD BY AUTOMATED COUNT: 46.9 FL (ref 35.9–50)
GLUCOSE SERPL-MCNC: 221 MG/DL (ref 65–99)
HCT VFR BLD AUTO: 37.6 % (ref 37–47)
HGB BLD-MCNC: 12.4 G/DL (ref 12–16)
LV EJECT FRACT  99904: 60
LV EJECT FRACT  99904: 60
MAGNESIUM SERPL-MCNC: 2 MG/DL (ref 1.5–2.5)
MCH RBC QN AUTO: 32.9 PG (ref 27–33)
MCHC RBC AUTO-ENTMCNC: 33 G/DL (ref 33.6–35)
MCV RBC AUTO: 99.7 FL (ref 81.4–97.8)
PLATELET # BLD AUTO: 252 K/UL (ref 164–446)
PMV BLD AUTO: 8.6 FL (ref 9–12.9)
POTASSIUM SERPL-SCNC: 3.6 MMOL/L (ref 3.6–5.5)
RBC # BLD AUTO: 3.77 M/UL (ref 4.2–5.4)
SARS-COV-2 RNA RESP QL NAA+PROBE: NOTDETECTED
SODIUM SERPL-SCNC: 139 MMOL/L (ref 135–145)
SPECIMEN SOURCE: NORMAL
WBC # BLD AUTO: 11.1 K/UL (ref 4.8–10.8)

## 2020-08-05 PROCEDURE — 99233 SBSQ HOSP IP/OBS HIGH 50: CPT | Performed by: INTERNAL MEDICINE

## 2020-08-05 PROCEDURE — 770006 HCHG ROOM/CARE - MED/SURG/GYN SEMI*

## 2020-08-05 PROCEDURE — 700102 HCHG RX REV CODE 250 W/ 637 OVERRIDE(OP): Performed by: INTERNAL MEDICINE

## 2020-08-05 PROCEDURE — 93306 TTE W/DOPPLER COMPLETE: CPT | Mod: 26 | Performed by: INTERNAL MEDICINE

## 2020-08-05 PROCEDURE — 700101 HCHG RX REV CODE 250: Performed by: ANESTHESIOLOGY

## 2020-08-05 PROCEDURE — 160041 HCHG SURGERY MINUTES - EA ADDL 1 MIN LEVEL 4: Performed by: ORTHOPAEDIC SURGERY

## 2020-08-05 PROCEDURE — 700105 HCHG RX REV CODE 258: Performed by: ANESTHESIOLOGY

## 2020-08-05 PROCEDURE — 83735 ASSAY OF MAGNESIUM: CPT

## 2020-08-05 PROCEDURE — 700117 HCHG RX CONTRAST REV CODE 255: Performed by: INTERNAL MEDICINE

## 2020-08-05 PROCEDURE — 700102 HCHG RX REV CODE 250 W/ 637 OVERRIDE(OP): Performed by: HOSPITALIST

## 2020-08-05 PROCEDURE — 160048 HCHG OR STATISTICAL LEVEL 1-5: Performed by: ORTHOPAEDIC SURGERY

## 2020-08-05 PROCEDURE — A9270 NON-COVERED ITEM OR SERVICE: HCPCS | Performed by: HOSPITALIST

## 2020-08-05 PROCEDURE — 99221 1ST HOSP IP/OBS SF/LOW 40: CPT | Mod: GC | Performed by: INTERNAL MEDICINE

## 2020-08-05 PROCEDURE — 501838 HCHG SUTURE GENERAL: Performed by: ORTHOPAEDIC SURGERY

## 2020-08-05 PROCEDURE — 500054 HCHG BANDAGE, ELASTIC 6: Performed by: ORTHOPAEDIC SURGERY

## 2020-08-05 PROCEDURE — C1713 ANCHOR/SCREW BN/BN,TIS/BN: HCPCS | Performed by: ORTHOPAEDIC SURGERY

## 2020-08-05 PROCEDURE — A6454 SELF-ADHER BAND W>=3" <5"/YD: HCPCS | Performed by: ORTHOPAEDIC SURGERY

## 2020-08-05 PROCEDURE — 36415 COLL VENOUS BLD VENIPUNCTURE: CPT

## 2020-08-05 PROCEDURE — 160035 HCHG PACU - 1ST 60 MINS PHASE I: Performed by: ORTHOPAEDIC SURGERY

## 2020-08-05 PROCEDURE — 160002 HCHG RECOVERY MINUTES (STAT): Performed by: ORTHOPAEDIC SURGERY

## 2020-08-05 PROCEDURE — 302196 LINEN, HYPOALLERGENIC: Performed by: INTERNAL MEDICINE

## 2020-08-05 PROCEDURE — 160009 HCHG ANES TIME/MIN: Performed by: ORTHOPAEDIC SURGERY

## 2020-08-05 PROCEDURE — 160029 HCHG SURGERY MINUTES - 1ST 30 MINS LEVEL 4: Performed by: ORTHOPAEDIC SURGERY

## 2020-08-05 PROCEDURE — A9270 NON-COVERED ITEM OR SERVICE: HCPCS | Performed by: INTERNAL MEDICINE

## 2020-08-05 PROCEDURE — 700111 HCHG RX REV CODE 636 W/ 250 OVERRIDE (IP): Performed by: INTERNAL MEDICINE

## 2020-08-05 PROCEDURE — 80048 BASIC METABOLIC PNL TOTAL CA: CPT

## 2020-08-05 PROCEDURE — 93306 TTE W/DOPPLER COMPLETE: CPT

## 2020-08-05 PROCEDURE — 0QSK04Z REPOSITION LEFT FIBULA WITH INTERNAL FIXATION DEVICE, OPEN APPROACH: ICD-10-PCS | Performed by: ORTHOPAEDIC SURGERY

## 2020-08-05 PROCEDURE — 700111 HCHG RX REV CODE 636 W/ 250 OVERRIDE (IP): Performed by: ANESTHESIOLOGY

## 2020-08-05 PROCEDURE — 85027 COMPLETE CBC AUTOMATED: CPT

## 2020-08-05 PROCEDURE — 700111 HCHG RX REV CODE 636 W/ 250 OVERRIDE (IP): Performed by: HOSPITALIST

## 2020-08-05 DEVICE — PLATE DISTAL FIBULA 7H (2TX2+2TX1=6): Type: IMPLANTABLE DEVICE | Site: ANKLE | Status: FUNCTIONAL

## 2020-08-05 DEVICE — SCREW 2.5 MM LOCKING TI X 14MM LONG (6TX8=48): Type: IMPLANTABLE DEVICE | Site: ANKLE | Status: FUNCTIONAL

## 2020-08-05 DEVICE — SCREW 3.5 MM NON-LOCKING TI X 12MM LONG (6TX8+2TX5=58): Type: IMPLANTABLE DEVICE | Site: ANKLE | Status: FUNCTIONAL

## 2020-08-05 DEVICE — SCREW 2.5 MM LOCKING TI X 12MM LONG (6TX8=48): Type: IMPLANTABLE DEVICE | Site: ANKLE | Status: FUNCTIONAL

## 2020-08-05 DEVICE — SCREW 3.5 MM LOCKING TI X 12MM LONG (6TX8+2TX5=58): Type: IMPLANTABLE DEVICE | Site: ANKLE | Status: FUNCTIONAL

## 2020-08-05 RX ORDER — CEFAZOLIN SODIUM 1 G/3ML
INJECTION, POWDER, FOR SOLUTION INTRAMUSCULAR; INTRAVENOUS PRN
Status: DISCONTINUED | OUTPATIENT
Start: 2020-08-05 | End: 2020-08-06 | Stop reason: SURG

## 2020-08-05 RX ORDER — DIPHENHYDRAMINE HCL 25 MG
25 TABLET ORAL ONCE
Status: COMPLETED | OUTPATIENT
Start: 2020-08-05 | End: 2020-08-05

## 2020-08-05 RX ORDER — METOCLOPRAMIDE HYDROCHLORIDE 5 MG/ML
INJECTION INTRAMUSCULAR; INTRAVENOUS PRN
Status: DISCONTINUED | OUTPATIENT
Start: 2020-08-05 | End: 2020-08-06 | Stop reason: SURG

## 2020-08-05 RX ORDER — SODIUM CHLORIDE, SODIUM LACTATE, POTASSIUM CHLORIDE, CALCIUM CHLORIDE 600; 310; 30; 20 MG/100ML; MG/100ML; MG/100ML; MG/100ML
INJECTION, SOLUTION INTRAVENOUS
Status: DISCONTINUED | OUTPATIENT
Start: 2020-08-05 | End: 2020-08-06 | Stop reason: SURG

## 2020-08-05 RX ORDER — ONDANSETRON 2 MG/ML
INJECTION INTRAMUSCULAR; INTRAVENOUS PRN
Status: DISCONTINUED | OUTPATIENT
Start: 2020-08-05 | End: 2020-08-06 | Stop reason: SURG

## 2020-08-05 RX ORDER — LIDOCAINE HYDROCHLORIDE 20 MG/ML
INJECTION, SOLUTION EPIDURAL; INFILTRATION; INTRACAUDAL; PERINEURAL PRN
Status: DISCONTINUED | OUTPATIENT
Start: 2020-08-05 | End: 2020-08-06 | Stop reason: SURG

## 2020-08-05 RX ORDER — OXYCODONE HYDROCHLORIDE 10 MG/1
10 TABLET ORAL
Status: DISCONTINUED | OUTPATIENT
Start: 2020-08-05 | End: 2020-08-06

## 2020-08-05 RX ORDER — HYDROMORPHONE HYDROCHLORIDE 1 MG/ML
0.5 INJECTION, SOLUTION INTRAMUSCULAR; INTRAVENOUS; SUBCUTANEOUS
Status: DISCONTINUED | OUTPATIENT
Start: 2020-08-05 | End: 2020-08-06

## 2020-08-05 RX ADMIN — FENTANYL CITRATE 50 MCG: 50 INJECTION INTRAMUSCULAR; INTRAVENOUS at 23:22

## 2020-08-05 RX ADMIN — LEVOTHYROXINE SODIUM 75 MCG: 0.07 TABLET ORAL at 05:23

## 2020-08-05 RX ADMIN — HYDROMORPHONE HYDROCHLORIDE 0.5 MG: 1 INJECTION, SOLUTION INTRAMUSCULAR; INTRAVENOUS; SUBCUTANEOUS at 08:56

## 2020-08-05 RX ADMIN — DILTIAZEM HYDROCHLORIDE 120 MG: 120 CAPSULE, COATED, EXTENDED RELEASE ORAL at 05:28

## 2020-08-05 RX ADMIN — HUMAN ALBUMIN MICROSPHERES AND PERFLUTREN 3 ML: 10; .22 INJECTION, SOLUTION INTRAVENOUS at 16:08

## 2020-08-05 RX ADMIN — HYDROMORPHONE HYDROCHLORIDE 0.25 MG: 1 INJECTION, SOLUTION INTRAMUSCULAR; INTRAVENOUS; SUBCUTANEOUS at 05:18

## 2020-08-05 RX ADMIN — HYDROMORPHONE HYDROCHLORIDE 0.5 MG: 1 INJECTION, SOLUTION INTRAMUSCULAR; INTRAVENOUS; SUBCUTANEOUS at 18:18

## 2020-08-05 RX ADMIN — LIDOCAINE HYDROCHLORIDE 40 MG: 20 INJECTION, SOLUTION EPIDURAL; INFILTRATION; INTRACAUDAL at 23:24

## 2020-08-05 RX ADMIN — SOTALOL HYDROCHLORIDE 80 MG: 80 TABLET ORAL at 05:28

## 2020-08-05 RX ADMIN — ROSUVASTATIN CALCIUM 10 MG: 20 TABLET, FILM COATED ORAL at 17:09

## 2020-08-05 RX ADMIN — METOCLOPRAMIDE 10 MG: 5 INJECTION, SOLUTION INTRAMUSCULAR; INTRAVENOUS at 23:40

## 2020-08-05 RX ADMIN — OXYCODONE HYDROCHLORIDE 10 MG: 10 TABLET ORAL at 14:00

## 2020-08-05 RX ADMIN — HYDROMORPHONE HYDROCHLORIDE 0.5 MG: 1 INJECTION, SOLUTION INTRAMUSCULAR; INTRAVENOUS; SUBCUTANEOUS at 15:14

## 2020-08-05 RX ADMIN — Medication 1000 UNITS: at 05:28

## 2020-08-05 RX ADMIN — HYDROMORPHONE HYDROCHLORIDE 0.5 MG: 1 INJECTION, SOLUTION INTRAMUSCULAR; INTRAVENOUS; SUBCUTANEOUS at 12:05

## 2020-08-05 RX ADMIN — HYDROMORPHONE HYDROCHLORIDE 0.25 MG: 1 INJECTION, SOLUTION INTRAMUSCULAR; INTRAVENOUS; SUBCUTANEOUS at 01:38

## 2020-08-05 RX ADMIN — METOLAZONE 5 MG: 5 TABLET ORAL at 05:27

## 2020-08-05 RX ADMIN — DIPHENHYDRAMINE HYDROCHLORIDE 25 MG: 25 TABLET ORAL at 14:00

## 2020-08-05 RX ADMIN — HYDROMORPHONE HYDROCHLORIDE 0.5 MG: 1 INJECTION, SOLUTION INTRAMUSCULAR; INTRAVENOUS; SUBCUTANEOUS at 21:26

## 2020-08-05 RX ADMIN — CEFAZOLIN 2 G: 330 INJECTION, POWDER, FOR SOLUTION INTRAMUSCULAR; INTRAVENOUS at 23:32

## 2020-08-05 RX ADMIN — PROPOFOL 150 MG: 10 INJECTION, EMULSION INTRAVENOUS at 23:24

## 2020-08-05 RX ADMIN — ONDANSETRON 4 MG: 2 INJECTION INTRAMUSCULAR; INTRAVENOUS at 23:48

## 2020-08-05 RX ADMIN — PAROXETINE HYDROCHLORIDE 20 MG: 20 TABLET, FILM COATED ORAL at 05:28

## 2020-08-05 RX ADMIN — SODIUM CHLORIDE, POTASSIUM CHLORIDE, SODIUM LACTATE AND CALCIUM CHLORIDE: 600; 310; 30; 20 INJECTION, SOLUTION INTRAVENOUS at 23:20

## 2020-08-05 RX ADMIN — OXYCODONE 5 MG: 5 TABLET ORAL at 04:12

## 2020-08-05 RX ADMIN — OXYCODONE HYDROCHLORIDE 10 MG: 10 TABLET ORAL at 17:09

## 2020-08-05 RX ADMIN — OXYCODONE 5 MG: 5 TABLET ORAL at 07:40

## 2020-08-05 RX ADMIN — OXYCODONE HYDROCHLORIDE 10 MG: 10 TABLET ORAL at 10:50

## 2020-08-05 RX ADMIN — OXYCODONE 5 MG: 5 TABLET ORAL at 00:02

## 2020-08-05 RX ADMIN — SOTALOL HYDROCHLORIDE 80 MG: 80 TABLET ORAL at 17:09

## 2020-08-05 ASSESSMENT — ENCOUNTER SYMPTOMS
NECK PAIN: 0
ABDOMINAL PAIN: 0
DIARRHEA: 0
PND: 0
FLANK PAIN: 0
BLOOD IN STOOL: 0
HEMOPTYSIS: 0
WEAKNESS: 1
SHORTNESS OF BREATH: 1
FEVER: 0
DIZZINESS: 0
NAUSEA: 0
CHILLS: 0
SHORTNESS OF BREATH: 0
VOMITING: 0
COUGH: 0
CONSTIPATION: 0
DOUBLE VISION: 0
BLURRED VISION: 0
MYALGIAS: 0
HEADACHES: 0
PALPITATIONS: 0

## 2020-08-05 ASSESSMENT — LIFESTYLE VARIABLES
EVER_SMOKED: NEVER
HAVE PEOPLE ANNOYED YOU BY CRITICIZING YOUR DRINKING: NO
DOES PATIENT WANT TO STOP DRINKING: NO
TOTAL SCORE: 0
TOTAL SCORE: 0
ALCOHOL_USE: NO
EVER FELT BAD OR GUILTY ABOUT YOUR DRINKING: NO
HOW MANY TIMES IN THE PAST YEAR HAVE YOU HAD 5 OR MORE DRINKS IN A DAY: 0
TOTAL SCORE: 0
CONSUMPTION TOTAL: NEGATIVE
EVER HAD A DRINK FIRST THING IN THE MORNING TO STEADY YOUR NERVES TO GET RID OF A HANGOVER: NO
ON A TYPICAL DAY WHEN YOU DRINK ALCOHOL HOW MANY DRINKS DO YOU HAVE: 1
AVERAGE NUMBER OF DAYS PER WEEK YOU HAVE A DRINK CONTAINING ALCOHOL: 7
HAVE YOU EVER FELT YOU SHOULD CUT DOWN ON YOUR DRINKING: NO

## 2020-08-05 ASSESSMENT — COGNITIVE AND FUNCTIONAL STATUS - GENERAL
TURNING FROM BACK TO SIDE WHILE IN FLAT BAD: A LITTLE
STANDING UP FROM CHAIR USING ARMS: A LOT
CLIMB 3 TO 5 STEPS WITH RAILING: A LOT
MOVING FROM LYING ON BACK TO SITTING ON SIDE OF FLAT BED: A LITTLE
MOVING TO AND FROM BED TO CHAIR: A LITTLE
HELP NEEDED FOR BATHING: A LITTLE
MOBILITY SCORE: 15
TOILETING: A LITTLE
SUGGESTED CMS G CODE MODIFIER MOBILITY: CK
SUGGESTED CMS G CODE MODIFIER DAILY ACTIVITY: CJ
WALKING IN HOSPITAL ROOM: A LOT
DAILY ACTIVITIY SCORE: 22

## 2020-08-05 NOTE — ASSESSMENT & PLAN NOTE
This is chronic and she is on tadalafil and she was taken off of ambrisentan 2 weeks ago secondary to hypotension  Follow-up with cardiology and [facility] following discharge  Continue chronic oxygen  Resume home medications once cleared by cardiology, family brings in her home medications

## 2020-08-05 NOTE — ED TRIAGE NOTES
"Zeinab Loza  75 y.o. female  Chief Complaint   Patient presents with   • T-5000 GLF     Pt's legs gave out while walking and pt felt a pop in her left ankle when she fell to the ground. Pt denies hitting her head, currently on eliquis. No obvious deformity or discoloration to left ankle. Pt reporting significant left ankle pain.        BIBA from home. Pt received 100mcg fentanyl and 1mg versed from [facility].  Pt on 5L NC baseline.    /67   Pulse 71   Resp 16   Ht 1.702 m (5' 7\")   Wt 91.6 kg (202 lb)   SpO2 100%     "

## 2020-08-05 NOTE — ASSESSMENT & PLAN NOTE
Continue torsemide, sotalol, metolazone, diltiazem  PRN labetalol IV    Diuresis complicated by hypokalemia, history of hypokalemia, replacing aggressively, monitoring BMP every 8, replace magnesium

## 2020-08-05 NOTE — CARE PLAN
Problem: Communication  Goal: The ability to communicate needs accurately and effectively will improve  Outcome: PROGRESSING AS EXPECTED   Interventions: Encourage patient to voice feelings, include patient in the plan of care.     Problem: Safety  Goal: Will remain free from falls  Outcome: PROGRESSING AS EXPECTED  Intervention: Implement fall precautions  Flowsheets (Taken 8/4/2020 2300)  Environmental Precautions:   Personal Belongings, Wastebasket, Call Bell etc. in Easy Reach   Treaded Slipper Socks on Patient   Transferred to Stronger Side   Report Given to Other Health Care Providers Regarding Fall Risk   Bed in Low Position   Communication Sign for Patients & Families   Mobility Assessed & Appropriate Sign Placed

## 2020-08-05 NOTE — PROGRESS NOTES
2 RN skin check completed.     Devices in place- nasal cannula, splint/ace wrap to left lower leg and foot  Skin assessed under devices-yes  Confirmed pressure ulcers found- N/A  New potential pressure ulcers noted- N/A  Wound consult placed-N/A  The follow interventions are in place- waffle mattress, patient turns self  Patient has bruising to the inner right arm

## 2020-08-05 NOTE — ED PROVIDER NOTES
ED Provider Note    Scribed for Rickey Gilbert M.D. by Oliverio Gutierrez. 8/4/2020  7:02 PM    Primary care provider: Ashia Tidwell M.D.  Means of arrival: EMS  History obtained from: patient  History limited by: none    CHIEF COMPLAINT  Chief Complaint   Patient presents with   • T-5000 GLF     Pt's legs gave out while walking and pt felt a pop in her left ankle when she fell to the ground. Pt denies hitting her head, currently on eliquis. No obvious deformity or discoloration to left ankle. Pt reporting significant left ankle pain.        HPI  Zeinab Loza is a 75 y.o. female who presents to the Emergency Department via EMS with complaints of a recent ground level fall. She states that she was walking when her legs gave out from underneath her. She was not feeling lightheaded. She landed on her behind, but she states she felt pain and a pop in her left ankle. She is complaining of moderate pain to her left knee, shin, and foot. She is currently on Eliquis. She states she did not hit her head, back, or chest. She denies any nausea or diarrhea. She reports a history of chronic knee pain for which she receives regular injections. She states she is unable to undergo knee surgery due to her history of pulmonary hypertension. She is currently taking Tadalafil; she states she was taking ambrisentan before, but she stopped 2 weeks ago due to low blood pressure readings.    REVIEW OF SYSTEMS  Pertinent positives include: ground level fall or left ankle pain. Pertinent negatives include: nausea, diarrhea, or new knee pain. See history of present illness. All other systems are negative.     PAST MEDICAL HISTORY   has a past medical history of Aneurysm artery, celiac (HCC) (2019), Aneurysm of right renal artery (HCC), Atrial fibrillation (HCC), Diastolic heart failure (HCC), Hypertension, essential, Pulmonary hypertension (HCC), and Warfarin anticoagulation.    SURGICAL HISTORY  patient denies any surgical  "history    SOCIAL HISTORY  Social History     Tobacco Use   • Smoking status: Never Smoker   • Smokeless tobacco: Never Used   Substance Use Topics   • Alcohol use: Yes     Alcohol/week: 4.2 oz     Types: 7 Shots of liquor per week     Comment: 1 day   • Drug use: No      Social History     Substance and Sexual Activity   Drug Use No       FAMILY HISTORY  None noted when reviewed.     CURRENT MEDICATIONS  Home Medications     Reviewed by Lisa Corrigan R.N. (Registered Nurse) on 08/04/20 at 1859  Med List Status: Partial   Medication Last Dose Status   acetaminophen (TYLENOL) 500 MG Tab  Active   ambrisentan (LETAIRIS) 10 MG tablet  Active   amitriptyline (ELAVIL) 10 MG Tab  Active   apixaban (ELIQUIS) 5mg Tab  Active   Cholecalciferol (VITAMIN D) 2000 units Cap  Active   digoxin (LANOXIN) 125 MCG Tab  Active   DILTIAZem CD (CARDIZEM CD) 120 MG CAPSULE SR 24 HR  Active   HYDROcodone-acetaminophen (NORCO) 7.5-325 MG per tablet  Active   levothyroxine (SYNTHROID) 75 MCG Tab  Active   methocarbamol (ROBAXIN) 500 MG Tab  Active   metOLazone (ZAROXOLYN) 5 MG Tab  Active   PARoxetine (PAXIL) 20 MG Tab  Active   potassium chloride SA (KDUR) 20 MEQ Tab CR  Active   rosuvastatin (CRESTOR) 10 MG Tab  Active   sotalol (BETAPACE) 80 MG Tab  Active   sulfamethoxazole-trimethoprim (BACTRIM DS) 800-160 MG tablet  Active   Tadalafil, PAH, (ADCIRCA) 20 MG Tab  Active   therapeutic multivitamin-minerals (THERAGRAN-M) Tab  Active   torsemide (DEMADEX) 20 MG Tab  Active                ALLERGIES  Allergies   Allergen Reactions   • Zolpidem Tartrate Unspecified     Lightheaded and confusion       PHYSICAL EXAM  VITAL SIGNS: /67   Pulse 71   Resp 16   Ht 1.702 m (5' 7\")   Wt 91.6 kg (202 lb)   SpO2 100%   BMI 31.64 kg/m²     Constitutional: Alert in no apparent distress.  HENT: No signs of trauma, Bilateral external ears normal, Nose normal. Uvula midline.   Eyes: Pupils are equal and reactive, Conjunctiva normal, " Non-icteric.   Neck: Normal range of motion, No tenderness, Supple, No stridor.   Lymphatic: No lymphadenopathy noted.   Cardiovascular: Regular rate and rhythm, no murmurs.   Thorax & Lungs: Normal breath sounds, No respiratory distress, No wheezing, No chest tenderness.   Abdomen:  Soft, No tenderness, No peritoneal signs, No masses, No pulsatile masses.   Skin: Warm, Dry, No erythema, No rash.   Back: No bony tenderness, No CVA tenderness.   Extremities: 2+ peripheral pulses to bilateral lower extremities, medial inferior malleolar tenderness to palpation and edema. The fifth metatarsal is not tender. Sensation intact. Negative Jara test.  5 out of 5 strength at level of knee and hip on affected extremity.       Musculoskeletal: Good range of motion in all major joints. No tenderness to palpation or major deformities noted.   Neurologic: Alert , Normal motor function, Normal sensory function, No focal deficits noted.   Psychiatric: Affect normal, Judgment normal, Mood normal.     RADIOLOGY  DX-TIBIA AND FIBULA LEFT   Final Result      No acute proximal tibial or fibular fractures. Distal tibial fracture is discussed separately.      DX-FOOT-COMPLETE 3+ LEFT   Final Result      No acute foot fracture. Ankle fracture is discussed separately.      DX-ANKLE 3+ VIEWS LEFT   Final Result      1.  Acute lateral malleolar fracture, with lateral subluxation of the ankle joint.   2.  Associated small posterior malleolar fracture.        The radiologist's interpretation of all radiological studies have been reviewed by me.    COURSE & MEDICAL DECISION MAKING  Nursing notes, VS, PMSFHx reviewed in chart. Pertinent Labs & Imaging studies reviewed. (See chart for details).    75 y.o. female p/w chief complaint of acute ankle pain following a mechanical ground level fall.  As the patient did not meet criteria for clinical clearance an x-ray was obtained.    7:02 PM Patient seen and examined at bedside.      I verified that  the patient was wearing a mask and I was wearing appropriate PPE every time I entered the room. The patient's mask was on the patient at all times during my encounter except for a brief view of the oropharynx.     The differential diagnoses include but are not limited to:     Negative Jara Test making torn achilles tendon unlikely at this time  No open wounds or lacerations.   No hx or physical exam evidence to suggest benefit in CT head given no head strike.   Pt w/ no midline c/s pain, doubt fx  No C/T/L spine TTP, doubt fx  No intrathoracic or abd pain to suggest PTX, rib fx or BAT    8:03 PM - Reviewed the patient's ankle xray which showed evidence of a fracture. Patient was reevaluated at bedside. Discussed radiology results with the patient. She will further be treated with Dilaudid 1 mg.    8:25 PM I discussed the patient's case and the above findings with Dr. Edge (Orthopedics) who advised admission to medicine for surgery. Reevaluated the patient at bedside and discussed the plan for hospitalization and later surgery. Patient verbalizes understanding and agreement to this plan of care.       9:30 PM - Paged hospitalist.    Patient admitted to hospitalist due to history of pulmonary artery hypertension on tadalafil.  Patient will be kept n.p.o. at midnight.      Orthopedic Splint    Indication: fx    Location/Description: left ankle    Procedure:  The patient was agreeable to splint immobilization of extremity and understands risks involved in immobilization of joint and splinting. She provided verbal consent    Exam prior to splinting revealed neurovascularly intact extremity.    Splint Material and Type: orthoglass     Regional Anesthesia: na    The patient tolerated the procedure well.    Complications: None    Post procedure exam: Distal extremity is well perfused with brisk capillary refill, normal sensation to touch, normal strength.      9:47 PM - I discussed the patient's case and the above  findings with Dr. Dougherty (Hospitalist) who agrees to evaluate the patient for hospitalization.      FINAL IMPRESSION  1. Sprain of left ankle, unspecified ligament, initial encounter    2. Closed bimalleolar fracture of left ankle, initial encounter          IOliverio (Scribe), am scribing for, and in the presence of, Rickey Gilbert M.D..    Electronically signed by: Oliverio Gutierrez (Scribe), 8/4/2020    IRickey M.D. personally performed the services described in this documentation, as scribed by Oliverio Gutierrez in my presence, and it is both accurate and complete.    The note accurately reflects work and decisions made by me.  Rickey Gilbert M.D.  8/4/2020  11:31 PM

## 2020-08-05 NOTE — CARE PLAN
Problem: Bowel/Gastric:  Goal: Normal bowel function is maintained or improved  Outcome: PROGRESSING AS EXPECTED  Note: Pt had a bowel movement 08/04. Pt reports bowel motility within baseline. Bowel sounds normoactive in all four quadrants. Patient educated on narcotic use and constipation. Patient encouraged to remain hydrated and ambulate.      Problem: Knowledge Deficit  Goal: Knowledge of disease process/condition, treatment plan, diagnostic tests, and medications will improve  Outcome: PROGRESSING AS EXPECTED  Note: Reviewed POC including safety, mobility, pain management, medication administration, DVT prophylaxis, possible surgery, and discharge. Call light within reach. Pt calls appropriately. Hourly rounding in place. Pt has no needs or concerns at this time.

## 2020-08-05 NOTE — PROGRESS NOTES
Hospital Medicine Daily Progress Note    Date of Service  8/5/2020    Chief Complaint  75 y.o. female admitted 8/4/2020 with GLF     Hospital Course    Patient with multiple comorbid conditions admitted with a ground-level fall complicated by left ankle fracture      Interval Problem Update  Patient seen and evaluated on rounds  Discussed with nursing staff on rounds  Feeling well, pain uncontrolled, pain regimen transition, discussed with nursing  Slight itching, states Benadryl ordered  Discussed with her cardiologist, Dr Eng and he will evaluate the patient  Remains n.p.o. for the procedure  Continue close clinical monitoring    Consultants/Specialty  Orthopedics  Cardiology    Code Status  DNAR/DNI    Disposition  PT/OT evaluations postoperatively to determine disposition needs    Review of Systems  Review of Systems   Constitutional: Positive for malaise/fatigue. Negative for chills and fever.   HENT: Negative for hearing loss.    Eyes: Negative for blurred vision and double vision.   Respiratory: Positive for shortness of breath (chronic). Negative for cough and hemoptysis.    Cardiovascular: Negative for chest pain, palpitations and PND.   Gastrointestinal: Negative for abdominal pain, blood in stool, constipation, diarrhea, melena, nausea and vomiting.   Genitourinary: Negative for dysuria, flank pain, frequency, hematuria and urgency.   Musculoskeletal: Positive for joint pain. Negative for myalgias and neck pain.   Skin: Negative for rash.   Neurological: Negative for dizziness and headaches.        Physical Exam  Temp:  [36.1 °C (97 °F)-36.9 °C (98.5 °F)] 36.4 °C (97.6 °F)  Pulse:  [67-79] 75  Resp:  [11-21] 18  BP: (120-189)/() 132/68  SpO2:  [95 %-100 %] 98 %    Physical Exam  HENT:      Head: Normocephalic and atraumatic.      Right Ear: External ear normal.      Left Ear: External ear normal.      Nose: Nose normal.      Mouth/Throat:      Mouth: Mucous membranes are moist.   Eyes:       Extraocular Movements: Extraocular movements intact.      Conjunctiva/sclera: Conjunctivae normal.      Pupils: Pupils are equal, round, and reactive to light.   Neck:      Musculoskeletal: Normal range of motion.   Cardiovascular:      Rate and Rhythm: Normal rate and regular rhythm.      Pulses: Normal pulses.      Heart sounds: Murmur present. No friction rub. No gallop.    Pulmonary:      Effort: Pulmonary effort is normal. No respiratory distress.      Breath sounds: No stridor. Rales present. No wheezing or rhonchi.   Chest:      Chest wall: No tenderness.   Abdominal:      General: Abdomen is flat. Bowel sounds are normal. There is no distension.      Palpations: Abdomen is soft. There is no mass.      Tenderness: There is no abdominal tenderness. There is no right CVA tenderness, left CVA tenderness, guarding or rebound.      Hernia: No hernia is present.   Musculoskeletal: Normal range of motion.      Right lower leg: No edema.      Left lower leg: Edema present.   Skin:     General: Skin is warm and dry.      Capillary Refill: Capillary refill takes less than 2 seconds.   Neurological:      General: No focal deficit present.      Mental Status: She is alert and oriented to person, place, and time. Mental status is at baseline.      Cranial Nerves: No cranial nerve deficit.      Sensory: No sensory deficit.      Motor: No weakness.      Coordination: Coordination normal.      Gait: Gait normal.      Deep Tendon Reflexes: Reflexes normal.   Psychiatric:         Mood and Affect: Mood normal.         Behavior: Behavior normal.         Thought Content: Thought content normal.         Judgment: Judgment normal.         Fluids    Intake/Output Summary (Last 24 hours) at 8/5/2020 1406  Last data filed at 8/5/2020 0900  Gross per 24 hour   Intake 240 ml   Output --   Net 240 ml       Laboratory  Recent Labs     08/05/20  0850   WBC 11.1*   RBC 3.77*   HEMOGLOBIN 12.4   HEMATOCRIT 37.6   MCV 99.7*   MCH 32.9   MCHC  33.0*   RDW 46.9   PLATELETCT 252   MPV 8.6*     Recent Labs     08/05/20  0850   SODIUM 139   POTASSIUM 3.6   CHLORIDE 92*   CO2 30   GLUCOSE 221*   BUN 39*   CREATININE 1.06   CALCIUM 10.6*                   Imaging  DX-TIBIA AND FIBULA LEFT   Final Result      No acute proximal tibial or fibular fractures. Distal tibial fracture is discussed separately.      DX-FOOT-COMPLETE 3+ LEFT   Final Result      No acute foot fracture. Ankle fracture is discussed separately.      DX-ANKLE 3+ VIEWS LEFT   Final Result      1.  Acute lateral malleolar fracture, with lateral subluxation of the ankle joint.   2.  Associated small posterior malleolar fracture.           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 Toscano consulted on the patient and surgery is being planned, however patient has been on Eliquis and she also has pulmonary hypertension for which she has been told in the past she was not a candidate for elective surgery  Anesthesiologist will need to go over this case and patient's pulmonologist at [facility] is also involved  Continue pain control and I will keep her n.p.o. for now    I also discussed the case with her cardiologist, Dr Eng who will evaluate the patient.    Pulmonary hypertension (HCC)- (present on admission)  Assessment & Plan  This is chronic and she is on tadalafil and she was taken off of ambrisentan 2 weeks ago secondary to hypotension  I informed patient's cardiologist here in town, Dr Eng regarding patient's admission, he will evaluate the patient  Continue chronic oxygen  Continue home meds though it is nonformulary; daughter will bring in tomorrow    Essential hypertension- (present on admission)  Assessment & Plan  Continue torsemide, sotalol, metolazone, diltiazem  PRN labetalol IV    Chronic anticoagulation- (present on admission)  Assessment & Plan  For pulmonary  hypertension and A. fib on Eliquis  On hold for now    Chronic respiratory failure (HCC)- (present on admission)  Assessment & Plan  Chronically on 5 L oxygen and she is at baseline    Paroxysmal atrial fibrillation (HCC)- (present on admission)  Assessment & Plan  Currently in normal sinus rhythm  Continue sotalol and digoxin  Holding Eliquis for now pending surgery    Hypothyroidism- (present on admission)  Assessment & Plan  Continue Synthroid 75 mcg         VTE prophylaxis: SCDs

## 2020-08-05 NOTE — ASSESSMENT & PLAN NOTE
Secondary to ground-level fall  Status post surgical fixation with orthopedics team  TTWB per orthopedics  Echocardiogram was ordered by cardiology preoperatively  Continue pain control  Patient refusing postacute placement  PT/OT evaluations  Home health care arrangement, discussed with case management/social workers

## 2020-08-05 NOTE — CARE CONFERENCE
ORTHOPEDIC SURGERY CONSULT NOTE    I was notified about the patient at 824pm. I responded by phone at 824pm. I evaluated the patient at 840pm.     CC: left ankle pain     HPI:  Zeinab Loza is a 75 y.o. female who presents to the ER after a fall. She states she needs both her knees replaced and they frequently give out. She has had multiple falls recently. Today when her knees gave out she did not have anything to catch herself on and she fell and had immediately left ankle pain, deformity and inability to bear weight. She was found to have a left ankle fracture and orthopedics was consulted.     Due to her chronic medical conditions, she was told she should not undergo general anesthesia or have surgery. She is followed at [facility] and states that her heart has been improving. She does take Eliquis.     She endorses chronic left sided radiculopathy resulting in some paresthesias in her left foot but she denies new paresthesias since her injury     Review of systems was completed and is negative except per HPI    Past Medical History:   Diagnosis Date   • Aneurysm artery, celiac (HCC) 2019   • Aneurysm of right renal artery (HCC)    • Atrial fibrillation (HCC)    • Diastolic heart failure (HCC)    • Hypertension, essential    • Pulmonary hypertension (HCC)    • Warfarin anticoagulation        History reviewed. No pertinent surgical history.    Social History     Socioeconomic History   • Marital status:      Spouse name: Not on file   • Number of children: Not on file   • Years of education: Not on file   • Highest education level: Not on file   Occupational History   • Not on file   Social Needs   • Financial resource strain: Not on file   • Food insecurity     Worry: Not on file     Inability: Not on file   • Transportation needs     Medical: Not on file     Non-medical: Not on file   Tobacco Use   • Smoking status: Never Smoker   • Smokeless tobacco: Never Used   Substance and Sexual Activity   •  Alcohol use: Yes     Alcohol/week: 4.2 oz     Types: 7 Shots of liquor per week     Comment: 1 day   • Drug use: No   • Sexual activity: Not on file   Lifestyle   • Physical activity     Days per week: Not on file     Minutes per session: Not on file   • Stress: Not on file   Relationships   • Social connections     Talks on phone: Not on file     Gets together: Not on file     Attends Worship service: Not on file     Active member of club or organization: Not on file     Attends meetings of clubs or organizations: Not on file     Relationship status: Not on file   • Intimate partner violence     Fear of current or ex partner: Not on file     Emotionally abused: Not on file     Physically abused: Not on file     Forced sexual activity: Not on file   Other Topics Concern   • Not on file   Social History Narrative   • Not on file       No family history on file.       PHYSICAL EXAM  Vitals:    08/04/20 1857   BP: 132/67   Pulse: 71   Resp: 16   SpO2: 100%     General: No acute distress. Non toxic appearing  Neuro: follows commands  Resp: unlabored breathing on room air  Cv: extremities are warm and well perfused  Abd: non distended  MSK: LLE examined. 2+ DP pulses. Swelling about the lateral ankle. She can gently wiggle toes. She has intact sensation on the dorsal foot. Plantar and lateral sensation is somewhat altered but this is her abseline     RESULTS REVIEWED  3 views of the left ankle are reviewed. She has a distal fibula fracture and widening of the medial clear space consistent with an SER IV equivalent     ASSESSMENT AND PLAN  75 y.o. female with left SER IV equivalent ankle fracture. She does have some chronic medical problems and has been advised not to undergo general anesthesia. We discussed the potential for nonoperative management. She would like to have surgery if it is safe. Ultimately the anesthetic option is up to anesthesia, but we did discuss the possibility of regional anesthesia but she would  have to wait a few days due to her eliquis.   1. ER to splint LLE  2. NWB LLE  3. Admit to hospitalist  4. Will discuss anesthesia options and surgical timing   5. OK for patient to have a diet molina Toscano M.D.

## 2020-08-05 NOTE — ASSESSMENT & PLAN NOTE
For pulmonary hypertension and A. fib on Eliquis  On hold for now  Okay to resume Eliquis per orthopedics

## 2020-08-05 NOTE — PROGRESS NOTES
S: some difficulty with pain control due to difficulty obtaining an IV and got behind on pain control. Splint feels a bit tight     O: LLE with splint in place. Wiggles toes. SILT on dorsal foot. Toes warm and well perfused    A/P  75 y.o. female with left ankle fracture. We will discuss anesthesia options to see if it is appropriate for fixation today.     Defer definitive timing of surgery to Dr Juliann Toscano M.D.

## 2020-08-05 NOTE — ASSESSMENT & PLAN NOTE
Currently in normal sinus rhythm  Continue sotalol and digoxin  Discussed with orthopedics CHANTELLE Zee, okay to resume [place]

## 2020-08-05 NOTE — CONSULTS
Cardiology New Consult Note    Date of note:    8/5/2020      Consulting Physician: John Lee M.D.    Patient ID:  Name:   Zeinab Loza   YOB: 1945  Age:   75 y.o.  female   MRN:   [id number]      Consult question: Preoperative evaluation    HPI:  Zeinab Loza is a 75-year-old female with PMHx of pulmonary hypertension, mitral valve repair in 2005, atrial fibrillation on chronic long-term anticoagulation, celiac artery aneurysm status post repair, hypothyroidism, essential hypertension, who has been admitted for left ankle fracture.  Patient had a ground-level fall yesterday and has been found to have left ankle fracture.  Has been planned for fixation surgery.  Therefore cardiology has been consulted to assess operative risk given her co morbidities.    ROS:   Constitutional: Negative for chills and fever.   HENT: Negative for hearing loss.    Eyes: Negative for blurred vision and double vision.   Respiratory: Positive for shortness of breath (chronic). Negative for cough and hemoptysis.    Cardiovascular: Negative for chest pain, palpitations and PND.   Gastrointestinal: Negative for abdominal pain, blood in stool, constipation, diarrhea, melena, nausea and vomiting.   Genitourinary: Negative for dysuria, flank pain, frequency, hematuria and urgency.   Musculoskeletal: Positive for joint pain, left ankle pain. Negative for myalgias and neck pain.   Skin: Negative for rash.   Neurological: Negative for dizziness and headaches.      PMH (problem list reviewed, pertinent to this consultation):   has a past medical history of Aneurysm artery, celiac (HCC) (2019), Aneurysm of right renal artery (HCC), Atrial fibrillation (HCC), Diastolic heart failure (HCC), Hypertension, essential, Pulmonary hypertension (HCC), and Warfarin anticoagulation.    Outpatient Medications:     Medications Prior to Admission   Medication Sig Dispense Refill Last Dose   • potassium chloride SA (KDUR) 20 MEQ  Tab CR Take 2 Tabs by mouth 2 times a day. 120 Tab 11    • digoxin (LANOXIN) 125 MCG Tab Take 1 Tab by mouth every evening. 90 Tab 3    • DILTIAZem CD (CARDIZEM CD) 120 MG CAPSULE SR 24 HR TAKE 1 CAPSULE EVERY DAY 90 Cap 1    • torsemide (DEMADEX) 20 MG Tab Take 1 Tab by mouth 2 times a day. 180 Tab 3    • sulfamethoxazole-trimethoprim (BACTRIM DS) 800-160 MG tablet       • apixaban (ELIQUIS) 5mg Tab Take 5 mg by mouth 2 Times a Day.      • amitriptyline (ELAVIL) 10 MG Tab Take 20 mg by mouth every bedtime.      • HYDROcodone-acetaminophen (NORCO) 7.5-325 MG per tablet Take 1 Tab by mouth 2 Times a Day.  0    • methocarbamol (ROBAXIN) 500 MG Tab 500 mg every day.  0    • metOLazone (ZAROXOLYN) 5 MG Tab Take 5 mg by mouth every day.      • sotalol (BETAPACE) 80 MG Tab Take 1 Tab by mouth 2 times a day. 60 Tab 11    • acetaminophen (TYLENOL) 500 MG Tab Take 1,000 mg by mouth every 6 hours as needed for Mild Pain.      • Tadalafil, PAH, (ADCIRCA) 20 MG Tab Take 40 mg by mouth every bedtime. Indications: Pulmonary Arterial Hypertension      • therapeutic multivitamin-minerals (THERAGRAN-M) Tab Take 1 Tab by mouth every day.      • PARoxetine (PAXIL) 20 MG Tab Take 20 mg by mouth every day.      • rosuvastatin (CRESTOR) 10 MG Tab Take 10 mg by mouth every evening.      • ambrisentan (LETAIRIS) 10 MG tablet Take 10 mg by mouth every day.      • levothyroxine (SYNTHROID) 75 MCG Tab Take 75 mcg by mouth Every morning on an empty stomach.      • Cholecalciferol (VITAMIN D) 2000 units Cap Take 2,000 Units by mouth every day.        Allergies: Zolpidem tartrate    Family History: family history is not on file.    Social History:  reports that she has never smoked. She has never used smokeless tobacco. She reports current alcohol use of about 4.2 oz of alcohol per week. She reports that she does not use drugs.    Physical Exam    Body mass index is 31.64 kg/m².  /68   Pulse 75   Temp 36.4 °C (97.6 °F) (Temporal)   Resp  "18   Ht 1.702 m (5' 7\")   Wt 91.6 kg (202 lb)   SpO2 98%   Vitals:    08/05/20 0000 08/05/20 0353 08/05/20 0725 08/05/20 1136   BP: 145/98 137/89 120/66 132/68   Pulse: 78 74 67 75   Resp: 20 19 18 18   Temp: 36.9 °C (98.4 °F) 36.9 °C (98.5 °F) 36.1 °C (97 °F) 36.4 °C (97.6 °F)   TempSrc: Temporal Temporal Temporal Temporal   SpO2: 98% 97% 98%    Weight:       Height:         Oxygen Therapy:  Pulse Oximetry: 98 %, O2 (LPM): 0, O2 Delivery Device: None - Room Air    General:   HEENT: No jugular venous distension sitting upright, pupils equal, round and reactive to light, extraocular movements intact  Heart: Normal rate, regular rhythm, no murmurs, no rubs or gallops  Lungs: Clear to auscultation bilaterally  Abdomen: Bowel sounds positive, non tender, non distended, no masses   Extremities: Left ankle in splint, No lower extremity edema, no clubbing, no cyanosis  Neurological: Alert and oriented x 3, no focal deficit  Skin: no rashes    Labs (reviewed and notable for):     Recent Labs     08/05/20  0850   WBC 11.1*   RBC 3.77*   HEMOGLOBIN 12.4   HEMATOCRIT 37.6   MCV 99.7*   MCH 32.9   RDW 46.9   PLATELETCT 252   MPV 8.6*       Recent Labs     08/05/20  0850   SODIUM 139   POTASSIUM 3.6   CHLORIDE 92*   CO2 30   GLUCOSE 221*   BUN 39*       Echo: 08/05    Normal left ventricular size, wall thickness, and systolic function.  Dilated right ventricle with preserved systolic function.  No significant valvular pathology.  Estimated right ventricular systolic pressure  is 25 mmHg; normal.  Normal pericardium without effusion.    EC-ECHOCARDIOGRAM COMPLETE W/O CONT   Final Result      DX-TIBIA AND FIBULA LEFT   Final Result      No acute proximal tibial or fibular fractures. Distal tibial fracture is discussed separately.      DX-FOOT-COMPLETE 3+ LEFT   Final Result      No acute foot fracture. Ankle fracture is discussed separately.      DX-ANKLE 3+ VIEWS LEFT   Final Result      1.  Acute lateral malleolar fracture, " with lateral subluxation of the ankle joint.   2.  Associated small posterior malleolar fracture.      EC-ECHOCARDIOGRAM COMPLETE W/ CONT    (Results Pending)         Assessment and Plan:    # Preoperative evaluation for fixation of left ankle fracture    -Patient denies any current cardiopulmonary symptoms, has chronic shortness of breath secondary to pulmonary hypertension  -Vital signs are within normal limits  -Echocardiogram on 08/05 showed normal left ventricular size, wall thickness and function with EF of 60%, RVSP of 25 mmHg    -patient has 0.71% Estimated Risk Probability for Perioperative Myocardial Infarction or Cardiac Arrest as per Wallace Perioperative Cardiac Risk score  -Patient can go for the surgery with moderate risk given her multiple comorbidities.    Thank you for allowing me to participate in the care of this patient.  Please call if any clarification will be helpful.

## 2020-08-05 NOTE — H&P
"Hospital Medicine History & Physical Note    Date of Service  8/4/2020    Primary Care Physician  Ashia Tidwell M.D.    Code Status  DNAR/DNI    Chief Complaint  Chief Complaint   Patient presents with   • T-5000 GLF     Pt's legs gave out while walking and pt felt a pop in her left ankle when she fell to the ground. Pt denies hitting her head, currently on eliquis. No obvious deformity or discoloration to left ankle. Pt reporting significant left ankle pain.        History of Presenting Illness  75 y.o. female who presented 8/4/2020 with a couple of falls due to her legs \"giving out\".  She did not hit her head.  She does not use any assistive devices for ambulation, though her daughter at bedside said she should but has refused in the past.  This time she felt a pop in her left ankle when she fell, and experienced immediate pain.  She was found to have a left malleolar fracture in the ER and a splint was placed.  Orthopedic surgery saw her and surgery is planned, however she has pulmonary hypertension and is on Eliquis as well so timing is unclear.      Review of Systems  Review of Systems   Constitutional: Negative for chills and fever.        Hungry   Eyes: Negative for blurred vision.   Respiratory: Negative for cough and shortness of breath (Chronically on 5 L oxygen).    Cardiovascular: Negative for chest pain and palpitations.   Gastrointestinal: Negative for abdominal pain, nausea and vomiting.   Genitourinary: Negative for dysuria.   Musculoskeletal: Positive for joint pain (Left ankle). Negative for myalgias.   Neurological: Positive for weakness (In legs at times when walking). Negative for dizziness and headaches.   All other systems reviewed and are negative.      Past Medical History   has a past medical history of Aneurysm artery, celiac (HCC) (2019), Aneurysm of right renal artery (HCC), Atrial fibrillation (HCC), Diastolic heart failure (HCC), Hypertension, essential, Pulmonary hypertension " (HCC), and Warfarin anticoagulation.    Surgical History  Right heart catheterization    Family History  Reviewed and not pertinent to the presenting problem    Social History   reports that she has never smoked. She has never used smokeless tobacco. She reports current alcohol use of about 4.2 oz of alcohol per week. She reports that she does not use drugs.    Allergies  Allergies   Allergen Reactions   • Zolpidem Tartrate Unspecified     Lightheaded and confusion       Medications  No current facility-administered medications on file prior to encounter.      Current Outpatient Medications on File Prior to Encounter   Medication Sig Dispense Refill   • potassium chloride SA (KDUR) 20 MEQ Tab CR Take 2 Tabs by mouth 2 times a day. 120 Tab 11   • digoxin (LANOXIN) 125 MCG Tab Take 1 Tab by mouth every evening. 90 Tab 3   • DILTIAZem CD (CARDIZEM CD) 120 MG CAPSULE SR 24 HR TAKE 1 CAPSULE EVERY DAY 90 Cap 1   • torsemide (DEMADEX) 20 MG Tab Take 1 Tab by mouth 2 times a day. 180 Tab 3   • sulfamethoxazole-trimethoprim (BACTRIM DS) 800-160 MG tablet      • apixaban (ELIQUIS) 5mg Tab Take 5 mg by mouth 2 Times a Day.     • amitriptyline (ELAVIL) 10 MG Tab Take 20 mg by mouth every bedtime.     • HYDROcodone-acetaminophen (NORCO) 7.5-325 MG per tablet Take 1 Tab by mouth 2 Times a Day.  0   • methocarbamol (ROBAXIN) 500 MG Tab 500 mg every day.  0   • metOLazone (ZAROXOLYN) 5 MG Tab Take 5 mg by mouth every day.     • sotalol (BETAPACE) 80 MG Tab Take 1 Tab by mouth 2 times a day. 60 Tab 11   • acetaminophen (TYLENOL) 500 MG Tab Take 1,000 mg by mouth every 6 hours as needed for Mild Pain.     • Tadalafil, PAH, (ADCIRCA) 20 MG Tab Take 40 mg by mouth every bedtime. Indications: Pulmonary Arterial Hypertension     • therapeutic multivitamin-minerals (THERAGRAN-M) Tab Take 1 Tab by mouth every day.     • PARoxetine (PAXIL) 20 MG Tab Take 20 mg by mouth every day.     • rosuvastatin (CRESTOR) 10 MG Tab Take 10 mg by mouth  every evening.     • ambrisentan (LETAIRIS) 10 MG tablet Take 10 mg by mouth every day.     • levothyroxine (SYNTHROID) 75 MCG Tab Take 75 mcg by mouth Every morning on an empty stomach.     • Cholecalciferol (VITAMIN D) 2000 units Cap Take 2,000 Units by mouth every day.           Physical Exam  Pulse:  [71] 71  Resp:  [16] 16  BP: (132)/(67) 132/67  SpO2:  [100 %] 100 %    Physical Exam  Vitals signs and nursing note reviewed.   Constitutional:       Appearance: She is well-developed.   HENT:      Head: Normocephalic and atraumatic.      Right Ear: External ear normal.      Left Ear: External ear normal.      Mouth/Throat:      Mouth: Mucous membranes are dry.   Eyes:      Pupils: Pupils are equal, round, and reactive to light.   Neck:      Musculoskeletal: No neck rigidity or muscular tenderness.   Cardiovascular:      Rate and Rhythm: Normal rate and regular rhythm.      Heart sounds: Normal heart sounds. No murmur.      Comments: 70s  Pulmonary:      Effort: Pulmonary effort is normal. No respiratory distress.      Breath sounds: Normal breath sounds. No wheezing or rales.   Abdominal:      General: Bowel sounds are normal. There is no distension.      Palpations: Abdomen is soft.      Tenderness: There is no abdominal tenderness.   Musculoskeletal: Normal range of motion.         General: Deformity (Left ankle in splint; toes warm to touch) present. No tenderness.      Right lower leg: No edema.      Left lower leg: No edema.   Skin:     General: Skin is warm and dry.      Findings: No erythema.   Neurological:      General: No focal deficit present.      Mental Status: She is alert and oriented to person, place, and time.   Psychiatric:         Behavior: Behavior normal.         Laboratory:          No results for input(s): ALTSGPT, ASTSGOT, ALKPHOSPHAT, TBILIRUBIN, DBILIRUBIN, GAMMAGT, AMYLASE, LIPASE, ALB, PREALBUMIN, GLUCOSE in the last 72 hours.      No results for input(s): NTPROBNP in the last 72  hours.      No results for input(s): TROPONINT in the last 72 hours.    Imaging:  DX-TIBIA AND FIBULA LEFT   Final Result      No acute proximal tibial or fibular fractures. Distal tibial fracture is discussed separately.      DX-FOOT-COMPLETE 3+ LEFT   Final Result      No acute foot fracture. Ankle fracture is discussed separately.      DX-ANKLE 3+ VIEWS LEFT   Final Result      1.  Acute lateral malleolar fracture, with lateral subluxation of the ankle joint.   2.  Associated small posterior malleolar fracture.            Assessment/Plan:  I anticipate this patient will require at least two midnights for appropriate medical management, necessitating inpatient admission.    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 Toscano consulted on the patient and surgery is being planned, however patient has been on Eliquis and she also has pulmonary hypertension for which she has been told in the past she was not a candidate for elective surgery  Anesthesiologist will need to go over this case and patient's pulmonologist at [facility] is also involved  Continue pain control and I will keep her n.p.o. for now    Pulmonary hypertension (HCC)- (present on admission)  Assessment & Plan  This is chronic and she is on tadalafil and she was taken off of ambrisentan 2 weeks ago secondary to hypotension  She sees a pulmonologist at [facility] and would like that pulmonologist to be called in the morning; her daughter has the information  Continue chronic oxygen  Continue home meds though it is nonformulary; daughter will bring in tomorrow    Paroxysmal atrial fibrillation (HCC)- (present on admission)  Assessment & Plan  Currently in normal sinus rhythm  Continue sotalol and digoxin  Holding Eliquis for now pending surgery    Essential hypertension- (present on admission)  Assessment & Plan  Continue torsemide, sotalol, metolazone,  diltiazem  PRN labetalol IV    Chronic anticoagulation- (present on admission)  Assessment & Plan  For pulmonary hypertension and A. fib on Eliquis  On hold for now    Chronic respiratory failure (HCC)- (present on admission)  Assessment & Plan  Chronically on 5 L oxygen and she is at baseline    Hypothyroidism- (present on admission)  Assessment & Plan  Continue Synthroid 75 mcg    DVT prophylaxis holding Eliquis for now; can either restart after surgery or start prophylaxis per Ortho recs

## 2020-08-05 NOTE — PROGRESS NOTES
Received patient from ED. Patient arrived via gurney with all belongings. Vital signs stable, patient resting in bed, patient complaining of pain medication of an 8/10 in her left ankle. Call light within reach, bed in the low and locked position, 2 side rails up.

## 2020-08-06 LAB
ALBUMIN SERPL BCP-MCNC: 4.1 G/DL (ref 3.2–4.9)
ALBUMIN/GLOB SERPL: 1.4 G/DL
ALP SERPL-CCNC: 83 U/L (ref 30–99)
ALT SERPL-CCNC: 21 U/L (ref 2–50)
ANION GAP SERPL CALC-SCNC: 13 MMOL/L (ref 7–16)
AST SERPL-CCNC: 15 U/L (ref 12–45)
BILIRUB SERPL-MCNC: 0.6 MG/DL (ref 0.1–1.5)
BUN SERPL-MCNC: 31 MG/DL (ref 8–22)
CALCIUM SERPL-MCNC: 10.4 MG/DL (ref 8.5–10.5)
CHLORIDE SERPL-SCNC: 94 MMOL/L (ref 96–112)
CO2 SERPL-SCNC: 32 MMOL/L (ref 20–33)
CREAT SERPL-MCNC: 1.15 MG/DL (ref 0.5–1.4)
ERYTHROCYTE [DISTWIDTH] IN BLOOD BY AUTOMATED COUNT: 46.6 FL (ref 35.9–50)
GLOBULIN SER CALC-MCNC: 2.9 G/DL (ref 1.9–3.5)
GLUCOSE SERPL-MCNC: 129 MG/DL (ref 65–99)
HCT VFR BLD AUTO: 36.9 % (ref 37–47)
HGB BLD-MCNC: 12.1 G/DL (ref 12–16)
INR PPP: 1.08 (ref 0.87–1.13)
MAGNESIUM SERPL-MCNC: 2.2 MG/DL (ref 1.5–2.5)
MCH RBC QN AUTO: 33 PG (ref 27–33)
MCHC RBC AUTO-ENTMCNC: 32.8 G/DL (ref 33.6–35)
MCV RBC AUTO: 100.5 FL (ref 81.4–97.8)
PHOSPHATE SERPL-MCNC: 3.4 MG/DL (ref 2.5–4.5)
PLATELET # BLD AUTO: 219 K/UL (ref 164–446)
PMV BLD AUTO: 8.5 FL (ref 9–12.9)
POTASSIUM SERPL-SCNC: 3 MMOL/L (ref 3.6–5.5)
PROT SERPL-MCNC: 7 G/DL (ref 6–8.2)
PROTHROMBIN TIME: 14.4 SEC (ref 12–14.6)
RBC # BLD AUTO: 3.67 M/UL (ref 4.2–5.4)
SODIUM SERPL-SCNC: 139 MMOL/L (ref 135–145)
WBC # BLD AUTO: 7.9 K/UL (ref 4.8–10.8)

## 2020-08-06 PROCEDURE — A9270 NON-COVERED ITEM OR SERVICE: HCPCS | Performed by: HOSPITALIST

## 2020-08-06 PROCEDURE — 700111 HCHG RX REV CODE 636 W/ 250 OVERRIDE (IP): Performed by: ORTHOPAEDIC SURGERY

## 2020-08-06 PROCEDURE — 80053 COMPREHEN METABOLIC PANEL: CPT

## 2020-08-06 PROCEDURE — 73600 X-RAY EXAM OF ANKLE: CPT | Mod: LT

## 2020-08-06 PROCEDURE — 700102 HCHG RX REV CODE 250 W/ 637 OVERRIDE(OP): Performed by: INTERNAL MEDICINE

## 2020-08-06 PROCEDURE — 36415 COLL VENOUS BLD VENIPUNCTURE: CPT

## 2020-08-06 PROCEDURE — A9270 NON-COVERED ITEM OR SERVICE: HCPCS | Performed by: ORTHOPAEDIC SURGERY

## 2020-08-06 PROCEDURE — 700112 HCHG RX REV CODE 229: Performed by: ORTHOPAEDIC SURGERY

## 2020-08-06 PROCEDURE — 700102 HCHG RX REV CODE 250 W/ 637 OVERRIDE(OP): Performed by: HOSPITALIST

## 2020-08-06 PROCEDURE — 97162 PT EVAL MOD COMPLEX 30 MIN: CPT

## 2020-08-06 PROCEDURE — 99232 SBSQ HOSP IP/OBS MODERATE 35: CPT | Performed by: INTERNAL MEDICINE

## 2020-08-06 PROCEDURE — 85027 COMPLETE CBC AUTOMATED: CPT

## 2020-08-06 PROCEDURE — A9270 NON-COVERED ITEM OR SERVICE: HCPCS | Performed by: INTERNAL MEDICINE

## 2020-08-06 PROCEDURE — 700102 HCHG RX REV CODE 250 W/ 637 OVERRIDE(OP): Performed by: ORTHOPAEDIC SURGERY

## 2020-08-06 PROCEDURE — 85610 PROTHROMBIN TIME: CPT

## 2020-08-06 PROCEDURE — 770006 HCHG ROOM/CARE - MED/SURG/GYN SEMI*

## 2020-08-06 PROCEDURE — 83735 ASSAY OF MAGNESIUM: CPT

## 2020-08-06 PROCEDURE — 84100 ASSAY OF PHOSPHORUS: CPT

## 2020-08-06 PROCEDURE — 700101 HCHG RX REV CODE 250: Performed by: INTERNAL MEDICINE

## 2020-08-06 PROCEDURE — 700101 HCHG RX REV CODE 250: Performed by: ORTHOPAEDIC SURGERY

## 2020-08-06 PROCEDURE — 97166 OT EVAL MOD COMPLEX 45 MIN: CPT

## 2020-08-06 PROCEDURE — 700105 HCHG RX REV CODE 258: Performed by: HOSPITALIST

## 2020-08-06 RX ORDER — HYDROMORPHONE HYDROCHLORIDE 1 MG/ML
0.4 INJECTION, SOLUTION INTRAMUSCULAR; INTRAVENOUS; SUBCUTANEOUS
Status: DISCONTINUED | OUTPATIENT
Start: 2020-08-06 | End: 2020-08-06 | Stop reason: HOSPADM

## 2020-08-06 RX ORDER — AMOXICILLIN 250 MG
1 CAPSULE ORAL
Status: DISCONTINUED | OUTPATIENT
Start: 2020-08-06 | End: 2020-08-06

## 2020-08-06 RX ORDER — AMOXICILLIN 250 MG
1 CAPSULE ORAL NIGHTLY
Status: DISCONTINUED | OUTPATIENT
Start: 2020-08-06 | End: 2020-08-06

## 2020-08-06 RX ORDER — HYDRALAZINE HYDROCHLORIDE 20 MG/ML
5 INJECTION INTRAMUSCULAR; INTRAVENOUS
Status: DISCONTINUED | OUTPATIENT
Start: 2020-08-06 | End: 2020-08-06 | Stop reason: HOSPADM

## 2020-08-06 RX ORDER — OXYCODONE HYDROCHLORIDE 10 MG/1
10 TABLET ORAL
Status: DISCONTINUED | OUTPATIENT
Start: 2020-08-06 | End: 2020-08-09 | Stop reason: HOSPADM

## 2020-08-06 RX ORDER — DOCUSATE SODIUM 100 MG/1
100 CAPSULE, LIQUID FILLED ORAL 2 TIMES DAILY
Status: DISCONTINUED | OUTPATIENT
Start: 2020-08-06 | End: 2020-08-09 | Stop reason: HOSPADM

## 2020-08-06 RX ORDER — ONDANSETRON 2 MG/ML
4 INJECTION INTRAMUSCULAR; INTRAVENOUS
Status: DISCONTINUED | OUTPATIENT
Start: 2020-08-06 | End: 2020-08-06 | Stop reason: HOSPADM

## 2020-08-06 RX ORDER — HYDROMORPHONE HYDROCHLORIDE 1 MG/ML
0.1 INJECTION, SOLUTION INTRAMUSCULAR; INTRAVENOUS; SUBCUTANEOUS
Status: DISCONTINUED | OUTPATIENT
Start: 2020-08-06 | End: 2020-08-06 | Stop reason: HOSPADM

## 2020-08-06 RX ORDER — ENEMA 19; 7 G/133ML; G/133ML
1 ENEMA RECTAL
Status: DISCONTINUED | OUTPATIENT
Start: 2020-08-06 | End: 2020-08-06

## 2020-08-06 RX ORDER — ACETAMINOPHEN 500 MG
1000 TABLET ORAL EVERY 6 HOURS
Status: DISCONTINUED | OUTPATIENT
Start: 2020-08-06 | End: 2020-08-09 | Stop reason: HOSPADM

## 2020-08-06 RX ORDER — HYDROMORPHONE HYDROCHLORIDE 1 MG/ML
0.5 INJECTION, SOLUTION INTRAMUSCULAR; INTRAVENOUS; SUBCUTANEOUS
Status: DISCONTINUED | OUTPATIENT
Start: 2020-08-06 | End: 2020-08-09 | Stop reason: HOSPADM

## 2020-08-06 RX ORDER — SODIUM CHLORIDE, SODIUM LACTATE, POTASSIUM CHLORIDE, CALCIUM CHLORIDE 600; 310; 30; 20 MG/100ML; MG/100ML; MG/100ML; MG/100ML
INJECTION, SOLUTION INTRAVENOUS CONTINUOUS
Status: DISCONTINUED | OUTPATIENT
Start: 2020-08-06 | End: 2020-08-06 | Stop reason: HOSPADM

## 2020-08-06 RX ORDER — KETOROLAC TROMETHAMINE 30 MG/ML
15 INJECTION, SOLUTION INTRAMUSCULAR; INTRAVENOUS EVERY 6 HOURS
Status: DISCONTINUED | OUTPATIENT
Start: 2020-08-06 | End: 2020-08-06

## 2020-08-06 RX ORDER — ONDANSETRON 2 MG/ML
4 INJECTION INTRAMUSCULAR; INTRAVENOUS EVERY 4 HOURS PRN
Status: DISCONTINUED | OUTPATIENT
Start: 2020-08-06 | End: 2020-08-09 | Stop reason: HOSPADM

## 2020-08-06 RX ORDER — DIPHENHYDRAMINE HCL 25 MG
25 TABLET ORAL ONCE
Status: COMPLETED | OUTPATIENT
Start: 2020-08-06 | End: 2020-08-06

## 2020-08-06 RX ORDER — AMOXICILLIN 250 MG
2 CAPSULE ORAL 2 TIMES DAILY
Status: DISCONTINUED | OUTPATIENT
Start: 2020-08-06 | End: 2020-08-09 | Stop reason: HOSPADM

## 2020-08-06 RX ORDER — CEFAZOLIN SODIUM 2 G/100ML
2 INJECTION, SOLUTION INTRAVENOUS EVERY 8 HOURS
Status: COMPLETED | OUTPATIENT
Start: 2020-08-06 | End: 2020-08-06

## 2020-08-06 RX ORDER — HALOPERIDOL 5 MG/ML
1 INJECTION INTRAMUSCULAR
Status: DISCONTINUED | OUTPATIENT
Start: 2020-08-06 | End: 2020-08-06 | Stop reason: HOSPADM

## 2020-08-06 RX ORDER — MEPERIDINE HYDROCHLORIDE 25 MG/ML
12.5 INJECTION INTRAMUSCULAR; INTRAVENOUS; SUBCUTANEOUS
Status: DISCONTINUED | OUTPATIENT
Start: 2020-08-06 | End: 2020-08-06 | Stop reason: HOSPADM

## 2020-08-06 RX ORDER — DIPHENHYDRAMINE HYDROCHLORIDE 50 MG/ML
12.5 INJECTION INTRAMUSCULAR; INTRAVENOUS
Status: DISCONTINUED | OUTPATIENT
Start: 2020-08-06 | End: 2020-08-06 | Stop reason: HOSPADM

## 2020-08-06 RX ORDER — SCOLOPAMINE TRANSDERMAL SYSTEM 1 MG/1
1 PATCH, EXTENDED RELEASE TRANSDERMAL
Status: DISCONTINUED | OUTPATIENT
Start: 2020-08-06 | End: 2020-08-06

## 2020-08-06 RX ORDER — DEXAMETHASONE SODIUM PHOSPHATE 4 MG/ML
4 INJECTION, SOLUTION INTRA-ARTICULAR; INTRALESIONAL; INTRAMUSCULAR; INTRAVENOUS; SOFT TISSUE
Status: DISCONTINUED | OUTPATIENT
Start: 2020-08-06 | End: 2020-08-06

## 2020-08-06 RX ORDER — POTASSIUM CHLORIDE 20 MEQ/1
40 TABLET, EXTENDED RELEASE ORAL EVERY 4 HOURS
Status: DISPENSED | OUTPATIENT
Start: 2020-08-06 | End: 2020-08-07

## 2020-08-06 RX ORDER — OXYCODONE HCL 5 MG/5 ML
10 SOLUTION, ORAL ORAL
Status: DISCONTINUED | OUTPATIENT
Start: 2020-08-06 | End: 2020-08-06 | Stop reason: HOSPADM

## 2020-08-06 RX ORDER — OXYCODONE HYDROCHLORIDE 5 MG/1
5 TABLET ORAL
Status: DISCONTINUED | OUTPATIENT
Start: 2020-08-06 | End: 2020-08-09 | Stop reason: HOSPADM

## 2020-08-06 RX ORDER — BISACODYL 10 MG
10 SUPPOSITORY, RECTAL RECTAL
Status: DISCONTINUED | OUTPATIENT
Start: 2020-08-06 | End: 2020-08-06

## 2020-08-06 RX ORDER — POLYETHYLENE GLYCOL 3350 17 G/17G
1 POWDER, FOR SOLUTION ORAL 2 TIMES DAILY
Status: DISCONTINUED | OUTPATIENT
Start: 2020-08-06 | End: 2020-08-09 | Stop reason: HOSPADM

## 2020-08-06 RX ORDER — POLYETHYLENE GLYCOL 3350 17 G/17G
1 POWDER, FOR SOLUTION ORAL 2 TIMES DAILY PRN
Status: DISCONTINUED | OUTPATIENT
Start: 2020-08-06 | End: 2020-08-06

## 2020-08-06 RX ORDER — OXYCODONE HCL 5 MG/5 ML
5 SOLUTION, ORAL ORAL
Status: DISCONTINUED | OUTPATIENT
Start: 2020-08-06 | End: 2020-08-06 | Stop reason: HOSPADM

## 2020-08-06 RX ORDER — ACETAMINOPHEN 325 MG/1
650 TABLET ORAL EVERY 6 HOURS
Status: DISCONTINUED | OUTPATIENT
Start: 2020-08-06 | End: 2020-08-06

## 2020-08-06 RX ORDER — MIDAZOLAM HYDROCHLORIDE 1 MG/ML
1 INJECTION INTRAMUSCULAR; INTRAVENOUS
Status: DISCONTINUED | OUTPATIENT
Start: 2020-08-06 | End: 2020-08-06 | Stop reason: HOSPADM

## 2020-08-06 RX ORDER — DIPHENHYDRAMINE HYDROCHLORIDE 50 MG/ML
25 INJECTION INTRAMUSCULAR; INTRAVENOUS EVERY 6 HOURS PRN
Status: DISCONTINUED | OUTPATIENT
Start: 2020-08-06 | End: 2020-08-06

## 2020-08-06 RX ORDER — HALOPERIDOL 5 MG/ML
1 INJECTION INTRAMUSCULAR EVERY 6 HOURS PRN
Status: DISCONTINUED | OUTPATIENT
Start: 2020-08-06 | End: 2020-08-06

## 2020-08-06 RX ORDER — BUPIVACAINE HYDROCHLORIDE AND EPINEPHRINE 5; 5 MG/ML; UG/ML
INJECTION, SOLUTION EPIDURAL; INTRACAUDAL; PERINEURAL
Status: DISCONTINUED | OUTPATIENT
Start: 2020-08-06 | End: 2020-08-06 | Stop reason: HOSPADM

## 2020-08-06 RX ORDER — HYDROMORPHONE HYDROCHLORIDE 1 MG/ML
0.2 INJECTION, SOLUTION INTRAMUSCULAR; INTRAVENOUS; SUBCUTANEOUS
Status: DISCONTINUED | OUTPATIENT
Start: 2020-08-06 | End: 2020-08-06 | Stop reason: HOSPADM

## 2020-08-06 RX ADMIN — OXYCODONE HYDROCHLORIDE 10 MG: 10 TABLET ORAL at 09:10

## 2020-08-06 RX ADMIN — OXYCODONE HYDROCHLORIDE 10 MG: 10 TABLET ORAL at 16:43

## 2020-08-06 RX ADMIN — ROSUVASTATIN CALCIUM 10 MG: 20 TABLET, FILM COATED ORAL at 16:44

## 2020-08-06 RX ADMIN — SODIUM CHLORIDE: 9 INJECTION, SOLUTION INTRAVENOUS at 05:23

## 2020-08-06 RX ADMIN — TORSEMIDE 20 MG: 20 TABLET ORAL at 05:22

## 2020-08-06 RX ADMIN — MULTIPLE VITAMINS W/ MINERALS TAB 1 TABLET: TAB at 05:20

## 2020-08-06 RX ADMIN — DIPHENHYDRAMINE HYDROCHLORIDE 25 MG: 25 TABLET ORAL at 23:14

## 2020-08-06 RX ADMIN — OXYCODONE HYDROCHLORIDE 10 MG: 10 TABLET ORAL at 01:49

## 2020-08-06 RX ADMIN — TORSEMIDE 20 MG: 20 TABLET ORAL at 16:47

## 2020-08-06 RX ADMIN — DILTIAZEM HYDROCHLORIDE 120 MG: 120 CAPSULE, COATED, EXTENDED RELEASE ORAL at 05:21

## 2020-08-06 RX ADMIN — PAROXETINE HYDROCHLORIDE 20 MG: 20 TABLET, FILM COATED ORAL at 05:20

## 2020-08-06 RX ADMIN — AMITRIPTYLINE HYDROCHLORIDE 20 MG: 10 TABLET, FILM COATED ORAL at 21:06

## 2020-08-06 RX ADMIN — SOTALOL HYDROCHLORIDE 80 MG: 80 TABLET ORAL at 16:45

## 2020-08-06 RX ADMIN — CEFAZOLIN SODIUM 2 G: 2 INJECTION, SOLUTION INTRAVENOUS at 08:08

## 2020-08-06 RX ADMIN — DOCUSATE SODIUM 50 MG AND SENNOSIDES 8.6 MG 2 TABLET: 8.6; 5 TABLET, FILM COATED ORAL at 16:43

## 2020-08-06 RX ADMIN — ACETAMINOPHEN 650 MG: 325 TABLET, FILM COATED ORAL at 01:50

## 2020-08-06 RX ADMIN — DOCUSATE SODIUM 50 MG AND SENNOSIDES 8.6 MG 1 TABLET: 8.6; 5 TABLET, FILM COATED ORAL at 01:50

## 2020-08-06 RX ADMIN — KETOROLAC TROMETHAMINE 15 MG: 30 INJECTION, SOLUTION INTRAMUSCULAR at 01:50

## 2020-08-06 RX ADMIN — CEFAZOLIN SODIUM 2 G: 2 INJECTION, SOLUTION INTRAVENOUS at 16:44

## 2020-08-06 RX ADMIN — DIPHENHYDRAMINE HYDROCHLORIDE 25 MG: 50 INJECTION INTRAMUSCULAR; INTRAVENOUS at 11:18

## 2020-08-06 RX ADMIN — OXYCODONE HYDROCHLORIDE 10 MG: 10 TABLET ORAL at 21:06

## 2020-08-06 RX ADMIN — KETOROLAC TROMETHAMINE 15 MG: 30 INJECTION, SOLUTION INTRAMUSCULAR at 08:08

## 2020-08-06 RX ADMIN — ACETAMINOPHEN 650 MG: 325 TABLET, FILM COATED ORAL at 11:18

## 2020-08-06 RX ADMIN — DOCUSATE SODIUM 100 MG: 100 CAPSULE, LIQUID FILLED ORAL at 16:44

## 2020-08-06 RX ADMIN — DIGOXIN 125 MCG: 125 TABLET ORAL at 16:44

## 2020-08-06 RX ADMIN — Medication 1000 UNITS: at 05:21

## 2020-08-06 RX ADMIN — LEVOTHYROXINE SODIUM 75 MCG: 0.07 TABLET ORAL at 05:21

## 2020-08-06 RX ADMIN — POTASSIUM CHLORIDE 40 MEQ: 1500 TABLET, EXTENDED RELEASE ORAL at 16:47

## 2020-08-06 RX ADMIN — DOCUSATE SODIUM 100 MG: 100 CAPSULE, LIQUID FILLED ORAL at 05:21

## 2020-08-06 RX ADMIN — ACETAMINOPHEN 650 MG: 325 TABLET, FILM COATED ORAL at 05:20

## 2020-08-06 RX ADMIN — POLYETHYLENE GLYCOL 3350 1 PACKET: 17 POWDER, FOR SOLUTION ORAL at 16:44

## 2020-08-06 RX ADMIN — ACETAMINOPHEN 1000 MG: 500 TABLET ORAL at 16:44

## 2020-08-06 RX ADMIN — SOTALOL HYDROCHLORIDE 80 MG: 80 TABLET ORAL at 05:21

## 2020-08-06 RX ADMIN — METOLAZONE 5 MG: 5 TABLET ORAL at 05:19

## 2020-08-06 RX ADMIN — POTASSIUM CHLORIDE 40 MEQ: 1500 TABLET, EXTENDED RELEASE ORAL at 21:06

## 2020-08-06 RX ADMIN — OXYCODONE HYDROCHLORIDE 10 MG: 10 TABLET ORAL at 05:22

## 2020-08-06 RX ADMIN — ACETAMINOPHEN 1000 MG: 500 TABLET ORAL at 23:14

## 2020-08-06 ASSESSMENT — COGNITIVE AND FUNCTIONAL STATUS - GENERAL
SUGGESTED CMS G CODE MODIFIER DAILY ACTIVITY: CJ
HELP NEEDED FOR BATHING: A LITTLE
DRESSING REGULAR LOWER BODY CLOTHING: A LITTLE
STANDING UP FROM CHAIR USING ARMS: A LOT
SUGGESTED CMS G CODE MODIFIER MOBILITY: CK
DAILY ACTIVITIY SCORE: 21
TOILETING: A LITTLE
MOBILITY SCORE: 17
WALKING IN HOSPITAL ROOM: A LOT
CLIMB 3 TO 5 STEPS WITH RAILING: A LOT
MOVING FROM LYING ON BACK TO SITTING ON SIDE OF FLAT BED: A LITTLE

## 2020-08-06 ASSESSMENT — ACTIVITIES OF DAILY LIVING (ADL): TOILETING: INDEPENDENT

## 2020-08-06 ASSESSMENT — PAIN SCALES - GENERAL: PAIN_LEVEL: 3

## 2020-08-06 ASSESSMENT — ENCOUNTER SYMPTOMS
DIARRHEA: 0
PND: 0
VOMITING: 0
SHORTNESS OF BREATH: 1
ABDOMINAL PAIN: 0
HEMOPTYSIS: 0
PALPITATIONS: 0
NECK PAIN: 0
COUGH: 0
BLOOD IN STOOL: 0
MYALGIAS: 0
HEADACHES: 0
FEVER: 0
DIZZINESS: 0
CONSTIPATION: 0
CHILLS: 0
DOUBLE VISION: 0
BLURRED VISION: 0
FLANK PAIN: 0
NAUSEA: 0

## 2020-08-06 ASSESSMENT — GAIT ASSESSMENTS
ASSISTIVE DEVICE: FRONT WHEEL WALKER
GAIT LEVEL OF ASSIST: MODERATE ASSIST
DISTANCE (FEET): 3

## 2020-08-06 NOTE — ANESTHESIA QCDR
2019 [facility] Clinical Data Registry (for Quality Improvement)     Postoperative nausea/vomiting risk protocol (Adult = 18 yrs and Pediatric 3-17 yrs)- (430 and 463)  General inhalation anesthetic (NOT TIVA) with PONV risk factors: Yes  Provision of anti-emetic therapy with at least 2 different classes of agents: Yes   Patient DID NOT receive anti-emetic therapy and reason is documented in Medical Record:  N/A    Multimodal Pain Management- (477)  Non-emergent surgery AND patient age >= 18: No  Use of Multimodal Pain Management, two or more drugs and/or interventions, NOT including systemic opioids:   Exception: Documented allergy to multiple classes of analgesics:     Smoking Abstinence (404)  Patient is current smoker (cigarette, pipe, e-cig, marijuanna): No  Elective Surgery:   Abstinence instructions provided prior to day of surgery:   Patient abstained from smoking on day of surgery:     Pre-Op Beta-Blocker in Isolated CABG (44)  Isolated CABG AND patient age >= 18: No  Beta-blocker admin within 24 hours of surgical incision:   Exception:of medical reason(s) for not administering beta blocker within 24 hours prior to surgical incision (e.g., not  indicated,other medical reason):     PACU assessment of acute postoperative pain prior to Anesthesia Care End- Applies to Patients Age = 18- (ABG7)  Initial PACU pain score is which of the following: < 7/10  Patient unable to report pain score: N/A    Post-anesthetic transfer of care checklist/protocol to PACU/ICU- (426 and 427)  Upon conclusion of case, patient transferred to which of the following locations: PACU/Non-ICU  Use of transfer checklist/protocol: Yes  Exclusion: Service Performed in Patient Hospital Room (and thus did not require transfer): N/A  Unplanned admission to ICU related to anesthesia service up through end of PACU care- (MD51)  Unplanned admission to ICU (not initially anticipated at anesthesia start time): No

## 2020-08-06 NOTE — PROGRESS NOTES
To pre-op with transport via bed with full oxygen tank. Patient on 5L NC baseline. Home Bipap with patient for use in PACU if needed.

## 2020-08-06 NOTE — CARE PLAN
Problem: Communication  Goal: The ability to communicate needs accurately and effectively will improve  Outcome: PROGRESSING AS EXPECTED   Patient communicates effectively with staff.     Problem: Knowledge Deficit  Goal: Knowledge of disease process/condition, treatment plan, diagnostic tests, and medications will improve  Outcome: PROGRESSING AS EXPECTED   Education provided as needed. Patient informed that specific information regarding her surgery would be discussed in pre-op with the surgeon.

## 2020-08-06 NOTE — THERAPY
Occupational Therapy   Initial Evaluation     Patient Name: Zeinab Loza  Age:  75 y.o., Sex:  female  Medical Record #: [id number]  Today's Date: 8/6/2020     Precautions  Precautions: Toe Touch Weight Bearing Left Lower Extremity    Assessment  Patient is 75 y.o. female presents to skilled OT services following GLF at home resulting in L bimalleolar fx now post ORIF, TTWB x6 weeks. Pt was unable to maintain TTWB during t/f but when transitioned to NWB able to perform t/f's with close cga. Pt verbalized she has 24/7 assist from dtr but sister is also coming to assist and would prefer to d/c home on w/c level to t/f from eob<>w/c and BSC. Pt will need w/c and BSC prior to d/c home. Will follow while in house to address balance, generalized weakness, ADL modifications and re-training.     Plan    Recommend Occupational Therapy 4 times per week until therapy goals are met for the following treatments:  Adaptive Equipment, Self Care/Activities of Daily Living, Therapeutic Activities and Therapeutic Exercises.    DC Equipment Recommendations: Wheelchair, Bed Side Commode  Discharge Recommendations: Recommend home health for continued occupational therapy services     Objective       08/06/20 0934   Prior Living Situation   Prior Services None   Housing / Facility [address]   Steps Into Home   (ramp)   Bathroom Set up Walk In Shower;Shower Chair;Grab Bars   Equipment Owned Tub / Shower Seat;Grab Bar(s) In Tub / Shower;Raised Toilet Seat Without Arms   Lives with - Patient's Self Care Capacity Spouse;Adult Children   Comments I w/ ADLs/IADLs baseline. Reports spouse has detention but doesn't require physical assist. Dtr provides assist with IADLs and can also assist as needed   Prior Level of ADL Function   Self Feeding Independent   Grooming / Hygiene Independent   Bathing Independent   Dressing Independent   Toileting Independent   Prior Level of IADL Function   Medication Management Independent   Laundry Independent    Kitchen Mobility Independent   Finances Independent  (dtr assists as needed)   Home Management Requires Assist   Shopping Requires Assist   Prior Level Of Mobility Independent Without Device in Home   Driving / Transportation Relatives / Others Provide Transportation  (dtr)   Balance Assessment   Sitting Balance (Static) Fair +   Sitting Balance (Dynamic) Fair   Standing Balance (Static) Fair -   Standing Balance (Dynamic) Poor +   Weight Shift Sitting Fair   Weight Shift Standing Absent  (TTWB LLE)   Comments w/FWW, does better with NWB, unable to maintain TTWB   Bed Mobility    Supine to Sit Supervised   Sit to Supine Supervised   Scooting Supervised  (seated)   Rolling   (pivoting method)   Comments has adjustable bed at home   ADL Assessment   Eating Independent   Grooming Supervision;Seated   Bathing   (discussed home s/u and AE)   Upper Body Dressing Supervision   Lower Body Dressing Minimal Assist   Toileting Minimal Assist   Functional Mobility   Sit to Stand Minimal Assist   Bed, Chair, Wheelchair Transfer Minimal Assist   Toilet Transfers Minimal Assist  (eob<>BSC)   Comments w/FWW, able to maintain NWB but unable to maintain TTWB. Educated on NWB during t/f's for safety   Short Term Goals   Short Term Goal # 1 Pt will perform LB dressing with supervision using AE as needed   Short Term Goal # 2 Pt will perform toileting task with supervision   Short Term Goal # 3 Pt will perform functional t/f's with supervision    Anticipated Discharge Equipment and Recommendations   DC Equipment Recommendations Wheelchair;Bed Side Commode

## 2020-08-06 NOTE — DISCHARGE PLANNING
Received Choice form at 2766  Agency/Facility Name: [facility]  Referral sent per Choice form @ 8524

## 2020-08-06 NOTE — OP REPORT
DATE OF SERVICE:  08/05/2020    PREOPERATIVE DIAGNOSIS:  Left lateral malleolus fracture.    POSTOPERATIVE DIAGNOSIS:  Left lateral malleolus fracture.    PROCEDURE:  Open reduction and internal fixation, left lateral malleolus   fracture.    NAME OF SURGEON:  Tk Humphreys MD    ASSISTANT:  Eric Charles DO    ESTIMATED BLOOD LOSS:  None.    INDICATIONS:  This is a very nice patient status post a twisting injury to the   ankle who sustained a displaced lateral malleolus fracture with gross   disruption of the mortise.  Risks and benefits of operative treatment were   discussed, which include but not limited to bleeding, infection, neurovascular   damage, pain, stiffness, malunion, nonunion, DVT, PE, MI, stroke and death.    They understand all these risks and wished to proceed.    DESCRIPTION OF PROCEDURE:  Patient was sedated with LMA anesthesia and   administered preoperative antibiotics.  Her left ankle was prepped and draped   in the usual sterile fashion.  A standard lateral approach to the ankle was   performed with care taken to avoid all neurovascular structures.  The fracture   was reduced to the anatomic position, held with an [facility] distal fibular locking   plate with a combination of locking and nonlocking fixation.  All screws were   checked and found of appropriate length ____.  Syndesmosis was stressed under   fluoroscopic guidance, found not to open.  As a result, no fixation was   placed in this.  Wounds were irrigated, closed in layers.  Sterile dressing   was applied.  Posterior splint was applied.  Patient tolerated the procedure   well.    POSTOPERATIVE PLAN:  Patient to be toe-touch weightbearing, admitted for   perioperative antibiotics, DVT prophylaxis and pain control.       ____________________________________     TK HUMPHREYS MD    URI / NTS    DD:  08/05/2020 23:52:38  DT:  08/06/2020 00:50:53    D#:  [id number]  Job#:  [id number]

## 2020-08-06 NOTE — ANESTHESIA PROCEDURE NOTES
Airway    Date/Time: 8/5/2020 11:25 PM  Performed by: Mumtaz Edwards M.D.  Authorized by: Mumtaz Edwards M.D.     Location:  OR  Urgency:  Elective  Difficult Airway: No    Indications for Airway Management:  Anesthesia      Spontaneous Ventilation: absent    Sedation Level:  Deep  Preoxygenated: Yes    Final Airway Type:  Supraglottic airway  Final Supraglottic Airway:  Standard LMA    SGA Size:  3  Number of Attempts at Approach:  1  Number of Other Approaches Attempted:  0

## 2020-08-06 NOTE — CARE PLAN
Problem: Communication  Goal: The ability to communicate needs accurately and effectively will improve  Outcome: PROGRESSING AS EXPECTED   Pt communicates needs effectively.   Call light w/in reach. Hourly rounding in place.      Problem: Pain Management  Goal: Pain level will decrease to patient's comfort goal  Outcome: PROGRESSING AS EXPECTED  Pain managed well with PRN medication at this time.  Will continue to monitor and implement non-pharmacological methods PRN.

## 2020-08-06 NOTE — DISCHARGE PLANNING
Anticipated Discharge Disposition: Home with HH    Action: Spoke to pt who states she would like to go home tomorrow, pt states she has a PCP she saw last month.  Pt states she has used [facility] in the past and would like them again, or [facility].  Choice form faxed to [facility].    Barriers to Discharge: HH acceptance, medical clearance    Plan: f/u with medical team, pt, [facility]

## 2020-08-06 NOTE — ANESTHESIA PREPROCEDURE EVALUATION
Relevant Problems   ANESTHESIA   (+) Other sleep apnea      CARDIAC   (+) Essential hypertension   (+) PAH (pulmonary artery hypertension) (HCC)   (+) Paroxysmal atrial fibrillation (HCC)   (+) Pulmonary hypertension (HCC)      ENDO   (+) Hypothyroidism       Physical Exam    Airway   Mallampati: II  TM distance: >3 FB  Neck ROM: full       Cardiovascular - normal exam  Rhythm: regular  Rate: normal  (-) murmur     Dental - normal exam           Pulmonary - normal exam  Breath sounds clear to auscultation     Abdominal    Neurological - normal exam                 Anesthesia Plan    ASA 3   ASA physical status 3 criteria: hypertension - poorly controlled and respiratory insufficiency or compromise    Plan - general       Airway plan will be LMA                  Informed Consent:

## 2020-08-06 NOTE — ANESTHESIA TIME REPORT
Anesthesia Start and Stop Event Times     Date Time Event    8/5/2020 2316 Ready for Procedure     2320 Anesthesia Start    8/6/2020 0020 Anesthesia Stop        Responsible Staff  08/05/20 to 08/06/20    Name Role Begin End    Mumtaz Edwards M.D. Anesth 2320 0020        Preop Diagnosis (Free Text):  Pre-op Diagnosis     Closed fracture of malleolus of left ankle        Preop Diagnosis (Codes):    Post op Diagnosis  Fracture dislocation of left ankle      Premium Reason  A. 3PM - 7AM    Comments:

## 2020-08-06 NOTE — PROGRESS NOTES
2 RN Skin Check    2 RN skin check complete upon return to unit.   Devices in place: SCDs, oxymask.  Skin assessed under devices: yes.  Confirmed pressure ulcers found on: N/A.  New potential pressure ulcers noted on N/A. Wound consult placed: No.  The following interventions in place: Pillows, Lotion and Waffle bed overlay.    Skin intact overall. Generalized bruising noted to bilateral upper extremities and left buttock. Surgical dressing in place to left lower extremity.

## 2020-08-06 NOTE — THERAPY
Physical Therapy   Initial Evaluation     Patient Name: Zeinab Loza  Age:  75 y.o., Sex:  female  Medical Record #: [id number]  Today's Date: 8/6/2020     Precautions: (P) Toe Touch Weight Bearing Left Lower Extremity    Assessment  Zeinab Loza is a 75 y.o. female admitted due to L bimalleolar fx secondary to GLF. Is now s/p L ankle ORIF and is TTWB LLE for 6 weeks. Pt demonstrates good bed mobility. Pt was educated on TTWB status and required reinforcement first attempt performing STS. Pt transferred to BSC with therapies and returned to bed, therapies reinforcing TTWB precautions. Practiced STS x2 while maintaining WB statues and pt's understanding improved. She benefits from assuming NWB LLE and was able to hop 3x to R and 2x backwards while maintaining precautions in order to return to bed. Recommend WC for primary mode of mobility, however will train gait using FWW for short household distances (I.e. pt reports having BSC at home). Will continue to follow.    Plan    Recommend Physical Therapy 5 times per week until therapy goals are met for the following treatments:  Gait Training, Stair Training, Therapeutic Activities and Therapeutic Exercises    DC Equipment Recommendations: (P) Wheelchair, Front-Wheel Walker  Discharge Recommendations: (P) Recommend home health transitional care for continued physical therapy services.            Objective     08/06/20 0945   Pain 0 - 10 Group   Therapist Pain Assessment   (no complaints of pain)   Prior Living Situation   Prior Services None   Housing / Facility 1 Story House   Steps Into Home 0   Steps In Home 0   Equipment Owned None   Lives with - Patient's Self Care Capacity Spouse;Adult Children  ( has ALS, dtr available 24/7)   Prior Level of Functional Mobility   Bed Mobility Independent   Transfer Status Independent   Ambulation Independent   Distance Ambulation (Feet)   (community)   Assistive Devices Used None   Stairs Independent    Cognition    Level of Consciousness Alert   Passive ROM Lower Body   Passive ROM Lower Body X   Comments L ankle not assessed, s/p ORIF   Active ROM Lower Body    Active ROM Lower Body  X   Strength Lower Body   Lower Body Strength  X   Comments L ankle not assessed, s/p ORIF   Sensation Lower Body   Lower Extremity Sensation   X   Comments LLE L5 not assessed d/t cast, remaining intact   Balance Assessment   Sitting Balance (Static) Fair   Sitting Balance (Dynamic) Fair   Standing Balance (Static) Poor +   Standing Balance (Dynamic) Poor +   Weight Shift Sitting Fair   Weight Shift Standing Absent  (absent d/t TTWB LLE)   Gait Analysis   Gait Level Of Assist Moderate Assist   Assistive Device Front Wheel Walker   Distance (Feet) 3   # of Times Distance was Traveled 2  (bed <> BSC)   Weight Bearing Status TTWB LLE   Bed Mobility    Supine to Sit Supervised   Sit to Supine Supervised   Scooting Supervised   Functional Mobility   Sit to Stand Minimal Assist   Toilet Transfers Minimal Assist   Transfer Method Stand Pivot   Short Term Goals    Short Term Goal # 1 Pt will be able to perform all functional transfers while maintaining WB precautions with SPV within 6tx in order to improve independence.   Short Term Goal # 2 Pt will be able to to propel WC with SPV within 6tx in order to improve independence.   Short Term Goal # 3 Pt will be able to ambulate household distances using FWW while maintaining WB precautions with SPV within 6tx in order to improve independence.   Education Group   Education Provided Role of Physical Therapist   Role of Physical Therapist Patient Response Patient;Acceptance;Explanation;Demonstration;Verbal Demonstration;Action Demonstration;Reinforcement Needed   Problem List    Problems Impaired Transfers;Impaired Ambulation;Limited Knowledge of Post-Op Precautions   Anticipated Discharge Equipment and Recommendations   DC Equipment Recommendations Wheelchair;Front-Wheel Walker   Discharge  Recommendations Recommend outpatient physical therapy services to address higher level deficits

## 2020-08-06 NOTE — PROGRESS NOTES
Hospital Medicine Daily Progress Note    Date of Service  8/6/2020    Chief Complaint  75 y.o. female admitted 8/4/2020 with GLF     Hospital Course    Patient with multiple comorbid conditions admitted with a ground-level fall complicated by left ankle fracture      Interval Problem Update  Patient seen and evaluated on rounds  Discussed with nursing staff on rounds  Low potassium replaced  Pain remains controlled  Orthopedics following  Echo completed yesterday per cardiology team  Patient refusing postacute placement of any sort  Home health care orders placed  PT/OT evaluations requested      Consultants/Specialty  Orthopedics  Cardiology    Code Status  DNAR/DNI    Disposition  PT/OT evaluations postoperatively to determine disposition needs  Home health care orders placed, discussed with case management/social workers  Patient refusing postacute placement of any sort even if indicated    Review of Systems  Review of Systems   Constitutional: Positive for malaise/fatigue. Negative for chills and fever.   HENT: Negative for hearing loss.    Eyes: Negative for blurred vision and double vision.   Respiratory: Positive for shortness of breath (chronic). Negative for cough and hemoptysis.    Cardiovascular: Negative for chest pain, palpitations and PND.   Gastrointestinal: Negative for abdominal pain, blood in stool, constipation, diarrhea, melena, nausea and vomiting.   Genitourinary: Negative for dysuria, flank pain, frequency, hematuria and urgency.   Musculoskeletal: Positive for joint pain. Negative for myalgias and neck pain.   Skin: Negative for rash.   Neurological: Negative for dizziness and headaches.        Physical Exam  Temp:  [36 °C (96.8 °F)-37.2 °C (98.9 °F)] 36.8 °C (98.2 °F)  Pulse:  [60-82] 73  Resp:  [14-22] 18  BP: (118-167)/(64-95) 129/73  SpO2:  [94 %-100 %] 94 %    Physical Exam  HENT:      Head: Normocephalic and atraumatic.      Right Ear: External ear normal.      Left Ear: External ear  normal.      Nose: Nose normal.      Mouth/Throat:      Mouth: Mucous membranes are moist.   Eyes:      Extraocular Movements: Extraocular movements intact.      Conjunctiva/sclera: Conjunctivae normal.      Pupils: Pupils are equal, round, and reactive to light.   Neck:      Musculoskeletal: Normal range of motion.   Cardiovascular:      Rate and Rhythm: Normal rate and regular rhythm.      Pulses: Normal pulses.      Heart sounds: Murmur present. No friction rub. No gallop.    Pulmonary:      Effort: Pulmonary effort is normal. No respiratory distress.      Breath sounds: No stridor. Rales present. No wheezing or rhonchi.   Chest:      Chest wall: No tenderness.   Abdominal:      General: Abdomen is flat. Bowel sounds are normal. There is no distension.      Palpations: Abdomen is soft. There is no mass.      Tenderness: There is no abdominal tenderness. There is no right CVA tenderness, left CVA tenderness, guarding or rebound.      Hernia: No hernia is present.   Musculoskeletal: Normal range of motion.      Right lower leg: No edema.      Left lower leg: Edema present.   Skin:     General: Skin is warm and dry.      Capillary Refill: Capillary refill takes less than 2 seconds.   Neurological:      General: No focal deficit present.      Mental Status: She is alert and oriented to person, place, and time. Mental status is at baseline.      Cranial Nerves: No cranial nerve deficit.      Sensory: No sensory deficit.      Motor: No weakness.      Coordination: Coordination normal.      Gait: Gait normal.      Deep Tendon Reflexes: Reflexes normal.   Psychiatric:         Mood and Affect: Mood normal.         Behavior: Behavior normal.         Thought Content: Thought content normal.         Judgment: Judgment normal.         Fluids    Intake/Output Summary (Last 24 hours) at 8/6/2020 1428  Last data filed at 8/6/2020 1200  Gross per 24 hour   Intake 895 ml   Output 27 ml   Net 868 ml       Laboratory  Recent Labs      08/05/20  0850 08/06/20  0723   WBC 11.1* 7.9   RBC 3.77* 3.67*   HEMOGLOBIN 12.4 12.1   HEMATOCRIT 37.6 36.9*   MCV 99.7* 100.5*   MCH 32.9 33.0   MCHC 33.0* 32.8*   RDW 46.9 46.6   PLATELETCT 252 219   MPV 8.6* 8.5*     Recent Labs     08/05/20  0850 08/06/20  0723   SODIUM 139 139   POTASSIUM 3.6 3.0*   CHLORIDE 92* 94*   CO2 30 32   GLUCOSE 221* 129*   BUN 39* 31*   CREATININE 1.06 1.15   CALCIUM 10.6* 10.4     Recent Labs     08/06/20  0726   INR 1.08               Imaging  DX-PORTABLE FLUORO > 1 HOUR   Final Result      Portable fluoroscopy utilized for 1 second.         INTERPRETING LOCATION: [address] [place] [address], [address]      DX-ANKLE 2- VIEWS LEFT   Final Result         1.  Intraoperative changes of distal fibular ORIF in progress      EC-ECHOCARDIOGRAM COMPLETE W/O CONT   Final Result      DX-TIBIA AND FIBULA LEFT   Final Result      No acute proximal tibial or fibular fractures. Distal tibial fracture is discussed separately.      DX-FOOT-COMPLETE 3+ LEFT   Final Result      No acute foot fracture. Ankle fracture is discussed separately.      DX-ANKLE 3+ VIEWS LEFT   Final Result      1.  Acute lateral malleolar fracture, with lateral subluxation of the ankle joint.   2.  Associated small posterior malleolar fracture.      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- (present on admission)  Assessment & Plan  Secondary to ground-level fall  Status post surgical fixation with orthopedics team  TTWB per orthopedics  Echocardiogram was ordered by cardiology preoperatively  Continue pain control  Patient refusing postacute placement  PT/OT evaluations  Home health care arrangement, discussed with case management/social workers    Pulmonary hypertension (HCC)- (present on admission)  Assessment & Plan  This is chronic and she is on tadalafil and she was taken off of ambrisentan 2 weeks ago secondary to hypotension  I  informed patient's cardiologist here in [facility], Dr Eng regarding patient's admission, he will evaluate the patient  Continue chronic oxygen  Resume home medications once cleared by cardiology, family brings in her home medications    Essential hypertension- (present on admission)  Assessment & Plan  Continue torsemide, sotalol, metolazone, diltiazem  PRN labetalol IV    Chronic anticoagulation- (present on admission)  Assessment & Plan  For pulmonary hypertension and A. fib on Eliquis  On hold for now  We will ask orthopedics tomorrow if anticoagulation can be reinitiated    Chronic respiratory failure (HCC)- (present on admission)  Assessment & Plan  Chronically on 5 L oxygen and she is at baseline    Paroxysmal atrial fibrillation (HCC)- (present on admission)  Assessment & Plan  Currently in normal sinus rhythm  Continue sotalol and digoxin  Holding Eliquis for now    Hypothyroidism- (present on admission)  Assessment & Plan  Continue Synthroid 75 mcg       VTE prophylaxis: SCDs

## 2020-08-06 NOTE — FACE TO FACE
Face to Face Supporting Documentation - Home Health    The encounter with this patient was in whole or in part the primary reason for home health admission.    Date of encounter:   Patient:                    MRN:                       YOB: 2018  Zeinab Loza  [id number]  1945     Home health to see patient for:  Skilled Nursing care for assessment, interventions & education, Registered dietitian consult, Medical social work consult, Home health aide, Physical Therapy evaluation and treatment and Occupational therapy evaluation and treatment    Skilled need for:  Surgical Aftercare Post orthopedics surgery    Skilled nursing interventions to include:  Comment: Comanagement of medical care    Homebound status evidenced by:  Needs the assistance of another person in order to leave the home. Leaving home requires a considerable and taxing effort. There is a normal inability to leave the home.    Community Physician to provide follow up care: Ashia Tidwell M.D.     Optional Interventions? No      I certify the face to face encounter for this home health care referral meets the CMS requirements and the encounter/clinical assessment with the patient was, in whole, or in part, for the medical condition(s) listed above, which is the primary reason for home health care. Based on my clinical findings: the service(s) are medically necessary, support the need for home health care, and the homebound criteria are met.  I certify that this patient has had a face to face encounter by myself.  John Lee M.D. - NPI: [id number]

## 2020-08-07 LAB
ANION GAP SERPL CALC-SCNC: 15 MMOL/L (ref 7–16)
BUN SERPL-MCNC: 35 MG/DL (ref 8–22)
CALCIUM SERPL-MCNC: 10.9 MG/DL (ref 8.5–10.5)
CHLORIDE SERPL-SCNC: 91 MMOL/L (ref 96–112)
CO2 SERPL-SCNC: 34 MMOL/L (ref 20–33)
CREAT SERPL-MCNC: 1.13 MG/DL (ref 0.5–1.4)
ERYTHROCYTE [DISTWIDTH] IN BLOOD BY AUTOMATED COUNT: 46 FL (ref 35.9–50)
GLUCOSE SERPL-MCNC: 145 MG/DL (ref 65–99)
HCT VFR BLD AUTO: 38 % (ref 37–47)
HGB BLD-MCNC: 12.4 G/DL (ref 12–16)
MCH RBC QN AUTO: 33 PG (ref 27–33)
MCHC RBC AUTO-ENTMCNC: 32.6 G/DL (ref 33.6–35)
MCV RBC AUTO: 101.1 FL (ref 81.4–97.8)
PLATELET # BLD AUTO: 222 K/UL (ref 164–446)
PMV BLD AUTO: 8.6 FL (ref 9–12.9)
POTASSIUM SERPL-SCNC: 2.8 MMOL/L (ref 3.6–5.5)
RBC # BLD AUTO: 3.76 M/UL (ref 4.2–5.4)
SODIUM SERPL-SCNC: 140 MMOL/L (ref 135–145)
WBC # BLD AUTO: 6.5 K/UL (ref 4.8–10.8)

## 2020-08-07 PROCEDURE — 97530 THERAPEUTIC ACTIVITIES: CPT | Mod: CQ

## 2020-08-07 PROCEDURE — 700102 HCHG RX REV CODE 250 W/ 637 OVERRIDE(OP): Performed by: INTERNAL MEDICINE

## 2020-08-07 PROCEDURE — 700102 HCHG RX REV CODE 250 W/ 637 OVERRIDE(OP): Performed by: ORTHOPAEDIC SURGERY

## 2020-08-07 PROCEDURE — A9270 NON-COVERED ITEM OR SERVICE: HCPCS | Performed by: ORTHOPAEDIC SURGERY

## 2020-08-07 PROCEDURE — 700102 HCHG RX REV CODE 250 W/ 637 OVERRIDE(OP): Performed by: HOSPITALIST

## 2020-08-07 PROCEDURE — 97535 SELF CARE MNGMENT TRAINING: CPT

## 2020-08-07 PROCEDURE — 85027 COMPLETE CBC AUTOMATED: CPT

## 2020-08-07 PROCEDURE — A9270 NON-COVERED ITEM OR SERVICE: HCPCS | Performed by: HOSPITALIST

## 2020-08-07 PROCEDURE — 97535 SELF CARE MNGMENT TRAINING: CPT | Mod: CQ

## 2020-08-07 PROCEDURE — 700112 HCHG RX REV CODE 229: Performed by: ORTHOPAEDIC SURGERY

## 2020-08-07 PROCEDURE — 770006 HCHG ROOM/CARE - MED/SURG/GYN SEMI*

## 2020-08-07 PROCEDURE — 99232 SBSQ HOSP IP/OBS MODERATE 35: CPT | Performed by: INTERNAL MEDICINE

## 2020-08-07 PROCEDURE — A9270 NON-COVERED ITEM OR SERVICE: HCPCS | Performed by: INTERNAL MEDICINE

## 2020-08-07 PROCEDURE — 700101 HCHG RX REV CODE 250: Performed by: INTERNAL MEDICINE

## 2020-08-07 PROCEDURE — 36415 COLL VENOUS BLD VENIPUNCTURE: CPT

## 2020-08-07 PROCEDURE — 80048 BASIC METABOLIC PNL TOTAL CA: CPT

## 2020-08-07 RX ORDER — POTASSIUM CHLORIDE 20 MEQ/1
40 TABLET, EXTENDED RELEASE ORAL EVERY 4 HOURS
Status: COMPLETED | OUTPATIENT
Start: 2020-08-07 | End: 2020-08-07

## 2020-08-07 RX ORDER — POTASSIUM CHLORIDE 20 MEQ/1
40 TABLET, EXTENDED RELEASE ORAL ONCE
Status: COMPLETED | OUTPATIENT
Start: 2020-08-07 | End: 2020-08-07

## 2020-08-07 RX ORDER — SODIUM CHLORIDE AND POTASSIUM CHLORIDE 300; 900 MG/100ML; MG/100ML
INJECTION, SOLUTION INTRAVENOUS CONTINUOUS
Status: ACTIVE | OUTPATIENT
Start: 2020-08-07 | End: 2020-08-07

## 2020-08-07 RX ADMIN — APIXABAN 5 MG: 5 TABLET, FILM COATED ORAL at 17:23

## 2020-08-07 RX ADMIN — OXYCODONE HYDROCHLORIDE 10 MG: 10 TABLET ORAL at 05:52

## 2020-08-07 RX ADMIN — OXYCODONE HYDROCHLORIDE 10 MG: 10 TABLET ORAL at 21:20

## 2020-08-07 RX ADMIN — DOCUSATE SODIUM 50 MG AND SENNOSIDES 8.6 MG 2 TABLET: 8.6; 5 TABLET, FILM COATED ORAL at 17:23

## 2020-08-07 RX ADMIN — ACETAMINOPHEN 1000 MG: 500 TABLET ORAL at 12:08

## 2020-08-07 RX ADMIN — MULTIPLE VITAMINS W/ MINERALS TAB 1 TABLET: TAB at 05:51

## 2020-08-07 RX ADMIN — DIGOXIN 125 MCG: 125 TABLET ORAL at 17:23

## 2020-08-07 RX ADMIN — POTASSIUM CHLORIDE 40 MEQ: 1500 TABLET, EXTENDED RELEASE ORAL at 09:42

## 2020-08-07 RX ADMIN — DOCUSATE SODIUM 100 MG: 100 CAPSULE, LIQUID FILLED ORAL at 17:23

## 2020-08-07 RX ADMIN — PAROXETINE HYDROCHLORIDE 20 MG: 20 TABLET, FILM COATED ORAL at 05:54

## 2020-08-07 RX ADMIN — SOTALOL HYDROCHLORIDE 80 MG: 80 TABLET ORAL at 17:22

## 2020-08-07 RX ADMIN — AMITRIPTYLINE HYDROCHLORIDE 20 MG: 10 TABLET, FILM COATED ORAL at 21:20

## 2020-08-07 RX ADMIN — METOLAZONE 5 MG: 5 TABLET ORAL at 05:53

## 2020-08-07 RX ADMIN — OXYCODONE HYDROCHLORIDE 10 MG: 10 TABLET ORAL at 09:42

## 2020-08-07 RX ADMIN — DOCUSATE SODIUM 100 MG: 100 CAPSULE, LIQUID FILLED ORAL at 05:53

## 2020-08-07 RX ADMIN — OXYCODONE HYDROCHLORIDE 10 MG: 10 TABLET ORAL at 14:15

## 2020-08-07 RX ADMIN — ROSUVASTATIN CALCIUM 10 MG: 20 TABLET, FILM COATED ORAL at 17:23

## 2020-08-07 RX ADMIN — POLYETHYLENE GLYCOL 3350 1 PACKET: 17 POWDER, FOR SOLUTION ORAL at 17:22

## 2020-08-07 RX ADMIN — ACETAMINOPHEN 1000 MG: 500 TABLET ORAL at 17:23

## 2020-08-07 RX ADMIN — ACETAMINOPHEN 1000 MG: 500 TABLET ORAL at 05:52

## 2020-08-07 RX ADMIN — SOTALOL HYDROCHLORIDE 80 MG: 80 TABLET ORAL at 05:54

## 2020-08-07 RX ADMIN — DOCUSATE SODIUM 50 MG AND SENNOSIDES 8.6 MG 2 TABLET: 8.6; 5 TABLET, FILM COATED ORAL at 05:51

## 2020-08-07 RX ADMIN — DILTIAZEM HYDROCHLORIDE 120 MG: 120 CAPSULE, COATED, EXTENDED RELEASE ORAL at 05:53

## 2020-08-07 RX ADMIN — TORSEMIDE 20 MG: 20 TABLET ORAL at 05:51

## 2020-08-07 RX ADMIN — POTASSIUM CHLORIDE AND SODIUM CHLORIDE: 900; 300 INJECTION, SOLUTION INTRAVENOUS at 10:33

## 2020-08-07 RX ADMIN — POTASSIUM CHLORIDE 40 MEQ: 1500 TABLET, EXTENDED RELEASE ORAL at 14:14

## 2020-08-07 RX ADMIN — Medication 1000 UNITS: at 05:53

## 2020-08-07 RX ADMIN — LEVOTHYROXINE SODIUM 75 MCG: 0.07 TABLET ORAL at 05:52

## 2020-08-07 RX ADMIN — TORSEMIDE 20 MG: 20 TABLET ORAL at 17:22

## 2020-08-07 RX ADMIN — POTASSIUM CHLORIDE 40 MEQ: 1500 TABLET, EXTENDED RELEASE ORAL at 21:19

## 2020-08-07 ASSESSMENT — ENCOUNTER SYMPTOMS
VOMITING: 0
FEVER: 0
DOUBLE VISION: 0
CONSTIPATION: 0
NAUSEA: 0
SHORTNESS OF BREATH: 1
FLANK PAIN: 0
HEMOPTYSIS: 0
HEADACHES: 0
BLURRED VISION: 0
BLOOD IN STOOL: 0
COUGH: 0
NECK PAIN: 0
PND: 0
DIZZINESS: 0
MYALGIAS: 0
DIARRHEA: 0
CHILLS: 0
PALPITATIONS: 0
ABDOMINAL PAIN: 0

## 2020-08-07 ASSESSMENT — COGNITIVE AND FUNCTIONAL STATUS - GENERAL
WALKING IN HOSPITAL ROOM: A LOT
STANDING UP FROM CHAIR USING ARMS: A LITTLE
DRESSING REGULAR LOWER BODY CLOTHING: A LITTLE
HELP NEEDED FOR BATHING: A LITTLE
MOVING TO AND FROM BED TO CHAIR: A LITTLE
CLIMB 3 TO 5 STEPS WITH RAILING: TOTAL
TURNING FROM BACK TO SIDE WHILE IN FLAT BAD: A LITTLE
SUGGESTED CMS G CODE MODIFIER MOBILITY: CK
DAILY ACTIVITIY SCORE: 21
MOVING FROM LYING ON BACK TO SITTING ON SIDE OF FLAT BED: A LITTLE
MOBILITY SCORE: 15
TOILETING: A LITTLE
SUGGESTED CMS G CODE MODIFIER DAILY ACTIVITY: CJ

## 2020-08-07 NOTE — FACE TO FACE
Face to Face Note  -  Durable Medical Equipment    John Lee M.D. - NPI: [id number]  I certify that this patient is under my care and that they had a durable medical equipment(DME)face to face encounter by myself that meets the physician DME face-to-face encounter requirements with this patient on:    Date of encounter:   Patient:                    MRN:                       YOB: 2018  Zeinab Loza  [id number]  1945     The encounter with the patient was in whole, or in part, for the following medical condition, which is the primary reason for durable medical equipment:  Post-Op Surgery    I certify that, based on my findings, the following durable medical equipment is medically necessary:  Walkers, Wheel Chair and 3 in 1 Bedside [place].    HOME O2 Saturation Measurements:(Values must be present for Home Oxygen orders)         ,     ,         My Clinical findings support the need for the above equipment due to:  Abnormal Gait

## 2020-08-07 NOTE — THERAPY
Physical Therapy   Daily Treatment     Patient Name: Zeinab Loza  Age:  75 y.o., Sex:  female  Medical Record #: [id number]  Today's Date: 8/7/2020     Precautions: Toe Touch Weight Bearing Left Lower Extremity    Assessment    Pt progressing well w/ therapy. Pts dtr present for family training. Pt able to perform bed mobility w/out assist from an elevated HOB. Pts bed at home has this feature. Pt trialing transfers w/ and w/out the FWW. Pt performed squat pivot transfers w/out the FWW better than attempting SPT w/ it. Pts dtr able to safely guard the pt during transfers as pt needed very little assist for transfers. Pts dtr stating she got some equipment from [facility] but will need a w/c. Pt continues to be at a level in which her rehab can be managed by .    Plan    Continue current treatment plan.    DC Equipment Recommendations: Wheelchair  Discharge Recommendations: Recommend home health for continued physical therapy services     Objective       08/07/20 1544   Precautions   Precautions Toe Touch Weight Bearing Left Lower Extremity   Gait Analysis   Comments Pt preferring transfers to gait at this time.   Bed Mobility    Supine to Sit Supervised   Sit to Supine Supervised   Scooting Supervised   Rolling   (sit pivot)   Functional Mobility   Sit to Stand Minimal Assist   Bed, Chair, Wheelchair Transfer Minimal Assist   Transfer Method Stand Pivot;Squat Pivot   Mobility Squat pivot w/ no AD. Stand pivot w/ FWW.   Short Term Goals    Short Term Goal # 1 Pt will be able to perform all functional transfers while maintaining WB precautions with SPV within 6tx in order to improve independence.   Goal Outcome # 1 Progressing as expected   Short Term Goal # 2 Pt will be able to to propel WC with SPV within 6tx in order to improve independence.   Goal Outcome # 2 Progressing as expected   Short Term Goal # 3 Pt will be able to ambulate household distances using FWW while maintaining WB precautions with SPV  within 6tx in order to improve independence.   Goal Outcome # 3 Goal not met

## 2020-08-07 NOTE — DISCHARGE PLANNING
Anticipated Discharge Disposition: Home with HH and DME    Action: received DME referral.  DME choice faxed to [facility].  Aniticipating dc tomorrow 8/8.    Barriers to Discharge: DME delivery    Plan: f/u with [facility], pt

## 2020-08-07 NOTE — DISCHARGE PLANNING
Agency/Facility Name: [facility]  Spoke To: Keely  Outcome: BSRN informed [facility] that patient only need wheelchair. DME will be delivered 8/7 within 3 hours.  Pt has bsc and walker at home    RN CM informed

## 2020-08-07 NOTE — CARE PLAN
Problem: Communication  Goal: The ability to communicate needs accurately and effectively will improve  Outcome: PROGRESSING AS EXPECTED  Communicates her needs effectively.  D/w plan of care.       Problem: Pain Management  Goal: Pain level will decrease to patient's comfort goal  Outcome: PROGRESSING AS EXPECTED   Pain controlled with prn medication.

## 2020-08-07 NOTE — DISCHARGE PLANNING
Received Choice form at 5969  Agency/Facility Name: [facility]  Referral sent per Choice form @ 1723

## 2020-08-07 NOTE — CARE PLAN
Problem: Communication  Goal: The ability to communicate needs accurately and effectively will improve  Outcome: PROGRESSING AS EXPECTED   Patient communicates effectively with staff.     Problem: Knowledge Deficit  Goal: Knowledge of disease process/condition, treatment plan, diagnostic tests, and medications will improve  Outcome: PROGRESSING AS EXPECTED   Patient demonstrates adequate understanding of treatment plan. Education provided as needed.

## 2020-08-07 NOTE — PROGRESS NOTES
Hospital Medicine Daily Progress Note    Date of Service  8/7/2020    Chief Complaint  75 y.o. female admitted 8/4/2020 with GLF     Hospital Course    Patient with multiple comorbid conditions admitted with a ground-level fall complicated by left ankle fracture      Interval Problem Update  Patient seen and evaluated on rounds  Discussed with nursing staff on rounds  Low potassium replaced  Pain remains controlled  Orthopedics following, discussed with orthopedic yaya Tuttle to resume Eliquis  Anticipate discharge home tomorrow with outpatient cardiology, [facility] follow-up and orthopedics follow-up      Consultants/Specialty  Orthopedics  Cardiology    Code Status  DNAR/DNI    Disposition  PT/OT evaluations postoperatively to determine disposition needs  Home health care orders placed, discussed with case management/social workers  Patient refusing postacute placement of any sort even if indicated    Review of Systems  Review of Systems   Constitutional: Positive for malaise/fatigue. Negative for chills and fever.   HENT: Negative for hearing loss.    Eyes: Negative for blurred vision and double vision.   Respiratory: Positive for shortness of breath (chronic). Negative for cough and hemoptysis.    Cardiovascular: Negative for chest pain, palpitations and PND.   Gastrointestinal: Negative for abdominal pain, blood in stool, constipation, diarrhea, melena, nausea and vomiting.   Genitourinary: Negative for dysuria, flank pain, frequency, hematuria and urgency.   Musculoskeletal: Positive for joint pain. Negative for myalgias and neck pain.   Skin: Negative for rash.   Neurological: Negative for dizziness and headaches.        Physical Exam  Temp:  [35.9 °C (96.7 °F)-36.7 °C (98.1 °F)] 36.6 °C (97.9 °F)  Pulse:  [64-79] 73  Resp:  [16] 16  BP: (108-143)/(59-83) 109/72  SpO2:  [94 %-99 %] 94 %    Physical Exam  HENT:      Head: Normocephalic and atraumatic.      Right Ear: External ear normal.      Left Ear:  External ear normal.      Nose: Nose normal.      Mouth/Throat:      Mouth: Mucous membranes are moist.   Eyes:      Extraocular Movements: Extraocular movements intact.      Conjunctiva/sclera: Conjunctivae normal.      Pupils: Pupils are equal, round, and reactive to light.   Neck:      Musculoskeletal: Normal range of motion.   Cardiovascular:      Rate and Rhythm: Normal rate and regular rhythm.      Pulses: Normal pulses.      Heart sounds: Murmur present. No friction rub. No gallop.    Pulmonary:      Effort: Pulmonary effort is normal. No respiratory distress.      Breath sounds: No stridor. Rales present. No wheezing or rhonchi.   Chest:      Chest wall: No tenderness.   Abdominal:      General: Abdomen is flat. Bowel sounds are normal. There is no distension.      Palpations: Abdomen is soft. There is no mass.      Tenderness: There is no abdominal tenderness. There is no right CVA tenderness, left CVA tenderness, guarding or rebound.      Hernia: No hernia is present.   Musculoskeletal: Normal range of motion.      Right lower leg: No edema.      Left lower leg: Edema present.   Skin:     General: Skin is warm and dry.      Capillary Refill: Capillary refill takes less than 2 seconds.   Neurological:      General: No focal deficit present.      Mental Status: She is alert and oriented to person, place, and time. Mental status is at baseline.      Cranial Nerves: No cranial nerve deficit.      Sensory: No sensory deficit.      Motor: No weakness.      Coordination: Coordination normal.      Gait: Gait normal.      Deep Tendon Reflexes: Reflexes normal.   Psychiatric:         Mood and Affect: Mood normal.         Behavior: Behavior normal.         Thought Content: Thought content normal.         Judgment: Judgment normal.         Fluids    Intake/Output Summary (Last 24 hours) at 8/7/2020 1445  Last data filed at 8/7/2020 1033  Gross per 24 hour   Intake 480 ml   Output 0 ml   Net 480 ml        Laboratory  Recent Labs     08/05/20  0850 08/06/20  0723 08/07/20  0756   WBC 11.1* 7.9 6.5   RBC 3.77* 3.67* 3.76*   HEMOGLOBIN 12.4 12.1 12.4   HEMATOCRIT 37.6 36.9* 38.0   MCV 99.7* 100.5* 101.1*   MCH 32.9 33.0 33.0   MCHC 33.0* 32.8* 32.6*   RDW 46.9 46.6 46.0   PLATELETCT 252 219 222   MPV 8.6* 8.5* 8.6*     Recent Labs     08/05/20  0850 08/06/20  0723 08/07/20  0756   SODIUM 139 139 140   POTASSIUM 3.6 3.0* 2.8*   CHLORIDE 92* 94* 91*   CO2 30 32 34*   GLUCOSE 221* 129* 145*   BUN 39* 31* 35*   CREATININE 1.06 1.15 1.13   CALCIUM 10.6* 10.4 10.9*     Recent Labs     08/06/20  0726   INR 1.08               Imaging  DX-PORTABLE FLUORO > 1 HOUR   Final Result      Portable fluoroscopy utilized for 1 second.         INTERPRETING LOCATION: [address] [place] [address], [address]      DX-ANKLE 2- VIEWS LEFT   Final Result         1.  Intraoperative changes of distal fibular ORIF in progress      EC-ECHOCARDIOGRAM COMPLETE W/ CONT   Final Result      EC-ECHOCARDIOGRAM COMPLETE W/O CONT   Final Result      DX-TIBIA AND FIBULA LEFT   Final Result      No acute proximal tibial or fibular fractures. Distal tibial fracture is discussed separately.      DX-FOOT-COMPLETE 3+ LEFT   Final Result      No acute foot fracture. Ankle fracture is discussed separately.      DX-ANKLE 3+ VIEWS LEFT   Final Result      1.  Acute lateral malleolar fracture, with lateral subluxation of the ankle joint.   2.  Associated small posterior malleolar fracture.           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- (present on admission)  Assessment & Plan  Secondary to ground-level fall  Status post surgical fixation with orthopedics team  TTWB per orthopedics  Echocardiogram was ordered by cardiology preoperatively  Continue pain control  Patient refusing postacute placement  PT/OT evaluations  Home health care arrangement, discussed with case management/social workers    Pulmonary hypertension (HCC)- (present  on admission)  Assessment & Plan  This is chronic and she is on tadalafil and she was taken off of ambrisentan 2 weeks ago secondary to hypotension  Follow-up with cardiology and [facility] following discharge  Continue chronic oxygen  Resume home medications once cleared by cardiology, family brings in her home medications    Essential hypertension- (present on admission)  Assessment & Plan  Continue torsemide, sotalol, metolazone, diltiazem  PRN labetalol IV    Chronic anticoagulation- (present on admission)  Assessment & Plan  For pulmonary hypertension and A. fib on Eliquis  On hold for now  Okay to resume Eliquis per orthopedics    Chronic respiratory failure (HCC)- (present on admission)  Assessment & Plan  Chronically on 5 L oxygen and she is at baseline    Paroxysmal atrial fibrillation (HCC)- (present on admission)  Assessment & Plan  Currently in normal sinus rhythm  Continue sotalol and digoxin  Discussed with orthopedics CHANTELLE Zee, yaya to resume Eliquis    Hypothyroidism- (present on admission)  Assessment & Plan  Continue Synthroid 75 mcg     Potassium replaced  Recheck labs tomorrow  If stable, anticipate discharge tomorrow  VTE prophylaxis: SCDs

## 2020-08-08 LAB
ANION GAP SERPL CALC-SCNC: 13 MMOL/L (ref 7–16)
ANION GAP SERPL CALC-SCNC: 15 MMOL/L (ref 7–16)
BUN SERPL-MCNC: 38 MG/DL (ref 8–22)
BUN SERPL-MCNC: 43 MG/DL (ref 8–22)
CALCIUM SERPL-MCNC: 10.6 MG/DL (ref 8.5–10.5)
CALCIUM SERPL-MCNC: 11 MG/DL (ref 8.5–10.5)
CHLORIDE SERPL-SCNC: 90 MMOL/L (ref 96–112)
CHLORIDE SERPL-SCNC: 91 MMOL/L (ref 96–112)
CO2 SERPL-SCNC: 33 MMOL/L (ref 20–33)
CO2 SERPL-SCNC: 37 MMOL/L (ref 20–33)
CREAT SERPL-MCNC: 1.31 MG/DL (ref 0.5–1.4)
CREAT SERPL-MCNC: 1.43 MG/DL (ref 0.5–1.4)
ERYTHROCYTE [DISTWIDTH] IN BLOOD BY AUTOMATED COUNT: 45.2 FL (ref 35.9–50)
GLUCOSE SERPL-MCNC: 147 MG/DL (ref 65–99)
GLUCOSE SERPL-MCNC: 149 MG/DL (ref 65–99)
HCT VFR BLD AUTO: 36.2 % (ref 37–47)
HGB BLD-MCNC: 12.1 G/DL (ref 12–16)
MAGNESIUM SERPL-MCNC: 1.9 MG/DL (ref 1.5–2.5)
MCH RBC QN AUTO: 32.9 PG (ref 27–33)
MCHC RBC AUTO-ENTMCNC: 33.4 G/DL (ref 33.6–35)
MCV RBC AUTO: 98.4 FL (ref 81.4–97.8)
PLATELET # BLD AUTO: 222 K/UL (ref 164–446)
PMV BLD AUTO: 8.5 FL (ref 9–12.9)
POTASSIUM SERPL-SCNC: 2.9 MMOL/L (ref 3.6–5.5)
POTASSIUM SERPL-SCNC: 3.7 MMOL/L (ref 3.6–5.5)
RBC # BLD AUTO: 3.68 M/UL (ref 4.2–5.4)
SODIUM SERPL-SCNC: 139 MMOL/L (ref 135–145)
SODIUM SERPL-SCNC: 140 MMOL/L (ref 135–145)
WBC # BLD AUTO: 5.6 K/UL (ref 4.8–10.8)

## 2020-08-08 PROCEDURE — 700111 HCHG RX REV CODE 636 W/ 250 OVERRIDE (IP): Performed by: ORTHOPAEDIC SURGERY

## 2020-08-08 PROCEDURE — 700102 HCHG RX REV CODE 250 W/ 637 OVERRIDE(OP): Performed by: ORTHOPAEDIC SURGERY

## 2020-08-08 PROCEDURE — 85027 COMPLETE CBC AUTOMATED: CPT

## 2020-08-08 PROCEDURE — A9270 NON-COVERED ITEM OR SERVICE: HCPCS | Performed by: ORTHOPAEDIC SURGERY

## 2020-08-08 PROCEDURE — 83735 ASSAY OF MAGNESIUM: CPT

## 2020-08-08 PROCEDURE — 700102 HCHG RX REV CODE 250 W/ 637 OVERRIDE(OP): Performed by: INTERNAL MEDICINE

## 2020-08-08 PROCEDURE — 99232 SBSQ HOSP IP/OBS MODERATE 35: CPT | Performed by: INTERNAL MEDICINE

## 2020-08-08 PROCEDURE — 700112 HCHG RX REV CODE 229: Performed by: ORTHOPAEDIC SURGERY

## 2020-08-08 PROCEDURE — 80048 BASIC METABOLIC PNL TOTAL CA: CPT

## 2020-08-08 PROCEDURE — 770006 HCHG ROOM/CARE - MED/SURG/GYN SEMI*

## 2020-08-08 PROCEDURE — A9270 NON-COVERED ITEM OR SERVICE: HCPCS | Performed by: INTERNAL MEDICINE

## 2020-08-08 PROCEDURE — 700111 HCHG RX REV CODE 636 W/ 250 OVERRIDE (IP): Performed by: INTERNAL MEDICINE

## 2020-08-08 PROCEDURE — 36415 COLL VENOUS BLD VENIPUNCTURE: CPT

## 2020-08-08 PROCEDURE — 700102 HCHG RX REV CODE 250 W/ 637 OVERRIDE(OP): Performed by: HOSPITALIST

## 2020-08-08 PROCEDURE — A9270 NON-COVERED ITEM OR SERVICE: HCPCS | Performed by: HOSPITALIST

## 2020-08-08 RX ORDER — POTASSIUM CHLORIDE 7.45 MG/ML
10 INJECTION INTRAVENOUS
Status: DISCONTINUED | OUTPATIENT
Start: 2020-08-08 | End: 2020-08-08

## 2020-08-08 RX ORDER — POTASSIUM CHLORIDE 7.45 MG/ML
10 INJECTION INTRAVENOUS
Status: DISPENSED | OUTPATIENT
Start: 2020-08-08 | End: 2020-08-08

## 2020-08-08 RX ORDER — POTASSIUM CHLORIDE 20 MEQ/1
40 TABLET, EXTENDED RELEASE ORAL EVERY 4 HOURS
Status: COMPLETED | OUTPATIENT
Start: 2020-08-08 | End: 2020-08-09

## 2020-08-08 RX ORDER — MAGNESIUM SULFATE HEPTAHYDRATE 40 MG/ML
2 INJECTION, SOLUTION INTRAVENOUS ONCE
Status: COMPLETED | OUTPATIENT
Start: 2020-08-08 | End: 2020-08-08

## 2020-08-08 RX ORDER — DIPHENHYDRAMINE HCL 25 MG
25 TABLET ORAL EVERY 6 HOURS PRN
Status: DISCONTINUED | OUTPATIENT
Start: 2020-08-08 | End: 2020-08-09 | Stop reason: HOSPADM

## 2020-08-08 RX ORDER — POTASSIUM CHLORIDE 20 MEQ/1
40 TABLET, EXTENDED RELEASE ORAL EVERY 4 HOURS
Status: COMPLETED | OUTPATIENT
Start: 2020-08-08 | End: 2020-08-08

## 2020-08-08 RX ORDER — POTASSIUM CHLORIDE 20 MEQ/1
50 TABLET, EXTENDED RELEASE ORAL 2 TIMES DAILY
Status: DISCONTINUED | OUTPATIENT
Start: 2020-08-09 | End: 2020-08-09

## 2020-08-08 RX ORDER — POTASSIUM CHLORIDE 7.45 MG/ML
10 INJECTION INTRAVENOUS
Status: COMPLETED | OUTPATIENT
Start: 2020-08-08 | End: 2020-08-08

## 2020-08-08 RX ADMIN — POTASSIUM CHLORIDE 10 MEQ: 7.46 INJECTION, SOLUTION INTRAVENOUS at 08:53

## 2020-08-08 RX ADMIN — POTASSIUM CHLORIDE 40 MEQ: 1500 TABLET, EXTENDED RELEASE ORAL at 11:20

## 2020-08-08 RX ADMIN — ACETAMINOPHEN 1000 MG: 500 TABLET ORAL at 05:33

## 2020-08-08 RX ADMIN — OXYCODONE HYDROCHLORIDE 10 MG: 10 TABLET ORAL at 08:58

## 2020-08-08 RX ADMIN — ACETAMINOPHEN 1000 MG: 500 TABLET ORAL at 11:20

## 2020-08-08 RX ADMIN — OXYCODONE HYDROCHLORIDE 10 MG: 10 TABLET ORAL at 05:35

## 2020-08-08 RX ADMIN — DILTIAZEM HYDROCHLORIDE 120 MG: 120 CAPSULE, COATED, EXTENDED RELEASE ORAL at 05:35

## 2020-08-08 RX ADMIN — TORSEMIDE 20 MG: 20 TABLET ORAL at 18:28

## 2020-08-08 RX ADMIN — ROSUVASTATIN CALCIUM 10 MG: 20 TABLET, FILM COATED ORAL at 18:28

## 2020-08-08 RX ADMIN — POTASSIUM CHLORIDE 40 MEQ: 1500 TABLET, EXTENDED RELEASE ORAL at 15:00

## 2020-08-08 RX ADMIN — OXYCODONE HYDROCHLORIDE 10 MG: 10 TABLET ORAL at 18:01

## 2020-08-08 RX ADMIN — AMITRIPTYLINE HYDROCHLORIDE 20 MG: 10 TABLET, FILM COATED ORAL at 19:30

## 2020-08-08 RX ADMIN — POTASSIUM CHLORIDE 10 MEQ: 7.46 INJECTION, SOLUTION INTRAVENOUS at 10:22

## 2020-08-08 RX ADMIN — METOLAZONE 5 MG: 5 TABLET ORAL at 05:31

## 2020-08-08 RX ADMIN — DIGOXIN 125 MCG: 125 TABLET ORAL at 18:28

## 2020-08-08 RX ADMIN — APIXABAN 5 MG: 5 TABLET, FILM COATED ORAL at 05:35

## 2020-08-08 RX ADMIN — POTASSIUM CHLORIDE 10 MEQ: 10 INJECTION, SOLUTION INTRAVENOUS at 17:41

## 2020-08-08 RX ADMIN — MAGNESIUM SULFATE 2 G: 2 INJECTION INTRAVENOUS at 19:33

## 2020-08-08 RX ADMIN — POTASSIUM CHLORIDE 40 MEQ: 1500 TABLET, EXTENDED RELEASE ORAL at 19:32

## 2020-08-08 RX ADMIN — SOTALOL HYDROCHLORIDE 80 MG: 80 TABLET ORAL at 18:28

## 2020-08-08 RX ADMIN — APIXABAN 5 MG: 5 TABLET, FILM COATED ORAL at 18:28

## 2020-08-08 RX ADMIN — TORSEMIDE 20 MG: 20 TABLET ORAL at 05:30

## 2020-08-08 RX ADMIN — DOCUSATE SODIUM 50 MG AND SENNOSIDES 8.6 MG 2 TABLET: 8.6; 5 TABLET, FILM COATED ORAL at 18:28

## 2020-08-08 RX ADMIN — OXYCODONE HYDROCHLORIDE 10 MG: 10 TABLET ORAL at 15:00

## 2020-08-08 RX ADMIN — POTASSIUM CHLORIDE 10 MEQ: 10 INJECTION, SOLUTION INTRAVENOUS at 16:00

## 2020-08-08 RX ADMIN — DOCUSATE SODIUM 100 MG: 100 CAPSULE, LIQUID FILLED ORAL at 05:34

## 2020-08-08 RX ADMIN — Medication 1000 UNITS: at 05:32

## 2020-08-08 RX ADMIN — LEVOTHYROXINE SODIUM 75 MCG: 0.07 TABLET ORAL at 05:33

## 2020-08-08 RX ADMIN — PAROXETINE HYDROCHLORIDE 20 MG: 20 TABLET, FILM COATED ORAL at 05:34

## 2020-08-08 RX ADMIN — OXYCODONE HYDROCHLORIDE 10 MG: 10 TABLET ORAL at 11:58

## 2020-08-08 RX ADMIN — POTASSIUM CHLORIDE 10 MEQ: 7.46 INJECTION, SOLUTION INTRAVENOUS at 13:52

## 2020-08-08 RX ADMIN — DOCUSATE SODIUM 50 MG AND SENNOSIDES 8.6 MG 2 TABLET: 8.6; 5 TABLET, FILM COATED ORAL at 05:31

## 2020-08-08 RX ADMIN — ACETAMINOPHEN 1000 MG: 500 TABLET ORAL at 18:28

## 2020-08-08 RX ADMIN — POTASSIUM CHLORIDE 10 MEQ: 7.46 INJECTION, SOLUTION INTRAVENOUS at 11:59

## 2020-08-08 RX ADMIN — MULTIPLE VITAMINS W/ MINERALS TAB 1 TABLET: TAB at 05:32

## 2020-08-08 RX ADMIN — ONDANSETRON 4 MG: 2 INJECTION INTRAMUSCULAR; INTRAVENOUS at 10:22

## 2020-08-08 RX ADMIN — DOCUSATE SODIUM 100 MG: 100 CAPSULE, LIQUID FILLED ORAL at 18:28

## 2020-08-08 RX ADMIN — OXYCODONE HYDROCHLORIDE 10 MG: 10 TABLET ORAL at 00:48

## 2020-08-08 RX ADMIN — ACETAMINOPHEN 1000 MG: 500 TABLET ORAL at 00:48

## 2020-08-08 RX ADMIN — OXYCODONE HYDROCHLORIDE 10 MG: 10 TABLET ORAL at 21:23

## 2020-08-08 RX ADMIN — SOTALOL HYDROCHLORIDE 80 MG: 80 TABLET ORAL at 05:30

## 2020-08-08 ASSESSMENT — ENCOUNTER SYMPTOMS
SHORTNESS OF BREATH: 1
VOMITING: 0
DOUBLE VISION: 0
NECK PAIN: 0
HEMOPTYSIS: 0
HEADACHES: 0
FEVER: 0
COUGH: 0
CHILLS: 0
PALPITATIONS: 0
MYALGIAS: 0
DIARRHEA: 0
BLURRED VISION: 0
BLOOD IN STOOL: 0
DIZZINESS: 0
NAUSEA: 0
ABDOMINAL PAIN: 0
CONSTIPATION: 0
PND: 0
FLANK PAIN: 0

## 2020-08-08 ASSESSMENT — CHA2DS2 SCORE
PRIOR STROKE OR TIA OR THROMBOEMBOLISM: NO
VASCULAR DISEASE: NO
SEX: FEMALE
HYPERTENSION: YES
AGE 65 TO 74: YES
DIABETES: NO
AGE 75 OR GREATER: NO
CHA2DS2 VASC SCORE: 3
CHF OR LEFT VENTRICULAR DYSFUNCTION: NO

## 2020-08-08 ASSESSMENT — FIBROSIS 4 INDEX: FIB4 SCORE: 1.08

## 2020-08-08 NOTE — PROGRESS NOTES
Hospital Medicine Daily Progress Note    Date of Service  8/8/2020    Chief Complaint  75 y.o. female admitted 8/4/2020 with GLF     Hospital Course    Patient with multiple comorbid conditions admitted with a ground-level fall complicated by left ankle fracture      Interval Problem Update  Patient seen and evaluated on rounds  Discussed with nursing staff on rounds  Persistent hypokalemia, feels fatigued, muscle cramps secondary to this  Aggressive replacement ordered  We will plan discharge tomorrow with home health care      Consultants/Specialty  Orthopedics  Cardiology    Code Status  DNAR/DNI    Disposition  PT/OT evaluations postoperatively to determine disposition needs  Home health care orders placed, discussed with case management/social workers  Patient refusing postacute placement of any sort even if indicated    Review of Systems  Review of Systems   Constitutional: Positive for malaise/fatigue. Negative for chills and fever.   HENT: Negative for hearing loss.    Eyes: Negative for blurred vision and double vision.   Respiratory: Positive for shortness of breath (chronic). Negative for cough and hemoptysis.    Cardiovascular: Negative for chest pain, palpitations and PND.   Gastrointestinal: Negative for abdominal pain, blood in stool, constipation, diarrhea, melena, nausea and vomiting.   Genitourinary: Negative for dysuria, flank pain, frequency, hematuria and urgency.   Musculoskeletal: Positive for joint pain. Negative for myalgias and neck pain.   Skin: Negative for rash.   Neurological: Negative for dizziness and headaches.        Physical Exam  Temp:  [35.8 °C (96.5 °F)-36.7 °C (98 °F)] 36.3 °C (97.3 °F)  Pulse:  [66-75] 67  Resp:  [16-18] 16  BP: (110-116)/(57-75) 113/61  SpO2:  [95 %-98 %] 96 %    Physical Exam  HENT:      Head: Normocephalic and atraumatic.      Right Ear: External ear normal.      Left Ear: External ear normal.      Nose: Nose normal.      Mouth/Throat:      Mouth: Mucous  membranes are moist.   Eyes:      Extraocular Movements: Extraocular movements intact.      Conjunctiva/sclera: Conjunctivae normal.      Pupils: Pupils are equal, round, and reactive to light.   Neck:      Musculoskeletal: Normal range of motion.   Cardiovascular:      Rate and Rhythm: Normal rate and regular rhythm.      Pulses: Normal pulses.      Heart sounds: Murmur present. No friction rub. No gallop.    Pulmonary:      Effort: Pulmonary effort is normal. No respiratory distress.      Breath sounds: No stridor. Rales present. No wheezing or rhonchi.   Chest:      Chest wall: No tenderness.   Abdominal:      General: Abdomen is flat. Bowel sounds are normal. There is no distension.      Palpations: Abdomen is soft. There is no mass.      Tenderness: There is no abdominal tenderness. There is no right CVA tenderness, left CVA tenderness, guarding or rebound.      Hernia: No hernia is present.   Musculoskeletal: Normal range of motion.      Right lower leg: No edema.      Left lower leg: Edema present.   Skin:     General: Skin is warm and dry.      Capillary Refill: Capillary refill takes less than 2 seconds.   Neurological:      General: No focal deficit present.      Mental Status: She is alert and oriented to person, place, and time. Mental status is at baseline.      Cranial Nerves: No cranial nerve deficit.      Sensory: No sensory deficit.      Motor: No weakness.      Coordination: Coordination normal.      Gait: Gait normal.      Deep Tendon Reflexes: Reflexes normal.   Psychiatric:         Mood and Affect: Mood normal.         Behavior: Behavior normal.         Thought Content: Thought content normal.         Judgment: Judgment normal.         Fluids    Intake/Output Summary (Last 24 hours) at 8/8/2020 1334  Last data filed at 8/8/2020 0700  Gross per 24 hour   Intake 360 ml   Output 0 ml   Net 360 ml       Laboratory  Recent Labs     08/06/20  0723 08/07/20  0756 08/08/20  0702   WBC 7.9 6.5 5.6   RBC  3.67* 3.76* 3.68*   HEMOGLOBIN 12.1 12.4 12.1   HEMATOCRIT 36.9* 38.0 36.2*   .5* 101.1* 98.4*   MCH 33.0 33.0 32.9   MCHC 32.8* 32.6* 33.4*   RDW 46.6 46.0 45.2   PLATELETCT 219 222 222   MPV 8.5* 8.6* 8.5*     Recent Labs     08/06/20  0723 08/07/20  0756 08/08/20  0706   SODIUM 139 140 140   POTASSIUM 3.0* 2.8* 2.9*   CHLORIDE 94* 91* 90*   CO2 32 34* 37*   GLUCOSE 129* 145* 149*   BUN 31* 35* 38*   CREATININE 1.15 1.13 1.31   CALCIUM 10.4 10.9* 11.0*     Recent Labs     08/06/20  0726   INR 1.08               Imaging  DX-PORTABLE FLUORO > 1 HOUR   Final Result      Portable fluoroscopy utilized for 1 second.         INTERPRETING LOCATION: [address] [place] [address], [facility]      DX-ANKLE 2- VIEWS LEFT   Final Result         1.  Intraoperative changes of distal fibular ORIF in progress      EC-ECHOCARDIOGRAM COMPLETE W/ CONT   Final Result      EC-ECHOCARDIOGRAM COMPLETE W/O CONT   Final Result      DX-TIBIA AND FIBULA LEFT   Final Result      No acute proximal tibial or fibular fractures. Distal tibial fracture is discussed separately.      DX-FOOT-COMPLETE 3+ LEFT   Final Result      No acute foot fracture. Ankle fracture is discussed separately.      DX-ANKLE 3+ VIEWS LEFT   Final Result      1.  Acute lateral malleolar fracture, with lateral subluxation of the ankle joint.   2.  Associated small posterior malleolar fracture.           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- (present on admission)  Assessment & Plan  Secondary to ground-level fall  Status post surgical fixation with orthopedics team  TTWB per orthopedics  Echocardiogram was ordered by cardiology preoperatively  Continue pain control  Patient refusing postacute placement  PT/OT evaluations  Home health care arrangement, discussed with case management/social workers    Pulmonary hypertension (HCC)- (present on admission)  Assessment & Plan  This is chronic and she is on tadalafil and she was taken off of  ambrisentan 2 weeks ago secondary to hypotension  Follow-up with cardiology and  [facility] following discharge  Continue chronic oxygen  Resume home medications once cleared by cardiology, family brings in her home medications    Essential hypertension- (present on admission)  Assessment & Plan  Continue torsemide, sotalol, metolazone, diltiazem  PRN labetalol IV    Diuresis complicated by hypokalemia, history of hypokalemia, replacing aggressively, monitoring BMP every 8, replace magnesium    Chronic anticoagulation- (present on admission)  Assessment & Plan  For pulmonary hypertension and A. fib on Eliquis  On hold for now  Okay to resume Eliquis per orthopedics    Chronic respiratory failure (HCC)- (present on admission)  Assessment & Plan  Chronically on 5 L oxygen and she is at baseline    Paroxysmal atrial fibrillation (HCC)- (present on admission)  Assessment & Plan  Currently in normal sinus rhythm  Continue sotalol and digoxin  Discussed with orthopedics CHANTELLE Zee, okay to resume Eliquis    Hypothyroidism- (present on admission)  Assessment & Plan  Continue Synthroid 75 mcg     Potassium replaced  Recheck labs tomorrow  If stable, anticipate discharge tomorrow  VTE prophylaxis: SCDs

## 2020-08-08 NOTE — CARE PLAN
Problem: Infection  Goal: Will remain free from infection  Outcome: PROGRESSING AS EXPECTED  Afebrile, dressing dry and intact.      Problem: Pain Management  Goal: Pain level will decrease to patient's comfort goal  Outcome: PROGRESSING AS EXPECTED   Pain controlled with prn medication.

## 2020-08-08 NOTE — PROGRESS NOTES
Patient not able to tolerate potassium at 100ml/hr rate decreased to 75ml/hr for 20 minute trail to see if tolerable.  MD saw patient at bedside patient reported the same.  Ice pack provided, medicated for pain.

## 2020-08-08 NOTE — PROGRESS NOTES
Patient declining second PIV will allow magnesium after potassium runs.  Also first potassium level should be drawn after kcl runs which are running over about 1hr 45 mins due to intolerance (running at 60ml/hr).  Will d/w phlebotomy when they round.  Discussed they will draw from other arm.

## 2020-08-08 NOTE — PROGRESS NOTES
Unable to tolerate 75ml/hr second bag running at 60 ml/hr + ice pack, tolerating at this time.  Call placed to Dr. Lee to notify of.  Patient also with nausea dry heaving no emesis given zofran with some relief.

## 2020-08-08 NOTE — THERAPY
Occupational Therapy  Daily Treatment     Patient Name: Zeinab Loza  Age:  75 y.o., Sex:  female  Medical Record #: [id number]  Today's Date: 8/7/2020     Precautions  Precautions: Toe Touch Weight Bearing Left Lower Extremity    Assessment    Pt seen for OT tx today for family training with dtr. Pt and dtr able to demonstrate back modifications for ADLs and transfers adequately. They're aware of they need to work with HH OT to progress pt with shower t/f and showering task and in agreement. Dtr has obtained BSC from [facility] but will look into obtaining drop arm command and FWW from [facility]. Will need w/c prior to d/c home.     Plan    Continue current treatment plan.    DC Equipment Recommendations: Wheelchair(dtr obtained BSC from [facility])  Discharge Recommendations: Recommend home health for continued occupational therapy services     Objective       08/07/20 1501   Balance   Sitting Balance (Static) Fair +   Sitting Balance (Dynamic) Fair   Standing Balance (Static) Fair   Standing Balance (Dynamic) Fair -   Weight Shift Sitting Fair   Weight Shift Standing Absent  (TTWB)   Skilled Intervention Verbal Cuing   Bed Mobility    Supine to Sit Supervised   Sit to Supine Supervised   Scooting Supervised   Rolling   (pivoting)   Skilled Intervention   (raised hob, has adjustable bed at home)   Activities of Daily Living   Eating Independent   Grooming Supervision;Seated   Bathing   (discussed wrapping of LLE, and positioning. )   Upper Body Dressing Supervision   Lower Body Dressing Minimal Assist  (dtr assisting as needed)   Toileting Supervision   Skilled Intervention Verbal Cuing;Compensatory Strategies   Comments discussed and trained in modifications for ADLs. Dtr very receptive of education and training, assisting as needed   Functional Mobility   Sit to Stand Minimal Assist   Bed, Chair, Wheelchair Transfer Minimal Assist   Toilet Transfers Supervised  (w/o AD, low squat pivot)   Transfer Method  Squat Pivot   Mobility multiple functional t/f's using AD and w/o. Does better with squat pivot transfers   Short Term Goals   Short Term Goal # 1 Pt will perform LB dressing with supervision using AE as needed   Goal Outcome # 1 Progressing as expected   Short Term Goal # 2 Pt will perform toileting task with supervision   Goal Outcome # 2 Goal met   Short Term Goal # 3 Pt will perform functional t/f's with supervision    Goal Outcome # 3 Goal met   Anticipated Discharge Equipment and Recommendations   DC Equipment Recommendations Wheelchair  (dtr [facility])

## 2020-08-08 NOTE — CARE PLAN
Problem: Communication  Goal: The ability to communicate needs accurately and effectively will improve  Outcome: PROGRESSING AS EXPECTED   Patient communicates effectively with staff.     Problem: Safety  Goal: Will remain free from falls  Outcome: PROGRESSING AS EXPECTED   Patient calls appropriately for assistance. Call light and personal belongings within reach. Non-slip footwear in use.

## 2020-08-09 VITALS
HEIGHT: 67 IN | OXYGEN SATURATION: 99 % | WEIGHT: 218.7 LBS | BODY MASS INDEX: 34.33 KG/M2 | HEART RATE: 69 BPM | RESPIRATION RATE: 16 BRPM | SYSTOLIC BLOOD PRESSURE: 126 MMHG | DIASTOLIC BLOOD PRESSURE: 65 MMHG | TEMPERATURE: 97.8 F

## 2020-08-09 LAB
ANION GAP SERPL CALC-SCNC: 12 MMOL/L (ref 7–16)
ANION GAP SERPL CALC-SCNC: 14 MMOL/L (ref 7–16)
BUN SERPL-MCNC: 44 MG/DL (ref 8–22)
BUN SERPL-MCNC: 45 MG/DL (ref 8–22)
CALCIUM SERPL-MCNC: 11.1 MG/DL (ref 8.5–10.5)
CALCIUM SERPL-MCNC: 11.6 MG/DL (ref 8.5–10.5)
CHLORIDE SERPL-SCNC: 88 MMOL/L (ref 96–112)
CHLORIDE SERPL-SCNC: 89 MMOL/L (ref 96–112)
CO2 SERPL-SCNC: 34 MMOL/L (ref 20–33)
CO2 SERPL-SCNC: 39 MMOL/L (ref 20–33)
CREAT SERPL-MCNC: 1.17 MG/DL (ref 0.5–1.4)
CREAT SERPL-MCNC: 1.2 MG/DL (ref 0.5–1.4)
GLUCOSE SERPL-MCNC: 139 MG/DL (ref 65–99)
GLUCOSE SERPL-MCNC: 150 MG/DL (ref 65–99)
POTASSIUM SERPL-SCNC: 3.3 MMOL/L (ref 3.6–5.5)
POTASSIUM SERPL-SCNC: 3.3 MMOL/L (ref 3.6–5.5)
SODIUM SERPL-SCNC: 137 MMOL/L (ref 135–145)
SODIUM SERPL-SCNC: 139 MMOL/L (ref 135–145)

## 2020-08-09 PROCEDURE — 700112 HCHG RX REV CODE 229: Performed by: ORTHOPAEDIC SURGERY

## 2020-08-09 PROCEDURE — A9270 NON-COVERED ITEM OR SERVICE: HCPCS | Performed by: ORTHOPAEDIC SURGERY

## 2020-08-09 PROCEDURE — 700102 HCHG RX REV CODE 250 W/ 637 OVERRIDE(OP): Performed by: NURSE PRACTITIONER

## 2020-08-09 PROCEDURE — 700102 HCHG RX REV CODE 250 W/ 637 OVERRIDE(OP): Performed by: INTERNAL MEDICINE

## 2020-08-09 PROCEDURE — 700102 HCHG RX REV CODE 250 W/ 637 OVERRIDE(OP): Performed by: ORTHOPAEDIC SURGERY

## 2020-08-09 PROCEDURE — A9270 NON-COVERED ITEM OR SERVICE: HCPCS | Performed by: HOSPITALIST

## 2020-08-09 PROCEDURE — 80048 BASIC METABOLIC PNL TOTAL CA: CPT

## 2020-08-09 PROCEDURE — 99239 HOSP IP/OBS DSCHRG MGMT >30: CPT | Performed by: INTERNAL MEDICINE

## 2020-08-09 PROCEDURE — 700102 HCHG RX REV CODE 250 W/ 637 OVERRIDE(OP): Performed by: HOSPITALIST

## 2020-08-09 PROCEDURE — A9270 NON-COVERED ITEM OR SERVICE: HCPCS | Performed by: NURSE PRACTITIONER

## 2020-08-09 PROCEDURE — A9270 NON-COVERED ITEM OR SERVICE: HCPCS | Performed by: INTERNAL MEDICINE

## 2020-08-09 PROCEDURE — 36415 COLL VENOUS BLD VENIPUNCTURE: CPT

## 2020-08-09 RX ORDER — POTASSIUM CHLORIDE 20 MEQ/1
40 TABLET, EXTENDED RELEASE ORAL 3 TIMES DAILY
Qty: 120 TAB | Refills: 0 | Status: SHIPPED | OUTPATIENT
Start: 2020-08-09 | End: 2020-09-29

## 2020-08-09 RX ORDER — POTASSIUM CHLORIDE 20 MEQ/1
50 TABLET, EXTENDED RELEASE ORAL 2 TIMES DAILY
Status: DISCONTINUED | OUTPATIENT
Start: 2020-08-09 | End: 2020-08-09 | Stop reason: HOSPADM

## 2020-08-09 RX ORDER — HYDROCODONE BITARTRATE AND ACETAMINOPHEN 7.5; 325 MG/1; MG/1
1 TABLET ORAL EVERY 4 HOURS PRN
Qty: 20 TAB | Refills: 0 | Status: SHIPPED | OUTPATIENT
Start: 2020-08-09 | End: 2020-08-12

## 2020-08-09 RX ADMIN — OXYCODONE HYDROCHLORIDE 10 MG: 10 TABLET ORAL at 12:32

## 2020-08-09 RX ADMIN — POTASSIUM CHLORIDE 40 MEQ: 1500 TABLET, EXTENDED RELEASE ORAL at 00:22

## 2020-08-09 RX ADMIN — OXYCODONE HYDROCHLORIDE 10 MG: 10 TABLET ORAL at 09:32

## 2020-08-09 RX ADMIN — DILTIAZEM HYDROCHLORIDE 120 MG: 120 CAPSULE, COATED, EXTENDED RELEASE ORAL at 04:03

## 2020-08-09 RX ADMIN — LEVOTHYROXINE SODIUM 75 MCG: 0.07 TABLET ORAL at 04:02

## 2020-08-09 RX ADMIN — ACETAMINOPHEN 1000 MG: 500 TABLET ORAL at 00:22

## 2020-08-09 RX ADMIN — DOCUSATE SODIUM 50 MG AND SENNOSIDES 8.6 MG 2 TABLET: 8.6; 5 TABLET, FILM COATED ORAL at 04:01

## 2020-08-09 RX ADMIN — OXYCODONE HYDROCHLORIDE 10 MG: 10 TABLET ORAL at 04:04

## 2020-08-09 RX ADMIN — OXYCODONE HYDROCHLORIDE 10 MG: 10 TABLET ORAL at 00:23

## 2020-08-09 RX ADMIN — PAROXETINE HYDROCHLORIDE 20 MG: 20 TABLET, FILM COATED ORAL at 04:04

## 2020-08-09 RX ADMIN — POTASSIUM CHLORIDE 50 MEQ: 1500 TABLET, EXTENDED RELEASE ORAL at 09:30

## 2020-08-09 RX ADMIN — DOCUSATE SODIUM 100 MG: 100 CAPSULE, LIQUID FILLED ORAL at 04:02

## 2020-08-09 RX ADMIN — TORSEMIDE 20 MG: 20 TABLET ORAL at 04:04

## 2020-08-09 RX ADMIN — Medication 1000 UNITS: at 04:02

## 2020-08-09 RX ADMIN — APIXABAN 5 MG: 5 TABLET, FILM COATED ORAL at 04:03

## 2020-08-09 RX ADMIN — DIPHENHYDRAMINE HYDROCHLORIDE 25 MG: 25 TABLET ORAL at 00:22

## 2020-08-09 RX ADMIN — ACETAMINOPHEN 1000 MG: 500 TABLET ORAL at 12:27

## 2020-08-09 RX ADMIN — MULTIPLE VITAMINS W/ MINERALS TAB 1 TABLET: TAB at 04:04

## 2020-08-09 RX ADMIN — METOLAZONE 5 MG: 5 TABLET ORAL at 04:03

## 2020-08-09 RX ADMIN — SOTALOL HYDROCHLORIDE 80 MG: 80 TABLET ORAL at 04:01

## 2020-08-09 NOTE — DISCHARGE SUMMARY
Discharge Summary    CHIEF COMPLAINT ON ADMISSION  Chief Complaint   Patient presents with   • T-5000 GLF     Pt's legs gave out while walking and pt felt a pop in her left ankle when she fell to the ground. Pt denies hitting her head, currently on eliquis. No obvious deformity or discoloration to left ankle. Pt reporting significant left ankle pain.        Reason for Admission  EMS     Admission Date  8/4/2020    CODE STATUS  DNAR/DNI    HPI & HOSPITAL COURSE  This is a 75 y.o. female here with GLF.     Patient with underlying history of severe pulmonary hypertension [facility] Dr Eng, atrial fibrillation anticoagulated with Eliquis, chronic hypoxemic respiratory failure, dyslipidemia, hypothyroidism, chronic pain on narcotic therapy.  Patient presented to the hospital in August 4, 2020 after having a ground-level fall complicated by left ankle pain, imaging evaluation on presentation in the emergency department revealed acute lateral malleolar fracture with lateral subluxation of the ankle joint.  Associated small posterior malleolar fracture also seen.  No acute proximal tibial or fibular fractures were seen, distal tibial fracture is seen.  Patient was admitted to the hospital, clearance from anesthesiology and cardiology obtained.  Echocardiogram was repeated preoperatively per cardiology recommendations.  Patient underwent surgical intervention on August 5, 2020 with Dr. Humphreys from orthopedics team where patient underwent open reduction internal fixation of the left lateral malleolus fracture.  Postoperatively patient remained in the hospital for ongoing pain control, physical therapy and Occupational Therapy.  Of note patient with profound hypokalemia associated with diuretic usage, potassium supplementation was maintained and adjusted, at discharge she is advised for 40meq 3 times daily of potassium supplementation and close follow-up with her PCP, cardiology and   [facility] team for further titration/adjustment of her diuretic therapy.  Anticoagulation was reinitiated after clearance from orthopedics team.  Patient refused postacute placement, home health care has been arranged.  Patient has been cleared for discharge from physical therapy and occupational therapy perspective for discharge home with home health care services, appropriate DME have been ordered.  /case management informed.  Slight hypercalcemia, could be related to the use of diuretics, recommend further close follow-up with PCP and ongoing monitoring of chemistry panel in the outpatient setting per PCP and home health care.  No further acute hospitalization needs, patient is being discharged home in stable condition with recommendations to maintain close outpatient follow-up with PCP, orthopedics, [facility] and [facility].  Schedulers informed to arrange outpatient follow-up.  Opiates prescribed, safe use of opiates discussed with the patient, advised against use of opiates with alcohol/other sedatives.  Advised against the use of opioids if driving/operating heavy machinery.  Patient verbalized understanding.     Therefore, she is discharged in good and stable condition to home with organized home healthcare and close outpatient follow-up.    The patient met 2-midnight criteria for an inpatient stay at the time of discharge.    Discharge Date  08/09/20    FOLLOW UP ITEMS POST DISCHARGE  Follow-up with primary care physician.  Have home health care team check your complete metabolic panel/chemistry panel 3-4 times a week, discussed results with your [facility] team and [facility] team and your primary care physician.  Continue physical therapy and occupational therapy with your home health team.  Discuss potassium supplementation, adjustment of your water pills with your cardiology team.  Use opioids responsibly, further opioid refills per your pain management doctor.  Only Toe touch  weightbearing to the left ankle, left lower extremity for 6 weeks.    DISCHARGE DIAGNOSES  Principal Problem:    Closed fracture of malleolus of left ankle with nonunion POA: Yes  Active Problems:    Pulmonary hypertension (HCC) POA: Yes    Essential hypertension POA: Yes      Overview: Overview:       IMO Load 2014 1.1    Hypothyroidism POA: Yes      Overview: Overview:       IMO Load 2014 1.1    Paroxysmal atrial fibrillation (HCC) POA: Yes    Chronic respiratory failure (HCC) POA: Yes    Chronic anticoagulation POA: Yes  Resolved Problems:    * No resolved hospital problems. *      FOLLOW UP  Future Appointments   Date Time Provider Department Center   8/21/2020  1:30 PM Ramiro Eng M.D. [facility] None   10/1/2020  7:40 AM Devon Robles M.D. [facility] None     No follow-up provider specified.    MEDICATIONS ON DISCHARGE     Medication List      CHANGE how you take these medications      Instructions   HYDROcodone-acetaminophen 7.5-325 MG per tablet  What changed:   · when to take this  · reasons to take this  Commonly known as: NORCO   Take 1 Tab by mouth every four hours as needed for Moderate Pain or Severe Pain for up to 3 days.  Dose: 1 Tab     potassium chloride SA 20 MEQ Tbcr  What changed: when to take this  Commonly known as: Kdur   Take 2 Tabs by mouth 3 times a day.  Dose: 40 mEq        CONTINUE taking these medications      Instructions   acetaminophen 500 MG Tabs  Commonly known as: TYLENOL   Take 1,000 mg by mouth every 6 hours as needed for Mild Pain.  Dose: 1,000 mg     Adcirca 20 MG Tabs  Generic drug: Tadalafil (PAH)   Take 40 mg by mouth every bedtime. Indications: Pulmonary Arterial Hypertension  Dose: 40 mg     ambrisentan 10 MG tablet  Commonly known as: LETAIRIS   Take 10 mg by mouth every day.  Dose: 10 mg     amitriptyline 10 MG Tabs  Commonly known as: ELAVIL   Take 20 mg by mouth every bedtime.  Dose: 20 mg     apixaban 5mg Tabs  Commonly known as: ELIQUIS   Take 5 mg by mouth 2 Times a  Day.  Dose: 5 mg     digoxin 125 MCG Tabs  Commonly known as: LANOXIN   Take 1 Tab by mouth every evening.  Dose: 125 mcg     DILTIAZem  MG Cp24  Commonly known as: CARDIZEM CD   TAKE 1 CAPSULE EVERY DAY     levothyroxine 75 MCG Tabs  Commonly known as: SYNTHROID   Take 75 mcg by mouth Every morning on an empty stomach.  Dose: 75 mcg     methocarbamol 500 MG Tabs  Commonly known as: ROBAXIN   500 mg every day.  Dose: 500 mg     metOLazone 5 MG Tabs  Commonly known as: ZAROXOLYN   Take 5 mg by mouth every day.  Dose: 5 mg     PARoxetine 20 MG Tabs  Commonly known as: PAXIL   Take 20 mg by mouth every day.  Dose: 20 mg     rosuvastatin 10 MG Tabs  Commonly known as: CRESTOR   Take 10 mg by mouth every evening.  Dose: 10 mg     sotalol 80 MG Tabs  Commonly known as: BETAPACE   Take 1 Tab by mouth 2 times a day.  Dose: 80 mg     therapeutic multivitamin-minerals Tabs   Take 1 Tab by mouth every day.  Dose: 1 Tab     torsemide 20 MG Tabs  Commonly known as: DEMADEX   Doctor's comments: **Patient requests 90 days supply**  Take 1 Tab by mouth 2 times a day.  Dose: 20 mg     Vitamin D 50 MCG (2000 UT) Caps   Take 2,000 Units by mouth every day.  Dose: 2,000 Units        STOP taking these medications    sulfamethoxazole-trimethoprim 800-160 MG tablet  Commonly known as: BACTRIM DS            Allergies  Allergies   Allergen Reactions   • Zolpidem Tartrate Unspecified     Lightheaded and confusion       DIET  Orders Placed This Encounter   Procedures   • Diet Order Regular     Standing Status:   Standing     Number of Occurrences:   1     Order Specific Question:   Diet:     Answer:   Regular [1]       ACTIVITY  As tolerated.  Toe touch LEFT leg    CONSULTATIONS  Orthopedics  Cardiology    PROCEDURES  None    LABORATORY  Lab Results   Component Value Date    SODIUM 139 08/09/2020    POTASSIUM 3.3 (L) 08/09/2020    CHLORIDE 88 (L) 08/09/2020    CO2 39 (H) 08/09/2020    GLUCOSE 150 (H) 08/09/2020    BUN 45 (H) 08/09/2020     CREATININE 1.20 08/09/2020        Lab Results   Component Value Date    WBC 5.6 08/08/2020    HEMOGLOBIN 12.1 08/08/2020    HEMATOCRIT 36.2 (L) 08/08/2020    PLATELETCT 222 08/08/2020        Total time of the discharge process exceeds 38 minutes.

## 2020-08-09 NOTE — DISCHARGE INSTRUCTIONS
Discharge Instructions    Discharged to home by car with relative. Discharged via wheelchair, hospital escort: Yes.  Special equipment needed: Wheelchair    Be sure to schedule a follow-up appointment with your primary care doctor or any specialists as instructed.     Discharge Plan:   Follow-up with primary care physician.  Have home health care team check your complete metabolic panel/chemistry panel 3-4 times a week, discussed results with your [facility] team and [facility] team and your primary care physician.  Continue physical therapy and occupational therapy with your home health team.  Discuss potassium supplementation, adjustment of your water pills with your cardiology team.  Use opioids responsibly, further opioid refills per your pain management doctor.  Only no weightbearing to the left ankle, left lower extremity until advanced by orthopedic MD.    Keep your splint dry at all times.      Diet Plan: Regular  Activity Level: Up with assistance and walker for safety.  Wheel chair to any place at a distance.  Confirmed Follow up Appointment: Patient to Call and Schedule Appointment  Confirmed Symptoms Management: Discussed  Medication Reconciliation Updated: Yes    I understand that a diet low in cholesterol, fat, and sodium is recommended for good health. Unless I have been given specific instructions below for another diet, I accept this instruction as my diet prescription.   Other diet: Regular    Special Instructions: Discharge instructions for the Orthopedic Patient    Follow up with Primary Care Physician within 2 weeks of discharge to home, regarding:  Review of medications and diagnostic testing.  Surveillance for medical complications.  Workup and treatment of osteoporosis, if appropriate.     -Is this patient being discharged with medication to prevent blood clots? Yes Eliquis       Depression / Suicide Risk    As you are discharged from this [facility] facility, it is important to  learn how to keep safe from harming yourself.    Recognize the warning signs:  · Abrupt changes in personality, positive or negative- including increase in energy   · Giving away possessions  · Change in eating patterns- significant weight changes-  positive or negative  · Change in sleeping patterns- unable to sleep or sleeping all the time   · Unwillingness or inability to communicate  · Depression  · Unusual sadness, discouragement and loneliness  · Talk of wanting to die  · Neglect of personal appearance   · Rebelliousness- reckless behavior  · Withdrawal from people/activities they love  · Confusion- inability to concentrate     If you or a loved one observes any of these behaviors or has concerns about self-harm, here's what you can do:  · Talk about it- your feelings and reasons for harming yourself  · Remove any means that you might use to hurt yourself (examples: pills, rope, extension cords, firearm)  · Get professional help from the community (Mental Health, Substance Abuse, psychological counseling)  · Do not be alone:Call your Safe Contact- someone whom you trust who will be there for you.  · Call your local CRISIS HOTLINE 415-1393 or 927-561-4807  · Call your local Children's Mobile Crisis Response Team [place] (136) 333-9476 or www.[employer]  · Call the toll free National Suicide Prevention Hotlines   · National Suicide Prevention Lifeline 560-002-WJBW (7602)  · National Hope Line Network 800-SUICIDE (816-0570)    Acetaminophen; Hydrocodone tablets or capsules  What is this medicine?  ACETAMINOPHEN; HYDROCODONE (a set a QUINTIN domenic fen; patrick droe KOE done) is a pain reliever. It is used to treat moderate to severe pain.  This medicine may be used for other purposes; ask your health care provider or pharmacist if you have questions.  COMMON BRAND NAME(S): Anexsia, Bancap HC, Ceta-Plus, Co-Gesic, Comfortpak, Dolagesic, Dolorex Forte, DuoCet, Hydrocet, Hydrogesic, Lorcet, Lorcet HD, Lorcet Plus,  Lortab, Margesic H, Maxidone, Norco, Polygesic, Stagesic, Vanacet, Verdrocet, Vicodin, Vicodin ES, Vicodin HP, Xodol, Zydone  What should I tell my health care provider before I take this medicine?  They need to know if you have any of these conditions:  · brain tumor  · Crohn's disease, inflammatory bowel disease, or ulcerative colitis  · drug abuse or addiction  · head injury  · heart or circulation problems  · if you often drink alcohol  · kidney disease or problems going to the bathroom  · liver disease  · lung disease, asthma, or breathing problems  · an unusual or allergic reaction to acetaminophen, hydrocodone, other opioid analgesics, other medicines, foods, dyes, or preservatives  · pregnant or trying to get pregnant  · breast-feeding  How should I use this medicine?  Take this medicine by mouth with a glass of water. Follow the directions on the prescription label. You can take it with or without food. If it upsets your stomach, take it with food. Do not take your medicine more often than directed.  A special MedGuide will be given to you by the pharmacist with each prescription and refill. Be sure to read this information carefully each time.  Talk to your pediatrician regarding the use of this medicine in children. Special care may be needed.  Overdosage: If you think you have taken too much of this medicine contact a poison control center or emergency room at once.  NOTE: This medicine is only for you. Do not share this medicine with others.  What if I miss a dose?  If you miss a dose, take it as soon as you can. If it is almost time for your next dose, take only that dose. Do not take double or extra doses.  What may interact with this medicine?  This medicine may interact with the following medications:  · alcohol  · antiviral medicines for HIV or AIDS  · atropine  · antihistamines for allergy, cough and cold  · certain antibiotics like erythromycin, clarithromycin  · certain medicines for anxiety or  sleep  · certain medicines for bladder problems like oxybutynin, tolterodine  · certain medicines for depression like amitriptyline, fluoxetine, sertraline  · certain medicines for fungal infections like ketoconazole and itraconazole  · certain medicines for Parkinson's disease like benztropine, trihexyphenidyl  · certain medicines for seizures like carbamazepine, phenobarbital, phenytoin, primidone  · certain medicines for stomach problems like dicyclomine, hyoscyamine  · certain medicines for travel sickness like scopolamine  · general anesthetics like halothane, isoflurane, methoxyflurane, propofol  · ipratropium  · local anesthetics like lidocaine, pramoxine, tetracaine  · MAOIs like Carbex, Eldepryl, Marplan, Nardil, and Parnate  · medicines that relax muscles for surgery  · other medicines with acetaminophen  · other narcotic medicines for pain or cough  · phenothiazines like chlorpromazine, mesoridazine, prochlorperazine, thioridazine  · rifampin  This list may not describe all possible interactions. Give your health care provider a list of all the medicines, herbs, non-prescription drugs, or dietary supplements you use. Also tell them if you smoke, drink alcohol, or use illegal drugs. Some items may interact with your medicine.  What should I watch for while using this medicine?  Tell your doctor or health care professional if your pain does not go away, if it gets worse, or if you have new or a different type of pain. You may develop tolerance to the medicine. Tolerance means that you will need a higher dose of the medicine for pain relief. Tolerance is normal and is expected if you take the medicine for a long time.  Do not suddenly stop taking your medicine because you may develop a severe reaction. Your body becomes used to the medicine. This does NOT mean you are addicted. Addiction is a behavior related to getting and using a drug for a non-medical reason. If you have pain, you have a medical reason to  take pain medicine. Your doctor will tell you how much medicine to take. If your doctor wants you to stop the medicine, the dose will be slowly lowered over time to avoid any side effects.  There are different types of narcotic medicines (opiates). If you take more than one type at the same time or if you are taking another medicine that also causes drowsiness, you may have more side effects. Give your health care provider a list of all medicines you use. Your doctor will tell you how much medicine to take. Do not take more medicine than directed. Call emergency for help if you have problems breathing or unusual sleepiness.  Do not take other medicines that contain acetaminophen with this medicine. Always read labels carefully. If you have questions, ask your doctor or pharmacist.  If you take too much acetaminophen get medical help right away. Too much acetaminophen can be very dangerous and cause liver damage. Even if you do not have symptoms, it is important to get help right away.  You may get drowsy or dizzy. Do not drive, use machinery, or do anything that needs mental alertness until you know how this medicine affects you. Do not stand or sit up quickly, especially if you are an older patient. This reduces the risk of dizzy or fainting spells. Alcohol may interfere with the effect of this medicine. Avoid alcoholic drinks.  The medicine will cause constipation. Try to have a bowel movement at least every 2 to 3 days. If you do not have a bowel movement for 3 days, call your doctor or health care professional.  Your mouth may get dry. Chewing sugarless gum or sucking hard candy, and drinking plenty of water may help. Contact your doctor if the problem does not go away or is severe.  What side effects may I notice from receiving this medicine?  Side effects that you should report to your doctor or health care professional as soon as possible:  · allergic reactions like skin rash, itching or hives, swelling of  the face, lips, or tongue  · breathing problems  · confusion  · redness, blistering, peeling or loosening of the skin, including inside the mouth  · signs and symptoms of low blood pressure like dizziness; feeling faint or lightheaded, falls; unusually weak or tired  · trouble passing urine or change in the amount of urine  · yellowing of the eyes or skin  Side effects that usually do not require medical attention (report to your doctor or health care professional if they continue or are bothersome):  · constipation  · dry mouth  · nausea, vomiting  · tiredness  This list may not describe all possible side effects. Call your doctor for medical advice about side effects. You may report side effects to FDA at 3-941-FDA-8204.  Where should I keep my medicine?  Keep out of the reach of children. This medicine can be abused. Keep your medicine in a safe place to protect it from theft. Do not share this medicine with anyone. Selling or giving away this medicine is dangerous and against the law.  Store at room temperature between 15 and 30 degrees C (59 and 86 degrees F).  This medicine may cause harm and death if it is taken by other adults, children, or pets. Return medicine that has not been used to an official disposal site. Contact the [facility] at [phone] or your [facility] government to find a site. If you cannot return the medicine, flush it down the toilet. Do not use the medicine after the expiration date.  NOTE: This sheet is a summary. It may not cover all possible information. If you have questions about this medicine, talk to your doctor, pharmacist, or health care provider.  © 2020 Elsevier/Gold Standard (2018-04-24 12:44:49)      Potassium chloride tablets, extended-release tablets or capsules  What is this medicine?  POTASSIUM CHLORIDE (alan TASS i um KLOOR hetal) is a potassium supplement used to prevent and to treat low potassium. Potassium is important for the heart, muscles, and nerves. Too much or too  little potassium in the body can cause serious problems.  This medicine may be used for other purposes; ask your health care provider or pharmacist if you have questions.  COMMON BRAND NAME(S): ED-K+10, K-10, K-8, K-Dur, K-Tab, Kaon-CL, Klor-Con, Klor-Con M10, Klor-Con M15, Klor-Con M20, Klotrix, Micro-K, Micro-K Extencaps, Slow-K  What should I tell my health care provider before I take this medicine?  They need to know if you have any of these conditions:  · Charlotte's disease  · dehydration  · diabetes  · difficulty swallowing  · heart disease  · high levels of potassium in the blood  · irregular heartbeat  · kidney disease  · recent severe burn  · stomach ulcers or other stomach problems  · an unusual or allergic reaction to potassium, tartrazine, other medicines, foods, dyes, or preservatives  · pregnant or trying to get pregnant  · breast-feeding  How should I use this medicine?  Take this medicine by mouth with a full glass of water. Take with food. Follow the directions on the prescription label. Do not suck on, crush, or chew this medicine. If you have difficulty swallowing, ask the pharmacist how to take. Take your medicine at regular intervals. Do not take it more often than directed. Do not stop taking except on your doctor's advice.  Talk to your pediatrician regarding the use of this medicine in children. Special care may be needed.  Overdosage: If you think you have taken too much of this medicine contact a poison control center or emergency room at once.  NOTE: This medicine is only for you. Do not share this medicine with others.  What if I miss a dose?  If you miss a dose, take it as soon as you can. If it is almost time for your next dose, take only that dose. Do not take double or extra doses.  What may interact with this medicine?  Do not take this medicine with any of the following medications:  · certain diuretics such as spironolactone, triamterene  · certain medicines for stomach problems  like atropine; difenoxin and glycopyrrolate  · eplerenone  · sodium polystyrene sulfonate  This medicine may also interact with the following medications:  · certain medicines for blood pressure or heart disease like lisinopril, losartan, quinapril, valsartan  · medicines that lower your chance of fighting infection such as cyclosporine, tacrolimus  · NSAIDs, medicines for pain and inflammation, like ibuprofen or naproxen  · other potassium supplements  · salt substitutes  This list may not describe all possible interactions. Give your health care provider a list of all the medicines, herbs, non-prescription drugs, or dietary supplements you use. Also tell them if you smoke, drink alcohol, or use illegal drugs. Some items may interact with your medicine.  What should I watch for while using this medicine?  Visit your doctor or health care professional for regular check ups. You will need lab work done regularly.  You may need to be on a special diet while taking this medicine. Ask your doctor.  What side effects may I notice from receiving this medicine?  Side effects that you should report to your doctor or health care professional as soon as possible:  · allergic reactions like skin rash, itching or hives, swelling of the face, lips, or tongue  · black, tarry stools  · breathing problems  · confusion  · heartburn  · fast, irregular heartbeat  · feeling faint or lightheaded, falls  · low blood pressure  · numbness or tingling in hands or feet  · pain when swallowing  · unusually weak or tired  · weakness, heaviness of legs  Side effects that usually do not require medical attention (report to your doctor or health care professional if they continue or are bothersome):  · diarrhea  · nausea, vomiting  · stomach pain  This list may not describe all possible side effects. Call your doctor for medical advice about side effects. You may report side effects to FDA at 7-255-FDA-2403.  Where should I keep my  medicine?  Keep out of the reach of children.  Store at room temperature between 15 and 30 degrees C (59 and 86 degrees F ). Keep bottle closed tightly to protect this medicine from light and moisture. Throw away any unused medicine after the expiration date.  NOTE: This sheet is a summary. It may not cover all possible information. If you have questions about this medicine, talk to your doctor, pharmacist, or health care provider.  © 2020 Elsevier/Gold Standard (2017-09-20 11:43:27)      Ankle Fracture    The ankle joint is made up of the lower (distal) sections of your lower leg bones(tibia and fibula) along with a bone in your foot (talus). An ankle fracture is a break in one, two, or all three of these sections of bone.  Follow these instructions at home:  If you have a splint:  · Wear the splint as told by your doctor. Take it off only as told by your doctor.  · Loosen the splint if your toes tingle, become numb, or turn cold and blue.  · Keep the splint clean.  · If the splint is not waterproof:  ? Do not let it get wet.  ? Cover it with a watertight covering when you take a bath or a shower.  If you have a cast:  · Do not stick anything inside the cast to scratch your skin. Doing that increases your risk of infection.  · Check the skin around the cast every day. Tell your doctor about any concerns.  · You may put lotion on dry skin around the edges of the cast. Do not put lotion on the skin underneath the cast.  · Keep the cast clean.  · If the cast is not waterproof:  ? Do not let it get wet.  ? Cover it with a watertight covering when you take a bath or a shower.  Managing pain, stiffness, and swelling  · If directed, put ice on the injured area:  ? If you have a removable splint, remove it as told by your doctor.  ? Put ice in a plastic bag.  ? Place a towel between your skin and the bag.  ? Leave the ice on for 20 minutes, 2-3 times a day.  · Move your toes often. This prevents stiffness and lessens  swelling.  · Raise (elevate) the injured area above the level of your heart while you are sitting or lying down.  General instructions  · Do not use the injured limb to support your body weight until your doctor says that you can. Use crutches as told by your doctor.  · Take over-the-counter and prescription medicines only as told by your doctor.  · Ask your doctor when it is safe to drive if you have a cast or splint.  · Do exercises as told by your doctor.  · Do not use any products that contain nicotine or tobacco, such as cigarettes and e-cigarettes. These can delay bone healing. If you need help quitting, ask your doctor.  · Keep all follow-up visits as told by your doctor. This is important.  Contact a doctor if:  · Your pain or swelling gets worse.  · Your pain or swelling does not get better when you rest or take medicine.  Get help right away if:  · Your cast gets damaged.  · You continue to have very bad pain.  · You have new pain or swelling.  · Your skin or toes below the injured ankle:  ? Turn blue or gray.  ? Feel cold or numb.  ? Lose sensitivity to touch.  Summary  · An ankle fracture is a break in one, two, or all three of the bones in your lower leg and lower foot.  · If you have a splint, wear it as told by your health care provider. Keep it clean and dry.  · If you have a cast, do not stick anything inside the cast to scratch your skin. This can cause infection.  · Use ice, take medicines, raise your foot, and avoid tobacco and nicotine products. These steps will lessen pain and swelling and speed up healing.  This information is not intended to replace advice given to you by your health care provider. Make sure you discuss any questions you have with your health care provider.  Document Released: 10/15/2010 Document Revised: 11/30/2018 Document Reviewed: 01/22/2018  Elsevier Patient Education © 2020 Elsevier Inc.

## 2020-08-09 NOTE — PROGRESS NOTES
"   Orthopaedic PA Progress Note    Interval changes:pain free, slow to mobilize    ROS - Patient denies any new issues. No chest pain, dyspnea, or fever.  Pain well controlled.    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

## 2020-08-09 NOTE — CARE PLAN
Problem: Communication  Goal: The ability to communicate needs accurately and effectively will improve  Outcome: PROGRESSING AS EXPECTED   Patient communicates effectively with staff.     Problem: Knowledge Deficit  Goal: Knowledge of disease process/condition, treatment plan, diagnostic tests, and medications will improve  Outcome: PROGRESSING AS EXPECTED   Patient demonstrates adequate understanding of treatment plan.

## 2020-08-09 NOTE — PROGRESS NOTES
K was 3.3 call placed to Dr. Lee as per comm order.   Has order for kcl 50 meq however starts at 1800.

## 2020-08-09 NOTE — PROGRESS NOTES
"8/9/2020    S: Patient was seen and examined this morning. Doing well overall. Pain controlled. No complaints.    Physical Exam  Vitals  /65   Pulse 69   Temp 36.6 °C (97.8 °F) (Temporal)   Resp 16   Ht 1.702 m (5' 7\")   Wt 99.2 kg (218 lb 11.1 oz)   SpO2 99%   General: A&Ox3, NAD  Chest: Symmetric expansion of the chest wall, no distress.  Heart: RRR, palpable peripheral pulses  Extremities:    Bulky splint intact   Dressing is c/d/I   Accessible compartments soft and compressible   DNVI   Brisk cap refill    Radiographs:  DX-PORTABLE FLUORO > 1 HOUR   Final Result      Portable fluoroscopy utilized for 1 second.         INTERPRETING LOCATION: [address] [place] [address], [place], [address]      DX-ANKLE 2- VIEWS LEFT   Final Result         1.  Intraoperative changes of distal fibular ORIF in progress      EC-ECHOCARDIOGRAM COMPLETE W/ CONT   Final Result      EC-ECHOCARDIOGRAM COMPLETE W/O CONT   Final Result      DX-TIBIA AND FIBULA LEFT   Final Result      No acute proximal tibial or fibular fractures. Distal tibial fracture is discussed separately.      DX-FOOT-COMPLETE 3+ LEFT   Final Result      No acute foot fracture. Ankle fracture is discussed separately.      DX-ANKLE 3+ VIEWS LEFT   Final Result      1.  Acute lateral malleolar fracture, with lateral subluxation of the ankle joint.   2.  Associated small posterior malleolar fracture.          Laboratory Values  Recent Labs     08/07/20  0756 08/08/20  0702   WBC 6.5 5.6   RBC 3.76* 3.68*   HEMOGLOBIN 12.4 12.1   HEMATOCRIT 38.0 36.2*   .1* 98.4*   MCH 33.0 32.9   MCHC 32.6* 33.4*   RDW 46.0 45.2   PLATELETCT 222 222   MPV 8.6* 8.5*     Recent Labs     08/08/20  0706 08/08/20  1649 08/09/20  0100   SODIUM 140 139 137   POTASSIUM 2.9* 3.7 3.3*   CHLORIDE 90* 91* 89*   CO2 37* 33 34*   GLUCOSE 149* 147* 139*   BUN 38* 43* 44*           Impression:  Patient is a 75 y.o F s/p ORIF L Ankle on 8/5    Plan:  Pain control  Weight bearing: TTWB x 6 weeks  DVT " Ppx  PT/OT  Medical management

## 2020-08-09 NOTE — PROGRESS NOTES
Had to re order potassium infusion as MAR would not allow me to scan in doses. Patient is receiving her 6th dose of the previously ordered 6 doses however due to longer infusion time running at 60ml/hr the order time frame was insufficient.

## 2020-08-09 NOTE — CARE PLAN
Problem: Communication  Goal: The ability to communicate needs accurately and effectively will improve  Outcome: PROGRESSING AS EXPECTED  Plan of care reviewed.       Problem: Infection  Goal: Will remain free from infection  Outcome: PROGRESSING AS EXPECTED   Afebrile, dressing dry and intact.

## 2020-08-09 NOTE — PROGRESS NOTES
"/65   Pulse 69   Temp 36.6 °C (97.8 °F) (Temporal)   Resp 16   Ht 1.702 m (5' 7\")   Wt 99.2 kg (218 lb 11.1 oz)   SpO2 99%   Patient discharged home with daughter.  Has DME wheelchair, already has DME Walker, oxygen, and BSC at home.  PIV removed. Has follow up instructions and aware of where to  RX.  Has HH per daughter and knows to call pmd tomorrow for follow up blood work potassium and kidney function and to have them collaborate with her cardiologist.  No vaccines ordered at this time.  Further care as an outpatient.   "

## 2020-08-20 ENCOUNTER — HOSPITAL ENCOUNTER (OUTPATIENT)
Facility: MEDICAL CENTER | Age: 75
End: 2020-08-20
Attending: FAMILY MEDICINE
Payer: MEDICARE

## 2020-08-20 LAB
ANION GAP SERPL CALC-SCNC: 13 MMOL/L (ref 7–16)
BUN SERPL-MCNC: 24 MG/DL (ref 8–22)
CALCIUM SERPL-MCNC: 10.7 MG/DL (ref 8.5–10.5)
CHLORIDE SERPL-SCNC: 99 MMOL/L (ref 96–112)
CO2 SERPL-SCNC: 29 MMOL/L (ref 20–33)
CREAT SERPL-MCNC: 1 MG/DL (ref 0.5–1.4)
GLUCOSE SERPL-MCNC: 130 MG/DL (ref 65–99)
POTASSIUM SERPL-SCNC: 4.5 MMOL/L (ref 3.6–5.5)
SODIUM SERPL-SCNC: 141 MMOL/L (ref 135–145)

## 2020-08-20 PROCEDURE — 80048 BASIC METABOLIC PNL TOTAL CA: CPT

## 2020-08-21 ENCOUNTER — TELEMEDICINE (OUTPATIENT)
Dept: CARDIOLOGY | Facility: MEDICAL CENTER | Age: 75
End: 2020-08-21
Payer: MEDICARE

## 2020-08-21 VITALS
BODY MASS INDEX: 31.39 KG/M2 | DIASTOLIC BLOOD PRESSURE: 74 MMHG | SYSTOLIC BLOOD PRESSURE: 107 MMHG | HEIGHT: 67 IN | WEIGHT: 200 LBS | HEART RATE: 71 BPM

## 2020-08-21 DIAGNOSIS — I48.0 PAROXYSMAL ATRIAL FIBRILLATION (HCC): ICD-10-CM

## 2020-08-21 DIAGNOSIS — S82.892K: ICD-10-CM

## 2020-08-21 DIAGNOSIS — Z79.01 CHRONIC ANTICOAGULATION: ICD-10-CM

## 2020-08-21 DIAGNOSIS — R06.89 HYPERCAPNIA: ICD-10-CM

## 2020-08-21 DIAGNOSIS — Z79.899 HIGH RISK MEDICATION USE: ICD-10-CM

## 2020-08-21 DIAGNOSIS — J96.11 CHRONIC RESPIRATORY FAILURE WITH HYPOXIA (HCC): ICD-10-CM

## 2020-08-21 DIAGNOSIS — I27.20 PULMONARY HYPERTENSION (HCC): ICD-10-CM

## 2020-08-21 DIAGNOSIS — E83.52 HYPERCALCEMIA: ICD-10-CM

## 2020-08-21 DIAGNOSIS — I10 ESSENTIAL HYPERTENSION: ICD-10-CM

## 2020-08-21 PROCEDURE — 99214 OFFICE O/P EST MOD 30 MIN: CPT | Mod: 95,CR | Performed by: INTERNAL MEDICINE

## 2020-08-21 ASSESSMENT — ENCOUNTER SYMPTOMS
SORE THROAT: 0
FEVER: 0
SHORTNESS OF BREATH: 0
ORTHOPNEA: 0
DIZZINESS: 0
WHEEZING: 0
PND: 0
CHILLS: 0
STRIDOR: 0
NEUROLOGICAL NEGATIVE: 1
RESPIRATORY NEGATIVE: 1
HEMOPTYSIS: 0
LOSS OF CONSCIOUSNESS: 0
EYES NEGATIVE: 1
CONSTITUTIONAL NEGATIVE: 1
SPUTUM PRODUCTION: 0
WEAKNESS: 0
CLAUDICATION: 0
MUSCULOSKELETAL NEGATIVE: 1
BRUISES/BLEEDS EASILY: 0
GASTROINTESTINAL NEGATIVE: 1
CARDIOVASCULAR NEGATIVE: 1
PALPITATIONS: 0
COUGH: 0

## 2020-08-21 ASSESSMENT — FIBROSIS 4 INDEX: FIB4 SCORE: 1.08

## 2020-08-21 NOTE — PROGRESS NOTES
Chief Complaint   Patient presents with   • Atrial Fibrillation       Subjective:   Zeinab Loza is a 75 y.o. female who presents today as a follow-up for her pulmonary hypertension after mitral valve repair.  She continues on her Eliquis.  She feels like she is in atrial fibrillation is significant upon.  She did break her ankle possibly secondary to hypokalemia.  She has not needed any of the metolazone.  She is otherwise doing well.    Past Medical History:   Diagnosis Date   • Aneurysm artery, celiac (HCC) 2019   • Aneurysm of right renal artery (HCC)    • Atrial fibrillation (HCC)    • Diastolic heart failure (HCC)    • Hypertension, essential    • Pulmonary hypertension (HCC)    • Warfarin anticoagulation      Past Surgical History:   Procedure Laterality Date   • ANKLE ORIF Left 8/5/2020    Procedure: ORIF, ANKLE;  Surgeon: Tk Humphreys M.D.;  Location: SURGERY [facility];  Service: Orthopedics     History reviewed. No pertinent family history.  Social History     Socioeconomic History   • Marital status:      Spouse name: Not on file   • Number of children: Not on file   • Years of education: Not on file   • Highest education level: Not on file   Occupational History   • Not on file   Social Needs   • Financial resource strain: Not on file   • Food insecurity     Worry: Not on file     Inability: Not on file   • Transportation needs     Medical: Not on file     Non-medical: Not on file   Tobacco Use   • Smoking status: Never Smoker   • Smokeless tobacco: Never Used   Substance and Sexual Activity   • Alcohol use: Yes     Alcohol/week: 4.2 oz     Types: 7 Shots of liquor per week     Comment: 1 day   • Drug use: No   • Sexual activity: Not on file   Lifestyle   • Physical activity     Days per week: Not on file     Minutes per session: Not on file   • Stress: Not on file   Relationships   • Social connections     Talks on phone: Not on file     Gets together: Not on file     Attends  Episcopal service: Not on file     Active member of club or organization: Not on file     Attends meetings of clubs or organizations: Not on file     Relationship status: Not on file   • Intimate partner violence     Fear of current or ex partner: Not on file     Emotionally abused: Not on file     Physically abused: Not on file     Forced sexual activity: Not on file   Other Topics Concern   • Not on file   Social History Narrative   • Not on file     Allergies   Allergen Reactions   • Zolpidem Tartrate Unspecified     Lightheaded and confusion     Outpatient Encounter Medications as of 8/21/2020   Medication Sig Dispense Refill   • potassium chloride SA (KDUR) 20 MEQ Tab CR Take 2 Tabs by mouth 3 times a day. (Patient taking differently: Take 120 mEq by mouth 3 times a day.) 120 Tab 0   • digoxin (LANOXIN) 125 MCG Tab Take 1 Tab by mouth every evening. 90 Tab 3   • DILTIAZem CD (CARDIZEM CD) 120 MG CAPSULE SR 24 HR TAKE 1 CAPSULE EVERY DAY 90 Cap 1   • torsemide (DEMADEX) 20 MG Tab Take 1 Tab by mouth 2 times a day. 180 Tab 3   • apixaban (ELIQUIS) 5mg Tab Take 5 mg by mouth 2 Times a Day.     • amitriptyline (ELAVIL) 10 MG Tab Take 20 mg by mouth every bedtime.     • methocarbamol (ROBAXIN) 500 MG Tab 500 mg every day.  0   • metOLazone (ZAROXOLYN) 5 MG Tab Take 5 mg by mouth every day.     • sotalol (BETAPACE) 80 MG Tab Take 1 Tab by mouth 2 times a day. 60 Tab 11   • acetaminophen (TYLENOL) 500 MG Tab Take 1,000 mg by mouth every 6 hours as needed for Mild Pain.     • Tadalafil, PAH, (ADCIRCA) 20 MG Tab Take 40 mg by mouth every bedtime. Indications: Pulmonary Arterial Hypertension     • therapeutic multivitamin-minerals (THERAGRAN-M) Tab Take 1 Tab by mouth every day.     • PARoxetine (PAXIL) 20 MG Tab Take 20 mg by mouth every day.     • rosuvastatin (CRESTOR) 10 MG Tab Take 10 mg by mouth every evening.     • ambrisentan (LETAIRIS) 10 MG tablet Take 10 mg by mouth every day.     • levothyroxine (SYNTHROID)  "75 MCG Tab Take 75 mcg by mouth Every morning on an empty stomach.     • Cholecalciferol (VITAMIN D) 2000 units Cap Take 2,000 Units by mouth every day.       No facility-administered encounter medications on file as of 8/21/2020.      Review of Systems   Constitutional: Negative.  Negative for chills, fever and malaise/fatigue.   HENT: Negative.  Negative for sore throat.    Eyes: Negative.    Respiratory: Negative.  Negative for cough, hemoptysis, sputum production, shortness of breath, wheezing and stridor.    Cardiovascular: Negative.  Negative for chest pain, palpitations, orthopnea, claudication, leg swelling and PND.   Gastrointestinal: Negative.    Genitourinary: Negative.    Musculoskeletal: Negative.    Skin: Negative.    Neurological: Negative.  Negative for dizziness, loss of consciousness and weakness.   Endo/Heme/Allergies: Negative.  Does not bruise/bleed easily.   All other systems reviewed and are negative.       Objective:   /74 (BP Location: Left arm, Patient Position: Sitting, BP Cuff Size: Adult)   Pulse 71   Ht 1.702 m (5' 7\")   Wt 90.7 kg (200 lb)   BMI 31.32 kg/m²     Physical Exam   Constitutional: She appears well-developed and well-nourished. No distress.   HENT:   Head: Normocephalic and atraumatic.   Right Ear: External ear normal.   Left Ear: External ear normal.   Nose: Nose normal.   Mouth/Throat: No oropharyngeal exudate.   Eyes: Pupils are equal, round, and reactive to light. Conjunctivae and EOM are normal. Right eye exhibits no discharge. Left eye exhibits no discharge. No scleral icterus.   Neck: Neck supple. No JVD present.   Cardiovascular: Normal rate, regular rhythm and intact distal pulses. Exam reveals no gallop and no friction rub.   No murmur heard.  Pulmonary/Chest: Effort normal. No stridor. No respiratory distress. She has no wheezes. She has no rales. She exhibits no tenderness.   Abdominal: Soft. She exhibits no distension. There is no guarding. "   Musculoskeletal: Normal range of motion.         General: No tenderness, deformity or edema.   Neurological: She is alert. She has normal reflexes. She displays normal reflexes. No cranial nerve deficit. She exhibits normal muscle tone. Coordination normal.   Skin: Skin is warm and dry. No rash noted. She is not diaphoretic. No erythema. No pallor.   Psychiatric: She has a normal mood and affect. Her behavior is normal. Judgment and thought content normal.   Nursing note and vitals reviewed.      Assessment:     1. Chronic anticoagulation     2. Chronic respiratory failure with hypoxia (HCC)     3. 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 Essential hypertension     5. High risk medication use     6. Hypercalcemia     7. Hypercapnia     8. Paroxysmal atrial fibrillation (HCC)     9. Pulmonary hypertension (HCC)         Medical Decision Making:  Today's Assessment / Status / Plan:   75-year-old female with PAH sleep apnea mitral valve repair atrial fibrillation and edema.  At this point I think she is doing clinically well.  Given the fact that she is in optically ambulatory given her recent ankle fracture we will hold off any further testing.  We will see her back in 3 months.    1. PAH, Who type 1    - cont Tadalafil 40    - hold letaris 10     2. FEI    - f/u Bipap     3. Cyanposis    - consider cMRI at next visit     4. MVR    - clinical follow up     5. A-fib    - cont sotalol    - cont eliquis 5 BID    - cont dig     6. Edema    - cont torsemide 20 BID    - cont KCL 60 mEQ BID     7. HTN    - cont DILT , unclear if this is for HTN or + vasodilator response     8. High Risk Meds    - labs in one week    Start time: 1:40  Stop time: 1:55    This evaluation was conducted via Zoom using secure and encrypted videoconferencing technology. The patient was in a private location in the [facility].    The patient's identity was confirmed and verbal consent was obtained for this virtual  visit.

## 2020-09-03 ENCOUNTER — HOSPITAL ENCOUNTER (OUTPATIENT)
Facility: MEDICAL CENTER | Age: 75
End: 2020-09-03
Attending: FAMILY MEDICINE
Payer: MEDICARE

## 2020-09-03 LAB
ALBUMIN SERPL BCP-MCNC: 4.1 G/DL (ref 3.2–4.9)
ALBUMIN/GLOB SERPL: 1.7 G/DL
ALP SERPL-CCNC: 86 U/L (ref 30–99)
ALT SERPL-CCNC: 22 U/L (ref 2–50)
ANION GAP SERPL CALC-SCNC: 15 MMOL/L (ref 7–16)
AST SERPL-CCNC: 24 U/L (ref 12–45)
BASOPHILS # BLD AUTO: 0.9 % (ref 0–1.8)
BASOPHILS # BLD: 0.05 K/UL (ref 0–0.12)
BILIRUB SERPL-MCNC: 0.3 MG/DL (ref 0.1–1.5)
BUN SERPL-MCNC: 20 MG/DL (ref 8–22)
CALCIUM SERPL-MCNC: 10.8 MG/DL (ref 8.5–10.5)
CHLORIDE SERPL-SCNC: 97 MMOL/L (ref 96–112)
CO2 SERPL-SCNC: 28 MMOL/L (ref 20–33)
CREAT SERPL-MCNC: 0.8 MG/DL (ref 0.5–1.4)
EOSINOPHIL # BLD AUTO: 0.09 K/UL (ref 0–0.51)
EOSINOPHIL NFR BLD: 1.7 % (ref 0–6.9)
ERYTHROCYTE [DISTWIDTH] IN BLOOD BY AUTOMATED COUNT: 47.4 FL (ref 35.9–50)
GLOBULIN SER CALC-MCNC: 2.4 G/DL (ref 1.9–3.5)
GLUCOSE SERPL-MCNC: 103 MG/DL (ref 65–99)
HCT VFR BLD AUTO: 35.1 % (ref 37–47)
HGB BLD-MCNC: 11.8 G/DL (ref 12–16)
IMM GRANULOCYTES # BLD AUTO: 0.02 K/UL (ref 0–0.11)
IMM GRANULOCYTES NFR BLD AUTO: 0.4 % (ref 0–0.9)
LYMPHOCYTES # BLD AUTO: 1.36 K/UL (ref 1–4.8)
LYMPHOCYTES NFR BLD: 25.2 % (ref 22–41)
MCH RBC QN AUTO: 34.3 PG (ref 27–33)
MCHC RBC AUTO-ENTMCNC: 33.6 G/DL (ref 33.6–35)
MCV RBC AUTO: 102 FL (ref 81.4–97.8)
MONOCYTES # BLD AUTO: 0.57 K/UL (ref 0–0.85)
MONOCYTES NFR BLD AUTO: 10.6 % (ref 0–13.4)
NEUTROPHILS # BLD AUTO: 3.31 K/UL (ref 2–7.15)
NEUTROPHILS NFR BLD: 61.2 % (ref 44–72)
NRBC # BLD AUTO: 0 K/UL
NRBC BLD-RTO: 0 /100 WBC
PLATELET # BLD AUTO: 243 K/UL (ref 164–446)
PMV BLD AUTO: 9.1 FL (ref 9–12.9)
POTASSIUM SERPL-SCNC: 4 MMOL/L (ref 3.6–5.5)
PROT SERPL-MCNC: 6.5 G/DL (ref 6–8.2)
RBC # BLD AUTO: 3.44 M/UL (ref 4.2–5.4)
SODIUM SERPL-SCNC: 140 MMOL/L (ref 135–145)
WBC # BLD AUTO: 5.4 K/UL (ref 4.8–10.8)

## 2020-09-03 PROCEDURE — 85025 COMPLETE CBC W/AUTO DIFF WBC: CPT

## 2020-09-03 PROCEDURE — 80053 COMPREHEN METABOLIC PANEL: CPT

## 2020-09-03 PROCEDURE — 83880 ASSAY OF NATRIURETIC PEPTIDE: CPT | Mod: GZ

## 2020-09-04 LAB — NT-PROBNP SERPL IA-MCNC: 571 PG/ML (ref 0–125)

## 2020-09-24 ENCOUNTER — HOSPITAL ENCOUNTER (OUTPATIENT)
Facility: MEDICAL CENTER | Age: 75
End: 2020-09-24
Attending: FAMILY MEDICINE
Payer: MEDICARE

## 2020-09-24 LAB
ANION GAP SERPL CALC-SCNC: 14 MMOL/L (ref 7–16)
BUN SERPL-MCNC: 28 MG/DL (ref 8–22)
CALCIUM SERPL-MCNC: 10.8 MG/DL (ref 8.5–10.5)
CHLORIDE SERPL-SCNC: 98 MMOL/L (ref 96–112)
CO2 SERPL-SCNC: 29 MMOL/L (ref 20–33)
CREAT SERPL-MCNC: 0.77 MG/DL (ref 0.5–1.4)
GLUCOSE SERPL-MCNC: 108 MG/DL (ref 65–99)
POTASSIUM SERPL-SCNC: 4.5 MMOL/L (ref 3.6–5.5)
SODIUM SERPL-SCNC: 141 MMOL/L (ref 135–145)

## 2020-09-24 PROCEDURE — 80048 BASIC METABOLIC PNL TOTAL CA: CPT

## 2020-09-29 ENCOUNTER — OFFICE VISIT (OUTPATIENT)
Dept: URGENT CARE | Facility: PHYSICIAN GROUP | Age: 75
End: 2020-09-29
Payer: MEDICARE

## 2020-09-29 ENCOUNTER — APPOINTMENT (OUTPATIENT)
Dept: RADIOLOGY | Facility: MEDICAL CENTER | Age: 75
DRG: 309 | End: 2020-09-29
Attending: EMERGENCY MEDICINE
Payer: MEDICARE

## 2020-09-29 ENCOUNTER — HOSPITAL ENCOUNTER (INPATIENT)
Facility: MEDICAL CENTER | Age: 75
LOS: 1 days | DRG: 309 | End: 2020-10-01
Attending: EMERGENCY MEDICINE | Admitting: STUDENT IN AN ORGANIZED HEALTH CARE EDUCATION/TRAINING PROGRAM
Payer: MEDICARE

## 2020-09-29 VITALS
TEMPERATURE: 97.5 F | RESPIRATION RATE: 12 BRPM | BODY MASS INDEX: 32.65 KG/M2 | SYSTOLIC BLOOD PRESSURE: 142 MMHG | HEART RATE: 121 BPM | WEIGHT: 208 LBS | DIASTOLIC BLOOD PRESSURE: 88 MMHG | OXYGEN SATURATION: 95 % | HEIGHT: 67 IN

## 2020-09-29 DIAGNOSIS — R00.0 SINUS TACHYCARDIA: ICD-10-CM

## 2020-09-29 DIAGNOSIS — I48.91 ATRIAL FIBRILLATION, UNSPECIFIED TYPE (HCC): ICD-10-CM

## 2020-09-29 DIAGNOSIS — I48.0 PAROXYSMAL ATRIAL FIBRILLATION (HCC): ICD-10-CM

## 2020-09-29 DIAGNOSIS — J96.11 CHRONIC RESPIRATORY FAILURE WITH HYPOXIA (HCC): ICD-10-CM

## 2020-09-29 PROBLEM — Z78.9 NON-SMOKER: Status: ACTIVE | Noted: 2020-09-29

## 2020-09-29 PROBLEM — E03.8 OTHER SPECIFIED HYPOTHYROIDISM: Status: ACTIVE | Noted: 2020-09-29

## 2020-09-29 PROBLEM — E78.49 OTHER HYPERLIPIDEMIA: Status: ACTIVE | Noted: 2020-09-29

## 2020-09-29 LAB
ALBUMIN SERPL BCP-MCNC: 4.5 G/DL (ref 3.2–4.9)
ALBUMIN/GLOB SERPL: 1.6 G/DL
ALP SERPL-CCNC: 95 U/L (ref 30–99)
ALT SERPL-CCNC: 24 U/L (ref 2–50)
ANION GAP SERPL CALC-SCNC: 11 MMOL/L (ref 7–16)
AST SERPL-CCNC: 21 U/L (ref 12–45)
BASOPHILS # BLD AUTO: 0.4 % (ref 0–1.8)
BASOPHILS # BLD: 0.03 K/UL (ref 0–0.12)
BILIRUB SERPL-MCNC: 0.4 MG/DL (ref 0.1–1.5)
BUN SERPL-MCNC: 27 MG/DL (ref 8–22)
CALCIUM SERPL-MCNC: 10.9 MG/DL (ref 8.5–10.5)
CHLORIDE SERPL-SCNC: 96 MMOL/L (ref 96–112)
CO2 SERPL-SCNC: 32 MMOL/L (ref 20–33)
CREAT SERPL-MCNC: 0.88 MG/DL (ref 0.5–1.4)
D DIMER PPP IA.FEU-MCNC: 0.49 UG/ML (FEU) (ref 0–0.5)
DIGOXIN SERPL-MCNC: 0.4 NG/ML (ref 0.8–2)
EKG IMPRESSION: NORMAL
EOSINOPHIL # BLD AUTO: 0.09 K/UL (ref 0–0.51)
EOSINOPHIL NFR BLD: 1.1 % (ref 0–6.9)
ERYTHROCYTE [DISTWIDTH] IN BLOOD BY AUTOMATED COUNT: 45.3 FL (ref 35.9–50)
GLOBULIN SER CALC-MCNC: 2.8 G/DL (ref 1.9–3.5)
GLUCOSE SERPL-MCNC: 138 MG/DL (ref 65–99)
HCT VFR BLD AUTO: 39.5 % (ref 37–47)
HGB BLD-MCNC: 13 G/DL (ref 12–16)
IMM GRANULOCYTES # BLD AUTO: 0.04 K/UL (ref 0–0.11)
IMM GRANULOCYTES NFR BLD AUTO: 0.5 % (ref 0–0.9)
LYMPHOCYTES # BLD AUTO: 1.43 K/UL (ref 1–4.8)
LYMPHOCYTES NFR BLD: 17.5 % (ref 22–41)
MAGNESIUM SERPL-MCNC: 2.4 MG/DL (ref 1.5–2.5)
MCH RBC QN AUTO: 32.9 PG (ref 27–33)
MCHC RBC AUTO-ENTMCNC: 32.9 G/DL (ref 33.6–35)
MCV RBC AUTO: 100 FL (ref 81.4–97.8)
MONOCYTES # BLD AUTO: 0.61 K/UL (ref 0–0.85)
MONOCYTES NFR BLD AUTO: 7.5 % (ref 0–13.4)
NEUTROPHILS # BLD AUTO: 5.96 K/UL (ref 2–7.15)
NEUTROPHILS NFR BLD: 73 % (ref 44–72)
NRBC # BLD AUTO: 0 K/UL
NRBC BLD-RTO: 0 /100 WBC
NT-PROBNP SERPL IA-MCNC: 837 PG/ML (ref 0–125)
PLATELET # BLD AUTO: 295 K/UL (ref 164–446)
PMV BLD AUTO: 8.4 FL (ref 9–12.9)
POTASSIUM SERPL-SCNC: 3.7 MMOL/L (ref 3.6–5.5)
PROT SERPL-MCNC: 7.3 G/DL (ref 6–8.2)
RBC # BLD AUTO: 3.95 M/UL (ref 4.2–5.4)
SODIUM SERPL-SCNC: 139 MMOL/L (ref 135–145)
T4 FREE SERPL-MCNC: 1.33 NG/DL (ref 0.93–1.7)
TROPONIN T SERPL-MCNC: 16 NG/L (ref 6–19)
TSH SERPL DL<=0.005 MIU/L-ACNC: 2 UIU/ML (ref 0.38–5.33)
WBC # BLD AUTO: 8.2 K/UL (ref 4.8–10.8)

## 2020-09-29 PROCEDURE — 80162 ASSAY OF DIGOXIN TOTAL: CPT

## 2020-09-29 PROCEDURE — 80053 COMPREHEN METABOLIC PANEL: CPT

## 2020-09-29 PROCEDURE — 99218 PR INITIAL OBSERVATION CARE,LEVL I: CPT | Performed by: STUDENT IN AN ORGANIZED HEALTH CARE EDUCATION/TRAINING PROGRAM

## 2020-09-29 PROCEDURE — G0378 HOSPITAL OBSERVATION PER HR: HCPCS

## 2020-09-29 PROCEDURE — 83735 ASSAY OF MAGNESIUM: CPT

## 2020-09-29 PROCEDURE — 700102 HCHG RX REV CODE 250 W/ 637 OVERRIDE(OP): Performed by: STUDENT IN AN ORGANIZED HEALTH CARE EDUCATION/TRAINING PROGRAM

## 2020-09-29 PROCEDURE — 84443 ASSAY THYROID STIM HORMONE: CPT

## 2020-09-29 PROCEDURE — 99215 OFFICE O/P EST HI 40 MIN: CPT | Performed by: INTERNAL MEDICINE

## 2020-09-29 PROCEDURE — 99214 OFFICE O/P EST MOD 30 MIN: CPT | Performed by: PHYSICIAN ASSISTANT

## 2020-09-29 PROCEDURE — 84439 ASSAY OF FREE THYROXINE: CPT

## 2020-09-29 PROCEDURE — 93000 ELECTROCARDIOGRAM COMPLETE: CPT | Performed by: PHYSICIAN ASSISTANT

## 2020-09-29 PROCEDURE — 700105 HCHG RX REV CODE 258: Performed by: EMERGENCY MEDICINE

## 2020-09-29 PROCEDURE — 83880 ASSAY OF NATRIURETIC PEPTIDE: CPT

## 2020-09-29 PROCEDURE — 71045 X-RAY EXAM CHEST 1 VIEW: CPT

## 2020-09-29 PROCEDURE — 85379 FIBRIN DEGRADATION QUANT: CPT

## 2020-09-29 PROCEDURE — 93005 ELECTROCARDIOGRAM TRACING: CPT | Performed by: EMERGENCY MEDICINE

## 2020-09-29 PROCEDURE — 99285 EMERGENCY DEPT VISIT HI MDM: CPT

## 2020-09-29 PROCEDURE — 93005 ELECTROCARDIOGRAM TRACING: CPT

## 2020-09-29 PROCEDURE — A9270 NON-COVERED ITEM OR SERVICE: HCPCS | Performed by: STUDENT IN AN ORGANIZED HEALTH CARE EDUCATION/TRAINING PROGRAM

## 2020-09-29 PROCEDURE — 85025 COMPLETE CBC W/AUTO DIFF WBC: CPT

## 2020-09-29 PROCEDURE — 84484 ASSAY OF TROPONIN QUANT: CPT

## 2020-09-29 RX ORDER — DIGOXIN 125 MCG
125 TABLET ORAL EVERY EVENING
Status: DISCONTINUED | OUTPATIENT
Start: 2020-09-29 | End: 2020-10-01 | Stop reason: HOSPADM

## 2020-09-29 RX ORDER — POLYETHYLENE GLYCOL 3350 17 G/17G
1 POWDER, FOR SOLUTION ORAL
Status: DISCONTINUED | OUTPATIENT
Start: 2020-09-29 | End: 2020-10-01 | Stop reason: HOSPADM

## 2020-09-29 RX ORDER — TORSEMIDE 20 MG/1
40 TABLET ORAL 2 TIMES DAILY
COMMUNITY
End: 2020-10-12 | Stop reason: SDUPTHER

## 2020-09-29 RX ORDER — PAROXETINE HYDROCHLORIDE 20 MG/1
20 TABLET, FILM COATED ORAL EVERY MORNING
Status: DISCONTINUED | OUTPATIENT
Start: 2020-09-30 | End: 2020-10-01 | Stop reason: HOSPADM

## 2020-09-29 RX ORDER — SOTALOL HYDROCHLORIDE 80 MG/1
80 TABLET ORAL 2 TIMES DAILY
Status: DISCONTINUED | OUTPATIENT
Start: 2020-09-29 | End: 2020-09-29

## 2020-09-29 RX ORDER — METOPROLOL TARTRATE 1 MG/ML
5 INJECTION, SOLUTION INTRAVENOUS
Status: DISCONTINUED | OUTPATIENT
Start: 2020-09-29 | End: 2020-10-01 | Stop reason: HOSPADM

## 2020-09-29 RX ORDER — OXYCODONE HYDROCHLORIDE 5 MG/1
5 TABLET ORAL EVERY 4 HOURS PRN
Status: DISCONTINUED | OUTPATIENT
Start: 2020-09-29 | End: 2020-10-01 | Stop reason: HOSPADM

## 2020-09-29 RX ORDER — BISACODYL 10 MG
10 SUPPOSITORY, RECTAL RECTAL
Status: DISCONTINUED | OUTPATIENT
Start: 2020-09-29 | End: 2020-10-01 | Stop reason: HOSPADM

## 2020-09-29 RX ORDER — TADALAFIL 20 MG/1
40 TABLET ORAL
Status: DISCONTINUED | OUTPATIENT
Start: 2020-09-29 | End: 2020-09-29

## 2020-09-29 RX ORDER — POTASSIUM CHLORIDE 20 MEQ/1
60 TABLET, EXTENDED RELEASE ORAL 2 TIMES DAILY
Status: DISCONTINUED | OUTPATIENT
Start: 2020-09-29 | End: 2020-10-01 | Stop reason: HOSPADM

## 2020-09-29 RX ORDER — AMITRIPTYLINE HYDROCHLORIDE 10 MG/1
20 TABLET, FILM COATED ORAL
Status: DISCONTINUED | OUTPATIENT
Start: 2020-09-29 | End: 2020-10-01 | Stop reason: HOSPADM

## 2020-09-29 RX ORDER — AMOXICILLIN 250 MG
2 CAPSULE ORAL 2 TIMES DAILY
Status: DISCONTINUED | OUTPATIENT
Start: 2020-09-29 | End: 2020-10-01 | Stop reason: HOSPADM

## 2020-09-29 RX ORDER — POTASSIUM CHLORIDE 20 MEQ/1
20 TABLET, EXTENDED RELEASE ORAL
COMMUNITY
End: 2021-04-29 | Stop reason: SDUPTHER

## 2020-09-29 RX ORDER — ROSUVASTATIN CALCIUM 20 MG/1
10 TABLET, COATED ORAL EVERY EVENING
Status: DISCONTINUED | OUTPATIENT
Start: 2020-09-29 | End: 2020-10-01 | Stop reason: HOSPADM

## 2020-09-29 RX ORDER — SODIUM CHLORIDE 9 MG/ML
1000 INJECTION, SOLUTION INTRAVENOUS ONCE
Status: COMPLETED | OUTPATIENT
Start: 2020-09-29 | End: 2020-09-29

## 2020-09-29 RX ORDER — DILTIAZEM HYDROCHLORIDE 120 MG/1
120 CAPSULE, COATED, EXTENDED RELEASE ORAL
Status: DISCONTINUED | OUTPATIENT
Start: 2020-09-30 | End: 2020-10-01 | Stop reason: HOSPADM

## 2020-09-29 RX ORDER — TORSEMIDE 20 MG/1
40 TABLET ORAL 2 TIMES DAILY
Status: DISCONTINUED | OUTPATIENT
Start: 2020-09-29 | End: 2020-10-01 | Stop reason: HOSPADM

## 2020-09-29 RX ORDER — LEVOTHYROXINE SODIUM 0.07 MG/1
75 TABLET ORAL
Status: DISCONTINUED | OUTPATIENT
Start: 2020-09-30 | End: 2020-10-01 | Stop reason: HOSPADM

## 2020-09-29 RX ORDER — SOTALOL HYDROCHLORIDE 80 MG/1
120 TABLET ORAL 2 TIMES DAILY
Status: DISCONTINUED | OUTPATIENT
Start: 2020-09-29 | End: 2020-10-01 | Stop reason: HOSPADM

## 2020-09-29 RX ADMIN — SODIUM CHLORIDE 1000 ML: 9 INJECTION, SOLUTION INTRAVENOUS at 18:45

## 2020-09-29 RX ADMIN — AMITRIPTYLINE HYDROCHLORIDE 20 MG: 10 TABLET, FILM COATED ORAL at 23:40

## 2020-09-29 RX ADMIN — ROSUVASTATIN CALCIUM 10 MG: 20 TABLET, FILM COATED ORAL at 23:55

## 2020-09-29 RX ADMIN — DIGOXIN 125 MCG: 125 TABLET ORAL at 23:55

## 2020-09-29 RX ADMIN — OXYCODONE 5 MG: 5 TABLET ORAL at 23:54

## 2020-09-29 RX ADMIN — POTASSIUM CHLORIDE 60 MEQ: 1500 TABLET, EXTENDED RELEASE ORAL at 23:54

## 2020-09-29 RX ADMIN — SOTALOL HYDROCHLORIDE 120 MG: 120 TABLET ORAL at 23:42

## 2020-09-29 RX ADMIN — APIXABAN 5 MG: 5 TABLET, FILM COATED ORAL at 23:40

## 2020-09-29 RX ADMIN — DOCUSATE SODIUM 50 MG AND SENNOSIDES 8.6 MG 2 TABLET: 8.6; 5 TABLET, FILM COATED ORAL at 23:54

## 2020-09-29 ASSESSMENT — FIBROSIS 4 INDEX
FIB4 SCORE: 1.54
FIB4 SCORE: 1.54

## 2020-09-29 ASSESSMENT — ENCOUNTER SYMPTOMS
WHEEZING: 0
PALPITATIONS: 0
NAUSEA: 0
CONSTITUTIONAL NEGATIVE: 1
PALPITATIONS: 1
PSYCHIATRIC NEGATIVE: 1
EYE DISCHARGE: 0
SHORTNESS OF BREATH: 0
COUGH: 0
SORE THROAT: 0
CLAUDICATION: 0
NEUROLOGICAL NEGATIVE: 1
EYE REDNESS: 0
ABDOMINAL PAIN: 0
MUSCULOSKELETAL NEGATIVE: 1
DIZZINESS: 0
FEVER: 0
GASTROINTESTINAL NEGATIVE: 1
CHILLS: 0
VOMITING: 0
EYES NEGATIVE: 1
DIARRHEA: 0
HEADACHES: 0
ORTHOPNEA: 0
MYALGIAS: 0
SHORTNESS OF BREATH: 0

## 2020-09-29 ASSESSMENT — CHA2DS2 SCORE
SEX: FEMALE
AGE 65 TO 74: YES
VASCULAR DISEASE: NO
CHA2DS2 VASC SCORE: 3
AGE 75 OR GREATER: NO
DIABETES: NO
CHF OR LEFT VENTRICULAR DYSFUNCTION: NO
HYPERTENSION: YES

## 2020-09-29 NOTE — PROGRESS NOTES
"Subjective:      Zeinab Loza is a 75 y.o. female who presents with Irregular Heart Beat (Last friday pt did pulse rate 121, checked again still elavated, pt recomended EKG, CHF PAH)      HPI:  This is a new problem. Zeinab Loza is a 75 y.o. female with history of atrial fibrillation, hypertension, pulmonary hypertension, and heart failure.  Who is chronically anti-coagulated on Coumadin, presents today for evaluation of irregular heartbeat.  Patient reports that she had a physical therapist come to the health couple times a week because she had an ankle surgery done almost 2 months ago.  The PT said that on Friday he noticed that her heart rate was elevated but it was regular rhythm which is not consistent with her atrial fibrillation.  It was elevated to 121 at that time.  When the PT came again within the last 1 to 2 days it was still elevated close to 120.  Patient is also reported feeling a little bit \"weak\" and occasional lightheadedness.  States that otherwise she feels fine.  She has not had any chest pain, visual changes, headache, nausea/vomiting, or increased shortness of breath from her baseline.  No fever/chills.        Review of Systems   Constitutional: Positive for malaise/fatigue. Negative for chills and fever.   HENT: Negative for congestion and sore throat.    Eyes: Negative for discharge and redness.   Respiratory: Negative for cough and shortness of breath.    Cardiovascular: Positive for palpitations. Negative for chest pain.   Gastrointestinal: Negative for abdominal pain, diarrhea, nausea and vomiting.   Musculoskeletal: Negative for myalgias.   Skin: Negative for rash.   Neurological: Negative for dizziness and headaches.       PMH:  has a past medical history of Aneurysm artery, celiac (HCC) (2019), Aneurysm of right renal artery (HCC), Atrial fibrillation (HCC), Diastolic heart failure (HCC), Hypertension, essential, Pulmonary hypertension (HCC), and Warfarin " anticoagulation.  MEDS:   Current Outpatient Medications:   •  potassium chloride SA (KDUR) 20 MEQ Tab CR, Take 2 Tabs by mouth 3 times a day. (Patient taking differently: Take 120 mEq by mouth 2 times a day.), Disp: 120 Tab, Rfl: 0  •  digoxin (LANOXIN) 125 MCG Tab, Take 1 Tab by mouth every evening., Disp: 90 Tab, Rfl: 3  •  DILTIAZem CD (CARDIZEM CD) 120 MG CAPSULE SR 24 HR, TAKE 1 CAPSULE EVERY DAY, Disp: 90 Cap, Rfl: 1  •  torsemide (DEMADEX) 20 MG Tab, Take 1 Tab by mouth 2 times a day. (Patient taking differently: Take 40 mg by mouth 2 times a day.), Disp: 180 Tab, Rfl: 3  •  apixaban (ELIQUIS) 5mg Tab, Take 5 mg by mouth 2 Times a Day., Disp: , Rfl:   •  amitriptyline (ELAVIL) 10 MG Tab, Take 20 mg by mouth every bedtime., Disp: , Rfl:   •  methocarbamol (ROBAXIN) 500 MG Tab, 500 mg every day., Disp: , Rfl: 0  •  metOLazone (ZAROXOLYN) 5 MG Tab, Take 5 mg by mouth every day., Disp: , Rfl:   •  sotalol (BETAPACE) 80 MG Tab, Take 1 Tab by mouth 2 times a day., Disp: 60 Tab, Rfl: 11  •  acetaminophen (TYLENOL) 500 MG Tab, Take 1,000 mg by mouth every 6 hours as needed for Mild Pain., Disp: , Rfl:   •  Tadalafil, PAH, (ADCIRCA) 20 MG Tab, Take 40 mg by mouth every bedtime. Indications: Pulmonary Arterial Hypertension, Disp: , Rfl:   •  therapeutic multivitamin-minerals (THERAGRAN-M) Tab, Take 1 Tab by mouth every day., Disp: , Rfl:   •  PARoxetine (PAXIL) 20 MG Tab, Take 20 mg by mouth every day., Disp: , Rfl:   •  rosuvastatin (CRESTOR) 10 MG Tab, Take 10 mg by mouth every evening., Disp: , Rfl:   •  ambrisentan (LETAIRIS) 10 MG tablet, Take 10 mg by mouth every day., Disp: , Rfl:   •  levothyroxine (SYNTHROID) 75 MCG Tab, Take 75 mcg by mouth Every morning on an empty stomach., Disp: , Rfl:   •  Cholecalciferol (VITAMIN D) 2000 units Cap, Take 2,000 Units by mouth every day., Disp: , Rfl:   ALLERGIES:   Allergies   Allergen Reactions   • Zolpidem Tartrate Unspecified     Lightheaded and confusion     SURGHX:  "  Past Surgical History:   Procedure Laterality Date   • ANKLE ORIF Left 8/5/2020    Procedure: ORIF, ANKLE;  Surgeon: Tk Humphreys M.D.;  Location: SURGERY [facility];  Service: Orthopedics     SOCHX:  reports that she has never smoked. She has never used smokeless tobacco. She reports current alcohol use of about 4.2 oz of alcohol per week. She reports that she does not use drugs.  FH: Family history was reviewed, no pertinent findings to report     Objective:     /88 (BP Location: Right arm, Patient Position: Sitting, BP Cuff Size: Adult)   Pulse (!) 121   Temp 36.4 °C (97.5 °F) (Temporal)   Resp 12   Ht 1.702 m (5' 7.01\")   Wt 94.3 kg (208 lb)   SpO2 95%   BMI 32.57 kg/m²      Physical Exam  Constitutional:       Appearance: She is well-developed.   HENT:      Head: Normocephalic and atraumatic.      Right Ear: External ear normal.      Left Ear: External ear normal.   Eyes:      Conjunctiva/sclera: Conjunctivae normal.      Pupils: Pupils are equal, round, and reactive to light.   Neck:      Musculoskeletal: Normal range of motion.   Cardiovascular:      Rate and Rhythm: Regular rhythm. Tachycardia present.      Pulses:           Radial pulses are 2+ on the right side and 2+ on the left side.      Heart sounds: Normal heart sounds. No murmur.   Pulmonary:      Effort: Pulmonary effort is normal.      Breath sounds: Normal breath sounds. No decreased breath sounds, wheezing, rhonchi or rales.   Lymphadenopathy:      Cervical: No cervical adenopathy.   Skin:     General: Skin is warm and dry.      Capillary Refill: Capillary refill takes less than 2 seconds.   Neurological:      Mental Status: She is alert and oriented to person, place, and time.   Psychiatric:         Behavior: Behavior normal.         Judgment: Judgment normal.         EKG Interpretation - HR is 118 normal EKG, sinus tachycardia no present, prolonged QT interval, inferior infarct finding is now present again (comapred to " EKG from 7/9/2020)           Assessment/Plan:       1. Sinus tachycardia  - EKG - Clinic Performed        *Patient appears to have persistent sinus tachycardia at least Friday.  She is starting to note some mild lightheadedness/dizziness with positional changes.  She is not having any increased shortness of breath or chest pain but due to her significant cardiac history and the fact that she is on chronic anticoagulation with Coumadin, I believe she needs higher level of care.  She is referred to the emergency department for further work-up and evaluation.  Her daughter is going to drive her.

## 2020-09-29 NOTE — ED TRIAGE NOTES
Amb to triage w/ daughter, sent from  for further eval.  Pt c/o a rapid HR in the 120's x 4 days.  Pt denies sob or chest pain.  Pt c/o mild dizziness.  Pt also c/o increased swelling to BLE.

## 2020-09-30 LAB
ALBUMIN SERPL BCP-MCNC: 3.9 G/DL (ref 3.2–4.9)
ALBUMIN/GLOB SERPL: 1.4 G/DL
ALP SERPL-CCNC: 84 U/L (ref 30–99)
ALT SERPL-CCNC: 22 U/L (ref 2–50)
ANION GAP SERPL CALC-SCNC: 13 MMOL/L (ref 7–16)
AST SERPL-CCNC: 27 U/L (ref 12–45)
BILIRUB SERPL-MCNC: 0.4 MG/DL (ref 0.1–1.5)
BUN SERPL-MCNC: 24 MG/DL (ref 8–22)
CALCIUM SERPL-MCNC: 10.2 MG/DL (ref 8.5–10.5)
CHLORIDE SERPL-SCNC: 98 MMOL/L (ref 96–112)
CHOLEST SERPL-MCNC: 213 MG/DL (ref 100–199)
CO2 SERPL-SCNC: 30 MMOL/L (ref 20–33)
COVID ORDER STATUS COVID19: NORMAL
CREAT SERPL-MCNC: 0.89 MG/DL (ref 0.5–1.4)
EKG IMPRESSION: NORMAL
GLOBULIN SER CALC-MCNC: 2.7 G/DL (ref 1.9–3.5)
GLUCOSE SERPL-MCNC: 116 MG/DL (ref 65–99)
HDLC SERPL-MCNC: 53 MG/DL
LDLC SERPL CALC-MCNC: 131 MG/DL
POTASSIUM SERPL-SCNC: 3.7 MMOL/L (ref 3.6–5.5)
PROT SERPL-MCNC: 6.6 G/DL (ref 6–8.2)
SARS-COV-2 RNA RESP QL NAA+PROBE: NOTDETECTED
SODIUM SERPL-SCNC: 141 MMOL/L (ref 135–145)
SPECIMEN SOURCE: NORMAL
TRIGL SERPL-MCNC: 146 MG/DL (ref 0–149)

## 2020-09-30 PROCEDURE — 99232 SBSQ HOSP IP/OBS MODERATE 35: CPT | Performed by: INTERNAL MEDICINE

## 2020-09-30 PROCEDURE — 99224 PR SUBSEQUENT OBSERVATION CARE,LEVEL I: CPT | Performed by: INTERNAL MEDICINE

## 2020-09-30 PROCEDURE — C9803 HOPD COVID-19 SPEC COLLECT: HCPCS | Performed by: STUDENT IN AN ORGANIZED HEALTH CARE EDUCATION/TRAINING PROGRAM

## 2020-09-30 PROCEDURE — 96374 THER/PROPH/DIAG INJ IV PUSH: CPT

## 2020-09-30 PROCEDURE — 93010 ELECTROCARDIOGRAM REPORT: CPT | Mod: 76 | Performed by: INTERNAL MEDICINE

## 2020-09-30 PROCEDURE — 770020 HCHG ROOM/CARE - TELE (206)

## 2020-09-30 PROCEDURE — 93005 ELECTROCARDIOGRAM TRACING: CPT | Performed by: INTERNAL MEDICINE

## 2020-09-30 PROCEDURE — 36415 COLL VENOUS BLD VENIPUNCTURE: CPT

## 2020-09-30 PROCEDURE — 99221 1ST HOSP IP/OBS SF/LOW 40: CPT | Performed by: INTERNAL MEDICINE

## 2020-09-30 PROCEDURE — A9270 NON-COVERED ITEM OR SERVICE: HCPCS | Performed by: STUDENT IN AN ORGANIZED HEALTH CARE EDUCATION/TRAINING PROGRAM

## 2020-09-30 PROCEDURE — 93010 ELECTROCARDIOGRAM REPORT: CPT | Performed by: INTERNAL MEDICINE

## 2020-09-30 PROCEDURE — 700102 HCHG RX REV CODE 250 W/ 637 OVERRIDE(OP): Performed by: STUDENT IN AN ORGANIZED HEALTH CARE EDUCATION/TRAINING PROGRAM

## 2020-09-30 PROCEDURE — 80053 COMPREHEN METABOLIC PANEL: CPT

## 2020-09-30 PROCEDURE — 700101 HCHG RX REV CODE 250: Performed by: STUDENT IN AN ORGANIZED HEALTH CARE EDUCATION/TRAINING PROGRAM

## 2020-09-30 PROCEDURE — 80061 LIPID PANEL: CPT

## 2020-09-30 PROCEDURE — U0003 INFECTIOUS AGENT DETECTION BY NUCLEIC ACID (DNA OR RNA); SEVERE ACUTE RESPIRATORY SYNDROME CORONAVIRUS 2 (SARS-COV-2) (CORONAVIRUS DISEASE [COVID-19]), AMPLIFIED PROBE TECHNIQUE, MAKING USE OF HIGH THROUGHPUT TECHNOLOGIES AS DESCRIBED BY CMS-2020-01-R: HCPCS

## 2020-09-30 PROCEDURE — 93005 ELECTROCARDIOGRAM TRACING: CPT | Performed by: STUDENT IN AN ORGANIZED HEALTH CARE EDUCATION/TRAINING PROGRAM

## 2020-09-30 RX ORDER — TADALAFIL 20 MG/1
40 TABLET ORAL
Status: DISCONTINUED | OUTPATIENT
Start: 2020-09-30 | End: 2020-10-01 | Stop reason: HOSPADM

## 2020-09-30 RX ORDER — TADALAFIL 20 MG/1
40 TABLET ORAL
Status: DISCONTINUED | OUTPATIENT
Start: 2020-09-30 | End: 2020-09-30

## 2020-09-30 RX ADMIN — DIGOXIN 125 MCG: 125 TABLET ORAL at 18:02

## 2020-09-30 RX ADMIN — OXYCODONE 5 MG: 5 TABLET ORAL at 18:02

## 2020-09-30 RX ADMIN — APIXABAN 5 MG: 5 TABLET, FILM COATED ORAL at 06:01

## 2020-09-30 RX ADMIN — TORSEMIDE 40 MG: 20 TABLET ORAL at 06:10

## 2020-09-30 RX ADMIN — TADALAFIL 40 MG: 20 TABLET ORAL at 21:00

## 2020-09-30 RX ADMIN — PAROXETINE HYDROCHLORIDE 20 MG: 20 TABLET, FILM COATED ORAL at 06:02

## 2020-09-30 RX ADMIN — METOPROLOL TARTRATE 5 MG: 5 INJECTION, SOLUTION INTRAVENOUS at 02:19

## 2020-09-30 RX ADMIN — DOCUSATE SODIUM 50 MG AND SENNOSIDES 8.6 MG 2 TABLET: 8.6; 5 TABLET, FILM COATED ORAL at 06:11

## 2020-09-30 RX ADMIN — LEVOTHYROXINE SODIUM 75 MCG: 0.07 TABLET ORAL at 06:01

## 2020-09-30 RX ADMIN — ROSUVASTATIN CALCIUM 10 MG: 20 TABLET, FILM COATED ORAL at 18:03

## 2020-09-30 RX ADMIN — POTASSIUM CHLORIDE 60 MEQ: 1500 TABLET, EXTENDED RELEASE ORAL at 22:21

## 2020-09-30 RX ADMIN — AMITRIPTYLINE HYDROCHLORIDE 20 MG: 10 TABLET, FILM COATED ORAL at 20:24

## 2020-09-30 RX ADMIN — OXYCODONE 5 MG: 5 TABLET ORAL at 10:31

## 2020-09-30 RX ADMIN — OXYCODONE 5 MG: 5 TABLET ORAL at 06:01

## 2020-09-30 RX ADMIN — SOTALOL HYDROCHLORIDE 120 MG: 120 TABLET ORAL at 06:02

## 2020-09-30 RX ADMIN — OXYCODONE 5 MG: 5 TABLET ORAL at 22:22

## 2020-09-30 RX ADMIN — SOTALOL HYDROCHLORIDE 120 MG: 120 TABLET ORAL at 18:04

## 2020-09-30 RX ADMIN — DILTIAZEM HYDROCHLORIDE 120 MG: 120 CAPSULE, EXTENDED RELEASE ORAL at 06:01

## 2020-09-30 RX ADMIN — TORSEMIDE 40 MG: 20 TABLET ORAL at 18:04

## 2020-09-30 RX ADMIN — APIXABAN 5 MG: 5 TABLET, FILM COATED ORAL at 18:02

## 2020-09-30 RX ADMIN — POTASSIUM CHLORIDE 60 MEQ: 1500 TABLET, EXTENDED RELEASE ORAL at 10:31

## 2020-09-30 ASSESSMENT — COGNITIVE AND FUNCTIONAL STATUS - GENERAL
MOVING FROM LYING ON BACK TO SITTING ON SIDE OF FLAT BED: A LITTLE
TOILETING: A LITTLE
CLIMB 3 TO 5 STEPS WITH RAILING: A LITTLE
DAILY ACTIVITIY SCORE: 22
SUGGESTED CMS G CODE MODIFIER DAILY ACTIVITY: CJ
MOBILITY SCORE: 20
SUGGESTED CMS G CODE MODIFIER MOBILITY: CJ
MOVING TO AND FROM BED TO CHAIR: A LITTLE
STANDING UP FROM CHAIR USING ARMS: A LITTLE
HELP NEEDED FOR BATHING: A LITTLE

## 2020-09-30 ASSESSMENT — ENCOUNTER SYMPTOMS
LIGHT-HEADEDNESS: 0
ABDOMINAL PAIN: 0
PALPITATIONS: 0
NAUSEA: 0
SPUTUM PRODUCTION: 0
PHOTOPHOBIA: 0
NERVOUS/ANXIOUS: 0
PALPITATIONS: 1
VOMITING: 0
FLANK PAIN: 0
DIZZINESS: 0
EYE PAIN: 0
MYALGIAS: 0
CHILLS: 0
WHEEZING: 0
HEMOPTYSIS: 0
HEADACHES: 0
WEIGHT LOSS: 0
EYE DISCHARGE: 0
LOSS OF CONSCIOUSNESS: 0
TREMORS: 0
COUGH: 0
DEPRESSION: 0
BRUISES/BLEEDS EASILY: 0
DOUBLE VISION: 0
ORTHOPNEA: 0
HEARTBURN: 0
FEVER: 0
SPEECH CHANGE: 0
BLURRED VISION: 0
BACK PAIN: 0
NECK PAIN: 0
POLYDIPSIA: 0
SHORTNESS OF BREATH: 0
FOCAL WEAKNESS: 0
HALLUCINATIONS: 0

## 2020-09-30 ASSESSMENT — LIFESTYLE VARIABLES
CONSUMPTION TOTAL: NEGATIVE
TOTAL SCORE: 0
AVERAGE NUMBER OF DAYS PER WEEK YOU HAVE A DRINK CONTAINING ALCOHOL: 2
HAVE YOU EVER FELT YOU SHOULD CUT DOWN ON YOUR DRINKING: NO
TOTAL SCORE: 0
EVER FELT BAD OR GUILTY ABOUT YOUR DRINKING: NO
TOTAL SCORE: 0
ALCOHOL_USE: YES
DOES PATIENT WANT TO STOP DRINKING: NO
ON A TYPICAL DAY WHEN YOU DRINK ALCOHOL HOW MANY DRINKS DO YOU HAVE: 1
HOW MANY TIMES IN THE PAST YEAR HAVE YOU HAD 5 OR MORE DRINKS IN A DAY: 0
SUBSTANCE_ABUSE: 0
EVER HAD A DRINK FIRST THING IN THE MORNING TO STEADY YOUR NERVES TO GET RID OF A HANGOVER: NO
HAVE PEOPLE ANNOYED YOU BY CRITICIZING YOUR DRINKING: NO

## 2020-09-30 ASSESSMENT — PATIENT HEALTH QUESTIONNAIRE - PHQ9
1. LITTLE INTEREST OR PLEASURE IN DOING THINGS: NOT AT ALL
SUM OF ALL RESPONSES TO PHQ9 QUESTIONS 1 AND 2: 0
2. FEELING DOWN, DEPRESSED, IRRITABLE, OR HOPELESS: NOT AT ALL

## 2020-09-30 ASSESSMENT — PAIN DESCRIPTION - PAIN TYPE
TYPE: CHRONIC PAIN
TYPE: ACUTE PAIN

## 2020-09-30 ASSESSMENT — FIBROSIS 4 INDEX: FIB4 SCORE: 1.06

## 2020-09-30 NOTE — PROGRESS NOTES
Cardiology Follow Up Progress Note    Date of Service  9/30/2020    Attending Physician  Kraig Waite M.D.    Chief Complaint   Tachycardia    HPI  Zeinab Loza is a 75 y.o. female with a complicated past medical history detailed in Dr. Liao's note 9/29/20, in brief history of MVR, MAZE and NED 2011 at [facility], chronic hypoxemic respiratory failure 2/2 to PH on tadalafil and admitted 9/29/2020 with 1 week of tachycardia without associated palpitations, LAST, orthopnea, peripheral swelling.     Interim Events  No events overnight, heart rates at time of my exam around 60s. She denies any changes to her O2 needs, no orthopnea, leg swelling, abdominal bloating.    Review of Systems  Review of Systems   Constitutional: Negative for chills and fever.   Respiratory: Negative for cough and shortness of breath (at baseline).    Cardiovascular: Negative for chest pain, palpitations and leg swelling.   Genitourinary: Positive for frequency.   Neurological: Negative for dizziness and light-headedness.       Vital signs in last 24 hours  Temp:  [36.3 °C (97.4 °F)-37.3 °C (99.1 °F)] 36.4 °C (97.6 °F)  Pulse:  [105-128] 105  Resp:  [13-21] 20  BP: (114-167)/() 125/80  SpO2:  [95 %-99 %] 96 %    Physical Exam  Physical Exam  Vitals signs reviewed.   Constitutional:       General: She is not in acute distress.     Appearance: She is obese.   HENT:      Head: Normocephalic and atraumatic.   Neck:      Comments: JVP 6-7cm  Cardiovascular:      Rate and Rhythm: Normal rate and regular rhythm.      Heart sounds: No murmur.   Pulmonary:      Effort: Pulmonary effort is normal.      Breath sounds: No wheezing or rales.      Comments: 5L O2  Abdominal:      Palpations: Abdomen is soft.   Musculoskeletal:      Right lower leg: No edema.      Left lower leg: No edema.   Skin:     General: Skin is warm and dry.   Neurological:      Mental Status: She is alert.   Psychiatric:         Judgment: Judgment normal.         Lab  Review  Lab Results   Component Value Date/Time    WBC 8.2 09/29/2020 05:03 PM    RBC 3.95 (L) 09/29/2020 05:03 PM    HEMOGLOBIN 13.0 09/29/2020 05:03 PM    HEMATOCRIT 39.5 09/29/2020 05:03 PM    .0 (H) 09/29/2020 05:03 PM    MCH 32.9 09/29/2020 05:03 PM    MCHC 32.9 (L) 09/29/2020 05:03 PM    MPV 8.4 (L) 09/29/2020 05:03 PM      Lab Results   Component Value Date/Time    SODIUM 141 09/30/2020 09:55 AM    POTASSIUM 3.7 09/30/2020 09:55 AM    CHLORIDE 98 09/30/2020 09:55 AM    CO2 30 09/30/2020 09:55 AM    GLUCOSE 116 (H) 09/30/2020 09:55 AM    BUN 24 (H) 09/30/2020 09:55 AM    CREATININE 0.89 09/30/2020 09:55 AM      Lab Results   Component Value Date/Time    ASTSGOT 27 09/30/2020 09:55 AM    ALTSGPT 22 09/30/2020 09:55 AM     Lab Results   Component Value Date/Time    CHOLSTRLTOT 213 (H) 09/30/2020 09:55 AM     (H) 09/30/2020 09:55 AM    HDL 53 09/30/2020 09:55 AM    TRIGLYCERIDE 146 09/30/2020 09:55 AM    TROPONINT 16 09/29/2020 05:03 PM       Recent Labs     09/29/20  1703   NTPROBNP 837*       Cardiac Imaging and Procedures Review  EKG: atrial fibrillation     Echocardiogram:  8/5/20  CONCLUSIONS  Normal left ventricular size, wall thickness, and systolic function.  Dilated right ventricle with preserved systolic function.  No significant valvular pathology.  Estimated right ventricular systolic pressure  is 25 mmHg; normal.  Normal pericardium without effusion.    Left Ventricle  Normal left ventricular chamber size. Normal left ventricular wall   thickness. Normal left ventricular systolic function. Left ventricular   ejection fraction is visually estimated to be 60%. Normal regional wall   motion. Indeterminate diastolic function.     Right Ventricle  Mildly dilated right ventricle. Normal right ventricular systolic   function.     Right Atrium  Normal right atrial size. Normal inferior vena cava size and   inspiratory collapse.     Left Atrium  Normal left atrial size. Left atrial volume index  is 33  mL/sq m.     Mitral Valve  Structurally normal mitral valve without significant stenosis or   regurgitation.     Aortic Valve  Structurally normal aortic valve without significant stenosis or   regurgitation.     Tricuspid Valve  Structurally normal tricuspid valve. No tricuspid stenosis. Trace   tricuspid regurgitation. Estimated right ventricular systolic pressure    is 25 mmHg.     Pulmonic Valve  Structurally normal pulmonic valve without significant stenosis or   regurgitation.     Pericardium  Normal pericardium without effusion.     Aorta  Normal aortic root for body surface area. Ascending aorta diameter is   3.2 cm.    Cardiac Catheterization:  3/20/18    CARDIAC CATH 3/20/2018:  1) Pulmonary hypertension: RA 15 mmHg, RV 49/6/14 mmHg, PA 48/21/33 mmHg, PCWP 19 mmHg, Odin C.O. 5.19 L/min, Odin C.I.  2.39, PVR 4.86. Findings suggest froup 2 PH.  2) Abnormal exercise response with increase in pulmonary pressure and increase in left sided filling pressure.  3) Shunt run: no evidence of left right shunting.  4) No evidence of right to left intracardiac shunting at rest or with exercise. Echo findings dictated separately.  5) Preserved cardiac output at rest.     RHC 2019  Mean RA 19 mmHg, RV 47/21 mmHg, PA 50/24 mmHg, mean PA 35 mmHg, PCWP 30 mmHg  Odin CO 4.5 , CI 2.03  PVR 1.1 woods    Assessment/Plan  Atypical Atrial Flutter/Atrial Fibrillation  - rates improved this morning after increase in sotalol dose, QTc is prolonged although appears to be stable compared to prior, longest 546ms on my measurements from EKG at 0748 today  - EP consulted, appreciate their recommendations  - on Eliquis 5mg bid, can resume this as she will be doing RHC as outpatient  - dig 125mcg and Dilt 120mg    Pulmonary Hypertension, WHO group 1 vs 2  Chronic hypoxic respiratory failure  - severely elevated PCWP on previous RHC, last done in 2018, plan to re-evaluate this as an outpatient. Continue home diuretics at this point,  she does not appear overtly overloaded on exam this morning  - discussed Cardiomems implantation which she is interested in  - most recent echo shows only mild RV dilation with normal function and normal RVSP  - continue tadalafil 40mg QHS    Plan: monitor QTc and rhythm overnight, plan for RHC as outpatient in the next few weeks with discussion of cardiomems.     Staffed with Dr. Alfaro

## 2020-09-30 NOTE — CONSULTS
Electrophysiology consult    Requested by Dr. Liao      REASON FOR CONSULT: Recurrent atrial arrhythmias    HPI: 75-year-old white female with history of mitral valve repair and surgical maze 2011 at [facility].  Previous catheter ablation prior to surgery  Catheter ablation following surgery.  Being maintained on sotalol 80 mg twice a day.  Getting physical therapy at home.  Noted to have elevated heart rate by PT.  Relatively asymptomatic.  On Eliquis.  Admitted the hospital noted to be in atrial arrhythmia.  Sotalol 80 twice a day was increased to 120 twice a day.  Denies smoking or alcohol use.  Feels well.  Previously seen Dr. Galindo.  Normal recent echocardiogram and TFTs.        MEDICATIONS:      Current Facility-Administered Medications:   •  amitriptyline (ELAVIL) tablet 20 mg, 20 mg, Oral, QHS, Fortune Aig-Ojeanor, D.O., 20 mg at 09/29/20 2340  •  [Held by provider] apixaban (ELIQUIS) tablet 5 mg, 5 mg, Oral, BID, Fortune Aig-Ojeanor, D.O., 5 mg at 09/30/20 0601  •  digoxin (LANOXIN) tablet 125 mcg, 125 mcg, Oral, Q EVENING, Fortune Aig-Ojeanor, D.O., 125 mcg at 09/29/20 2355  •  DILTIAZem CD (CARDIZEM CD) capsule 120 mg, 120 mg, Oral, Q DAY, Fortune Aig-Ojeanor, D.O., 120 mg at 09/30/20 0601  •  levothyroxine (SYNTHROID) tablet 75 mcg, 75 mcg, Oral, AM ES, Fortune Aig-Ojeanor, D.O., 75 mcg at 09/30/20 0601  •  PARoxetine (PAXIL) tablet 20 mg, 20 mg, Oral, QAM, Fortune Aig-Ojeanor, D.O., 20 mg at 09/30/20 0602  •  rosuvastatin (CRESTOR) tablet 10 mg, 10 mg, Oral, Q EVENING, Fortune Aig-Ojeanor, D.O., 10 mg at 09/29/20 2355  •  Metoprolol Tartrate (LOPRESSOR) injection 5 mg, 5 mg, Intravenous, Q5 MIN PRN, Fortune Aig-Ojeanor, D.O., 5 mg at 09/30/20 0219  •  senna-docusate (PERICOLACE or SENOKOT S) 8.6-50 MG per tablet 2 Tab, 2 Tab, Oral, BID, 2 Tab at 09/30/20 0611 **AND** polyethylene glycol/lytes (MIRALAX) PACKET 1 Packet, 1 Packet, Oral, QDAY PRN **AND** magnesium hydroxide (MILK OF MAGNESIA) suspension 30  mL, 30 mL, Oral, QDAY PRN **AND** bisacodyl (DULCOLAX) suppository 10 mg, 10 mg, Rectal, QDAY PRN, Fortune Aig-Ojeanor, D.O.  •  torsemide (DEMADEX) tablet 40 mg, 40 mg, Oral, BID, Fortune Aig-Ojeanor, D.O., 40 mg at 09/30/20 0610  •  potassium chloride SA (Kdur) tablet 60 mEq, 60 mEq, Oral, BID, Fortune Aig-Ojeanor, D.O., 60 mEq at 09/30/20 1031  •  sotalol (BETAPACE) tablet 120 mg, 120 mg, Oral, BID, Fortune Aig-Ojeanor, D.O., 120 mg at 09/30/20 0602  •  oxyCODONE immediate-release (ROXICODONE) tablet 5 mg, 5 mg, Oral, Q4HRS PRN, Fortune Aig-Ojeanor, D.O., 5 mg at 09/30/20 1031    ALLERGIES:   Ms. Loza  is allergic to zolpidem tartrate.     SURGERIES:  Ms. Loza   has a past surgical history that includes ankle orif (Left, 8/5/2020).     MEDICAL ILLNESSES:  Ms. Loza   has a past medical history of Aneurysm artery, celiac (HCC) (2019), Aneurysm of right renal artery (HCC), Atrial fibrillation (HCC), Diastolic heart failure (HCC), Hypertension, essential, Pulmonary hypertension (HCC), and Warfarin anticoagulation.     SOCIAL HISTORY:  Ms. Loza   reports that she has never smoked. She has never used smokeless tobacco. She reports current alcohol use of about 4.2 oz of alcohol per week. She reports that she does not use drugs.     FAMILY HISTORY:  Ms.'s Loza  family history is not on file.      ROS:  Review of Systems   Constitutional: Negative for chills and fever.   HENT: Negative for congestion.    Eyes: Negative for blurred vision, pain and discharge.   Respiratory: Negative for cough, hemoptysis and wheezing.    Cardiovascular: Negative for chest pain and palpitations.   Gastrointestinal: Negative for abdominal pain, nausea and vomiting.   Musculoskeletal: Negative for joint pain and myalgias.   Skin: Negative for itching and rash.   Neurological: Negative for speech change and loss of consciousness.   Endo/Heme/Allergies: Does not bruise/bleed easily.   Psychiatric/Behavioral: Negative for  "depression. The patient is not nervous/anxious.    All other systems reviewed and are negative.    In addition to the above, a complete review of system was performed and all other organs systems were normal    PHYSICAL EXAM:  Physical Exam   Constitutional: She is oriented to person, place, and time and well-developed, well-nourished, and in no distress.   HENT:   Head: Normocephalic and atraumatic.   Eyes: Pupils are equal, round, and reactive to light. EOM are normal.   Neck: Normal range of motion. Neck supple.   Cardiovascular: Normal rate and regular rhythm. Exam reveals no gallop and no friction rub.   Murmur heard.  Pulmonary/Chest: Effort normal and breath sounds normal.   Abdominal: Soft. Bowel sounds are normal.   Musculoskeletal: Normal range of motion.         General: No tenderness, deformity or edema.   Neurological: She is alert and oriented to person, place, and time.   Skin: Skin is dry.   Psychiatric: Mood, memory, affect and judgment normal.       /82   Pulse (!) 103   Temp 36.4 °C (97.6 °F) (Temporal)   Resp 18   Ht 1.702 m (5' 7\")   Wt 93 kg (205 lb 0.4 oz)   SpO2 95%   BMI 32.11 kg/m²      Lab Results   Component Value Date/Time    CHOLSTRLTOT 213 (H) 09/30/2020 09:55 AM     (H) 09/30/2020 09:55 AM    HDL 53 09/30/2020 09:55 AM    TRIGLYCERIDE 146 09/30/2020 09:55 AM       Lab Results   Component Value Date/Time    SODIUM 141 09/30/2020 09:55 AM    POTASSIUM 3.7 09/30/2020 09:55 AM    CHLORIDE 98 09/30/2020 09:55 AM    CO2 30 09/30/2020 09:55 AM    GLUCOSE 116 (H) 09/30/2020 09:55 AM    BUN 24 (H) 09/30/2020 09:55 AM    CREATININE 0.89 09/30/2020 09:55 AM     Lab Results   Component Value Date/Time    ALKPHOSPHAT 84 09/30/2020 09:55 AM    ASTSGOT 27 09/30/2020 09:55 AM    ALTSGPT 22 09/30/2020 09:55 AM    TBILIRUBIN 0.4 09/30/2020 09:55 AM      No results found for: BNPBTYPENAT      Recent Labs     09/29/20  1703   WBC 8.2   RBC 3.95*   HEMOGLOBIN 13.0   HEMATOCRIT 39.5 "   .0*   MCH 32.9   MCHC 32.9*   RDW 45.3   PLATELETCT 295   MPV 8.4*   Echocardiogram from 2020  CONCLUSIONS  Normal left ventricular size, wall thickness, and systolic function.  Dilated right ventricle with preserved systolic function.  No significant valvular pathology.  Estimated right ventricular systolic pressure  is 25 mmHg; normal.  Normal pericardium without effusion.     No prior study is available for comparison.        EK2020 at 1212.  Sinus rhythm.  Q's inferiorly.  QTC less than 500.  Earlier EKGs today and 2020 atrial tachycardia or atypical atrial flutter with RVR Q's inferiorly    Results for orders placed or performed during the hospital encounter of 20   EKG   Result Value Ref Range    Report       [facility]    Test Date:  2020  Pt Name:    NATALIE STAPLES                Department: ER  MRN:        [id number]                      Room:  Gender:     Female                       Technician: [id number]  :        1945                   Requested By:ER TRIAGE PROTOCOL  Order #:    [id number]                    Reading MD:    Measurements  Intervals                                Axis  Rate:       122                          P:          0  WV:         118                          QRS:        -32  QRSD:       88                           T:          147  QT:         308  QTc:        439    Interpretive Statements  SINUS TACHYCARDIA  VENTRICULAR PREMATURE COMPLEX  ABERRANT COMPLEX, POSSIBLY SUPRAVENTRICULAR  PROBABLE LVH WITH SECONDARY REPOL ABNRM  PROBABLE INFERIOR INFARCT, AGE INDETERMINATE  CONSIDER POSTERIOR WALL INVOLVEMENT  BASELINE WANDER IN LEAD(S) V2,V6  Compared to ECG 2020 10:35:34  Ventricular premature complex(es) now present   Aberrant conduction of supraventricular beat(s) now present  Myocardial infarct finding now present  Atrial fibrillation no longer present     EKG in AM   Result Value Ref Range    Report        [facility]    Test Date:  2020  Pt Name:    NATALIE STAPLES                Department: ER  MRN:        [id number]                      Room:       T724  Gender:     Female                       Technician: [email or web address]  :        1945                   Requested By:DOUGLAS GARZA  Order #:    [id number]                    Reading MD: Enrrique Holloway MD    Measurements  Intervals                                Axis  Rate:       118                          P:          0  CT:         116                          QRS:        -24  QRSD:       84                           T:  QT:         331  QTc:        464    Interpretive Statements  SUPRAVENTRICULAR TACHYCARDIA  LEFT VENTRICULAR HYPERTROPHY  PROBABLE INFERIOR INFARCT, AGE INDETERMINATE  Electronically Signed On 2020 7:50:05 PDT by Enrrique Holloway MD  Electronically Signed On 2020 7:54:46 PDT by Enrrique Holloway MD     Tikosyn EKG   Result Value Ref Range    Report       [facility]    Test Date:  2020  Pt Name:    NATALIE STAPLES                Department: 171  MRN:        [id number]                      Room:       T724  Gender:     Female                       Technician: [facility]  :        1945                   Requested By:TM CARPIO  Order #:    [id number]                    Reading MD: Enrrique Holloway MD    Measurements  Intervals                                Axis  Rate:       96                           P:  CT:                                      QRS:        -23  QRSD:       71                           T:  QT:         512  QTc:        648    Interpretive Statements  ATRIAL FIBRILLATION  EARLY PRECORDIAL R/S TRANSITION  CONSIDER INFERIOR INFARCT, OLD  NONSPECIFIC ST-T WAVE ABNORMALITIES, DIFFUSE LEADS  PROLONGED QT INTERVAL  Electronically Signed On 2020 7:55:59 PDT by Enrrique Holloway MD     EKG   Result Value Ref Range    Report       [facility]    Test Date:  2020  Pt Name:     NATALIE STAPLES                Department: 171  MRN:        [id number]                      Room:       T724  Gender:     Female                       Technician: [email or web address]  :        1945                   Requested By:MT CARPIO  Order #:    [id number]                    Reading MD: Enrrique Holloway MD    Measurements  Intervals                                Axis  Rate:       64                           P:          4  OK:         228                          QRS:        -27  QRSD:       93                           T:          6  QT:         455  QTc:        470    Interpretive Statements  SINUS RHYTHM  FIRST DEGREE AV BLOCK  LEFT VENTRICULAR HYPERTROPHY  CONSIDER INFERIOR INFARCTION  Compared to ECG 2020 07:48:35  Atrial fibrillation no longer present  Electronically Signed On 2020 12:36:28 PDT by Enrrique Holloway MD         IMAGING:   DX-CHEST-PORTABLE (1 VIEW)   Final Result      No acute cardiopulmonary abnormality identified.      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 2020     Priority: High   • Acrocyanosis (HCC) 01/15/2020     Priority: High   • Pulmonary hypertension (HCC) 2019     Priority: High   • Chronic back pain 2019     Priority: High   • Hypokalemia 01/15/2020     Priority: Medium   • Essential hypertension 05/10/2006     Priority: Medium   • Hypercalcemia 01/15/2020     Priority: Low   • Hypercapnia 01/15/2020     Priority: Low   • Chronic anticoagulation 2019     Priority: Low   • A-fib (HCC) 2019     Priority: Low   • PAH (pulmonary artery hypertension) (HCC) 2019     Priority: Low   • Chronic respiratory failure (HCC) 2019     Priority: Low   • Hypothyroidism 01/10/2006     Priority: Low   • Non-smoker 2020   • Other specified hypothyroidism 2020   • Other hyperlipidemia 2020   •  Chronic respiratory failure with hypoxia (HCC) 09/29/2020   • High risk medication use 05/05/2020   • Other sleep apnea 09/09/2019   • E. coli UTI 08/22/2019   • Drug-induced constipation 08/20/2019   • Macrocytic anemia 08/20/2019       ASSESSMENT AND PLAN:   1.  Recurrent atrial arrhythmias.  Evidence of atrial tachycardia or atypical flutter.  Previously on sotalol 80 twice a day.  Now on 120 twice a day with conversion to Sinus.  Continue current medications.  Possibly home tomorrow.  2.  Anticoagulation continue.  3.  Status post surgical maze.  4.  Status post mitral valve repair.  5.  We will continue to follow.

## 2020-09-30 NOTE — PROGRESS NOTES
Spiritual Care Note    Patient Information     Patient's Name: Zeinab Loza   MRN: [id number]    YOB: 1945   Age and Gender: 75 y.o. female   Service Area: Telemetry   Room (and Bed): [place] [address]   Ethnicity or Nationality:     Primary Language: English   Caodaism/Spiritual preference: Non-Jainism   Place of Residence: [place]   Family/Friends/Others Present: No   Clinical Team Present: No   Medical Diagnosis(-es)/Procedure(s): A-fib   Code Status: Full Code    Date of Admission: 9/29/2020   Length of Stay: 0 days        Spiritual Care Provider Information:  Name of Spiritual Care Provider: Ester Hernandez  Title of Spiritual Care Provider: Associate   Phone Number: [phone]  E-mail: [email or web address]  Total time : 10 minutes    Spiritual Screen Results:    Gen Nursing  Spiritual Screen  Was spiritual care education provided to the patient?: Yes     Palliative Care  PC Caodaism/Spiritual Screening  Was spiritual care education provided to the patient?: Yes      Encounter/Request Information  Encounter/Request Type   Visited With: Patient  Nature of the Visit: Initial, On shift  General Visit: Yes  Referral From/ Origin of Request: Epic nursing    Religous Needs/Values  Caodaism Needs Visit  Caodaism Needs: Prayer    Spiritual Assessment     Spiritual Care Encounters    Observations/Symptoms: Accepting, Thankfulness    Interaction/Conversation: Pt requested prayer for herself and her , who has ALS and is a patient on this same floor.  She was arranging to go visit him when the  left.    Assessment: Need    Need: Seeking Spiritual Assistance and Support, Family Issues/Concern    Interventions: Compassionate presence, active listening, prayer.    Outcomes: Spiritual Comfort    Plan: Visit Upon Request    Notes:

## 2020-09-30 NOTE — ASSESSMENT & PLAN NOTE
History of paroxysmal atrial fibrillation s/p multiple previous ablations followed by a MAZE procedure and NED ligation (per notes) by Dr. Rachel at [facility] 5/19/2011.   Presents this time with symptoms of atrial fibrillation/a flutter.  Cardiology consulted. Follow-up with recommendations.  Continue home medications-diltiazem, digoxin and sotalol  Check dig level.  TSH.  Eliquis for anticoagulation.  Admit to observation for telemetry.  Daily BMP and magnesium.  EP consulted  Dose of sotalol increased to 120 twice daily.  Plan to monitor on telemetry until tomorrow morning

## 2020-09-30 NOTE — PROGRESS NOTES
Patient brought to floor by this RN and ACLS RN Mayda. Patient A&O x4, on 5L NC O2 which is patient's baseline. Patient denies pain at this time. Patient updated on plan of care and verbalized understanding. 2 RN skin check completed. Tele box on and in place, monitor room notified. Patient oriented to unit. Fall precautions in place. Will continue to monitor.

## 2020-09-30 NOTE — PROGRESS NOTES
Received report and assumed care of patient. Patient is A&Ox4 and awake in bed. Patient is in no signs of distress, VSS, unlabored breathing on 5L and denies pain at this time. Patient was updated on the plan of care for the day. Call light within reach, bed in low position, 2 side rails up. Fall precautions in place and tele box is on. Will round hourly.

## 2020-09-30 NOTE — CARE PLAN
Problem: Communication  Goal: The ability to communicate needs accurately and effectively will improve  Outcome: PROGRESSING AS EXPECTED   Note: Patient updated to call for assistance with needs. Patient has call light within reach, fall precautions in place. Patient oriented to unit. Will continue to monitor.     Problem: Knowledge Deficit  Goal: Knowledge of disease process/condition, treatment plan, diagnostic tests, and medications will improve  Outcome: PROGRESSING AS EXPECTED   Note: Patient updated on plan of care and verbalized understanding. Will provide updates.

## 2020-09-30 NOTE — ASSESSMENT & PLAN NOTE
History of chronic hypoxic respiratory failure secondary to pulmonary hypertension.  On 5 L of oxygen at home.  Continue medications for pulmonary hypertension.  Continue home oxygen.

## 2020-09-30 NOTE — CARE PLAN
Problem: Communication  Goal: The ability to communicate needs accurately and effectively will improve  9/30/2020 1303 by Julienne Waters R.N.  Outcome: PROGRESSING AS EXPECTED  Note: Pt is A&Ox4 and is able to follow commands. Educated pt about POC and encouraged pt to ask questions. All questions answered at time. Call light within reach. Hourly rounding in place.     Problem: Pain Management  Goal: Pain level will decrease to patient's comfort goal  Outcome: PROGRESSING AS EXPECTED  Note: Assessed pt's pain using 1-10 scale. Educated pt as per MAR. Facilitated calm environment.

## 2020-09-30 NOTE — H&P
Hospital Medicine History & Physical Note    Date of Service  9/30/2020    Primary Care Physician  Ashia Tidwell M.D.    Consultants  Cardiology    Code Status  Full Code    Chief Complaint  Chief Complaint   Patient presents with   • Rapid Heart Beat   • Dizziness       History of Presenting Illness  Zeinab Loza is a 75 y.o. with past medical history notable for atrial fibrillation/a flutter, s/p ablation on multiple medications, mitral regurgitation s/p MVR accompanied by MAZE and NED ligation in 2011 at [facility], pulmonary hypertension on 5 L home oxygen, chronic back pain, hypothyroidism and hyperlipidemia who presented to the emergency room for evaluation of dizziness and tachycardia.   Patient states that her heart rate has been high for approximately the past few days to 1 week ranging between 110s to 130, without any symptoms of chest pain, palpitations, or lightheadedness but does note some mild dizziness. States she has been working with outpatient PT for the last few week and was told of the elevated heart rate. She was initially seen at a urgent care and sent to the ED right away. EKG showed sinus tach vs more likely atypical atrial flutter vs SVT with heart rate in the 120s.  She was hydrated with IV fluids and cardiology was consulted in the ED.   She will be admitted to telemetry overnight and we will follow up with cardiology recommendations.    Review of Systems  Review of Systems   Constitutional: Negative.    HENT: Negative.    Eyes: Negative.    Respiratory: Negative for shortness of breath and wheezing.    Cardiovascular: Negative for chest pain, palpitations, orthopnea, claudication and leg swelling.   Gastrointestinal: Negative.    Genitourinary: Negative.    Musculoskeletal: Negative.    Skin: Negative.    Neurological: Negative.    Endo/Heme/Allergies: Negative.    Psychiatric/Behavioral: Negative.        Past Medical History   has a past medical history of Aneurysm artery,  celiac (HCC) (2019), Aneurysm of right renal artery (HCC), Atrial fibrillation (HCC), Diastolic heart failure (HCC), Hypertension, essential, Pulmonary hypertension (HCC), and Warfarin anticoagulation.    Surgical History   has a past surgical history that includes ankle orif (Left, 8/5/2020).     Family History  family history is not on file.     Social History   reports that she has never smoked. She has never used smokeless tobacco. She reports current alcohol use of about 4.2 oz of alcohol per week. She reports that she does not use drugs.    Allergies  Allergies   Allergen Reactions   • Zolpidem Tartrate Unspecified     Lightheaded and confusion       Medications  Prior to Admission Medications   Prescriptions Last Dose Informant Patient Reported? Taking?   Cholecalciferol (VITAMIN D) 2000 units Cap 9/29/2020 at 1130 Patient Yes No   Sig: Take 2,000 Units by mouth every day.   DILTIAZem CD (CARDIZEM CD) 120 MG CAPSULE SR 24 HR 9/29/2020 at 1130  No No   Sig: TAKE 1 CAPSULE EVERY DAY   PARoxetine (PAXIL) 20 MG Tab 9/29/2020 at 1130 Patient Yes No   Sig: Take 20 mg by mouth every morning.   Tadalafil, PAH, (ADCIRCA) 20 MG Tab 9/28/2020 at Unknown time Patient Yes No   Sig: Take 40 mg by mouth every bedtime. Indications: Pulmonary Arterial Hypertension   acetaminophen (TYLENOL) 500 MG Tab unknown at Unknown time Patient Yes No   Sig: Take 1,000 mg by mouth every 6 hours as needed for Mild Pain.   amitriptyline (ELAVIL) 10 MG Tab 9/29/2020 at 1130 Patient Yes No   Sig: Take 20 mg by mouth every bedtime.   apixaban (ELIQUIS) 5mg Tab 9/29/2020 at 1130 Patient Yes No   Sig: Take 5 mg by mouth 2 Times a Day.   digoxin (LANOXIN) 125 MCG Tab 9/28/2020 at Unknown time  No No   Sig: Take 1 Tab by mouth every evening.   levothyroxine (SYNTHROID) 75 MCG Tab 9/29/2020 at 1130 Patient Yes No   Sig: Take 75 mcg by mouth Every morning on an empty stomach.   potassium chloride SA (KDUR) 20 MEQ Tab CR 9/29/2020 at 1130  Yes Yes    Sig: Take 60 mEq by mouth 2 times a day.   rosuvastatin (CRESTOR) 10 MG Tab 9/28/2020 at Unknown time Patient Yes No   Sig: Take 10 mg by mouth every evening.   sotalol (BETAPACE) 80 MG Tab 9/29/2020 at 1130 Patient Yes No   Sig: Take 1 Tab by mouth 2 times a day.   therapeutic multivitamin-minerals (THERAGRAN-M) Tab 9/29/2020 at 1130 Patient Yes No   Sig: Take 1 Tab by mouth every day.   torsemide (DEMADEX) 20 MG Tab 9/29/2020 at 1130  Yes Yes   Sig: Take 40 mg by mouth 2 Times a Day.      Facility-Administered Medications: None       Physical Exam  Temp:  [37.3 °C (99.1 °F)] 37.3 °C (99.1 °F)  Pulse:  [117-128] 121  Resp:  [13-19] 19  BP: (114-167)/(71-91) 167/79  SpO2:  [98 %-99 %] 98 %    Physical Exam  Constitutional:       Appearance: Normal appearance.   HENT:      Head: Normocephalic and atraumatic.   Eyes:      Conjunctiva/sclera: Conjunctivae normal.      Pupils: Pupils are equal, round, and reactive to light.   Neck:      Musculoskeletal: Normal range of motion and neck supple.   Cardiovascular:      Rate and Rhythm: Tachycardia present. Rhythm irregular.      Heart sounds: No murmur.   Pulmonary:      Effort: Pulmonary effort is normal.      Breath sounds: Normal breath sounds.   Abdominal:      General: Bowel sounds are normal. There is no distension.      Palpations: Abdomen is soft.      Tenderness: There is no abdominal tenderness.   Musculoskeletal:         General: No swelling or tenderness.   Skin:     General: Skin is dry.   Neurological:      General: No focal deficit present.      Mental Status: She is alert and oriented to person, place, and time.   Psychiatric:         Behavior: Behavior normal.         Laboratory:  Recent Labs     09/29/20  1703   WBC 8.2   RBC 3.95*   HEMOGLOBIN 13.0   HEMATOCRIT 39.5   .0*   MCH 32.9   MCHC 32.9*   RDW 45.3   PLATELETCT 295   MPV 8.4*     Recent Labs     09/29/20  1703   SODIUM 139   POTASSIUM 3.7   CHLORIDE 96   CO2 32   GLUCOSE 138*   BUN 27*    CREATININE 0.88   CALCIUM 10.9*     Recent Labs     09/29/20  1703   ALTSGPT 24   ASTSGOT 21   ALKPHOSPHAT 95   TBILIRUBIN 0.4   GLUCOSE 138*         Recent Labs     09/29/20  1703   NTPROBNP 837*         Recent Labs     09/29/20  1703   TROPONINT 16       Imaging:  DX-CHEST-PORTABLE (1 VIEW)   Final Result      No acute cardiopulmonary abnormality identified.            Assessment/Plan:  I anticipate this patient is appropriate for observation status at this time.    Pulmonary hypertension (HCC)- (present on admission)  Assessment & Plan  This is chronic and she is on tadalafil and diuretic-torsemide and potassium.  Continue chronic oxygen-5L  Resume home medications  Daily weights  Strict I's and O.    Chronic back pain- (present on admission)  Assessment & Plan  History of chronic back pain, on oxycodone at home.  We will continue home medication.    Chronic respiratory failure with hypoxia (HCC)  Assessment & Plan  History of chronic hypoxic respiratory failure secondary to pulmonary hypertension.  On 5 L of oxygen at home.  Continue medications for pulmonary hypertension.  Continue home oxygen.    Other hyperlipidemia  Assessment & Plan  History of, continue home medication.    Other specified hypothyroidism  Assessment & Plan  History of, continue home medication.  Check TSH level.    A-fib (HCC)  Assessment & Plan  History of paroxysmal atrial fibrillation s/p multiple previous ablations followed by a MAZE procedure and NED ligation (per notes) by Dr. Rachel at [facility] 5/19/2011.   Presents this time with symptoms of atrial fibrillation/a flutter.  Cardiology consulted. Follow-up with recommendations.  Continue home medications-diltiazem, digoxin and sotalol  Check dig level.  TSH.  Eliquis for anticoagulation.  Admit to observation for telemetry.  Daily BMP and magnesium.  EP consult in a.m.

## 2020-09-30 NOTE — CARE PLAN
Problem: Communication  Goal: The ability to communicate needs accurately and effectively will improve  Outcome: PROGRESSING AS EXPECTED  Note: Pt is A&Ox4 and is able to follow commands. Educated pt about POC and encouraged pt to ask questions. All questions answered at time. Call light within reach. Hourly rounding in place.        Problem: Pain Management  Goal: Pain level will decrease to patient's comfort goal  Outcome: PROGRESSING AS EXPECTED  Note: Assessed pt's pain using 1-10 scale. Educated pt as per MAR. Facilitated calm environment.

## 2020-09-30 NOTE — ED NOTES
Med rec complete per pt and daughter at bedside. Per pt, Letairis has been on hold for approximately 1 month. No ABX taken in the last 2 weeks. Per daughter, will bring adcirca rx tomorrow.

## 2020-09-30 NOTE — ED PROVIDER NOTES
ED Provider Note    CHIEF COMPLAINT  Chief Complaint   Patient presents with   • Rapid Heart Beat   • Dizziness       HPI  Zeinab Loza is a 75 y.o. female here for evaluation of heart rate elevation and dizziness.  Patient states that her heart rate has been high for approximately 1 to 2 weeks, and that this is not totally new.  She does have history of atrial fibrillation, and is on Eliquis for the same.  She states that she has no fever chills, no vomiting, and no chest pain.  However she states that when she gets up, she does have intermittent periods of dizziness.  She has history of pulmonary hypertension for which she sees a doctor at [place] [id number], but her cardiologist here is Dr. Eng.  Patient has no chest pain, no abdominal pain, no back pain, no headache.  She states that she feels fine, but when she is resting, her heart rate is in the 120s.  She states that she takes all of her medications as prescribed.  Nothing seems alleviate exacerbate her symptoms.      ROS  See HPI for further details, o/w negative.     PAST MEDICAL HISTORY   has a past medical history of Aneurysm artery, celiac (HCC) (2019), Aneurysm of right renal artery (HCC), Atrial fibrillation (HCC), Diastolic heart failure (HCC), Hypertension, essential, Pulmonary hypertension (HCC), and Warfarin anticoagulation.    SOCIAL HISTORY  Social History     Tobacco Use   • Smoking status: Never Smoker   • Smokeless tobacco: Never Used   Substance and Sexual Activity   • Alcohol use: Yes     Alcohol/week: 4.2 oz     Types: 7 Shots of liquor per week     Comment: 1 day   • Drug use: No   • Sexual activity: Not on file       Family History  No bleeding disorders    SURGICAL HISTORY   has a past surgical history that includes ankle orif (Left, 8/5/2020).    CURRENT MEDICATIONS  Home Medications     Reviewed by Kalie Ramirez (Pharmacy Tech) on 09/29/20 at 1943  Med List Status: Complete   Medication Last Dose Status   acetaminophen  (TYLENOL) 500 MG Tab unknown Active   amitriptyline (ELAVIL) 10 MG Tab 9/29/2020 Active   apixaban (ELIQUIS) 5mg Tab 9/29/2020 Active   Cholecalciferol (VITAMIN D) 2000 units Cap 9/29/2020 Active   digoxin (LANOXIN) 125 MCG Tab 9/28/2020 Active   DILTIAZem CD (CARDIZEM CD) 120 MG CAPSULE SR 24 HR 9/29/2020 Active   levothyroxine (SYNTHROID) 75 MCG Tab 9/29/2020 Active   PARoxetine (PAXIL) 20 MG Tab 9/29/2020 Active   potassium chloride SA (KDUR) 20 MEQ Tab CR 9/29/2020 Active   rosuvastatin (CRESTOR) 10 MG Tab 9/28/2020 Active   sotalol (BETAPACE) 80 MG Tab 9/29/2020 Active   Tadalafil, PAH, (ADCIRCA) 20 MG Tab 9/28/2020 Active   therapeutic multivitamin-minerals (THERAGRAN-M) Tab 9/29/2020 Active   torsemide (DEMADEX) 20 MG Tab 9/29/2020 Active                ALLERGIES  Allergies   Allergen Reactions   • Zolpidem Tartrate Unspecified     Lightheaded and confusion       REVIEW OF SYSTEMS  See HPI for further details. Review of systems as above, otherwise all other systems are negative.     PHYSICAL EXAM  Constitutional: Well developed, well nourished. No acute distress.  HEENT: Normocephalic, atraumatic. Posterior pharynx clear and moist.  Eyes:  EOMI. Normal sclera.  Neck: Supple, Full range of motion, nontender.  Chest/Pulmonary: clear to ausculation. Symmetrical expansion.   Cardio: Tachycardic rate and rhythm with no murmur.   Abdomen: Soft, nontender. No peritoneal signs. No guarding. No palpable masses.  Back: No CVA tenderness, nontender midline, no step offs.  Musculoskeletal: No deformity, no edema, neurovascular intact.   Neuro: Clear speech, appropriate, cooperative, cranial nerves II-XII grossly intact.  Psych: Normal mood and affect    PROCEDURES     MEDICAL RECORD  I have reviewed patient's medical record and pertinent results are listed.    COURSE & MEDICAL DECISION MAKING  I have reviewed any medical record information, laboratory studies and radiographic results as noted above.    Results for orders  placed or performed during the hospital encounter of 09/29/20   CBC with Differential   Result Value Ref Range    WBC 8.2 4.8 - 10.8 K/uL    RBC 3.95 (L) 4.20 - 5.40 M/uL    Hemoglobin 13.0 12.0 - 16.0 g/dL    Hematocrit 39.5 37.0 - 47.0 %    .0 (H) 81.4 - 97.8 fL    MCH 32.9 27.0 - 33.0 pg    MCHC 32.9 (L) 33.6 - 35.0 g/dL    RDW 45.3 35.9 - 50.0 fL    Platelet Count 295 164 - 446 K/uL    MPV 8.4 (L) 9.0 - 12.9 fL    Neutrophils-Polys 73.00 (H) 44.00 - 72.00 %    Lymphocytes 17.50 (L) 22.00 - 41.00 %    Monocytes 7.50 0.00 - 13.40 %    Eosinophils 1.10 0.00 - 6.90 %    Basophils 0.40 0.00 - 1.80 %    Immature Granulocytes 0.50 0.00 - 0.90 %    Nucleated RBC 0.00 /100 WBC    Neutrophils (Absolute) 5.96 2.00 - 7.15 K/uL    Lymphs (Absolute) 1.43 1.00 - 4.80 K/uL    Monos (Absolute) 0.61 0.00 - 0.85 K/uL    Eos (Absolute) 0.09 0.00 - 0.51 K/uL    Baso (Absolute) 0.03 0.00 - 0.12 K/uL    Immature Granulocytes (abs) 0.04 0.00 - 0.11 K/uL    NRBC (Absolute) 0.00 K/uL   Complete Metabolic Panel (CMP)   Result Value Ref Range    Sodium 139 135 - 145 mmol/L    Potassium 3.7 3.6 - 5.5 mmol/L    Chloride 96 96 - 112 mmol/L    Co2 32 20 - 33 mmol/L    Anion Gap 11.0 7.0 - 16.0    Glucose 138 (H) 65 - 99 mg/dL    Bun 27 (H) 8 - 22 mg/dL    Creatinine 0.88 0.50 - 1.40 mg/dL    Calcium 10.9 (H) 8.5 - 10.5 mg/dL    AST(SGOT) 21 12 - 45 U/L    ALT(SGPT) 24 2 - 50 U/L    Alkaline Phosphatase 95 30 - 99 U/L    Total Bilirubin 0.4 0.1 - 1.5 mg/dL    Albumin 4.5 3.2 - 4.9 g/dL    Total Protein 7.3 6.0 - 8.2 g/dL    Globulin 2.8 1.9 - 3.5 g/dL    A-G Ratio 1.6 g/dL   Troponin   Result Value Ref Range    Troponin T 16 6 - 19 ng/L   ESTIMATED GFR   Result Value Ref Range    GFR If African American >60 >60 mL/min/1.73 m 2    GFR If Non African American >60 >60 mL/min/1.73 m 2   proBrain Natriuretic Peptide, NT   Result Value Ref Range    NT-proBNP 837 (H) 0 - 125 pg/mL   D-DIMER   Result Value Ref Range    D-Dimer Screen 0.49 0.00 -  0.50 ug/mL (FEU)   EKG   Result Value Ref Range    Report       [facility] Emergency Dept.    Test Date:  2020  Pt Name:    NATALIE STAPLES                Department: ER  MRN:        [id number]                      Room:  Gender:     Female                       Technician: [id number]  :        1945                   Requested By:ER TRIAGE PROTOCOL  Order #:    [id number]                    Reading MD:    Measurements  Intervals                                Axis  Rate:       122                          P:          0  WY:         118                          QRS:        -32  QRSD:       88                           T:          147  QT:         308  QTc:        439    Interpretive Statements  SINUS TACHYCARDIA  VENTRICULAR PREMATURE COMPLEX  ABERRANT COMPLEX, POSSIBLY SUPRAVENTRICULAR  PROBABLE LVH WITH SECONDARY REPOL ABNRM  PROBABLE INFERIOR INFARCT, AGE INDETERMINATE  CONSIDER POSTERIOR WALL INVOLVEMENT  BASELINE WANDER IN LEAD(S) V2,V6  Compared to ECG 2020 10:35:34  Ventricular premature complex(es) now present   Aberrant conduction of supraventricular beat(s) now present  Myocardial infarct finding now present  Atrial fibrillation no longer present       DX-CHEST-PORTABLE (1 VIEW)   Final Result      No acute cardiopulmonary abnormality identified.        EKG; irregularly irregular at 122.  No acute ST elevation, no ST depression.  QTC is 439.    Gentle and slow HYDRATION: Based on the patient's presentation of Dehydration the patient was given IV fluids. IV Hydration was used because oral hydration was not adequate alone. Upon recheck following hydration, the patient was improved.    9:05 PM  Cardiology paged.     9:25 PM  Dr. Liao, on for cards, will come and see the pt.     10:16 PM  Dr. Liao states pt will have to stay in the hospital secondary to her atril fib and flutter. He will consult.  I have called the hospitalist to admit.   He will order a dig level, follow it,  and have ep see tomorrow.       FINAL IMPRESSION  Atrial fibrillation  Atrial flutter    Electronically signed by: Obinna Diane D.O., 9/29/2020 9:15 PM

## 2020-09-30 NOTE — PROGRESS NOTES
Hospital Medicine Daily Progress Note    Date of Service  9/30/2020    Chief Complaint/  Hospital Course  75 y.o. female with history of A. fib, admitted 9/29/2020 with rapid heartbeats, dizziness      Interval Problem Update  Patient admitted with A. fib with RVR  Dose of sotalol increased by cardiology.  Heart rate improved, at 100s.  Sinus rhythm, heart rate 60, QTc 470, on EKG at 7 AM.  She is on 5 L nasal cannula,at baseline.  Plan to continue monitoring heart rate, and QT interval  Consultants/Specialty  Cardiology    Code Status  Full Code    Disposition  Home when medically cleared    Review of Systems  Review of Systems   Constitutional: Negative for chills, fever and weight loss.   HENT: Negative for ear pain, hearing loss and tinnitus.    Eyes: Negative for blurred vision, double vision and photophobia.   Respiratory: Negative for cough, hemoptysis and sputum production.    Cardiovascular: Positive for palpitations. Negative for chest pain and orthopnea.   Gastrointestinal: Negative for heartburn, nausea and vomiting.   Genitourinary: Negative for dysuria, flank pain, frequency and hematuria.   Musculoskeletal: Negative for back pain, joint pain and neck pain.   Skin: Negative for itching and rash.   Neurological: Negative for tremors, speech change, focal weakness and headaches.   Endo/Heme/Allergies: Negative for environmental allergies and polydipsia. Does not bruise/bleed easily.   Psychiatric/Behavioral: Negative for hallucinations and substance abuse. The patient is not nervous/anxious.         Physical Exam  Temp:  [36.3 °C (97.4 °F)-37.3 °C (99.1 °F)] 36.4 °C (97.6 °F)  Pulse:  [103-128] 103  Resp:  [13-21] 18  BP: (114-167)/() 125/82  SpO2:  [95 %-99 %] 95 %    Physical Exam  Vitals signs and nursing note reviewed.   Constitutional:       General: She is not in acute distress.     Appearance: Normal appearance.   HENT:      Head: Normocephalic and atraumatic.      Nose: Nose normal.       Mouth/Throat:      Mouth: Mucous membranes are moist.   Eyes:      Extraocular Movements: Extraocular movements intact.      Pupils: Pupils are equal, round, and reactive to light.   Neck:      Musculoskeletal: Normal range of motion and neck supple.   Cardiovascular:      Rate and Rhythm: Normal rate. Rhythm irregularly irregular.   Pulmonary:      Effort: Pulmonary effort is normal.      Breath sounds: Normal breath sounds.   Abdominal:      General: Abdomen is flat. There is no distension.      Tenderness: There is no abdominal tenderness.   Musculoskeletal: Normal range of motion.         General: No swelling or deformity.   Skin:     General: Skin is warm and dry.   Neurological:      General: No focal deficit present.      Mental Status: She is alert and oriented to person, place, and time.   Psychiatric:         Mood and Affect: Mood normal.         Behavior: Behavior normal.         Fluids  No intake or output data in the 24 hours ending 09/30/20 1412    Laboratory  Recent Labs     09/29/20  1703   WBC 8.2   RBC 3.95*   HEMOGLOBIN 13.0   HEMATOCRIT 39.5   .0*   MCH 32.9   MCHC 32.9*   RDW 45.3   PLATELETCT 295   MPV 8.4*     Recent Labs     09/29/20  1703 09/30/20  0955   SODIUM 139 141   POTASSIUM 3.7 3.7   CHLORIDE 96 98   CO2 32 30   GLUCOSE 138* 116*   BUN 27* 24*   CREATININE 0.88 0.89   CALCIUM 10.9* 10.2             Recent Labs     09/30/20  0955   TRIGLYCERIDE 146   HDL 53   *       Imaging  DX-CHEST-PORTABLE (1 VIEW)   Final Result      No acute cardiopulmonary abnormality identified.           Assessment/Plan  A-fib ([facility])  Assessment & Plan  History of paroxysmal atrial fibrillation s/p multiple previous ablations followed by a MAZE procedure and NED ligation (per notes) by Dr. Rachel at [facility] 5/19/2011.   Presents this time with symptoms of atrial fibrillation/a flutter.  Cardiology consulted. Follow-up with recommendations.  Continue home medications-diltiazem, digoxin and  sotalol  Check dig level.  TSH.  Eliquis for anticoagulation.  Admit to observation for telemetry.  Daily BMP and magnesium.  EP consulted  Dose of sotalol increased to 120 twice daily.  Plan to monitor on telemetry until tomorrow morning    Chronic respiratory failure with hypoxia (HCC)  Assessment & Plan  History of chronic hypoxic respiratory failure secondary to pulmonary hypertension.  On 5 L of oxygen at home.  Continue medications for pulmonary hypertension.  Continue home oxygen.    Other hyperlipidemia  Assessment & Plan  History of, continue home medication.    Other specified hypothyroidism  Assessment & Plan  History of, continue home medication.  Check TSH level.    Pulmonary hypertension (HCC)- (present on admission)  Assessment & Plan  This is chronic and she is on tadalafil and diuretic-torsemide and potassium.  Continue chronic oxygen-5L  Resume home medications  Daily weights  Strict I's and O.    Chronic back pain- (present on admission)  Assessment & Plan  History of chronic back pain, on oxycodone at home.  We will continue home medication.       VTE prophylaxis: Apixaban

## 2020-09-30 NOTE — PROGRESS NOTES
2 RN Skin Check    2 RN skin check complete.   Devices in place: SCDs, PIV, tele box  Skin assessed under devices: yes.  Confirmed pressure ulcers found on: none.  New potential pressure ulcers noted on none. Wound consult placed N/A.  The following interventions in place Pillows and Lotion.      All patients bony prominences are clean, dry, and intact. Patient had recent LEFT ankle surgery roughly 6-7 weeks ago per patient and incision is clean, dry, intact, and open to air. No other lesions or wounds noted.

## 2020-09-30 NOTE — CONSULTS
Cardiology Initial Consult Note    Date of note:    9/29/2020      Consulting Physician: Obinna Diane D.O.    Patient ID:    Name:   Zeinab Loza   YOB: 1945  Age:   75 y.o.  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      She is followed by Dr. Geoff Eng here at [facility] as well as Dr. Aleks Zelaya (pulmonologist) at [facility]. She has had extensive testing at [facility] for her hypoxemic respiratory failure with relatively normal PFTs, and multiple RHCs and shunt studies which showed at worst mild pulmonary hypertension (highest PASP 48mmHg). She has been managed with diuretics and was started on ambrisentan and tadalafil to help with her pulmonary hypertension. Unfortunately this has not allowed her to be off oxygen, and actually started to cause symptomatic hypotension. She was last seen in [facility] in July and at that time she had been off Letaris for 2 weeks with improvement in energy and no increase in SOB. She then saw Dr. Eng by telemedicine 8/21/2020 and no changes were made to her regimen. For the last week she has noticed high heart rates in the 110s-120s, without weight gain, increased SOB, change in exercise tolerance, lightheadedness, pre-syncope, or syncope. She contacted [facility] who told her to seek out urgent care evaluation. From urgent care she was sent to the ED due to her tachycardia.    EKG showed sinus tach vs more likely atypical atrial flutter vs SVT. By telemetry it did appears  she has had runs of atrial fibrillation as well.     ROS  Constitution: Negative for chills, fever and night sweats.   HENT: Negative for nosebleeds.    Eyes: Negative for vision loss in left eye and vision loss in right eye.   Respiratory: Negative for hemoptysis.    Gastrointestinal: Negative for hematemesis, hematochezia and melena.   Genitourinary: Negative for hematuria.   Neurological: Negative for focal weakness, numbness and paresthesias.      All others reviewed and negative.      Past Medical History:   Diagnosis Date   • Aneurysm artery, celiac (HCC) 2019   • Aneurysm of right renal artery (HCC)    • Atrial fibrillation (HCC)    • Diastolic heart failure (HCC)    • Hypertension, essential    • Pulmonary hypertension (HCC)    • Warfarin anticoagulation        Past Surgical History:   Procedure Laterality Date   • ANKLE ORIF Left 8/5/2020    Procedure: ORIF, ANKLE;  Surgeon: Tk Humphreys M.D.;  Location: SURGERY [facility];  Service: Orthopedics         (Not in a hospital admission)    No current facility-administered medications for this encounter.      Current Outpatient Medications   Medication Sig Dispense Refill   • potassium chloride SA (KDUR) 20 MEQ Tab CR Take 60 mEq by mouth 2 times a day.     • torsemide (DEMADEX) 20 MG Tab Take 40 mg by mouth 2 Times a Day.     • digoxin (LANOXIN) 125 MCG Tab Take 1 Tab by mouth every evening. 90 Tab 3   • DILTIAZem CD (CARDIZEM CD) 120 MG CAPSULE SR 24 HR TAKE 1 CAPSULE EVERY DAY 90 Cap 1   • apixaban (ELIQUIS) 5mg Tab Take 5 mg by mouth 2 Times a Day.     • amitriptyline (ELAVIL) 10 MG Tab Take 20 mg by mouth every bedtime.     • sotalol (BETAPACE) 80 MG Tab Take 1 Tab by mouth 2 times a day. 60 Tab 11   • acetaminophen (TYLENOL) 500 MG Tab Take 1,000 mg by mouth every 6 hours as needed for Mild Pain.     • Tadalafil, PAH, (ADCIRCA) 20 MG Tab Take 40 mg by mouth every bedtime. Indications: Pulmonary Arterial Hypertension     • therapeutic  multivitamin-minerals (THERAGRAN-M) Tab Take 1 Tab by mouth every day.     • PARoxetine (PAXIL) 20 MG Tab Take 20 mg by mouth every morning.     • rosuvastatin (CRESTOR) 10 MG Tab Take 10 mg by mouth every evening.     • levothyroxine (SYNTHROID) 75 MCG Tab Take 75 mcg by mouth Every morning on an empty stomach.     • Cholecalciferol (VITAMIN D) 2000 units Cap Take 2,000 Units by mouth every day.           Allergies   Allergen Reactions   • Zolpidem Tartrate Unspecified     Lightheaded and confusion         History reviewed. No pertinent family history. FH reviewed and no history of SCD.       Social History     Socioeconomic History   • Marital status:      Spouse name: Not on file   • Number of children: Not on file   • Years of education: Not on file   • Highest education level: Not on file   Occupational History   • Not on file   Social Needs   • Financial resource strain: Not on file   • Food insecurity     Worry: Not on file     Inability: Not on file   • Transportation needs     Medical: Not on file     Non-medical: Not on file   Tobacco Use   • Smoking status: Never Smoker   • Smokeless tobacco: Never Used   Substance and Sexual Activity   • Alcohol use: Yes     Alcohol/week: 4.2 oz     Types: 7 Shots of liquor per week     Comment: 1 day   • Drug use: No   • Sexual activity: Not on file   Lifestyle   • Physical activity     Days per week: Not on file     Minutes per session: Not on file   • Stress: Not on file   Relationships   • Social connections     Talks on phone: Not on file     Gets together: Not on file     Attends Shinto service: Not on file     Active member of club or organization: Not on file     Attends meetings of clubs or organizations: Not on file     Relationship status: Not on file   • Intimate partner violence     Fear of current or ex partner: Not on file     Emotionally abused: Not on file     Physically abused: Not on file     Forced sexual activity: Not on file   Other Topics  "Concern   • Not on file   Social History Narrative   • Not on file         Physical Exam  Body mass index is 31.79 kg/m².  /86   Pulse (!) 123   Temp 37.3 °C (99.1 °F) (Temporal)   Resp 14   Ht 1.702 m (5' 7\")   Wt 92.1 kg (203 lb)   SpO2 98%   Vitals:    09/29/20 1900 09/29/20 2000 09/29/20 2058 09/29/20 2102   BP: 158/81   135/86   Pulse: (!) 123 (!) 126 (!) 123    Resp:   14    Temp:       TempSrc:       SpO2: 98% 99% 98%    Weight:       Height:         Oxygen Therapy:  Pulse Oximetry: 98 %, O2 (LPM): 5, O2 Delivery Device: Nasal Cannula    General: Well appearing and in no apparent distress  Eyes: nl conjunctiva  ENT: OP covered by mask.   Neck: JVP 4 cm H2O, no carotid bruits  Lungs: normal respiratory effort, CTAB  Heart: tachycardic, regular, no murmurs, no rubs or gallops, 1+ edema bilateral lower extremities. No LV/RV heave on cardiac palpatation. No left radial pulse, 2+ right radial pulse.  2+ bilateral dp pulses.   Abdomen: soft, non tender, non distended, no masses, normal bowel sounds.  No HSM.  Extremities/MSK: no clubbing, no cyanosis  Neurological: No focal sensory deficits  Psychiatric: Appropriate affect, A/O x 3  Skin: Warm extremities        Labs (personally reviewed and notable for):   NT proBNP 837      Cardiac Imaging and Procedures Review:    EKG dated 9/29/2020: My personal interpretation is sinus tachycardia, PVCs, non-specific st changes, consider inferoposterior infarct.     Echo dated 8/5/2020: My personal interpretation is normal RV function, no significant MR, PASP did appear to be 25-30mmHg.     Radiology test Review:  CXR:   IMPRESSION:     No acute cardiopulmonary abnormality identified.       Previous work-up per [facility] pul note in 2017:  Previous PFT reviewed   FEV1/FVC 74%  FEV1 83% (2.11L)  TLC 88% (4.82)  DLCO 88%  My interpretation: Normal PFT    Previous CT scan - Mosaic attenuation, no PE    ECHO: reviewed  RVSP 43 mmHg, EF 60-65%    RHC - Reviewed  RA mean " 3 mmHg, PA sys 39 mmHg, PA kevin 9 mmHg, PA mean 20 mmHg, CO 4.1    Last six minute walk-reviewed  SpO2 sara 74%, up to 4L with some improvement in SpO2, walked 66% of predicted      On 03/20/2018, she had an echocardiogram with a stress test that had a negative bubble associated with it. Her resting RVSP was 29, peak exercise was 42. She underwent heart catheterization with a right atrial pressure of 13, an RV pressure of 49/6, with an RVEDP of 14, a pulmonary artery pressure of 48/21 with a mean of 33. Cardiac output was 5.19 by Odin with an index of 2.39, 7.7, with an index of 3.54 by thermodilution. A calculated pulmonary vascular resistance of 389 dynes was derived. At this juncture, she appears to have a mild degree of resting pulmonary hypertension. Her shunt could not be further identified.    3/2019 at [facility]:  LEXI 3/20/2018:  SUMMARY:   1. The LV systolic function is normal. The estimated LV ejection fraction is 65%.   2. Saline contrast bubble study was negative, with no evidence of any intracardiac shunt.   3. Pacing wire/catheter visualized in the right ventricle.   4. At rest, TR estimated RVSP 29 mmHg + RAP. At stress, TR estimated RVSP 42 mm Hg +RAP. Post   stress, TR estimated RVSP 34 mm Hg + RAP.   5. Pre and post lateral tissue doppler e' are normal. E/e' ratio pre and post stress are normal at   12 and 10 respectively suggestive of normal diastolic function at stress.    CARDIAC CATH 3/20/2018:  1) Pulmonary hypertension: RA 15 mmHg, RV 49/6/14 mmHg, PA 48/21/33 mmHg, PCWP 19 mmHg, Odin C.O. 5.19 L/min, Odin C.I.  2.39, PVR 4.86. Findings suggest froup 2 PH.  2) Abnormal exercise response with increase in pulmonary pressure and increase in left sided filling pressure.  3) Shunt run: no evidence of left right shunting.  4) No evidence of right to left intracardiac shunting at rest or with exercise. Echo findings dictated separately.  5) Preserved cardiac output at rest.    RHC 2019  Mean RA 19  mmHg, RV 47/21 mmHg, PA 50/24 mmHg, mean PA 35 mmHg, PCWP 30 mmHg  Odin CO 4.5 , CI 2.03  PVR 1.1 woods          Pulmonary Hypertension Group: I (due to iatrogenic atrial septal defect caused during mitral valve repair / eisenmenger physiology) related to though iatrogenic related to atrial septal defect now s/p closure. Patient now appearing to also have component of diastolic heart failure / group II PH in addition to her known group I.        Impression and Medical Decision Making:  # Tachycardia - suspect atypical atrial flutter vs SVT. Likely some component of atrial fibrillation as well that I saw on the monitor while I was examining her.  # Atrail flutter s/p ablation  # Atrial fibrillation s/p multiple previous ablations followed by a MAZE procedure and NED ligation (per notes) by Dr. Rachel at [facility] 5/19/2011. Remains on anticoagulation, unclear if HANNAH has been performed to check patency of NED, if so she could come off of anticoagulation.   # Iatrogenic Atrial-septal defect s/p surgical and then amplatzer closure per [facility] records. Closure device not well visualized on echocardiogram here performed last month, however no significant shunt was visualized. Of note, she has had a negative shunt study by RHC last in March of 2019.   # Pulmonary hypertension at most moderate with highest PASP 50mmHG by RHC at [place]. Per pulmonary at [place], this is thought to be initially WHO group I due to iatrogenic ASD caused during mitral valve repair and eventually causing Eisenmenger's physiology, however the ASD was able to be closed and there is no significant RV failure. Her last RHC showed a PCWP of 30 suggesting this might be primarily WHO group II pulmonary hypertension at this time.   # Chronic Diastolic Heart Failure, NYHA class III, stage C. Currently appears compensated, but difficult exam.   # Chronic hypoxemic respiratory failure secondary to above.  # Mitral regurgitation s/p mitral valve repair,  functioning normally  # Obesity  # FEI on BiPAP   # Right renal artery and celiac artery aneurysms s/p coil embolization      Recommendations:  # admit for obs, will check dig level and if WNL, will restart digoxin. Would also suggest IV metoprolol 5mg x 1, and will increase sotalol dose to 120mg PO bid as has been suggested in the past by Dr. Galindo.   # EP evaluation tomorrow for repeat ablation vs medication titration evaluation. I do not think her pulmonary hypertension is so severe she would be too high risk for ablation, however RHC could be performed to confirm. I do think given her multiple previous ablations and maze procedure, and the fact she is likely having recurrent a fib and potentially another form of SVT, any ablation would have low likelihood of success  # continue eliquis 5mg PO bid for now, suggest HANNAH in the future (potentially even tomorrow) to confirm NED ligation and get her off of eliquis as there does not seem to be another indication  # continue home torsemide and kcl doses  # continue diltiazem, could potentially wean off as this is likely contributing to leg swelling  # continue crestor  # continue tadalafil 40mg QHS for now. Given her last RHC showed almost pure WHO group II pulmonary hypertension (PVR was 1.1), could consider repeat RHC tomorrow as well to guide diuretic therapy (history suggested mild hypovolemia on admission and she was given fluids by the ED). She could also be a great candidate for a Cardiomems device.    # Lastly, does not appear like any of the previous LHCs evaluated her coronary arteries, this could be useful as well given her significant diastolic heart failure and extreme oxygen requirement.   # would not restart ambrisentan for now given previous symptomatic hypotension      Thank you for allowing me to participate in the care of this patient, Cardiology will continue to follow.  Please contact me with any questions.      Julio C Liao MD  Cardiologist,  [facility]   [phone]

## 2020-09-30 NOTE — ASSESSMENT & PLAN NOTE
This is chronic and she is on tadalafil and diuretic-torsemide and potassium.  Continue chronic oxygen-5L  Resume home medications  Daily weights  Strict I's and O.

## 2020-10-01 ENCOUNTER — PATIENT OUTREACH (OUTPATIENT)
Dept: HEALTH INFORMATION MANAGEMENT | Facility: OTHER | Age: 75
End: 2020-10-01

## 2020-10-01 VITALS
HEART RATE: 66 BPM | TEMPERATURE: 99 F | OXYGEN SATURATION: 96 % | SYSTOLIC BLOOD PRESSURE: 120 MMHG | HEIGHT: 67 IN | BODY MASS INDEX: 32.18 KG/M2 | DIASTOLIC BLOOD PRESSURE: 56 MMHG | RESPIRATION RATE: 17 BRPM | WEIGHT: 205.03 LBS

## 2020-10-01 LAB
ANION GAP SERPL CALC-SCNC: 15 MMOL/L (ref 7–16)
BASOPHILS # BLD AUTO: 0.5 % (ref 0–1.8)
BASOPHILS # BLD: 0.04 K/UL (ref 0–0.12)
BUN SERPL-MCNC: 28 MG/DL (ref 8–22)
CALCIUM SERPL-MCNC: 10.2 MG/DL (ref 8.5–10.5)
CHLORIDE SERPL-SCNC: 97 MMOL/L (ref 96–112)
CO2 SERPL-SCNC: 26 MMOL/L (ref 20–33)
CREAT SERPL-MCNC: 0.93 MG/DL (ref 0.5–1.4)
EKG IMPRESSION: NORMAL
EKG IMPRESSION: NORMAL
EOSINOPHIL # BLD AUTO: 0.13 K/UL (ref 0–0.51)
EOSINOPHIL NFR BLD: 1.7 % (ref 0–6.9)
ERYTHROCYTE [DISTWIDTH] IN BLOOD BY AUTOMATED COUNT: 45.7 FL (ref 35.9–50)
GLUCOSE SERPL-MCNC: 124 MG/DL (ref 65–99)
HCT VFR BLD AUTO: 35 % (ref 37–47)
HGB BLD-MCNC: 11.4 G/DL (ref 12–16)
IMM GRANULOCYTES # BLD AUTO: 0.03 K/UL (ref 0–0.11)
IMM GRANULOCYTES NFR BLD AUTO: 0.4 % (ref 0–0.9)
LYMPHOCYTES # BLD AUTO: 1.75 K/UL (ref 1–4.8)
LYMPHOCYTES NFR BLD: 23.3 % (ref 22–41)
MAGNESIUM SERPL-MCNC: 2.1 MG/DL (ref 1.5–2.5)
MCH RBC QN AUTO: 32.7 PG (ref 27–33)
MCHC RBC AUTO-ENTMCNC: 32.6 G/DL (ref 33.6–35)
MCV RBC AUTO: 100.3 FL (ref 81.4–97.8)
MONOCYTES # BLD AUTO: 0.75 K/UL (ref 0–0.85)
MONOCYTES NFR BLD AUTO: 10 % (ref 0–13.4)
NEUTROPHILS # BLD AUTO: 4.82 K/UL (ref 2–7.15)
NEUTROPHILS NFR BLD: 64.1 % (ref 44–72)
NRBC # BLD AUTO: 0 K/UL
NRBC BLD-RTO: 0 /100 WBC
PHOSPHATE SERPL-MCNC: 3.1 MG/DL (ref 2.5–4.5)
PLATELET # BLD AUTO: 191 K/UL (ref 164–446)
PMV BLD AUTO: 8.7 FL (ref 9–12.9)
POTASSIUM SERPL-SCNC: 3.9 MMOL/L (ref 3.6–5.5)
RBC # BLD AUTO: 3.49 M/UL (ref 4.2–5.4)
SODIUM SERPL-SCNC: 138 MMOL/L (ref 135–145)
WBC # BLD AUTO: 7.5 K/UL (ref 4.8–10.8)

## 2020-10-01 PROCEDURE — 83735 ASSAY OF MAGNESIUM: CPT

## 2020-10-01 PROCEDURE — 84100 ASSAY OF PHOSPHORUS: CPT

## 2020-10-01 PROCEDURE — 3E02340 INTRODUCTION OF INFLUENZA VACCINE INTO MUSCLE, PERCUTANEOUS APPROACH: ICD-10-PCS | Performed by: INTERNAL MEDICINE

## 2020-10-01 PROCEDURE — 99232 SBSQ HOSP IP/OBS MODERATE 35: CPT | Performed by: INTERNAL MEDICINE

## 2020-10-01 PROCEDURE — 99239 HOSP IP/OBS DSCHRG MGMT >30: CPT | Performed by: INTERNAL MEDICINE

## 2020-10-01 PROCEDURE — 80048 BASIC METABOLIC PNL TOTAL CA: CPT

## 2020-10-01 PROCEDURE — 93005 ELECTROCARDIOGRAM TRACING: CPT | Performed by: INTERNAL MEDICINE

## 2020-10-01 PROCEDURE — 93010 ELECTROCARDIOGRAM REPORT: CPT | Performed by: INTERNAL MEDICINE

## 2020-10-01 PROCEDURE — 90662 IIV NO PRSV INCREASED AG IM: CPT | Performed by: INTERNAL MEDICINE

## 2020-10-01 PROCEDURE — 700111 HCHG RX REV CODE 636 W/ 250 OVERRIDE (IP): Performed by: INTERNAL MEDICINE

## 2020-10-01 PROCEDURE — 85025 COMPLETE CBC W/AUTO DIFF WBC: CPT

## 2020-10-01 PROCEDURE — 90471 IMMUNIZATION ADMIN: CPT

## 2020-10-01 PROCEDURE — 700102 HCHG RX REV CODE 250 W/ 637 OVERRIDE(OP): Performed by: STUDENT IN AN ORGANIZED HEALTH CARE EDUCATION/TRAINING PROGRAM

## 2020-10-01 PROCEDURE — A9270 NON-COVERED ITEM OR SERVICE: HCPCS | Performed by: STUDENT IN AN ORGANIZED HEALTH CARE EDUCATION/TRAINING PROGRAM

## 2020-10-01 PROCEDURE — 36415 COLL VENOUS BLD VENIPUNCTURE: CPT

## 2020-10-01 RX ORDER — SOTALOL HYDROCHLORIDE 120 MG/1
120 TABLET ORAL 2 TIMES DAILY
Qty: 60 TAB | Refills: 1 | Status: ON HOLD | OUTPATIENT
Start: 2020-10-01 | End: 2020-10-21

## 2020-10-01 RX ORDER — POTASSIUM CHLORIDE 20 MEQ/1
40 TABLET, EXTENDED RELEASE ORAL ONCE
Status: DISCONTINUED | OUTPATIENT
Start: 2020-10-01 | End: 2020-10-01 | Stop reason: HOSPADM

## 2020-10-01 RX ADMIN — DILTIAZEM HYDROCHLORIDE 120 MG: 120 CAPSULE, EXTENDED RELEASE ORAL at 05:02

## 2020-10-01 RX ADMIN — OXYCODONE 5 MG: 5 TABLET ORAL at 03:29

## 2020-10-01 RX ADMIN — POTASSIUM CHLORIDE 60 MEQ: 1500 TABLET, EXTENDED RELEASE ORAL at 09:59

## 2020-10-01 RX ADMIN — PAROXETINE HYDROCHLORIDE 20 MG: 20 TABLET, FILM COATED ORAL at 05:03

## 2020-10-01 RX ADMIN — TORSEMIDE 40 MG: 20 TABLET ORAL at 05:02

## 2020-10-01 RX ADMIN — OXYCODONE 5 MG: 5 TABLET ORAL at 09:59

## 2020-10-01 RX ADMIN — INFLUENZA A VIRUS A/MICHIGAN/45/2015 X-275 (H1N1) ANTIGEN (FORMALDEHYDE INACTIVATED), INFLUENZA A VIRUS A/SINGAPORE/INFIMH-16-0019/2016 IVR-186 (H3N2) ANTIGEN (FORMALDEHYDE INACTIVATED), INFLUENZA B VIRUS B/PHUKET/3073/2013 ANTIGEN (FORMALDEHYDE INACTIVATED), AND INFLUENZA B VIRUS B/MARYLAND/15/2016 BX-69A ANTIGEN (FORMALDEHYDE INACTIVATED) 0.7 ML: 60; 60; 60; 60 INJECTION, SUSPENSION INTRAMUSCULAR at 11:44

## 2020-10-01 RX ADMIN — SOTALOL HYDROCHLORIDE 120 MG: 120 TABLET ORAL at 05:05

## 2020-10-01 RX ADMIN — LEVOTHYROXINE SODIUM 75 MCG: 0.07 TABLET ORAL at 05:01

## 2020-10-01 ASSESSMENT — ENCOUNTER SYMPTOMS
NUMBNESS: 0
BLOOD IN STOOL: 0
LIGHT-HEADEDNESS: 0
TROUBLE SWALLOWING: 0
NAUSEA: 0
WHEEZING: 0
SPEECH DIFFICULTY: 0
SHORTNESS OF BREATH: 1
COUGH: 0
CHOKING: 0
VOMITING: 0
FATIGUE: 0
MUSCULOSKELETAL NEGATIVE: 1
WEAKNESS: 0
STRIDOR: 0
PALPITATIONS: 1
DIZZINESS: 0
APNEA: 0
SHORTNESS OF BREATH: 0
CHEST TIGHTNESS: 0
FEVER: 0
CHILLS: 0
ABDOMINAL PAIN: 0

## 2020-10-01 ASSESSMENT — PAIN DESCRIPTION - PAIN TYPE
TYPE: CHRONIC PAIN

## 2020-10-01 NOTE — DISCHARGE PLANNING
@6978  Agency/Facility Name: [facility]  Spoke To: Inge  Outcome: Accepted and can see Mendez 10/4.    Received Choice form at 1015  Agency/Facility Name: [facility]   Referral sent per Choice form @ 1011

## 2020-10-01 NOTE — PROGRESS NOTES
Cardiology Follow Up Progress Note    Date of Service  9/30/2020    Attending Physician  Kraig Waite M.D.      Presented with recurrent atrial arrhythmia, heart rate 110-120s      Past medical history significant for severe mitral regurgitation status post mitral valve repair with Maze procedure and NED ligation 2011 at [facility], pulmonary hypertension, chronic hypoxemic respiratory failure on 5 L oxygen, atrial flutter status post ablation, atrial fibrillation status post ablation x2 prior to her maze procedure, on sotalol, obstructive sleep apnea on BiPAP, right renal artery and celiac artery aneurysm status post coil embolization.        Interim Events    No overnight cardiac events  Maintaining sinus rhythm on sotalol  EKG reviewed  Borderline long QTC, approximately 499 ms, discussed with EP, will continue with sotalol 120 twice daily  Keep potassium above 4.5, magnesium above 2  Additional one-time potassium dose today  Stable for discharge  Blood pressure stable      Review of Systems  Review of Systems   Respiratory: Negative for apnea, cough, choking, chest tightness, shortness of breath, wheezing and stridor.    Cardiovascular: Negative for chest pain and leg swelling.       Vital signs in last 24 hours  Temp:  [36.3 °C (97.3 °F)-36.5 °C (97.7 °F)] 36.3 °C (97.3 °F)  Pulse:  [] 69  Resp:  [14-21] 18  BP: (120-167)/() 120/70  SpO2:  [95 %-99 %] 98 %    Physical Exam  Physical Exam  Cardiovascular:      Rate and Rhythm: Normal rate and regular rhythm.   Pulmonary:      Effort: Pulmonary effort is normal.   Skin:     General: Skin is warm.   Neurological:      General: No focal deficit present.      Mental Status: She is alert.   Psychiatric:         Mood and Affect: Mood normal.         Lab Review  Lab Results   Component Value Date/Time    WBC 8.2 09/29/2020 05:03 PM    RBC 3.95 (L) 09/29/2020 05:03 PM    HEMOGLOBIN 13.0 09/29/2020 05:03 PM    HEMATOCRIT 39.5 09/29/2020 05:03 PM    MCV  100.0 (H) 09/29/2020 05:03 PM    MCH 32.9 09/29/2020 05:03 PM    MCHC 32.9 (L) 09/29/2020 05:03 PM    MPV 8.4 (L) 09/29/2020 05:03 PM      Lab Results   Component Value Date/Time    SODIUM 141 09/30/2020 09:55 AM    POTASSIUM 3.7 09/30/2020 09:55 AM    CHLORIDE 98 09/30/2020 09:55 AM    CO2 30 09/30/2020 09:55 AM    GLUCOSE 116 (H) 09/30/2020 09:55 AM    BUN 24 (H) 09/30/2020 09:55 AM    CREATININE 0.89 09/30/2020 09:55 AM      Lab Results   Component Value Date/Time    ASTSGOT 27 09/30/2020 09:55 AM    ALTSGPT 22 09/30/2020 09:55 AM     Lab Results   Component Value Date/Time    CHOLSTRLTOT 213 (H) 09/30/2020 09:55 AM     (H) 09/30/2020 09:55 AM    HDL 53 09/30/2020 09:55 AM    TRIGLYCERIDE 146 09/30/2020 09:55 AM    TROPONINT 16 09/29/2020 05:03 PM       Recent Labs     09/29/20  1703   NTPROBNP 837*       Cardiac Imaging and Procedures Review  EKG: Sinus rhythm on 9/30/2020    Echocardiogram:    8/5/2020  RVSP 25 mmHg  Normal pericardium without effusion  No significant valvular pathology  Dilated RV with preserved systolic function    Cardiac Catheterization:  N/a    Imaging  Chest X-Ray: No acute cardiopulmonary    Stress Test: Not applicable    Assessment/Plan    Recurrent atrial arrhythmia  -Previous history of A. fib/flutter ablations  -Appreciate EP, evidence of atrial tachycardia versus atypical flutter  -Previously on 80 mg of sotalol, now 120 mg twice a day with conversion to sinus  -EKG reviewed, QTC approximately 499 ms, discussed with EP will continue with sotalol at 120 twice a day  -K3.9, on p.o. potassium, will give additional potassium x1 this morning  -Continue with digoxin and diltiazem  -Continue with Eliquis 5mg BID    Pulmonary hypertension  -Chronic hypoxic respiratory failure  -Severely elevated PCWP on previous right heart cath 2018  -Plan to reevaluate right heart cath as an outpatient  -Discussed CardioMEMS implantation, patient is interested  -Continue with Tadalafil  -Continue  with torsemide 40 twice a day    Plan for RHC and cardio mems as outpatient, will arrange  Follow-up appointment with our pulmonary hypertension clinic in 7 to 10 days, will arrange.  Follow-up appointment with EP 10/10/2020 at 1:20 PM.  Stable for discharge  Cardiology will sign off  Please contact me with any questions.    NYASIA Keith.   Cardiologist, [facility]  [phone]

## 2020-10-01 NOTE — PROGRESS NOTES
Cardiology Follow Up Progress Note    Date of Service  10/1/2020    Attending Physician  Kraig Waite M.D.    Chief Complaint/Reason for EP Consult:  Recurrent atrial arrhythmias.     HPI  Zeinab Loza is a 75 y.o. female admitted 9/29/2020 with recurrent atrial arrhythmias with RVR.  She has a past medical history significant for mitral valve repair and surgical MAZE in 2011 at [facility].  Previous catheter ablations prior to MAZE procedure.  Has been maintained on Sotalol 80 mg BID.  Follows longitudinally with  and Dr. Eng as well as [facility] for her pulmonary HTN (Who group 1 vs 2).     Interim Events  Reports that she is feeling well this AM.   Monitored rhythm: Currently sinus.  Overnight sinus/intermittentatrial arrhythmias (AT/atypical AFL)   Labs: K 3.9, Mg 2.1, CrCL 82 nl/min.  EKG: QTc 499 ms after 4th ekg.    Review of Systems  Review of Systems   Constitutional: Negative for chills, fatigue and fever.   HENT: Negative for congestion, nosebleeds, tinnitus and trouble swallowing.    Respiratory: Positive for shortness of breath (Chronic ). Negative for cough and wheezing.    Cardiovascular: Positive for palpitations (with atrial arrhtyhmias ). Negative for chest pain and leg swelling.   Gastrointestinal: Negative for abdominal pain, blood in stool, nausea and vomiting.   Genitourinary: Negative for hematuria.   Musculoskeletal: Negative.    Skin: Negative.    Neurological: Negative for dizziness, syncope, speech difficulty, weakness, light-headedness and numbness.       Vital signs in last 24 hours  Temp:  [36.1 °C (97 °F)-37.2 °C (99 °F)] 37.2 °C (99 °F)  Pulse:  [] 66  Resp:  [16-18] 17  BP: (111-127)/(56-82) 120/56  SpO2:  [95 %-98 %] 96 %    Physical Exam  Physical Exam  Vitals signs and nursing note reviewed.   Constitutional:       Appearance: Normal appearance.   HENT:      Head: Normocephalic and atraumatic.   Eyes:      Conjunctiva/sclera: Conjunctivae normal.       Pupils: Pupils are equal, round, and reactive to light.   Neck:      Musculoskeletal: Normal range of motion.   Cardiovascular:      Rate and Rhythm: Normal rate and regular rhythm.      Pulses: Normal pulses.      Heart sounds: Normal heart sounds. No murmur. No friction rub. No gallop.    Pulmonary:      Effort: Pulmonary effort is normal.      Breath sounds: Normal breath sounds. No wheezing, rhonchi or rales.      Comments: 02 via NC 5 L/min   Abdominal:      General: Bowel sounds are normal.      Tenderness: There is no abdominal tenderness.   Musculoskeletal: Normal range of motion.   Skin:     General: Skin is warm and dry.   Neurological:      Mental Status: She is alert and oriented to person, place, and time.   Psychiatric:         Mood and Affect: Mood normal.         Behavior: Behavior normal.         Thought Content: Thought content normal.         Judgment: Judgment normal.         Lab Review  Lab Results   Component Value Date/Time    WBC 7.5 10/01/2020 05:48 AM    RBC 3.49 (L) 10/01/2020 05:48 AM    HEMOGLOBIN 11.4 (L) 10/01/2020 05:48 AM    HEMATOCRIT 35.0 (L) 10/01/2020 05:48 AM    .3 (H) 10/01/2020 05:48 AM    MCH 32.7 10/01/2020 05:48 AM    MCHC 32.6 (L) 10/01/2020 05:48 AM    MPV 8.7 (L) 10/01/2020 05:48 AM      Lab Results   Component Value Date/Time    SODIUM 138 10/01/2020 05:48 AM    POTASSIUM 3.9 10/01/2020 05:48 AM    CHLORIDE 97 10/01/2020 05:48 AM    CO2 26 10/01/2020 05:48 AM    GLUCOSE 124 (H) 10/01/2020 05:48 AM    BUN 28 (H) 10/01/2020 05:48 AM    CREATININE 0.93 10/01/2020 05:48 AM      Lab Results   Component Value Date/Time    ASTSGOT 27 09/30/2020 09:55 AM    ALTSGPT 22 09/30/2020 09:55 AM     Lab Results   Component Value Date/Time    CHOLSTRLTOT 213 (H) 09/30/2020 09:55 AM     (H) 09/30/2020 09:55 AM    HDL 53 09/30/2020 09:55 AM    TRIGLYCERIDE 146 09/30/2020 09:55 AM    TROPONINT 16 09/29/2020 05:03 PM       Recent Labs     09/29/20  1703   NTPROBNP 837*        Cardiac Imaging and Procedures Review  EKG:  My personal interpretation of the EKG dated 10/1/20 is Sinus.    Echocardiogram:  8/5/20  Left Ventricle  Normal left ventricular chamber size. Normal left ventricular wall thickness. Normal left ventricular systolic function. Left ventricular ejection fraction is visually estimated to be 60%. Normal regional wall motion. Indeterminate diastolic function.  Right Ventricle  Mildly dilated right ventricle. Normal right ventricular systolic function.  Right Atrium  Normal right atrial size. Normal inferior vena cava size and inspiratory collapse.  Left Atrium  Normal left atrial size. Left atrial volume index is 33  mL/sq m.  Mitral Valve  Structurally normal mitral valve without significant stenosis or regurgitation.  Aortic Valve  Structurally normal aortic valve without significant stenosis or regurgitation.  Tricuspid Valve  Structurally normal tricuspid valve. No tricuspid stenosis. Trace tricuspid regurgitation. Estimated right ventricular systolic pressure  is 25 mmHg.  Pulmonic Valve  Structurally normal pulmonic valve without significant stenosis or regurgitation.  Pericardium  Normal pericardium without effusion.  Aorta  Normal aortic root for body surface area. Ascending aorta diameter is 3.2 cm.    Assessment/Plan  1. Recurrent Atrial Arrhthymias:  - Evidence of atrial tachycardia vs atypical atrial flutter on admission.    - Currently in sinus rhythm.  Still intermittently paroxysmal .  - Continue Sotalol 120 mg PO BID.   - ECG this AM with QTc approximately 499 ms and within parameters.  Will continue to follow closely as outpt.   - CrCl stable for sotalol dose.   - Continue low dose Diltiazem.   - Continue Eliquis.   - replete potassium x 1 today before DC.     2. Pulm HTN/Chronic Resp Fialure:  - Will have outpt [facility] for follow up evaluation (last 2018)/ consideraion for potential cardiomemes. Per primary cards.     - On tadalafil 40 mg QHS, Torsemide 40  mg.   - On supplemental 02.     - EP will sign off.  Follow up in EP clinic is arranged. Please call with any questions.     TEODORO Fuentes.   [facility]  [phone]

## 2020-10-01 NOTE — PROGRESS NOTES
Assumed care of PT A&O 4. Pt resting in bed with no signs of labored breathing. On 5L NC, baseline. Tele monitor in place, cardiac rhythm being monitored. Call light within reach, bed in lowest position, upper bed rails up. Pt was updated on plan of care for the day. Will continue to monitor.     Pt is not doing a heart cath today, probable DC and follow up outpatient.

## 2020-10-01 NOTE — FACE TO FACE
Face to Face Supporting Documentation - Home Health    The encounter with this patient was in whole or in part the primary reason for home health admission.    Date of encounter:   Patient:                    MRN:                       YOB: 2018  Zeinab Loza  [id number]  1945     Home health to see patient for:  Skilled Nursing care for assessment, interventions & education, Physical Therapy evaluation and treatment and Occupational therapy evaluation and treatment    Skilled need for:  Recent Deterioration of Health Status A. fib with RVR, chronic respiratory failure, FEI    Skilled nursing interventions to include:  Comment: Assessment and education, PT OT    Homebound status evidenced by:  Needs the assistance of another person in order to leave the home. Leaving home requires a considerable and taxing effort. There is a normal inability to leave the home.    Community Physician to provide follow up care: Ashia Tidwell M.D.     Optional Interventions? No      I certify the face to face encounter for this home health care referral meets the CMS requirements and the encounter/clinical assessment with the patient was, in whole, or in part, for the medical condition(s) listed above, which is the primary reason for home health care. Based on my clinical findings: the service(s) are medically necessary, support the need for home health care, and the homebound criteria are met.  I certify that this patient has had a face to face encounter by myself.  Kraig Waite M.D. - NPI: [id number]

## 2020-10-01 NOTE — DISCHARGE SUMMARY
Discharge Summary    CHIEF COMPLAINT ON ADMISSION  Chief Complaint   Patient presents with   • Rapid Heart Beat   • Dizziness       Reason for Admission  Sent by Urgent Care      Admission Date  9/29/2020    CODE STATUS  Full Code    HPI & HOSPITAL COURSE  This is a 75 y.o. 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 fib with RVR.  Patient was consulted by cardiology and electrophysiology.  Dose of sotalol increased to 120 mg twice a day.  Patient converted to sinus rhythm.  TC was borderline prolonged at 499, which was discussed with electrophysiology.  She was discharged in stable condition.          Therefore, she is discharged in good and stable condition to home with close outpatient follow-up.        Discharge Date  10/1/2020    FOLLOW UP ITEMS POST DISCHARGE  Cardiology  DISCHARGE DIAGNOSES  Active Problems:    A-fib (HCC) POA: Unknown    Other specified hypothyroidism POA: Unknown    Other hyperlipidemia POA: Unknown    Chronic respiratory failure with hypoxia (HCC) POA: Unknown    Chronic back pain POA: Yes    Pulmonary hypertension (HCC) POA: Yes  Resolved Problems:    * No resolved hospital problems. *      FOLLOW UP  Future Appointments   Date Time Provider Department Center   10/12/2020  1:20 PM Thee Galindo M.D. [facility] None     Ashia Tidwell M.D.  [address] [facility] [id number] [phone]      Please call to schedule your hospital follow up  with your primary care physician. Thank you      Ramiro Eng M.D.  [address]   [address]  [facility] [address]  [phone]    Call  Please follow up with Dr. Eng so he can decide on cardiac catheterization      MEDICATIONS ON DISCHARGE     Medication List      CHANGE how you take these medications      Instructions   sotalol 120 MG tablet  What changed:   · medication strength  · how much to take  Commonly known as: BETAPACE   Take 1 Tab by mouth 2 times a day.  Dose: 120 mg        CONTINUE taking these medications      Instructions   acetaminophen 500 MG Tabs  Commonly known as: TYLENOL   Take 1,000 mg by mouth every 6 hours as needed for Mild Pain.  Dose: 1,000 mg     Adcirca 20 MG Tabs  Generic drug: Tadalafil (PAH)   Take 40 mg by mouth every bedtime. Indications: Pulmonary Arterial Hypertension  Dose: 40 mg     amitriptyline 10 MG Tabs  Commonly known as: ELAVIL   Take 20 mg by mouth every bedtime.  Dose: 20 mg     apixaban 5mg Tabs  Commonly known as: ELIQUIS   Take 5 mg by mouth 2 Times a Day.  Dose: 5 mg     digoxin 125 MCG Tabs  Commonly known as: LANOXIN   Take 1 Tab by mouth every evening.  Dose: 125 mcg     DILTIAZem  MG Cp24  Commonly known as: CARDIZEM CD   TAKE 1 CAPSULE EVERY DAY     levothyroxine 75 MCG Tabs  Commonly known as: SYNTHROID   Take 75 mcg by mouth Every morning on an empty stomach.  Dose: 75 mcg     PARoxetine 20 MG Tabs  Commonly known as: PAXIL   Take 20 mg by mouth every morning.  Dose: 20 mg     potassium chloride SA 20 MEQ Tbcr  Commonly known as: Kdur   Take 60 mEq by mouth 2 times a day.  Dose: 60 mEq     rosuvastatin 10 MG Tabs  Commonly known as: CRESTOR   Take 10 mg by mouth every evening.  Dose: 10 mg     therapeutic multivitamin-minerals Tabs   Take 1 Tab by mouth every day.  Dose: 1 Tab     torsemide 20 MG Tabs  Commonly known as: DEMADEX   Take 40 mg by mouth 2 Times a Day.  Dose: 40 mg     Vitamin D 50 MCG (2000 UT) Caps   Take 2,000 Units by mouth every day.  Dose: 2,000 Units             Allergies  Allergies   Allergen Reactions   • Zolpidem Tartrate Unspecified     Lightheaded and confusion       DIET  Orders Placed This Encounter   Procedures   • Diet Order Cardiac     Standing Status:   Standing     Number of Occurrences:   1     Order Specific Question:   Diet:     Answer:   Cardiac [6]       ACTIVITY  As tolerated.  Weight bearing as tolerated    CONSULTATIONS  Cardiology  Electrophysiology    PROCEDURES  None    LABORATORY  Lab Results   Component Value Date    SODIUM 138 10/01/2020    POTASSIUM 3.9 10/01/2020    CHLORIDE 97 10/01/2020    CO2 26 10/01/2020    GLUCOSE 124 (H) 10/01/2020    BUN 28 (H) 10/01/2020    CREATININE 0.93 10/01/2020        Lab Results   Component Value Date    WBC 7.5 10/01/2020    HEMOGLOBIN 11.4 (L) 10/01/2020    HEMATOCRIT 35.0 (L) 10/01/2020    PLATELETCT 191 10/01/2020      DX-CHEST-PORTABLE (1 VIEW)   Final Result      No acute cardiopulmonary abnormality identified.            Total time of the discharge process exceeds 36 minutes.

## 2020-10-01 NOTE — PROGRESS NOTES
Assumed care of PT A&O 4. Pt resting in bed with no signs of labored breathing. On 5L. Tele monitor in place, cardiac rhythm being monitored. Call light within reach, bed in lowest position, upper bed rails up. Pt was updated on plan of care for the day. Will continue to monitor.

## 2020-10-01 NOTE — DISCHARGE INSTRUCTIONS
Discharge Instructions    Discharged to home by car with relative. Discharged via wheelchair, hospital escort: Yes.  Special equipment needed: Not Applicable    Be sure to schedule a follow-up appointment with your primary care doctor or any specialists as instructed.     Discharge Plan:   Diet Plan: Discussed  Activity Level: Discussed  Confirmed Follow up Appointment: Appointment Scheduled  Confirmed Symptoms Management: Discussed  Medication Reconciliation Updated: Yes  Influenza Vaccine Indication: Indicated: 65 years and older(pt request flu shot prior to d/c)  Influenza Vaccine Given - only chart on this line when given: Influenza Vaccine Given (See MAR)    I understand that a diet low in cholesterol, fat, and sodium is recommended for good health. Unless I have been given specific instructions below for another diet, I accept this instruction as my diet prescription.   Other diet: Cardiac    Special Instructions: None    · Is patient discharged on Warfarin / Coumadin?   No       Cardiac Arrhythmia  Your heart is a muscle that works to pump blood through your body by regular contractions. The beating of your heart is controlled by a system of special pacemaker cells. These cells control the electrical activity of the heart. When the system controlling this regular beating is disturbed, a heart rhythm abnormality (arrhythmia) results.  WHEN YOUR HEART SKIPS A BEAT  One of the most common and least serious heart arrhythmias is called an ectopic or premature atrial heartbeat (PAC). This may be noticed as a small change in your regular pulse. A PAC originates from the top part (atrium) of the heart. Within the right atrium, the SA node is the area that normally controls the regularity of the heart. PACs occur in heart tissue outside of the SA node region. You may feel this as a skipped beat or heart flutter, especially if several occur in succession or occur frequently.   Another arrhythmia is ventricular  premature complex (VCP or PVC). These extra beats start out in the bottom, more muscular chambers of the heart. In most cases a PVC is harmless. If there are underlying causes that are making the heart irritable such as an overactive thyroid or a prior heart attack PVCs may be of more concern. In a few cases, medications to control the heart rhythm may be prescribed.  Things to try at home:  · Cut down or avoid alcohol, tobacco and caffeine.  · Get enough sleep.  · Reduce stress.  · Exercise more.  WHEN THE HEART BEATS TOO FAST  Atrial tachycardia is a fast heart rate, which starts out in the atrium. It may last from minutes to much longer. Your heart may beat 140 to 240 times per minute instead of the normal 60 to 100.  · Symptoms include a worried feeling (anxiety) and a sense that your heart is beating fast and hard.  · You may be able to stop the fast rate by holding your breath or bearing down as if you were going to have a bowel movement.  · This type of fast rate is usually not dangerous.  Atrial fibrillation and atrial flutter are other fast rhythms that start in the atria. Both conditions keep the atria from filling with enough blood so the heart does not work well.  · Symptoms include feeling light-headed or faint.  · These fast rates may be the result of heart damage or disease. Too much thyroid hormone may play a role.  · There may be no clear cause or it may be from heart disease or damage.  · Medication or a special electrical treatment (cardioversion) may be needed to get the heart beating normally.  Ventricular tachycardia is a fast heart rate that starts in the lower muscular chambers (ventricles) This is a serious disorder that requires treatment as soon as possible. You need someone else to get and use a small defibrillator.  · Symptoms include collapse, chest pain, or being short of breath.  · Treatment may include medication, procedures to improve blood flow to the heart, or an implantable  cardiac defibrillator (ICD).  DIAGNOSIS   · A cardiogram (EKG or ECG) will be done to see the arrhythmia, as well as lab tests to check the underlying cause.  · If the extra beats or fast rate come and go, you may wear a Holter monitor that records your heart rate for a longer period of time.  SEEK MEDICAL CARE IF:  · You have irregular or fast heartbeats (palpitations).  · You experience skipped beats.  · You develop lightheadedness.  · You have chest discomfort.  · You have shortness of breath.  · You have more frequent episodes, if you are already being treated.  SEEK IMMEDIATE MEDICAL CARE IF:   · You have severe chest pain, especially if the pain is crushing or pressure-like and spreads to the arms, back, neck, or jaw, or if you have sweating, feeling sick to your stomach (nausea), or shortness of breath. THIS IS AN EMERGENCY. Do not wait to see if the pain will go away. Get medical help at once. Call 911 or 0 (). DO NOT drive yourself to the hospital.  · You feel dizzy or faint.  · You have episodes of previously documented atrial tachycardia that do not resolve with the techniques your caregiver has taught you.  · Irregular or rapid heartbeats begin to occur more often than in the past, especially if they are associated with more pronounced symptoms or of longer duration.  Document Released: 12/18/2006 Document Revised: 03/11/2013 Document Reviewed: 08/05/2009  ExitCare® Patient Information ©2014 [employer].    Sotalol tablets (Betapace)  What is this medicine?  SOTALOL (LUIS ta lole) is a beta-blocker. Beta-blockers reduce the workload on the heart and help it to beat more regularly. This medicine is used to treat heart rhythm problems and to slow rapid heartbeats. This medicine can help your heart to return to and maintain a normal rhythm.  This medicine may be used for other purposes; ask your health care provider or pharmacist if you have questions.  COMMON BRAND NAME(S): BETAPACE, BETAPACE AF,  Ren  What should I tell my health care provider before I take this medicine?  They need to know if you have any of these conditions:  · diabetes  · heart or vessel disease like slow heart rate, worsening heart failure, heart block, sick sinus syndrome or Raynaud's disease  · kidney disease  · liver disease  · history of low levels of potassium or magnesium  · lung or breathing disease, like asthma or emphysema  · pheochromocytoma  · recent heart attack  · thyroid disease  · an unusual or allergic reaction to sotalol, other beta-blockers, medicines, foods, dyes, or preservatives  · pregnant or trying to get pregnant  · breast-feeding  How should I use this medicine?  Take this medicine by mouth with a glass of water. Follow the directions on the prescription label. Take your doses at regular intervals. Do not take your medicine more often than directed. Do not stop taking this medicine suddenly. This could lead to serious heart-related effects.  Talk to your pediatrician regarding the use of this medicine in children. Special care may be needed. While this medicine may be used in children for selected conditions precautions do apply.  Overdosage: If you think you have taken too much of this medicine contact a poison control center or emergency room at once.  NOTE: This medicine is only for you. Do not share this medicine with others.  What if I miss a dose?  If you miss a dose, take it as soon as you can. If it is almost time for your next dose, take only that dose. Do not take double or extra doses.  What may interact with this medicine?  Do not take this medicine with any of the following medications:  · amoxapine  · arsenic trioxide  · certain antibiotics like gatifloxacin, grepafloxacin, levofloxacin, moxifloxacin, sparfloxacin, telithromycin  · cisapride  · droperidol  · haloperidol  · hawthorn  · maprotiline  · medicines for malaria like chloroquine and halofantrine  · medicines to control heart  rhythm  · methadone  · pentamidine  · phenothiazines like prochlorperazine, perphenazine, thioridazine, and others  · pimozide  · ranolazine  · tricyclic antidepressants like amitriptyline, imipramine, nortriptyline, and others  · vardenafil  This medicine may also interact with the following medications:  · antacids  · certain antibiotics such as clarithromycin and erythromycin  · clonidine  · digoxin  · medicines for angina or high blood pressure  · medicines for colds and breathing difficulties  · medicines for diabetes  · other beta-blockers like atenolol, metoprolol, propranolol and others  · ziprasidone  This list may not describe all possible interactions. Give your health care provider a list of all the medicines, herbs, non-prescription drugs, or dietary supplements you use. Also tell them if you smoke, drink alcohol, or use illegal drugs. Some items may interact with your medicine.  What should I watch for while using this medicine?  Visit your doctor or health care professional for regular checks on your progress. Check your heart rate and blood pressure regularly while you are taking this medicine. Ask your doctor or health care professional what your heart rate and blood pressure should be, and when you should contact him or her. Your doctor or health care professional also may schedule regular blood tests and electrocardiograms to check your progress.  Because your condition and the use of this medicine carry some risk, it is a good idea to carry an identification card, necklace or bracelet with details of your condition, medications, and doctor or health care professional.  You may get drowsy or dizzy. Do not drive, use machinery, or do anything that needs mental alertness until you know how this drug affects you. Do not stand or sit up quickly, especially if you are an older patient. This reduces the risk of dizzy or fainting spells. Alcohol can make you more drowsy and dizzy. Avoid alcoholic  drinks.  Do not treat yourself for coughs, colds, or pain while you are taking this medicine without asking your doctor or health care professional for advice. Some ingredients may increase your blood pressure.  If you are going to have surgery, tell your doctor or health care professional that you are taking this medicine.  What side effects may I notice from receiving this medicine?  Side effects that you should report to your doctor or health care professional as soon as possible:  · chest pain  · cold, tingling, or numb hands or feet  · confusion  · diarrhea  · difficulty breathing, wheezing  · irregular heartbeat  · muscle aches and pains  · skin rash  · slow heart rate  · sweating  · swollen legs or ankles  · tremor, shakes  · vomiting  Side effects that usually do not require medical attention (report to your doctor or health care professional if they continue or are bothersome):  · change in sex drive or performance  · mental depression  · nausea  · weakness or tiredness  This list may not describe all possible side effects. Call your doctor for medical advice about side effects. You may report side effects to FDA at 3-992-FDA-4179.  Where should I keep my medicine?  Keep out of the reach of children.  Store at room temperature between 15 and 30 degrees C (59 and 86 degrees F). Throw away any unused medicine after the expiration date.  NOTE: This sheet is a summary. It may not cover all possible information. If you have questions about this medicine, talk to your doctor, pharmacist, or health care provider.  © 2020 [employer]/Gold Standard (2019-12-10 11:02:43)

## 2020-10-01 NOTE — PROGRESS NOTES
Discharge instructions give to patient at bedside. Pt is AOx4, and verbalizes understanding and states plans for follow up. New and/or home medications reviewed, post discharge activity level and worsening of symptoms needing follow up care discussed. Telemetry monitor, ID band, and IV removed. All belongings accounted for, all questions answered at this time. Pt was taken by wheelchair to car by RN and family member.

## 2020-10-07 DIAGNOSIS — I10 ESSENTIAL HYPERTENSION: ICD-10-CM

## 2020-10-08 RX ORDER — DILTIAZEM HYDROCHLORIDE 120 MG/1
CAPSULE, COATED, EXTENDED RELEASE ORAL
Qty: 90 CAP | Refills: 2 | Status: ON HOLD | OUTPATIENT
Start: 2020-10-08 | End: 2020-10-25 | Stop reason: SDUPTHER

## 2020-10-09 ENCOUNTER — APPOINTMENT (OUTPATIENT)
Dept: CARDIOLOGY | Facility: MEDICAL CENTER | Age: 75
End: 2020-10-09
Payer: MEDICARE

## 2020-10-09 ENCOUNTER — TELEPHONE (OUTPATIENT)
Dept: CARDIOLOGY | Facility: MEDICAL CENTER | Age: 75
End: 2020-10-09

## 2020-10-09 NOTE — TELEPHONE ENCOUNTER
Patient had an apt. Today with  today 10/09/2020 @ 7:45am attempted to called @ 7:43 no answer left message , 7:45am no answer left message and called 7:50am no answer. Left message to please called us back to rescheduled apt.

## 2020-10-12 ENCOUNTER — TELEMEDICINE (OUTPATIENT)
Dept: CARDIOLOGY | Facility: MEDICAL CENTER | Age: 75
End: 2020-10-12
Payer: MEDICARE

## 2020-10-12 ENCOUNTER — TELEPHONE (OUTPATIENT)
Dept: CARDIOLOGY | Facility: MEDICAL CENTER | Age: 75
End: 2020-10-12

## 2020-10-12 VITALS
SYSTOLIC BLOOD PRESSURE: 120 MMHG | OXYGEN SATURATION: 99 % | DIASTOLIC BLOOD PRESSURE: 74 MMHG | HEART RATE: 71 BPM | WEIGHT: 203 LBS | BODY MASS INDEX: 31.86 KG/M2 | HEIGHT: 67 IN

## 2020-10-12 VITALS
DIASTOLIC BLOOD PRESSURE: 77 MMHG | HEIGHT: 67 IN | HEART RATE: 71 BPM | OXYGEN SATURATION: 99 % | WEIGHT: 203 LBS | SYSTOLIC BLOOD PRESSURE: 120 MMHG | BODY MASS INDEX: 31.86 KG/M2

## 2020-10-12 DIAGNOSIS — Z79.899 HIGH RISK MEDICATION USE: ICD-10-CM

## 2020-10-12 DIAGNOSIS — I27.21 PAH (PULMONARY ARTERY HYPERTENSION) (HCC): ICD-10-CM

## 2020-10-12 DIAGNOSIS — E78.2 MIXED HYPERLIPIDEMIA: ICD-10-CM

## 2020-10-12 DIAGNOSIS — E83.52 HYPERCALCEMIA: ICD-10-CM

## 2020-10-12 DIAGNOSIS — Z78.9 NON-SMOKER: ICD-10-CM

## 2020-10-12 DIAGNOSIS — J96.11 CHRONIC RESPIRATORY FAILURE WITH HYPOXIA (HCC): ICD-10-CM

## 2020-10-12 DIAGNOSIS — E78.49 OTHER HYPERLIPIDEMIA: ICD-10-CM

## 2020-10-12 DIAGNOSIS — I48.4 ATYPICAL ATRIAL FLUTTER (HCC): ICD-10-CM

## 2020-10-12 DIAGNOSIS — R06.89 HYPERCAPNIA: ICD-10-CM

## 2020-10-12 DIAGNOSIS — I10 ESSENTIAL HYPERTENSION: ICD-10-CM

## 2020-10-12 DIAGNOSIS — I48.0 PAROXYSMAL ATRIAL FIBRILLATION (HCC): ICD-10-CM

## 2020-10-12 PROBLEM — I27.20 PULMONARY HYPERTENSION (HCC): Status: RESOLVED | Noted: 2019-09-09 | Resolved: 2020-10-12

## 2020-10-12 PROCEDURE — 99215 OFFICE O/P EST HI 40 MIN: CPT | Mod: 95,CR | Performed by: INTERNAL MEDICINE

## 2020-10-12 PROCEDURE — 99214 OFFICE O/P EST MOD 30 MIN: CPT | Mod: 95,CR | Performed by: INTERNAL MEDICINE

## 2020-10-12 RX ORDER — ROSUVASTATIN CALCIUM 20 MG/1
20 TABLET, COATED ORAL EVERY EVENING
Qty: 30 TAB | Refills: 11 | Status: SHIPPED | OUTPATIENT
Start: 2020-10-12 | End: 2020-10-12 | Stop reason: SDUPTHER

## 2020-10-12 RX ORDER — ROSUVASTATIN CALCIUM 20 MG/1
20 TABLET, COATED ORAL EVERY EVENING
Qty: 30 TAB | Refills: 11 | Status: SHIPPED | OUTPATIENT
Start: 2020-10-12 | End: 2021-01-13 | Stop reason: SDUPTHER

## 2020-10-12 RX ORDER — TORSEMIDE 20 MG/1
40 TABLET ORAL 2 TIMES DAILY
Qty: 120 TAB | Refills: 11 | Status: SHIPPED | OUTPATIENT
Start: 2020-10-12 | End: 2021-05-24 | Stop reason: DRUGHIGH

## 2020-10-12 ASSESSMENT — ENCOUNTER SYMPTOMS
SORE THROAT: 0
LOSS OF CONSCIOUSNESS: 0
CHILLS: 0
WEAKNESS: 0
CARDIOVASCULAR NEGATIVE: 1
SPUTUM PRODUCTION: 0
DIZZINESS: 0
NEUROLOGICAL NEGATIVE: 1
STRIDOR: 0
CLAUDICATION: 0
PALPITATIONS: 0
SHORTNESS OF BREATH: 0
CONSTITUTIONAL NEGATIVE: 1
PND: 0
FEVER: 0
RESPIRATORY NEGATIVE: 1
HEMOPTYSIS: 0
MUSCULOSKELETAL NEGATIVE: 1
EYES NEGATIVE: 1
COUGH: 0
GASTROINTESTINAL NEGATIVE: 1
WHEEZING: 0
ORTHOPNEA: 0
BRUISES/BLEEDS EASILY: 0

## 2020-10-12 ASSESSMENT — FIBROSIS 4 INDEX
FIB4 SCORE: 2.2
FIB4 SCORE: 2.2

## 2020-10-12 NOTE — PROGRESS NOTES
Telemedicine: Established Patient   This evaluation was conducted via Zoom using secure and encrypted videoconferencing technology. The patient was in a private location in the state of [place].    The patient's identity was confirmed and verbal consent was obtained for this virtual visit.    Subjective:   CC:   Chief Complaint   Patient presents with   • Atrial Fibrillation       Zeinab Loza is a 75 y.o. female presenting for evaluation and management of:    Afib and Flutter    74 y/o F with pAF/AFL. H/o PH. On vasodilators. Was in the ED 2 weeks ago with atypical flutter. Sotalol increased to 120mg BID and she converted to NSR. Since discharge she notes her tongue is swollen and she bit it the other day. Concerned that this is a side effect of sotalol. She says it is worse since increasing the sotalol. No palpitations. No syncope. Feels like her breathing is better.    ROS   12 point ROS performed, negative for all system except as above.      Allergies   Allergen Reactions   • Zolpidem Tartrate Unspecified     Lightheaded and confusion       Current medicines (including changes today)  Current Outpatient Medications   Medication Sig Dispense Refill   • rosuvastatin (CRESTOR) 20 MG Tab Take 1 Tab by mouth every evening. 30 Tab 11   • torsemide (DEMADEX) 20 MG Tab Take 2 Tabs by mouth 2 Times a Day. 120 Tab 11   • DILTIAZem CD (CARDIZEM CD) 120 MG CAPSULE SR 24 HR TAKE 1 CAPSULE EVERY DAY 90 Cap 2   • sotalol (BETAPACE) 120 MG tablet Take 1 Tab by mouth 2 times a day. 60 Tab 1   • potassium chloride SA (KDUR) 20 MEQ Tab CR Take 60 mEq by mouth 2 times a day.     • digoxin (LANOXIN) 125 MCG Tab Take 1 Tab by mouth every evening. 90 Tab 3   • apixaban (ELIQUIS) 5mg Tab Take 5 mg by mouth 2 Times a Day.     • amitriptyline (ELAVIL) 10 MG Tab Take 20 mg by mouth every bedtime.     • acetaminophen (TYLENOL) 500 MG Tab Take 1,000 mg by mouth every 6 hours as needed for Mild Pain.     • Tadalafil, PAH, (ADCIRCA)  20 MG Tab Take 40 mg by mouth every bedtime. Indications: Pulmonary Arterial Hypertension     • therapeutic multivitamin-minerals (THERAGRAN-M) Tab Take 1 Tab by mouth every day.     • PARoxetine (PAXIL) 20 MG Tab Take 20 mg by mouth every morning.     • levothyroxine (SYNTHROID) 75 MCG Tab Take 75 mcg by mouth Every morning on an empty stomach.     • Cholecalciferol (VITAMIN D) 2000 units Cap Take 2,000 Units by mouth every day.       No current facility-administered medications for this visit.        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 08/05/2020     Priority: High   • Acrocyanosis ([facility]) 01/15/2020     Priority: High   • A-fib ([facility]) 07/06/2019     Priority: High   • Hypokalemia 01/15/2020     Priority: Medium   • Essential hypertension 05/10/2006     Priority: Medium   • Hypercalcemia 01/15/2020     Priority: Low   • Hypercapnia 01/15/2020     Priority: Low   • Chronic anticoagulation 08/21/2019     Priority: Low   • Chronic back pain 07/06/2019     Priority: Low   • PAH (pulmonary artery hypertension) ([facility]) 07/06/2019     Priority: Low   • Chronic respiratory failure ([facility]) 07/06/2019     Priority: Low   • Hypothyroidism 01/10/2006     Priority: Low   • Non-smoker 09/29/2020   • Other specified hypothyroidism 09/29/2020   • Other hyperlipidemia 09/29/2020   • Chronic respiratory failure with hypoxia ([facility]) 09/29/2020   • High risk medication use 05/05/2020   • Other sleep apnea 09/09/2019   • E. coli UTI 08/22/2019   • Drug-induced constipation 08/20/2019   • Macrocytic anemia 08/20/2019       No family history on file.    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  She  has a past surgical history that includes ankle orif (Left,  "8/5/2020).       Objective:   /74 (BP Location: Left arm, Patient Position: Sitting, BP Cuff Size: Adult)   Pulse 71   Ht 1.702 m (5' 7\")   Wt 92.1 kg (203 lb)   SpO2 99% Comment: On 5L supplemental O2  BMI 31.79 kg/m²     Physical Exam   NAD, On O2  Breathing unlabored  Pulse regular as reported by pt  No edema      Assessment and Plan:   The following treatment plan was discussed:     1. pAF  2. pAFL  3. Sotalol use, high risk medication monitoring  4. Pulmonary hypertension    - H/o MAZE and ablation in the past. Recurrence of atypical flutter, AF. Better controlled on high dose sotalol but having symptoms  - Will admit for Tikosyn load. Stop sotalol for 5 days beforehand.  - If failure or intolerance to Tikosyn, ablation can be considered. She is at elevated but no prohibitive risk for general anesthesia.  - Continue OAC    FV 3 months after admission     Time: 0022-1392    Thee Galindo MD  Cardiac Electrophysiology           "

## 2020-10-12 NOTE — TELEPHONE ENCOUNTER
Patient scheduled for Tikosyn admit on 10-21-20 at [facility] with Dr. Galindo. Patient was instructed to hold her Sotalol 5 days prior.    FYI to Yaritza Pat and Dr. Galindo.

## 2020-10-12 NOTE — PROGRESS NOTES
Chief Complaint   Patient presents with   • Atrial Fibrillation       Subjective:   Zeinab Loza is a 75 y.o. female who presents today as a follow-up for her mitral valve repair with pulmonary hypertension atrial fibrillation hypokalemia and high risk medication usage.  She does feel her of atrial fibrillation slightly more often.  She was increased on her dose of sotalol to 120.  She notices the side effect of tongue swelling from that.  Her blood pressures been controlled.  She is not having any chest pain.    Past Medical History:   Diagnosis Date   • Aneurysm artery, celiac (HCC) 2019   • Aneurysm of right renal artery (HCC)    • Atrial fibrillation (HCC)    • Diastolic heart failure (HCC)    • Hypertension, essential    • Pulmonary hypertension (HCC)    • Warfarin anticoagulation      Past Surgical History:   Procedure Laterality Date   • ANKLE ORIF Left 8/5/2020    Procedure: ORIF, ANKLE;  Surgeon: Tk Humphreys M.D.;  Location: SURGERY [facility];  Service: Orthopedics     History reviewed. No pertinent family history.  Social History     Socioeconomic History   • Marital status:      Spouse name: Not on file   • Number of children: Not on file   • Years of education: Not on file   • Highest education level: Not on file   Occupational History   • Not on file   Social Needs   • Financial resource strain: Not on file   • Food insecurity     Worry: Not on file     Inability: Not on file   • Transportation needs     Medical: Not on file     Non-medical: Not on file   Tobacco Use   • Smoking status: Never Smoker   • Smokeless tobacco: Never Used   Substance and Sexual Activity   • Alcohol use: Yes     Alcohol/week: 4.2 oz     Types: 7 Shots of liquor per week     Comment: 1 day   • Drug use: No   • Sexual activity: Not on file   Lifestyle   • Physical activity     Days per week: Not on file     Minutes per session: Not on file   • Stress: Not on file   Relationships   • Social connections      Talks on phone: Not on file     Gets together: Not on file     Attends [employer] service: Not on file     Active member of club or organization: Not on file     Attends meetings of clubs or organizations: Not on file     Relationship status: Not on file   • Intimate partner violence     Fear of current or ex partner: Not on file     Emotionally abused: Not on file     Physically abused: Not on file     Forced sexual activity: Not on file   Other Topics Concern   • Not on file   Social History Narrative   • Not on file     Allergies   Allergen Reactions   • Zolpidem Tartrate Unspecified     Lightheaded and confusion     Outpatient Encounter Medications as of 10/12/2020   Medication Sig Dispense Refill   • rosuvastatin (CRESTOR) 20 MG Tab Take 1 Tab by mouth every evening. 30 Tab 11   • DILTIAZem CD (CARDIZEM CD) 120 MG CAPSULE SR 24 HR TAKE 1 CAPSULE EVERY DAY 90 Cap 2   • sotalol (BETAPACE) 120 MG tablet Take 1 Tab by mouth 2 times a day. 60 Tab 1   • potassium chloride SA (KDUR) 20 MEQ Tab CR Take 60 mEq by mouth 2 times a day.     • torsemide (DEMADEX) 20 MG Tab Take 40 mg by mouth 2 Times a Day.     • digoxin (LANOXIN) 125 MCG Tab Take 1 Tab by mouth every evening. 90 Tab 3   • apixaban (ELIQUIS) 5mg Tab Take 5 mg by mouth 2 Times a Day.     • amitriptyline (ELAVIL) 10 MG Tab Take 20 mg by mouth every bedtime.     • acetaminophen (TYLENOL) 500 MG Tab Take 1,000 mg by mouth every 6 hours as needed for Mild Pain.     • Tadalafil, PAH, (ADCIRCA) 20 MG Tab Take 40 mg by mouth every bedtime. Indications: Pulmonary Arterial Hypertension     • therapeutic multivitamin-minerals (THERAGRAN-M) Tab Take 1 Tab by mouth every day.     • PARoxetine (PAXIL) 20 MG Tab Take 20 mg by mouth every morning.     • levothyroxine (SYNTHROID) 75 MCG Tab Take 75 mcg by mouth Every morning on an empty stomach.     • Cholecalciferol (VITAMIN D) 2000 units Cap Take 2,000 Units by mouth every day.     • [DISCONTINUED] rosuvastatin  "(CRESTOR) 10 MG Tab Take 10 mg by mouth every evening.       No facility-administered encounter medications on file as of 10/12/2020.      Review of Systems   Constitutional: Negative.  Negative for chills, fever and malaise/fatigue.   HENT: Negative.  Negative for sore throat.    Eyes: Negative.    Respiratory: Negative.  Negative for cough, hemoptysis, sputum production, shortness of breath, wheezing and stridor.    Cardiovascular: Negative.  Negative for chest pain, palpitations, orthopnea, claudication, leg swelling and PND.   Gastrointestinal: Negative.    Genitourinary: Negative.    Musculoskeletal: Negative.    Skin: Negative.    Neurological: Negative.  Negative for dizziness, loss of consciousness and weakness.   Endo/Heme/Allergies: Negative.  Does not bruise/bleed easily.   All other systems reviewed and are negative.       Objective:   /77 (BP Location: Left arm, Patient Position: Sitting, BP Cuff Size: Adult)   Pulse 71   Ht 1.702 m (5' 7\")   Wt 92.1 kg (203 lb)   SpO2 99%   BMI 31.79 kg/m²     Physical Exam   Constitutional: She appears well-developed and well-nourished. No distress.   HENT:   Head: Normocephalic and atraumatic.   Right Ear: External ear normal.   Left Ear: External ear normal.   Nose: Nose normal.   Mouth/Throat: No oropharyngeal exudate.   Eyes: Pupils are equal, round, and reactive to light. Conjunctivae and EOM are normal. Right eye exhibits no discharge. Left eye exhibits no discharge. No scleral icterus.   Neck: Neck supple. No JVD present.   Cardiovascular: Normal rate, regular rhythm and intact distal pulses. Exam reveals no gallop and no friction rub.   No murmur heard.  Pulmonary/Chest: Effort normal. No stridor. No respiratory distress. She has no wheezes. She has no rales. She exhibits no tenderness.   Abdominal: Soft. She exhibits no distension. There is no guarding.   Musculoskeletal: Normal range of motion.         General: No tenderness, deformity or edema. "   Neurological: She is alert. She has normal reflexes. She displays normal reflexes. No cranial nerve deficit. She exhibits normal muscle tone. Coordination normal.   Skin: Skin is warm and dry. No rash noted. She is not diaphoretic. No erythema. No pallor.   Psychiatric: She has a normal mood and affect. Her behavior is normal. Judgment and thought content normal.   Nursing note and vitals reviewed.    Echocardiogram: Dated 8/5/2020 personally interpret myself showing normal LV systolic function with a dilated right ventricle with preserved systolic function PASP estimated 25.    Cardiac catheterization: Dated 3/20/2018 personally under myself showing    CARDIAC CATH 3/20/2018:  1) Pulmonary hypertension: RA 15 mmHg, RV 49/6/14 mmHg, PA 48/21/33 mmHg, PCWP 19 mmHg, Odin C.O. 5.19 L/min, Odin C.I.  2.39, PVR 4.86. Findings suggest froup 2 PH.  2) Abnormal exercise response with increase in pulmonary pressure and increase in left sided filling pressure.  3) Shunt run: no evidence of left right shunting.  4) No evidence of right to left intracardiac shunting at rest or with exercise. Echo findings dictated separately.  5) Preserved cardiac output at rest.     RHC 2019  Mean RA 19 mmHg, RV 47/21 mmHg, PA 50/24 mmHg, mean PA 35 mmHg, PCWP 30 mmHg  Odin CO 4.5 , CI 2.03  PVR 1.1 woods    Lab Results   Component Value Date/Time    CHOLSTRLTOT 213 (H) 09/30/2020 09:55 AM     (H) 09/30/2020 09:55 AM    HDL 53 09/30/2020 09:55 AM    TRIGLYCERIDE 146 09/30/2020 09:55 AM       Lab Results   Component Value Date/Time    SODIUM 138 10/01/2020 05:48 AM    POTASSIUM 3.9 10/01/2020 05:48 AM    CHLORIDE 97 10/01/2020 05:48 AM    CO2 26 10/01/2020 05:48 AM    GLUCOSE 124 (H) 10/01/2020 05:48 AM    BUN 28 (H) 10/01/2020 05:48 AM    CREATININE 0.93 10/01/2020 05:48 AM     Lab Results   Component Value Date/Time    ALKPHOSPHAT 84 09/30/2020 09:55 AM    ASTSGOT 27 09/30/2020 09:55 AM    ALTSGPT 22 09/30/2020 09:55 AM    TBILIRUBIN  0.4 09/30/2020 09:55 AM        Assessment:     1. Paroxysmal atrial fibrillation (HCC)     2. Chronic respiratory failure with hypoxia (HCC)     3. Essential hypertension     4. High risk medication use     5. Hypercalcemia     6. Hypercapnia     7. Non-smoker     8. Other hyperlipidemia     9. PAH (pulmonary artery hypertension) (HCC)     10. Mixed hyperlipidemia  rosuvastatin (CRESTOR) 20 MG Tab       Medical Decision Making:  Today's Assessment / Status / Plan:     75-year-old female with pulmonary pretension sleep apnea mitral valve repair and atrial fibrillation.  She will talk to EP briefly about her atrial fibrillation.  For her hyperlipidemia we will increase her rosuvastatin to 20.  She is otherwise doing well we will see her back in 3 months.    1. PAH, Who type 1    - cont Tadalafil 40    - hold letaris 10 due to hypotension     2. FEI    - f/u Bipap     3. Cyanposis    - hold cMRI for now     4. MVR    - clinical follow up     5. A-fib    - cont sotalol 120    - cont eliquis 5 BID    - cont dig     6. Edema    - cont torsemide 20 BID    - cont KCL 60 mEQ BID     7. HTN    - cont DILT , unclear if this is for HTN or + vasodilator response    8. HLD    - increase rosuva to 20     8. High Risk Meds    - labs in one week    Start time: 12:55  Stop time: 1:10    This evaluation was conducted via Zoom using secure and encrypted videoconferencing technology. The patient was in a private location in the [facility].    The patient's identity was confirmed and verbal consent was obtained for this virtual visit.

## 2020-10-13 ENCOUNTER — HOSPITAL ENCOUNTER (OUTPATIENT)
Facility: MEDICAL CENTER | Age: 75
End: 2020-10-13
Attending: FAMILY MEDICINE
Payer: MEDICARE

## 2020-10-13 LAB
ALBUMIN SERPL BCP-MCNC: 4.3 G/DL (ref 3.2–4.9)
ALBUMIN/GLOB SERPL: 1.4 G/DL
ALP SERPL-CCNC: 102 U/L (ref 30–99)
ALT SERPL-CCNC: 31 U/L (ref 2–50)
ANION GAP SERPL CALC-SCNC: 15 MMOL/L (ref 7–16)
AST SERPL-CCNC: 29 U/L (ref 12–45)
BASOPHILS # BLD AUTO: 0.5 % (ref 0–1.8)
BASOPHILS # BLD: 0.03 K/UL (ref 0–0.12)
BILIRUB SERPL-MCNC: 0.3 MG/DL (ref 0.1–1.5)
BUN SERPL-MCNC: 27 MG/DL (ref 8–22)
CALCIUM SERPL-MCNC: 11.3 MG/DL (ref 8.5–10.5)
CHLORIDE SERPL-SCNC: 96 MMOL/L (ref 96–112)
CO2 SERPL-SCNC: 28 MMOL/L (ref 20–33)
CREAT SERPL-MCNC: 0.91 MG/DL (ref 0.5–1.4)
EOSINOPHIL # BLD AUTO: 0.06 K/UL (ref 0–0.51)
EOSINOPHIL NFR BLD: 1.1 % (ref 0–6.9)
ERYTHROCYTE [DISTWIDTH] IN BLOOD BY AUTOMATED COUNT: 45 FL (ref 35.9–50)
GLOBULIN SER CALC-MCNC: 3.1 G/DL (ref 1.9–3.5)
GLUCOSE SERPL-MCNC: 109 MG/DL (ref 65–99)
HCT VFR BLD AUTO: 40 % (ref 37–47)
HGB BLD-MCNC: 12.9 G/DL (ref 12–16)
IMM GRANULOCYTES # BLD AUTO: 0.02 K/UL (ref 0–0.11)
IMM GRANULOCYTES NFR BLD AUTO: 0.4 % (ref 0–0.9)
LYMPHOCYTES # BLD AUTO: 1.6 K/UL (ref 1–4.8)
LYMPHOCYTES NFR BLD: 28.8 % (ref 22–41)
MCH RBC QN AUTO: 32.3 PG (ref 27–33)
MCHC RBC AUTO-ENTMCNC: 32.3 G/DL (ref 33.6–35)
MCV RBC AUTO: 100 FL (ref 81.4–97.8)
MONOCYTES # BLD AUTO: 0.5 K/UL (ref 0–0.85)
MONOCYTES NFR BLD AUTO: 9 % (ref 0–13.4)
NEUTROPHILS # BLD AUTO: 3.35 K/UL (ref 2–7.15)
NEUTROPHILS NFR BLD: 60.2 % (ref 44–72)
NRBC # BLD AUTO: 0 K/UL
NRBC BLD-RTO: 0 /100 WBC
NT-PROBNP SERPL IA-MCNC: 445 PG/ML (ref 0–125)
PLATELET # BLD AUTO: 286 K/UL (ref 164–446)
PMV BLD AUTO: 9.4 FL (ref 9–12.9)
POTASSIUM SERPL-SCNC: 4.4 MMOL/L (ref 3.6–5.5)
PROT SERPL-MCNC: 7.4 G/DL (ref 6–8.2)
RBC # BLD AUTO: 4 M/UL (ref 4.2–5.4)
SODIUM SERPL-SCNC: 139 MMOL/L (ref 135–145)
WBC # BLD AUTO: 5.6 K/UL (ref 4.8–10.8)

## 2020-10-13 PROCEDURE — 85025 COMPLETE CBC W/AUTO DIFF WBC: CPT

## 2020-10-13 PROCEDURE — 80053 COMPREHEN METABOLIC PANEL: CPT

## 2020-10-13 PROCEDURE — 83880 ASSAY OF NATRIURETIC PEPTIDE: CPT

## 2020-10-21 ENCOUNTER — HOSPITAL ENCOUNTER (INPATIENT)
Facility: MEDICAL CENTER | Age: 75
LOS: 4 days | DRG: 309 | End: 2020-10-25
Attending: INTERNAL MEDICINE | Admitting: INTERNAL MEDICINE
Payer: MEDICARE

## 2020-10-21 DIAGNOSIS — R53.81 DEBILITY: ICD-10-CM

## 2020-10-21 DIAGNOSIS — I27.21 PAH (PULMONARY ARTERY HYPERTENSION) (HCC): ICD-10-CM

## 2020-10-21 DIAGNOSIS — Z79.899 HIGH RISK MEDICATION USE: ICD-10-CM

## 2020-10-21 DIAGNOSIS — J96.11 CHRONIC RESPIRATORY FAILURE WITH HYPOXIA (HCC): ICD-10-CM

## 2020-10-21 DIAGNOSIS — I10 ESSENTIAL HYPERTENSION: ICD-10-CM

## 2020-10-21 DIAGNOSIS — I48.0 PAROXYSMAL ATRIAL FIBRILLATION (HCC): ICD-10-CM

## 2020-10-21 LAB
ALBUMIN SERPL BCP-MCNC: 4.3 G/DL (ref 3.2–4.9)
ALBUMIN/GLOB SERPL: 1.5 G/DL
ALP SERPL-CCNC: 104 U/L (ref 30–99)
ALT SERPL-CCNC: 23 U/L (ref 2–50)
ANION GAP SERPL CALC-SCNC: 12 MMOL/L (ref 7–16)
AST SERPL-CCNC: 24 U/L (ref 12–45)
BILIRUB SERPL-MCNC: 0.2 MG/DL (ref 0.1–1.5)
BUN SERPL-MCNC: 25 MG/DL (ref 8–22)
CALCIUM SERPL-MCNC: 10.9 MG/DL (ref 8.5–10.5)
CHLORIDE SERPL-SCNC: 98 MMOL/L (ref 96–112)
CO2 SERPL-SCNC: 30 MMOL/L (ref 20–33)
COVID ORDER STATUS COVID19: NORMAL
CREAT SERPL-MCNC: 0.8 MG/DL (ref 0.5–1.4)
EKG IMPRESSION: NORMAL
EKG IMPRESSION: NORMAL
GLOBULIN SER CALC-MCNC: 2.9 G/DL (ref 1.9–3.5)
GLUCOSE SERPL-MCNC: 137 MG/DL (ref 65–99)
INR PPP: 1.05 (ref 0.87–1.13)
MAGNESIUM SERPL-MCNC: 2.1 MG/DL (ref 1.5–2.5)
POTASSIUM SERPL-SCNC: 4.1 MMOL/L (ref 3.6–5.5)
PROT SERPL-MCNC: 7.2 G/DL (ref 6–8.2)
PROTHROMBIN TIME: 14 SEC (ref 12–14.6)
SARS-COV-2 RNA RESP QL NAA+PROBE: NOTDETECTED
SODIUM SERPL-SCNC: 140 MMOL/L (ref 135–145)
SPECIMEN SOURCE: NORMAL

## 2020-10-21 PROCEDURE — 700101 HCHG RX REV CODE 250: Performed by: INTERNAL MEDICINE

## 2020-10-21 PROCEDURE — 85610 PROTHROMBIN TIME: CPT

## 2020-10-21 PROCEDURE — 80053 COMPREHEN METABOLIC PANEL: CPT

## 2020-10-21 PROCEDURE — U0003 INFECTIOUS AGENT DETECTION BY NUCLEIC ACID (DNA OR RNA); SEVERE ACUTE RESPIRATORY SYNDROME CORONAVIRUS 2 (SARS-COV-2) (CORONAVIRUS DISEASE [COVID-19]), AMPLIFIED PROBE TECHNIQUE, MAKING USE OF HIGH THROUGHPUT TECHNOLOGIES AS DESCRIBED BY CMS-2020-01-R: HCPCS

## 2020-10-21 PROCEDURE — 93005 ELECTROCARDIOGRAM TRACING: CPT | Performed by: PHYSICIAN ASSISTANT

## 2020-10-21 PROCEDURE — C9803 HOPD COVID-19 SPEC COLLECT: HCPCS | Performed by: PHYSICIAN ASSISTANT

## 2020-10-21 PROCEDURE — 93010 ELECTROCARDIOGRAM REPORT: CPT | Mod: 76 | Performed by: INTERNAL MEDICINE

## 2020-10-21 PROCEDURE — 83735 ASSAY OF MAGNESIUM: CPT

## 2020-10-21 PROCEDURE — 93010 ELECTROCARDIOGRAM REPORT: CPT | Performed by: INTERNAL MEDICINE

## 2020-10-21 PROCEDURE — 94760 N-INVAS EAR/PLS OXIMETRY 1: CPT

## 2020-10-21 PROCEDURE — A9270 NON-COVERED ITEM OR SERVICE: HCPCS | Performed by: PHYSICIAN ASSISTANT

## 2020-10-21 PROCEDURE — 770020 HCHG ROOM/CARE - TELE (206)

## 2020-10-21 PROCEDURE — 700102 HCHG RX REV CODE 250 W/ 637 OVERRIDE(OP): Performed by: PHYSICIAN ASSISTANT

## 2020-10-21 PROCEDURE — 99223 1ST HOSP IP/OBS HIGH 75: CPT | Performed by: INTERNAL MEDICINE

## 2020-10-21 RX ORDER — PAROXETINE HYDROCHLORIDE 20 MG/1
20 TABLET, FILM COATED ORAL EVERY MORNING
Status: DISCONTINUED | OUTPATIENT
Start: 2020-10-22 | End: 2020-10-25 | Stop reason: HOSPADM

## 2020-10-21 RX ORDER — ROSUVASTATIN CALCIUM 20 MG/1
20 TABLET, COATED ORAL EVERY EVENING
Status: DISCONTINUED | OUTPATIENT
Start: 2020-10-21 | End: 2020-10-25 | Stop reason: HOSPADM

## 2020-10-21 RX ORDER — DOFETILIDE 0.5 MG/1
500 CAPSULE ORAL EVERY 12 HOURS
Status: DISCONTINUED | OUTPATIENT
Start: 2020-10-21 | End: 2020-10-22

## 2020-10-21 RX ORDER — DOFETILIDE 0.5 MG/1
500 CAPSULE ORAL EVERY 12 HOURS
Status: DISCONTINUED | OUTPATIENT
Start: 2020-10-21 | End: 2020-10-21

## 2020-10-21 RX ORDER — AMITRIPTYLINE HYDROCHLORIDE 10 MG/1
10 TABLET, FILM COATED ORAL
Status: DISCONTINUED | OUTPATIENT
Start: 2020-10-21 | End: 2020-10-22

## 2020-10-21 RX ORDER — LEVOTHYROXINE SODIUM 0.07 MG/1
75 TABLET ORAL
Status: DISCONTINUED | OUTPATIENT
Start: 2020-10-22 | End: 2020-10-25 | Stop reason: HOSPADM

## 2020-10-21 RX ORDER — MAGNESIUM SULFATE 1 G/100ML
1 INJECTION INTRAVENOUS
Status: ACTIVE | OUTPATIENT
Start: 2020-10-21 | End: 2020-10-21

## 2020-10-21 RX ORDER — HYDROCODONE BITARTRATE AND ACETAMINOPHEN 7.5; 325 MG/1; MG/1
1 TABLET ORAL EVERY 8 HOURS PRN
COMMUNITY
End: 2021-10-20

## 2020-10-21 RX ORDER — DIGOXIN 125 MCG
125 TABLET ORAL DAILY
Status: DISCONTINUED | OUTPATIENT
Start: 2020-10-22 | End: 2020-10-25 | Stop reason: HOSPADM

## 2020-10-21 RX ORDER — METOPROLOL TARTRATE 1 MG/ML
5 INJECTION, SOLUTION INTRAVENOUS EVERY 4 HOURS PRN
Status: DISCONTINUED | OUTPATIENT
Start: 2020-10-21 | End: 2020-10-25 | Stop reason: HOSPADM

## 2020-10-21 RX ORDER — HYDROCODONE BITARTRATE AND ACETAMINOPHEN 5; 325 MG/1; MG/1
1-2 TABLET ORAL EVERY 6 HOURS PRN
Status: DISCONTINUED | OUTPATIENT
Start: 2020-10-21 | End: 2020-10-25 | Stop reason: HOSPADM

## 2020-10-21 RX ORDER — DILTIAZEM HYDROCHLORIDE 120 MG/1
120 CAPSULE, COATED, EXTENDED RELEASE ORAL DAILY
Status: DISCONTINUED | OUTPATIENT
Start: 2020-10-22 | End: 2020-10-25 | Stop reason: HOSPADM

## 2020-10-21 RX ORDER — POTASSIUM CHLORIDE 20 MEQ/1
40 TABLET, EXTENDED RELEASE ORAL
Status: ACTIVE | OUTPATIENT
Start: 2020-10-21 | End: 2020-10-21

## 2020-10-21 RX ORDER — TORSEMIDE 20 MG/1
40 TABLET ORAL 2 TIMES DAILY
Status: DISCONTINUED | OUTPATIENT
Start: 2020-10-21 | End: 2020-10-25 | Stop reason: HOSPADM

## 2020-10-21 RX ORDER — AMITRIPTYLINE HYDROCHLORIDE 10 MG/1
20 TABLET, FILM COATED ORAL
Status: DISCONTINUED | OUTPATIENT
Start: 2020-10-21 | End: 2020-10-21

## 2020-10-21 RX ORDER — TADALAFIL 20 MG/1
40 TABLET ORAL
Status: DISCONTINUED | OUTPATIENT
Start: 2020-10-21 | End: 2020-10-25 | Stop reason: HOSPADM

## 2020-10-21 RX ADMIN — TADALAFIL 40 MG: 20 TABLET ORAL at 21:00

## 2020-10-21 RX ADMIN — METOPROLOL TARTRATE 5 MG: 5 INJECTION, SOLUTION INTRAVENOUS at 21:57

## 2020-10-21 RX ADMIN — ROSUVASTATIN CALCIUM 20 MG: 20 TABLET, FILM COATED ORAL at 17:18

## 2020-10-21 RX ADMIN — DOFETILIDE 500 MCG: 0.5 CAPSULE ORAL at 13:58

## 2020-10-21 RX ADMIN — TORSEMIDE 40 MG: 20 TABLET ORAL at 16:16

## 2020-10-21 RX ADMIN — HYDROCODONE BITARTRATE AND ACETAMINOPHEN 2 TABLET: 5; 325 TABLET ORAL at 20:15

## 2020-10-21 RX ADMIN — APIXABAN 5 MG: 5 TABLET, FILM COATED ORAL at 17:18

## 2020-10-21 RX ADMIN — HYDROCODONE BITARTRATE AND ACETAMINOPHEN 2 TABLET: 5; 325 TABLET ORAL at 14:58

## 2020-10-21 ASSESSMENT — LIFESTYLE VARIABLES
TOTAL SCORE: 0
HAVE PEOPLE ANNOYED YOU BY CRITICIZING YOUR DRINKING: NO
EVER HAD A DRINK FIRST THING IN THE MORNING TO STEADY YOUR NERVES TO GET RID OF A HANGOVER: NO
EVER FELT BAD OR GUILTY ABOUT YOUR DRINKING: NO
CONSUMPTION TOTAL: NEGATIVE
AVERAGE NUMBER OF DAYS PER WEEK YOU HAVE A DRINK CONTAINING ALCOHOL: 2
HAVE YOU EVER FELT YOU SHOULD CUT DOWN ON YOUR DRINKING: NO
HOW MANY TIMES IN THE PAST YEAR HAVE YOU HAD 5 OR MORE DRINKS IN A DAY: 0
DOES PATIENT WANT TO STOP DRINKING: NO
ON A TYPICAL DAY WHEN YOU DRINK ALCOHOL HOW MANY DRINKS DO YOU HAVE: 1
TOTAL SCORE: 0
ALCOHOL_USE: YES
TOTAL SCORE: 0

## 2020-10-21 ASSESSMENT — CHA2DS2 SCORE
SEX: FEMALE
VASCULAR DISEASE: NO
CHF OR LEFT VENTRICULAR DYSFUNCTION: NO
HYPERTENSION: YES
CHA2DS2 VASC SCORE: 3
DIABETES: NO
PRIOR STROKE OR TIA OR THROMBOEMBOLISM: NO
AGE 75 OR GREATER: NO
AGE 65 TO 74: YES

## 2020-10-21 ASSESSMENT — COGNITIVE AND FUNCTIONAL STATUS - GENERAL
SUGGESTED CMS G CODE MODIFIER DAILY ACTIVITY: CH
SUGGESTED CMS G CODE MODIFIER MOBILITY: CH
DAILY ACTIVITIY SCORE: 24
MOBILITY SCORE: 24

## 2020-10-21 ASSESSMENT — ENCOUNTER SYMPTOMS
COUGH: 0
BRUISES/BLEEDS EASILY: 0
LIGHT-HEADEDNESS: 0
ABDOMINAL DISTENTION: 0
CHILLS: 0
BLOOD IN STOOL: 0
FATIGUE: 0
PALPITATIONS: 1
SHORTNESS OF BREATH: 1
DIZZINESS: 0
DIAPHORESIS: 0
CHEST TIGHTNESS: 0
FEVER: 0
UNEXPECTED WEIGHT CHANGE: 0

## 2020-10-21 ASSESSMENT — FIBROSIS 4 INDEX
FIB4 SCORE: 1.33

## 2020-10-21 ASSESSMENT — PAIN DESCRIPTION - PAIN TYPE: TYPE: CHRONIC PAIN

## 2020-10-21 NOTE — PROGRESS NOTES
Cardiology Follow Up Progress Note    Date of Service  10/21/2020    Attending Physician  Thee Galindo M.D.    Chief Complaint   Atrial Fibrillation, paroxysmal    HPI  Zeinab Loza is a 75 y.o. female with a complicated past medical history detailed in Dr. Liao's note 9/29/20, in brief history of MVR, MAZE and NED 2011 at [facility], chronic hypoxemic respiratory failure 2/2 to PH on tadalafil, paroxysmal atrial fibrillation, previously on sotalol but developed tongue swelling with dose increase. She presents today for direct admission for initiation of Tikosyn, after stopping sotalol.     Interim Events  She has been in rapid afib since stopping the Sotalol on 10/16, has some increased shortness of breath with this, palpitations. She does note resolution of her tongue swelling since stopping Sotalol. She has not missed any doses of her Eliquis.     Monitored rhythm: Currently atrial fibrillation with RVR.    Labs: pending  EKG: AFib with RVR with PVC avg QTc 396ms on baseline ECG this morning    Review of Systems  Review of Systems   Constitutional: Negative for chills, diaphoresis, fatigue, fever and unexpected weight change.   Respiratory: Positive for shortness of breath. Negative for cough and chest tightness.    Cardiovascular: Positive for palpitations. Negative for chest pain and leg swelling.   Gastrointestinal: Negative for abdominal distention and blood in stool.   Genitourinary: Negative for decreased urine volume.   Musculoskeletal: Negative for gait problem.   Neurological: Negative for dizziness and light-headedness.   Hematological: Does not bruise/bleed easily.       Vital signs in last 24 hours  Temp:  [36.1 °C (96.9 °F)] 36.1 °C (96.9 °F)  Pulse:  [140] 140  Resp:  [20] 20  BP: (146)/(86) 146/86  SpO2:  [90 %] 90 %    Physical Exam  Physical Exam  Vitals signs and nursing note reviewed.   Constitutional:       General: She is not in acute distress.     Appearance: Normal appearance.   HENT:       Head: Normocephalic and atraumatic.      Mouth/Throat:      Mouth: Mucous membranes are moist.   Eyes:      General: No scleral icterus.  Neck:      Comments: JVP 7-8cm  Cardiovascular:      Rate and Rhythm: Tachycardia present. Rhythm irregular.      Pulses: Normal pulses.      Heart sounds: No murmur.   Pulmonary:      Effort: Pulmonary effort is normal. No respiratory distress.      Breath sounds: No rales.      Comments: Supplemental O2  Abdominal:      General: There is no distension.      Palpations: Abdomen is soft.   Musculoskeletal:      Right lower leg: No edema.      Left lower leg: No edema.   Skin:     General: Skin is warm and dry.      Capillary Refill: Capillary refill takes less than 2 seconds.   Neurological:      General: No focal deficit present.      Mental Status: She is alert and oriented to person, place, and time.   Psychiatric:         Judgment: Judgment normal.         Lab Review  Lab Results   Component Value Date/Time    WBC 5.6 10/13/2020 04:12 PM    RBC 4.00 (L) 10/13/2020 04:12 PM    HEMOGLOBIN 12.9 10/13/2020 04:12 PM    HEMATOCRIT 40.0 10/13/2020 04:12 PM    .0 (H) 10/13/2020 04:12 PM    MCH 32.3 10/13/2020 04:12 PM    MCHC 32.3 (L) 10/13/2020 04:12 PM    MPV 9.4 10/13/2020 04:12 PM      Lab Results   Component Value Date/Time    SODIUM 139 10/13/2020 04:12 PM    POTASSIUM 4.4 10/13/2020 04:12 PM    CHLORIDE 96 10/13/2020 04:12 PM    CO2 28 10/13/2020 04:12 PM    GLUCOSE 109 (H) 10/13/2020 04:12 PM    BUN 27 (H) 10/13/2020 04:12 PM    CREATININE 0.91 10/13/2020 04:12 PM      Lab Results   Component Value Date/Time    ASTSGOT 29 10/13/2020 04:12 PM    ALTSGPT 31 10/13/2020 04:12 PM     Lab Results   Component Value Date/Time    CHOLSTRLTOT 213 (H) 09/30/2020 09:55 AM     (H) 09/30/2020 09:55 AM    HDL 53 09/30/2020 09:55 AM    TRIGLYCERIDE 146 09/30/2020 09:55 AM    TROPONINT 16 09/29/2020 05:03 PM       No results for input(s): NTPROBNP in the last 72  hours.    Assessment/Plan   Paroxysmal Atrial Fibrillation  Atrial fibrillation with RVR   - baseline ECG this AM with QTc approximately 396ms, has been off Sotalol long enough for adequate clearance   - Continue low dose Diltiazem.   - continue digoxin, may stop this if HR falls  - Continue Eliquis.   - If CrCl>60 then will initiate Tikosyn and 500mg bid  - keep K>4 and Mag>2  - I will arrange for an electrical cardioversion Friday AM if patient has not converted to sinus rhythm by then. COVID pcr ordered.      Pulm HTN/Chronic Resp Failure     - On tadalafil 40 mg QHS, Torsemide 40 mg bid  - On supplemental 02.     QT prolonging medications  - patient taking amitriptyline for headaches and sleep, started about 1 year ago by Neurologist, she has not had much improvement and is agreeable to weaning medication given risk of QT prolongation, given her small dose will taper to 10mg tonight and 10/22 then can discontinue.   - OK to continue Paxil    Staffed with Dr. Galindo

## 2020-10-21 NOTE — RESPIRATORY CARE
Oxygen Rounds      Patient found on    O2 L/m:  ___5______    Oxygen device:  _SNC_______   Spo2: ______96___%      Respiratory device skin site inspection completed.

## 2020-10-21 NOTE — H&P
Electrophysiology H&P    DOS: 10/21/2020    Chief complaint: Afib with RVR    HPI: 76 y/o F with h/o pAF with RVR, pulmonary hypertension, on chronic Eliquis, presenting for dofetilide loading. She had been managed with Sotalol chronically. Recently she was noted to have Afib with RVR and presented to the ED. She complained of CP and palpitations. She had her sotalol increased to 120mg BID. This improved her palpitations but caused tongue swelling. She stopped the sotalol which improved the tongue swelling but went back into Afib with RVR. She is admitted for elective dofetilide loading. Noted to be in Afib with RVR. C/o palpitations. No CP or SOB at rest, no syncope.    ROS (+ highlighted in bold):  Constitutional: Fevers/chills/fatigue/weightloss  HEENT: Blurry vision/eye pain/sore throat/hearing loss  Respiratory: Shortness of breath/cough  Cardiovascular: Chest pain/palpitations/edema/orthopnea/syncope  GI: Nausea/vomitting/diarrhea  MSK: Arthralgias/myagias/muscle weakness  Skin: Rash/sores  Neurological: Numbness/tremors/vertigo  Endocrine: Excessive thirst/polyuria/cold intolerance/heat intolerance  Psych: Depression/anxiety    Past Medical History:   Diagnosis Date   • Aneurysm artery, celiac (HCC) 2019   • Aneurysm of right renal artery (HCC)    • Atrial fibrillation (HCC)    • Diastolic heart failure (HCC)    • Hypertension, essential    • Pulmonary hypertension (HCC)    • Warfarin anticoagulation        Past Surgical History:   Procedure Laterality Date   • ANKLE ORIF Left 8/5/2020    Procedure: ORIF, ANKLE;  Surgeon: Tk Humphreys M.D.;  Location: SURGERY [facility];  Service: Orthopedics       Social History     Socioeconomic History   • Marital status:      Spouse name: Not on file   • Number of children: Not on file   • Years of education: Not on file   • Highest education level: Not on file   Occupational History   • Not on file   Social Needs   • Financial resource strain: Not on  file   • Food insecurity     Worry: Not on file     Inability: Not on file   • Transportation needs     Medical: Not on file     Non-medical: Not on file   Tobacco Use   • Smoking status: Never Smoker   • Smokeless tobacco: Never Used   Substance and Sexual Activity   • Alcohol use: Yes     Alcohol/week: 4.2 oz     Types: 7 Shots of liquor per week     Comment: 1 day   • Drug use: No   • Sexual activity: Not on file   Lifestyle   • Physical activity     Days per week: Not on file     Minutes per session: Not on file   • Stress: Not on file   Relationships   • Social connections     Talks on phone: Not on file     Gets together: Not on file     Attends [employer] service: Not on file     Active member of club or organization: Not on file     Attends meetings of clubs or organizations: Not on file     Relationship status: Not on file   • Intimate partner violence     Fear of current or ex partner: Not on file     Emotionally abused: Not on file     Physically abused: Not on file     Forced sexual activity: Not on file   Other Topics Concern   • Not on file   Social History Narrative   • Not on file       No family history on file.   No family history of premature coronary disease    Allergies   Allergen Reactions   • Betapace [Sotalol] Anaphylaxis   • Zolpidem Tartrate Unspecified     Lightheaded and confusion       Current Facility-Administered Medications   Medication Dose Route Frequency Provider Last Rate Last Dose   • apixaban (ELIQUIS) tablet 5 mg  5 mg Oral BID Shameka Vegas P.A.-C.       • [START ON 10/22/2020] digoxin (LANOXIN) tablet 125 mcg  125 mcg Oral DAILY Shameka Vegas P.A.-C.       • [START ON 10/22/2020] DILTIAZem CD (CARDIZEM CD) capsule 120 mg  120 mg Oral DAILY Shameka Vegas P.A.-C.       • [START ON 10/22/2020] levothyroxine (SYNTHROID) tablet 75 mcg  75 mcg Oral AM ES Shameka Vegas P.A.-C.       • [START ON 10/22/2020] PARoxetine (PAXIL) tablet 20 mg  20 mg Oral QAM  LOLA Falcon.A.-C.       • rosuvastatin (CRESTOR) tablet 20 mg  20 mg Oral Q EVENING Shameka Vegas P.A.-C.       • Tadalafil (PAH) TABS 40 mg  40 mg Oral QHS Shameka Vegas P.A.-C.       • torsemide (DEMADEX) tablet 40 mg  40 mg Oral BID Shameka Vegas P.A.-C.   40 mg at 10/21/20 1616   • magnesium sulfate in D5W IVPB premix 1 g  1 g Intravenous Once PRN Shameka Veags P.A.-C.       • potassium chloride SA (Kdur) tablet 40 mEq  40 mEq Oral Once PRN Shameka Vegas P.A.-C.       • amitriptyline (ELAVIL) tablet 10 mg  10 mg Oral QHS Shameka Vegas P.A.-C.       • dofetilide (TIKOSYN) capsule 500 mcg  500 mcg Oral Q12HRS Shameka Vegas P.A.-C.   500 mcg at 10/21/20 1358   • HYDROcodone-acetaminophen (NORCO) 5-325 MG per tablet 1-2 Tab  1-2 Tab Oral Q6HRS PRN Shameka Vegas P.A.-C.   2 Tab at 10/21/20 1458       Physical Exam:  Vitals:    10/21/20 1118 10/21/20 1124 10/21/20 1532 10/21/20 1538   BP:  144/87 140/82    Pulse:  93 69 69   Resp:  18 16 16   Temp:  36.1 °C (97 °F) 36.7 °C (98.1 °F)    TempSrc: Temporal Temporal Temporal    SpO2:  98% 97% 96%   Weight:       Height:         General appearance: NAD, conversant   Eyes: anicteric sclerae, moist conjunctivae; no lid-lag; PERRLA  HENT: Atraumatic; oropharynx clear with moist mucous membranes and no mucosal ulcerations; normal hard and soft palate  Neck: Trachea midline; FROM, supple, no thyromegaly or lymphadenopathy  Lungs: CTA, with normal respiratory effort and no intercostal retractions  CV: Tachycardic, irregular, no MRGs, no JVD   Abdomen: Soft, non-tender; no masses or HSM  Extremities: No peripheral edema or extremity lymphadenopathy  Skin: Normal temperature, turgor and texture; no rash, ulcers or subcutaneous nodules  Psych: Appropriate affect, alert and oriented to person, place and time    Data:  Lipids:   Lab Results   Component Value Date/Time    CHOLSTROT 213 (H) 09/30/2020 09:55 AM    TRIGLYCERIDE  146 09/30/2020 09:55 AM    HDL 53 09/30/2020 09:55 AM     (H) 09/30/2020 09:55 AM        BMP:  Lab Results   Component Value Date/Time    SODIUM 140 10/21/2020 1013    POTASSIUM 4.1 10/21/2020 1013    CHLORIDE 98 10/21/2020 1013    CO2 30 10/21/2020 1013    GLUCOSE 137 (H) 10/21/2020 1013    BUN 25 (H) 10/21/2020 1013    CREATININE 0.80 10/21/2020 1013    CALCIUM 10.9 (H) 10/21/2020 1013    ANION 12.0 10/21/2020 1013        TSH:   Lab Results   Component Value Date/Time    TSHULTRASEN 2.000 09/29/2020 1703        THYROXINE (T4):   No results found for: FREEDIR     CBC:   Lab Results   Component Value Date/Time    WBC 5.6 10/13/2020 04:12 PM    RBC 4.00 (L) 10/13/2020 04:12 PM    HEMOGLOBIN 12.9 10/13/2020 04:12 PM    HEMATOCRIT 40.0 10/13/2020 04:12 PM    .0 (H) 10/13/2020 04:12 PM    MCH 32.3 10/13/2020 04:12 PM    MCHC 32.3 (L) 10/13/2020 04:12 PM    RDW 45.0 10/13/2020 04:12 PM    PLATELETCT 286 10/13/2020 04:12 PM    MPV 9.4 10/13/2020 04:12 PM    NEUTSPOLYS 60.20 10/13/2020 04:12 PM    LYMPHOCYTES 28.80 10/13/2020 04:12 PM    MONOCYTES 9.00 10/13/2020 04:12 PM    EOSINOPHILS 1.10 10/13/2020 04:12 PM    BASOPHILS 0.50 10/13/2020 04:12 PM    IMMGRAN 0.40 10/13/2020 04:12 PM    NRBC 0.00 10/13/2020 04:12 PM    NEUTS 3.35 10/13/2020 04:12 PM    LYMPHS 1.60 10/13/2020 04:12 PM    LYMPHS 16 07/09/2019 09:30 AM    MONOS 0.50 10/13/2020 04:12 PM    EOS 0.06 10/13/2020 04:12 PM    BASO 0.03 10/13/2020 04:12 PM    IMMGRANAB 0.02 10/13/2020 04:12 PM    NRBCAB 0.00 10/13/2020 04:12 PM        CBC w/o DIFF  Lab Results   Component Value Date/Time    WBC 5.6 10/13/2020 04:12 PM    RBC 4.00 (L) 10/13/2020 04:12 PM    HEMOGLOBIN 12.9 10/13/2020 04:12 PM    .0 (H) 10/13/2020 04:12 PM    MCH 32.3 10/13/2020 04:12 PM    MCHC 32.3 (L) 10/13/2020 04:12 PM    RDW 45.0 10/13/2020 04:12 PM    MPV 9.4 10/13/2020 04:12 PM       Prior echo/stress results reviewed: Normal LV and RV systolic function    EKG interpreted  by me: Afib with RVR    Impression/Plan:  1. Afib with RVR  2. Pulmonary Hypertension  3. Dofetilide loading, high risk medication initiation    - 2 night observation for dofetilide loading  - DCCV Fri AM if remains in Afib  - Continue Eliquis  - ECGs 2 hrs after each dose, if QTc >500ms hold next dose    Thee Galindo MD  Cardiac Electrophysiology

## 2020-10-22 LAB
ANION GAP SERPL CALC-SCNC: 10 MMOL/L (ref 7–16)
BUN SERPL-MCNC: 27 MG/DL (ref 8–22)
CALCIUM SERPL-MCNC: 10 MG/DL (ref 8.5–10.5)
CHLORIDE SERPL-SCNC: 91 MMOL/L (ref 96–112)
CO2 SERPL-SCNC: 32 MMOL/L (ref 20–33)
CREAT SERPL-MCNC: 0.96 MG/DL (ref 0.5–1.4)
EKG IMPRESSION: NORMAL
ERYTHROCYTE [DISTWIDTH] IN BLOOD BY AUTOMATED COUNT: 43.1 FL (ref 35.9–50)
GLUCOSE SERPL-MCNC: 115 MG/DL (ref 65–99)
HCT VFR BLD AUTO: 35.9 % (ref 37–47)
HGB BLD-MCNC: 11.9 G/DL (ref 12–16)
MAGNESIUM SERPL-MCNC: 2 MG/DL (ref 1.5–2.5)
MCH RBC QN AUTO: 32.4 PG (ref 27–33)
MCHC RBC AUTO-ENTMCNC: 33.1 G/DL (ref 33.6–35)
MCV RBC AUTO: 97.8 FL (ref 81.4–97.8)
PLATELET # BLD AUTO: 220 K/UL (ref 164–446)
PMV BLD AUTO: 8.3 FL (ref 9–12.9)
POTASSIUM SERPL-SCNC: 3.1 MMOL/L (ref 3.6–5.5)
RBC # BLD AUTO: 3.67 M/UL (ref 4.2–5.4)
SODIUM SERPL-SCNC: 133 MMOL/L (ref 135–145)
WBC # BLD AUTO: 5.7 K/UL (ref 4.8–10.8)

## 2020-10-22 PROCEDURE — 93010 ELECTROCARDIOGRAM REPORT: CPT | Performed by: INTERNAL MEDICINE

## 2020-10-22 PROCEDURE — 85027 COMPLETE CBC AUTOMATED: CPT

## 2020-10-22 PROCEDURE — 93005 ELECTROCARDIOGRAM TRACING: CPT | Performed by: INTERNAL MEDICINE

## 2020-10-22 PROCEDURE — A9270 NON-COVERED ITEM OR SERVICE: HCPCS | Performed by: NURSE PRACTITIONER

## 2020-10-22 PROCEDURE — A9270 NON-COVERED ITEM OR SERVICE: HCPCS | Performed by: PHYSICIAN ASSISTANT

## 2020-10-22 PROCEDURE — 700102 HCHG RX REV CODE 250 W/ 637 OVERRIDE(OP): Performed by: PHYSICIAN ASSISTANT

## 2020-10-22 PROCEDURE — 94760 N-INVAS EAR/PLS OXIMETRY 1: CPT

## 2020-10-22 PROCEDURE — 770020 HCHG ROOM/CARE - TELE (206)

## 2020-10-22 PROCEDURE — 99232 SBSQ HOSP IP/OBS MODERATE 35: CPT | Performed by: INTERNAL MEDICINE

## 2020-10-22 PROCEDURE — A9270 NON-COVERED ITEM OR SERVICE: HCPCS | Performed by: INTERNAL MEDICINE

## 2020-10-22 PROCEDURE — 80048 BASIC METABOLIC PNL TOTAL CA: CPT

## 2020-10-22 PROCEDURE — 700101 HCHG RX REV CODE 250: Performed by: INTERNAL MEDICINE

## 2020-10-22 PROCEDURE — 83735 ASSAY OF MAGNESIUM: CPT

## 2020-10-22 PROCEDURE — 36415 COLL VENOUS BLD VENIPUNCTURE: CPT

## 2020-10-22 PROCEDURE — 700102 HCHG RX REV CODE 250 W/ 637 OVERRIDE(OP): Performed by: INTERNAL MEDICINE

## 2020-10-22 PROCEDURE — 700102 HCHG RX REV CODE 250 W/ 637 OVERRIDE(OP): Performed by: NURSE PRACTITIONER

## 2020-10-22 RX ORDER — POTASSIUM CHLORIDE 20 MEQ/1
40 TABLET, EXTENDED RELEASE ORAL ONCE
Status: COMPLETED | OUTPATIENT
Start: 2020-10-22 | End: 2020-10-22

## 2020-10-22 RX ORDER — DOFETILIDE 0.25 MG/1
250 CAPSULE ORAL EVERY 12 HOURS
Status: DISCONTINUED | OUTPATIENT
Start: 2020-10-22 | End: 2020-10-23

## 2020-10-22 RX ORDER — POTASSIUM CHLORIDE 20 MEQ/1
40 TABLET, EXTENDED RELEASE ORAL 2 TIMES DAILY
Status: DISCONTINUED | OUTPATIENT
Start: 2020-10-22 | End: 2020-10-25

## 2020-10-22 RX ORDER — POTASSIUM CHLORIDE 20 MEQ/1
40 TABLET, EXTENDED RELEASE ORAL 2 TIMES DAILY
Status: DISCONTINUED | OUTPATIENT
Start: 2020-10-22 | End: 2020-10-22

## 2020-10-22 RX ADMIN — DILTIAZEM HYDROCHLORIDE 120 MG: 120 CAPSULE, COATED, EXTENDED RELEASE ORAL at 05:00

## 2020-10-22 RX ADMIN — DOFETILIDE 250 MCG: 0.25 CAPSULE ORAL at 17:01

## 2020-10-22 RX ADMIN — POTASSIUM CHLORIDE 40 MEQ: 1500 TABLET, EXTENDED RELEASE ORAL at 15:59

## 2020-10-22 RX ADMIN — APIXABAN 5 MG: 5 TABLET, FILM COATED ORAL at 05:00

## 2020-10-22 RX ADMIN — TORSEMIDE 40 MG: 20 TABLET ORAL at 05:00

## 2020-10-22 RX ADMIN — DIGOXIN 125 MCG: 125 TABLET ORAL at 05:00

## 2020-10-22 RX ADMIN — PAROXETINE HYDROCHLORIDE 20 MG: 20 TABLET, FILM COATED ORAL at 05:00

## 2020-10-22 RX ADMIN — HYDROCODONE BITARTRATE AND ACETAMINOPHEN 2 TABLET: 5; 325 TABLET ORAL at 17:01

## 2020-10-22 RX ADMIN — METOPROLOL TARTRATE 5 MG: 5 INJECTION, SOLUTION INTRAVENOUS at 19:11

## 2020-10-22 RX ADMIN — HYDROCODONE BITARTRATE AND ACETAMINOPHEN 2 TABLET: 5; 325 TABLET ORAL at 10:12

## 2020-10-22 RX ADMIN — POTASSIUM CHLORIDE 40 MEQ: 1500 TABLET, EXTENDED RELEASE ORAL at 19:03

## 2020-10-22 RX ADMIN — DOFETILIDE 500 MCG: 0.5 CAPSULE ORAL at 04:04

## 2020-10-22 RX ADMIN — ROSUVASTATIN CALCIUM 20 MG: 20 TABLET, FILM COATED ORAL at 17:01

## 2020-10-22 RX ADMIN — HYDROCODONE BITARTRATE AND ACETAMINOPHEN 2 TABLET: 5; 325 TABLET ORAL at 02:46

## 2020-10-22 RX ADMIN — LEVOTHYROXINE SODIUM 75 MCG: 0.07 TABLET ORAL at 05:00

## 2020-10-22 RX ADMIN — POTASSIUM CHLORIDE 40 MEQ: 1500 TABLET, EXTENDED RELEASE ORAL at 08:12

## 2020-10-22 RX ADMIN — TADALAFIL 40 MG: 20 TABLET ORAL at 21:00

## 2020-10-22 RX ADMIN — TORSEMIDE 40 MG: 20 TABLET ORAL at 13:47

## 2020-10-22 RX ADMIN — APIXABAN 5 MG: 5 TABLET, FILM COATED ORAL at 17:01

## 2020-10-22 ASSESSMENT — ENCOUNTER SYMPTOMS
COLOR CHANGE: 0
FEVER: 0
DIAPHORESIS: 0
SHORTNESS OF BREATH: 0
WHEEZING: 0
FATIGUE: 0
CHEST TIGHTNESS: 0
DIZZINESS: 0
STRIDOR: 0
ABDOMINAL PAIN: 0
COUGH: 0
PALPITATIONS: 1
LIGHT-HEADEDNESS: 0
CHILLS: 0

## 2020-10-22 ASSESSMENT — PAIN DESCRIPTION - PAIN TYPE
TYPE: CHRONIC PAIN

## 2020-10-22 ASSESSMENT — FIBROSIS 4 INDEX: FIB4 SCORE: 1.66

## 2020-10-22 NOTE — DISCHARGE PLANNING
Anticipated Disposition:  Home     Action:   -> Chart review completed. This CM spoke with Pt at bedside, Vernell daughter present at bedside, obtained assessment information, verified face-sheet. Pt lives with spouse and daughter in a single level house, both are able to provide care, support, and ride upon DC. Pt uses a walker to get around sometimes. Pt uses [facility] via [facility]. Her preferred pharmacy is [facility].          1- Pt requested [facility] service if needed as she had it before real liked it.  [facility] choice form signed and faxed to [facility].  This CM TTed Yaritza VALERIO with Home Health request.             Barriers to Discharge:   Medical clearance  Home Health order and acceptance      Plan:  Please follow up with attending physician for medical clearance and HH order.         Care Transition Team Assessment    Information Source  Orientation : Oriented x 4  Information Given By: Patient  Informant's Name: Zeinab   Who is responsible for making decisions for patient? : Patient         Elopement Risk  Legal Hold: No  Ambulatory or Self Mobile in Wheelchair: Yes  Disoriented: No  Psychiatric Symptoms: None  History of Wandering: No  Elopement this Admit: No  Vocalizing Wanting to Leave: No  Displays Behaviors, Body Language Wanting to Leave: No-Not at Risk for Elopement  Elopement Risk: Not at Risk for Elopement    Interdisciplinary Discharge Planning  Does Admitting Nurse Feel This Could be a Complex Discharge?: No  Primary Care Physician: sAhia Tidwell M.D  Lives with - Patient's Self Care Capacity: Spouse  Patient or legal guardian wants to designate a caregiver: Yes  Caregiver name: Vernell Loza  Caregiver contact info: [phone]  Support Systems: Family Member(s), Children, Spouse / Significant Other  Housing / Facility: [address]  Do You Take your Prescribed Medications Regularly: Yes  Able to Return to Previous ADL's: Yes  Mobility Issues: No  Prior Services:  Skilled Home Health Services  Patient Prefers to be Discharged to:: Home with Home Health   Assistance Needed: Yes  Durable Medical Equipment: Walker, Home Oxygen  DME Provider / Phone: [facility]     Discharge Preparedness  What is your plan after discharge?: Home health care  What are your discharge supports?: Spouse, Child  Prior Functional Level: Ambulatory, Needs Assist with Medication Management, Needs Assist with Activities of Daily Living, Uses Walker    Functional Assesment  Prior Functional Level: Ambulatory, Needs Assist with Medication Management, Needs Assist with Activities of Daily Living, Uses Walker    Finances  Financial Barriers to Discharge: No  Prescription Coverage: Yes    Vision / Hearing Impairment  Vision Impairment : Yes  Right Eye Vision: Wears Glasses  Left Eye Vision: Wears Glasses  Hearing Impairment : No              Domestic Abuse  Have you ever been the victim of abuse or violence?: No  Physical Abuse or Sexual Abuse: No  Verbal Abuse or Emotional Abuse: No  Possible Abuse/Neglect Reported to:: Not Applicable         Discharge Risks or Barriers  Discharge risks or barriers?: No    Anticipated Discharge Information  Discharge Disposition: D/T to home under HHA care in anticipation of covered skilled care (06)

## 2020-10-22 NOTE — CARE PLAN
Problem: Safety  Goal: Will remain free from injury  Outcome: PROGRESSING AS EXPECTED  Goal: Will remain free from falls  Outcome: PROGRESSING AS EXPECTED     Problem: Pain Management  Goal: Pain level will decrease to patient's comfort goal  Outcome: PROGRESSING AS EXPECTED     Patient educated on treatment plan, medications, and plan of care for today. Patient verbalized understanding and no additional questions at this time.

## 2020-10-22 NOTE — PROGRESS NOTES
Monitor Summary:    Afib   Touched 194  (R) PVC  12 beats vtach @2000  4 beats vtach @0430  -/.08/-

## 2020-10-22 NOTE — PROGRESS NOTES
Monitor Summary    Afib   Converted at 1200 to SR 84-97  Freq PVC, coup, big, and trip  -/.08/-    12 Hr Chart Check

## 2020-10-22 NOTE — OR NURSING
COVID-19 Pre-Surgery Screenin. Do you have an undiagnosed respiratory illness or symptoms such as coughing or sneezing? no     • Onset of Sx: no    • Acute vs. chronic respiratory illness: no     2. Do you have an unexplained fever greater than 100.4 degrees Fahrenheit or 38 degrees Celsius? no  ?  3. Have you had direct exposure to a patient who tested positive for Covid-19? no    4. Patient informed of current visitation and mask policies by this RN.

## 2020-10-22 NOTE — PROGRESS NOTES
Bedside report received, pt care assumed, tele box on.  Pt is lying in bed, does not appear to be in distress, breathing is even and unlabored. Bed in lowest position, bed alarm on, and call light within reach.

## 2020-10-22 NOTE — PROGRESS NOTES
Bedside report received from day RN; assumed pt care. Pt assessment complete. Pt Aox4. Reviewed plan of care with pt. Tele box on and rhythm verified. Chart and labs reviewed. Bed in lowest position, and 2 side rails up. Pt educated on call light; call light with in reach.

## 2020-10-23 ENCOUNTER — APPOINTMENT (OUTPATIENT)
Dept: CARDIOLOGY | Facility: MEDICAL CENTER | Age: 75
DRG: 309 | End: 2020-10-23
Attending: PHYSICIAN ASSISTANT
Payer: MEDICARE

## 2020-10-23 LAB
ANION GAP SERPL CALC-SCNC: 9 MMOL/L (ref 7–16)
BUN SERPL-MCNC: 30 MG/DL (ref 8–22)
CALCIUM SERPL-MCNC: 10.6 MG/DL (ref 8.5–10.5)
CHLORIDE SERPL-SCNC: 96 MMOL/L (ref 96–112)
CO2 SERPL-SCNC: 35 MMOL/L (ref 20–33)
CREAT SERPL-MCNC: 0.88 MG/DL (ref 0.5–1.4)
EKG IMPRESSION: NORMAL
GLUCOSE SERPL-MCNC: 135 MG/DL (ref 65–99)
MAGNESIUM SERPL-MCNC: 2.1 MG/DL (ref 1.5–2.5)
POTASSIUM SERPL-SCNC: 4 MMOL/L (ref 3.6–5.5)
SODIUM SERPL-SCNC: 140 MMOL/L (ref 135–145)

## 2020-10-23 PROCEDURE — 99232 SBSQ HOSP IP/OBS MODERATE 35: CPT | Performed by: NURSE PRACTITIONER

## 2020-10-23 PROCEDURE — 83735 ASSAY OF MAGNESIUM: CPT

## 2020-10-23 PROCEDURE — A9270 NON-COVERED ITEM OR SERVICE: HCPCS | Performed by: NURSE PRACTITIONER

## 2020-10-23 PROCEDURE — 80048 BASIC METABOLIC PNL TOTAL CA: CPT

## 2020-10-23 PROCEDURE — 93010 ELECTROCARDIOGRAM REPORT: CPT | Mod: 77 | Performed by: INTERNAL MEDICINE

## 2020-10-23 PROCEDURE — 700102 HCHG RX REV CODE 250 W/ 637 OVERRIDE(OP): Performed by: INTERNAL MEDICINE

## 2020-10-23 PROCEDURE — A9270 NON-COVERED ITEM OR SERVICE: HCPCS | Performed by: PHYSICIAN ASSISTANT

## 2020-10-23 PROCEDURE — 700102 HCHG RX REV CODE 250 W/ 637 OVERRIDE(OP): Performed by: PHYSICIAN ASSISTANT

## 2020-10-23 PROCEDURE — A9270 NON-COVERED ITEM OR SERVICE: HCPCS | Performed by: INTERNAL MEDICINE

## 2020-10-23 PROCEDURE — 36415 COLL VENOUS BLD VENIPUNCTURE: CPT

## 2020-10-23 PROCEDURE — 93010 ELECTROCARDIOGRAM REPORT: CPT | Performed by: INTERNAL MEDICINE

## 2020-10-23 PROCEDURE — 93005 ELECTROCARDIOGRAM TRACING: CPT | Performed by: INTERNAL MEDICINE

## 2020-10-23 PROCEDURE — 770020 HCHG ROOM/CARE - TELE (206)

## 2020-10-23 PROCEDURE — 700102 HCHG RX REV CODE 250 W/ 637 OVERRIDE(OP): Performed by: NURSE PRACTITIONER

## 2020-10-23 RX ORDER — TORSEMIDE 20 MG/1
20 TABLET ORAL
Status: DISCONTINUED | OUTPATIENT
Start: 2020-10-23 | End: 2020-10-25 | Stop reason: HOSPADM

## 2020-10-23 RX ORDER — DOFETILIDE 0.12 MG/1
125 CAPSULE ORAL EVERY 12 HOURS
Status: DISCONTINUED | OUTPATIENT
Start: 2020-10-23 | End: 2020-10-25 | Stop reason: HOSPADM

## 2020-10-23 RX ORDER — POTASSIUM CHLORIDE 20 MEQ/1
20 TABLET, EXTENDED RELEASE ORAL
Status: DISCONTINUED | OUTPATIENT
Start: 2020-10-23 | End: 2020-10-25 | Stop reason: HOSPADM

## 2020-10-23 RX ADMIN — DOFETILIDE 250 MCG: 0.25 CAPSULE ORAL at 04:58

## 2020-10-23 RX ADMIN — DOFETILIDE 125 MCG: 0.12 CAPSULE ORAL at 17:19

## 2020-10-23 RX ADMIN — ROSUVASTATIN CALCIUM 20 MG: 20 TABLET, FILM COATED ORAL at 17:19

## 2020-10-23 RX ADMIN — DILTIAZEM HYDROCHLORIDE 120 MG: 120 CAPSULE, COATED, EXTENDED RELEASE ORAL at 04:58

## 2020-10-23 RX ADMIN — APIXABAN 5 MG: 5 TABLET, FILM COATED ORAL at 17:18

## 2020-10-23 RX ADMIN — TORSEMIDE 40 MG: 20 TABLET ORAL at 04:59

## 2020-10-23 RX ADMIN — HYDROCODONE BITARTRATE AND ACETAMINOPHEN 2 TABLET: 5; 325 TABLET ORAL at 15:10

## 2020-10-23 RX ADMIN — POTASSIUM CHLORIDE 40 MEQ: 1500 TABLET, EXTENDED RELEASE ORAL at 17:18

## 2020-10-23 RX ADMIN — HYDROCODONE BITARTRATE AND ACETAMINOPHEN 2 TABLET: 5; 325 TABLET ORAL at 08:47

## 2020-10-23 RX ADMIN — HYDROCODONE BITARTRATE AND ACETAMINOPHEN 2 TABLET: 5; 325 TABLET ORAL at 22:11

## 2020-10-23 RX ADMIN — LEVOTHYROXINE SODIUM 75 MCG: 0.07 TABLET ORAL at 04:58

## 2020-10-23 RX ADMIN — DIGOXIN 125 MCG: 125 TABLET ORAL at 04:59

## 2020-10-23 RX ADMIN — PAROXETINE HYDROCHLORIDE 20 MG: 20 TABLET, FILM COATED ORAL at 04:59

## 2020-10-23 RX ADMIN — TADALAFIL 40 MG: 20 TABLET ORAL at 21:00

## 2020-10-23 RX ADMIN — TORSEMIDE 40 MG: 20 TABLET ORAL at 15:11

## 2020-10-23 RX ADMIN — APIXABAN 5 MG: 5 TABLET, FILM COATED ORAL at 04:58

## 2020-10-23 RX ADMIN — POTASSIUM CHLORIDE 40 MEQ: 1500 TABLET, EXTENDED RELEASE ORAL at 04:58

## 2020-10-23 ASSESSMENT — ENCOUNTER SYMPTOMS
STRIDOR: 0
COUGH: 0
DIAPHORESIS: 0
COLOR CHANGE: 0
DIZZINESS: 0
SHORTNESS OF BREATH: 0
CHEST TIGHTNESS: 0
PALPITATIONS: 1
FEVER: 0
LIGHT-HEADEDNESS: 0
WHEEZING: 0
CHILLS: 0
FATIGUE: 0
ABDOMINAL PAIN: 0

## 2020-10-23 ASSESSMENT — PAIN DESCRIPTION - PAIN TYPE
TYPE: CHRONIC PAIN

## 2020-10-23 ASSESSMENT — FIBROSIS 4 INDEX
FIB4 SCORE: 1.66
FIB4 SCORE: 1.66

## 2020-10-23 NOTE — PROGRESS NOTES
Cardiology Follow Up Progress Note    Date of Service  10/23/2020    Attending Physician  Thee Galindo M.D.    Chief Complaint   Atrial Fibrillation RVR; Tikosyn initiation    HPI  Zeinab Loza is a 75 y.o. female admitted 10/21/2020 for Tikosyn initiation for Atrial Fibrillation RVR by Dr. Galindo. Per Dr. Galindo's H&P: h/o pAF/AFL with RVR, pulmonary hypertension, on chronic Eliquis, presenting for dofetilide loading. She had been managed with Sotalol chronically. Recently she was noted to have Afib with RVR and presented to the ED. She complained of CP and palpitations. She had her sotalol increased to 120 mg BID. This improved her palpitations but caused tongue swelling. She stopped the sotalol which improved the tongue swelling but went back into Afib with RVR. She is admitted for elective dofetilide loading. Noted to be in Afib with RVR. C/o palpitations. No CP or SOB at rest, no syncope.    Alcohol history significant for pAF/AFL, MVR s/p maze 2011 at [facility], chronic hypoxemia respiratory failure 2/2 PH, hx tongue swelling on sotalol.     Interim Events  10/23/2020: Seen in EP Rounds  No cardiac complaints.   On Tikosyn 250 mcg BID  At b/l 5L oxygen NC  Weight up 5 lbs, RN instructed to re-weigh pt to confirm weight, XR dose torsemide/KCL ordered PRN x 1 if weight >5lbs  Labs reviewed. Electrolytes stable.   Telemetry: SR/ST 70s-120s bpm.   2-hours post 4th dose: QTc > 500 ms    10/22/2020: Seen in EP Rounds  Reports intermittent palpitations. Denies other cardiac complaints at this time or S/E Tikosyn.   Baseline supplemental O2-5L NC  Vital Signs/Labs: Were reviewed. BP stable. Mg+ 2.0, K+ 3.1, weight +3lbs.  Telemetry monitor: AF RVR/intermittent PAF/SR, some NSVT, appears AF w/RBBB with aberrancy. Hrs 70s-130s bpm. Will continue to monitor.    No significant events overnight.   EKG Reviewed.   2-hours post 2nd dose QTc: > 500 ms, CrCl: 77.28 mL/min.  Baseline QTc: 396 ms.     Review of Systems  Review  of Systems   Constitutional: Negative for chills, diaphoresis, fatigue and fever.   Respiratory: Negative for cough, chest tightness, shortness of breath, wheezing and stridor.    Cardiovascular: Positive for palpitations. Negative for chest pain and leg swelling.   Gastrointestinal: Negative for abdominal pain.   Genitourinary: Negative for difficulty urinating and hematuria.   Skin: Negative for color change.   Neurological: Negative for dizziness and light-headedness.     Vital signs in last 24 hours  Temp:  [36.1 °C (96.9 °F)-36.8 °C (98.3 °F)] 36.8 °C (98.3 °F)  Pulse:  [] 90  Resp:  [18] 18  BP: (134-167)/() 135/89  SpO2:  [93 %-98 %] 94 %    Physical Exam  Physical Exam   Constitutional: She is oriented to person, place, and time. No distress.   Neck: No JVD present.   Cardiovascular: Normal rate and regular rhythm.   No murmur heard.  Pulses:       Radial pulses are 2+ on the right side and 2+ on the left side.   Pulmonary/Chest: Effort normal and breath sounds normal. No respiratory distress. She has no wheezes. She has no rales. She exhibits no tenderness.   Supplemental oxygen 5L NC   Musculoskeletal:         General: No edema.   Neurological: She is alert and oriented to person, place, and time.   Skin: Skin is warm and dry. She is not diaphoretic. No erythema.   Nursing note reviewed.     Lab Review  Lab Results   Component Value Date/Time    WBC 5.7 10/22/2020 06:06 AM    RBC 3.67 (L) 10/22/2020 06:06 AM    HEMOGLOBIN 11.9 (L) 10/22/2020 06:06 AM    HEMATOCRIT 35.9 (L) 10/22/2020 06:06 AM    MCV 97.8 10/22/2020 06:06 AM    MCH 32.4 10/22/2020 06:06 AM    MCHC 33.1 (L) 10/22/2020 06:06 AM    MPV 8.3 (L) 10/22/2020 06:06 AM      Lab Results   Component Value Date/Time    SODIUM 140 10/23/2020 06:13 AM    POTASSIUM 4.0 10/23/2020 06:13 AM    CHLORIDE 96 10/23/2020 06:13 AM    CO2 35 (H) 10/23/2020 06:13 AM    GLUCOSE 135 (H) 10/23/2020 06:13 AM    BUN 30 (H) 10/23/2020 06:13 AM    CREATININE  0.88 10/23/2020 06:13 AM      Lab Results   Component Value Date/Time    ASTSGOT 24 10/21/2020 10:13 AM    ALTSGPT 23 10/21/2020 10:13 AM     Lab Results   Component Value Date/Time    CHOLSTRLTOT 213 (H) 09/30/2020 09:55 AM     (H) 09/30/2020 09:55 AM    HDL 53 09/30/2020 09:55 AM    TRIGLYCERIDE 146 09/30/2020 09:55 AM           Cardiac Imaging and Procedures Review  EKG 10/22/2020:  Sinus rhythm, prolonged QTc    Echocardiogram (08/05/2020):    Normal left ventricular size, wall thickness, and systolic function.Dilated right ventricle with preserved systolic function. No significant valvular pathology. Estimated right ventricular systolic pressure  is 25 mmHg; normal. Normal pericardium without effusion. No prior study is available for comparison.     Imaging  Chest X-Ray (09/29/2020):    FINDINGS:  LUNGS: The lungs are clear.  HEART and MEDIASTINUM: enlarged. There has been previous sternotomy.  Pleura: There are no pleural effusion or pneumothoraces.  Osseous structures: No significant bony abnormality.  IMPRESSION: No acute cardiopulmonary abnormality identified.     Assessment/Plan   1. AF RVR/Tikosyn initiation  - Tikosyn initiation started on 10/21/20 for AF RVR.   - H/o MAZE (2011) and ablation in the past. Recurrence of pAF/atypical AFl. Recent hospital admits for AF/AFL RVR, on Sotalol, now stopped, appropriate washout period.   - Last ECHO (08/05/20) LVEF 60%, no significant valvular abnormalities.  - TSH 2 (09/29/20), electrolytes stable, on torsemide/KCl 40 mEq BID, monitor labs, adjust accordingly.   - Now off amitriptyline, prior tx HA/sleep, given QT prolongation. Continue Paxil.   - Currently in sinus rhythm. Hrs 70s-120s bpm. Will continue to monitor.  CrCl: 88 mL/min.   - Baseline QTc: ~400 ms, 2 hours post 4th dose: QTc > 500 ms.   - Decreased dosage to Tikosyn 125 mcg BID.   - Reviewed with Dr. Galindo.  Will continue to monitor. If Qtc continues to be > 500 ms on lower dose, will be  unable to tolerate medication. May have to consider alterative AAD and or ablation.   - On OAC with Eliquis, continue.   - Discussed Tikosyn education and plan with pt, all pt questions/concerns were answered, pt verbalized understanding.  - Could consideration for repeat ablation if arrhythmia recurrence on Tikosyn.   - Will send electronic Rx to pharmacy once appropriate dosage verified. CM consult to verify availability/copay.  - Plan per above, will monitor overnight, labs in AM. Plan to d/c in AM if tolerates.   - Discussed plan with patient.     Tikosyn Medication Education Instructions:   Please take Tikosyn as prescribed and do not miss any doses.  If you miss a dose, do NOT double dose. Do not take medication less than 11-12 hours apart between doses. Please seek emergency medical attention or go to your nearest ER if you have symptoms of severe dizziness or palpitations, shortness of breath unreleaved by rest, and syncope or near syncope. Please notify us if you have any side effects from the medication or questions/concerns. Please do not stop taking the medication until you contact your provider.     Thank you for allowing me to participate in the care of this patient.  I will continue to follow this patient    Please contact me with any questions.    TEODORO Kramer.   [facility]  [phone]

## 2020-10-23 NOTE — CARE PLAN
Patient able to communicate needs effectively to staff. Instructed patient to alert staff to any increased chest pain or sob. Patient using call bell appropriately. Patient monitored on tele and 5L O2.

## 2020-10-23 NOTE — RESPIRATORY CARE
"  Oxygen Rounds      Patient found on    O2 L/m:  _5________    Oxygen device:  _NC_______   Spo2: ______98___%      Patient titrated to   O2 L/m: __5 + \"bubbles\"______   Oxygen device: _NC________   Spo2: _____98____%   Respiratory device skin site inspection completed.    "

## 2020-10-23 NOTE — PROGRESS NOTES
Assumed care at 0645. Patient in bed, a&ox4. Tele monitor in place. HR tachy at times with ambulating to commode, +160. Patient denies chest pain and sob. Cards aware. EKG complete this morning. QTc 533. Cards updated.

## 2020-10-24 LAB
ANION GAP SERPL CALC-SCNC: 7 MMOL/L (ref 7–16)
BUN SERPL-MCNC: 32 MG/DL (ref 8–22)
CALCIUM SERPL-MCNC: 10.4 MG/DL (ref 8.5–10.5)
CHLORIDE SERPL-SCNC: 94 MMOL/L (ref 96–112)
CO2 SERPL-SCNC: 34 MMOL/L (ref 20–33)
CREAT SERPL-MCNC: 1.05 MG/DL (ref 0.5–1.4)
EKG IMPRESSION: NORMAL
GLUCOSE SERPL-MCNC: 123 MG/DL (ref 65–99)
MAGNESIUM SERPL-MCNC: 2.1 MG/DL (ref 1.5–2.5)
POTASSIUM SERPL-SCNC: 3.9 MMOL/L (ref 3.6–5.5)
SODIUM SERPL-SCNC: 135 MMOL/L (ref 135–145)

## 2020-10-24 PROCEDURE — 36415 COLL VENOUS BLD VENIPUNCTURE: CPT

## 2020-10-24 PROCEDURE — A9270 NON-COVERED ITEM OR SERVICE: HCPCS | Performed by: NURSE PRACTITIONER

## 2020-10-24 PROCEDURE — A9270 NON-COVERED ITEM OR SERVICE: HCPCS | Performed by: PHYSICIAN ASSISTANT

## 2020-10-24 PROCEDURE — 93010 ELECTROCARDIOGRAM REPORT: CPT | Mod: 77 | Performed by: INTERNAL MEDICINE

## 2020-10-24 PROCEDURE — 770020 HCHG ROOM/CARE - TELE (206)

## 2020-10-24 PROCEDURE — 80048 BASIC METABOLIC PNL TOTAL CA: CPT

## 2020-10-24 PROCEDURE — 700102 HCHG RX REV CODE 250 W/ 637 OVERRIDE(OP): Performed by: INTERNAL MEDICINE

## 2020-10-24 PROCEDURE — 83735 ASSAY OF MAGNESIUM: CPT

## 2020-10-24 PROCEDURE — 700111 HCHG RX REV CODE 636 W/ 250 OVERRIDE (IP): Performed by: PHYSICIAN ASSISTANT

## 2020-10-24 PROCEDURE — 700102 HCHG RX REV CODE 250 W/ 637 OVERRIDE(OP): Performed by: PHYSICIAN ASSISTANT

## 2020-10-24 PROCEDURE — 93005 ELECTROCARDIOGRAM TRACING: CPT | Performed by: PHYSICIAN ASSISTANT

## 2020-10-24 PROCEDURE — 93010 ELECTROCARDIOGRAM REPORT: CPT | Performed by: INTERNAL MEDICINE

## 2020-10-24 PROCEDURE — A9270 NON-COVERED ITEM OR SERVICE: HCPCS | Performed by: INTERNAL MEDICINE

## 2020-10-24 PROCEDURE — 700102 HCHG RX REV CODE 250 W/ 637 OVERRIDE(OP): Performed by: NURSE PRACTITIONER

## 2020-10-24 PROCEDURE — 93005 ELECTROCARDIOGRAM TRACING: CPT | Performed by: INTERNAL MEDICINE

## 2020-10-24 RX ORDER — MAGNESIUM SULFATE 1 G/100ML
1 INJECTION INTRAVENOUS ONCE
Status: COMPLETED | OUTPATIENT
Start: 2020-10-24 | End: 2020-10-24

## 2020-10-24 RX ORDER — POTASSIUM CHLORIDE 20 MEQ/1
20 TABLET, EXTENDED RELEASE ORAL ONCE
Status: COMPLETED | OUTPATIENT
Start: 2020-10-24 | End: 2020-10-24

## 2020-10-24 RX ORDER — DOFETILIDE 0.12 MG/1
125 CAPSULE ORAL EVERY 12 HOURS
Qty: 60 CAP | Refills: 1 | Status: SHIPPED | OUTPATIENT
Start: 2020-10-24 | End: 2021-01-12 | Stop reason: SDUPTHER

## 2020-10-24 RX ADMIN — DILTIAZEM HYDROCHLORIDE 120 MG: 120 CAPSULE, COATED, EXTENDED RELEASE ORAL at 05:21

## 2020-10-24 RX ADMIN — ROSUVASTATIN CALCIUM 20 MG: 20 TABLET, FILM COATED ORAL at 17:16

## 2020-10-24 RX ADMIN — DILTIAZEM HYDROCHLORIDE 60 MG: 30 TABLET, FILM COATED ORAL at 17:16

## 2020-10-24 RX ADMIN — POTASSIUM CHLORIDE 40 MEQ: 1500 TABLET, EXTENDED RELEASE ORAL at 05:20

## 2020-10-24 RX ADMIN — LEVOTHYROXINE SODIUM 75 MCG: 0.07 TABLET ORAL at 05:20

## 2020-10-24 RX ADMIN — POTASSIUM CHLORIDE 40 MEQ: 1500 TABLET, EXTENDED RELEASE ORAL at 17:16

## 2020-10-24 RX ADMIN — TORSEMIDE 40 MG: 20 TABLET ORAL at 14:49

## 2020-10-24 RX ADMIN — PAROXETINE HYDROCHLORIDE 20 MG: 20 TABLET, FILM COATED ORAL at 05:20

## 2020-10-24 RX ADMIN — HYDROCODONE BITARTRATE AND ACETAMINOPHEN 2 TABLET: 5; 325 TABLET ORAL at 18:27

## 2020-10-24 RX ADMIN — MAGNESIUM SULFATE 1 G: 1 INJECTION INTRAVENOUS at 10:25

## 2020-10-24 RX ADMIN — TADALAFIL 40 MG: 20 TABLET ORAL at 21:00

## 2020-10-24 RX ADMIN — HYDROCODONE BITARTRATE AND ACETAMINOPHEN 2 TABLET: 5; 325 TABLET ORAL at 05:24

## 2020-10-24 RX ADMIN — DOFETILIDE 125 MCG: 0.12 CAPSULE ORAL at 17:16

## 2020-10-24 RX ADMIN — POTASSIUM CHLORIDE 20 MEQ: 1500 TABLET, EXTENDED RELEASE ORAL at 12:09

## 2020-10-24 RX ADMIN — DOFETILIDE 125 MCG: 0.12 CAPSULE ORAL at 04:13

## 2020-10-24 RX ADMIN — APIXABAN 5 MG: 5 TABLET, FILM COATED ORAL at 05:21

## 2020-10-24 RX ADMIN — APIXABAN 5 MG: 5 TABLET, FILM COATED ORAL at 17:16

## 2020-10-24 RX ADMIN — DILTIAZEM HYDROCHLORIDE 60 MG: 30 TABLET, FILM COATED ORAL at 10:58

## 2020-10-24 RX ADMIN — TORSEMIDE 40 MG: 20 TABLET ORAL at 05:23

## 2020-10-24 RX ADMIN — HYDROCODONE BITARTRATE AND ACETAMINOPHEN 2 TABLET: 5; 325 TABLET ORAL at 12:09

## 2020-10-24 RX ADMIN — DIGOXIN 125 MCG: 125 TABLET ORAL at 05:20

## 2020-10-24 ASSESSMENT — ENCOUNTER SYMPTOMS
CHEST TIGHTNESS: 0
CHILLS: 0
HEADACHES: 1
FEVER: 0
SHORTNESS OF BREATH: 0
PALPITATIONS: 1
LIGHT-HEADEDNESS: 1
DIZZINESS: 1
FATIGUE: 0
ABDOMINAL DISTENTION: 0

## 2020-10-24 ASSESSMENT — PAIN DESCRIPTION - PAIN TYPE
TYPE: CHRONIC PAIN

## 2020-10-24 NOTE — PROGRESS NOTES
Report received from day shift RN, assumed patient care @ 1930. Patient is A&O x 4, on 5 L NC O2. Tele box on and in place. Patient denies any pain at this time. Patient updated on plan of care and verbalizes no questions. Patient has call light within reach, fall precautions in place. Patient educated to call for assistance as needed. Will continue to monitor.

## 2020-10-24 NOTE — DISCHARGE PLANNING
Received Choice form at 0047 10/22  Agency/Facility Name: [facility]  Referral sent per Choice form @ [phone]

## 2020-10-24 NOTE — PROGRESS NOTES
Cardiology Follow Up Progress Note    Date of Service  10/24/2020    Attending Physician  Thee Galindo M.D.  Dr. Orantes    Chief Complaint   Atrial Fibrillation, paroxysmal    HPI  Zeinab Loza is a 75 y.o. female with a complicated past medical history detailed in Dr. Liao's note 9/29/20, in brief history of MVR, MAZE and NED 2011 at [facility], chronic hypoxemic respiratory failure 2/2 to PH on tadalafil, paroxysmal atrial fibrillation, previously on sotalol but developed tongue swelling with dose increase. She presents today for direct admission for initiation of Tikosyn, after stopping sotalol.     Interim Events  Converted to atrial fibrillation with RVR this morning with intermittent runs of what looks like idioventricular beats vs trigeminy.     She has palpitations and some lightheadedness upon standing this morning. No change in her breathing, still on 5L O2 (baseline).     Monitored rhythm: Currently atrial fibrillation with RVR.    Labs: K 3.9 Mag 2.1,   EKG: sinus rhythm with QTc 475ms after dose #6    Review of Systems  Review of Systems   Constitutional: Negative for chills, fatigue and fever.   Respiratory: Negative for chest tightness and shortness of breath (baseline).    Cardiovascular: Positive for palpitations. Negative for leg swelling.   Gastrointestinal: Negative for abdominal distention.   Genitourinary: Negative for difficulty urinating.   Neurological: Positive for dizziness, light-headedness and headaches.       Vital signs in last 24 hours  Temp:  [36.1 °C (97 °F)-37.1 °C (98.8 °F)] 36.4 °C (97.5 °F)  Pulse:  [76-99] 89  Resp:  [16-20] 20  BP: (121-174)/() 149/102  SpO2:  [94 %-100 %] 97 %    Physical Exam  Physical Exam  Vitals signs and nursing note reviewed.   Constitutional:       General: She is not in acute distress.     Appearance: Normal appearance.   HENT:      Head: Normocephalic and atraumatic.      Mouth/Throat:      Mouth: Mucous membranes are moist.   Eyes:       General: No scleral icterus.  Cardiovascular:      Rate and Rhythm: Tachycardia present. Rhythm irregular.      Pulses: Normal pulses.      Heart sounds: No murmur.   Pulmonary:      Effort: Pulmonary effort is normal. No respiratory distress.   Abdominal:      General: There is no distension.      Palpations: Abdomen is soft.   Musculoskeletal:      Right lower leg: No edema.      Left lower leg: No edema.   Skin:     General: Skin is warm and dry.      Capillary Refill: Capillary refill takes less than 2 seconds.   Neurological:      General: No focal deficit present.      Mental Status: She is alert and oriented to person, place, and time.   Psychiatric:         Judgment: Judgment normal.         Lab Review  Lab Results   Component Value Date/Time    WBC 5.7 10/22/2020 06:06 AM    RBC 3.67 (L) 10/22/2020 06:06 AM    HEMOGLOBIN 11.9 (L) 10/22/2020 06:06 AM    HEMATOCRIT 35.9 (L) 10/22/2020 06:06 AM    MCV 97.8 10/22/2020 06:06 AM    MCH 32.4 10/22/2020 06:06 AM    MCHC 33.1 (L) 10/22/2020 06:06 AM    MPV 8.3 (L) 10/22/2020 06:06 AM      Lab Results   Component Value Date/Time    SODIUM 135 10/24/2020 05:56 AM    POTASSIUM 3.9 10/24/2020 05:56 AM    CHLORIDE 94 (L) 10/24/2020 05:56 AM    CO2 34 (H) 10/24/2020 05:56 AM    GLUCOSE 123 (H) 10/24/2020 05:56 AM    BUN 32 (H) 10/24/2020 05:56 AM    CREATININE 1.05 10/24/2020 05:56 AM      Lab Results   Component Value Date/Time    ASTSGOT 24 10/21/2020 10:13 AM    ALTSGPT 23 10/21/2020 10:13 AM     Lab Results   Component Value Date/Time    CHOLSTRLTOT 213 (H) 09/30/2020 09:55 AM     (H) 09/30/2020 09:55 AM    HDL 53 09/30/2020 09:55 AM    TRIGLYCERIDE 146 09/30/2020 09:55 AM    TROPONINT 16 09/29/2020 05:03 PM       No results for input(s): NTPROBNP in the last 72 hours.    Assessment/Plan   Paroxysmal Atrial Fibrillation  Atrial fibrillation with RVR   - ECG this AM with QTc 475ms after Dose #6 (reduced to 125mcg starting dose #5). Current rhythm is afib with  RVR with intermittent RBBB aberrancy with frequent PVCs, couplets    - Will try to rate control with Diltiazem, continue long acting in addition to shortacting 60mg q6hr, titrated as needed   - continue digoxin  - plan for DCCV tomorrow AM if still in afib tomorrow am, (ate breakfast this morning)  - Continue Eliquis.   - keep K>4 and Mag>2     Pulm HTN/Chronic Resp Failure    - On tadalafil 40 mg QHS, Torsemide 40 mg bid  - On supplemental 02.     QT prolonging medications  - D/C'd amitriptyline   - OK to continue Paxil    Addendum: Pt in and out of atrial fibrillation this afternoon, resting rates are controlled, rates up to 150 with ambulation, she is symptomatic with this. Will continue with increase rate control, monitor overnight, DC tomorrow AM as long as rate controlled.     Staffed with Dr. Orantes    Staffed with Dr. Orantes

## 2020-10-24 NOTE — PROGRESS NOTES
Monitor Summary  SR-ST-Afib  HR 87-90 with 160s when ambulating to commode.  0.20/0.08/0.42 per tele monitor

## 2020-10-24 NOTE — PROGRESS NOTES
Monitor Summary  SR 76-98  Trigem   Couplet   (R) PVC   I/O Rate Dependent Bundle Branch Beats  Burst of A-Fib x2, non-sustaining   1.1 sec pause  BBB  .14/.12/.40

## 2020-10-25 VITALS
BODY MASS INDEX: 32.87 KG/M2 | RESPIRATION RATE: 18 BRPM | DIASTOLIC BLOOD PRESSURE: 84 MMHG | WEIGHT: 209.44 LBS | HEART RATE: 92 BPM | SYSTOLIC BLOOD PRESSURE: 144 MMHG | HEIGHT: 67 IN | TEMPERATURE: 97.5 F | OXYGEN SATURATION: 96 %

## 2020-10-25 LAB
ANION GAP SERPL CALC-SCNC: 9 MMOL/L (ref 7–16)
BUN SERPL-MCNC: 29 MG/DL (ref 8–22)
CALCIUM SERPL-MCNC: 10.3 MG/DL (ref 8.5–10.5)
CHLORIDE SERPL-SCNC: 96 MMOL/L (ref 96–112)
CO2 SERPL-SCNC: 33 MMOL/L (ref 20–33)
CREAT SERPL-MCNC: 0.8 MG/DL (ref 0.5–1.4)
EKG IMPRESSION: NORMAL
GLUCOSE SERPL-MCNC: 135 MG/DL (ref 65–99)
MAGNESIUM SERPL-MCNC: 2.4 MG/DL (ref 1.5–2.5)
POTASSIUM SERPL-SCNC: 3.6 MMOL/L (ref 3.6–5.5)
POTASSIUM SERPL-SCNC: 4.2 MMOL/L (ref 3.6–5.5)
SODIUM SERPL-SCNC: 138 MMOL/L (ref 135–145)

## 2020-10-25 PROCEDURE — A9270 NON-COVERED ITEM OR SERVICE: HCPCS | Performed by: INTERNAL MEDICINE

## 2020-10-25 PROCEDURE — 93005 ELECTROCARDIOGRAM TRACING: CPT | Performed by: PHYSICIAN ASSISTANT

## 2020-10-25 PROCEDURE — A9270 NON-COVERED ITEM OR SERVICE: HCPCS | Performed by: PHYSICIAN ASSISTANT

## 2020-10-25 PROCEDURE — 36415 COLL VENOUS BLD VENIPUNCTURE: CPT

## 2020-10-25 PROCEDURE — 84132 ASSAY OF SERUM POTASSIUM: CPT

## 2020-10-25 PROCEDURE — 80048 BASIC METABOLIC PNL TOTAL CA: CPT

## 2020-10-25 PROCEDURE — 700102 HCHG RX REV CODE 250 W/ 637 OVERRIDE(OP): Performed by: INTERNAL MEDICINE

## 2020-10-25 PROCEDURE — 93010 ELECTROCARDIOGRAM REPORT: CPT | Mod: 77 | Performed by: INTERNAL MEDICINE

## 2020-10-25 PROCEDURE — 93010 ELECTROCARDIOGRAM REPORT: CPT | Performed by: INTERNAL MEDICINE

## 2020-10-25 PROCEDURE — A9270 NON-COVERED ITEM OR SERVICE: HCPCS | Performed by: NURSE PRACTITIONER

## 2020-10-25 PROCEDURE — 83735 ASSAY OF MAGNESIUM: CPT

## 2020-10-25 PROCEDURE — 93005 ELECTROCARDIOGRAM TRACING: CPT | Performed by: INTERNAL MEDICINE

## 2020-10-25 PROCEDURE — 700102 HCHG RX REV CODE 250 W/ 637 OVERRIDE(OP): Performed by: PHYSICIAN ASSISTANT

## 2020-10-25 PROCEDURE — 700102 HCHG RX REV CODE 250 W/ 637 OVERRIDE(OP): Performed by: NURSE PRACTITIONER

## 2020-10-25 RX ORDER — DILTIAZEM HYDROCHLORIDE 360 MG/1
360 CAPSULE, EXTENDED RELEASE ORAL
Qty: 30 CAP | Refills: 11 | Status: SHIPPED | OUTPATIENT
Start: 2020-10-25 | End: 2021-01-13

## 2020-10-25 RX ORDER — POTASSIUM CHLORIDE 20 MEQ/1
40 TABLET, EXTENDED RELEASE ORAL 3 TIMES DAILY
Status: DISCONTINUED | OUTPATIENT
Start: 2020-10-25 | End: 2020-10-25

## 2020-10-25 RX ORDER — POTASSIUM CHLORIDE 20 MEQ/1
40 TABLET, EXTENDED RELEASE ORAL 3 TIMES DAILY
Status: DISCONTINUED | OUTPATIENT
Start: 2020-10-25 | End: 2020-10-25 | Stop reason: HOSPADM

## 2020-10-25 RX ADMIN — APIXABAN 5 MG: 5 TABLET, FILM COATED ORAL at 05:11

## 2020-10-25 RX ADMIN — POTASSIUM CHLORIDE 40 MEQ: 1500 TABLET, EXTENDED RELEASE ORAL at 05:11

## 2020-10-25 RX ADMIN — Medication 400 MG: at 05:11

## 2020-10-25 RX ADMIN — DILTIAZEM HYDROCHLORIDE 60 MG: 30 TABLET, FILM COATED ORAL at 05:11

## 2020-10-25 RX ADMIN — HYDROCODONE BITARTRATE AND ACETAMINOPHEN 2 TABLET: 5; 325 TABLET ORAL at 00:24

## 2020-10-25 RX ADMIN — TORSEMIDE 40 MG: 20 TABLET ORAL at 05:22

## 2020-10-25 RX ADMIN — DOFETILIDE 125 MCG: 0.12 CAPSULE ORAL at 05:10

## 2020-10-25 RX ADMIN — PAROXETINE HYDROCHLORIDE 20 MG: 20 TABLET, FILM COATED ORAL at 05:10

## 2020-10-25 RX ADMIN — DILTIAZEM HYDROCHLORIDE 60 MG: 30 TABLET, FILM COATED ORAL at 11:18

## 2020-10-25 RX ADMIN — DILTIAZEM HYDROCHLORIDE 60 MG: 30 TABLET, FILM COATED ORAL at 00:21

## 2020-10-25 RX ADMIN — POTASSIUM CHLORIDE 40 MEQ: 1500 TABLET, EXTENDED RELEASE ORAL at 08:46

## 2020-10-25 RX ADMIN — DILTIAZEM HYDROCHLORIDE 120 MG: 120 CAPSULE, COATED, EXTENDED RELEASE ORAL at 05:11

## 2020-10-25 RX ADMIN — DIGOXIN 125 MCG: 125 TABLET ORAL at 06:09

## 2020-10-25 RX ADMIN — HYDROCODONE BITARTRATE AND ACETAMINOPHEN 2 TABLET: 5; 325 TABLET ORAL at 08:45

## 2020-10-25 RX ADMIN — LEVOTHYROXINE SODIUM 75 MCG: 0.07 TABLET ORAL at 05:11

## 2020-10-25 NOTE — PROGRESS NOTES
Patient heart rhythm continues to convert in and out of afib with HR from 90-140s. Patient denies symptoms except for a mild headache.

## 2020-10-25 NOTE — FACE TO FACE
Face to Face Supporting Documentation - Home Health    The encounter with this patient was in whole or in part the primary reason for home health admission.    Date of encounter:   Patient:                    MRN:                       YOB: 2018  Zeinab Loza  [id number]  1945     Home health to see patient for:  Skilled Nursing care for assessment, interventions & education   Physical therapy evaluation and treatment and Occupational therapy evaluation and treatment    Skilled need for:  Recent Deterioration of Health Status secondary to atrial fibrillation, chronic respiratory failure    Skilled nursing interventions to include:  Comment: assessment, education, PT, OT    Homebound status evidenced by:  Needs the assistance of another person in order to leave the home. Requires walker. Leaving home requires a considerable and taxing effort. There is a normal inability to leave the home.    Community Physician to provide follow up care: Ashia Tidwell M.D.     Optional Interventions? No    I certify the face to face encounter for this home health care referral meets the CMS requirements and the encounter/clinical assessment with the patient was, in whole, or in part, for the medical condition(s) listed above, which is the primary reason for home health care. Based on my clinical findings: the service(s) are medically necessary, support the need for home health care, and the homebound criteria are met.  I certify that this patient has had a face to face encounter by myself.  Shameka Vegas P.A.-C. - NPI: [id number]

## 2020-10-25 NOTE — DISCHARGE PLANNING
Anticipated Discharge Disposition: Home with HH    Action: CM contacted [facility] pharmacy for price quote on Dofetilide.  Cost is $8.42 for 1 month supply; pt notified and states she can afford this amount.      Barriers to Discharge: Home health acceptance pending.     Plan: Care coordination to continue to follow and assist with discharge planning needs.

## 2020-10-25 NOTE — DISCHARGE INSTRUCTIONS
You do not need to take Magnesium right now. Have your labs drawn on Wed 10/28. We will call you with the results and discuss the potassium and Magnesium dosing.     Your next dose of Tikosyn is at 5:00pm    Tikosyn Medication Education Instructions:   Please take Tikosyn as prescribed and do not miss any doses.  If you miss a dose, do NOT double dose. Do not take medication less than 11-12 hours apart between doses. Please seek emergency medical attention or go to your nearest ER if you have symptoms of severe dizziness or palpitations, shortness of breath unreleaved by rest, and syncope or near syncope. Please notify us if you have any side effects from the medication or questions/concerns. Please do not stop taking the medication until you contact your provider.     Future Appointments   Date Time Provider Department Center   11/4/2020 11:40 AM KIRILL Kramer LD None       Discharge Instructions    Discharged to home by car with relative. Discharged via wheelchair, hospital escort: Yes.  Special equipment needed: Not Applicable    Be sure to schedule a follow-up appointment with your primary care doctor or any specialists as instructed.     Discharge Plan:   Diet Plan: Discussed  Activity Level: Discussed  Confirmed Follow up Appointment: Appointment Scheduled  Confirmed Symptoms Management: Discussed  Medication Reconciliation Updated: Yes  Influenza Vaccine Indication: Patient Refuses    I understand that a diet low in cholesterol, fat, and sodium is recommended for good health. Unless I have been given specific instructions below for another diet, I accept this instruction as my diet prescription.   Other diet: Cardiac    Special Instructions: None    · Is patient discharged on Warfarin / Coumadin?   No     Depression / Suicide Risk    As you are discharged from this [facility] facility, it is important to learn how to keep safe from harming yourself.    Recognize the warning signs:  · Abrupt  changes in personality, positive or negative- including increase in energy   · Giving away possessions  · Change in eating patterns- significant weight changes-  positive or negative  · Change in sleeping patterns- unable to sleep or sleeping all the time   · Unwillingness or inability to communicate  · Depression  · Unusual sadness, discouragement and loneliness  · Talk of wanting to die  · Neglect of personal appearance   · Rebelliousness- reckless behavior  · Withdrawal from people/activities they love  · Confusion- inability to concentrate     If you or a loved one observes any of these behaviors or has concerns about self-harm, here's what you can do:  · Talk about it- your feelings and reasons for harming yourself  · Remove any means that you might use to hurt yourself (examples: pills, rope, extension cords, firearm)  · Get professional help from the community (Mental Health, Substance Abuse, psychological counseling)  · Do not be alone:Call your Safe Contact- someone whom you trust who will be there for you.  · Call your local CRISIS HOTLINE 576-2571 or 308-463-5295  · Call your local Children's Mobile Crisis Response Team [place] (943) 138-4390 or wwwFundRazr  · Call the toll free National Suicide Prevention Hotlines   · National Suicide Prevention Lifeline 436-606-LMIN (4253)  · National Hope Line Network 800-SUICIDE (821-1528)  Dofetilide capsules  What is this medicine?  DOFETILIDE (hernandez FET il hetal) is an antiarrhythmic drug. It helps make your heart beat regularly. This medicine also helps to slow rapid heartbeats.  This medicine may be used for other purposes; ask your health care provider or pharmacist if you have questions.  COMMON BRAND NAME(S): Tikosyn  What should I tell my health care provider before I take this medicine?  They need to know if you have any of these conditions:  · heart disease  · history of irregular heartbeat  · history of low levels of potassium or magnesium in  the blood  · kidney disease  · liver disease  · an unusual or allergic reaction to dofetilide, other medicines, foods, dyes, or preservatives  · pregnant or trying to get pregnant  · breast-feeding  How should I use this medicine?  Take this medicine by mouth with a glass of water. Follow the directions on the prescription label. Do not take with grapefruit juice. You can take it with or without food. If it upsets your stomach, take it with food. Take your medicine at regular intervals. Do not take it more often than directed. Do not stop taking except on your doctor's advice.  A special MedGuide will be given to you by the pharmacist with each prescription and refill. Be sure to read this information carefully each time.  Talk to your pediatrician regarding the use of this medicine in children. Special care may be needed.  Overdosage: If you think you have taken too much of this medicine contact a poison control center or emergency room at once.  NOTE: This medicine is only for you. Do not share this medicine with others.  What if I miss a dose?  If you miss a dose, skip it. Take your next dose at the normal time. Do not take extra or 2 doses at the same time to make up for the missed dose.  What may interact with this medicine?  Do not take this medicine with any of the following medications:  · cimetidine  · cisapride  · dolutegravir  · dronedarone  · hydrochlorothiazide  · ketoconazole  · megestrol  · pimozide  · prochlorperazine  · thioridazine  · trimethoprim  · verapamil  This medicine may also interact with the following medications:  · amiloride  · cannabinoids  · certain antibiotics like erythromycin or clarithromycin  · certain antiviral medicines for HIV or hepatitis  · certain medicines for depression, anxiety, or psychotic disorders  · digoxin  · diltiazem  · grapefruit juice  · metformin  · nefazodone  · other medicines that prolong the QT interval (an abnormal heart  rhythm)  · quinine  · triamterene  · zafirlukast  · ziprasidone  This list may not describe all possible interactions. Give your health care provider a list of all the medicines, herbs, non-prescription drugs, or dietary supplements you use. Also tell them if you smoke, drink alcohol, or use illegal drugs. Some items may interact with your medicine.  What should I watch for while using this medicine?  Your condition will be monitored carefully while you are receiving this medicine.  What side effects may I notice from receiving this medicine?  Side effects that you should report to your doctor or health care professional as soon as possible:  · allergic reactions like skin rash, itching or hives, swelling of the face, lips, or tongue  · breathing problems  · chest pain or chest tightness  · dizziness  · signs and symptoms of a dangerous change in heartbeat or heart rhythm like chest pain; dizziness; fast or irregular heartbeat; palpitations; feeling faint or lightheaded, falls; breathing problems  · signs and symptoms of electrolyte imbalance like severe diarrhea, unusual sweating, vomiting, loss of appetite, increased thirst  · swelling of the ankles, legs, or feet  · tingling, numbness in the hands or feet  Side effects that usually do not require medical attention (report to your doctor or health care professional if they continue or are bothersome):  · diarrhea  · general ill feeling or flu-like symptoms  · headache  · nausea  · trouble sleeping  · stomach pain  This list may not describe all possible side effects. Call your doctor for medical advice about side effects. You may report side effects to FDA at 6-300-FDA-2393.  Where should I keep my medicine?  Keep out of the reach of children.  Store at room temperature between 15 and 30 degrees C (59 and 86 degrees F). Throw away any unused medicine after the expiration date.  NOTE: This sheet is a summary. It may not cover all possible information. If you have  questions about this medicine, talk to your doctor, pharmacist, or health care provider.  © 2020 Elsevier/Gold Standard (2019-12-09 10:18:48)      Atrial Fibrillation    Atrial fibrillation is a type of heartbeat that is irregular or fast (rapid). If you have this condition, your heart beats without any order. This makes it hard for your heart to pump blood in a normal way. Having this condition gives you more risk for stroke, heart failure, and other heart problems.  Atrial fibrillation may start all of a sudden and then stop on its own, or it may become a long-lasting problem.  What are the causes?  This condition may be caused by heart conditions, such as:  · High blood pressure.  · Heart failure.  · Heart valve disease.  · Heart surgery.  Other causes include:  · Pneumonia.  · Obstructive sleep apnea.  · Lung cancer.  · Thyroid disease.  · Drinking too much alcohol.  Sometimes the cause is not known.  What increases the risk?  You are more likely to develop this condition if:  · You smoke.  · You are older.  · You have diabetes.  · You are overweight.  · You have a family history of this condition.  · You exercise often and hard.  What are the signs or symptoms?  Common symptoms of this condition include:  · A feeling like your heart is beating very fast.  · Chest pain.  · Feeling short of breath.  · Feeling light-headed or weak.  · Getting tired easily.  Follow these instructions at home:  Medicines  · Take over-the-counter and prescription medicines only as told by your doctor.  · If your doctor gives you a blood-thinning medicine, take it exactly as told. Taking too much of it can cause bleeding. Taking too little of it does not protect you against clots. Clots can cause a stroke.  Lifestyle         · Do not use any tobacco products. These include cigarettes, chewing tobacco, and e-cigarettes. If you need help quitting, ask your doctor.  · Do not drink alcohol.  · Do not drink beverages that have caffeine.  "These include coffee, soda, and tea.  · Follow diet instructions as told by your doctor.  · Exercise regularly as told by your doctor.  General instructions  · If you have a condition that causes breathing to stop for a short period of time (apnea), treat it as told by your doctor.  · Keep a healthy weight. Do not use diet pills unless your doctor says they are safe for you. Diet pills may make heart problems worse.  · Keep all follow-up visits as told by your doctor. This is important.  Contact a doctor if:  · You notice a change in the speed, rhythm, or strength of your heartbeat.  · You are taking a blood-thinning medicine and you see more bruising.  · You get tired more easily when you move or exercise.  · You have a sudden change in weight.  Get help right away if:    · You have pain in your chest or your belly (abdomen).  · You have trouble breathing.  · You have blood in your vomit, poop, or pee (urine).  · You have any signs of a stroke. \"BE FAST\" is an easy way to remember the main warning signs:  ? B - Balance. Signs are dizziness, sudden trouble walking, or loss of balance.  ? E - Eyes. Signs are trouble seeing or a change in how you see.  ? F - Face. Signs are sudden weakness or loss of feeling in the face, or the face or eyelid drooping on one side.  ? A - Arms. Signs are weakness or loss of feeling in an arm. This happens suddenly and usually on one side of the body.  ? S - Speech. Signs are sudden trouble speaking, slurred speech, or trouble understanding what people say.  ? T - Time. Time to call emergency services. Write down what time symptoms started.  · You have other signs of a stroke, such as:  ? A sudden, very bad headache with no known cause.  ? Feeling sick to your stomach (nausea).  ? Throwing up (vomiting).  ? Jerky movements you cannot control (seizure).  These symptoms may be an emergency. Do not wait to see if the symptoms will go away. Get medical help right away. Call your local " emergency services (911 in the [place] Do not drive yourself to the hospital.  Summary  · Atrial fibrillation is a type of heartbeat that is irregular or fast (rapid).  · You are at higher risk of this condition if you smoke, are older, have diabetes, or are overweight.  · Follow your doctor's instructions about medicines, diet, exercise, and follow-up visits.  · Get help right away if you think that you have signs of a stroke.  This information is not intended to replace advice given to you by your health care provider. Make sure you discuss any questions you have with your health care provider.  Document Released: 09/26/2009 Document Revised: 02/21/2019 Document Reviewed: 02/08/2019  Elsevier Patient Education © 2020 Elsevier Inc.

## 2020-10-25 NOTE — PROGRESS NOTES
Patient escorted via wheelchair to car to go home with daughter. IV and tele box removed, all belongings gathered and sent home with patient. Patient a&ox4, educated on medications and where to pick them up. Verbalized understanding and denied any questions or additional needs at this time.

## 2020-10-26 ENCOUNTER — TELEPHONE (OUTPATIENT)
Dept: CARDIOLOGY | Facility: MEDICAL CENTER | Age: 75
End: 2020-10-26

## 2020-10-26 RX ORDER — METOPROLOL TARTRATE 50 MG/1
50 TABLET, FILM COATED ORAL 2 TIMES DAILY
Qty: 60 TAB | Refills: 3 | Status: ON HOLD | OUTPATIENT
Start: 2020-10-26 | End: 2021-04-23

## 2020-10-26 NOTE — TELEPHONE ENCOUNTER
10/26/20 - Per Answer Shun. Pt's daughter Vernell is calling back to speak with you regarding her mother's heart rate. The heart is fluctuating b/t 140-167 and then will be steady at 147.    Please call Vernell back at [phone] to discuss her concerns.    Thank you

## 2020-10-26 NOTE — TELEPHONE ENCOUNTER
SUSAN    Received a call from [place]. Pt is no longer on phone but  advised me that Vernell called in for pt and stated pt was in the hospital for A-fib and released yesterday, her heart rate is between 146-160. Please call Vernell back at [phone].    Thank you

## 2020-10-26 NOTE — TELEPHONE ENCOUNTER
131/94  Hr 145bpm irregular  SOB with short distance  Pt 5L O2 at baseline 92%    HH RN called to discuss pt care. Per tiger text from [facility] he would like to start pt on metoprolol 50mg BID. Family updated and RX sent in. If symptoms and HR do not improve or get worse after 2 hours family plans to take her to the ER

## 2020-10-26 NOTE — TELEPHONE ENCOUNTER
Spoke with dtr, pt maintaining elevated HR since this morning, reports fatigue and SOB. Has taken all cardiac meds. Tiger text sent to [facility]

## 2020-10-27 NOTE — TELEPHONE ENCOUNTER
Called pt dtr. Pt took metoprolol   120/60 66bpm she still feels worn out but slightly better than yesterday. Pt able to work with PT today.

## 2020-11-03 ENCOUNTER — HOSPITAL ENCOUNTER (OUTPATIENT)
Facility: MEDICAL CENTER | Age: 75
End: 2020-11-03
Attending: FAMILY MEDICINE
Payer: MEDICARE

## 2020-11-03 ENCOUNTER — TELEPHONE (OUTPATIENT)
Dept: CARDIOLOGY | Facility: MEDICAL CENTER | Age: 75
End: 2020-11-03

## 2020-11-03 LAB
ANION GAP SERPL CALC-SCNC: 11 MMOL/L (ref 7–16)
BUN SERPL-MCNC: 23 MG/DL (ref 8–22)
CALCIUM SERPL-MCNC: 11.1 MG/DL (ref 8.5–10.5)
CHLORIDE SERPL-SCNC: 102 MMOL/L (ref 96–112)
CO2 SERPL-SCNC: 32 MMOL/L (ref 20–33)
CREAT SERPL-MCNC: 0.78 MG/DL (ref 0.5–1.4)
GLUCOSE SERPL-MCNC: 124 MG/DL (ref 65–99)
MAGNESIUM SERPL-MCNC: 2.3 MG/DL (ref 1.5–2.5)
POTASSIUM SERPL-SCNC: 4.2 MMOL/L (ref 3.6–5.5)
SODIUM SERPL-SCNC: 145 MMOL/L (ref 135–145)

## 2020-11-03 PROCEDURE — 83735 ASSAY OF MAGNESIUM: CPT

## 2020-11-03 PROCEDURE — 80048 BASIC METABOLIC PNL TOTAL CA: CPT

## 2020-11-03 NOTE — TELEPHONE ENCOUNTER
11/2/20 - Per Answer [place] Report - Pt's daughter Vernell  would like Elizabeth to know she had issues with the sleep study.    Please call Vernell at [phone].    Thank you

## 2020-11-04 ENCOUNTER — NON-PROVIDER VISIT (OUTPATIENT)
Dept: CARDIOLOGY | Facility: MEDICAL CENTER | Age: 75
End: 2020-11-04

## 2020-11-04 ENCOUNTER — TELEPHONE (OUTPATIENT)
Dept: CARDIOLOGY | Facility: MEDICAL CENTER | Age: 75
End: 2020-11-04

## 2020-11-04 ENCOUNTER — TELEMEDICINE (OUTPATIENT)
Dept: CARDIOLOGY | Facility: MEDICAL CENTER | Age: 75
End: 2020-11-04
Payer: MEDICARE

## 2020-11-04 VITALS
DIASTOLIC BLOOD PRESSURE: 72 MMHG | HEART RATE: 58 BPM | OXYGEN SATURATION: 98 % | HEIGHT: 67 IN | SYSTOLIC BLOOD PRESSURE: 126 MMHG | WEIGHT: 214 LBS | BODY MASS INDEX: 33.59 KG/M2

## 2020-11-04 VITALS
BODY MASS INDEX: 33.59 KG/M2 | HEART RATE: 58 BPM | DIASTOLIC BLOOD PRESSURE: 72 MMHG | HEIGHT: 67 IN | OXYGEN SATURATION: 98 % | SYSTOLIC BLOOD PRESSURE: 126 MMHG | WEIGHT: 214 LBS

## 2020-11-04 DIAGNOSIS — I48.4 ATYPICAL ATRIAL FLUTTER (HCC): ICD-10-CM

## 2020-11-04 PROCEDURE — 99215 OFFICE O/P EST HI 40 MIN: CPT | Mod: 95,CR | Performed by: INTERNAL MEDICINE

## 2020-11-04 ASSESSMENT — FIBROSIS 4 INDEX
FIB4 SCORE: 1.66
FIB4 SCORE: 1.66

## 2020-11-04 NOTE — PROGRESS NOTES
Telemedicine: Established Patient   This evaluation was conducted via Zoom using secure and encrypted videoconferencing technology. The patient was in a private location in the state [facility].    The patient's identity was confirmed and verbal consent was obtained for this virtual visit.    Subjective:   CC:   Chief Complaint   Patient presents with   • Atrial Fibrillation       Zeinab Loza is a 75 y.o. female presenting for evaluation and management of:    pAF    We started season last week in the hospital.  She did not tolerate high dose due to QT prolongation.  We discharged her on 125 mcg twice daily.  Unfortunately after discharge, she did have an episode of paroxysmal atrial fibrillation with RVR.  We started some metoprolol.  She has had intermittent episodes of atrial fibrillation since, but her rates have been better controlled.  We did discuss repeat ablation, her last ablation was over 10 years ago, as she had a surgical maze and left atrial appendage closure in 2011.  She has gained over 60 pounds since discharge from the hospital, concerned that it is water weight as she is not urinating as much.    ROS   12 point review of systems was conducted and negative aside from above      Allergies   Allergen Reactions   • Betapace [Sotalol] Anaphylaxis   • Zolpidem Tartrate Unspecified     Lightheaded and confusion       Current medicines (including changes today)  Current Outpatient Medications   Medication Sig Dispense Refill   • metoprolol (LOPRESSOR) 50 MG Tab Take 1 Tab by mouth 2 times a day. 60 Tab 3   • DILTIAZem CD (CARDIZEM CD) 360 MG ER capsule Take 1 Cap by mouth every day. 30 Cap 11   • dofetilide (TIKOSYN) 125 MCG Cap Take 1 Cap by mouth every 12 hours. 60 Cap 1   • HYDROcodone-acetaminophen (NORCO) 7.5-325 MG per tablet Take 1 Tab by mouth 2 times a day. Indications: Pain     • rosuvastatin (CRESTOR) 20 MG Tab Take 1 Tab by mouth every evening. 30 Tab 11   • torsemide (DEMADEX) 20 MG  Tab Take 2 Tabs by mouth 2 Times a Day. 120 Tab 11   • potassium chloride SA (KDUR) 20 MEQ Tab CR Take 60 mEq by mouth 2 times a day.     • digoxin (LANOXIN) 125 MCG Tab Take 1 Tab by mouth every evening. 90 Tab 3   • apixaban (ELIQUIS) 5mg Tab Take 5 mg by mouth 2 Times a Day.     • Tadalafil, PAH, (ADCIRCA) 20 MG Tab Take 40 mg by mouth every bedtime. Indications: Pulmonary Arterial Hypertension     • therapeutic multivitamin-minerals (THERAGRAN-M) Tab Take 1 Tab by mouth every day.     • PARoxetine (PAXIL) 20 MG Tab Take 20 mg by mouth every morning.     • levothyroxine (SYNTHROID) 75 MCG Tab Take 75 mcg by mouth Every morning on an empty stomach.     • Cholecalciferol (VITAMIN D) 2000 units Cap Take 2,000 Units by mouth every day.       No current facility-administered medications for this visit.        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 08/05/2020     Priority: High   • Acrocyanosis (Former[facility]) 01/15/2020     Priority: High   • A-fib ([facility]) 07/06/2019     Priority: High   • Hypokalemia 01/15/2020     Priority: Medium   • Essential hypertension 05/10/2006     Priority: Medium   • Hypercalcemia 01/15/2020     Priority: Low   • Hypercapnia 01/15/2020     Priority: Low   • Chronic anticoagulation 08/21/2019     Priority: Low   • Chronic back pain 07/06/2019     Priority: Low   • PAH (pulmonary artery hypertension) (Former[facility]) 07/06/2019     Priority: Low   • Chronic respiratory failure ([facility]) 07/06/2019     Priority: Low   • Hypothyroidism 01/10/2006     Priority: Low   • Non-smoker 09/29/2020   • Other specified hypothyroidism 09/29/2020   • Other hyperlipidemia 09/29/2020   • Chronic respiratory failure with hypoxia ([facility]) 09/29/2020   • High risk medication use 05/05/2020   • Other sleep apnea 09/09/2019   • E. coli UTI 08/22/2019   • Drug-induced constipation 08/20/2019   • Macrocytic anemia 08/20/2019       No family history on file.   No family  "history of premature coronary disease    She  has a past medical history of Aneurysm artery, celiac (HCC) (2019), Aneurysm of right renal artery (HCC), Atrial fibrillation (HCC), Diastolic heart failure (HCC), Hypertension, essential, Pulmonary hypertension (HCC), and Warfarin anticoagulation.  She  has a past surgical history that includes ankle orif (Left, 8/5/2020).       Objective:   /72 (BP Location: Right arm, Patient Position: Sitting, BP Cuff Size: Adult)   Pulse (!) 58   Ht 1.702 m (5' 7\")   Wt 97.1 kg (214 lb)   SpO2 98%   BMI 33.52 kg/m²     Physical Exam   Pulses regular as reported by patient  Lower extremity edema is present  No skin rashes  Alert and oriented x3  No acute distress  Breathing is not labored    Assessment and Plan:   The following treatment plan was discussed:     1.  Paroxysmal atrial fibrillation with RVR  2.  Mitral valve disease, status post mitral valve repair in 2011  3.  Multiple prior atrial fibrillation ablations prior to 2010, left atrial maze in 2011  4.  Dofetilide use, high risk medication monitoring  5.  Chronic oral anticoagulation  6.  Paroxysmal atypical atrial flutter  7.  Pulmonary hypertension    -Discussed further management of rhythm control.  Continue dofetilide and metoprolol for now.  We did discuss repeat ablation.  I do think she has a good chance of maintaining sinus rhythm with ablation of atrial flutter and fibrillation long-term.  She does have pulmonary hypertension so she is at a bit higher risk for general anesthesia, but she is not prohibitive.  -We discussed pulmonary vein isolation for therapeutic management and continued rhythm control.  We discussed the risks and benefits of this procedure.  Risks include 1-3% risk of major cardiovascular event including stroke, myocardial infarction, phrenic nerve damage, esophageal injury and/or fistula formation, cardiac perforation, pericardial effusion, tamponade, major bleeding, or death.  I quoted " a 70 to 80% chance free of atrial fibrillation at 12 months.  We discussed that he may also need a second procedure.    Plan to continue dofetilide, anticoagulation, and metoprolol for now.  If she has recurrence of atrial fibrillation and is symptomatic, she can call to schedule ablation.    Increase torsemide to 120 mg daily from 80 mg daily.  Do this for 3 days.  Hopefully this will help get some of her extra fluid off.  Her BMP yesterday looked good.  Creatinine is normal.    Follow-up in 3 months

## 2020-11-04 NOTE — NON-PROVIDER
Patient arrived 11 min late for ED appt. Daughter insisted on mom being seen (Father home bound with dementia can not be left fo long periods of time) even if it ment by virtual means. Patient H/F for tikoysn admission ekg performed along with vitals and Rx review. Daughter will connect for Virtual appt from home at 2:00p with .    Notice patient has a 10+lb weight gain.

## 2020-11-04 NOTE — TELEPHONE ENCOUNTER
dtr spoke with [facility] office to be seen in sleep medicine. Pt missed her October 1st appt due to in the hospital.    LVM with sleep medicine to try to get her into be seen sooner as she was told to call next week for a f/u

## 2020-12-01 ENCOUNTER — HOSPITAL ENCOUNTER (OUTPATIENT)
Facility: MEDICAL CENTER | Age: 75
End: 2020-12-01
Attending: FAMILY MEDICINE
Payer: MEDICARE

## 2020-12-01 LAB
ALBUMIN SERPL BCP-MCNC: 4.5 G/DL (ref 3.2–4.9)
ALBUMIN/GLOB SERPL: 1.6 G/DL
ALP SERPL-CCNC: 92 U/L (ref 30–99)
ALT SERPL-CCNC: 22 U/L (ref 2–50)
ANION GAP SERPL CALC-SCNC: 13 MMOL/L (ref 7–16)
AST SERPL-CCNC: 25 U/L (ref 12–45)
BILIRUB SERPL-MCNC: 0.4 MG/DL (ref 0.1–1.5)
BUN SERPL-MCNC: 28 MG/DL (ref 8–22)
CALCIUM SERPL-MCNC: 11.4 MG/DL (ref 8.5–10.5)
CHLORIDE SERPL-SCNC: 89 MMOL/L (ref 96–112)
CO2 SERPL-SCNC: 33 MMOL/L (ref 20–33)
CREAT SERPL-MCNC: 1.54 MG/DL (ref 0.5–1.4)
GLOBULIN SER CALC-MCNC: 2.9 G/DL (ref 1.9–3.5)
GLUCOSE SERPL-MCNC: 166 MG/DL (ref 65–99)
POTASSIUM SERPL-SCNC: 3.7 MMOL/L (ref 3.6–5.5)
PROT SERPL-MCNC: 7.4 G/DL (ref 6–8.2)
SODIUM SERPL-SCNC: 135 MMOL/L (ref 135–145)

## 2020-12-01 PROCEDURE — 36415 COLL VENOUS BLD VENIPUNCTURE: CPT

## 2020-12-01 PROCEDURE — 80053 COMPREHEN METABOLIC PANEL: CPT

## 2020-12-08 ENCOUNTER — HOSPITAL ENCOUNTER (OUTPATIENT)
Facility: MEDICAL CENTER | Age: 75
End: 2020-12-08
Attending: FAMILY MEDICINE
Payer: MEDICARE

## 2020-12-08 LAB
ALBUMIN SERPL BCP-MCNC: 4.6 G/DL (ref 3.2–4.9)
ALBUMIN/GLOB SERPL: 1.6 G/DL
ALP SERPL-CCNC: 90 U/L (ref 30–99)
ALT SERPL-CCNC: 27 U/L (ref 2–50)
ANION GAP SERPL CALC-SCNC: 11 MMOL/L (ref 7–16)
AST SERPL-CCNC: 35 U/L (ref 12–45)
BASOPHILS # BLD AUTO: 0.8 % (ref 0–1.8)
BASOPHILS # BLD: 0.04 K/UL (ref 0–0.12)
BILIRUB SERPL-MCNC: 0.3 MG/DL (ref 0.1–1.5)
BUN SERPL-MCNC: 27 MG/DL (ref 8–22)
CALCIUM SERPL-MCNC: 11.5 MG/DL (ref 8.5–10.5)
CHLORIDE SERPL-SCNC: 90 MMOL/L (ref 96–112)
CO2 SERPL-SCNC: 37 MMOL/L (ref 20–33)
CREAT SERPL-MCNC: 1.16 MG/DL (ref 0.5–1.4)
EOSINOPHIL # BLD AUTO: 0.08 K/UL (ref 0–0.51)
EOSINOPHIL NFR BLD: 1.6 % (ref 0–6.9)
ERYTHROCYTE [DISTWIDTH] IN BLOOD BY AUTOMATED COUNT: 45.4 FL (ref 35.9–50)
GLOBULIN SER CALC-MCNC: 2.9 G/DL (ref 1.9–3.5)
GLUCOSE SERPL-MCNC: 180 MG/DL (ref 65–99)
HCT VFR BLD AUTO: 40.2 % (ref 37–47)
HGB BLD-MCNC: 12.9 G/DL (ref 12–16)
IMM GRANULOCYTES # BLD AUTO: 0.01 K/UL (ref 0–0.11)
IMM GRANULOCYTES NFR BLD AUTO: 0.2 % (ref 0–0.9)
IRON SATN MFR SERPL: 22 % (ref 15–55)
IRON SERPL-MCNC: 77 UG/DL (ref 40–170)
LYMPHOCYTES # BLD AUTO: 1.33 K/UL (ref 1–4.8)
LYMPHOCYTES NFR BLD: 26.5 % (ref 22–41)
MCH RBC QN AUTO: 31.2 PG (ref 27–33)
MCHC RBC AUTO-ENTMCNC: 32.1 G/DL (ref 33.6–35)
MCV RBC AUTO: 97.3 FL (ref 81.4–97.8)
MONOCYTES # BLD AUTO: 0.52 K/UL (ref 0–0.85)
MONOCYTES NFR BLD AUTO: 10.4 % (ref 0–13.4)
NEUTROPHILS # BLD AUTO: 3.03 K/UL (ref 2–7.15)
NEUTROPHILS NFR BLD: 60.5 % (ref 44–72)
NRBC # BLD AUTO: 0 K/UL
NRBC BLD-RTO: 0 /100 WBC
PLATELET # BLD AUTO: 267 K/UL (ref 164–446)
PMV BLD AUTO: 9 FL (ref 9–12.9)
POTASSIUM SERPL-SCNC: 4 MMOL/L (ref 3.6–5.5)
PROT SERPL-MCNC: 7.5 G/DL (ref 6–8.2)
RBC # BLD AUTO: 4.13 M/UL (ref 4.2–5.4)
SODIUM SERPL-SCNC: 138 MMOL/L (ref 135–145)
TIBC SERPL-MCNC: 345 UG/DL (ref 250–450)
TSH SERPL DL<=0.005 MIU/L-ACNC: 1.17 UIU/ML (ref 0.38–5.33)
UIBC SERPL-MCNC: 268 UG/DL (ref 110–370)
VIT B12 SERPL-MCNC: 1055 PG/ML (ref 211–911)
WBC # BLD AUTO: 5 K/UL (ref 4.8–10.8)

## 2020-12-08 PROCEDURE — 83540 ASSAY OF IRON: CPT | Mod: GA

## 2020-12-08 PROCEDURE — 84443 ASSAY THYROID STIM HORMONE: CPT

## 2020-12-08 PROCEDURE — 82607 VITAMIN B-12: CPT

## 2020-12-08 PROCEDURE — 80053 COMPREHEN METABOLIC PANEL: CPT

## 2020-12-08 PROCEDURE — 82746 ASSAY OF FOLIC ACID SERUM: CPT

## 2020-12-08 PROCEDURE — 83550 IRON BINDING TEST: CPT | Mod: GA

## 2020-12-08 PROCEDURE — 85025 COMPLETE CBC W/AUTO DIFF WBC: CPT

## 2020-12-09 LAB — FOLATE SERPL-MCNC: 31.2 NG/ML

## 2020-12-29 ENCOUNTER — HOSPITAL ENCOUNTER (OUTPATIENT)
Facility: MEDICAL CENTER | Age: 75
End: 2020-12-29
Attending: FAMILY MEDICINE
Payer: MEDICARE

## 2020-12-29 LAB
ALBUMIN SERPL BCP-MCNC: 4.2 G/DL (ref 3.2–4.9)
ALBUMIN/GLOB SERPL: 1.8 G/DL
ALP SERPL-CCNC: 85 U/L (ref 30–99)
ALT SERPL-CCNC: 33 U/L (ref 2–50)
ANION GAP SERPL CALC-SCNC: 10 MMOL/L (ref 7–16)
AST SERPL-CCNC: 36 U/L (ref 12–45)
BASOPHILS # BLD AUTO: 0.7 % (ref 0–1.8)
BASOPHILS # BLD: 0.05 K/UL (ref 0–0.12)
BILIRUB SERPL-MCNC: 0.4 MG/DL (ref 0.1–1.5)
BUN SERPL-MCNC: 26 MG/DL (ref 8–22)
CALCIUM SERPL-MCNC: 10.6 MG/DL (ref 8.5–10.5)
CHLORIDE SERPL-SCNC: 101 MMOL/L (ref 96–112)
CO2 SERPL-SCNC: 31 MMOL/L (ref 20–33)
CREAT SERPL-MCNC: 0.88 MG/DL (ref 0.5–1.4)
EOSINOPHIL # BLD AUTO: 0.03 K/UL (ref 0–0.51)
EOSINOPHIL NFR BLD: 0.4 % (ref 0–6.9)
ERYTHROCYTE [DISTWIDTH] IN BLOOD BY AUTOMATED COUNT: 50.4 FL (ref 35.9–50)
FOLATE SERPL-MCNC: 13.1 NG/ML
GLOBULIN SER CALC-MCNC: 2.3 G/DL (ref 1.9–3.5)
GLUCOSE SERPL-MCNC: 145 MG/DL (ref 65–99)
HCT VFR BLD AUTO: 38.7 % (ref 37–47)
HGB BLD-MCNC: 12.6 G/DL (ref 12–16)
IMM GRANULOCYTES # BLD AUTO: 0.05 K/UL (ref 0–0.11)
IMM GRANULOCYTES NFR BLD AUTO: 0.7 % (ref 0–0.9)
IRON SATN MFR SERPL: 33 % (ref 15–55)
IRON SERPL-MCNC: 116 UG/DL (ref 40–170)
LYMPHOCYTES # BLD AUTO: 1.26 K/UL (ref 1–4.8)
LYMPHOCYTES NFR BLD: 16.8 % (ref 22–41)
MCH RBC QN AUTO: 32.6 PG (ref 27–33)
MCHC RBC AUTO-ENTMCNC: 32.6 G/DL (ref 33.6–35)
MCV RBC AUTO: 100 FL (ref 81.4–97.8)
MONOCYTES # BLD AUTO: 0.53 K/UL (ref 0–0.85)
MONOCYTES NFR BLD AUTO: 7.1 % (ref 0–13.4)
NEUTROPHILS # BLD AUTO: 5.57 K/UL (ref 2–7.15)
NEUTROPHILS NFR BLD: 74.3 % (ref 44–72)
NRBC # BLD AUTO: 0 K/UL
NRBC BLD-RTO: 0 /100 WBC
PLATELET # BLD AUTO: 259 K/UL (ref 164–446)
PMV BLD AUTO: 9.2 FL (ref 9–12.9)
POTASSIUM SERPL-SCNC: 4.2 MMOL/L (ref 3.6–5.5)
PROT SERPL-MCNC: 6.5 G/DL (ref 6–8.2)
RBC # BLD AUTO: 3.87 M/UL (ref 4.2–5.4)
SODIUM SERPL-SCNC: 142 MMOL/L (ref 135–145)
TIBC SERPL-MCNC: 350 UG/DL (ref 250–450)
TSH SERPL DL<=0.005 MIU/L-ACNC: 1.27 UIU/ML (ref 0.38–5.33)
UIBC SERPL-MCNC: 234 UG/DL (ref 110–370)
VIT B12 SERPL-MCNC: 588 PG/ML (ref 211–911)
WBC # BLD AUTO: 7.5 K/UL (ref 4.8–10.8)

## 2020-12-29 PROCEDURE — 83540 ASSAY OF IRON: CPT | Mod: GA

## 2020-12-29 PROCEDURE — 84443 ASSAY THYROID STIM HORMONE: CPT

## 2020-12-29 PROCEDURE — 85025 COMPLETE CBC W/AUTO DIFF WBC: CPT

## 2020-12-29 PROCEDURE — 80053 COMPREHEN METABOLIC PANEL: CPT

## 2020-12-29 PROCEDURE — 83550 IRON BINDING TEST: CPT | Mod: GA

## 2020-12-29 PROCEDURE — 82607 VITAMIN B-12: CPT

## 2020-12-29 PROCEDURE — 82746 ASSAY OF FOLIC ACID SERUM: CPT

## 2021-01-12 ENCOUNTER — TELEMEDICINE (OUTPATIENT)
Dept: SLEEP MEDICINE | Facility: MEDICAL CENTER | Age: 76
End: 2021-01-12
Payer: MEDICARE

## 2021-01-12 ENCOUNTER — TELEPHONE (OUTPATIENT)
Dept: CARDIOLOGY | Facility: MEDICAL CENTER | Age: 76
End: 2021-01-12

## 2021-01-12 VITALS
SYSTOLIC BLOOD PRESSURE: 132 MMHG | DIASTOLIC BLOOD PRESSURE: 87 MMHG | WEIGHT: 209 LBS | BODY MASS INDEX: 32.8 KG/M2 | OXYGEN SATURATION: 97 % | HEART RATE: 69 BPM | HEIGHT: 67 IN

## 2021-01-12 DIAGNOSIS — G47.33 OSA (OBSTRUCTIVE SLEEP APNEA): ICD-10-CM

## 2021-01-12 DIAGNOSIS — I48.0 PAROXYSMAL ATRIAL FIBRILLATION (HCC): ICD-10-CM

## 2021-01-12 DIAGNOSIS — I27.21 PAH (PULMONARY ARTERY HYPERTENSION) (HCC): ICD-10-CM

## 2021-01-12 PROCEDURE — 99204 OFFICE O/P NEW MOD 45 MIN: CPT | Mod: 95,CR | Performed by: FAMILY MEDICINE

## 2021-01-12 RX ORDER — CARVEDILOL 12.5 MG/1
TABLET ORAL
COMMUNITY
Start: 2020-11-24 | End: 2021-01-13

## 2021-01-12 RX ORDER — DOFETILIDE 0.12 MG/1
125 CAPSULE ORAL EVERY 12 HOURS
Qty: 60 CAP | Refills: 1 | Status: SHIPPED | OUTPATIENT
Start: 2021-01-12 | End: 2021-01-26 | Stop reason: SDUPTHER

## 2021-01-12 ASSESSMENT — FIBROSIS 4 INDEX: FIB4 SCORE: 1.77

## 2021-01-12 NOTE — TELEPHONE ENCOUNTER
"To [facility] - please advise.     Confirmed with pt and daughter she should be taking dofetilide. Pt states she never started that medication after leaving the hospital.      Pt having intermittent AF episodes only occasionally symptomatic when \"heart starts racing\"    With pt feeling\"ok\" wondering if she should start med    "

## 2021-01-12 NOTE — TELEPHONE ENCOUNTER
Thee Galindo M.D.  You Just now (3:15 PM)     Yes - would take Tikosyn as prescribed. She should have an appt with me coming up in Feb, if not let's make her one      Reordered tikosyn. Made f/u for pt in Feb. Will call if she experiences any side effects.

## 2021-01-12 NOTE — TELEPHONE ENCOUNTER
Patient daughter Vernell, called wanting to know if the Pt should be on dofetilide (TIKOSYN) 125 MCG Cap and if so they will need a RX to be sent to [facility] on [address].     Thank you,  Rosie HALL

## 2021-01-12 NOTE — PROGRESS NOTES
"Telemedicine Visit: New Patient     This encounter was conducted via Zoom . Verbal consent was obtained. Patient's identity was verified.   Given the importance of social distancing and other strategies recommended to reduce the risk of COVID-19 transmission, I am providing medical care to this patient via audio/video visit in place of an in person visit at the request of the patient.        [facility]  Consult Note     Date: 1/12/2021 / Time: 1:25 PM    Patient ID:   Name:             Zeinab Loza   YOB: 1945  Age:                 75 y.o.  female   MRN:               [id number]      Thank you for requesting a sleep medicine consultation on Zeinab Loza at the sleep center. She was diagnosed with FEI 5 years ago and has been on the BiPAP 11/7 cm with O2 bleed in at 5 L/min. He initial symptoms include. She is referred by Diann for evaluation and treatment of sleep disorder breathing and insomnia.     HISTORY OF PRESENT ILLNESS:       She does not have a set sleep schedule. At night,  Zeinab Loza goes to bed around 8-9 pm on weekdays and on the weekends. She gets out of bed at 11 am on weekdays and on the weekends.  She  Averages about 10 hrs of sleep on a good night and 3-4 hrs on a bad night. Pt has bad nights about 4-5 nights per week. She falls asleep within few minutes. She wakes up about multiple times during the night due to no obvious reason and chronic pain and on average It takes her couple hr to fall back asleep. During that time She lies in bed. During that time she watches TV and reads.       She is aware of snoring/breathing pauses/gasping or choking in sleep since she has been on the BiPAP. She  denies any symptoms of restless legs syndrome such as an \"urge to move\"  She  legs in the evening or bedtime. She  denies any symptoms of narcolepsy such as sleep paralysis or cataplexy, or any symptoms to suggest parasomnias such as sleep walking or acting out of " dreams. She  has not used any medications for the sleep problem.Overall, she doesnot finds her sleep refreshing. In terms of  excessive daytime sleepiness, she complains of sleepinesswhile reading or watching TV .She does take regular naps.   The 30 day compliance was downloaded which shows adequate compliance with more that 4 hr usage about 100%. The AHI is has improved to 3.3/hr. The mask leak is normal.       She has PAH which is managed by [facility]. She is currently on tadalafil. She has chronic respiratory failure and on supplemental O2. Last ECHO is from 8/2020 and the RVSP is 25 mmHg.    REVIEW OF SYSTEMS:       Constitutional: Denies fevers, Denies weight changes  Eyes: Denies changes in vision, no eye pain  Ears/Nose/Throat/Mouth: Denies nasal congestion or sore throat   Cardiovascular: Denies chest pain or palpitations   Respiratory: + shortness of breath , Denies cough  Gastrointestinal/Hepatic: Denies abdominal pain, nausea, vomiting, diarrhea, constipation or GI bleeding   Genitourinary: Denies bladder dysfunction, dysuria or frequency  Musculoskeletal/Rheum: Denies  joint pain and swelling   Skin/Breast: Denies rash  Psychiatric: denies mood disorder     Comprehensive review of systems form is reviewed with the patient and is attached in the EMR.     PMH:  has a past medical history of Aneurysm artery, celiac (HCC) (2019), Aneurysm of right renal artery (HCC), Atrial fibrillation (HCC), Chickenpox, Diastolic heart failure (HCC), Mong[place] measles, Hypertension, essential, Pulmonary hypertension (HCC), and Warfarin anticoagulation.  MEDS:   Current Outpatient Medications:   •  carvedilol (COREG) 12.5 MG Tab, TAKE 1 TABLET BY MOUTH TWICE DAILY FOR 30 DAYS. STOP METOPROLOL, Disp: , Rfl:   •  amitriptyline (ELAVIL) 10 MG Tab, Take 2 Tabs by mouth every bedtime., Disp: 60 Tab, Rfl: 11  •  metoprolol (LOPRESSOR) 50 MG Tab, Take 1 Tab by mouth 2 times a day., Disp: 60 Tab, Rfl: 3  •  dofetilide (TIKOSYN) 125 MCG  Cap, Take 1 Cap by mouth every 12 hours., Disp: 60 Cap, Rfl: 1  •  HYDROcodone-acetaminophen (NORCO) 7.5-325 MG per tablet, Take 1 Tab by mouth 2 times a day. Indications: Pain, Disp: , Rfl:   •  rosuvastatin (CRESTOR) 20 MG Tab, Take 1 Tab by mouth every evening., Disp: 30 Tab, Rfl: 11  •  torsemide (DEMADEX) 20 MG Tab, Take 2 Tabs by mouth 2 Times a Day., Disp: 120 Tab, Rfl: 11  •  potassium chloride SA (KDUR) 20 MEQ Tab CR, Take 60 mEq by mouth 2 times a day., Disp: , Rfl:   •  digoxin (LANOXIN) 125 MCG Tab, Take 1 Tab by mouth every evening., Disp: 90 Tab, Rfl: 3  •  apixaban (ELIQUIS) 5mg Tab, Take 5 mg by mouth 2 Times a Day., Disp: , Rfl:   •  Tadalafil, PAH, (ADCIRCA) 20 MG Tab, Take 40 mg by mouth every bedtime. Indications: Pulmonary Arterial Hypertension, Disp: , Rfl:   •  PARoxetine (PAXIL) 20 MG Tab, Take 20 mg by mouth every morning., Disp: , Rfl:   •  levothyroxine (SYNTHROID) 75 MCG Tab, Take 75 mcg by mouth Every morning on an empty stomach., Disp: , Rfl:   •  Cholecalciferol (VITAMIN D) 2000 units Cap, Take 2,000 Units by mouth every day., Disp: , Rfl:   •  DILTIAZem CD (CARDIZEM CD) 360 MG ER capsule, Take 1 Cap by mouth every day. (Patient not taking: Reported on 1/12/2021), Disp: 30 Cap, Rfl: 11  •  therapeutic multivitamin-minerals (THERAGRAN-M) Tab, Take 1 Tab by mouth every day., Disp: , Rfl:   ALLERGIES:   Allergies   Allergen Reactions   • Betapace [Sotalol] Anaphylaxis   • Zolpidem Tartrate Unspecified     Lightheaded and confusion     SURGHX:   Past Surgical History:   Procedure Laterality Date   • ANKLE ORIF Left 8/5/2020    Procedure: ORIF, ANKLE;  Surgeon: Tk Humphreys M.D.;  Location: SURGERY [facility];  Service: Orthopedics   • TONSILLECTOMY       SOCHX:  reports that she has never smoked. She has never used smokeless tobacco. She reports current alcohol use of about 4.2 oz of alcohol per week. She reports that she does not use drugs.  FH: No family history on  "file.    Physical Exam:  Vitals/ General Appearance:   Weight/BMI: Body mass index is 32.73 kg/m².  /87 (BP Location: Left arm, Patient Position: Sitting, BP Cuff Size: Adult)   Pulse 69   Ht 1.702 m (5' 7\")   Wt 94.8 kg (209 lb)   SpO2 97%   Vitals:    01/12/21 1315   BP: 132/87   BP Location: Left arm   Patient Position: Sitting   BP Cuff Size: Adult   Pulse: 69   SpO2: 97%   Weight: 94.8 kg (209 lb)   Height: 1.702 m (5' 7\")           Constitutional: Alert, no distress, well-groomed.  Skin: No rashes in visible areas.  Eye: Round. Conjunctiva clear, lids normal. No icterus.   ENMT: Lips pink without lesions, good dentition, moist mucous membranes. Phonation normal.  Neck: No masses, no thyromegaly. Moves freely without pain.  CV: Pulse as reported by patient  Respiratory: Unlabored respiratory effort, no cough or audible wheeze  Psych: Alert and oriented x3, normal affect and mood.   INVESTIGATIONS:       ASSESSMENT AND PLAN     1. Sleep Apnea .She  Is currently on BiPAP with O2 bleed in mask.The pathophysiology of sleep anea and the increased risk of cardiovascular morbidity from untreated sleep apnea is discussed in detail with the patient. She  also has HTN and Afib which can be worsened by her FEI.     She is urged to avoid supine sleep, weight gain and alcoholic beverages since all of these can worsen sleep apnea. She is cautioned against drowsy driving. If She feels sleepy while driving, She must pull over for a break/nap, rather than persist on the road, in the interest of She own safety and that of others on the road.   Plan   - Changing pressure to BiPAP EPAP min 7 IPAP max 15 PS 4 cm with O2 bleed in  since she has residual symptoms of snoring and EDS we will try to change pressure    - We will try to her her previous SS    - compliance was reinforced     2. Regarding treatment of other past medical problems and general health maintenance,  She is urged to follow up with PCP.      "

## 2021-01-13 ENCOUNTER — TELEMEDICINE (OUTPATIENT)
Dept: CARDIOLOGY | Facility: MEDICAL CENTER | Age: 76
End: 2021-01-13
Payer: MEDICARE

## 2021-01-13 VITALS
SYSTOLIC BLOOD PRESSURE: 123 MMHG | DIASTOLIC BLOOD PRESSURE: 73 MMHG | OXYGEN SATURATION: 95 % | HEIGHT: 67 IN | HEART RATE: 56 BPM | WEIGHT: 208 LBS | BODY MASS INDEX: 32.65 KG/M2

## 2021-01-13 DIAGNOSIS — E87.6 HYPOKALEMIA: ICD-10-CM

## 2021-01-13 DIAGNOSIS — E78.2 MIXED HYPERLIPIDEMIA: ICD-10-CM

## 2021-01-13 DIAGNOSIS — I10 ESSENTIAL HYPERTENSION: ICD-10-CM

## 2021-01-13 DIAGNOSIS — I27.21 PAH (PULMONARY ARTERY HYPERTENSION) (HCC): ICD-10-CM

## 2021-01-13 DIAGNOSIS — Z79.899 HIGH RISK MEDICATION USE: ICD-10-CM

## 2021-01-13 DIAGNOSIS — I48.0 PAROXYSMAL ATRIAL FIBRILLATION (HCC): ICD-10-CM

## 2021-01-13 PROCEDURE — 99214 OFFICE O/P EST MOD 30 MIN: CPT | Mod: 95,CR | Performed by: NURSE PRACTITIONER

## 2021-01-13 RX ORDER — SOTALOL HYDROCHLORIDE 80 MG/1
TABLET ORAL
COMMUNITY
Start: 2020-11-15 | End: 2021-01-13

## 2021-01-13 RX ORDER — VALACYCLOVIR HYDROCHLORIDE 1 G/1
TABLET, FILM COATED ORAL
COMMUNITY
Start: 2020-11-04 | End: 2021-01-13

## 2021-01-13 RX ORDER — DIAZEPAM 5 MG/1
TABLET ORAL
COMMUNITY
Start: 2020-12-11 | End: 2021-01-13

## 2021-01-13 RX ORDER — ROSUVASTATIN CALCIUM 20 MG/1
20 TABLET, COATED ORAL EVERY EVENING
Qty: 90 TAB | Refills: 3 | Status: SHIPPED | OUTPATIENT
Start: 2021-01-13 | End: 2021-01-13

## 2021-01-13 RX ORDER — ROSUVASTATIN CALCIUM 20 MG/1
20 TABLET, COATED ORAL EVERY EVENING
Qty: 90 TAB | Refills: 3 | Status: SHIPPED | OUTPATIENT
Start: 2021-01-13 | End: 2021-08-05 | Stop reason: SDUPTHER

## 2021-01-13 RX ORDER — SULFAMETHOXAZOLE AND TRIMETHOPRIM 400; 80 MG/1; MG/1
TABLET ORAL
COMMUNITY
Start: 2020-11-24 | End: 2021-01-13

## 2021-01-13 ASSESSMENT — ENCOUNTER SYMPTOMS
CLAUDICATION: 0
PALPITATIONS: 1
COUGH: 0
ABDOMINAL PAIN: 0
FEVER: 0
MYALGIAS: 0
SHORTNESS OF BREATH: 1
PND: 0
DIZZINESS: 1
ROS GI COMMENTS: ABDOMINAL BLOATING
ORTHOPNEA: 1

## 2021-01-13 ASSESSMENT — FIBROSIS 4 INDEX: FIB4 SCORE: 1.77

## 2021-01-13 NOTE — PATIENT INSTRUCTIONS
Follow up with Dr. Aleks Zelaya at [facility] in 3 months   Start Tikosyn (dofetilide) as instructed  Take extra 40 mg of torsemide for 2 days  Monitor sodium intake, recommend 2000 mg of sodium per day

## 2021-01-13 NOTE — PROGRESS NOTES
Chief Complaint   Patient presents with   • Atrial Fibrillation   • HTN (Controlled)   • Hyperlipidemia       Subjective:   This evaluation was conducted via Zoom using secure and encrypted videoconferencing technology. The patient was in a private location in the [facility].    The patient's identity was confirmed and verbal consent was obtained for this virtual visit.    Zeinab Loza is a 75 y.o. female who presents today for follow-up on her pulmonary hypertension, A. fib    Patient of Dr. Galindo and Dr. Eng.  She was last seen in clinic on 11/4/2020 with Dr. Galinod.  He discussed with patient about further management of rhythm control.  Patient planning on continuing dofetilide and metoprolol for now unless she has recurrence of A. fib with symptoms.  Torsemide was also increased for 3 days.    Patient reports today that she has not been taking dofetilide since her hospitalization, did not  the prescription.  Patient did contact Dr. Galindo's nurse yesterday and was restarted on dofetilide.    Patient also reports she did not increase her rosuvastatin as directed during Dr. Eng's visit in October.    Patient continues to report dyspnea on exertion, does use oxygen at 5 L, mild lower extremity edema, occasional palpitations, occasional dizziness with position changes and she does sleep elevated in bed.  She denies chest pains, PND.    She has been feeling fatigued recently and is planning on starting her walking routine.    She reports her weights are up to 208 pounds, they were previously around 201 pounds.    She does report high sodium diet.  She is trying to work on reducing her sodium intake.    She does continue to follow with pulmonary at [facility] with Dr. Zelaya.    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 in 3/7/2019    Previous Right Heart cath Data from [facility]:  RHC 2019  Mean RA 19 mmHg, RV 47/21 mmHg, PA 50/24 mmHg, mean PA 35 mmHg, PCWP 30 mmHg  Odin CO 4.5 , CI 2.03  PVR 1.1 woods     RHC 2018  RA 15 mmHg, RV 49/6/14 mmHg, PA 48/21/33 mmHg, PCWP 19 mmHg,   ick C.O. 5.19 L/min, Odin C.I 2.39   PVR 4.8 michelle    -Hypertension    -Paroxysmal A. fib, started on Tikosyn on 10/21/2020, on Eliquis    -Hyperlipidemia: Taking rosuvastatin    -Sleep apnea, uses BiPAP with oxygen bleed in 5L, followed by sleep medicine    Past Medical History:   Diagnosis Date   • Aneurysm artery, celiac (HCC) 2019   • Aneurysm of right renal artery (HCC)    • Atrial fibrillation (HCC)    • Chickenpox    • Diastolic heart failure (HCC)    • [place] measles    • Hypertension, essential    • Pulmonary hypertension (HCC)    • Warfarin anticoagulation      Past Surgical History:   Procedure Laterality Date   • ANKLE ORIF Left 8/5/2020    Procedure: ORIF, ANKLE;  Surgeon: Tk Humphreys M.D.;  Location: SURGERY [facility];  Service: Orthopedics   • TONSILLECTOMY       History reviewed. No pertinent family history.  Social History     Socioeconomic History   • Marital status:      Spouse name: Not on file   • Number of children: Not on file   • Years of education: Not on file   • Highest education level: Not on file   Occupational History   • Not on file   Social Needs   • Financial resource strain: Not on file   • Food insecurity     Worry: Not on file     Inability: Not on file   • Transportation needs     Medical: Not on file     Non-medical: Not on file   Tobacco Use   • Smoking status: Never Smoker   • Smokeless tobacco: Never Used   Substance and Sexual Activity   • Alcohol use: Yes     Alcohol/week: 4.2 oz     Types: 7 Shots of liquor per week     Comment: 1 day   • Drug use: No   • Sexual activity: Not on file    Lifestyle   • Physical activity     Days per week: Not on file     Minutes per session: Not on file   • Stress: Not on file   Relationships   • Social connections     Talks on phone: Not on file     Gets together: Not on file     Attends Gnosticist service: Not on file     Active member of club or organization: Not on file     Attends meetings of clubs or organizations: Not on file     Relationship status: Not on file   • Intimate partner violence     Fear of current or ex partner: Not on file     Emotionally abused: Not on file     Physically abused: Not on file     Forced sexual activity: Not on file   Other Topics Concern   • Not on file   Social History Narrative   • Not on file     Allergies   Allergen Reactions   • Betapace [Sotalol] Anaphylaxis   • Zolpidem Tartrate Unspecified     Lightheaded and confusion     Outpatient Encounter Medications as of 1/13/2021   Medication Sig Dispense Refill   • rosuvastatin (CRESTOR) 20 MG Tab Take 1 Tab by mouth every evening. 90 Tab 3   • dofetilide (TIKOSYN) 125 MCG Cap Take 1 Cap by mouth every 12 hours. 60 Cap 1   • metoprolol (LOPRESSOR) 50 MG Tab Take 1 Tab by mouth 2 times a day. 60 Tab 3   • HYDROcodone-acetaminophen (NORCO) 7.5-325 MG per tablet Take 1 Tab by mouth 2 times a day. Indications: Pain     • torsemide (DEMADEX) 20 MG Tab Take 2 Tabs by mouth 2 Times a Day. 120 Tab 11   • potassium chloride SA (KDUR) 20 MEQ Tab CR Take 60 mEq by mouth 2 times a day.     • digoxin (LANOXIN) 125 MCG Tab Take 1 Tab by mouth every evening. 90 Tab 3   • apixaban (ELIQUIS) 5mg Tab Take 5 mg by mouth 2 Times a Day.     • Tadalafil, PAH, (ADCIRCA) 20 MG Tab Take 40 mg by mouth every bedtime. Indications: Pulmonary Arterial Hypertension     • PARoxetine (PAXIL) 20 MG Tab Take 20 mg by mouth every morning.     • levothyroxine (SYNTHROID) 75 MCG Tab Take 75 mcg by mouth Every morning on an empty stomach.     • Cholecalciferol (VITAMIN D) 2000 units Cap Take 2,000 Units by mouth  "every day.     • [DISCONTINUED] diazePAM (VALIUM) 5 MG Tab TAKE 1 TABLET BY MOUTH BEFORE PROCEDURE. MAY TAKE 2 ND BY MOUTH 30 \" BEFORE IF STILL ANXIOUS. DO NOT DRIVE WHILE TAKING. MAY CAUSE SEDATION     • [DISCONTINUED] sotalol (BETAPACE) 80 MG Tab      • [DISCONTINUED] sulfamethoxazole-trimethoprim (BACTRIM) 400-80 MG Tab TAKE 2 TABLETS BY MOUTH BID FOR 5 DAYS     • [DISCONTINUED] valacyclovir (VALTREX) 1 GM Tab TK 1 T PO Q 8 H FOR 10 DAYS     • [DISCONTINUED] rosuvastatin (CRESTOR) 20 MG Tab Take 1 Tab by mouth every evening. 90 Tab 3   • [DISCONTINUED] carvedilol (COREG) 12.5 MG Tab TAKE 1 TABLET BY MOUTH TWICE DAILY FOR 30 DAYS. STOP METOPROLOL     • [DISCONTINUED] amitriptyline (ELAVIL) 10 MG Tab Take 2 Tabs by mouth every bedtime. 60 Tab 11   • [DISCONTINUED] DILTIAZem CD (CARDIZEM CD) 360 MG ER capsule Take 1 Cap by mouth every day. (Patient not taking: Reported on 1/12/2021) 30 Cap 11   • [DISCONTINUED] rosuvastatin (CRESTOR) 20 MG Tab Take 1 Tab by mouth every evening. 30 Tab 11   • [DISCONTINUED] therapeutic multivitamin-minerals (THERAGRAN-M) Tab Take 1 Tab by mouth every day.       No facility-administered encounter medications on file as of 1/13/2021.      Review of Systems   Constitutional: Positive for malaise/fatigue. Negative for fever.   Respiratory: Positive for shortness of breath. Negative for cough.    Cardiovascular: Positive for palpitations (occ), orthopnea (elevated bed) and leg swelling (ankle). Negative for chest pain, claudication and PND.   Gastrointestinal: Negative for abdominal pain.        Abdominal bloating    Musculoskeletal: Negative for myalgias.   Neurological: Positive for dizziness (with changing positions quickly).   All other systems reviewed and are negative.       Objective:   /73 (BP Location: Left arm, Patient Position: Sitting, BP Cuff Size: Adult)   Pulse (!) 56   Ht 1.702 m (5' 7\")   Wt 94.3 kg (208 lb)   SpO2 95%   BMI 32.58 kg/m²     Physical Exam "   Constitutional: She is oriented to person, place, and time. She appears well-developed and well-nourished.   HENT:   Head: Normocephalic and atraumatic.   Eyes: EOM and lids are normal.   Neck: Normal range of motion.   Pulmonary/Chest: Effort normal.   Using oxygen at 5L   Musculoskeletal:         General: Edema (visualized mild edema) present.   Neurological: She is oriented to person, place, and time.   Skin: Skin is intact.   Psychiatric: She has a normal mood and affect. Her speech is normal and behavior is normal. Judgment and thought content normal. Cognition and memory are normal.         Lab Results   Component Value Date/Time    CHOLSTRLTOT 213 (H) 09/30/2020 09:55 AM     (H) 09/30/2020 09:55 AM    HDL 53 09/30/2020 09:55 AM    TRIGLYCERIDE 146 09/30/2020 09:55 AM       Lab Results   Component Value Date/Time    SODIUM 142 12/29/2020 12:30 PM    POTASSIUM 4.2 12/29/2020 12:30 PM    CHLORIDE 101 12/29/2020 12:30 PM    CO2 31 12/29/2020 12:30 PM    GLUCOSE 145 (H) 12/29/2020 12:30 PM    BUN 26 (H) 12/29/2020 12:30 PM    CREATININE 0.88 12/29/2020 12:30 PM     Lab Results   Component Value Date/Time    ALKPHOSPHAT 85 12/29/2020 12:30 PM    ASTSGOT 36 12/29/2020 12:30 PM    ALTSGPT 33 12/29/2020 12:30 PM    TBILIRUBIN 0.4 12/29/2020 12:30 PM      Transthoracic Echo Report 1/16/2020  Normal left ventricular systolic function. Left ventricular ejection fraction is visually estimated to be 65%.  A reliable estimation of diastolic function cannot be made due to mitral valve disease.  Normal inferior vena cava size and inspiratory collapse.  Known mitral valve repair which is functioning normally with   appropriate transvalvular gradient. Mean transvalvular gradient is 3 mmHg at a heart rate of 66 BPM.  Estimated right ventricular systolic pressure  is 32 mmHg.     Transthoracic Echo Report 8/5/2020  Normal left ventricular size, wall thickness, and systolic function.  Dilated right ventricle with  preserved systolic function.  No significant valvular pathology.  Estimated right ventricular systolic pressure  is 25 mmHg; normal.  Normal pericardium without effusion.     No prior study is available for comparison.      Assessment:     1. Mixed hyperlipidemia  rosuvastatin (CRESTOR) 20 MG Tab    DISCONTINUED: rosuvastatin (CRESTOR) 20 MG Tab   2. High risk medication use  Basic Metabolic Panel    MAGNESIUM   3. Essential hypertension  Basic Metabolic Panel    MAGNESIUM   4. PAH (pulmonary artery hypertension) (HCC)  Basic Metabolic Panel    MAGNESIUM   5. Paroxysmal atrial fibrillation (HCC)  Basic Metabolic Panel    MAGNESIUM   6. Hypokalemia  MAGNESIUM       Medical Decision Making:  Today's Assessment / Status / Plan:   1.  Pulmonary hypertension, who group 1-2, functional class III:  -Take extra torsemide 40 mg for 2 days then resume torsemide 40 mg twice a day  -Continue potassium 60 mEq twice a day  -Continue tadalafil 40 mg daily  -Unable to take Letairis due to hypotension  -Encourage patient to monitor sodium intake  -Reinforced s/sx of worsening symptoms with patient and weight monitoring. Pt verbalizes understanding. Pt to call office or RTC if present.  -Continue to follow up with [facility]     2.  Sleep apnea:  -Continue BiPAP with oxygen bleeding    3.  Atrial fibrillation:  -Restart dofetilide, call EP if propblems  -Continue Eliquis 5 mg twice a day  -Continue digoxin 125 mcg daily  -Continue metoprolol 50 mg twice a day  -Continue follow-up with EP, next visit 2/2/2021    4.  Hyperlipidemia: Last  on 9/30/2020  -Discussed with patient increase rosuvastatin to 20 mg daily    5.  Hypertension: BP stable  -Continue metoprolol 50 mg twice a day  -Continue torsemide    BMP and magnesium level in 3 months.    FU in clinic in 3 months with Dr. Eng and labs. Sooner if needed.    Patient verbalizes understanding and agrees with the plan of care.     PLEASE NOTE: This Note was created using voice  recognition Software. I have made every reasonable attempt to correct obvious errors, but I expect that there are errors of grammar and possibly content that I did not discover before finalizing the note

## 2021-01-14 ENCOUNTER — TELEPHONE (OUTPATIENT)
Dept: CARDIOLOGY | Facility: MEDICAL CENTER | Age: 76
End: 2021-01-14

## 2021-01-14 NOTE — TELEPHONE ENCOUNTER
NATALIE STAPLES Key: [id number] - PA Case ID: [id number] help? Call us at [phone]  Outcome  Approvedtoday  PA Case: [id number], Status: Approved, Coverage Starts on: 1/1/2021 12:00:00 AM, Coverage Ends on: 12/31/2021 12:00:00 AM. Questions? Contact [phone].  Drug  Dofetilide 125MCG capsules  Form  [employer] [facility] Form

## 2021-01-14 NOTE — TELEPHONE ENCOUNTER
NATALIE STAPLES Key: BVGRJAIME LOCKHART Case ID: [id number] help? Call us at [phone]  Status  [facility]  Drug  Dofetilide 125MCG capsules  Form  Gerri LOCKHART Form

## 2021-01-15 DIAGNOSIS — Z23 NEED FOR VACCINATION: ICD-10-CM

## 2021-01-20 ENCOUNTER — TELEPHONE (OUTPATIENT)
Dept: CARDIOLOGY | Facility: MEDICAL CENTER | Age: 76
End: 2021-01-20

## 2021-01-20 NOTE — TELEPHONE ENCOUNTER
Received a request to hold blood thinners form from [facility] requesting to hold Eliquis 5 days prior to Bilateral facet L4-S1.     Hx:  Afib on eliquis, pulmonary HTN, no hx of CVA or PE per chart review    To Macarena HERNANDEZ who last saw pt 1/13

## 2021-01-21 NOTE — TELEPHONE ENCOUNTER
Received clearance from Niels and could you call her and see how she is feeling, her weights. She is also a patient of Dr. Galindo.

## 2021-01-26 DIAGNOSIS — I48.0 PAROXYSMAL ATRIAL FIBRILLATION (HCC): ICD-10-CM

## 2021-01-26 RX ORDER — DOFETILIDE 0.12 MG/1
125 CAPSULE ORAL EVERY 12 HOURS
Qty: 90 CAP | Refills: 0 | Status: SHIPPED | OUTPATIENT
Start: 2021-01-26 | End: 2021-02-02

## 2021-02-02 ENCOUNTER — TELEMEDICINE (OUTPATIENT)
Dept: CARDIOLOGY | Facility: MEDICAL CENTER | Age: 76
End: 2021-02-02
Payer: MEDICARE

## 2021-02-02 ENCOUNTER — TELEPHONE (OUTPATIENT)
Dept: CARDIOLOGY | Facility: MEDICAL CENTER | Age: 76
End: 2021-02-02

## 2021-02-02 DIAGNOSIS — I48.0 PAROXYSMAL ATRIAL FIBRILLATION (HCC): ICD-10-CM

## 2021-02-02 PROCEDURE — 99214 OFFICE O/P EST MOD 30 MIN: CPT | Mod: 95,CR | Performed by: INTERNAL MEDICINE

## 2021-02-02 RX ORDER — DOFETILIDE 0.12 MG/1
125 CAPSULE ORAL EVERY 12 HOURS
Qty: 180 CAP | Refills: 1 | Status: SHIPPED | OUTPATIENT
Start: 2021-02-02 | End: 2021-03-16

## 2021-02-02 NOTE — TELEPHONE ENCOUNTER
NM: Zeinab Loza   PH: [phone]   PT NM: Zeinab Loza   : 49   REG DR: Dr Galindo   RE: Returning Danica's call to set up  EKG and appointment.     --------------------------------------  Message History  Account: [id number]  Taken:  Tue 2021  2:21p AF  Serial#: [id number]

## 2021-02-02 NOTE — TELEPHONE ENCOUNTER
Called pt and left vm. Needs to either schedule ekg with us or we will print out the order and send it to her so she can take it to any urgent care. Also needs 6mos paddy w/[place]

## 2021-02-02 NOTE — PROGRESS NOTES
Telemedicine: Established Patient   This evaluation was conducted via Zoom using secure and encrypted videoconferencing technology. The patient was in a private location in the state of [place].    The patient's identity was confirmed and verbal consent was obtained for this virtual visit.    Subjective:   CC:   Chief Complaint   Patient presents with   • Atrial Fibrillation     VOD DX:PAF       Zeinab Loza is a 75 y.o. female presenting for evaluation and management of:    Afib    75-year-old with a history of pulmonary hypertension on home oxygen with paroxysmal atrial fibrillation, managed with sotalol in the past but had allergic reaction, switched to dofetilide 125 mcg twice daily, doing well on this, because of a misunderstanding she went a couple months without taking this, but recently started taking this a few weeks ago.  She had a syncopal episode upon bending over about a week ago.  She hit her head.  She came around pretty quickly however.  She denies palpitations.  No other syncope like this.  Her  recently .  This was couple weeks ago, due to ALS.  She is feeling well without any recurrence of atrial fibrillation or palpitations, but does note that the Tikosyn is expensive    ROS   12 point review of systems was performed and negative aside from above      Allergies   Allergen Reactions   • Betapace [Sotalol] Anaphylaxis   • Zolpidem Tartrate Unspecified     Lightheaded and confusion       Current medicines (including changes today)  Current Outpatient Medications   Medication Sig Dispense Refill   • dofetilide (TIKOSYN) 125 MCG Cap Take 1 Cap by mouth every 12 hours. 180 Cap 1   • rosuvastatin (CRESTOR) 20 MG Tab Take 1 Tab by mouth every evening. 90 Tab 3   • metoprolol (LOPRESSOR) 50 MG Tab Take 1 Tab by mouth 2 times a day. 60 Tab 3   • HYDROcodone-acetaminophen (NORCO) 7.5-325 MG per tablet Take 1 Tab by mouth 2 times a day. Indications: Pain     • torsemide (DEMADEX) 20 MG Tab  Take 2 Tabs by mouth 2 Times a Day. 120 Tab 11   • potassium chloride SA (KDUR) 20 MEQ Tab CR Take 60 mEq by mouth 2 times a day.     • digoxin (LANOXIN) 125 MCG Tab Take 1 Tab by mouth every evening. 90 Tab 3   • apixaban (ELIQUIS) 5mg Tab Take 5 mg by mouth 2 Times a Day.     • Tadalafil, PAH, (ADCIRCA) 20 MG Tab Take 40 mg by mouth every bedtime. Indications: Pulmonary Arterial Hypertension     • PARoxetine (PAXIL) 20 MG Tab Take 20 mg by mouth every morning.     • levothyroxine (SYNTHROID) 75 MCG Tab Take 75 mcg by mouth Every morning on an empty stomach.     • Cholecalciferol (VITAMIN D) 2000 units Cap Take 2,000 Units by mouth every day.       No current facility-administered medications for this visit.        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 08/05/2020     Priority: High   • Acrocyanosis ([facility]) 01/15/2020     Priority: High   • A-fib ([facility]) 07/06/2019     Priority: High   • Hypokalemia 01/15/2020     Priority: Medium   • Essential hypertension 05/10/2006     Priority: Medium   • Hypercalcemia 01/15/2020     Priority: Low   • Hypercapnia 01/15/2020     Priority: Low   • Chronic anticoagulation 08/21/2019     Priority: Low   • Chronic back pain 07/06/2019     Priority: Low   • PAH (pulmonary artery hypertension) ([facility]) 07/06/2019     Priority: Low   • Chronic respiratory failure ([facility]) 07/06/2019     Priority: Low   • Hypothyroidism 01/10/2006     Priority: Low   • Non-smoker 09/29/2020   • Other specified hypothyroidism 09/29/2020   • Other hyperlipidemia 09/29/2020   • Chronic respiratory failure with hypoxia ([facility]) 09/29/2020   • High risk medication use 05/05/2020   • Other sleep apnea 09/09/2019   • E. coli UTI 08/22/2019   • Drug-induced constipation 08/20/2019   • Macrocytic anemia 08/20/2019       No family history on file.    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  She  has a past surgical history that includes ankle orif (Left, 8/5/2020) and tonsillectomy.       Objective:   There were no vitals taken for this visit.    Physical Exam   Pulse regular as reported by patient  No acute distress  Breathing unlabored on nasal cannula    Assessment and Plan:   The following treatment plan was discussed:     1. Paroxysmal atrial fibrillation (HCC)  - dofetilide (TIKOSYN) 125 MCG Cap; Take 1 Cap by mouth every 12 hours.  Dispense: 180 Cap; Refill: 1    1.  Paroxysmal atrial fibrillation  2.  High risk medication monitoring, dofetilide  3.  Pulmonary arterial hypertension    -She recently resumed dofetilide.  Had a syncopal episode.  Probably not related, but we will check an EKG just to ensure QTC is within reasonable limits, lab work 4 weeks ago showed stable creatinine and potassium levels  -We did discuss pulmonary vein isolation for atrial fibrillation ablation.  After this to her she want to try coming off dofetilide, however she notes that it is working pretty well so she will continue with this for now.  If it stops working well, she would reconsider ablation  -Continue management with cardiology clinic for pulmonary hypertension    Follow-up in 6 months, sooner if clinically atrial fibrillation or needs ablation      Follow-up: No follow-ups on file.

## 2021-02-05 ENCOUNTER — TELEPHONE (OUTPATIENT)
Dept: SLEEP MEDICINE | Facility: MEDICAL CENTER | Age: 76
End: 2021-02-05

## 2021-02-06 NOTE — TELEPHONE ENCOUNTER
Zeinab called regarding getting a new BIPAP as well as a INOGEN.   I called and advised if she has had machine for 5 years we could send a new order for a new machine.   In regards to the Inogen - explained that was for portable use, and asked her to call back to discuss more with one of the Medical Assistants.

## 2021-03-03 ENCOUNTER — TELEPHONE (OUTPATIENT)
Dept: CARDIOLOGY | Facility: MEDICAL CENTER | Age: 76
End: 2021-03-03

## 2021-03-03 DIAGNOSIS — I48.0 PAROXYSMAL ATRIAL FIBRILLATION (HCC): ICD-10-CM

## 2021-03-03 NOTE — TELEPHONE ENCOUNTER
Dr. Galindo,    This patient would like to schedule the ablation. What would you like her scheduled for? Are there any medications you would like her to hold for this procedure?    Thank You,  Callie

## 2021-03-04 NOTE — TELEPHONE ENCOUNTER
Thee Galindo M.D.  Callie Taylor, Med Ass't; Elizabeth Sen, MELL.   Caller: Unspecified (Yesterday, 10:12 AM)   Please schedule Afib ablation, no meds to hold, with [facility].     Thanks,  SUSAN

## 2021-03-04 NOTE — TELEPHONE ENCOUNTER
Elizabeth,    Can you please enter the orders for this procedure?    Afib ablation w/[facility]ANNA[facility]    Thank You,  Callie

## 2021-03-05 ENCOUNTER — HOSPITAL ENCOUNTER (OUTPATIENT)
Dept: CARDIOLOGY | Facility: MEDICAL CENTER | Age: 76
End: 2021-03-05
Attending: INTERNAL MEDICINE
Payer: MEDICARE

## 2021-03-05 DIAGNOSIS — I27.20 PROGRESSIVE PULMONARY HYPERTENSION (HCC): ICD-10-CM

## 2021-03-05 DIAGNOSIS — R06.02 SHORTNESS OF BREATH: ICD-10-CM

## 2021-03-05 LAB
EKG IMPRESSION: NORMAL
LV EJECT FRACT  99904: 60
LV EJECT FRACT MOD 2C 99903: 55.11
LV EJECT FRACT MOD 4C 99902: 59.09
LV EJECT FRACT MOD BP 99901: 56.67

## 2021-03-05 PROCEDURE — 93005 ELECTROCARDIOGRAM TRACING: CPT | Performed by: INTERNAL MEDICINE

## 2021-03-05 PROCEDURE — 93010 ELECTROCARDIOGRAM REPORT: CPT | Performed by: INTERNAL MEDICINE

## 2021-03-05 PROCEDURE — 93306 TTE W/DOPPLER COMPLETE: CPT

## 2021-03-05 PROCEDURE — 93306 TTE W/DOPPLER COMPLETE: CPT | Mod: 26 | Performed by: INTERNAL MEDICINE

## 2021-03-09 ENCOUNTER — TELEPHONE (OUTPATIENT)
Dept: CARDIOLOGY | Facility: MEDICAL CENTER | Age: 76
End: 2021-03-09

## 2021-03-09 NOTE — TELEPHONE ENCOUNTER
Elizabeth,    I recvd a call from this patient. Can you please call her and go over her echo and EKG results?    Thank You,  Callie

## 2021-03-09 NOTE — TELEPHONE ENCOUNTER
Patient scheduled for afib ablation w/HANNAH on 4-22-21 with Dr. Galindo. Patient has been instructed to check in at 10:00 for 12:00 case time. Message sent to authorizations. Emailed Carto.

## 2021-03-16 ENCOUNTER — TELEMEDICINE (OUTPATIENT)
Dept: CARDIOLOGY | Facility: MEDICAL CENTER | Age: 76
End: 2021-03-16
Payer: MEDICARE

## 2021-03-16 VITALS
BODY MASS INDEX: 32.18 KG/M2 | HEART RATE: 65 BPM | DIASTOLIC BLOOD PRESSURE: 74 MMHG | HEIGHT: 67 IN | OXYGEN SATURATION: 98 % | SYSTOLIC BLOOD PRESSURE: 122 MMHG | WEIGHT: 205 LBS

## 2021-03-16 DIAGNOSIS — I48.0 PAROXYSMAL ATRIAL FIBRILLATION (HCC): ICD-10-CM

## 2021-03-16 PROCEDURE — 99214 OFFICE O/P EST MOD 30 MIN: CPT | Mod: 95,CR | Performed by: INTERNAL MEDICINE

## 2021-03-16 ASSESSMENT — FIBROSIS 4 INDEX: FIB4 SCORE: 1.79

## 2021-03-16 NOTE — PROGRESS NOTES
Telemedicine: Established Patient   This evaluation was conducted via Zoom using secure and encrypted videoconferencing technology. The patient was in a private location in the state of [place].    The patient's identity was confirmed and verbal consent was obtained for this virtual visit.    Subjective:   CC:   Chief Complaint   Patient presents with   • Atrial Fibrillation     F/V Dx: Paroxysmal atrial fibrillation (HCC)       Zeinab Loza is a 76 y.o. female presenting for evaluation and management of:    Afib    6-year-old female with a history of mitral valve repair and maze, prior atrial flutter ablation, with recurrent paroxysmal atrial fibrillation with RVR, failed medication with propafenone, switched to sotalol, she had tongue swelling with this which she dofetilide, she did well with this for a while but also then developed tongue swelling which improved coming off dofetilide.  She is now interested in having a repeat ablation scheduled for next month.  She has a few questions about her medications, including which medication may be causing her current photosensitivity.  Otherwise palpitations are decently well controlled right now, no syncope presyncope.  No chest pain.  No shortness of breath at rest.  Normal, she is on home oxygen chronically.    ROS   12 point review of systems was conducted.  Positive only for skin sensitivity to light.      Allergies   Allergen Reactions   • Betapace [Sotalol] Anaphylaxis   • Tikosyn [Dofetilide] Swelling     TONGUE SWELL.    • Zolpidem Tartrate Unspecified     Lightheaded and confusion       Current medicines (including changes today)  Current Outpatient Medications   Medication Sig Dispense Refill   • rosuvastatin (CRESTOR) 20 MG Tab Take 1 Tab by mouth every evening. 90 Tab 3   • metoprolol (LOPRESSOR) 50 MG Tab Take 1 Tab by mouth 2 times a day. 60 Tab 3   • torsemide (DEMADEX) 20 MG Tab Take 2 Tabs by mouth 2 Times a Day. 120 Tab 11   • potassium  chloride SA (KDUR) 20 MEQ Tab CR Take 20 mEq by mouth. 3 tab po am, 3 tab po pm. (6 TAB TOTAL)     • digoxin (LANOXIN) 125 MCG Tab Take 1 Tab by mouth every evening. 90 Tab 3   • apixaban (ELIQUIS) 5mg Tab Take 5 mg by mouth 2 Times a Day.     • Tadalafil, PAH, (ADCIRCA) 20 MG Tab Take 40 mg by mouth every bedtime. Indications: Pulmonary Arterial Hypertension     • PARoxetine (PAXIL) 20 MG Tab Take 20 mg by mouth every morning.     • levothyroxine (SYNTHROID) 75 MCG Tab Take 75 mcg by mouth Every morning on an empty stomach.     • Cholecalciferol (VITAMIN D) 2000 units Cap Take 2,000 Units by mouth every day.     • HYDROcodone-acetaminophen (NORCO) 7.5-325 MG per tablet Take 1 Tab by mouth 2 times a day. Indications: Pain       No current facility-administered medications for this visit.       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 08/05/2020     Priority: High   • Acrocyanosis ([facility]) 01/15/2020     Priority: High   • A-fib ([facility]) 07/06/2019     Priority: High   • Hypokalemia 01/15/2020     Priority: Medium   • Essential hypertension 05/10/2006     Priority: Medium   • Hypercalcemia 01/15/2020     Priority: Low   • Hypercapnia 01/15/2020     Priority: Low   • Chronic anticoagulation 08/21/2019     Priority: Low   • Chronic back pain 07/06/2019     Priority: Low   • PAH (pulmonary artery hypertension) ([facility]) 07/06/2019     Priority: Low   • Chronic respiratory failure ([facility]) 07/06/2019     Priority: Low   • Hypothyroidism 01/10/2006     Priority: Low   • Non-smoker 09/29/2020   • Other specified hypothyroidism 09/29/2020   • Other hyperlipidemia 09/29/2020   • Chronic respiratory failure with hypoxia ([facility]) 09/29/2020   • High risk medication use 05/05/2020   • Other sleep apnea 09/09/2019   • E. coli UTI 08/22/2019   • Drug-induced constipation 08/20/2019   • Macrocytic anemia 08/20/2019       No family history on file.   Denies family history of  "premature coronary disease    She  has a past medical history of Aneurysm artery, celiac (HCC) (2019), Aneurysm of right renal artery (HCC), Atrial fibrillation (HCC), Chickenpox, Diastolic heart failure (HCC), [place] measles, Hypertension, essential, Pulmonary hypertension (HCC), and Warfarin anticoagulation.  She  has a past surgical history that includes ankle orif (Left, 8/5/2020) and tonsillectomy.       Objective:   /74 (BP Location: Left arm, Patient Position: Sitting, BP Cuff Size: Adult) Comment: TAKEN PER PT @ 3:44PM  Pulse 65 Comment: TAKEN PER PT @ 3:44PM  Ht 1.702 m (5' 7\") Comment: PER PT.  Wt 93 kg (205 lb) Comment: PER PT.  SpO2 98% Comment: TAKEN PER PT @ 3:44PM  BMI 32.11 kg/m²     Physical Exam   Awake alert and oriented x3  No acute distress  Breathing is unlabored, nasal cannula supplementation of oxygen  Pulse is regular as reported by patient    Echocardiogram performed 2 weeks ago shows normal systolic function    Assessment and Plan:   The following treatment plan was discussed:     1.  Paroxysmal atrial fibrillation status post maze with recurrence  2.  Atrial flutter status post ablation  3.  Pulmonary hypertension  4.  Mitral valve regurgitation status post mitral valve repair  5.  Chronic oral anticoagulation with Eliquis    -We discussed treatment options for her recurrent paroxysmal atrial fibrillation.  She has failed multiple antiarrhythmics either due to side effects or lack of efficacy.  -She is currently scheduled for atrial fibrillation ablation.  We discussed pulmonary vein isolation for therapeutic management and continued rhythm control.  We discussed the risks and benefits of this procedure.  Risks include 1-3% risk of major cardiovascular event including stroke, myocardial infarction, phrenic nerve damage, esophageal injury and/or fistula formation, cardiac perforation, pericardial effusion, tamponade, major bleeding, or death.  I quoted a 70 to 80% chance free of " atrial fibrillation at 12 months.  We discussed that she may also need a second procedure.   -All questions were answered, she is in agreement to proceed.  Plan A. fib ablation next month.  -She has a history of left atrial sinus ligation, she is on Eliquis currently, we could consider stopping her Eliquis couple months after ablation, will reassess this in clinic after ablation.    Plan A. fib ablation next month as scheduled.        Follow-up: No follow-ups on file.

## 2021-03-19 ENCOUNTER — TELEPHONE (OUTPATIENT)
Dept: CARDIOLOGY | Facility: MEDICAL CENTER | Age: 76
End: 2021-03-19

## 2021-03-19 NOTE — TELEPHONE ENCOUNTER
----- Message from Gil Lagos sent at 3/18/2021  1:58 PM PDT -----  Regarding: PT is having some symptoms/needs a call back please  Zander Johnson,  This pt says she's experiencing some symptoms and needs to discuss them with a nurse. Wondering if it's related to her heart.  Thanks,  Santa

## 2021-03-22 ENCOUNTER — HOSPITAL ENCOUNTER (OUTPATIENT)
Dept: LAB | Facility: MEDICAL CENTER | Age: 76
End: 2021-03-22
Attending: NURSE PRACTITIONER
Payer: MEDICARE

## 2021-03-22 DIAGNOSIS — Z79.899 HIGH RISK MEDICATION USE: ICD-10-CM

## 2021-03-22 DIAGNOSIS — I10 ESSENTIAL HYPERTENSION: ICD-10-CM

## 2021-03-22 DIAGNOSIS — I48.0 PAROXYSMAL ATRIAL FIBRILLATION (HCC): ICD-10-CM

## 2021-03-22 DIAGNOSIS — E87.6 HYPOKALEMIA: ICD-10-CM

## 2021-03-22 DIAGNOSIS — I27.21 PAH (PULMONARY ARTERY HYPERTENSION) (HCC): ICD-10-CM

## 2021-03-22 LAB
ANION GAP SERPL CALC-SCNC: 11 MMOL/L (ref 7–16)
BUN SERPL-MCNC: 35 MG/DL (ref 8–22)
CALCIUM SERPL-MCNC: 10.9 MG/DL (ref 8.5–10.5)
CHLORIDE SERPL-SCNC: 97 MMOL/L (ref 96–112)
CO2 SERPL-SCNC: 32 MMOL/L (ref 20–33)
CREAT SERPL-MCNC: 1.39 MG/DL (ref 0.5–1.4)
GLUCOSE SERPL-MCNC: 122 MG/DL (ref 65–99)
MAGNESIUM SERPL-MCNC: 2.2 MG/DL (ref 1.5–2.5)
POTASSIUM SERPL-SCNC: 3.9 MMOL/L (ref 3.6–5.5)
SODIUM SERPL-SCNC: 140 MMOL/L (ref 135–145)

## 2021-03-22 PROCEDURE — 80048 BASIC METABOLIC PNL TOTAL CA: CPT

## 2021-03-22 PROCEDURE — 36415 COLL VENOUS BLD VENIPUNCTURE: CPT

## 2021-03-22 PROCEDURE — 83735 ASSAY OF MAGNESIUM: CPT

## 2021-03-24 ENCOUNTER — TELEPHONE (OUTPATIENT)
Dept: CARDIOLOGY | Facility: MEDICAL CENTER | Age: 76
End: 2021-03-24

## 2021-03-24 NOTE — TELEPHONE ENCOUNTER
Received request to STOP Blood thinners from [facility].     Procedure: Bilateral SI Joint Ablation with IV Sedation.  On 5/18/21    To . Ok to hold eliquis 48 hours prior?

## 2021-03-25 ENCOUNTER — HOSPITAL ENCOUNTER (OUTPATIENT)
Dept: RADIOLOGY | Facility: MEDICAL CENTER | Age: 76
End: 2021-03-25
Attending: SURGERY
Payer: MEDICARE

## 2021-03-25 DIAGNOSIS — I72.8 ANEURYSM OF CELIAC ARTERY (HCC): ICD-10-CM

## 2021-03-25 PROCEDURE — 700117 HCHG RX CONTRAST REV CODE 255: Performed by: SURGERY

## 2021-03-25 PROCEDURE — 74174 CTA ABD&PLVS W/CONTRAST: CPT | Mod: MH

## 2021-03-25 RX ADMIN — IOHEXOL 100 ML: 350 INJECTION, SOLUTION INTRAVENOUS at 15:19

## 2021-03-25 NOTE — TELEPHONE ENCOUNTER
Thee Galindo M.D.  You; Elizabeth Sen R.N. 14 hours ago (5:53 PM)     OK to hold, benefit outweighs risk, overall low risk of stroke     Clearance faxed to Gilmar Chaudhary to [phone], transmission complete

## 2021-03-30 NOTE — RESULT ENCOUNTER NOTE
Please let patient know her kidney function has decreased. How are her symptoms? Is she still taking her Torsemide 40 mg twice a day. If she is stable, can she decrease to daily dosing of torsemide and repeat a BMP in a few weeks.

## 2021-04-07 ENCOUNTER — TELEPHONE (OUTPATIENT)
Dept: CARDIOLOGY | Facility: MEDICAL CENTER | Age: 76
End: 2021-04-07

## 2021-04-07 DIAGNOSIS — Z79.899 HIGH RISK MEDICATION USE: ICD-10-CM

## 2021-04-07 NOTE — TELEPHONE ENCOUNTER
TEODORO Hogan.  Moo Thomas R.N.   Please let patient know her kidney function has decreased. How are her symptoms? Is she still taking her Torsemide 40 mg twice a day. If she is stable, can she decrease to daily dosing of torsemide and repeat a BMP in a few weeks.         S/W pt, has been stable. No swelling noted.  will only take dose daily and will repeat lab work around 4/21/21. Pt educated to call for signs and symptoms of swelling, SOB,

## 2021-04-16 ENCOUNTER — PRE-ADMISSION TESTING (OUTPATIENT)
Dept: ADMISSIONS | Facility: MEDICAL CENTER | Age: 76
End: 2021-04-16
Attending: INTERNAL MEDICINE
Payer: MEDICARE

## 2021-04-16 DIAGNOSIS — Z01.812 PRE-OPERATIVE LABORATORY EXAMINATION: ICD-10-CM

## 2021-04-16 DIAGNOSIS — Z01.810 PRE-OPERATIVE CARDIOVASCULAR EXAMINATION: ICD-10-CM

## 2021-04-16 LAB
ALBUMIN SERPL BCP-MCNC: 4.6 G/DL (ref 3.2–4.9)
ALBUMIN/GLOB SERPL: 1.4 G/DL
ALP SERPL-CCNC: 87 U/L (ref 30–99)
ALT SERPL-CCNC: 21 U/L (ref 2–50)
ANION GAP SERPL CALC-SCNC: 17 MMOL/L (ref 7–16)
AST SERPL-CCNC: 25 U/L (ref 12–45)
BASOPHILS # BLD AUTO: 0.4 % (ref 0–1.8)
BASOPHILS # BLD: 0.04 K/UL (ref 0–0.12)
BILIRUB SERPL-MCNC: 0.5 MG/DL (ref 0.1–1.5)
BUN SERPL-MCNC: 36 MG/DL (ref 8–22)
CALCIUM SERPL-MCNC: 11.6 MG/DL (ref 8.5–10.5)
CHLORIDE SERPL-SCNC: 87 MMOL/L (ref 96–112)
CO2 SERPL-SCNC: 30 MMOL/L (ref 20–33)
CREAT SERPL-MCNC: 1.54 MG/DL (ref 0.5–1.4)
EKG IMPRESSION: NORMAL
EOSINOPHIL # BLD AUTO: 0.14 K/UL (ref 0–0.51)
EOSINOPHIL NFR BLD: 1.4 % (ref 0–6.9)
ERYTHROCYTE [DISTWIDTH] IN BLOOD BY AUTOMATED COUNT: 42.4 FL (ref 35.9–50)
GLOBULIN SER CALC-MCNC: 3.2 G/DL (ref 1.9–3.5)
GLUCOSE SERPL-MCNC: 143 MG/DL (ref 65–99)
HCT VFR BLD AUTO: 42.8 % (ref 37–47)
HGB BLD-MCNC: 14.3 G/DL (ref 12–16)
IMM GRANULOCYTES # BLD AUTO: 0.04 K/UL (ref 0–0.11)
IMM GRANULOCYTES NFR BLD AUTO: 0.4 % (ref 0–0.9)
INR PPP: 1.32 (ref 0.87–1.13)
LYMPHOCYTES # BLD AUTO: 2.14 K/UL (ref 1–4.8)
LYMPHOCYTES NFR BLD: 22 % (ref 22–41)
MCH RBC QN AUTO: 32.6 PG (ref 27–33)
MCHC RBC AUTO-ENTMCNC: 33.4 G/DL (ref 33.6–35)
MCV RBC AUTO: 97.7 FL (ref 81.4–97.8)
MONOCYTES # BLD AUTO: 1 K/UL (ref 0–0.85)
MONOCYTES NFR BLD AUTO: 10.3 % (ref 0–13.4)
NEUTROPHILS # BLD AUTO: 6.37 K/UL (ref 2–7.15)
NEUTROPHILS NFR BLD: 65.5 % (ref 44–72)
NRBC # BLD AUTO: 0 K/UL
NRBC BLD-RTO: 0 /100 WBC
PLATELET # BLD AUTO: 336 K/UL (ref 164–446)
PMV BLD AUTO: 9.1 FL (ref 9–12.9)
POTASSIUM SERPL-SCNC: 4.2 MMOL/L (ref 3.6–5.5)
PROT SERPL-MCNC: 7.8 G/DL (ref 6–8.2)
PROTHROMBIN TIME: 16.8 SEC (ref 12–14.6)
RBC # BLD AUTO: 4.38 M/UL (ref 4.2–5.4)
SARS-COV-2 RNA RESP QL NAA+PROBE: NOTDETECTED
SODIUM SERPL-SCNC: 134 MMOL/L (ref 135–145)
SPECIMEN SOURCE: NORMAL
WBC # BLD AUTO: 9.7 K/UL (ref 4.8–10.8)

## 2021-04-16 PROCEDURE — U0003 INFECTIOUS AGENT DETECTION BY NUCLEIC ACID (DNA OR RNA); SEVERE ACUTE RESPIRATORY SYNDROME CORONAVIRUS 2 (SARS-COV-2) (CORONAVIRUS DISEASE [COVID-19]), AMPLIFIED PROBE TECHNIQUE, MAKING USE OF HIGH THROUGHPUT TECHNOLOGIES AS DESCRIBED BY CMS-2020-01-R: HCPCS

## 2021-04-16 PROCEDURE — 93010 ELECTROCARDIOGRAM REPORT: CPT | Performed by: INTERNAL MEDICINE

## 2021-04-16 PROCEDURE — 93005 ELECTROCARDIOGRAM TRACING: CPT

## 2021-04-16 PROCEDURE — 85610 PROTHROMBIN TIME: CPT

## 2021-04-16 PROCEDURE — C9803 HOPD COVID-19 SPEC COLLECT: HCPCS

## 2021-04-16 PROCEDURE — 36415 COLL VENOUS BLD VENIPUNCTURE: CPT

## 2021-04-16 PROCEDURE — 80053 COMPREHEN METABOLIC PANEL: CPT

## 2021-04-16 PROCEDURE — 85025 COMPLETE CBC W/AUTO DIFF WBC: CPT

## 2021-04-16 PROCEDURE — U0005 INFEC AGEN DETEC AMPLI PROBE: HCPCS

## 2021-04-16 RX ORDER — VALACYCLOVIR HYDROCHLORIDE 1 G/1
1 TABLET, FILM COATED ORAL EVERY 8 HOURS PRN
COMMUNITY
Start: 2021-03-27 | End: 2022-07-27

## 2021-04-22 ENCOUNTER — HOSPITAL ENCOUNTER (OUTPATIENT)
Facility: MEDICAL CENTER | Age: 76
End: 2021-04-24
Attending: INTERNAL MEDICINE | Admitting: INTERNAL MEDICINE
Payer: MEDICARE

## 2021-04-22 ENCOUNTER — ANESTHESIA EVENT (OUTPATIENT)
Dept: CARDIOLOGY | Facility: MEDICAL CENTER | Age: 76
End: 2021-04-22
Payer: MEDICARE

## 2021-04-22 ENCOUNTER — ANESTHESIA (OUTPATIENT)
Dept: CARDIOLOGY | Facility: MEDICAL CENTER | Age: 76
End: 2021-04-22
Payer: MEDICARE

## 2021-04-22 ENCOUNTER — APPOINTMENT (OUTPATIENT)
Dept: CARDIOLOGY | Facility: MEDICAL CENTER | Age: 76
End: 2021-04-22
Attending: INTERNAL MEDICINE
Payer: MEDICARE

## 2021-04-22 DIAGNOSIS — I49.8 ATRIAL ARRHYTHMIA: ICD-10-CM

## 2021-04-22 DIAGNOSIS — J96.11 CHRONIC RESPIRATORY FAILURE WITH HYPOXIA (HCC): ICD-10-CM

## 2021-04-22 DIAGNOSIS — I48.0 PAROXYSMAL ATRIAL FIBRILLATION (HCC): ICD-10-CM

## 2021-04-22 DIAGNOSIS — Z86.79 S/P ABLATION OF ATRIAL FIBRILLATION: ICD-10-CM

## 2021-04-22 DIAGNOSIS — Z79.899 HIGH RISK MEDICATION USE: ICD-10-CM

## 2021-04-22 DIAGNOSIS — I10 ESSENTIAL HYPERTENSION: ICD-10-CM

## 2021-04-22 DIAGNOSIS — I27.21 PAH (PULMONARY ARTERY HYPERTENSION) (HCC): ICD-10-CM

## 2021-04-22 DIAGNOSIS — Z79.01 CHRONIC ANTICOAGULATION: ICD-10-CM

## 2021-04-22 DIAGNOSIS — Z98.890 S/P ABLATION OF ATRIAL FIBRILLATION: ICD-10-CM

## 2021-04-22 PROBLEM — E66.811 OBESITY (BMI 30.0-34.9): Status: ACTIVE | Noted: 2021-04-22

## 2021-04-22 PROBLEM — E66.9 OBESITY (BMI 30.0-34.9): Status: ACTIVE | Noted: 2021-04-22

## 2021-04-22 PROBLEM — N18.9 CHRONIC RENAL INSUFFICIENCY: Status: ACTIVE | Noted: 2021-04-22

## 2021-04-22 PROCEDURE — 700101 HCHG RX REV CODE 250: Performed by: INTERNAL MEDICINE

## 2021-04-22 PROCEDURE — A9270 NON-COVERED ITEM OR SERVICE: HCPCS | Performed by: INTERNAL MEDICINE

## 2021-04-22 PROCEDURE — 93312 ECHO TRANSESOPHAGEAL: CPT | Mod: 26 | Performed by: INTERNAL MEDICINE

## 2021-04-22 PROCEDURE — 700101 HCHG RX REV CODE 250

## 2021-04-22 PROCEDURE — 93656 COMPRE EP EVAL ABLTJ ATR FIB: CPT | Performed by: INTERNAL MEDICINE

## 2021-04-22 PROCEDURE — 93662 INTRACARDIAC ECG (ICE): CPT | Mod: 26 | Performed by: INTERNAL MEDICINE

## 2021-04-22 PROCEDURE — 700105 HCHG RX REV CODE 258: Performed by: ANESTHESIOLOGY

## 2021-04-22 PROCEDURE — 700102 HCHG RX REV CODE 250 W/ 637 OVERRIDE(OP): Performed by: INTERNAL MEDICINE

## 2021-04-22 PROCEDURE — 93655 ICAR CATH ABLTJ DSCRT ARRHYT: CPT | Performed by: INTERNAL MEDICINE

## 2021-04-22 PROCEDURE — 700102 HCHG RX REV CODE 250 W/ 637 OVERRIDE(OP): Performed by: ANESTHESIOLOGY

## 2021-04-22 PROCEDURE — A9270 NON-COVERED ITEM OR SERVICE: HCPCS | Performed by: ANESTHESIOLOGY

## 2021-04-22 PROCEDURE — 700111 HCHG RX REV CODE 636 W/ 250 OVERRIDE (IP): Performed by: ANESTHESIOLOGY

## 2021-04-22 PROCEDURE — 85347 COAGULATION TIME ACTIVATED: CPT

## 2021-04-22 PROCEDURE — 700105 HCHG RX REV CODE 258: Performed by: INTERNAL MEDICINE

## 2021-04-22 PROCEDURE — 700111 HCHG RX REV CODE 636 W/ 250 OVERRIDE (IP)

## 2021-04-22 PROCEDURE — 93657 TX L/R ATRIAL FIB ADDL: CPT | Performed by: INTERNAL MEDICINE

## 2021-04-22 PROCEDURE — 160002 HCHG RECOVERY MINUTES (STAT)

## 2021-04-22 PROCEDURE — 93613 INTRACARDIAC EPHYS 3D MAPG: CPT | Performed by: INTERNAL MEDICINE

## 2021-04-22 PROCEDURE — G0378 HOSPITAL OBSERVATION PER HR: HCPCS

## 2021-04-22 PROCEDURE — 93623 PRGRMD STIMJ&PACG IV RX NFS: CPT | Mod: 26 | Performed by: INTERNAL MEDICINE

## 2021-04-22 PROCEDURE — 93655 ICAR CATH ABLTJ DSCRT ARRHYT: CPT | Mod: XS

## 2021-04-22 PROCEDURE — 93662 INTRACARDIAC ECG (ICE): CPT

## 2021-04-22 PROCEDURE — 700101 HCHG RX REV CODE 250: Performed by: ANESTHESIOLOGY

## 2021-04-22 PROCEDURE — 93325 DOPPLER ECHO COLOR FLOW MAPG: CPT

## 2021-04-22 RX ORDER — DEXAMETHASONE SODIUM PHOSPHATE 4 MG/ML
INJECTION, SOLUTION INTRA-ARTICULAR; INTRALESIONAL; INTRAMUSCULAR; INTRAVENOUS; SOFT TISSUE PRN
Status: DISCONTINUED | OUTPATIENT
Start: 2021-04-22 | End: 2021-04-22 | Stop reason: SURG

## 2021-04-22 RX ORDER — LIDOCAINE HYDROCHLORIDE 10 MG/ML
INJECTION, SOLUTION INFILTRATION; PERINEURAL
Status: COMPLETED
Start: 2021-04-22 | End: 2021-04-22

## 2021-04-22 RX ORDER — BUPIVACAINE HYDROCHLORIDE 2.5 MG/ML
INJECTION, SOLUTION EPIDURAL; INFILTRATION; INTRACAUDAL
Status: COMPLETED
Start: 2021-04-22 | End: 2021-04-22

## 2021-04-22 RX ORDER — ACETAMINOPHEN 500 MG
1000 TABLET ORAL ONCE
Status: COMPLETED | OUTPATIENT
Start: 2021-04-22 | End: 2021-04-22

## 2021-04-22 RX ORDER — HEPARIN SODIUM 200 [USP'U]/100ML
INJECTION, SOLUTION INTRAVENOUS
Status: COMPLETED
Start: 2021-04-22 | End: 2021-04-22

## 2021-04-22 RX ORDER — OXYCODONE HCL 5 MG/5 ML
5 SOLUTION, ORAL ORAL
Status: COMPLETED | OUTPATIENT
Start: 2021-04-22 | End: 2021-04-22

## 2021-04-22 RX ORDER — PROTAMINE SULFATE 10 MG/ML
INJECTION, SOLUTION INTRAVENOUS PRN
Status: DISCONTINUED | OUTPATIENT
Start: 2021-04-22 | End: 2021-04-22 | Stop reason: SURG

## 2021-04-22 RX ORDER — SODIUM CHLORIDE, SODIUM LACTATE, POTASSIUM CHLORIDE, CALCIUM CHLORIDE 600; 310; 30; 20 MG/100ML; MG/100ML; MG/100ML; MG/100ML
INJECTION, SOLUTION INTRAVENOUS CONTINUOUS
Status: ACTIVE | OUTPATIENT
Start: 2021-04-22 | End: 2021-04-22

## 2021-04-22 RX ORDER — LEVOTHYROXINE SODIUM 0.07 MG/1
75 TABLET ORAL
Status: DISCONTINUED | OUTPATIENT
Start: 2021-04-23 | End: 2021-04-24 | Stop reason: HOSPADM

## 2021-04-22 RX ORDER — ONDANSETRON 2 MG/ML
INJECTION INTRAMUSCULAR; INTRAVENOUS PRN
Status: DISCONTINUED | OUTPATIENT
Start: 2021-04-22 | End: 2021-04-22 | Stop reason: SURG

## 2021-04-22 RX ORDER — LIDOCAINE HYDROCHLORIDE 20 MG/ML
INJECTION, SOLUTION EPIDURAL; INFILTRATION; INTRACAUDAL; PERINEURAL PRN
Status: DISCONTINUED | OUTPATIENT
Start: 2021-04-22 | End: 2021-04-22 | Stop reason: SURG

## 2021-04-22 RX ORDER — HEPARIN SODIUM 1000 [USP'U]/ML
INJECTION, SOLUTION INTRAVENOUS; SUBCUTANEOUS
Status: COMPLETED
Start: 2021-04-22 | End: 2021-04-22

## 2021-04-22 RX ORDER — SODIUM CHLORIDE, SODIUM LACTATE, POTASSIUM CHLORIDE, CALCIUM CHLORIDE 600; 310; 30; 20 MG/100ML; MG/100ML; MG/100ML; MG/100ML
INJECTION, SOLUTION INTRAVENOUS CONTINUOUS
Status: DISCONTINUED | OUTPATIENT
Start: 2021-04-22 | End: 2021-04-23

## 2021-04-22 RX ORDER — HYDROCODONE BITARTRATE AND ACETAMINOPHEN 5; 325 MG/1; MG/1
1-2 TABLET ORAL EVERY 4 HOURS PRN
Status: DISCONTINUED | OUTPATIENT
Start: 2021-04-22 | End: 2021-04-22

## 2021-04-22 RX ORDER — HYDROCODONE BITARTRATE AND ACETAMINOPHEN 5; 325 MG/1; MG/1
1-2 TABLET ORAL EVERY 4 HOURS PRN
Status: DISCONTINUED | OUTPATIENT
Start: 2021-04-22 | End: 2021-04-24 | Stop reason: HOSPADM

## 2021-04-22 RX ORDER — ACETAMINOPHEN 500 MG
1000 TABLET ORAL ONCE
Status: CANCELLED | OUTPATIENT
Start: 2021-04-22 | End: 2021-04-22

## 2021-04-22 RX ORDER — PAROXETINE HYDROCHLORIDE 20 MG/1
20 TABLET, FILM COATED ORAL EVERY MORNING
Status: DISCONTINUED | OUTPATIENT
Start: 2021-04-23 | End: 2021-04-24 | Stop reason: HOSPADM

## 2021-04-22 RX ORDER — TADALAFIL 20 MG/1
40 TABLET ORAL
Status: DISCONTINUED | OUTPATIENT
Start: 2021-04-22 | End: 2021-04-24 | Stop reason: HOSPADM

## 2021-04-22 RX ORDER — ISOPROTERENOL HYDROCHLORIDE 0.2 MG/ML
INJECTION, SOLUTION INTRAVENOUS
Status: COMPLETED
Start: 2021-04-22 | End: 2021-04-22

## 2021-04-22 RX ORDER — ONDANSETRON 2 MG/ML
4 INJECTION INTRAMUSCULAR; INTRAVENOUS
Status: DISCONTINUED | OUTPATIENT
Start: 2021-04-22 | End: 2021-04-22 | Stop reason: HOSPADM

## 2021-04-22 RX ORDER — ROSUVASTATIN CALCIUM 20 MG/1
20 TABLET, COATED ORAL EVERY EVENING
Status: DISCONTINUED | OUTPATIENT
Start: 2021-04-22 | End: 2021-04-24 | Stop reason: HOSPADM

## 2021-04-22 RX ORDER — PROTAMINE SULFATE 10 MG/ML
INJECTION, SOLUTION INTRAVENOUS
Status: COMPLETED
Start: 2021-04-22 | End: 2021-04-22

## 2021-04-22 RX ORDER — TORSEMIDE 20 MG/1
40 TABLET ORAL 2 TIMES DAILY
Status: DISCONTINUED | OUTPATIENT
Start: 2021-04-22 | End: 2021-04-24 | Stop reason: HOSPADM

## 2021-04-22 RX ADMIN — HYDROCODONE BITARTRATE AND ACETAMINOPHEN 2 TABLET: 5; 325 TABLET ORAL at 21:28

## 2021-04-22 RX ADMIN — PHENYLEPHRINE HYDROCHLORIDE 50 MCG/MIN: 10 INJECTION INTRAVENOUS at 13:02

## 2021-04-22 RX ADMIN — BUPIVACAINE HYDROCHLORIDE: 2.5 INJECTION, SOLUTION EPIDURAL; INFILTRATION; INTRACAUDAL; PERINEURAL at 12:30

## 2021-04-22 RX ADMIN — HEPARIN SODIUM: 1000 INJECTION, SOLUTION INTRAVENOUS; SUBCUTANEOUS at 14:28

## 2021-04-22 RX ADMIN — FENTANYL CITRATE 50 MCG: 50 INJECTION, SOLUTION INTRAMUSCULAR; INTRAVENOUS at 14:56

## 2021-04-22 RX ADMIN — LIDOCAINE HYDROCHLORIDE: 10 INJECTION, SOLUTION INFILTRATION; PERINEURAL at 12:30

## 2021-04-22 RX ADMIN — OXYCODONE HYDROCHLORIDE 5 MG: 5 SOLUTION ORAL at 16:22

## 2021-04-22 RX ADMIN — SODIUM CHLORIDE, POTASSIUM CHLORIDE, SODIUM LACTATE AND CALCIUM CHLORIDE: 600; 310; 30; 20 INJECTION, SOLUTION INTRAVENOUS at 11:00

## 2021-04-22 RX ADMIN — Medication 100 MG: at 13:02

## 2021-04-22 RX ADMIN — FENTANYL CITRATE 25 MCG: 50 INJECTION, SOLUTION INTRAMUSCULAR; INTRAVENOUS at 17:26

## 2021-04-22 RX ADMIN — DEXAMETHASONE SODIUM PHOSPHATE 8 MG: 4 INJECTION, SOLUTION INTRA-ARTICULAR; INTRALESIONAL; INTRAMUSCULAR; INTRAVENOUS; SOFT TISSUE at 13:09

## 2021-04-22 RX ADMIN — APIXABAN 5 MG: 5 TABLET, FILM COATED ORAL at 20:16

## 2021-04-22 RX ADMIN — POVIDONE IODINE 15 ML: 100 SOLUTION TOPICAL at 10:59

## 2021-04-22 RX ADMIN — ROSUVASTATIN CALCIUM 20 MG: 20 TABLET, FILM COATED ORAL at 20:16

## 2021-04-22 RX ADMIN — EPHEDRINE SULFATE 5 MG: 50 INJECTION INTRAMUSCULAR; INTRAVENOUS; SUBCUTANEOUS at 13:22

## 2021-04-22 RX ADMIN — LIDOCAINE HYDROCHLORIDE 100 MG: 20 INJECTION, SOLUTION EPIDURAL; INFILTRATION; INTRACAUDAL at 13:02

## 2021-04-22 RX ADMIN — SODIUM CHLORIDE, POTASSIUM CHLORIDE, SODIUM LACTATE AND CALCIUM CHLORIDE: 600; 310; 30; 20 INJECTION, SOLUTION INTRAVENOUS at 15:01

## 2021-04-22 RX ADMIN — ISOPROTERENOL HYDROCHLORIDE 200 MCG: 0.2 INJECTION, SOLUTION INTRAMUSCULAR; INTRAVENOUS at 14:49

## 2021-04-22 RX ADMIN — FENTANYL CITRATE 100 MCG: 50 INJECTION, SOLUTION INTRAMUSCULAR; INTRAVENOUS at 13:02

## 2021-04-22 RX ADMIN — ONDANSETRON 4 MG: 2 INJECTION INTRAMUSCULAR; INTRAVENOUS at 14:49

## 2021-04-22 RX ADMIN — PROPOFOL 100 MG: 10 INJECTION, EMULSION INTRAVENOUS at 13:02

## 2021-04-22 RX ADMIN — ROCURONIUM BROMIDE 10 MG: 10 INJECTION, SOLUTION INTRAVENOUS at 13:02

## 2021-04-22 RX ADMIN — SODIUM CHLORIDE, POTASSIUM CHLORIDE, SODIUM LACTATE AND CALCIUM CHLORIDE: 600; 310; 30; 20 INJECTION, SOLUTION INTRAVENOUS at 19:53

## 2021-04-22 RX ADMIN — ACETAMINOPHEN 1000 MG: 500 TABLET ORAL at 11:28

## 2021-04-22 RX ADMIN — ROCURONIUM BROMIDE 50 MG: 10 INJECTION, SOLUTION INTRAVENOUS at 13:07

## 2021-04-22 RX ADMIN — PROTAMINE SULFATE 50 MG: 10 INJECTION, SOLUTION INTRAVENOUS at 14:49

## 2021-04-22 RX ADMIN — TADALAFIL 40 MG: 20 TABLET ORAL at 21:00

## 2021-04-22 RX ADMIN — HEPARIN SODIUM: 200 INJECTION, SOLUTION INTRAVENOUS at 12:30

## 2021-04-22 RX ADMIN — LIDOCAINE HYDROCHLORIDE 100 MG: 20 INJECTION, SOLUTION EPIDURAL; INFILTRATION; INTRACAUDAL at 14:59

## 2021-04-22 RX ADMIN — FENTANYL CITRATE 50 MCG: 50 INJECTION, SOLUTION INTRAMUSCULAR; INTRAVENOUS at 12:40

## 2021-04-22 RX ADMIN — ROCURONIUM BROMIDE 20 MG: 10 INJECTION, SOLUTION INTRAVENOUS at 13:50

## 2021-04-22 RX ADMIN — SUGAMMADEX 200 MG: 100 INJECTION, SOLUTION INTRAVENOUS at 14:54

## 2021-04-22 ASSESSMENT — LIFESTYLE VARIABLES
HOW MANY TIMES IN THE PAST YEAR HAVE YOU HAD 5 OR MORE DRINKS IN A DAY: 0
EVER HAD A DRINK FIRST THING IN THE MORNING TO STEADY YOUR NERVES TO GET RID OF A HANGOVER: NO
HAVE PEOPLE ANNOYED YOU BY CRITICIZING YOUR DRINKING: NO
EVER FELT BAD OR GUILTY ABOUT YOUR DRINKING: NO
TOTAL SCORE: 0
AVERAGE NUMBER OF DAYS PER WEEK YOU HAVE A DRINK CONTAINING ALCOHOL: 2
CONSUMPTION TOTAL: NEGATIVE
TOTAL SCORE: 0
TOTAL SCORE: 0
ON A TYPICAL DAY WHEN YOU DRINK ALCOHOL HOW MANY DRINKS DO YOU HAVE: 1
DOES PATIENT WANT TO STOP DRINKING: NO
ALCOHOL_USE: YES
HAVE YOU EVER FELT YOU SHOULD CUT DOWN ON YOUR DRINKING: NO

## 2021-04-22 ASSESSMENT — PAIN DESCRIPTION - PAIN TYPE
TYPE: CHRONIC PAIN
TYPE: CHRONIC PAIN
TYPE: OTHER (COMMENT)
TYPE: CHRONIC PAIN

## 2021-04-22 ASSESSMENT — COGNITIVE AND FUNCTIONAL STATUS - GENERAL
MOBILITY SCORE: 23
SUGGESTED CMS G CODE MODIFIER DAILY ACTIVITY: CH
DAILY ACTIVITIY SCORE: 24
SUGGESTED CMS G CODE MODIFIER MOBILITY: CI
CLIMB 3 TO 5 STEPS WITH RAILING: A LITTLE

## 2021-04-22 ASSESSMENT — PAIN SCALES - GENERAL: PAIN_LEVEL: 0

## 2021-04-22 ASSESSMENT — FIBROSIS 4 INDEX: FIB4 SCORE: 1.2

## 2021-04-22 NOTE — ANESTHESIA PREPROCEDURE EVALUATION
77 yo w/symptomatic afib    Relevant Problems   ANESTHESIA   (+) Other sleep apnea      CARDIAC   (+) A-fib (HCC)   (+) Essential hypertension   (+) PAH (pulmonary artery hypertension) (HCC)         (+) Chronic renal insufficiency      ENDO   (+) Hypothyroidism   (+) Other specified hypothyroidism      Other   (+) Chronic anticoagulation   (+) Chronic respiratory failure (HCC)   (+) Chronic respiratory failure with hypoxia (HCC)   (+) Obesity (BMI 30.0-34.9)   (+) Other hyperlipidemia     Oxygen dependence  Most recent echo RVSP 30    Denies CAD, DM, liver dz, URI  Physical Exam    Airway   Mallampati: II  TM distance: >3 FB  Neck ROM: limited       Cardiovascular   Rhythm: regular  Rate: normal  (-) murmur     Dental - normal exam           Pulmonary   Breath sounds clear to auscultation     Abdominal    Neurological - normal exam                 Anesthesia Plan    ASA 3   ASA physical status 3 criteria: respiratory insufficiency or compromise    Plan - general       Airway plan will be ETT    (A-line)      Induction: intravenous    Postoperative Plan: Postoperative administration of opioids is intended.    Pertinent diagnostic labs and testing reviewed    Informed Consent:    Anesthetic plan and risks discussed with patient.    Use of blood products discussed with: patient whom consented to blood products.

## 2021-04-22 NOTE — ANESTHESIA PROCEDURE NOTES
Airway    Date/Time: 4/22/2021 1:03 PM  Performed by: Fartun Padilla M.D.  Authorized by: Fartun Padilla M.D.     Location:  OR  Urgency:  Elective  Indications for Airway Management:  Anesthesia      Spontaneous Ventilation: absent    Sedation Level:  Deep  Preoxygenated: Yes    Patient Position:  Sniffing  Mask Difficulty Assessment:  0 - not attempted  Final Airway Type:  Endotracheal airway  Final Endotracheal Airway:  ETT  Cuffed: Yes    Technique Used for Successful ETT Placement:  Direct laryngoscopy  Devices/Methods Used in Placement:  Intubating stylet    Insertion Site:  Oral  Blade Type:  [place]  Laryngoscope Blade/Videolaryngoscope Blade Size:  3  ETT Size (mm):  7.0  Measured from:  Teeth  ETT to Teeth (cm):  21  Placement Verified by: capnometry and palpation of cuff    Cormack-Lehane Classification:  Grade IIa - partial view of glottis  Number of Attempts at Approach:  1

## 2021-04-22 NOTE — H&P
[facility]  Electrophysiology Pre-procedure H&P    DOS:4/22/2021    Planned Procedure: Afib ablation    Chief complaint/Reason for Procedure: Afib    HPI: 75 y/o F with h/o AF s/p MAZE, MV repair, flutter s/p ablation, with recurrent AF/flutter for ablation. Failed tikosyn and sotalol due to side effects      Past Medical History:   Diagnosis Date   • Aneurysm artery, celiac (HCC) 2019   • Aneurysm of right renal artery (HCC)    • Atrial fibrillation (HCC)    • Breath shortness     5L O2 all the time   • Chickenpox    • Congestive heart failure (HCC)    • Diastolic heart failure (HCC)    • [place] measles    • Heart burn    • Heart valve disease     MV repair   • Hypertension, essential    • Pulmonary hypertension (HCC)    • Sleep apnea     Bipap   • Snoring    • Warfarin anticoagulation        Past Surgical History:   Procedure Laterality Date   • ANKLE ORIF Left 8/5/2020    Procedure: ORIF, ANKLE;  Surgeon: Tk Humphreys M.D.;  [facility]: SURGERY [facility];  Service: Orthopedics   • HYSTERECTOMY LAPAROSCOPY     • MITRAL VALVE REPAIR     • OTHER      neck surgery   • TONSILLECTOMY     • WRIST FUSION         Social History     Socioeconomic History   • Marital status:      Spouse name: Not on file   • Number of children: Not on file   • Years of education: Not on file   • Highest education level: Not on file   Occupational History   • Not on file   Tobacco Use   • Smoking status: Never Smoker   • Smokeless tobacco: Never Used   Substance and Sexual Activity   • Alcohol use: Yes     Alcohol/week: 4.2 oz     Types: 7 Shots of liquor per week     Comment: occasionally   • Drug use: No   • Sexual activity: Not on file   Other Topics Concern   • Not on file   Social History Narrative   • Not on file     Social Determinants of Health     Financial Resource Strain:    • Difficulty of Paying Living Expenses:    Food Insecurity:    • Worried About Running Out of Food in the Last Year:    •  "Ran Out of Food in the Last Year:    Transportation Needs:    • Lack of Transportation (Medical):    • Lack of Transportation (Non-Medical):    Physical Activity:    • Days of Exercise per Week:    • Minutes of Exercise per Session:    Stress:    • Feeling of Stress :    Social Connections:    • Frequency of Communication with Friends and Family:    • Frequency of Social Gatherings with Friends and Family:    • Attends Protestant Services:    • Active Member of Clubs or Organizations:    • Attends Club or Organization Meetings:    • Marital Status:    Intimate Partner Violence:    • Fear of Current or Ex-Partner:    • Emotionally Abused:    • Physically Abused:    • Sexually Abused:        No family history on file.    Allergies   Allergen Reactions   • Betapace [Sotalol] Anaphylaxis   • Tikosyn [Dofetilide] Swelling     TONGUE SWELL.    • Zolpidem Tartrate Unspecified     Lightheaded and confusion       Current Facility-Administered Medications   Medication Dose Route Frequency Provider Last Rate Last Admin   • lidocaine (XYLOCAINE) 1 % injection 0.5 mL  0.5 mL Intradermal Once PRN Thee Galindo M.D.       • lactated ringers infusion   Intravenous Continuous Thee Galindo M.D. 10 mL/hr at 04/22/21 1100 New Bag at 04/22/21 1100       Physical Exam:  Vitals:    04/22/21 1035 04/22/21 1115   BP:  140/73   Pulse:  68   Resp:  18   Temp:  36.1 °C (97 °F)   TempSrc:  Temporal   SpO2:  98%   Weight: 97.5 kg (214 lb 15.2 oz)    Height: 1.702 m (5' 7\") 1.702 m (5' 7\")     General appearance: NAD, conversant   Neck: Trachea midline; FROM, supple, no thyromegaly or lymphadenopathy  CV: RRR, no MRGs, no JVD   Extremities: No peripheral edema or extremity lymphadenopathy  Skin: Normal temperature, turgor and texture; no rash, ulcers or subcutaneous nodules  Psych: Appropriate affect, alert and oriented to person, place and time    Data:  Lab Results   Component Value Date/Time    CHOLSTROT 213 (H) 09/30/2020 09:55 AM    "  (H) 09/30/2020 09:55 AM    HDL 53 09/30/2020 09:55 AM    TRIGLYCERIDE 146 09/30/2020 09:55 AM       Lab Results   Component Value Date/Time    SODIUM 134 (L) 04/16/2021 01:19 PM    POTASSIUM 4.2 04/16/2021 01:19 PM    CHLORIDE 87 (L) 04/16/2021 01:19 PM    CO2 30 04/16/2021 01:19 PM    GLUCOSE 143 (H) 04/16/2021 01:19 PM    BUN 36 (H) 04/16/2021 01:19 PM    CREATININE 1.54 (H) 04/16/2021 01:19 PM     Lab Results   Component Value Date/Time    ALKPHOSPHAT 87 04/16/2021 01:19 PM    ASTSGOT 25 04/16/2021 01:19 PM    ALTSGPT 21 04/16/2021 01:19 PM    TBILIRUBIN 0.5 04/16/2021 01:19 PM      No results found for: BNPBTYPENAT            EKG interpreted by me: SR    Impression/Plan:  1) pAF, AFL    We discussed pulmonary vein isolation for therapeutic management and continued rhythm control.  We discussed the risks and benefits of this procedure.  Risks include 1-3% risk of major cardiovascular event including stroke, myocardial infarction, phrenic nerve damage, esophageal injury and/or fistula formation, cardiac perforation, pericardial effusion, tamponade, major bleeding, or death.  I quoted a 70 to 80% chance free of atrial fibrillation at 12 months.  We discussed that he may also need a second procedure.    Plan redo Afib/flutter      Thee Galindo MD  Cardiac Electrophysiology

## 2021-04-22 NOTE — ANESTHESIA POSTPROCEDURE EVALUATION
Patient: Zeinab Loza    Procedure Summary     Date: 04/22/21 Room / Location: [facility] IMAGING - CATH LAB [facility]; [facility] - ECHOCARDIOLOGY [facility]    Anesthesia Start: 1237 Anesthesia Stop: 1525    Procedures:       EC-[facility] W/O CONT      CL-EP ABLATION ATRIAL FIBRILLATION Diagnosis:       Paroxysmal atrial fibrillation (HCC)      Paroxysmal atrial fibrillation      Paroxysmal atrial fibrillation      (See Associated Dx)    Scheduled Providers: Thee Galindo M.D.; Fartun Padilla M.D. Responsible Provider: Fartun Padilla M.D.    Anesthesia Type: general ASA Status: 3          Final Anesthesia Type: general  Last vitals  BP   Blood Pressure : 128/59    Temp   36.1 °C (97 °F)    Pulse   70   Resp   18    SpO2   98 %      Anesthesia Post Evaluation    Patient location during evaluation: PACU  Patient participation: complete - patient participated  Level of consciousness: awake  Pain score: 0    Airway patency: patent  Anesthetic complications: no  Cardiovascular status: adequate  Respiratory status: acceptable  Hydration status: acceptable    PONV: none          No complications documented.     Nurse Pain Score: 3 (NPRS)

## 2021-04-22 NOTE — ANESTHESIA TIME REPORT
Anesthesia Start and Stop Event Times     Date Time Event    4/22/2021 1233 Ready for Procedure     1237 Anesthesia Start     1525 Anesthesia Stop        Responsible Staff  04/22/21    Name Role Begin End    Fartun Padilla M.D. Anesth 1237 1525        Preop Diagnosis (Free Text):  Pre-op Diagnosis             Preop Diagnosis (Codes):    Post op Diagnosis  A-fib (HCC)      Premium Reason  A. 3PM - 7AM    Comments:

## 2021-04-22 NOTE — OR NURSING
1525   PT RECEIVED FROM CATH LAB,  S/P SUCCESSFUL AFIB ABLATION.  PT IS A/A/OX4.  DENIES ANY PAIN.  BILATERAL GROINS WITH VASCADES.  RIGHT GROIN SITE WITH DARK BLACK AND BLUE SPOT AND SMALL HEMATOMA.  MANUAL PRESSURE APPLIED ON RIGHT GROIN.  NO FAMILY IS AT BEDSIDE.    1545   RIGHT GROIN HEMATOMA SEEMS A LITTLE SOFTER.  LEFT GROIN SITE IS CLEAR AND SOFT.    1600   LORIN MCGUIRE N.P IS HERE TO CHECK ON PATIENT.  INFORM HER ABOUT RIGHT GROIN HEMATOMA.  MORE MANUAL PRESSURE APPLIED.  PT TO BE ON 4 HRS BEDREST.  MEDICATED WITH PAIN MEDS PER MAR'S.    1630   GROIN SITE CHECKED AND RIGHT GROIN HEMATOMA SEEMS A LITTLE SOFTER.    1700   RIGHT GROIN HEMATOMA SEEMS A LITTLE HARDER.  INFORM CATH LAB CHARGE NURSE WHO WILL INFORM DR VALDERRAMA.  CON'T TO MONITOR PATIENT.    1715   2 CATH LAB RN'S ARE HERE TO CHECK AND PUT MANUAL PRESSURE ON RIGHT GROIN.  LEFT GROIN IS SOFT.    1800   RIGHT GROIN IS MUCH SOFTER AFTER MP APPLIED BY CATH LAB RN.  DRESSING CHANGED AND 5LBS SANDBAG APPLIED.  PT GIVEN SNACK AND DRINK.  CALL TO DAUGHTER FOR UPDATE AND INFORM DAUGHTER OF POTENTIAL ADMIT PATIENT OVERNIGHT.    1815   REPORT GIVEN TO OSITO RN WHO'S TAKING OVER CARE OF PATIENT AT THIS TIME.  BILATERAL GROINS CHECKED AND CLEAR.

## 2021-04-22 NOTE — OP REPORT
Electrophysiology Procedure Note    Procedures Performed:  Pulmonary Vein Isolation  Additional ablation for atrial fibrillation  Ablation of additional arrhythmia (x2, septal flutter, mitral line)  Intracardiac Echocardiography  Three-dimensional intracardiac mapping  IV isoproterenol infusion with programmed stimulation  Trans-esophageal echocardiogram    Electrophysiologist: Thee Galindo MD    Assistant(s): None    Anesthesia: General anesthesia was provided by Dr. Padilla of the Anesthesiology service.    Statement of Medical Necessity: This is a 76  year-old female with history of symptomatic paroxysmal atrial  fibrillation     Description of Procedure:    Access and catheter placement: After obtaining informed written consent, the patient was  brought to the EP lab in the fasting, non-sedated stated. The patient was sedated and intubated  by the anesthesiologist. The patient was prepped and draped in the usual sterile fashion. Using  the modified Seldinger technique, access was obtained in bilateral  femoral veins. Guidewires were advanced into the IVC. In the right femoral vein, 2 sheaths of   8F were placed. In the left femoral vein 2 sheaths of 10F and 8F were placed. A deflectable  decapolar catheter was advanced through the LFV to the coronary sinus. A 10F intracardiac  echo catheter was advanced to the right atrium. Through one of the 8F short sheaths in the RFV,  a long wire was advanced to the SVC. The short sheath was  changed out for an 8.5 F braided Tor-flex (BuyBox) sheath which was advanced to the SVC. The wire was  removed and the dilator was flushed. A 71-cm transseptal needle (BuyBox) was advanced to the tip of the  dilator, and the obturator was removed. The transseptal needle was attached to the manifold and  flushed. Under intracardiac echocardiographic guidance, the sheath/needle   system was withdrawn to the fossa ovalis. At this time, 14,000 units of heparin were administered.    Additional boluses of heparin were given as needed to keep -350 sec during LA dwell time.   The needle was advanced out of the dilator, and RF energy applied via the Aroda needle.   ICE visualized the needle passage through the fossa ovalis into the left  atrium. Confirmation of left atrial location was confirmed by injecting saline and transducing  pressure. The dilator was advanced over the needle into the left atrium, and then the sheath was advanced over the dilator. The dilator and  needle were removed. The sheath was flushed and connected to a continuous heparinized   saline drip.  The transseptal catheterization  procedure was repeated through the second RFV access site a 98-cm transseptal needle  and a medium-curl Vizigo (Adezeter) sheath. Left atrial location was again confirmed by injecting saline  and transducing pressure. The needle and dilator were removed. The sheath was attached to  the drip line and flushed.     A 3.5-mm externally-irrigated ablation catheter (Biosense-Arzola   Thermocool ST SF DF-curve) was advanced through the Vizigo sheath into the left atrium. A duodecapolar Penta-Ray catheter (Biosense-Arzola) was  advanced through the Tor-flex sheath into the left atrium. A 3-dimensional electroanatomical  map was constructed using the CARTO system.    At baseline, delayed signal was noted into the CS, with distal-proximal activation during sinus. LAT mapping in sinus demonstrated conduction into all 4 veins and posterior wall. A low floor line was noted with a large gap in the side of the RPVs. An anterior roof line appeared present and durable.     We first attempted to ablate the floor line gap with 30-40W. We tied this to the RPV. This did not isolate anything. Thus we continued ablation along the septum of the RPVs and tied this to the roof line. This did not isolate anything. Next we completed the RPV WACA, however, the RPVs remained non-isolated with apparent yaneth  conduction. We christiano a line from roof to floor to isolate the LPVs, 30Ws, power reduced near esophagus. We then ablated the RPV sarah which resulted in complete isolation of the RPVs and posterior wall.      Isuprel 2 mcg/min was administered and burst pacing was performed in the left atrium. Atrial flutter was induced, TCL 300ms. This was mapped and found to be micro-reenetry within the anterior septal left atrium. Ablation was performed at 35W resulting in termination of tachycardia. This area was consolidated.    Next, the mitral isthmus was targeted for ablation for prevention of mitral flutter. The anterior mitral isthmus was ablated from the annulus, through the anteroseptal ablation area, and tied back to the roof. Upon completion of this line, the entire left atrium was noted to be asystolic, and it was evident that there was complete isolation of the chamber, including the CS. After waiting a few minutes, conduction returned. It was not clear if there was conduction via an alternate route, or whether conduction through the anterior mitral isthmus had resumed, but either way it seemed highly unlikely that the atrium would be able to sustained mitral flutter given the above finding, and we did not perform any further ablation along the mitral isthmus.     The left atrial sheaths and catheters were withdrawn to the right atrium. The CTI was interrogated given prior ablation. It was noted to have no signal. Conduction time across the CTI was >250ms.     ICE visualization   of the pericardium confirmed no pericardial effusion at the end of the case. The  patient was awakened from anesthesia, catheters and sheaths were removed, Vascade MVP closure devices were deployed, and manual  pressure was held on bilateral groins until hemostasis was achieved.    Electrophysiological Findings:  Sinus cycle length 952 msec  Intervals- A-H 89 msec  H-V 46 msec   msec   msec   msec  AV block   ms  AVERP: 600/260 ms    Total RF time: 989 sec  Fluoro time: 0 min    Arrhythmias:    1) Septal anterior LA flutter TCL 300ms    Estimated blood loss - 30 mL    Complications: None    Impression:  1. Atrial fibrillation, paroxysmal  2. Successful isolation of all four pulmonary veins and posterior wall  3. Septal LA flutter  4. Successful ablation of septal LA flutter.  5. Empiric ablation for mitral isthmus flutter  6. CTI noted to be blocked from prior ablation    Recommendations:  1. Bed rest for 4 hours  2. Telemetry monitoring during recovery  3. Anticoagulant therapy for at least 6-12 months  4. Follow up in Arrhythmia clinic in 4 weeks      Thee Galindo MD  Cardiac Electrophysiology

## 2021-04-23 PROBLEM — Z86.79 S/P ABLATION OF ATRIAL FIBRILLATION: Status: ACTIVE | Noted: 2021-04-23

## 2021-04-23 PROBLEM — Z98.890 S/P ABLATION OF ATRIAL FIBRILLATION: Status: ACTIVE | Noted: 2021-04-23

## 2021-04-23 LAB
ACT BLD: 268 SEC (ref 74–137)
ACT BLD: 285 SEC (ref 74–137)
ACT BLD: 290 SEC (ref 74–137)
ANION GAP SERPL CALC-SCNC: 9 MMOL/L (ref 7–16)
BUN SERPL-MCNC: 36 MG/DL (ref 8–22)
CALCIUM SERPL-MCNC: 9.7 MG/DL (ref 8.5–10.5)
CHLORIDE SERPL-SCNC: 101 MMOL/L (ref 96–112)
CO2 SERPL-SCNC: 29 MMOL/L (ref 20–33)
CREAT SERPL-MCNC: 1.26 MG/DL (ref 0.5–1.4)
EKG IMPRESSION: NORMAL
EKG IMPRESSION: NORMAL
ERYTHROCYTE [DISTWIDTH] IN BLOOD BY AUTOMATED COUNT: 42.9 FL (ref 35.9–50)
GLUCOSE SERPL-MCNC: 194 MG/DL (ref 65–99)
HCT VFR BLD AUTO: 30.6 % (ref 37–47)
HGB BLD-MCNC: 10 G/DL (ref 12–16)
MCH RBC QN AUTO: 33 PG (ref 27–33)
MCHC RBC AUTO-ENTMCNC: 32.7 G/DL (ref 33.6–35)
MCV RBC AUTO: 101 FL (ref 81.4–97.8)
PLATELET # BLD AUTO: 236 K/UL (ref 164–446)
PMV BLD AUTO: 9.2 FL (ref 9–12.9)
POTASSIUM SERPL-SCNC: 4.1 MMOL/L (ref 3.6–5.5)
RBC # BLD AUTO: 3 M/UL (ref 4.2–5.4)
SODIUM SERPL-SCNC: 139 MMOL/L (ref 135–145)
WBC # BLD AUTO: 10.8 K/UL (ref 4.8–10.8)

## 2021-04-23 PROCEDURE — 99214 OFFICE O/P EST MOD 30 MIN: CPT | Performed by: NURSE PRACTITIONER

## 2021-04-23 PROCEDURE — 93010 ELECTROCARDIOGRAM REPORT: CPT | Performed by: INTERNAL MEDICINE

## 2021-04-23 PROCEDURE — 36415 COLL VENOUS BLD VENIPUNCTURE: CPT

## 2021-04-23 PROCEDURE — 85027 COMPLETE CBC AUTOMATED: CPT

## 2021-04-23 PROCEDURE — G0378 HOSPITAL OBSERVATION PER HR: HCPCS

## 2021-04-23 PROCEDURE — A9270 NON-COVERED ITEM OR SERVICE: HCPCS | Performed by: NURSE PRACTITIONER

## 2021-04-23 PROCEDURE — 700102 HCHG RX REV CODE 250 W/ 637 OVERRIDE(OP): Performed by: NURSE PRACTITIONER

## 2021-04-23 PROCEDURE — 94760 N-INVAS EAR/PLS OXIMETRY 1: CPT

## 2021-04-23 PROCEDURE — 96374 THER/PROPH/DIAG INJ IV PUSH: CPT

## 2021-04-23 PROCEDURE — 700102 HCHG RX REV CODE 250 W/ 637 OVERRIDE(OP): Performed by: INTERNAL MEDICINE

## 2021-04-23 PROCEDURE — A9270 NON-COVERED ITEM OR SERVICE: HCPCS | Performed by: INTERNAL MEDICINE

## 2021-04-23 PROCEDURE — 94660 CPAP INITIATION&MGMT: CPT

## 2021-04-23 PROCEDURE — 80048 BASIC METABOLIC PNL TOTAL CA: CPT

## 2021-04-23 PROCEDURE — 93005 ELECTROCARDIOGRAM TRACING: CPT | Performed by: INTERNAL MEDICINE

## 2021-04-23 PROCEDURE — 700111 HCHG RX REV CODE 636 W/ 250 OVERRIDE (IP): Performed by: NURSE PRACTITIONER

## 2021-04-23 RX ORDER — POTASSIUM CHLORIDE 20 MEQ/1
20 TABLET, EXTENDED RELEASE ORAL ONCE
Status: COMPLETED | OUTPATIENT
Start: 2021-04-23 | End: 2021-04-23

## 2021-04-23 RX ORDER — OMEPRAZOLE 20 MG/1
20 CAPSULE, DELAYED RELEASE ORAL DAILY
Status: DISCONTINUED | OUTPATIENT
Start: 2021-04-23 | End: 2021-04-24 | Stop reason: HOSPADM

## 2021-04-23 RX ORDER — FUROSEMIDE 10 MG/ML
80 INJECTION INTRAMUSCULAR; INTRAVENOUS ONCE
Status: COMPLETED | OUTPATIENT
Start: 2021-04-23 | End: 2021-04-23

## 2021-04-23 RX ORDER — SUCRALFATE 1 G/1
1 TABLET ORAL
Status: DISCONTINUED | OUTPATIENT
Start: 2021-04-23 | End: 2021-04-24 | Stop reason: HOSPADM

## 2021-04-23 RX ORDER — METOPROLOL TARTRATE 1 MG/ML
5 INJECTION, SOLUTION INTRAVENOUS EVERY 6 HOURS PRN
Status: DISCONTINUED | OUTPATIENT
Start: 2021-04-23 | End: 2021-04-24 | Stop reason: HOSPADM

## 2021-04-23 RX ORDER — METOPROLOL TARTRATE 50 MG/1
50 TABLET, FILM COATED ORAL TWICE DAILY
Status: DISCONTINUED | OUTPATIENT
Start: 2021-04-23 | End: 2021-04-24 | Stop reason: HOSPADM

## 2021-04-23 RX ORDER — SUCRALFATE 1 G/1
1 TABLET ORAL
Qty: 56 TABLET | Refills: 0 | Status: SHIPPED | OUTPATIENT
Start: 2021-04-23 | End: 2021-06-11

## 2021-04-23 RX ORDER — OMEPRAZOLE 20 MG/1
20 CAPSULE, DELAYED RELEASE ORAL DAILY
Qty: 30 CAPSULE | Refills: 0 | Status: SHIPPED | OUTPATIENT
Start: 2021-04-23 | End: 2021-04-27

## 2021-04-23 RX ADMIN — METOPROLOL TARTRATE 50 MG: 50 TABLET, FILM COATED ORAL at 16:33

## 2021-04-23 RX ADMIN — SUCRALFATE 1 G: 1 TABLET ORAL at 18:30

## 2021-04-23 RX ADMIN — ROSUVASTATIN CALCIUM 20 MG: 20 TABLET, FILM COATED ORAL at 18:30

## 2021-04-23 RX ADMIN — HYDROCODONE BITARTRATE AND ACETAMINOPHEN 2 TABLET: 5; 325 TABLET ORAL at 01:46

## 2021-04-23 RX ADMIN — TORSEMIDE 40 MG: 20 TABLET ORAL at 06:14

## 2021-04-23 RX ADMIN — HYDROCODONE BITARTRATE AND ACETAMINOPHEN 2 TABLET: 5; 325 TABLET ORAL at 11:00

## 2021-04-23 RX ADMIN — HYDROCODONE BITARTRATE AND ACETAMINOPHEN 2 TABLET: 5; 325 TABLET ORAL at 06:16

## 2021-04-23 RX ADMIN — OMEPRAZOLE 20 MG: 20 CAPSULE, DELAYED RELEASE ORAL at 07:44

## 2021-04-23 RX ADMIN — FUROSEMIDE 80 MG: 10 INJECTION, SOLUTION INTRAMUSCULAR; INTRAVENOUS at 09:38

## 2021-04-23 RX ADMIN — SUCRALFATE 1 G: 1 TABLET ORAL at 20:36

## 2021-04-23 RX ADMIN — PAROXETINE HYDROCHLORIDE 20 MG: 20 TABLET, FILM COATED ORAL at 06:15

## 2021-04-23 RX ADMIN — HYDROCODONE BITARTRATE AND ACETAMINOPHEN 2 TABLET: 5; 325 TABLET ORAL at 20:36

## 2021-04-23 RX ADMIN — POTASSIUM CHLORIDE 20 MEQ: 1500 TABLET, EXTENDED RELEASE ORAL at 11:57

## 2021-04-23 RX ADMIN — POTASSIUM CHLORIDE 20 MEQ: 1500 TABLET, EXTENDED RELEASE ORAL at 11:56

## 2021-04-23 RX ADMIN — HYDROCODONE BITARTRATE AND ACETAMINOPHEN 2 TABLET: 5; 325 TABLET ORAL at 15:41

## 2021-04-23 RX ADMIN — APIXABAN 5 MG: 5 TABLET, FILM COATED ORAL at 18:30

## 2021-04-23 RX ADMIN — SUCRALFATE 1 G: 1 TABLET ORAL at 11:00

## 2021-04-23 RX ADMIN — TORSEMIDE 40 MG: 20 TABLET ORAL at 18:30

## 2021-04-23 RX ADMIN — LEVOTHYROXINE SODIUM 75 MCG: 0.07 TABLET ORAL at 06:16

## 2021-04-23 RX ADMIN — TADALAFIL 40 MG: 20 TABLET ORAL at 21:00

## 2021-04-23 RX ADMIN — APIXABAN 5 MG: 5 TABLET, FILM COATED ORAL at 06:15

## 2021-04-23 ASSESSMENT — FIBROSIS 4 INDEX
FIB4 SCORE: 1.71
FIB4 SCORE: 1.2
FIB4 SCORE: 1.71
FIB4 SCORE: 1.2

## 2021-04-23 ASSESSMENT — ENCOUNTER SYMPTOMS
FEVER: 0
COUGH: 0
DIAPHORESIS: 0
ABDOMINAL PAIN: 0
LIGHT-HEADEDNESS: 0
WHEEZING: 0
SHORTNESS OF BREATH: 0
STRIDOR: 0
COLOR CHANGE: 0
DIZZINESS: 0
FATIGUE: 0
CHILLS: 0
PALPITATIONS: 0
CHEST TIGHTNESS: 0

## 2021-04-23 ASSESSMENT — CHA2DS2 SCORE
CHA2DS2 VASC SCORE: 3
PRIOR STROKE OR TIA OR THROMBOEMBOLISM: NO
AGE 75 OR GREATER: NO
DIABETES: NO
AGE 65 TO 74: YES
HYPERTENSION: YES
CHF OR LEFT VENTRICULAR DYSFUNCTION: NO
SEX: FEMALE
VASCULAR DISEASE: NO

## 2021-04-23 ASSESSMENT — PAIN DESCRIPTION - PAIN TYPE
TYPE: CHRONIC PAIN

## 2021-04-23 NOTE — DISCHARGE SUMMARY
HOSPITAL DISCHARGE INSTRUCTIONS    CHIEF COMPLAINT ON ADMISSION  Elective RF ablation for Paroxysmal Atrial Fibrillation    CODE STATUS  Full Code    HPI & HOSPITAL COURSE  This is a 76 y.o. year old female here for elective RF ablation for symptomatic paroxysmal atrial fibrillation. She is followed by Dr. Eng, HF clinic, and Dr. Galindo. Last seen by Dr. Galindo via Telemedicine on 03/16/21.  History of atrial fibrillation, s/p MAZE, MV repair, flutter s/p ablation, with recurrent AF/flutter for ablation. Failed tikosyn and sotalol due to side effects. Therefore decided to proceed with repeat ablation.    She underwent successful repeat RF ablation with PVI, septal LA flutter, and empiric ablation for mitral isthmus flutter by Dr. Galindo in 04/22/2021. CTI noted to be blocked from prior ablation. Fluid overload post procedure despite PO torsemide and IV diuretics, monitored overnight and diuresis.  Weight is down 1.7 kg at baseline continue home supplemental oxygen 5 L. Off digoxin. On metoprolol 50 mg BID and OAC with Eliquis    Procedural Conclusions per Dr. Galindo's Op Note (04/22/2021):  Electrophysiology Procedure Note  Procedures Performed:  Pulmonary Vein Isolation  Additional ablation for atrial fibrillation  Ablation of additional arrhythmia (x2, septal flutter, mitral line)  Intracardiac Echocardiography  Three-dimensional intracardiac mapping  IV isoproterenol infusion with programmed stimulation  Trans-esophageal echocardiogram  Electrophysiologist: Thee Galindo MD  Statement of Medical Necessity: This is a 76  year-old female with history of symptomatic paroxysmal atrial  fibrillation   ICE visualization   of the pericardium confirmed no pericardial effusion at the end of the case. The  patient was awakened from anesthesia, catheters and sheaths were removed, Vascade MVP closure devices were deployed, and manual  pressure was held on bilateral groins until hemostasis was achieved.  Electrophysiological  Findings:  Sinus cycle length 952 msec  Intervals- A-H 89 msec  H-V 46 msec   msec   msec   msec  AV block  ms  AVERP: 600/260 ms  Total RF time: 989 sec  Fluoro time: 0 min  Arrhythmias:  1) Septal anterior LA flutter TCL 300ms  Estimated blood loss - 30 mL  Complications: None  Impression:  1. Atrial fibrillation, paroxysmal  2. Successful isolation of all four pulmonary veins and posterior wall  3. Septal LA flutter  4. Successful ablation of septal LA flutter.  5. Empiric ablation for mitral isthmus flutter  6. CTI noted to be blocked from prior ablation  Recommendations:  1. Bed rest for 4 hours  2. Telemetry monitoring during recovery  3. Anticoagulant therapy for at least 6-12 months  4. Follow up in Arrhythmia clinic in 4 weeks    The patient has been seen and examined in EP rounds this AM.  Her monitored rhythm is sinus rhythm 60-80s presently.  Telemetry history, EKGs, labs, vital signs, and imaging have been reviewed and are stable, no arrhthymias or abnormalities. The patient denies any chest pain, dyspnea, dizziness, paraesthesias, or other complaints.  Her physical exam is without acute findings and CMS fully intact; specifically her right radial site and bilateral femoral access sites which are clean, dry, intact with tegaderm/gauze. Moderate bruising on right groin site, soft, nontender. No evidence of active bleeding, edema, erythema, or hematoma.  He has been out of bed and ambulated without difficulty.     Therefore, she is discharged in good and stable condition with close outpatient follow-up. TSH 1.27 (12/29/20).     DISCHARGE PROBLEM LIST  1. Paroxysmal Atrial Fibrillation  2. S/P successful repeat RF ablation with PVI, septal LA flutter, and empiric ablation for mitral isthmus flutter by Dr. Galindo in 04/22/2021. CTI noted to be blocked from prior ablation.   3. Chronic Anticoagulation with Eliquis    MEDICATIONS ON DISCHARGE   Zeinab Loza   Home Medication  Instructions [facility]:[id number]    Printed on:04/23/21 4807   Medication Information                      apixaban (ELIQUIS) 5mg Tab  Take 5 mg by mouth 2 Times a Day.             Cholecalciferol (VITAMIN D) 2000 units Cap  Take 2,000 Units by mouth every day.             HYDROcodone-acetaminophen (NORCO) 7.5-325 MG per tablet  Take 1 tablet by mouth every 8 hours as needed. Indications: Pain             levothyroxine (SYNTHROID) 75 MCG Tab  Take 75 mcg by mouth Every morning on an empty stomach.             omeprazole (PRILOSEC) 20 MG delayed-release capsule  Take 1 capsule by mouth every day. Take daily x 30 days post procedure             PARoxetine (PAXIL) 20 MG Tab  Take 20 mg by mouth every morning.             potassium chloride SA (KDUR) 20 MEQ Tab CR  Take 20 mEq by mouth. 3 tab po am, 3 tab po pm. (6 TAB TOTAL)             rosuvastatin (CRESTOR) 20 MG Tab  Take 1 Tab by mouth every evening.             sucralfate (CARAFATE) 1 GM Tab  Take 1 tablet by mouth 4 Times a Day,Before Meals and at Bedtime. Take as directed x 2 weeks post procedure             Tadalafil, PAH, (ADCIRCA) 20 MG Tab  Take 40 mg by mouth every bedtime. Indications: Pulmonary Arterial Hypertension             torsemide (DEMADEX) 20 MG Tab  Take 2 Tabs by mouth 2 Times a Day.             valacyclovir (VALTREX) 1 GM Tab  Take 1 tablet by mouth every 8 hours as needed.               DIET: CARDIAC DIET    LABORATORY  Lab Results   Component Value Date/Time    SODIUM 134 (L) 04/16/2021 01:19 PM    POTASSIUM 4.2 04/16/2021 01:19 PM    CHLORIDE 87 (L) 04/16/2021 01:19 PM    CO2 30 04/16/2021 01:19 PM    GLUCOSE 143 (H) 04/16/2021 01:19 PM    BUN 36 (H) 04/16/2021 01:19 PM    CREATININE 1.54 (H) 04/16/2021 01:19 PM      Lab Results   Component Value Date/Time    WBC 9.7 04/16/2021 01:19 PM    HEMOGLOBIN 14.3 04/16/2021 01:19 PM    HEMATOCRIT 42.8 04/16/2021 01:19 PM    PLATELETCT 336 04/16/2021 01:19 PM      ACTIVITY/SPECIFIC OUTPATIENT DISCHARGE  INSTRUCTIONS  Post Ablation Patient Instructions:  No lifting > 10 lbs x 1 week.  No baths or hot tubs x 1 week.  May shower on 04/24/2021 and take off groin dressings.  Continue to monitor sites daily for warmth, redness, discolored drainage.     Please take the esophageal protection medication that is prescribed to you for one month post procedure without missed doses.  Please do not miss any doses of your blood thinner.       Please walk and take deep breaths after discharge.  After discharge, if  experiences chest pain, shortness of breath, hemoptysis, neurological changes, high fever, lightheadedness/dizziness, trouble with catheter sites needs to be seen in the emergency department.    FOLLOW UP  Patient will follow up with EP in outpatient in 2-4 weeks as arranged for procedural followup or sooner if needed.  Patient instructed to monitor weights, blood pressure and HRs, and call the office if abnormal or with any questions/concerns.  The above plan and medications were reviewed in detail with the patient at time of discharge.  All patients questions/concerns were answered and patient verbalized understanding and is in agreement. Pt will obtain BMP in 1 week.    Future Appointments   Date Time Provider Department Center   4/29/2021  2:45 PM TEODORO Hogan. LD None   5/21/2021 12:45 PM KIRILL Kramer [facility] None   6/22/2021  1:00 PM Thee Galindo M.D. [facility] None         Thank you for allowing me to participate in this patients care.  Please contact me with any questions or concerns.     TEODORO Fields.   [facility]  [phone]

## 2021-04-23 NOTE — OR NURSING
1815: Report recieved. Pt is awake, alert, and oriented. Pt denies nausea. Pt reports no pain. Sight dressing reviewed with Mena;it is CDI, but sight is firm and bruised under the dressing and for a small area around it. Cardiac rhythm appears to be SR.    1820: Pt tolerating orals.     1900: Discharge criteria met.     1930: Sight has not changed since previous assessment. Pt transported to new room via gurney by Michel SCHREIBER. Pt's daughter is at bedside and updated on POC. Belongings with pt when transferred to floor. Sight assessed with Tj SCHREIBER.

## 2021-04-23 NOTE — PROGRESS NOTES
Received report from Michel SCHREIBER. Pt arrived to unit at 1930 via bed escorted by Michel SCHREIBER. Assessment complete. VSS. No signs of distress noted at this time. Tele monitor in place. Monitor room notified. Pt c/o pain 7/10. Fall precautions and appropriate signs in place. Pt oriented to unit routine, call light/phone system within reach. Personal belongings within reach. RN extension number provided. Pt educated regarding fall precautions. Bed alarm is on. Pt denies any further needs at this time.    Pt on bedrest.

## 2021-04-23 NOTE — PROGRESS NOTES
"Cardiology Follow Up Progress Note    Date of Service  4/23/2021    Attending Physician  Thee Galindo M.D.    Chief Complaint   Elective RF ablation for Paroxysmal Atrial Fibrillation    HPI  Zeinab Loza is a 76 y.o. female admitted 4/22/2021 for elective RF ablation for symptomatic paroxysmal atrial fibrillation. She is followed by Dr. Eng, HF clinic, and Dr. Galindo. Last seen by Dr. Galindo via Telemedicine on 03/16/21.  History of atrial fibrillation, s/p MAZE, MV repair, PAH, flutter s/p ablation, with recurrent AF/flutter for ablation. Failed tikosyn and sotalol due to side effects. Therefore decided to proceed with repeat ablation.     She underwent repeat RF ablation with PVI, septal LA flutter, and empiric ablation for mitral isthmus flutter by Dr. Galindo in 04/22/21. CTI noted to be blocked from prior ablation. Fluid overload post procedure, weight up 14 lbs, despite PO torsemide and IV diuretics. At baseline continue home supplemental oxygen 5 L. Off digoxin. On OAC with Eliquis    Interim Events  04/23/2021: Seen in EP Rounds  At baseline 5L O2 NC  Feels \"puffy\", lower extremity swelling.   Denies other Cardiac complaints at this time.   Vital Signs/Labs: Were reviewed. Afebrile, no leukocytosis, BP stable.   Weight Up 14 lbs. 222lbs. (dry weight 205-208 lbs).   Telemetry monitor: sinus rhythm.   No significant events overnight.     Review of Systems  Review of Systems   Constitutional: Negative for chills, diaphoresis, fatigue and fever.   Respiratory: Negative for cough, chest tightness, shortness of breath, wheezing and stridor.    Cardiovascular: Positive for leg swelling. Negative for chest pain and palpitations.   Gastrointestinal: Negative for abdominal pain.   Genitourinary: Negative for difficulty urinating and hematuria.   Skin: Negative for color change.   Neurological: Negative for dizziness and light-headedness.     Vital signs in last 24 hours  Temp:  [35.9 °C (96.6 °F)-36.8 °C (98.3 " °F)] 36.8 °C (98.3 °F)  Pulse:  [] 128  Resp:  [15-18] 18  BP: ()/(40-80) 138/76  SpO2:  [95 %-99 %] 97 %    Physical Exam  Physical Exam   Constitutional: She is oriented to person, place, and time. No distress.   Neck: JVD present.   Cardiovascular: Normal rate, regular rhythm and intact distal pulses.   Murmur heard.  Pulmonary/Chest: Effort normal and breath sounds normal. No respiratory distress. She has no wheezes. She has no rales. She exhibits no tenderness.   Musculoskeletal:         General: Edema (2-3+ bilateral lower extremity edema) present.      Cervical back: Normal range of motion.   Neurological: She is alert and oriented to person, place, and time.   Skin: Skin is warm and dry. She is not diaphoretic. No erythema.   Psychiatric: Mood, memory, affect and judgment normal.   Nursing note and vitals reviewed.    Lab Review  Lab Results   Component Value Date/Time    WBC 10.8 04/23/2021 12:34 PM    RBC 3.00 (L) 04/23/2021 12:34 PM    HEMOGLOBIN 10.0 (L) 04/23/2021 12:34 PM    HEMATOCRIT 30.6 (L) 04/23/2021 12:34 PM    .0 (H) 04/23/2021 12:34 PM    MCH 33.0 04/23/2021 12:34 PM    MCHC 32.7 (L) 04/23/2021 12:34 PM    MPV 9.2 04/23/2021 12:34 PM      Lab Results   Component Value Date/Time    SODIUM 139 04/23/2021 09:23 AM    POTASSIUM 4.1 04/23/2021 09:23 AM    CHLORIDE 101 04/23/2021 09:23 AM    CO2 29 04/23/2021 09:23 AM    GLUCOSE 194 (H) 04/23/2021 09:23 AM    BUN 36 (H) 04/23/2021 09:23 AM    CREATININE 1.26 04/23/2021 09:23 AM      Lab Results   Component Value Date/Time    ASTSGOT 25 04/16/2021 01:19 PM    ALTSGPT 21 04/16/2021 01:19 PM     Lab Results   Component Value Date/Time    CHOLSTRLTOT 213 (H) 09/30/2020 09:55 AM     (H) 09/30/2020 09:55 AM    HDL 53 09/30/2020 09:55 AM    TRIGLYCERIDE 146 09/30/2020 09:55 AM           Cardiac Imaging and Procedures Review  Echocardiogram (03/05/2021):  Normal left ventricular size and systolic function.  Mild concentric left  ventricular hypertrophy.  Left ventricular ejection fraction is visually estimated to be 60%.  Known mitral valve repair which is functioning normally with appropriate transvalvular gradient.  Mild mitral regurgitation.  Normal inferior vena cava size and inspiratory collapse.    Previous Right Heart cath Data from [facility]:  RHC 2019  Mean RA 19 mmHg, RV 47/21 mmHg, PA 50/24 mmHg, mean PA 35 mmHg, PCWP 30 mmHg  Odin CO 4.5 , CI 2.03  PVR 1.1 woods     RHC 2018  RA 15 mmHg, RV 49/6/14 mmHg, PA 48/21/33 mmHg, PCWP 19 mmHg,   ick C.O. 5.19 L/min, Odin C.I 2.39   PVR 4.8 woods    Procedural Conclusions per Dr. Galindo's Op Note (04/22/2021):  Electrophysiology Procedure Note  Procedures Performed:  Pulmonary Vein Isolation  Additional ablation for atrial fibrillation  Ablation of additional arrhythmia (x2, septal flutter, mitral line)  Intracardiac Echocardiography  Three-dimensional intracardiac mapping  IV isoproterenol infusion with programmed stimulation  Trans-esophageal echocardiogram  Electrophysiologist: Thee Galindo MD  Statement of Medical Necessity: This is a 76  year-old female with history of symptomatic paroxysmal atrial  fibrillation   ICE visualization   of the pericardium confirmed no pericardial effusion at the end of the case. The  patient was awakened from anesthesia, catheters and sheaths were removed, Vascade MVP closure devices were deployed, and manual  pressure was held on bilateral groins until hemostasis was achieved.  Electrophysiological Findings:  Sinus cycle length 952 msec  Intervals- A-H 89 msec  H-V 46 msec  QRS 107 msec  QT 438 msec  VT 187 msec  AV block CL 390 ms  AVERP: 600/260 ms  Total RF time: 989 sec  Fluoro time: 0 min  Arrhythmias:  1) Septal anterior LA flutter TCL 300ms  Estimated blood loss - 30 mL  Complications: None  Impression:  1. Atrial fibrillation, paroxysmal  2. Successful isolation of all four pulmonary veins and posterior wall  3. Septal LA flutter  4. Successful  ablation of septal LA flutter.  5. Empiric ablation for mitral isthmus flutter  6. CTI noted to be blocked from prior ablation  Recommendations:  1. Bed rest for 4 hours  2. Telemetry monitoring during recovery  3. Anticoagulant therapy for at least 6-12 months  4. Follow up in Arrhythmia clinic in 4 weeks    Assessment/Plan  1. Paroxysmal Atrial Fibrillation   - Recurrent PAF s/p repeat RF ablation with PVI, septal LA flutter, and empiric ablation for mitral isthmus flutter by Dr. Galindo in 04/22/21. CTI noted to be blocked from prior ablation.  - Fluid overload post procedure, weight up 14 lbs, despite PO torsemide and IV diuretics (dry weight 208 lbs).   - Home supplemental oxygen 5 L, at baseline.  - OAC with Eliquis, continue.   - PPI/Carafate  - Arrhythmia recurrence this afternoon, restart metoprolol 50 mg BID. IV PRN added.   - Continue to diuresis. Labs/EKG in AM.   - Discussed ablation education with patient per below.   - Discussed with Dr. Galindo, plan for DCCV failed prior to discharge if remains in arrhythmia.    2. PAF  - Continue IV and PO diuretics per above.   - F/U HF clinic next week for close monitoring (HF notified and will schedule pt).     3. S/P MVR (2005)- normal function per echo    ACTIVITY/SPECIFIC OUTPATIENT DISCHARGE INSTRUCTIONS  Post Ablation Patient Instructions:  No lifting > 10 lbs x 1 week.  No baths or hot tubs x 1 week.  May shower on 04/24/2021 and take off groin dressings.  Continue to monitor sites daily for warmth, redness, discolored drainage.     Please take the esophageal protection medication that is prescribed to you for one month post procedure without missed doses.  Please do not miss any doses of your blood thinner.       Please walk and take deep breaths after discharge.  After discharge, if  experiences chest pain, shortness of breath, hemoptysis, neurological changes, high fever, lightheadedness/dizziness, trouble with catheter sites needs to be seen in the emergency  department.     FOLLOW UP  Patient will follow up with EP in outpatient in 2-4 weeks as arranged for procedural followup or sooner if needed.  Patient instructed to monitor weights, blood pressure and Hrs, and call the office if abnormal or with any questions/concerns.     Thank you for allowing me to participate in the care of this patient.  I will continue to follow this patient    Please contact me with any questions.    TEODORO Kramer.   [facility]  [phone]

## 2021-04-23 NOTE — PROGRESS NOTES
Report received from noc shift RN, assumed patient care. Patient is A&O x 4, on 5 L O2. Tele box on and in place. Patient updated on plan of care and verbalizes no questions. Patient has call light within reach, fall precautions in place. Bilateral fem sites assessed. R side: CDI, hematoma noted, soft-no changes per night shift RN. L side: CDI, soft, no signs of hematoma. Patient educated to call for assistance as needed. Will continue to monitor.

## 2021-04-23 NOTE — PROGRESS NOTES
· 2 RN skin check completed with Niels SCHREIBER  · Devices in place Oxygen.  · Skin assessed under devices right ear scab, left ear redness that blanches.  · Confirmed pressure ulcers found on N/A.  · New potential pressure ulcers noted on N/A. Wound consult placed? N/A. Photo uploaded? N/A.   · The following interventions are in place N/A.    Right radial site clean, dry, and intact. Right groin site soft with bruising. Left groin incision site.    All other skin intact.

## 2021-04-23 NOTE — PROGRESS NOTES
EP Faculty note    Pt with h/o AF/AFL. S/p ablation. Noted to have AT on telemetry within the past hour.    - Resume metoprolol  - Telemetry monitoring  - If persistent we can discuss cardioversion prior to discharge    Thee Galindo MD  Cardiac Electrophysiology

## 2021-04-23 NOTE — PROGRESS NOTES
Patient's HR > 120 per monitor room for over 10 minutes. Patient calm, resting in bed. EKG obtained. DAVID Dastrna notified of patient's HR and EKG.

## 2021-04-23 NOTE — CARE PLAN
Problem: Skin Integrity  Goal: Skin Integrity is maintained or improved  Outcome: PROGRESSING AS EXPECTED     Pt show no signs of skin breakdown from lying supine after surgery.    Problem: Pain Management  Goal: Pain level will decrease to patient's comfort goal  Outcome: PROGRESSING SLOWER THAN EXPECTED     Pt c/o 7/10 pain from lying supine for extended period of time.

## 2021-04-24 VITALS
HEART RATE: 70 BPM | OXYGEN SATURATION: 96 % | WEIGHT: 214.73 LBS | DIASTOLIC BLOOD PRESSURE: 57 MMHG | HEIGHT: 67 IN | SYSTOLIC BLOOD PRESSURE: 106 MMHG | TEMPERATURE: 96.8 F | RESPIRATION RATE: 18 BRPM | BODY MASS INDEX: 33.7 KG/M2

## 2021-04-24 LAB
ANION GAP SERPL CALC-SCNC: 12 MMOL/L (ref 7–16)
BUN SERPL-MCNC: 31 MG/DL (ref 8–22)
CALCIUM SERPL-MCNC: 9.4 MG/DL (ref 8.5–10.5)
CHLORIDE SERPL-SCNC: 101 MMOL/L (ref 96–112)
CO2 SERPL-SCNC: 34 MMOL/L (ref 20–33)
CREAT SERPL-MCNC: 0.97 MG/DL (ref 0.5–1.4)
ERYTHROCYTE [DISTWIDTH] IN BLOOD BY AUTOMATED COUNT: 43.5 FL (ref 35.9–50)
GLUCOSE SERPL-MCNC: 125 MG/DL (ref 65–99)
HCT VFR BLD AUTO: 28.9 % (ref 37–47)
HGB BLD-MCNC: 9.5 G/DL (ref 12–16)
MCH RBC QN AUTO: 33.1 PG (ref 27–33)
MCHC RBC AUTO-ENTMCNC: 32.9 G/DL (ref 33.6–35)
MCV RBC AUTO: 100.7 FL (ref 81.4–97.8)
PLATELET # BLD AUTO: 201 K/UL (ref 164–446)
PMV BLD AUTO: 9.2 FL (ref 9–12.9)
POTASSIUM SERPL-SCNC: 3.1 MMOL/L (ref 3.6–5.5)
RBC # BLD AUTO: 2.87 M/UL (ref 4.2–5.4)
SODIUM SERPL-SCNC: 147 MMOL/L (ref 135–145)
WBC # BLD AUTO: 7.2 K/UL (ref 4.8–10.8)

## 2021-04-24 PROCEDURE — 99217 PR OBSERVATION CARE DISCHARGE: CPT | Performed by: NURSE PRACTITIONER

## 2021-04-24 PROCEDURE — 36415 COLL VENOUS BLD VENIPUNCTURE: CPT

## 2021-04-24 PROCEDURE — 700111 HCHG RX REV CODE 636 W/ 250 OVERRIDE (IP): Performed by: NURSE PRACTITIONER

## 2021-04-24 PROCEDURE — 85027 COMPLETE CBC AUTOMATED: CPT

## 2021-04-24 PROCEDURE — A9270 NON-COVERED ITEM OR SERVICE: HCPCS | Performed by: NURSE PRACTITIONER

## 2021-04-24 PROCEDURE — 80048 BASIC METABOLIC PNL TOTAL CA: CPT

## 2021-04-24 PROCEDURE — 700102 HCHG RX REV CODE 250 W/ 637 OVERRIDE(OP): Performed by: NURSE PRACTITIONER

## 2021-04-24 PROCEDURE — A9270 NON-COVERED ITEM OR SERVICE: HCPCS | Performed by: INTERNAL MEDICINE

## 2021-04-24 PROCEDURE — 94760 N-INVAS EAR/PLS OXIMETRY 1: CPT

## 2021-04-24 PROCEDURE — 94660 CPAP INITIATION&MGMT: CPT

## 2021-04-24 PROCEDURE — 700102 HCHG RX REV CODE 250 W/ 637 OVERRIDE(OP): Performed by: INTERNAL MEDICINE

## 2021-04-24 PROCEDURE — 96376 TX/PRO/DX INJ SAME DRUG ADON: CPT

## 2021-04-24 PROCEDURE — G0378 HOSPITAL OBSERVATION PER HR: HCPCS

## 2021-04-24 RX ORDER — FUROSEMIDE 10 MG/ML
40 INJECTION INTRAMUSCULAR; INTRAVENOUS ONCE
Status: COMPLETED | OUTPATIENT
Start: 2021-04-24 | End: 2021-04-24

## 2021-04-24 RX ORDER — METOPROLOL TARTRATE 50 MG/1
50 TABLET, FILM COATED ORAL 2 TIMES DAILY
Qty: 60 TABLET | Refills: 11 | Status: SHIPPED | OUTPATIENT
Start: 2021-04-24 | End: 2022-09-29 | Stop reason: SDUPTHER

## 2021-04-24 RX ORDER — POTASSIUM CHLORIDE 20 MEQ/1
40 TABLET, EXTENDED RELEASE ORAL DAILY
Status: DISCONTINUED | OUTPATIENT
Start: 2021-04-24 | End: 2021-04-24 | Stop reason: HOSPADM

## 2021-04-24 RX ADMIN — HYDROCODONE BITARTRATE AND ACETAMINOPHEN 2 TABLET: 5; 325 TABLET ORAL at 13:13

## 2021-04-24 RX ADMIN — SUCRALFATE 1 G: 1 TABLET ORAL at 12:24

## 2021-04-24 RX ADMIN — LEVOTHYROXINE SODIUM 75 MCG: 0.07 TABLET ORAL at 04:59

## 2021-04-24 RX ADMIN — HYDROCODONE BITARTRATE AND ACETAMINOPHEN 2 TABLET: 5; 325 TABLET ORAL at 09:11

## 2021-04-24 RX ADMIN — PAROXETINE HYDROCHLORIDE 20 MG: 20 TABLET, FILM COATED ORAL at 04:59

## 2021-04-24 RX ADMIN — OMEPRAZOLE 20 MG: 20 CAPSULE, DELAYED RELEASE ORAL at 04:59

## 2021-04-24 RX ADMIN — FUROSEMIDE 40 MG: 10 INJECTION, SOLUTION INTRAMUSCULAR; INTRAVENOUS at 08:36

## 2021-04-24 RX ADMIN — HYDROCODONE BITARTRATE AND ACETAMINOPHEN 2 TABLET: 5; 325 TABLET ORAL at 00:40

## 2021-04-24 RX ADMIN — POTASSIUM CHLORIDE 40 MEQ: 1500 TABLET, EXTENDED RELEASE ORAL at 10:53

## 2021-04-24 RX ADMIN — TORSEMIDE 40 MG: 20 TABLET ORAL at 04:59

## 2021-04-24 RX ADMIN — HYDROCODONE BITARTRATE AND ACETAMINOPHEN 2 TABLET: 5; 325 TABLET ORAL at 05:00

## 2021-04-24 RX ADMIN — METOPROLOL TARTRATE 50 MG: 50 TABLET, FILM COATED ORAL at 04:56

## 2021-04-24 RX ADMIN — APIXABAN 5 MG: 5 TABLET, FILM COATED ORAL at 04:59

## 2021-04-24 RX ADMIN — SUCRALFATE 1 G: 1 TABLET ORAL at 08:27

## 2021-04-24 ASSESSMENT — PAIN DESCRIPTION - PAIN TYPE
TYPE: CHRONIC PAIN

## 2021-04-24 ASSESSMENT — FIBROSIS 4 INDEX
FIB4 SCORE: 2.01

## 2021-04-24 NOTE — CARE PLAN
Problem: Communication  Goal: The ability to communicate needs accurately and effectively will improve  Outcome: PROGRESSING AS EXPECTED     Problem: Safety  Goal: Will remain free from injury  Outcome: PROGRESSING AS EXPECTED  Goal: Will remain free from falls  Outcome: PROGRESSING AS EXPECTED     Problem: Infection  Goal: Will remain free from infection  Outcome: PROGRESSING AS EXPECTED     Problem: Venous Thromboembolism (VTW)/Deep Vein Thrombosis (DVT) Prevention:  Goal: Patient will participate in Venous Thrombosis (VTE)/Deep Vein Thrombosis (DVT)Prevention Measures  Outcome: PROGRESSING AS EXPECTED     Problem: Bowel/Gastric:  Goal: Normal bowel function is maintained or improved  Outcome: PROGRESSING AS EXPECTED  Goal: Will not experience complications related to bowel motility  Outcome: PROGRESSING AS EXPECTED     Problem: Knowledge Deficit  Goal: Knowledge of disease process/condition, treatment plan, diagnostic tests, and medications will improve  Outcome: PROGRESSING AS EXPECTED  Goal: Knowledge of the prescribed therapeutic regimen will improve  Outcome: PROGRESSING AS EXPECTED     Problem: Discharge Barriers/Planning  Goal: Patient's continuum of care needs will be met  Outcome: PROGRESSING AS EXPECTED     Problem: Fluid Volume:  Goal: Will maintain balanced intake and output  Outcome: PROGRESSING AS EXPECTED     Problem: Pain Management  Goal: Pain level will decrease to patient's comfort goal  Outcome: PROGRESSING AS EXPECTED     Problem: Safety  Goal: Free from accidental injury  Outcome: PROGRESSING AS EXPECTED  Goal: Free from intentional harm  Outcome: PROGRESSING AS EXPECTED  Goal: Free from self harm  Outcome: PROGRESSING AS EXPECTED  Goal: Isolation Precautions for patient and staff safety  Outcome: PROGRESSING AS EXPECTED     Problem: Knowledge Deficit  Goal: Patient/Significant other demonstrates understanding of disease process, treatment plan, medications and discharge  instructions  Outcome: PROGRESSING AS EXPECTED     Problem: Psychosocial needs  Goal: Spiritual needs incorporated in hospitalization  Outcome: PROGRESSING AS EXPECTED  Goal: Cultural needs incorporated in hospitalization  Outcome: PROGRESSING AS EXPECTED  Goal: Anxiety reduction  Outcome: PROGRESSING AS EXPECTED     Problem: Discharge Barriers/Planning  Goal: Patient's Continuum of care needs are met  Outcome: PROGRESSING AS EXPECTED     Problem: Skin Integrity  Goal: Skin Integrity is maintained or improved  Outcome: PROGRESSING AS EXPECTED     Problem: Risk for Infection, Impaired Wound Healing  Goal: Remain free from signs and symptoms of infection  Outcome: PROGRESSING AS EXPECTED  Goal: Promotion of Wound Healing and Drain Management  Outcome: PROGRESSING AS EXPECTED     Problem: Risk for Impaired Mobility--Activity Intolerance  Goal: Mobilize and/or Transfer Safely with Maximum [place]  Outcome: PROGRESSING AS EXPECTED  Goal: Activity Level appropriate for Discharge or Transfer  Outcome: PROGRESSING AS EXPECTED     Problem: Oxygenation/Respiratory Function  Goal: Patient will Achieve/Maintain Optimum Respiratory Rate/Effort  Outcome: PROGRESSING AS EXPECTED     Problem: Risk of Aspiration  Goal: Absence of aspiration  Outcome: PROGRESSING AS EXPECTED     Problem: Pain  Goal: Alleviation of Pain or a reduction in pain to the patient's comfort goal  Outcome: PROGRESSING AS EXPECTED     Problem: Hemodynamic Status  Goal: Stable Vital Signs and Fluid Balance  Outcome: PROGRESSING AS EXPECTED     Problem: Risk for Deep Vein Thrombosis/Venous Thromboembolism  Goal: DVT/VTE Prevention Measures in Place  Outcome: PROGRESSING AS EXPECTED  Goal: Patient participates in DVT/VTE Prevention Measures  Outcome: PROGRESSING AS EXPECTED     Problem: Nutrition Deficit  Goal: Adequate Food and Fluid Intake  Outcome: PROGRESSING AS EXPECTED     Problem: Self Care Deficit  Goal: Ability to bathe, groom and dress  independently or with assistance  Outcome: PROGRESSING AS EXPECTED  Goal: Ability to feed and maintain oral hygiene independently or with assistance  Outcome: PROGRESSING AS EXPECTED  Goal: Ability to toilet independently or with assistance  Outcome: PROGRESSING AS EXPECTED     Problem: Elimination  Goal: Establishment and Maintenance of regular bowel elimination  Outcome: PROGRESSING AS EXPECTED  Goal: Regular urinary elimination  Outcome: PROGRESSING AS EXPECTED     Problem: Risk for injury related to physical restraint use  Goal: Safe and appropriate use of physical restraints. Restraints discontinued at the earliest possibility while ensuring patient safety.  Outcome: PROGRESSING AS EXPECTED     Problem: Respiratory:  Goal: Respiratory status will improve  Outcome: PROGRESSING AS EXPECTED

## 2021-04-24 NOTE — PROGRESS NOTES
Monitor Summary:    SR 63-80 .18/.08/.36  I/O afib/flutter 128-132; up to 150 -/.08/-  Rare to frequent PVC, Rare Big

## 2021-04-24 NOTE — CARE PLAN
Problem: Fluid Volume:  Goal: Will maintain balanced intake and output  Outcome: PROGRESSING SLOWER THAN EXPECTED     Pt was found to have volume overload. Pt voided at least 1L during the day with the use of diuretics. Morning weight will be obtained. Evening weight was 99.5kg.    Problem: Pain  Goal: Alleviation of Pain or a reduction in pain to the patient's comfort goal  Outcome: PROGRESSING SLOWER THAN EXPECTED     Pt stated she would like her chronic back pain level to be at a 4. Pt pain scale rating has only been low as 5 during this shift.

## 2021-04-24 NOTE — DISCHARGE INSTRUCTIONS
You have an appointment with Macarena Gaines for our heart failure clinic April 29 at 2:45 PM.  Please show 10-15 minutes early.  Please keep track of your weights and obtain BMP the day prior to seeing Macarena.    Discharge Instructions    Discharged to home by car with relative. Discharged via wheelchair, hospital escort: Yes.  Special equipment needed: Not Applicable    Be sure to schedule a follow-up appointment with your primary care doctor or any specialists as instructed.     Discharge Plan:        I understand that a diet low in cholesterol, fat, and sodium is recommended for good health. Unless I have been given specific instructions below for another diet, I accept this instruction as my diet prescription.   Other diet: cardiac    Special Instructions: DIVISION OF CARDIAC SURGERY DISCHARGE INSTRUCTIONS    Activity:    1. NO driving for 4 weeks after surgery. You may ride as a passenger.  2. NO lifting more than 10 pounds for 6 weeks.  3. For the next 6 weeks, keep your elbows close to your body and move within a pain-free motion when lifting, pushing or pulling.  Do not stretch both arms backwards at the same time.    4. Walk at least 4 times per day, there is no maximum.  5. Other physical activities (sex, housework, gardening, etc.) are okay after 4 weeks.  6. Continue using incentive spirometer for 2 weeks.  If you are going home on oxygen and you were not on oxygen prior to surgery, keep using until you are oxygen free.  7. Weigh yourself daily.  Call your Cardiologist for a weight gain of 2 or more pounds within 48 hours.  8. Take all of your medications as prescribed    Incision Care:    1. SHOWER EVERYDAY-no baths. Make sure to clean your incision(s) twice daily with plain, perfume and dye-free soap.  Then pat incision(s) dry with clean towel. No creams or lotions on your incision(s).    2. If you are discharging with a Prevena bandage on your chest, you or your home health nurse may remove the bandage 7  days after surgery, or sooner if the battery runs out or the device alarms. When the battery runs out, the bandage will lose suction and it will puff up.  To remove, slowly roll down the edges of the bandage. Throw away the entire bandage and the battery pack.  Keep the incision open to air and clean twice daily with soap and water.  3. If there is any increased redness or swelling, separation of the incision line, or thick drainage from any of your incisions, call the Cardiac Surgeons ([phone]).    4. Continue to wear your JAZMIN Stockings for 4 weeks. You may take them off when you are in bed or when your legs are elevated.    General Instructions:    1. You have been referred to Cardiac Rehab.  You can start Cardiac Rehab 30 days after surgery.  If you do not have an appointment at the time of discharge call [phone] to schedule an appointment.  2. Your Primary Care Doctor typically handles home oxygen. Oxygen may be stopped when your oxygen level is consistently greater than 90.  Check with your Primary Care Doctor if you are unsure.  3. Take all of your medications (including pain medications) as prescribed.  Taking medications other than prescribed can result in serious injury.    For Patients Discharged with Narcotic Pain Medication:     1. If a refill is needed, understand that only 1 refill will be provided and you must come to the Cardiac Surgeons’ office for an appointment (72 hours’ notice is required to schedule and there are no weekend appointments).  2. If the pain medications you are discharged on are not working, you will need to bring your remaining prescription into the office in order to receive a new prescription.  3. If you were taking narcotics prior to your heart surgery, the Cardiac Surgeons will provide you with one prescription and additional medications will need to be provided by your pain management doctor.  4. Do not drink alcohol while taking narcotics.  5. Lost or stolen  medications will not be refilled.  If medications are stolen, report to law enforcement.    Contact Cardiac Surgery at [phone] if you have any questions.    · Is patient discharged on Warfarin / Coumadin?   No     Depression / Suicide Risk    As you are discharged from this [facility], it is important to learn how to keep safe from harming yourself.    Recognize the warning signs:  · Abrupt changes in personality, positive or negative- including increase in energy   · Giving away possessions  · Change in eating patterns- significant weight changes-  positive or negative  · Change in sleeping patterns- unable to sleep or sleeping all the time   · Unwillingness or inability to communicate  · Depression  · Unusual sadness, discouragement and loneliness  · Talk of wanting to die  · Neglect of personal appearance   · Rebelliousness- reckless behavior  · Withdrawal from people/activities they love  · Confusion- inability to concentrate     If you or a loved one observes any of these behaviors or has concerns about self-harm, here's what you can do:  · Talk about it- your feelings and reasons for harming yourself  · Remove any means that you might use to hurt yourself (examples: pills, rope, extension cords, firearm)  · Get professional help from the community (Mental Health, Substance Abuse, psychological counseling)  · Do not be alone:Call your Safe Contact- someone whom you trust who will be there for you.  · Call your local CRISIS HOTLINE 133-4034 or 937-157-8677  · Call your local Children's Mobile Crisis Response Team [place] (489) 268-1541 or www.[employer]  · Call the toll free National Suicide Prevention Hotlines   · National Suicide Prevention Lifeline 429-852-YADO (2886)  · National Hope Line Network 800-SUICIDE (373-0859)            [facility] Post Ablation Patient Instructions:  1. No lifting > 10 lbs x 1 week.      2. No soaking in baths, hot  tubs, pools x 1 week.  May shower the day after discharge and take off groin dressings and leave uncovered.  Continue to monitor sites daily for warmth, redness, discolored drainage.  It is common to have a small lump in the area where the cather was (usually the size of a small marble); this will go away but takes approximately 6 weeks to normalize.     3.   Please take all medications as prescribed to you; please do not stop any medications prescribed post ablation unless directed by your healthcare provider.      4.   Please do not miss any doses of your blood thinner (if you have been started on, or take chronic blood thinners) without discussion with your healthcare provider first.     5.   Please walk and take deep breaths after discharge.  After discharge, if  experiences neurological changes/signs of stroke, high fever, you should be seen in the emergency dept.     6.   It is possible you may experience some chest discomfort or chest tightness post ablation.  This is usually secondary to inflammation and irritation of the tissues at the area of the ablation.  If this occurs, it is advised to try 400 mg of Ibuprofen with food as needed up to three times a day for a maximum of two days.  This should help to decrease pain and tissue inflammation.          **Please notify the office ([phone]) if this occurs.         ** DO NOT TAKE Ibuprofen IF HISTORY OF SIGNIFICANT BLEEDING OR KIDNEY DISEASE WITHOUT DISCUSSING WITH YOUR CARDIOLOGY PROVIDER FIRST.          ** If pain becomes severe or you have additional symptoms you may need to be medically evaluated; please contact the cardiology office ([phone]) for further direction.     7. It is possible that you may experience arrhythmia/Atrial Fibrillation post ablation.  This is secondary to irritation and inflammation of the cardiac tissues from the ablation.  If you have atrial fibrillation all day or feel poorly with it, please notify your cardiologist's via  phone ([phone]) or [facility].      8.  Please contact call our office ([phone]) or message via [employer] message if you have any questions or concerns post procedurally.    9. You need to be seen for post ablation follow up 2-4 weeks post procedure.  If you do not have a follow up appointment scheduled, please call [phone] to schedule your follow up appointment.               Cardiac Ablation  Cardiac ablation is a procedure to disable (ablate) a small amount of heart tissue in very specific places. The heart has many electrical connections. Sometimes these connections are abnormal and can cause the heart to beat very fast or irregularly. Ablating some of the problem areas can improve the heart rhythm or return it to normal. Ablation may be done for people who:  · Have Priscilla-Parkinson-White syndrome.  · Have fast heart rhythms (tachycardia).  · Have taken medicines for an abnormal heart rhythm (arrhythmia) that were not effective or caused side effects.  · Have a high-risk heartbeat that may be life-threatening.  During the procedure, a small incision is made in the neck or the groin, and a long, thin, flexible tube (catheter) is inserted into the incision and moved to the heart. Small devices (electrodes) on the tip of the catheter will send out electrical currents. A type of X-ray (fluoroscopy) will be used to help guide the catheter and to provide images of the heart.  Tell a health care provider about:  · Any allergies you have.  · All medicines you are taking, including vitamins, herbs, eye drops, creams, and over-the-counter medicines.  · Any problems you or family members have had with anesthetic medicines.  · Any blood disorders you have.  · Any surgeries you have had.  · Any medical conditions you have, such as kidney failure.  · Whether you are pregnant or may be pregnant.  What are the risks?  Generally, this is a safe procedure. However, problems may occur, including:  · Infection.  · Bruising and  bleeding at the catheter insertion site.  · Bleeding into the chest, especially into the sac that surrounds the heart. This is a serious complication.  · Stroke or blood clots.  · Damage to other structures or organs.  · Allergic reaction to medicines or dyes.  · Need for a permanent pacemaker if the normal electrical system is damaged. A pacemaker is a small computer that sends electrical signals to the heart and helps your heart beat normally.  · The procedure not being fully effective. This may not be recognized until months later. Repeat ablation procedures are sometimes required.  What happens before the procedure?  · Follow instructions from your health care provider about eating or drinking restrictions.  · Ask your health care provider about:  ? Changing or stopping your regular medicines. This is especially important if you are taking diabetes medicines or blood thinners.  ? Taking medicines such as aspirin and ibuprofen. These medicines can thin your blood. Do not take these medicines before your procedure if your health care provider instructs you not to.  · Plan to have someone take you home from the hospital or clinic.  · If you will be going home right after the procedure, plan to have someone with you for 24 hours.  What happens during the procedure?  · To lower your risk of infection:  ? Your health care team will wash or sanitize their hands.  ? Your skin will be washed with soap.  ? Hair may be removed from the incision area.  · An IV tube will be inserted into one of your veins.  · You will be given a medicine to help you relax (sedative).  · The skin on your neck or groin will be numbed.  · An incision will be made in your neck or your groin.  · A needle will be inserted through the incision and into a large vein in your neck or groin.  · A catheter will be inserted into the needle and moved to your heart.  · Dye may be injected through the catheter to help your surgeon see the area of the heart  that needs treatment.  · Electrical currents will be sent from the catheter to ablate heart tissue in desired areas. There are three types of energy that may be used to ablate heart tissue:  ? Heat (radiofrequency energy).  ? Laser energy.  ? Extreme cold (cryoablation).  · When the necessary tissue has been ablated, the catheter will be removed.  · Pressure will be held on the catheter insertion area to prevent excessive bleeding.  · A bandage (dressing) will be placed over the catheter insertion area.  The procedure may vary among health care providers and hospitals.  What happens after the procedure?  · Your blood pressure, heart rate, breathing rate, and blood oxygen level will be monitored until the medicines you were given have worn off.  · Your catheter insertion area will be monitored for bleeding. You will need to lie still for a few hours to ensure that you do not bleed from the catheter insertion area.  · Do not drive for 24 hours or as long as directed by your health care provider.  Summary  · Cardiac ablation is a procedure to disable (ablate) a small amount of heart tissue in very specific places. Ablating some of the problem areas can improve the heart rhythm or return it to normal.  · During the procedure, electrical currents will be sent from the catheter to ablate heart tissue in desired areas.  This information is not intended to replace advice given to you by your health care provider. Make sure you discuss any questions you have with your health care provider.  Document Released: 05/06/2010 Document Revised: 06/10/2019 Document Reviewed: 11/06/2017  [employer] Patient Education © 2020 Elsevier Inc.        Cardiac Ablation, Care After  This sheet gives you information about how to care for yourself after your procedure. Your health care provider may also give you more specific instructions. If you have problems or questions, contact your health care provider.  What can I expect after the  procedure?  After the procedure, it is common to have:  · Bruising around your puncture site.  · Tenderness around your puncture site.  · Skipped heartbeats.  · Tiredness (fatigue).  Follow these instructions at home:  Puncture site care    · Follow instructions from your health care provider about how to take care of your puncture site. Make sure you:  ? Wash your hands with soap and water before you change your bandage (dressing). If soap and water are not available, use hand .  ? Change your dressing as told by your health care provider.  ? Leave stitches (sutures), skin glue, or adhesive strips in place. These skin closures may need to stay in place for up to 2 weeks. If adhesive strip edges start to loosen and curl up, you may trim the loose edges. Do not remove adhesive strips completely unless your health care provider tells you to do that.  · Check your puncture site every day for signs of infection. Check for:  ? Redness, swelling, or pain.  ? Fluid or blood. If your puncture site starts to bleed, lie down on your back, apply firm pressure to the area, and contact your health care provider.  ? Warmth.  ? Pus or a bad smell.  Driving  · Ask your health care provider when it is safe for you to drive again after the procedure.  · Do not drive or use heavy machinery while taking prescription pain medicine.  · Do not drive for 24 hours if you were given a medicine to help you relax (sedative) during your procedure.  Activity  · Avoid activities that take a lot of effort for at least 3 days after your procedure.  · Do not lift anything that is heavier than 10 lb (4.5 kg), or the limit that you are told, until your health care provider says that it is safe.  · Return to your normal activities as told by your health care provider. Ask your health care provider what activities are safe for you.  General instructions  · Take over-the-counter and prescription medicines only as told by your health care  provider.  · Do not use any products that contain nicotine or tobacco, such as cigarettes and e-cigarettes. If you need help quitting, ask your health care provider.  · Do not take baths, swim, or use a hot tub until your health care provider approves.  · Do not drink alcohol for 24 hours after your procedure.  · Keep all follow-up visits as told by your health care provider. This is important.  Contact a health care provider if:  · You have redness, mild swelling, or pain around your puncture site.  · You have fluid or blood coming from your puncture site that stops after applying firm pressure to the area.  · Your puncture site feels warm to the touch.  · You have pus or a bad smell coming from your puncture site.  · You have a fever.  · You have chest pain or discomfort that spreads to your neck, jaw, or arm.  · You are sweating a lot.  · You feel nauseous.  · You have a fast or irregular heartbeat.  · You have shortness of breath.  · You are dizzy or light-headed and feel the need to lie down.  · You have pain or numbness in the arm or leg closest to your puncture site.  Get help right away if:  · Your puncture site suddenly swells.  · Your puncture site is bleeding and the bleeding does not stop after applying firm pressure to the area.  These symptoms may represent a serious problem that is an emergency. Do not wait to see if the symptoms will go away. Get medical help right away. Call your local emergency services (911 in the [place] Do not drive yourself to the hospital.  Summary  · After the procedure, it is normal to have bruising and tenderness at the puncture site in your groin, neck, or forearm.  · Check your puncture site every day for signs of infection.  · Get help right away if your puncture site is bleeding and the bleeding does not stop after applying firm pressure to the area. This is a medical emergency.  This information is not intended to replace advice given to you by your health care  provider. Make sure you discuss any questions you have with your health care provider.  Document Released: 03/29/2018 Document Revised: 11/30/2018 Document Reviewed: 03/29/2018  [employer] Patient Education © 2020 [employer]      Omeprazole capsules (sprinkle caps) - Rx  What is this medicine?  OMEPRAZOLE (oh ME pray zol) prevents the production of acid in the stomach. It is used to treat gastroesophageal reflux disease (GERD), ulcers, certain bacteria in the stomach, inflammation of the esophagus, and Zollinger-Neal Syndrome. It is also used to treat other conditions that cause too much stomach acid.  This medicine may be used for other purposes; ask your health care provider or pharmacist if you have questions.  COMMON BRAND NAME(S): Prilosec  What should I tell my health care provider before I take this medicine?  They need to know if you have any of these conditions:  · liver disease  · low levels of magnesium in the blood  · lupus  · an unusual or allergic reaction to omeprazole, other medicines, foods, dyes, or preservatives  · pregnant or trying to get pregnant  · breast-feeding  How should I use this medicine?  Take this medicine by mouth with a glass of water. Follow the directions on the prescription label. Do not cut, crush or chew this medicine. Swallow the capsules whole. You may open the capsule and put the contents in 1 tablespoon of applesauce. Swallow the medicine and applesauce right away. Do not chew the medicine or applesauce. Take this medicine before a meal. Take your medicine at regular intervals. Do not take your medicine more often than directed. Do not stop taking except on your doctor's advice.  A special MedGuide will be given to you by the pharmacist with each prescription and refill. Be sure to read this information carefully each time.  Talk to your pediatrician regarding the use of this medicine in children. While this drug may be prescribed for children as young as 2 years for  selected conditions, precautions do apply.  Overdosage: If you think you have taken too much of this medicine contact a poison control center or emergency room at once.  NOTE: This medicine is only for you. Do not share this medicine with others.  What if I miss a dose?  If you miss a dose, take it as soon as you can. If it is almost time for your next dose, take only that dose. Do not take double or extra doses.  What may interact with this medicine?  Do not take this medicine with any of the following medications:  · atazanavir  · clopidogrel  · nelfinavir  · rilpivirine  This medicine may also interact with the following medications:  · antifungals like itraconazole, ketoconazole, and voriconazole  · certain antivirals for HIV or hepatitis  · certain medicines that treat or prevent blood clots like warfarin  · cilostazol  · citalopram  · cyclosporine  · dasatinib  · digoxin  · disulfiram  · diuretics  · erlotinib  · iron supplements  · medicines for anxiety, panic, and sleep like diazepam  · medicines for seizures like carbamazepine, phenobarbital, phenytoin  · methotrexate  · mycophenolate mofetil  · nilotinib  · rifampin  · [place]'s wort  · tacrolimus  · vitamin B12  This list may not describe all possible interactions. Give your health care provider a list of all the medicines, herbs, non-prescription drugs, or dietary supplements you use. Also tell them if you smoke, drink alcohol, or use illegal drugs. Some items may interact with your medicine.  What should I watch for while using this medicine?  Visit your healthcare professional for regular checks on your progress. Tell your healthcare professional if your symptoms do not start to get better or if they get worse. You may need blood work done while taking this medicine.  This medicine may cause a decrease in vitamin B12. You should make sure that you get enough vitamin B12 while you are taking this medicine. Discuss the foods you eat and the vitamins you  take with your health care professional.  What side effects may I notice from receiving this medicine?  Side effects that you should report to your doctor or health care professional as soon as possible:  · allergic reactions like skin rash, itching or hives, swelling of the face, lips, or tongue  · bone pain  · breathing problems  · fever or sore throat  · joint pain  · rash on cheeks or arms that gets worse in the sun  · redness, blistering, peeling, or loosening of the skin, including inside the mouth  · severe diarrhea  · signs and symptoms of kidney injury like trouble passing urine or change in the amount of urine  · signs and symptoms of low magnesium like muscle cramps; muscle pain; muscle weakness; tremors; seizures; or fast, irregular heartbeat  · stomach polyps  · unusual bleeding or bruising  Side effects that usually do not require medical attention (report to your doctor or health care professional if they continue or are bothersome):  · diarrhea  · dry mouth  · gas  · headache  · nausea  · stomach pain  This list may not describe all possible side effects. Call your doctor for medical advice about side effects. You may report side effects to FDA at 0-693-FDA-8517.  Where should I keep my medicine?  Keep out of the reach of children.  Store at room temperature between 15 and 30 degrees C (59 and 86 degrees F). Protect from light and moisture. Throw away any unused medicine after the expiration date.  NOTE: This sheet is a summary. It may not cover all possible information. If you have questions about this medicine, talk to your doctor, pharmacist, or health care provider.  © 2020 [employer] (2019-10-09 13:30:14)        Sucralfate tablets  What is this medicine?  SUCRALFATE (PEDRO etienne fate) helps to treat ulcers of the intestine.  This medicine may be used for other purposes; ask your health care provider or pharmacist if you have questions.  COMMON BRAND NAME(S): Carafate  What should I tell  my health care provider before I take this medicine?  They need to know if you have any of these conditions:  · kidney disease  · an unusual or allergic reaction to sucralfate, other medicines, foods, dyes, or preservatives  · pregnant or trying to get pregnant  · breast-feeding  How should I use this medicine?  Take this medicine by mouth with a glass of water. Follow the directions on the prescription label. This medicine works best if you take it on an empty stomach, 1 hour before meals. Take your doses at regular intervals. Do not take your medicine more often than directed. Do not stop taking except on your doctor's advice.  Talk to your pediatrician regarding the use of this medicine in children. Special care may be needed.  Overdosage: If you think you have taken too much of this medicine contact a poison control center or emergency room at once.  NOTE: This medicine is only for you. Do not share this medicine with others.  What if I miss a dose?  If you miss a dose, take it as soon as you can. If it is almost time for your next dose, take only that dose. Do not take double or extra doses.  What may interact with this medicine?  · antacid  · cimetidine  · digoxin  · ketoconazole  · phenytoin  · quinidine  · ranitidine  · some antibiotics like ciprofloxacin, norfloxacin, and ofloxacin  · theophylline  · thyroid hormones  · warfarin  This list may not describe all possible interactions. Give your health care provider a list of all the medicines, herbs, non-prescription drugs, or dietary supplements you use. Also tell them if you smoke, drink alcohol, or use illegal drugs. Some items may interact with your medicine.  What should I watch for while using this medicine?  Visit your doctor or health care professional for regular check ups. Let your doctor know if your symptoms do not improve or if you feel worse.  Antacids should not be taken within one half hour before or after this medicine.  What side effects may  I notice from receiving this medicine?  Side effects that you should report to your doctor or health care professional as soon as possible:  · allergic reactions like skin rash, itching or hives, swelling of the face, lips, or tongue  · difficulty breathing  Side effects that usually do not require medical attention (report to your doctor or health care professional if they continue or are bothersome):  · back pain  · constipation  · drowsy, dizzy  · dry mouth  · headache  · stomach upset, gas  · trouble sleeping  This list may not describe all possible side effects. Call your doctor for medical advice about side effects. You may report side effects to FDA at 4-956-PYV-0496.  Where should I keep my medicine?  Keep out of the reach of children.  Store at room temperature between 15 and 30 degrees C (59 and 86 degrees F). Keep container tightly closed. Throw away any unused medicine after the expiration date.  NOTE: This sheet is a summary. It may not cover all possible information. If you have questions about this medicine, talk to your doctor, pharmacist, or health care provider.  © 2020 [employer]/Gold Standard (2009-08-19 15:46:20)    Discharge Instructions    Discharged to home by car with relative. Discharged via wheelchair, hospital escort: Refused.  Special equipment needed: Not Applicable    Be sure to schedule a follow-up appointment with your primary care doctor or any specialists as instructed.     Discharge Plan:   Diet Plan: Discussed  Activity Level: Discussed  Confirmed Follow up Appointment: Appointment Scheduled  Confirmed Symptoms Management: Discussed  Medication Reconciliation Updated: Yes    I understand that a diet low in cholesterol, fat, and sodium is recommended for good health. Unless I have been given specific instructions below for another diet, I accept this instruction as my diet prescription.   Other diet:     Special Instructions: None    · Is patient discharged on Warfarin / Coumadin?    No     Depression / Suicide Risk    As you are discharged from this [facility] facility, it is important to learn how to keep safe from harming yourself.    Recognize the warning signs:  · Abrupt changes in personality, positive or negative- including increase in energy   · Giving away possessions  · Change in eating patterns- significant weight changes-  positive or negative  · Change in sleeping patterns- unable to sleep or sleeping all the time   · Unwillingness or inability to communicate  · Depression  · Unusual sadness, discouragement and loneliness  · Talk of wanting to die  · Neglect of personal appearance   · Rebelliousness- reckless behavior  · Withdrawal from people/activities they love  · Confusion- inability to concentrate     If you or a loved one observes any of these behaviors or has concerns about self-harm, here's what you can do:  · Talk about it- your feelings and reasons for harming yourself  · Remove any means that you might use to hurt yourself (examples: pills, rope, extension cords, firearm)  · Get professional help from the community (Mental Health, Substance Abuse, psychological counseling)  · Do not be alone:Call your Safe Contact- someone whom you trust who will be there for you.  · Call your local CRISIS HOTLINE 158-2382 or 247-576-0588  · Call your local Children's Mobile Crisis Response Team [place] (098) 958-4048 or www.[employer]  · Call the toll free National Suicide Prevention Hotlines   · National Suicide Prevention Lifeline 619-529-KLVJ (0828)  · National Hope Line Network 800-SUICIDE (387-7108)

## 2021-04-24 NOTE — PROGRESS NOTES
Monitor Room notified this RN that pt's HR went up to 130 in atrial fibrillation. Pt's HR went back to 70s SR. Cardiologist To updated and advised not to order EKG.

## 2021-04-24 NOTE — PROGRESS NOTES
Received bedside report from RN, pt care assumed. No signs of acute distress noted at this time. Pt denies any additional needs at this time. Bed in lowest position, pt educated on fall risk and verbalized understanding, call light within reach, hourly rounding initiated.

## 2021-04-26 ENCOUNTER — TELEPHONE (OUTPATIENT)
Dept: CARDIOLOGY | Facility: MEDICAL CENTER | Age: 76
End: 2021-04-26

## 2021-04-26 NOTE — TELEPHONE ENCOUNTER
SUSAN    Pt called stating she is out of eliquis and needs a new prescription sent to [facility] on [address] [facility].    Thank you

## 2021-04-27 ENCOUNTER — HOSPITAL ENCOUNTER (OUTPATIENT)
Dept: LAB | Facility: MEDICAL CENTER | Age: 76
End: 2021-04-27
Attending: NURSE PRACTITIONER
Payer: MEDICARE

## 2021-04-27 DIAGNOSIS — Z98.890 S/P ABLATION OF ATRIAL FIBRILLATION: ICD-10-CM

## 2021-04-27 DIAGNOSIS — I48.0 PAROXYSMAL ATRIAL FIBRILLATION (HCC): ICD-10-CM

## 2021-04-27 DIAGNOSIS — J96.11 CHRONIC RESPIRATORY FAILURE WITH HYPOXIA (HCC): ICD-10-CM

## 2021-04-27 DIAGNOSIS — Z86.79 S/P ABLATION OF ATRIAL FIBRILLATION: ICD-10-CM

## 2021-04-27 DIAGNOSIS — Z79.899 HIGH RISK MEDICATION USE: ICD-10-CM

## 2021-04-27 DIAGNOSIS — Z79.01 CHRONIC ANTICOAGULATION: ICD-10-CM

## 2021-04-27 PROCEDURE — 36415 COLL VENOUS BLD VENIPUNCTURE: CPT

## 2021-04-27 PROCEDURE — 83735 ASSAY OF MAGNESIUM: CPT

## 2021-04-27 PROCEDURE — 80048 BASIC METABOLIC PNL TOTAL CA: CPT

## 2021-04-28 LAB
ANION GAP SERPL CALC-SCNC: 12 MMOL/L (ref 7–16)
BUN SERPL-MCNC: 20 MG/DL (ref 8–22)
CALCIUM SERPL-MCNC: 10.7 MG/DL (ref 8.5–10.5)
CHLORIDE SERPL-SCNC: 98 MMOL/L (ref 96–112)
CO2 SERPL-SCNC: 33 MMOL/L (ref 20–33)
CREAT SERPL-MCNC: 1 MG/DL (ref 0.5–1.4)
GLUCOSE SERPL-MCNC: 156 MG/DL (ref 65–99)
MAGNESIUM SERPL-MCNC: 1.7 MG/DL (ref 1.5–2.5)
POTASSIUM SERPL-SCNC: 3.2 MMOL/L (ref 3.6–5.5)
SODIUM SERPL-SCNC: 143 MMOL/L (ref 135–145)

## 2021-04-29 ENCOUNTER — OFFICE VISIT (OUTPATIENT)
Dept: CARDIOLOGY | Facility: MEDICAL CENTER | Age: 76
End: 2021-04-29
Payer: MEDICARE

## 2021-04-29 VITALS
SYSTOLIC BLOOD PRESSURE: 114 MMHG | DIASTOLIC BLOOD PRESSURE: 68 MMHG | OXYGEN SATURATION: 93 % | HEART RATE: 71 BPM | BODY MASS INDEX: 34.53 KG/M2 | HEIGHT: 67 IN | WEIGHT: 220 LBS

## 2021-04-29 DIAGNOSIS — Z79.01 CHRONIC ANTICOAGULATION: ICD-10-CM

## 2021-04-29 DIAGNOSIS — Z86.79 S/P ABLATION OF ATRIAL FIBRILLATION: ICD-10-CM

## 2021-04-29 DIAGNOSIS — I27.21 PAH (PULMONARY ARTERY HYPERTENSION) (HCC): ICD-10-CM

## 2021-04-29 DIAGNOSIS — R06.02 SHORTNESS OF BREATH: ICD-10-CM

## 2021-04-29 DIAGNOSIS — I10 ESSENTIAL HYPERTENSION: ICD-10-CM

## 2021-04-29 DIAGNOSIS — Z79.899 HIGH RISK MEDICATION USE: ICD-10-CM

## 2021-04-29 DIAGNOSIS — I48.0 PAROXYSMAL ATRIAL FIBRILLATION (HCC): ICD-10-CM

## 2021-04-29 DIAGNOSIS — E78.2 MIXED HYPERLIPIDEMIA: ICD-10-CM

## 2021-04-29 DIAGNOSIS — I27.20 PROGRESSIVE PULMONARY HYPERTENSION (HCC): ICD-10-CM

## 2021-04-29 DIAGNOSIS — Z98.890 S/P ABLATION OF ATRIAL FIBRILLATION: ICD-10-CM

## 2021-04-29 PROCEDURE — 93000 ELECTROCARDIOGRAM COMPLETE: CPT | Performed by: INTERNAL MEDICINE

## 2021-04-29 PROCEDURE — 99214 OFFICE O/P EST MOD 30 MIN: CPT | Performed by: NURSE PRACTITIONER

## 2021-04-29 RX ORDER — TORSEMIDE 20 MG/1
40 TABLET ORAL
Qty: 30 TABLET | Refills: 3 | Status: SHIPPED | OUTPATIENT
Start: 2021-04-29 | End: 2021-05-24 | Stop reason: DRUGHIGH

## 2021-04-29 RX ORDER — POTASSIUM CHLORIDE 20 MEQ/1
40 TABLET, EXTENDED RELEASE ORAL 2 TIMES DAILY
Qty: 360 TABLET | Refills: 3 | Status: SHIPPED | OUTPATIENT
Start: 2021-04-29 | End: 2021-06-11

## 2021-04-29 ASSESSMENT — ENCOUNTER SYMPTOMS
FEVER: 0
ABDOMINAL PAIN: 0
PND: 0
MYALGIAS: 0
PALPITATIONS: 1
COUGH: 0
DIZZINESS: 1
SHORTNESS OF BREATH: 1
ORTHOPNEA: 1
ROS GI COMMENTS: ABDOMINAL BLOATING
CLAUDICATION: 0

## 2021-04-29 ASSESSMENT — FIBROSIS 4 INDEX: FIB4 SCORE: 2.01

## 2021-04-29 NOTE — PROGRESS NOTES
Chief Complaint   Patient presents with   • Atrial Fibrillation   • Anticoagulation   • HTN (Controlled)       Subjective:   Zeinab Loza is a 76 y.o. female who presents today for follow-up on her pulmonary hypertension, A. Fib with her daughter, Vernell.    Patient of Dr. Galindo and Dr. Eng.  She was last seen in clinic on 3/16/2021 with Dr. Galindo.  She was scheduled for A fib ablation.     She had a repeat RF ablation with PVI, septal LA flutter and empiric ablation for mitral isthmus flutter by Dr. Galindo on 4/22/2021.    She did have postprocedure proximal atrial tachycardia.  Patient was discharged home on a beta-blocker.    Patient comes in the office today reporting some weight gain over the past couple of weeks.  She was weighing around 203-204 pounds prior to her ablation, but now is weighing up to 218 pounds at home.    She does report some abdominal bloating, palpitations, orthopnea, edema, shortness of breath, Dizziness,  or bruising to her right upper thigh.    She continues to report compliance with sodium intake.    She reports she is only taking 20 mEq of potassium twice a day.    She does continue to follow with pulmonary at [facility] with Dr. Zelaya.  Her next appointment is in June virtually.    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 5.19 L/min, Odin C.I 2.39   PVR 4.8 michelle    -Hypertension    -Paroxysmal A. fib, started on Tikosyn on 10/21/2020,  on Eliquis    -Hyperlipidemia: Taking rosuvastatin    -Sleep apnea, uses BiPAP with oxygen bleed in 5L, followed by sleep medicine    Past Medical History:   Diagnosis Date   • Aneurysm artery, celiac (HCC) 2019   • Aneurysm of right renal artery (HCC)    • Atrial fibrillation (HCC)    • Breath shortness     5L O2 all the time   • Chickenpox    • Congestive heart failure (HCC)    • Diastolic heart failure (HCC)    • Nauruan measles    • Heart burn    • Heart valve disease     MV repair   • Hypertension, essential    • Pulmonary hypertension (HCC)    • Sleep apnea     Bipap   • Snoring    • Warfarin anticoagulation      Past Surgical History:   Procedure Laterality Date   • ANKLE ORIF Left 8/5/2020    Procedure: ORIF, ANKLE;  Surgeon: Tk Humphreys M.D.;  Location: SURGERY [facility];  Service: Orthopedics   • HYSTERECTOMY LAPAROSCOPY     • MITRAL VALVE REPAIR     • OTHER      neck surgery   • TONSILLECTOMY     • WRIST FUSION       History reviewed. No pertinent family history.  Social History     Socioeconomic History   • Marital status:      Spouse name: Not on file   • Number of children: Not on file   • Years of education: Not on file   • Highest education level: Not on file   Occupational History   • Not on file   Tobacco Use   • Smoking status: Never Smoker   • Smokeless tobacco: Never Used   Substance and Sexual Activity   • Alcohol use: Yes     Alcohol/week: 4.2 oz     Types: 7 Shots of liquor per week     Comment: 1 drink a night   • Drug use: No   • Sexual activity: Not on file   Other Topics Concern   • Not on file   Social History Narrative   • Not on file     Social Determinants of Health     Financial Resource Strain:    • Difficulty of Paying Living Expenses:    Food Insecurity:    • Worried About Running Out of Food in the Last Year:    • Ran Out of Food in the Last Year:    Transportation Needs:    • Lack of Transportation (Medical):    • Lack of Transportation (Non-Medical):       Physical Activity:    • Days of Exercise per Week:    • Minutes of Exercise per Session:    Stress:    • Feeling of Stress :    Social Connections:    • Frequency of Communication with Friends and Family:    • Frequency of Social Gatherings with Friends and Family:    • Attends Yaz[employer] Services:    • Active Member of Clubs or Organizations:    • Attends Club or Organization Meetings:    • Marital Status:    Intimate Partner Violence:    • Fear of Current or Ex-Partner:    • Emotionally Abused:    • Physically Abused:    • Sexually Abused:      Allergies   Allergen Reactions   • Betapace [Sotalol] Anaphylaxis   • Tikosyn [Dofetilide] Swelling     TONGUE SWELL.    • Zolpidem Tartrate Unspecified     Lightheaded and confusion     Outpatient Encounter Medications as of 4/29/2021   Medication Sig Dispense Refill   • potassium chloride SA (KDUR) 20 MEQ Tab CR Take 2 Tablets by mouth 2 times a day. 360 tablet 3   • torsemide (DEMADEX) 20 MG Tab Take 2 Tablets by mouth 1 time a day as needed (as needed in addition to Regular torsemide dose). 30 tablet 3   • apixaban (ELIQUIS) 5mg Tab Take 1 tablet by mouth 2 times a day. 180 tablet 3   • metoprolol tartrate (LOPRESSOR) 50 MG Tab Take 1 tablet by mouth 2 times a day. 60 tablet 11   • sucralfate (CARAFATE) 1 GM Tab Take 1 tablet by mouth 4 Times a Day,Before Meals and at Bedtime. Take as directed x 2 weeks post procedure 56 tablet 0   • valacyclovir (VALTREX) 1 GM Tab Take 1 tablet by mouth every 8 hours as needed.     • rosuvastatin (CRESTOR) 20 MG Tab Take 1 Tab by mouth every evening. 90 Tab 3   • HYDROcodone-acetaminophen (NORCO) 7.5-325 MG per tablet Take 1 tablet by mouth every 8 hours as needed. Indications: Pain     • torsemide (DEMADEX) 20 MG Tab Take 2 Tabs by mouth 2 Times a Day. 120 Tab 11   • Tadalafil, PAH, (ADCIRCA) 20 MG Tab Take 40 mg by mouth every bedtime. Indications: Pulmonary Arterial Hypertension     • PARoxetine (PAXIL) 20 MG Tab Take 20 mg by  "mouth every morning.     • levothyroxine (SYNTHROID) 75 MCG Tab Take 75 mcg by mouth Every morning on an empty stomach.     • Cholecalciferol (VITAMIN D) 2000 units Cap Take 2,000 Units by mouth every day.     • [DISCONTINUED] potassium chloride SA (KDUR) 20 MEQ Tab CR Take 20 mEq by mouth. 3 tab po am, 3 tab po pm. (6 TAB TOTAL)       No facility-administered encounter medications on file as of 4/29/2021.     Review of Systems   Constitutional: Positive for malaise/fatigue. Negative for fever.   Respiratory: Positive for shortness of breath. Negative for cough.    Cardiovascular: Positive for palpitations (occ), orthopnea (elevated bed) and leg swelling (ankle). Negative for chest pain, claudication and PND.   Gastrointestinal: Negative for abdominal pain.        Abdominal bloating    Musculoskeletal: Negative for myalgias.   Skin:        Right thigh bruising   Neurological: Positive for dizziness (with changing positions quickly).   All other systems reviewed and are negative.       Objective:   /68 (BP Location: Left arm, Patient Position: Sitting, BP Cuff Size: Adult)   Pulse 71   Ht 1.702 m (5' 7\")   Wt 99.8 kg (220 lb)   SpO2 93%   BMI 34.46 kg/m²     Physical Exam   Constitutional: She is oriented to person, place, and time. She appears well-developed and well-nourished.   Using a wheelchair for ambulation   HENT:   Head: Normocephalic and atraumatic.   Eyes: Pupils are equal, round, and reactive to light. EOM are normal.   Neck: JVD present.   Cardiovascular: Normal rate, regular rhythm and normal heart sounds.  Occasional extrasystoles are present.   Pulmonary/Chest: Effort normal and breath sounds normal. No respiratory distress. She has no wheezes. She has no rales.   Abdominal: Soft. Bowel sounds are normal.   Musculoskeletal:         General: No edema.      Cervical back: Normal range of motion and neck supple.   Neurological: She is alert and oriented to person, place, and time.   Skin: Skin " is warm and dry. Bruising noted.        Bruising at right medial thigh   Psychiatric: She has a normal mood and affect. Her behavior is normal.   Vitals reviewed.        Lab Results   Component Value Date/Time    CHOLSTRLTOT 213 (H) 09/30/2020 09:55 AM     (H) 09/30/2020 09:55 AM    HDL 53 09/30/2020 09:55 AM    TRIGLYCERIDE 146 09/30/2020 09:55 AM       Lab Results   Component Value Date/Time    SODIUM 143 04/27/2021 12:30 PM    POTASSIUM 3.2 (L) 04/27/2021 12:30 PM    CHLORIDE 98 04/27/2021 12:30 PM    CO2 33 04/27/2021 12:30 PM    GLUCOSE 156 (H) 04/27/2021 12:30 PM    BUN 20 04/27/2021 12:30 PM    CREATININE 1.00 04/27/2021 12:30 PM     Lab Results   Component Value Date/Time    ALKPHOSPHAT 87 04/16/2021 01:19 PM    ASTSGOT 25 04/16/2021 01:19 PM    ALTSGPT 21 04/16/2021 01:19 PM    TBILIRUBIN 0.5 04/16/2021 01:19 PM      Transthoracic Echo Report 1/16/2020  Normal left ventricular systolic function. Left ventricular ejection fraction is visually estimated to be 65%.  A reliable estimation of diastolic function cannot be made due to mitral valve disease.  Normal inferior vena cava size and inspiratory collapse.  Known mitral valve repair which is functioning normally with   appropriate transvalvular gradient. Mean transvalvular gradient is 3 mmHg at a heart rate of 66 BPM.  Estimated right ventricular systolic pressure  is 32 mmHg.     Transthoracic Echo Report 8/5/2020  Normal left ventricular size, wall thickness, and systolic function.  Dilated right ventricle with preserved systolic function.  No significant valvular pathology.  Estimated right ventricular systolic pressure  is 25 mmHg; normal.  Normal pericardium without effusion.     No prior study is available for comparison.      Transthoracic Echo Report 3/5/2021  Normal left ventricular size and systolic function.  Mild concentric left ventricular hypertrophy.  Left ventricular ejection fraction is visually estimated to be 60%.  Known mitral  valve repair which is functioning normally with   appropriate transvalvular gradient.  Mild mitral regurgitation.  Normal inferior vena cava size and inspiratory collapse.     Assessment:     1. High risk medication use  EKG    Basic Metabolic Panel   2. Paroxysmal atrial fibrillation (HCC)  EKG    Basic Metabolic Panel   3. PAH (pulmonary artery hypertension) (HCC)     4. Progressive pulmonary hypertension (HCC)     5. Shortness of breath     6. S/P repeat ablation of atrial fibrillation/atrial flutter     7. Mixed hyperlipidemia     8. Essential hypertension     9. Chronic anticoagulation         Medical Decision Making:  Today's Assessment / Status / Plan:   1.  Pulmonary hypertension, WHO group 1-2, functional class III:  -Increase torsemide to 60 mg for 1 week, then decrease back down to 40 mg twice a day day.  If weight gain continues, worsens or does not improve in 1 week, contact our office for further recommendations.  Possible switch over to Bumex???  -Increase potassium to 40 mEq twice a day   -Obtain a BMP in 2 weeks  -Continue tadalafil 40 mg daily  -Unable to take Letairis due to hypotension  -Encourage patient to monitor sodium intake  -Reinforced s/sx of worsening symptoms with patient and weight monitoring. Pt verbalizes understanding. Pt to call office or RTC if present.  -Continue to follow up with [facility]     2.  Sleep apnea:  -Continue BiPAP with oxygen bleeding    3.  Atrial fibrillation: EKG today shows sinus rhythm 62 with PVCs  -Patient had A. fib ablation on 4/22/2021  -Has a follow-up with EP on 5/21/2021  -Continue Eliquis 5 mg twice a day  -Continue metoprolol 50 mg twice a day    4.  Hyperlipidemia: Last  on 9/30/2020  -Continue rosuvastatin 20 mg daily  -Will obtain follow-up lipid panel in the future    5.  Hypertension: BP stable  -Continue metoprolol 50 mg twice a day    BMP and magnesium level in 3 months.    FU in clinic in 2 months with Dr. Eng and labs in 2 weeks.  Sooner if needed.    Patient verbalizes understanding and agrees with the plan of care.     PLEASE NOTE: This Note was created using voice recognition Software. I have made every reasonable attempt to correct obvious errors, but I expect that there are errors of grammar and possibly content that I did not discover before finalizing the note

## 2021-04-29 NOTE — PATIENT INSTRUCTIONS
Increase Torsemide to 60 mg Twice a day for 7 days. Then resume 40 mg twice a day  Increase Potassium 40 mEq twice a day   Labs in 2 weeks

## 2021-04-30 LAB — EKG IMPRESSION: NORMAL

## 2021-05-04 ENCOUNTER — TELEPHONE (OUTPATIENT)
Dept: CARDIOLOGY | Facility: MEDICAL CENTER | Age: 76
End: 2021-05-04

## 2021-05-04 NOTE — TELEPHONE ENCOUNTER
GERALDINE    Patient would like to speak about her weight loss and the torsemide. Please call her back.    Thank you,    Kelly LAWRENCE

## 2021-05-05 ENCOUNTER — PATIENT MESSAGE (OUTPATIENT)
Dept: CARDIOLOGY | Facility: MEDICAL CENTER | Age: 76
End: 2021-05-05

## 2021-05-05 NOTE — TELEPHONE ENCOUNTER
----- Message from Octavia Kenney R.N. sent at 5/5/2021  4:17 PM PDT -----  Danica can you schedule this appt for us please? THANK YOU.     ----- Message -----  From: KIRILL Kramer  Sent: 5/4/2021   6:16 PM PDT  To: CATALINA Dumont, Octavia Kenney R.N., #    Dear Octavia,   Please schedule sooner appt.     Thanks for the update!     KIRILL Kramer   [facility]  [phone] - [phone]  ----- Message -----  From: CATALINA Dumont  Sent: 4/24/2021  12:50 PM PDT  To: KIRILL Hogan, #    Macarena Reynoso I scheduled this patient with you next week.  She has seen you in the past.  She is concerned about her fluid status.  I discharged her on her home dose of torsemide and KCl.  She is supposed to get a BMP in a week.Yaritza  You put in your note that she should go home on metoprolol 50 mg twice daily but in the reconciliation you did not order it.  She has been in and out of atrial fib flutter so I discharged her on metoprolol 50 mg twice daily.  She has an appointment with you in 1 month which was already scheduled.  If you would like something different please contact the patient.Thank you very much. Lashaun

## 2021-05-05 NOTE — TELEPHONE ENCOUNTER
Attempted to contact patient at [phone], VM is full and unable to leave message, Sent a mychart to patient.

## 2021-05-06 ENCOUNTER — TELEPHONE (OUTPATIENT)
Dept: CARDIOLOGY | Facility: MEDICAL CENTER | Age: 76
End: 2021-05-06

## 2021-05-06 NOTE — TELEPHONE ENCOUNTER
ED requested patient be seen sooner than the 5/21 appt. Called pt to schedule and got voicemail. I made her an appt for tmrw (Friday). Asked pt to call back if they cant make it-dl

## 2021-05-06 NOTE — PATIENT COMMUNICATION
"Medical Decision Making:  Today's Assessment / Status / Plan:   1.  Pulmonary hypertension, WHO group 1-2, functional class III:  -Increase torsemide to 60 mg for 1 week, then decrease back down to 40 mg twice a day day.  If weight gain continues, worsens or does not improve in 1 week, contact our office for further recommendations.  Possible switch over to Bumex???  -Increase potassium to 40 mEq twice a day   -Obtain a BMP in 2 weeks  -Continue tadalafil 40 mg daily  -Unable to take Letairis due to hypotension  -Encourage patient to monitor sodium intake  -Reinforced s/sx of worsening symptoms with patient and weight monitoring. Pt verbalizes understanding. Pt to call office or RTC if present.  -Continue to follow up with [facility]       S/W pt, she is concerned because she is not showing much improvement with increased dose of Torsemide.   Pt wt this morning 212.8  212.8 yesterday  214.6 on 4/29/21 home scale 220 on office scale.       Salt intake as been low. Pt has been really watching it.     Fluid intake, drinks about 4 16 oz glasses/day     Edema noted in stomach area mostly due to having her feet elevated most of the time but if she leaves her feet down they swell up like \"balloons.\" Improved energy level, SOB.  "

## 2021-05-14 ENCOUNTER — TELEPHONE (OUTPATIENT)
Dept: CARDIOLOGY | Facility: MEDICAL CENTER | Age: 76
End: 2021-05-14

## 2021-05-14 NOTE — TELEPHONE ENCOUNTER
Chart Prep    Called patient to see if BMP ordered by RS has been done, patient did not know that it needed to be done, she will go to the UC beginning of next week and will have that drawn. Confirmed appt with ED 5/21/21 @ 12:45pm with patient.

## 2021-05-17 ENCOUNTER — HOSPITAL ENCOUNTER (OUTPATIENT)
Dept: LAB | Facility: MEDICAL CENTER | Age: 76
End: 2021-05-17
Attending: NURSE PRACTITIONER
Payer: MEDICARE

## 2021-05-17 DIAGNOSIS — I48.0 PAROXYSMAL ATRIAL FIBRILLATION (HCC): ICD-10-CM

## 2021-05-17 DIAGNOSIS — Z79.899 HIGH RISK MEDICATION USE: ICD-10-CM

## 2021-05-17 LAB
ANION GAP SERPL CALC-SCNC: 10 MMOL/L (ref 7–16)
BUN SERPL-MCNC: 31 MG/DL (ref 8–22)
CALCIUM SERPL-MCNC: 10.9 MG/DL (ref 8.5–10.5)
CHLORIDE SERPL-SCNC: 97 MMOL/L (ref 96–112)
CO2 SERPL-SCNC: 28 MMOL/L (ref 20–33)
CREAT SERPL-MCNC: 1.11 MG/DL (ref 0.5–1.4)
FASTING STATUS PATIENT QL REPORTED: NORMAL
GLUCOSE SERPL-MCNC: 129 MG/DL (ref 65–99)
POTASSIUM SERPL-SCNC: 3.7 MMOL/L (ref 3.6–5.5)
SODIUM SERPL-SCNC: 135 MMOL/L (ref 135–145)

## 2021-05-17 PROCEDURE — 36415 COLL VENOUS BLD VENIPUNCTURE: CPT

## 2021-05-17 PROCEDURE — 80048 BASIC METABOLIC PNL TOTAL CA: CPT

## 2021-05-18 ENCOUNTER — OFFICE VISIT (OUTPATIENT)
Dept: URGENT CARE | Facility: PHYSICIAN GROUP | Age: 76
End: 2021-05-18
Payer: MEDICARE

## 2021-05-18 VITALS
OXYGEN SATURATION: 100 % | TEMPERATURE: 98 F | RESPIRATION RATE: 20 BRPM | BODY MASS INDEX: 34.21 KG/M2 | HEART RATE: 125 BPM | DIASTOLIC BLOOD PRESSURE: 56 MMHG | WEIGHT: 218 LBS | HEIGHT: 67 IN | SYSTOLIC BLOOD PRESSURE: 108 MMHG

## 2021-05-18 DIAGNOSIS — J34.89 NOSE DISCHARGE: ICD-10-CM

## 2021-05-18 DIAGNOSIS — I27.21 PAH (PULMONARY ARTERY HYPERTENSION) (HCC): ICD-10-CM

## 2021-05-18 DIAGNOSIS — I48.91 ATRIAL FIBRILLATION, UNSPECIFIED TYPE (HCC): ICD-10-CM

## 2021-05-18 DIAGNOSIS — Z86.14 HISTORY OF MRSA INFECTION: ICD-10-CM

## 2021-05-18 DIAGNOSIS — R79.81 LOW OXYGEN SATURATION: ICD-10-CM

## 2021-05-18 PROCEDURE — 93000 ELECTROCARDIOGRAM COMPLETE: CPT | Performed by: NURSE PRACTITIONER

## 2021-05-18 PROCEDURE — 99214 OFFICE O/P EST MOD 30 MIN: CPT | Performed by: NURSE PRACTITIONER

## 2021-05-18 ASSESSMENT — ENCOUNTER SYMPTOMS
BRUISES/BLEEDS EASILY: 1
ORTHOPNEA: 0
NECK PAIN: 0
EYE DISCHARGE: 0
HEADACHES: 0
EYE REDNESS: 0
PALPITATIONS: 0
COUGH: 0
DIZZINESS: 0
FEVER: 0
FOCAL WEAKNESS: 0
SHORTNESS OF BREATH: 0
SENSORY CHANGE: 0
TINGLING: 0
SORE THROAT: 0
CHILLS: 0
SPEECH CHANGE: 0
WEAKNESS: 0
HEARTBURN: 0
VOMITING: 0
MYALGIAS: 0
SINUS PAIN: 0
NAUSEA: 0

## 2021-05-18 ASSESSMENT — FIBROSIS 4 INDEX: FIB4 SCORE: 2.01

## 2021-05-18 NOTE — PROGRESS NOTES
"Subjective:      eZinab Loza is a 76 y.o. female who presents with Other (sorenss in nose x 1 week)            HPI  States experiencing soreness in her nostrils. States has had this in the past \"years ago\" and found this to be a Staph infection. No purulent nasal discharge. Uses nasal cannula for oxygen daily. Uses no lubrication to nostrils. No epistaxis, takes Eliquis. No sinus facial pressure or ear pain.     PMH:  has a past medical history of Aneurysm artery, celiac (HCC) (2019), Aneurysm of right renal artery (HCC), Atrial fibrillation (HCC), Breath shortness, Chickenpox, Congestive heart failure (HCC), Diastolic heart failure (HCC), French measles, Heart burn, Heart valve disease, Hypertension, essential, Pulmonary hypertension (HCC), Sleep apnea, Snoring, and Warfarin anticoagulation.  MEDS:   Current Outpatient Medications:   •  mupirocin (BACTROBAN) 2 % nasal ointment, May apply thin application to affected nostril twice daily x 7 days., Disp: 1 g, Rfl: 0  •  potassium chloride SA (KDUR) 20 MEQ Tab CR, Take 2 Tablets by mouth 2 times a day., Disp: 360 tablet, Rfl: 3  •  torsemide (DEMADEX) 20 MG Tab, Take 2 Tablets by mouth 1 time a day as needed (as needed in addition to Regular torsemide dose)., Disp: 30 tablet, Rfl: 3  •  apixaban (ELIQUIS) 5mg Tab, Take 1 tablet by mouth 2 times a day., Disp: 180 tablet, Rfl: 3  •  metoprolol tartrate (LOPRESSOR) 50 MG Tab, Take 1 tablet by mouth 2 times a day., Disp: 60 tablet, Rfl: 11  •  sucralfate (CARAFATE) 1 GM Tab, Take 1 tablet by mouth 4 Times a Day,Before Meals and at Bedtime. Take as directed x 2 weeks post procedure, Disp: 56 tablet, Rfl: 0  •  valacyclovir (VALTREX) 1 GM Tab, Take 1 tablet by mouth every 8 hours as needed., Disp: , Rfl:   •  rosuvastatin (CRESTOR) 20 MG Tab, Take 1 Tab by mouth every evening., Disp: 90 Tab, Rfl: 3  •  HYDROcodone-acetaminophen (NORCO) 7.5-325 MG per tablet, Take 1 tablet by mouth every 8 hours as needed. " Indications: Pain, Disp: , Rfl:   •  torsemide (DEMADEX) 20 MG Tab, Take 2 Tabs by mouth 2 Times a Day., Disp: 120 Tab, Rfl: 11  •  Tadalafil, PAH, (ADCIRCA) 20 MG Tab, Take 40 mg by mouth every bedtime. Indications: Pulmonary Arterial Hypertension, Disp: , Rfl:   •  PARoxetine (PAXIL) 20 MG Tab, Take 20 mg by mouth every morning., Disp: , Rfl:   •  levothyroxine (SYNTHROID) 75 MCG Tab, Take 75 mcg by mouth Every morning on an empty stomach., Disp: , Rfl:   •  Cholecalciferol (VITAMIN D) 2000 units Cap, Take 2,000 Units by mouth every day., Disp: , Rfl:   ALLERGIES:   Allergies   Allergen Reactions   • Betapace [Sotalol] Anaphylaxis   • Tikosyn [Dofetilide] Swelling     TONGUE SWELL.    • Zolpidem Tartrate Unspecified     Lightheaded and confusion     SURGHX:   Past Surgical History:   Procedure Laterality Date   • ANKLE ORIF Left 8/5/2020    Procedure: ORIF, ANKLE;  Surgeon: Tk Humphreys M.D.;  Location: SURGERY [facility];  Service: Orthopedics   • HYSTERECTOMY LAPAROSCOPY     • MITRAL VALVE REPAIR     • OTHER      neck surgery   • TONSILLECTOMY     • WRIST FUSION       SOCHX:  reports that she has never smoked. She has never used smokeless tobacco. She reports current alcohol use of about 4.2 oz of alcohol per week. She reports that she does not use drugs.  FH: Family history was reviewed, no pertinent findings to report    Review of Systems   Constitutional: Negative for chills, fever and malaise/fatigue.   HENT: Negative for congestion, ear pain, nosebleeds, sinus pain and sore throat.    Eyes: Negative for discharge and redness.   Respiratory: Negative for cough and shortness of breath.    Cardiovascular: Negative for chest pain, palpitations, orthopnea and leg swelling.   Gastrointestinal: Negative for heartburn, nausea and vomiting.   Musculoskeletal: Negative for myalgias and neck pain.   Skin: Negative for itching and rash.   Neurological: Negative for dizziness, tingling, sensory change, speech  "change, focal weakness, weakness and headaches.   Endo/Heme/Allergies: Negative for environmental allergies. Bruises/bleeds easily.   All other systems reviewed and are negative.         Objective:     /56 (BP Location: Left arm, Patient Position: Sitting, BP Cuff Size: Adult)   Pulse (!) 125   Temp 36.7 °C (98 °F) (Temporal)   Resp 20   Ht 1.702 m (5' 7\")   Wt 98.9 kg (218 lb)   SpO2 100% Comment: sitting 2 litres oxygen  BMI 34.14 kg/m²      Physical Exam  Vitals reviewed.   Constitutional:       General: She is awake. She is not in acute distress.     Appearance: Normal appearance. She is well-developed. She is not ill-appearing, toxic-appearing or diaphoretic.   HENT:      Head: Normocephalic.      Nose: Congestion and rhinorrhea present. No nasal deformity, septal deviation, signs of injury, laceration, nasal tenderness or mucosal edema.      Right Nostril: No foreign body, epistaxis, septal hematoma or occlusion.      Left Nostril: No foreign body, epistaxis, septal hematoma or occlusion.      Right Turbinates: Not enlarged, swollen or pale.      Left Turbinates: Swollen. Not enlarged or pale.      Comments: Left nostril has erythema and mild swelling or turbinates, mild white discharge.   Eyes:      Conjunctiva/sclera: Conjunctivae normal.      Pupils: Pupils are equal, round, and reactive to light.   Musculoskeletal:         General: Normal range of motion.      Cervical back: Normal range of motion and neck supple.   Skin:     General: Skin is warm and dry.   Neurological:      Mental Status: She is alert and oriented to person, place, and time.   Psychiatric:         Behavior: Behavior is cooperative.                        Assessment/Plan:        1. Nose discharge    - mupirocin (BACTROBAN) 2 % nasal ointment; May apply thin application to affected nostril twice daily x 7 days.  Dispense: 1 g; Refill: 0    2. History of MRSA infection    - mupirocin (BACTROBAN) 2 % nasal ointment; May apply " thin application to affected nostril twice daily x 7 days.  Dispense: 1 g; Refill: 0    3. Low oxygen saturation    - EKG - Clinic Performed    4. PAH (pulmonary artery hypertension) (HCC)      5. Atrial fibrillation, unspecified type (HCC)    - EKG - Clinic Performed    -Incidental finding of low oxygenation on arrival down to 85% on 5LNC, history of PAH, recent ablation for a-fib a week ago and reintroduction of beta blocker. Patient is asymptomatic with low oxygenation including dizziness, chest pain/tightness or palpitations, no extremity swelling. Transient hypoxia due to exertion as she gets this way on exertion. Daughter present at end of visit. EKG today showed previous findings in past this year. No present a-fib.   -Will treat patient for possible nasal infection but may be due to oc term nasal cannula use as well. Recommend better nasal care with saline flushing and water based nasal lubrication daily.   -Clean nasal passages as well as nasal cannula daily to prevent bacteria growth  -Patient has appt with cardiologist in 2 days, recommend to keep track of pulse oxygenation and discuss with cardiologist as needed if oxygenation stays below 90% as she states her normal is around 95-96% but on exertion decreases to 91%.

## 2021-05-20 ENCOUNTER — TELEPHONE (OUTPATIENT)
Dept: CARDIOLOGY | Facility: MEDICAL CENTER | Age: 76
End: 2021-05-20

## 2021-05-20 NOTE — RESULT ENCOUNTER NOTE
Could you follow up with patient and see how she is doing? Her GFR is slightly reduced from her prior labs. She does have a follow up with EP tomorrow.

## 2021-05-20 NOTE — TELEPHONE ENCOUNTER
"TEODORO Hogan.  Moo Thomas R.N.  Could you follow up with patient and see how she is doing? Her GFR is slightly reduced from her prior labs. She does have a follow up with EP tomorrow.     S/W Patient is experiencing less urination since lowering dose of Torsemide in April. Has gained 19 lbs since then. Eating habits have stayed exactly the same. HR for the last three days has been 100+, EP appt has been moved to 6/22/21.     Blood pressure was \"fine\" two days ago. 108/56 at urgent care on 5/18/21.    Denies shortness of breath. Feet have really swollen, does not wear compression stockings. Sits with legs up most of the time.   "

## 2021-05-24 RX ORDER — TORSEMIDE 20 MG/1
60 TABLET ORAL 2 TIMES DAILY
Qty: 180 TABLET | Refills: 3 | Status: SHIPPED | OUTPATIENT
Start: 2021-05-24 | End: 2021-06-11

## 2021-05-24 NOTE — TELEPHONE ENCOUNTER
Looks like patient appt was cancelled. Please see what dose of diuretic patient is taking.  If taking torsemide 40 mg twice a day, increase to 60 mg twice a day.  Patient needs come back in and see somebody soon.

## 2021-05-24 NOTE — TELEPHONE ENCOUNTER
S/W pt. Aware of medication increase. New RX sent to pharmacy. F/U with GERALDINE scheduled 6/11/21

## 2021-06-11 ENCOUNTER — OFFICE VISIT (OUTPATIENT)
Dept: CARDIOLOGY | Facility: MEDICAL CENTER | Age: 76
End: 2021-06-11
Payer: MEDICARE

## 2021-06-11 VITALS
BODY MASS INDEX: 33.59 KG/M2 | SYSTOLIC BLOOD PRESSURE: 110 MMHG | HEIGHT: 67 IN | DIASTOLIC BLOOD PRESSURE: 70 MMHG | OXYGEN SATURATION: 97 % | WEIGHT: 214 LBS | HEART RATE: 77 BPM

## 2021-06-11 DIAGNOSIS — I10 ESSENTIAL HYPERTENSION: ICD-10-CM

## 2021-06-11 DIAGNOSIS — G47.39 OTHER SLEEP APNEA: ICD-10-CM

## 2021-06-11 DIAGNOSIS — Z98.890 S/P ABLATION OF ATRIAL FIBRILLATION: ICD-10-CM

## 2021-06-11 DIAGNOSIS — Z79.899 HIGH RISK MEDICATION USE: ICD-10-CM

## 2021-06-11 DIAGNOSIS — E78.49 OTHER HYPERLIPIDEMIA: ICD-10-CM

## 2021-06-11 DIAGNOSIS — Z79.01 CHRONIC ANTICOAGULATION: ICD-10-CM

## 2021-06-11 DIAGNOSIS — I48.0 PAROXYSMAL ATRIAL FIBRILLATION (HCC): ICD-10-CM

## 2021-06-11 DIAGNOSIS — Z86.79 S/P ABLATION OF ATRIAL FIBRILLATION: ICD-10-CM

## 2021-06-11 DIAGNOSIS — I27.21 PAH (PULMONARY ARTERY HYPERTENSION) (HCC): ICD-10-CM

## 2021-06-11 LAB — EKG IMPRESSION: NORMAL

## 2021-06-11 PROCEDURE — 99214 OFFICE O/P EST MOD 30 MIN: CPT | Performed by: NURSE PRACTITIONER

## 2021-06-11 PROCEDURE — 93000 ELECTROCARDIOGRAM COMPLETE: CPT | Performed by: INTERNAL MEDICINE

## 2021-06-11 RX ORDER — TRETINOIN 0.5 MG/G
1 CREAM TOPICAL EVERY EVENING
COMMUNITY
Start: 2021-05-17 | End: 2021-10-20

## 2021-06-11 RX ORDER — CHLORHEXIDINE GLUCONATE ORAL RINSE 1.2 MG/ML
SOLUTION DENTAL
COMMUNITY
Start: 2021-06-04 | End: 2021-06-22

## 2021-06-11 RX ORDER — METHYLPREDNISOLONE 4 MG/1
TABLET ORAL
COMMUNITY
Start: 2021-06-04 | End: 2021-06-22

## 2021-06-11 RX ORDER — POTASSIUM CHLORIDE 20 MEQ/1
60 TABLET, EXTENDED RELEASE ORAL 2 TIMES DAILY
Qty: 180 TABLET | Refills: 11 | Status: SHIPPED | OUTPATIENT
Start: 2021-06-11 | End: 2022-03-08

## 2021-06-11 RX ORDER — METOLAZONE 5 MG/1
5 TABLET ORAL
COMMUNITY
End: 2021-09-24

## 2021-06-11 RX ORDER — BUMETANIDE 2 MG/1
2 TABLET ORAL DAILY
Qty: 60 TABLET | Refills: 11 | Status: SHIPPED | OUTPATIENT
Start: 2021-06-11 | End: 2021-06-14

## 2021-06-11 ASSESSMENT — ENCOUNTER SYMPTOMS
SHORTNESS OF BREATH: 1
ABDOMINAL PAIN: 0
ROS GI COMMENTS: ABDOMINAL BLOATING
COUGH: 0
PND: 0
CLAUDICATION: 0
FEVER: 0
DIZZINESS: 1
MYALGIAS: 0
ORTHOPNEA: 0
WHEEZING: 0
PALPITATIONS: 0

## 2021-06-11 ASSESSMENT — FIBROSIS 4 INDEX: FIB4 SCORE: 2.01

## 2021-06-11 NOTE — PROGRESS NOTES
Chief Complaint   Patient presents with   • Pulmonary Hypertension     F/V DX:High risk medication use   • Atrial Fibrillation       Subjective:   Zeinab Loza is a 76 y.o. female who presents today for follow-up on her pulmonary hypertension, atrial fibrillation with her daughter, Vernell.    Patient of Dr. Galindo and Dr. Eng.  She was last seen in clinic on 4/29/2021.    During that visit, patient was instructed to increase her torsemide to 60 mg for 1 week, but then reduce back down to 40 mg twice a day.  Patient was also encouraged to increase her potassium.  She was sent for follow-up lab testing.    Patient comes in the office today reporting episodes of her heart rates in the 120s 130s.  Patient had gained about 20 pounds and had some leftover metolazone 5 mg which she took 1 dose 2 days ago.  She lost 14 pounds with the 1 dose.    Her weights are now at 208 pounds.  She states her dry weight is around 205 pounds.    Patient reports she has continued to take torsemide 60 mg twice a day and potassium 60 mEq twice a day.    She does feel more energetic with the water weight loss.  She continues to have shortness of breath, knee pain and dizziness with changing positions.  She denies chest pain, palpitations, orthopnea, PND.    She denies any episodes of A. fib.     She continues to report compliance with sodium intake.     She does continue to follow with pulmonary at [facility] with Dr. Zelaya.      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 5.19 L/min, Odin C.I 2.39   PVR 4.8 woods     -Hypertension     -Paroxysmal A. fib, started on Tikosyn on 10/21/2020, on Eliquis; RF ablation with PVI, septal LA flutter and empiric ablation for mitral isthmus flutter by Dr. Galindo on 4/22/2021     -Hyperlipidemia: Taking rosuvastatin     -Sleep apnea, uses BiPAP with oxygen bleed in 5L, followed by sleep medicine       Past Medical History:   Diagnosis Date   • Aneurysm artery, celiac (HCC) 2019   • Aneurysm of right renal artery (HCC)    • Atrial fibrillation (HCC)    • Breath shortness     5L O2 all the time   • Chickenpox    • Congestive heart failure (HCC)    • Diastolic heart failure (HCC)    • Upper sorbian measles    • Heart burn    • Heart valve disease     MV repair   • Hypertension, essential    • Pulmonary hypertension (HCC)    • Sleep apnea     Bipap   • Snoring    • Warfarin anticoagulation      Past Surgical History:   Procedure Laterality Date   • ANKLE ORIF Left 8/5/2020    Procedure: ORIF, ANKLE;  Surgeon: Tk Humphreys M.D.;  Location: SURGERY [facility];  Service: Orthopedics   • HYSTERECTOMY LAPAROSCOPY     • MITRAL VALVE REPAIR     • OTHER      neck surgery   • TONSILLECTOMY     • WRIST FUSION       History reviewed. No pertinent family history.  Social History     Socioeconomic History   • Marital status:      Spouse name: Not on file   • Number of children: Not on file   • Years of education: Not on file   • Highest education level: Not on file   Occupational History   • Not on file   Tobacco Use   • Smoking status: Never Smoker   • Smokeless tobacco: Never Used   Vaping Use   • Vaping Use: Never used   Substance and Sexual Activity   • Alcohol use: Yes     Alcohol/week: 4.2 oz     Types: 7 Shots of liquor per week     Comment: 1 drink a night   • Drug use: No   • Sexual activity: Not on file   Other Topics Concern   • Not on file   Social  History Narrative   • Not on file     Social Determinants of Health     Financial Resource Strain:    • Difficulty of Paying Living Expenses:    Food Insecurity:    • Worried About Running Out of Food in the Last Year:    • Ran Out of Food in the Last Year:    Transportation Needs:    • Lack of Transportation (Medical):    • Lack of Transportation (Non-Medical):    Physical Activity:    • Days of Exercise per Week:    • Minutes of Exercise per Session:    Stress:    • Feeling of Stress :    Social Connections:    • Frequency of Communication with Friends and Family:    • Frequency of Social Gatherings with Friends and Family:    • Attends [employer] Services:    • Active Member of Clubs or Organizations:    • Attends Club or Organization Meetings:    • Marital Status:    Intimate Partner Violence:    • Fear of Current or Ex-Partner:    • Emotionally Abused:    • Physically Abused:    • Sexually Abused:      Allergies   Allergen Reactions   • Betapace [Sotalol] Anaphylaxis   • Tikosyn [Dofetilide] Swelling     TONGUE SWELL.    • Zolpidem Tartrate Unspecified     Lightheaded and confusion     Outpatient Encounter Medications as of 6/11/2021   Medication Sig Dispense Refill   • chlorhexidine (PERIDEX) 0.12 % Solution SWISH AND RINSE 15 ML BY MOUTH TWICE DAILY FOR 2 WEEKS.     • methylPREDNISolone (MEDROL DOSEPAK) 4 MG Tablet Therapy Pack FOLLOW PACKAGE DIRECTIONS     • tretinoin (RETIN-A) 0.05 % cream APPLY TO FACE EVERY 3 NIGHTS AND SLOWLY INCREASE TO NIGHTLY AS IRRITATION ALLOWS.     • metOLazone (ZAROXOLYN) 5 MG Tab Take 5 mg by mouth 1 time a day as needed.     • bumetanide (BUMEX) 2 MG tablet Take 1 tablet by mouth every day. 60 tablet 11   • potassium chloride SA (KDUR) 20 MEQ Tab CR Take 3 Tablets by mouth 2 times a day. 180 tablet 11   • apixaban (ELIQUIS) 5mg Tab Take 1 tablet by mouth 2 times a day. 180 tablet 3   • metoprolol tartrate (LOPRESSOR) 50 MG Tab Take 1 tablet by mouth 2 times a day. 60 tablet 11    • valacyclovir (VALTREX) 1 GM Tab Take 1 tablet by mouth every 8 hours as needed.     • rosuvastatin (CRESTOR) 20 MG Tab Take 1 Tab by mouth every evening. 90 Tab 3   • HYDROcodone-acetaminophen (NORCO) 7.5-325 MG per tablet Take 1 tablet by mouth every 8 hours as needed. Indications: Pain     • Tadalafil, PAH, (ADCIRCA) 20 MG Tab Take 40 mg by mouth every bedtime. Indications: Pulmonary Arterial Hypertension     • PARoxetine (PAXIL) 20 MG Tab Take 20 mg by mouth every morning.     • levothyroxine (SYNTHROID) 75 MCG Tab Take 75 mcg by mouth Every morning on an empty stomach.     • Cholecalciferol (VITAMIN D) 2000 units Cap Take 2,000 Units by mouth every day.     • [DISCONTINUED] triazolam (HALCION) 0.25 MG Tab  (Patient not taking: Reported on 6/11/2021)     • [DISCONTINUED] mupirocin (BACTROBAN) 2 % Ointment APPLY THIN FILM TO THE AFFECTED NOSTRIL TWICE DAILY FOR 7 DAYS (Patient not taking: Reported on 6/11/2021)     • [DISCONTINUED] torsemide (DEMADEX) 20 MG Tab Take 3 Tablets by mouth 2 times a day. 180 tablet 3   • [DISCONTINUED] mupirocin (BACTROBAN) 2 % nasal ointment May apply thin application to affected nostril twice daily x 7 days. (Patient not taking: Reported on 6/11/2021) 1 g 0   • [DISCONTINUED] potassium chloride SA (KDUR) 20 MEQ Tab CR Take 2 Tablets by mouth 2 times a day. 360 tablet 3   • [DISCONTINUED] sucralfate (CARAFATE) 1 GM Tab Take 1 tablet by mouth 4 Times a Day,Before Meals and at Bedtime. Take as directed x 2 weeks post procedure (Patient not taking: Reported on 6/11/2021) 56 tablet 0     No facility-administered encounter medications on file as of 6/11/2021.     Review of Systems   Constitutional: Positive for malaise/fatigue (improved). Negative for fever.   Respiratory: Positive for shortness of breath. Negative for cough and wheezing.    Cardiovascular: Positive for leg swelling. Negative for chest pain, palpitations, orthopnea, claudication and PND.   Gastrointestinal: Negative  "for abdominal pain.        Abdominal bloating    Musculoskeletal: Positive for joint pain (knee pain ). Negative for myalgias.   Neurological: Positive for dizziness (in am ).   All other systems reviewed and are negative.       Objective:   /70 (BP Location: Left arm, Patient Position: Sitting, BP Cuff Size: Adult)   Pulse 77   Ht 1.702 m (5' 7\")   Wt 97.1 kg (214 lb)   SpO2 97%   BMI 33.52 kg/m²     Physical Exam   Constitutional: She is oriented to person, place, and time. She appears well-developed.   HENT:   Head: Normocephalic and atraumatic.   Eyes: Pupils are equal, round, and reactive to light.   Neck: No JVD present.   Cardiovascular: Normal rate, regular rhythm and normal heart sounds.   Pulmonary/Chest: Effort normal and breath sounds normal. No respiratory distress. She has no wheezes. She has no rales.   Abdominal: Soft. Bowel sounds are normal.   Musculoskeletal:      Cervical back: Normal range of motion and neck supple.      Right lower leg: No edema.      Left lower leg: No edema.   Neurological: She is alert and oriented to person, place, and time.   Skin: Skin is warm and dry.   Psychiatric: Her behavior is normal.   Vitals reviewed.    Lab Results   Component Value Date/Time    CHOLSTRLTOT 213 (H) 09/30/2020 09:55 AM     (H) 09/30/2020 09:55 AM    HDL 53 09/30/2020 09:55 AM    TRIGLYCERIDE 146 09/30/2020 09:55 AM       Lab Results   Component Value Date/Time    SODIUM 135 05/17/2021 01:31 PM    POTASSIUM 3.7 05/17/2021 01:31 PM    CHLORIDE 97 05/17/2021 01:31 PM    CO2 28 05/17/2021 01:31 PM    GLUCOSE 129 (H) 05/17/2021 01:31 PM    BUN 31 (H) 05/17/2021 01:31 PM    CREATININE 1.11 05/17/2021 01:31 PM     Lab Results   Component Value Date/Time    ALKPHOSPHAT 87 04/16/2021 01:19 PM    ASTSGOT 25 04/16/2021 01:19 PM    ALTSGPT 21 04/16/2021 01:19 PM    TBILIRUBIN 0.5 04/16/2021 01:19 PM      Transthoracic Echo Report 1/16/2020  Normal left ventricular systolic function. Left " ventricular ejection fraction is visually estimated to be 65%.  A reliable estimation of diastolic function cannot be made due to mitral valve disease.  Normal inferior vena cava size and inspiratory collapse.  Known mitral valve repair which is functioning normally with   appropriate transvalvular gradient. Mean transvalvular gradient is 3 mmHg at a heart rate of 66 BPM.  Estimated right ventricular systolic pressure  is 32 mmHg.     Transthoracic Echo Report 8/5/2020  Normal left ventricular size, wall thickness, and systolic function.  Dilated right ventricle with preserved systolic function.  No significant valvular pathology.  Estimated right ventricular systolic pressure  is 25 mmHg; normal.  Normal pericardium without effusion.     No prior study is available for comparison.      Transthoracic Echo Report 3/5/2021  Normal left ventricular size and systolic function.  Mild concentric left ventricular hypertrophy.  Left ventricular ejection fraction is visually estimated to be 60%.  Known mitral valve repair which is functioning normally with   appropriate transvalvular gradient.  Mild mitral regurgitation.  Normal inferior vena cava size and inspiratory collapse.    Transesophageal Echo Report 4/22/2021  LV function appears preserved. Sigmoid septum.  Known mitral valve repair, mild MR.  NED has been ligated, no residual NED is seen.  No thrombus is seen in the left atrium.  Moderate tricuspid regurgitation.  Trileaflet aortic valve.  No ASD is seen.     Assessment:     1. Paroxysmal atrial fibrillation (HCC)  EKG    bumetanide (BUMEX) 2 MG tablet    Basic Metabolic Panel    potassium chloride SA (KDUR) 20 MEQ Tab CR   2. PAH (pulmonary artery hypertension) (HCC)  bumetanide (BUMEX) 2 MG tablet    Basic Metabolic Panel    potassium chloride SA (KDUR) 20 MEQ Tab CR   3. High risk medication use  bumetanide (BUMEX) 2 MG tablet    Basic Metabolic Panel    potassium chloride SA (KDUR) 20 MEQ Tab CR   4. Other  sleep apnea     5. Chronic anticoagulation     6. Other hyperlipidemia     7. Essential hypertension     8. S/P repeat ablation of atrial fibrillation/atrial flutter         Medical Decision Making:  Today's Assessment / Status / Plan:   1.  Pulmonary hypertension, WHO group 1-2, functional class III:  -Stop torsemide  -Start bumetanide 2 mg twice a day  -Discussed with patient to contact our office if her weights continue to increase with the new medication for further med adjustment  -Okay to continue potassium 60 mEq twice a day for now  -Hold off on taking metolazone unless directed  -Patient to obtain a follow-up BMP next week  -Continue tadalafil 40 mg daily  -Unable to take Letairis due to hypotension  -Encourage patient to monitor sodium intake  -Reinforced s/sx of worsening symptoms with patient and weight monitoring. Pt verbalizes understanding. Pt to call office or RTC if present.  -Continue to follow up with [facility]      2.  Sleep apnea:  -Continue BiPAP with oxygen bleeding     3.  Atrial fibrillation:  Sinus rhythm 73  -Patient had A. fib ablation on 4/22/2021  -Has a follow-up with EP on 6/22/2021  -Continue Eliquis 5 mg twice a day  -Continue metoprolol 50 mg twice a day     4.  Hyperlipidemia: Last  on 9/30/2020  -Continue rosuvastatin 20 mg daily  -Will obtain follow-up lipid panel in the future     5.  Hypertension: BP stable  -Continue metoprolol 50 mg twice a day     FU in clinic in  1 month with labs in 1 week. Sooner if needed.     Patient verbalizes understanding and agrees with the plan of care.      PLEASE NOTE: This Note was created using voice recognition Software. I have made every reasonable attempt to correct obvious errors, but I expect that there are errors of grammar and possibly content that I did not discover before finalizing the note

## 2021-06-22 ENCOUNTER — OFFICE VISIT (OUTPATIENT)
Dept: CARDIOLOGY | Facility: MEDICAL CENTER | Age: 76
End: 2021-06-22
Payer: MEDICARE

## 2021-06-22 VITALS
OXYGEN SATURATION: 97 % | HEART RATE: 130 BPM | DIASTOLIC BLOOD PRESSURE: 86 MMHG | WEIGHT: 214 LBS | HEIGHT: 67 IN | BODY MASS INDEX: 33.59 KG/M2 | SYSTOLIC BLOOD PRESSURE: 120 MMHG

## 2021-06-22 DIAGNOSIS — I48.0 PAROXYSMAL ATRIAL FIBRILLATION (HCC): ICD-10-CM

## 2021-06-22 DIAGNOSIS — Z79.01 CHRONIC ANTICOAGULATION: ICD-10-CM

## 2021-06-22 DIAGNOSIS — I10 ESSENTIAL HYPERTENSION: ICD-10-CM

## 2021-06-22 DIAGNOSIS — Z98.890 S/P ABLATION OF ATRIAL FIBRILLATION: ICD-10-CM

## 2021-06-22 DIAGNOSIS — I27.21 PAH (PULMONARY ARTERY HYPERTENSION) (HCC): ICD-10-CM

## 2021-06-22 DIAGNOSIS — Z86.79 S/P ABLATION OF ATRIAL FIBRILLATION: ICD-10-CM

## 2021-06-22 DIAGNOSIS — R55 SYNCOPE AND COLLAPSE: ICD-10-CM

## 2021-06-22 LAB — EKG IMPRESSION: NORMAL

## 2021-06-22 PROCEDURE — 99214 OFFICE O/P EST MOD 30 MIN: CPT | Performed by: NURSE PRACTITIONER

## 2021-06-22 PROCEDURE — 93000 ELECTROCARDIOGRAM COMPLETE: CPT | Performed by: INTERNAL MEDICINE

## 2021-06-22 RX ORDER — DIGOXIN 125 MCG
125 TABLET ORAL DAILY
Qty: 90 TABLET | Refills: 2 | Status: SHIPPED | OUTPATIENT
Start: 2021-06-22 | End: 2021-09-03 | Stop reason: SDUPTHER

## 2021-06-22 ASSESSMENT — FIBROSIS 4 INDEX: FIB4 SCORE: 2.01

## 2021-06-22 NOTE — PATIENT INSTRUCTIONS
2 Bumex tab in the AM and one tab in the PM for 3 days.  THen decrease back to twice daily.    Start digoxin.    Monitor BP and heart rate at home daily.     
yes...

## 2021-06-24 ENCOUNTER — TELEPHONE (OUTPATIENT)
Dept: CARDIOLOGY | Facility: MEDICAL CENTER | Age: 76
End: 2021-06-24

## 2021-06-24 DIAGNOSIS — I48.0 PAROXYSMAL ATRIAL FIBRILLATION (HCC): ICD-10-CM

## 2021-06-24 NOTE — TELEPHONE ENCOUNTER
Dr. Moore,    Would you mind I scheduled this patient with you next week without anesthesia?    Thank You,  Callie

## 2021-06-25 ENCOUNTER — HOSPITAL ENCOUNTER (OUTPATIENT)
Dept: LAB | Facility: MEDICAL CENTER | Age: 76
End: 2021-06-25
Attending: NURSE PRACTITIONER
Payer: MEDICARE

## 2021-06-25 DIAGNOSIS — I27.21 PAH (PULMONARY ARTERY HYPERTENSION) (HCC): ICD-10-CM

## 2021-06-25 DIAGNOSIS — I48.0 PAROXYSMAL ATRIAL FIBRILLATION (HCC): ICD-10-CM

## 2021-06-25 DIAGNOSIS — Z79.899 HIGH RISK MEDICATION USE: ICD-10-CM

## 2021-06-25 LAB
ANION GAP SERPL CALC-SCNC: 10 MMOL/L (ref 7–16)
BUN SERPL-MCNC: 22 MG/DL (ref 8–22)
CALCIUM SERPL-MCNC: 10.1 MG/DL (ref 8.5–10.5)
CHLORIDE SERPL-SCNC: 103 MMOL/L (ref 96–112)
CO2 SERPL-SCNC: 29 MMOL/L (ref 20–33)
CREAT SERPL-MCNC: 0.9 MG/DL (ref 0.5–1.4)
FASTING STATUS PATIENT QL REPORTED: NORMAL
GLUCOSE SERPL-MCNC: 133 MG/DL (ref 65–99)
POTASSIUM SERPL-SCNC: 4.8 MMOL/L (ref 3.6–5.5)
SODIUM SERPL-SCNC: 142 MMOL/L (ref 135–145)

## 2021-06-25 PROCEDURE — 80048 BASIC METABOLIC PNL TOTAL CA: CPT

## 2021-06-25 PROCEDURE — 36415 COLL VENOUS BLD VENIPUNCTURE: CPT

## 2021-06-25 NOTE — TELEPHONE ENCOUNTER
Ramiro Moore M.D.  Callie Taylor, Med Ass't  Caller: Unspecified (Yesterday,  9:04 AM)  Seems decompensated should go with anesthesia

## 2021-06-25 NOTE — TELEPHONE ENCOUNTER
Patient scheduled for cardioversion w/anesthesia on 7-6-21 with Dr. Eng. Patient has been instructed to check in at 8:00 for 10:00 case time. Message sent to nelson PLEITEZ to Yaritza HERNANDEZ

## 2021-06-25 NOTE — PROGRESS NOTES
Cardiology/Electrophysiology Follow-up Note      Subjective:   Chief Complaint:   Chief Complaint   Patient presents with   • Atrial Fibrillation       Zeinab Loza is a 76 y.o. female who presents today for post ablation follow up.    Ablation 4/2021 with Dr. Galindo with PVI, PWI, Septal LA flutter, mirtal isthmus flutter RFA.  CTI noted to be blocked from prior ablation.      Has been seeing Macarena HERNANDEZ in the heart failure clinic (Dr. Eng and Macarena), recently transitioned to bumex for diuresis.     She is followed by the heart failure clinic and Dr. Galindo for EP.  Past medical history also significant for mitral valve repair in 2005, PAH who group 1 on Tadalafil, HTN, FEI treated with BIPAP and use of chronic anticoagulation.    Today in follow up she is accompanied by her daughter. She reports that she is had ongoing issues with arrhythmia and tachycardia.  She is not sure how long has been occurring for but suspects more often then not.  She additionally tells me that she has frequent positional dizziness.  Has 'blacked out' a couple of times in the past few months.  She reports that she does feel more slightly more dyspneic.  Macarena recently transitioned her to Bumex, was inadvertently taking wrong dose, now has been taking 2 mg BID without further water/wt loss.     Patient endorses medication compliance        Past Medical History:   Diagnosis Date   • Aneurysm artery, celiac (HCC) 2019   • Aneurysm of right renal artery (HCC)    • Atrial fibrillation (HCC)    • Breath shortness     5L O2 all the time   • Chickenpox    • Congestive heart failure (HCC)    • Diastolic heart failure (HCC)    • Chinese measles    • Heart burn    • Heart valve disease     MV repair   • Hypertension, essential    • Pulmonary hypertension (HCC)    • Sleep apnea     Bipap   • Snoring    • Warfarin anticoagulation      Past Surgical History:   Procedure Laterality Date   • ANKLE ORIF Left 8/5/2020    Procedure: ORIF, ANKLE;   Surgeon: Tk Humphreys M.D.;  Location: SURGERY [facility];  Service: Orthopedics   • HYSTERECTOMY LAPAROSCOPY     • MITRAL VALVE REPAIR     • OTHER      neck surgery   • TONSILLECTOMY     • WRIST FUSION       History reviewed. No pertinent family history.  Social History     Socioeconomic History   • Marital status:      Spouse name: Not on file   • Number of children: Not on file   • Years of education: Not on file   • Highest education level: Not on file   Occupational History   • Not on file   Tobacco Use   • Smoking status: Never Smoker   • Smokeless tobacco: Never Used   Vaping Use   • Vaping Use: Never used   Substance and Sexual Activity   • Alcohol use: Yes     Alcohol/week: 4.2 oz     Types: 7 Shots of liquor per week     Comment: 1 drink a night   • Drug use: No   • Sexual activity: Not on file   Other Topics Concern   • Not on file   Social History Narrative   • Not on file     Social Determinants of Health     Financial Resource Strain:    • Difficulty of Paying Living Expenses:    Food Insecurity:    • Worried About Running Out of Food in the Last Year:    • Ran Out of Food in the Last Year:    Transportation Needs:    • Lack of Transportation (Medical):    • Lack of Transportation (Non-Medical):    Physical Activity:    • Days of Exercise per Week:    • Minutes of Exercise per Session:    Stress:    • Feeling of Stress :    Social Connections:    • Frequency of Communication with Friends and Family:    • Frequency of Social Gatherings with Friends and Family:    • Attends [employer] Services:    • Active Member of Clubs or Organizations:    • Attends Club or Organization Meetings:    • Marital Status:    Intimate Partner Violence:    • Fear of Current or Ex-Partner:    • Emotionally Abused:    • Physically Abused:    • Sexually Abused:      Allergies   Allergen Reactions   • Betapace [Sotalol] Anaphylaxis   • Tikosyn [Dofetilide] Swelling     TONGUE SWELL.    • Zolpidem Tartrate  Unspecified     Lightheaded and confusion       Current Outpatient Medications   Medication Sig Dispense Refill   • digoxin (LANOXIN) 125 MCG Tab Take 1 tablet by mouth every day. 90 tablet 2   • bumetanide (BUMEX) 2 MG tablet Take 1 tablet by mouth 2 times a day. 60 tablet 11   • tretinoin (RETIN-A) 0.05 % cream APPLY TO FACE EVERY 3 NIGHTS AND SLOWLY INCREASE TO NIGHTLY AS IRRITATION ALLOWS.     • metOLazone (ZAROXOLYN) 5 MG Tab Take 5 mg by mouth 1 time a day as needed.     • potassium chloride SA (KDUR) 20 MEQ Tab CR Take 3 Tablets by mouth 2 times a day. 180 tablet 11   • apixaban (ELIQUIS) 5mg Tab Take 1 tablet by mouth 2 times a day. 180 tablet 3   • metoprolol tartrate (LOPRESSOR) 50 MG Tab Take 1 tablet by mouth 2 times a day. 60 tablet 11   • valacyclovir (VALTREX) 1 GM Tab Take 1 tablet by mouth every 8 hours as needed.     • rosuvastatin (CRESTOR) 20 MG Tab Take 1 Tab by mouth every evening. 90 Tab 3   • HYDROcodone-acetaminophen (NORCO) 7.5-325 MG per tablet Take 1 tablet by mouth every 8 hours as needed. Indications: Pain     • Tadalafil, PAH, (ADCIRCA) 20 MG Tab Take 40 mg by mouth every bedtime. Indications: Pulmonary Arterial Hypertension     • PARoxetine (PAXIL) 20 MG Tab Take 20 mg by mouth every morning.     • levothyroxine (SYNTHROID) 75 MCG Tab Take 75 mcg by mouth Every morning on an empty stomach.     • Cholecalciferol (VITAMIN D) 2000 units Cap Take 2,000 Units by mouth every day.       No current facility-administered medications for this visit.       Review of Systems   Constitutional: Positive for malaise/fatigue. Negative for chills, fever and weight loss.   HENT: Negative for nosebleeds and tinnitus.    Eyes: Negative for blurred vision and double vision.   Respiratory: Positive for shortness of breath (reported as slight ). Negative for cough, hemoptysis, sputum production and wheezing.    Cardiovascular: Positive for palpitations. Negative for chest pain, orthopnea, leg swelling and  "PND.   Gastrointestinal: Negative for abdominal pain, blood in stool, diarrhea, heartburn, nausea and vomiting.   Skin: Negative for rash.   Neurological: Positive for dizziness. Negative for tingling, tremors, sensory change, speech change, focal weakness, loss of consciousness and headaches.        Reports syncopal episode x 2 in last 'few months'     All others systems reviewed and negative.     Objective:     /86 (BP Location: Left arm, Patient Position: Sitting, BP Cuff Size: Adult)   Pulse (!) 130   Ht 1.702 m (5' 7\")   Wt 97.1 kg (214 lb)   SpO2 97%  Body mass index is 33.52 kg/m².    Weight/BMI: Body mass index is 33.52 kg/m².  Wt Readings from Last 4 Encounters:   06/22/21 97.1 kg (214 lb)   06/11/21 97.1 kg (214 lb)   05/18/21 98.9 kg (218 lb)   04/29/21 99.8 kg (220 lb)         Physical Exam  HENT:      Head: Normocephalic and atraumatic.   Eyes:      Conjunctiva/sclera: Conjunctivae normal.      Pupils: Pupils are equal, round, and reactive to light.   Neck:      Vascular: No JVD.   Cardiovascular:      Rate and Rhythm: Tachycardia present. Rhythm irregular.      Heart sounds: Normal heart sounds. No murmur heard.   No friction rub. No gallop.    Pulmonary:      Effort: Pulmonary effort is normal.      Breath sounds: Normal breath sounds.   Musculoskeletal:         General: Normal range of motion.      Cervical back: Normal range of motion and neck supple.      Comments: Trace generalized BLE edema    Skin:     General: Skin is warm and dry.   Neurological:      Mental Status: She is alert and oriented to person, place, and time.   Psychiatric:         Mood and Affect: Mood and affect normal.         Behavior: Behavior normal.         Cognition and Memory: Memory normal.           Cardiac Imaging and Procedures Review:    EKG (6/22/21) reviewed by myself:   Atrial Flutter with RVR    Echo (4/22/21):   [facility] CONCLUSIONS  LV function appears preserved. Sigmoid septum.  Known mitral valve repair, " mild MR.  NED has been ligated, no residual NED is seen.  No thrombus is seen in the left atrium.  Moderate tricuspid regurgitation.  Trileaflet aortic valve.  No ASD is seen.    EP Procedures:  Procedural Conclusions per Dr. Galindo's Op Note (04/22/2021):  Procedures Performed:  Pulmonary Vein Isolation  Additional ablation for atrial fibrillation  Ablation of additional arrhythmia (x2, septal flutter, mitral line)  Intracardiac Echocardiography  Three-dimensional intracardiac mapping  IV isoproterenol infusion with programmed stimulation  Trans-esophageal echocardiogram  Electrophysiologist: Thee Galindo MD  Statement of Medical Necessity: This is a 76  year-old female with history of symptomatic paroxysmal atrial  fibrillation   ICE visualization   of the pericardium confirmed no pericardial effusion at the end of the case. The  patient was awakened from anesthesia, catheters and sheaths were removed, Vascade MVP closure devices were deployed, and manual  pressure was held on bilateral groins until hemostasis was achieved.  Electrophysiological Findings:  Sinus cycle length 952 msec  Intervals- A-H 89 msec  H-V 46 msec  QRS 107 msec  QT 438 msec  OK 187 msec  AV block CL 390 ms  AVERP: 600/260 ms  Total RF time: 989 sec  Arrhythmias:  1) Septal anterior LA flutter TCL 300ms  Estimated blood loss - 30 mL  Complications: None  Impression:  1. Atrial fibrillation, paroxysmal  2. Successful isolation of all four pulmonary veins and posterior wall  3. Septal LA flutter  4. Successful ablation of septal LA flutter.  5. Empiric ablation for mitral isthmus flutter  6. CTI noted to be blocked from prior ablation    Labs (personally reviewed and notable for):   Lab Results   Component Value Date/Time    SODIUM 135 05/17/2021 01:31 PM    POTASSIUM 3.7 05/17/2021 01:31 PM    CHLORIDE 97 05/17/2021 01:31 PM    CO2 28 05/17/2021 01:31 PM    GLUCOSE 129 (H) 05/17/2021 01:31 PM    BUN 31 (H) 05/17/2021 01:31 PM    CREATININE 1.11  05/17/2021 01:31 PM      Lab Results   Component Value Date/Time    WBC 7.2 04/24/2021 03:15 AM    RBC 2.87 (L) 04/24/2021 03:15 AM    HEMOGLOBIN 9.5 (L) 04/24/2021 03:15 AM    HEMATOCRIT 28.9 (L) 04/24/2021 03:15 AM    .7 (H) 04/24/2021 03:15 AM    MCH 33.1 (H) 04/24/2021 03:15 AM    MCHC 32.9 (L) 04/24/2021 03:15 AM    MPV 9.2 04/24/2021 03:15 AM    NEUTSPOLYS 65.50 04/16/2021 01:19 PM    LYMPHOCYTES 22.00 04/16/2021 01:19 PM    MONOCYTES 10.30 04/16/2021 01:19 PM    EOSINOPHILS 1.40 04/16/2021 01:19 PM    BASOPHILS 0.40 04/16/2021 01:19 PM      PT/INR:   Lab Results   Component Value Date/Time    PROTHROMBTM 16.8 (H) 04/16/2021 01:19 PM    INR 1.32 (H) 04/16/2021 01:19 PM         Assessment:     1. Paroxysmal atrial fibrillation ([facility])  EKG    [facility] ZIO PATCH MONITOR   2. S/P repeat ablation of atrial fibrillation/atrial flutter     3. Syncope and collapse  [facility] ZIO PATCH MONITOR   4. PAH (pulmonary artery hypertension) ([facility])     5. Essential hypertension     6. Chronic anticoagulation         Medical Decision Making:  Today's Assessment / Status / Plan:   1. Paroxysmal Atrial Arrhthymias:  - S/P repeat ablation with PVI, PWI, empiric mitral isthmus flutter and septal LA flutter by Dr. Galindo.   - Likely Atypical flutter with RVR today.  She reports more frequent arrhythmia, may not always be aware when occurring, ? Possibly persistent at this time.    Reviewed with Dr. Galindo.  Continue Metoprolol.  Will restart oral digoxin for further attempt at rate control and schedule for cardioversion next avail early next week.  I have asked her to keep track of her blood pressure and heart rate at home.  To call if HR not improving.   - She reports that she has not missed any doses of Eliquis in last 4 weeks.  She is instructed to continue without missed doses.      2. Syncope:  - Reports 2 brief epsidoes of syncope.  Sounds like may be secondary to orthostasis as occurs after standing and per her and her daughter comes  to quickly after collapse.  Will check zio patch for post conversion pauses, significant RVR or malignant arrhthymias as contributing source.  Zio to be placed today or tomorrow.     3. PAH/ Right sided Heart failure:  - On Tadalafil  Managed by Dr. Eng.   - Discussed diuretic management with Macarena.  Will temporarily increase Bumex dose to 4 mg (2 tabs) in the AM and 2 mg in the PM x 3 days to see if can enhance diuresis.   - She has follow up lab work previously ordered.  Will follow up with hert failure clinic in regards to diuretic management.     4. HTN:  - Blood pressure appropriate today.     5. Anticoagulation:  - Continue Eliquis.          Plan reviewed in detail with the patient and she verbalizes understanding and is in agreement.   RTC post cardioversion, sooner if clinical condition changes  Collaborating MD/ADD: Discussed with Dr. Galindo.     Please note that this dictation was created using voice recognition software. I have made every reasonable attempt to correct obvious errors, but I expect that there are errors of grammar and possibly content that I did not discover before finalizing the note.    TEODORO Fuentes.

## 2021-06-28 ENCOUNTER — PATIENT MESSAGE (OUTPATIENT)
Dept: CARDIOLOGY | Facility: MEDICAL CENTER | Age: 76
End: 2021-06-28

## 2021-06-28 NOTE — PATIENT COMMUNICATION
EA advised pt to take bumex as above on 6/22/21. Pt states it did not help.     To RO, GERALDINE is out of the office this week. Any further advisement?

## 2021-06-29 ASSESSMENT — ENCOUNTER SYMPTOMS
PND: 0
HEMOPTYSIS: 0
DIZZINESS: 1
SPEECH CHANGE: 0
TINGLING: 0
SHORTNESS OF BREATH: 1
DOUBLE VISION: 0
DIARRHEA: 0
COUGH: 0
PALPITATIONS: 1
FEVER: 0
SPUTUM PRODUCTION: 0
NAUSEA: 0
TREMORS: 0
ORTHOPNEA: 0
WEIGHT LOSS: 0
HEARTBURN: 0
VOMITING: 0
WHEEZING: 0
BLOOD IN STOOL: 0
HEADACHES: 0
SENSORY CHANGE: 0
FOCAL WEAKNESS: 0
BLURRED VISION: 0
LOSS OF CONSCIOUSNESS: 0
ABDOMINAL PAIN: 0
CHILLS: 0

## 2021-06-29 NOTE — PATIENT COMMUNICATION
Ramiro Eng M.D.  You 54 minutes ago (12:55 PM)     2mg BID please   Thanks    Message text    You  Ramiro Eng M.D. 20 hours ago (4:55 PM)     See. On 6/22/21 EA instructed the pt to increase her bumex 2 mg to 2 tabs in the am and 1 in the pm for 3 days (up from 1 tab BID) d/t swelling and weight gain. The pt reported today that she had no improvement w/ the increased dose. Do you have any other medication recommendations?     Message text    Ramiro Eng M.D.  You 21 hours ago (4:00 PM)     See it is unclear what the issue is.    Message text

## 2021-06-30 ENCOUNTER — NON-PROVIDER VISIT (OUTPATIENT)
Dept: CARDIOLOGY | Facility: MEDICAL CENTER | Age: 76
End: 2021-06-30
Payer: MEDICARE

## 2021-06-30 ENCOUNTER — TELEPHONE (OUTPATIENT)
Dept: CARDIOLOGY | Facility: MEDICAL CENTER | Age: 76
End: 2021-06-30

## 2021-06-30 DIAGNOSIS — I48.0 PAROXYSMAL ATRIAL FIBRILLATION (HCC): ICD-10-CM

## 2021-06-30 DIAGNOSIS — I47.10 SVT (SUPRAVENTRICULAR TACHYCARDIA) (HCC): ICD-10-CM

## 2021-06-30 DIAGNOSIS — R55 SYNCOPE, UNSPECIFIED SYNCOPE TYPE: ICD-10-CM

## 2021-06-30 DIAGNOSIS — R55 SYNCOPE AND COLLAPSE: ICD-10-CM

## 2021-06-30 PROCEDURE — 93268 ECG RECORD/REVIEW: CPT | Performed by: INTERNAL MEDICINE

## 2021-07-01 ENCOUNTER — PRE-ADMISSION TESTING (OUTPATIENT)
Dept: ADMISSIONS | Facility: MEDICAL CENTER | Age: 76
End: 2021-07-01
Attending: INTERNAL MEDICINE
Payer: MEDICARE

## 2021-07-01 VITALS — HEIGHT: 67 IN | BODY MASS INDEX: 34.08 KG/M2 | WEIGHT: 217.15 LBS

## 2021-07-01 DIAGNOSIS — Z01.812 PRE-OPERATIVE LABORATORY EXAMINATION: ICD-10-CM

## 2021-07-01 LAB
ANION GAP SERPL CALC-SCNC: 10 MMOL/L (ref 7–16)
BUN SERPL-MCNC: 22 MG/DL (ref 8–22)
CALCIUM SERPL-MCNC: 10.3 MG/DL (ref 8.5–10.5)
CHLORIDE SERPL-SCNC: 99 MMOL/L (ref 96–112)
CO2 SERPL-SCNC: 31 MMOL/L (ref 20–33)
CREAT SERPL-MCNC: 1.16 MG/DL (ref 0.5–1.4)
ERYTHROCYTE [DISTWIDTH] IN BLOOD BY AUTOMATED COUNT: 49 FL (ref 35.9–50)
GLUCOSE SERPL-MCNC: 110 MG/DL (ref 65–99)
HCT VFR BLD AUTO: 39.2 % (ref 37–47)
HGB BLD-MCNC: 12.3 G/DL (ref 12–16)
INR PPP: 1.26 (ref 0.87–1.13)
MCH RBC QN AUTO: 30.5 PG (ref 27–33)
MCHC RBC AUTO-ENTMCNC: 31.4 G/DL (ref 33.6–35)
MCV RBC AUTO: 97.3 FL (ref 81.4–97.8)
PLATELET # BLD AUTO: 264 K/UL (ref 164–446)
PMV BLD AUTO: 9 FL (ref 9–12.9)
POTASSIUM SERPL-SCNC: 3.8 MMOL/L (ref 3.6–5.5)
PROTHROMBIN TIME: 15.4 SEC (ref 12–14.6)
RBC # BLD AUTO: 4.03 M/UL (ref 4.2–5.4)
SODIUM SERPL-SCNC: 140 MMOL/L (ref 135–145)
WBC # BLD AUTO: 6.6 K/UL (ref 4.8–10.8)

## 2021-07-01 PROCEDURE — 80048 BASIC METABOLIC PNL TOTAL CA: CPT

## 2021-07-01 PROCEDURE — 85027 COMPLETE CBC AUTOMATED: CPT

## 2021-07-01 PROCEDURE — U0005 INFEC AGEN DETEC AMPLI PROBE: HCPCS

## 2021-07-01 PROCEDURE — U0003 INFECTIOUS AGENT DETECTION BY NUCLEIC ACID (DNA OR RNA); SEVERE ACUTE RESPIRATORY SYNDROME CORONAVIRUS 2 (SARS-COV-2) (CORONAVIRUS DISEASE [COVID-19]), AMPLIFIED PROBE TECHNIQUE, MAKING USE OF HIGH THROUGHPUT TECHNOLOGIES AS DESCRIBED BY CMS-2020-01-R: HCPCS

## 2021-07-01 PROCEDURE — 36415 COLL VENOUS BLD VENIPUNCTURE: CPT

## 2021-07-01 PROCEDURE — 85610 PROTHROMBIN TIME: CPT

## 2021-07-01 PROCEDURE — C9803 HOPD COVID-19 SPEC COLLECT: HCPCS

## 2021-07-01 ASSESSMENT — FIBROSIS 4 INDEX: FIB4 SCORE: 2.01

## 2021-07-02 LAB
SARS-COV-2 RNA RESP QL NAA+PROBE: NOTDETECTED
SPECIMEN SOURCE: NORMAL

## 2021-07-03 ENCOUNTER — PATIENT MESSAGE (OUTPATIENT)
Dept: CARDIOLOGY | Facility: MEDICAL CENTER | Age: 76
End: 2021-07-03

## 2021-07-06 ENCOUNTER — APPOINTMENT (OUTPATIENT)
Dept: CARDIOLOGY | Facility: MEDICAL CENTER | Age: 76
End: 2021-07-06
Attending: NURSE PRACTITIONER
Payer: MEDICARE

## 2021-07-06 ENCOUNTER — HOSPITAL ENCOUNTER (OUTPATIENT)
Facility: MEDICAL CENTER | Age: 76
End: 2021-07-06
Attending: INTERNAL MEDICINE | Admitting: INTERNAL MEDICINE
Payer: MEDICARE

## 2021-07-06 DIAGNOSIS — I48.0 PAROXYSMAL ATRIAL FIBRILLATION (HCC): ICD-10-CM

## 2021-07-06 LAB — EKG IMPRESSION: NORMAL

## 2021-07-06 PROCEDURE — 93005 ELECTROCARDIOGRAM TRACING: CPT | Performed by: INTERNAL MEDICINE

## 2021-07-06 PROCEDURE — 93010 ELECTROCARDIOGRAM REPORT: CPT | Performed by: INTERNAL MEDICINE

## 2021-07-06 RX ORDER — SODIUM CHLORIDE, SODIUM LACTATE, POTASSIUM CHLORIDE, CALCIUM CHLORIDE 600; 310; 30; 20 MG/100ML; MG/100ML; MG/100ML; MG/100ML
INJECTION, SOLUTION INTRAVENOUS CONTINUOUS
Status: DISCONTINUED | OUTPATIENT
Start: 2021-07-06 | End: 2021-07-06 | Stop reason: HOSPADM

## 2021-07-06 NOTE — PATIENT COMMUNICATION
S/W pt, she only took increased dose for three days. Aware there are refills at pharmacy. Advised to contact them and see if they need us to contact since patient should only be short a day and a half.     To ARBEN as member of care team in GERALDINE absence, pt reports increased dose of Bumex (4mg BID) for three days did not help. Patient has follow up with GERALDINE on 7/14/21.       Princess Hillman R.N. to Ramiro Eng M.D.    4:55 PM  See. On 6/22/21 EA instructed the pt to increase her bumex 2 mg to 2 tabs in the am and 1 in the pm for 3 days (up from 1 tab BID) d/t swelling and weight gain. The pt reported today that she had no improvement w/ the increased dose. Do you have any other medication recommendations?     Me  4:01 PM  Note     D/W ARBEN, wants patient to take 4mg Bumex BID for next three days

## 2021-07-06 NOTE — OR NURSING
Pt came in for a cardioversion and was already in SR. Dr. Eng reviewed pre-op EKG and okayed pt to be discharged.

## 2021-07-14 ENCOUNTER — OFFICE VISIT (OUTPATIENT)
Dept: CARDIOLOGY | Facility: MEDICAL CENTER | Age: 76
End: 2021-07-14
Payer: MEDICARE

## 2021-07-14 VITALS
HEART RATE: 72 BPM | BODY MASS INDEX: 34.53 KG/M2 | HEIGHT: 67 IN | DIASTOLIC BLOOD PRESSURE: 72 MMHG | SYSTOLIC BLOOD PRESSURE: 120 MMHG | WEIGHT: 220 LBS | OXYGEN SATURATION: 93 %

## 2021-07-14 DIAGNOSIS — E87.6 HYPOKALEMIA: ICD-10-CM

## 2021-07-14 DIAGNOSIS — Z79.899 HIGH RISK MEDICATION USE: ICD-10-CM

## 2021-07-14 DIAGNOSIS — Z98.890 S/P ABLATION OF ATRIAL FIBRILLATION: ICD-10-CM

## 2021-07-14 DIAGNOSIS — Z86.79 S/P ABLATION OF ATRIAL FIBRILLATION: ICD-10-CM

## 2021-07-14 DIAGNOSIS — I10 ESSENTIAL HYPERTENSION: ICD-10-CM

## 2021-07-14 DIAGNOSIS — Z79.01 CHRONIC ANTICOAGULATION: ICD-10-CM

## 2021-07-14 DIAGNOSIS — I48.0 PAROXYSMAL ATRIAL FIBRILLATION (HCC): ICD-10-CM

## 2021-07-14 DIAGNOSIS — E78.49 OTHER HYPERLIPIDEMIA: ICD-10-CM

## 2021-07-14 DIAGNOSIS — I27.21 PAH (PULMONARY ARTERY HYPERTENSION) (HCC): ICD-10-CM

## 2021-07-14 DIAGNOSIS — E78.2 MIXED HYPERLIPIDEMIA: ICD-10-CM

## 2021-07-14 PROCEDURE — 99214 OFFICE O/P EST MOD 30 MIN: CPT | Performed by: NURSE PRACTITIONER

## 2021-07-14 RX ORDER — BUMETANIDE 2 MG/1
4 TABLET ORAL 2 TIMES DAILY
Qty: 120 TABLET | Refills: 11 | Status: SHIPPED | OUTPATIENT
Start: 2021-07-14 | End: 2022-01-24 | Stop reason: SDUPTHER

## 2021-07-14 ASSESSMENT — ENCOUNTER SYMPTOMS
PND: 0
FEVER: 0
MYALGIAS: 0
CLAUDICATION: 0
SHORTNESS OF BREATH: 1
ROS GI COMMENTS: ABDOMINAL BLOATING
COUGH: 0
PALPITATIONS: 0
ORTHOPNEA: 0
WHEEZING: 0
DIZZINESS: 1
ABDOMINAL PAIN: 0

## 2021-07-14 ASSESSMENT — FIBROSIS 4 INDEX: FIB4 SCORE: 1.53

## 2021-07-14 NOTE — PROGRESS NOTES
Chief Complaint   Patient presents with   • Atrial Fibrillation     F/V Dx: Paroxysmal atrial fibrillation (HCC)   • Atrial Flutter     F/V Dx: S/P repeat ablation of atrial fibrillation/atrial flutter       Subjective:   Zeinab Loza is a 76 y.o. female who presents today for follow-up on her pulmonary hypertension, atrial fibrillation with her daughter, Vernell.    Patient of Dr. Galindo and Dr. Eng.  She was last seen in clinic on 6/22/2021 with Yaritza Abdullahi.  During that visit, patient was recommended to get an event monitor and increase her diuretics.    Patient comes in clinic today reporting she continues to feel bloated in her abdomen, reports increased shortness of breath with exertion, dizziness if changing positions, continued joint pain and fatigue.  She states she does not have any lower extremity edema at this time.    She otherwise denies chest pain, palpitation, orthopnea or PND.    Her home weights are up to 215 pounds.  She states her dry weight is around 205 pounds.    She reports she is taking Bumex 2 mg twice a day at this time with potassium 40 mEq twice a day.    She denies any known episodes of A. fib.     She continues to report compliance with sodium intake.     She does continue to follow with pulmonary at [facility] with Dr. Zelaya.      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 5.19 L/min, Odin C.I 2.39   PVR 4.8  woods     -Hypertension     -Paroxysmal A. fib, started on Tikosyn on 10/21/2020, on Eliquis; RF ablation with PVI, septal LA flutter and empiric ablation for mitral isthmus flutter by Dr. Gailndo on 4/22/2021     -Hyperlipidemia: Taking rosuvastatin     -Sleep apnea, uses BiPAP with oxygen bleed in 5L, followed by sleep medicine       Past Medical History:   Diagnosis Date   • Aneurysm artery, celiac (HCC) 2019   • Aneurysm of right renal artery (HCC)    • Atrial fibrillation (HCC)    • Breath shortness     5L O2 all the time   • Chickenpox    • Congestive heart failure (HCC)    • Diastolic heart failure (HCC)    • Yi measles    • Heart burn    • Heart valve disease     MV repair   • Hypertension, essential    • Pulmonary hypertension (HCC)    • Sleep apnea     Bipap   • Snoring    • Warfarin anticoagulation      Past Surgical History:   Procedure Laterality Date   • ANKLE ORIF Left 8/5/2020    Procedure: ORIF, ANKLE;  Surgeon: Tk Humphreys M.D.;  Location: SURGERY [facility];  Service: Orthopedics   • HYSTERECTOMY LAPAROSCOPY     • MITRAL VALVE REPAIR     • OTHER      neck surgery   • TONSILLECTOMY     • WRIST FUSION       History reviewed. No pertinent family history.  Social History     Socioeconomic History   • Marital status:      Spouse name: Not on file   • Number of children: Not on file   • Years of education: Not on file   • Highest education level: Not on file   Occupational History   • Not on file   Tobacco Use   • Smoking status: Never Smoker   • Smokeless tobacco: Never Used   Vaping Use   • Vaping Use: Never used   Substance and Sexual Activity   • Alcohol use: Yes     Alcohol/week: 4.2 oz     Types: 7 Shots of liquor per week     Comment: 1 drink a night   • Drug use: No   • Sexual activity: Not on file   Other Topics Concern   • Not on file   Social History Narrative   • Not on file     Social Determinants of Health     Financial Resource Strain:    • Difficulty of Paying Living  Expenses:    Food Insecurity:    • Worried About Running Out of Food in the Last Year:    • Ran Out of Food in the Last Year:    Transportation Needs:    • Lack of Transportation (Medical):    • Lack of Transportation (Non-Medical):    Physical Activity:    • Days of Exercise per Week:    • Minutes of Exercise per Session:    Stress:    • Feeling of Stress :    Social Connections:    • Frequency of Communication with Friends and Family:    • Frequency of Social Gatherings with Friends and Family:    • Attends Mosque Services:    • Active Member of Clubs or Organizations:    • Attends Club or Organization Meetings:    • Marital Status:    Intimate Partner Violence:    • Fear of Current or Ex-Partner:    • Emotionally Abused:    • Physically Abused:    • Sexually Abused:      Allergies   Allergen Reactions   • Betapace [Sotalol] Anaphylaxis   • Tikosyn [Dofetilide] Swelling     TONGUE SWELL.    • Zolpidem Tartrate Unspecified     Lightheaded and confusion     Outpatient Encounter Medications as of 7/14/2021   Medication Sig Dispense Refill   • bumetanide (BUMEX) 2 MG tablet Take 2 Tablets by mouth 2 times a day. 120 tablet 11   • digoxin (LANOXIN) 125 MCG Tab Take 1 tablet by mouth every day. 90 tablet 2   • tretinoin (RETIN-A) 0.05 % cream APPLY TO FACE EVERY 3 NIGHTS AND SLOWLY INCREASE TO NIGHTLY AS IRRITATION ALLOWS.     • potassium chloride SA (KDUR) 20 MEQ Tab CR Take 3 Tablets by mouth 2 times a day. 180 tablet 11   • apixaban (ELIQUIS) 5mg Tab Take 1 tablet by mouth 2 times a day. 180 tablet 3   • metoprolol tartrate (LOPRESSOR) 50 MG Tab Take 1 tablet by mouth 2 times a day. 60 tablet 11   • valacyclovir (VALTREX) 1 GM Tab Take 1 tablet by mouth every 8 hours as needed.     • rosuvastatin (CRESTOR) 20 MG Tab Take 1 Tab by mouth every evening. 90 Tab 3   • HYDROcodone-acetaminophen (NORCO) 7.5-325 MG per tablet Take 1 tablet by mouth every 8 hours as needed. Indications: Pain     • Tadalafil, PAH, (ADCIRCA)  "20 MG Tab Take 40 mg by mouth every bedtime. Indications: Pulmonary Arterial Hypertension     • PARoxetine (PAXIL) 20 MG Tab Take 20 mg by mouth every morning.     • levothyroxine (SYNTHROID) 75 MCG Tab Take 75 mcg by mouth Every morning on an empty stomach.     • Cholecalciferol (VITAMIN D) 2000 units Cap Take 2,000 Units by mouth every day.     • [DISCONTINUED] bumetanide (BUMEX) 2 MG tablet Take 4 mg by mouth 2 times a day. 60 tablet 11   • metOLazone (ZAROXOLYN) 5 MG Tab Take 5 mg by mouth 1 time a day as needed. (Patient not taking: Reported on 7/1/2021)       No facility-administered encounter medications on file as of 7/14/2021.     Review of Systems   Constitutional: Positive for malaise/fatigue (improved). Negative for fever.   Respiratory: Positive for shortness of breath. Negative for cough and wheezing.    Cardiovascular: Negative for chest pain, palpitations, orthopnea, claudication, leg swelling and PND.   Gastrointestinal: Negative for abdominal pain.        Abdominal bloating    Musculoskeletal: Positive for joint pain (knee pain ). Negative for myalgias.   Neurological: Positive for dizziness (with some position changes ).   All other systems reviewed and are negative.       Objective:   /72 (BP Location: Right arm, Patient Position: Sitting, BP Cuff Size: Adult)   Pulse 72   Ht 1.702 m (5' 7\")   Wt 99.8 kg (220 lb)   SpO2 93%   BMI 34.46 kg/m²     Physical Exam   Constitutional: She is oriented to person, place, and time. She appears well-developed.   HENT:   Head: Normocephalic and atraumatic.   Eyes: Pupils are equal, round, and reactive to light.   Neck: JVD present.   Cardiovascular: Normal rate, regular rhythm and normal heart sounds.   Pulmonary/Chest: Effort normal and breath sounds normal. No respiratory distress. She has no wheezes. She has no rales.   Abdominal: Soft. Bowel sounds are normal.   Musculoskeletal:      Cervical back: Normal range of motion and neck supple.      " Right lower leg: No edema.      Left lower leg: No edema.   Neurological: She is alert and oriented to person, place, and time.   Skin: Skin is warm and dry.   Psychiatric: Her behavior is normal.   Vitals reviewed.    Lab Results   Component Value Date/Time    CHOLSTRLTOT 213 (H) 09/30/2020 09:55 AM     (H) 09/30/2020 09:55 AM    HDL 53 09/30/2020 09:55 AM    TRIGLYCERIDE 146 09/30/2020 09:55 AM       Lab Results   Component Value Date/Time    SODIUM 140 07/01/2021 02:01 PM    POTASSIUM 3.8 07/01/2021 02:01 PM    CHLORIDE 99 07/01/2021 02:01 PM    CO2 31 07/01/2021 02:01 PM    GLUCOSE 110 (H) 07/01/2021 02:01 PM    BUN 22 07/01/2021 02:01 PM    CREATININE 1.16 07/01/2021 02:01 PM     Lab Results   Component Value Date/Time    ALKPHOSPHAT 87 04/16/2021 01:19 PM    ASTSGOT 25 04/16/2021 01:19 PM    ALTSGPT 21 04/16/2021 01:19 PM    TBILIRUBIN 0.5 04/16/2021 01:19 PM      Transthoracic Echo Report 1/16/2020  Normal left ventricular systolic function. Left ventricular ejection fraction is visually estimated to be 65%.  A reliable estimation of diastolic function cannot be made due to mitral valve disease.  Normal inferior vena cava size and inspiratory collapse.  Known mitral valve repair which is functioning normally with   appropriate transvalvular gradient. Mean transvalvular gradient is 3 mmHg at a heart rate of 66 BPM.  Estimated right ventricular systolic pressure  is 32 mmHg.     Transthoracic Echo Report 8/5/2020  Normal left ventricular size, wall thickness, and systolic function.  Dilated right ventricle with preserved systolic function.  No significant valvular pathology.  Estimated right ventricular systolic pressure  is 25 mmHg; normal.  Normal pericardium without effusion.     No prior study is available for comparison.      Transthoracic Echo Report 3/5/2021  Normal left ventricular size and systolic function.  Mild concentric left ventricular hypertrophy.  Left ventricular ejection fraction is  visually estimated to be 60%.  Known mitral valve repair which is functioning normally with   appropriate transvalvular gradient.  Mild mitral regurgitation.  Normal inferior vena cava size and inspiratory collapse.    Transesophageal Echo Report 4/22/2021  LV function appears preserved. Sigmoid septum.  Known mitral valve repair, mild MR.  NED has been ligated, no residual NED is seen.  No thrombus is seen in the left atrium.  Moderate tricuspid regurgitation.  Trileaflet aortic valve.  No ASD is seen.     Assessment:     1. Paroxysmal atrial fibrillation (HCC)  bumetanide (BUMEX) 2 MG tablet    Comp Metabolic Panel    Lipid Profile   2. PAH (pulmonary artery hypertension) (HCC)  bumetanide (BUMEX) 2 MG tablet    Comp Metabolic Panel    Lipid Profile   3. High risk medication use  bumetanide (BUMEX) 2 MG tablet    Comp Metabolic Panel    Lipid Profile   4. Other hyperlipidemia  Lipid Profile   5. Chronic anticoagulation     6. Hypokalemia     7. Mixed hyperlipidemia     8. Essential hypertension     9. S/P repeat ablation of atrial fibrillation/atrial flutter         Medical Decision Making:  Today's Assessment / Status / Plan:   1.  Pulmonary hypertension, WHO group 1-2, functional class III:  -Increase bumetanide to 4 mg twice a day  -Increase potassium 60 mEq twice a day  -Hold off on taking metolazone unless directed  -Patient to obtain a follow-up BMP in 2 weeks  -Continue tadalafil 40 mg daily  -Unable to take Letairis due to hypotension  -Encourage patient to monitor sodium intake  -Reinforced s/sx of worsening symptoms with patient and weight monitoring. Pt verbalizes understanding. Pt to call office or RTC if present.  -Continue to follow up with [facility]      2.  Sleep apnea:  -Continue BiPAP with oxygen bleeding     3.  Atrial fibrillation:    -She currently is wearing an event monitor  -Patient had A. fib ablation on 4/22/2021  -Continue Eliquis 5 mg twice a day  -Continue metoprolol 50 mg twice a  day     4.  Hyperlipidemia: Last  on 9/30/2020  -Continue rosuvastatin 20 mg daily  -CMP and lipid panel in 2 weeks     5.  Hypertension: BP stable  -Continue metoprolol 50 mg twice a day     FU in clinic in  1 month with labs in 2 weeks. Sooner if needed.     Patient verbalizes understanding and agrees with the plan of care.      PLEASE NOTE: This Note was created using voice recognition Software. I have made every reasonable attempt to correct obvious errors, but I expect that there are errors of grammar and possibly content that I did not discover before finalizing the note

## 2021-07-16 ENCOUNTER — TELEPHONE (OUTPATIENT)
Dept: CARDIOLOGY | Facility: MEDICAL CENTER | Age: 76
End: 2021-07-16

## 2021-07-16 NOTE — TELEPHONE ENCOUNTER
[facility] EKG report. Mohammad     New onset Afib, auto triggered event 7/15/21 1101AM, back in [facility] as of now.

## 2021-07-17 ENCOUNTER — OFFICE VISIT (OUTPATIENT)
Dept: URGENT CARE | Facility: PHYSICIAN GROUP | Age: 76
End: 2021-07-17
Payer: MEDICARE

## 2021-07-17 VITALS
WEIGHT: 210 LBS | DIASTOLIC BLOOD PRESSURE: 70 MMHG | SYSTOLIC BLOOD PRESSURE: 142 MMHG | OXYGEN SATURATION: 98 % | HEART RATE: 75 BPM | BODY MASS INDEX: 32.96 KG/M2 | RESPIRATION RATE: 18 BRPM | TEMPERATURE: 98 F | HEIGHT: 67 IN

## 2021-07-17 DIAGNOSIS — M10.9 ACUTE GOUT INVOLVING TOE OF RIGHT FOOT, UNSPECIFIED CAUSE: ICD-10-CM

## 2021-07-17 PROCEDURE — 99213 OFFICE O/P EST LOW 20 MIN: CPT | Performed by: NURSE PRACTITIONER

## 2021-07-17 RX ORDER — COLCHICINE 0.6 MG/1
TABLET ORAL
Qty: 3 TABLET | Refills: 0 | Status: SHIPPED | OUTPATIENT
Start: 2021-07-17 | End: 2021-09-24

## 2021-07-17 RX ORDER — PREDNISONE 20 MG/1
20 TABLET ORAL DAILY
Qty: 3 TABLET | Refills: 0 | Status: SHIPPED | OUTPATIENT
Start: 2021-07-17 | End: 2021-07-20

## 2021-07-17 ASSESSMENT — ENCOUNTER SYMPTOMS
FOCAL WEAKNESS: 1
MYALGIAS: 1
NECK PAIN: 0
NAUSEA: 0
FEVER: 0
VOMITING: 0
BACK PAIN: 0
CHILLS: 0
BRUISES/BLEEDS EASILY: 1
FALLS: 0

## 2021-07-17 ASSESSMENT — FIBROSIS 4 INDEX: FIB4 SCORE: 1.53

## 2021-07-17 ASSESSMENT — PAIN SCALES - GENERAL: PAINLEVEL: 8=MODERATE-SEVERE PAIN

## 2021-07-17 NOTE — PATIENT INSTRUCTIONS
Low-Purine Eating Plan  A low-purine eating plan involves making food choices to limit your intake of purine. Purine is a kind of uric acid. Too much uric acid in your blood can cause certain conditions, such as gout and kidney stones. Eating a low-purine diet can help control these conditions.  What are tips for following this plan?  Reading food labels    · Avoid foods with saturated or Trans fat.  · Check the ingredient list of grains-based foods, such as bread and cereal, to make sure that they contain whole grains.  · Check the ingredient list of sauces or soups to make sure they do not contain meat or fish.  · When choosing soft drinks, check the ingredient list to make sure they do not contain high-fructose corn syrup.  Shopping  · Buy plenty of fresh fruits and vegetables.  · Avoid buying canned or fresh fish.  · Buy dairy products labeled as low-fat or nonfat.  · Avoid buying premade or processed foods. These foods are often high in fat, salt (sodium), and added sugar.  Cooking  · Use olive oil instead of butter when cooking. Oils like olive oil, canola oil, and sunflower oil contain healthy fats.  Meal planning  · Learn which foods do or do not affect you. If you find out that a food tends to cause your gout symptoms to flare up, avoid eating that food. You can enjoy foods that do not cause problems. If you have any questions about a food item, talk with your dietitian or health care provider.  · Limit foods high in fat, especially saturated fat. Fat makes it harder for your body to get rid of uric acid.  · Choose foods that are lower in fat and are lean sources of protein.  General guidelines  · Limit alcohol intake to no more than 1 drink a day for nonpregnant women and 2 drinks a day for men. One drink equals 12 oz of beer, 5 oz of wine, or 1½ oz of hard liquor. Alcohol can affect the way your body gets rid of uric acid.  · Drink plenty of water to keep your urine clear or pale yellow. Fluids can help  remove uric acid from your body.  · If directed by your health care provider, take a vitamin C supplement.  · Work with your health care provider and dietitian to develop a plan to achieve or maintain a healthy weight. Losing weight can help reduce uric acid in your blood.  What foods are recommended?  The items listed may not be a complete list. Talk with your dietitian about what dietary choices are best for you.  Foods low in purines  Foods low in purines do not need to be limited. These include:  · All fruits.  · All low-purine vegetables, pickles, and olives.  · Breads, pasta, rice, cornbread, and popcorn. Cake and other baked goods.  · All dairy foods.  · Eggs, nuts, and nut butters.  · Spices and condiments, such as salt, herbs, and vinegar.  · Plant oils, butter, and margarine.  · Water, sugar-free soft drinks, tea, coffee, and cocoa.  · Vegetable-based soups, broths, sauces, and gravies.  Foods moderate in purines  Foods moderate in purines should be limited to the amounts listed.  · ½ cup of asparagus, cauliflower, spinach, mushrooms, or green peas, each day.  · 2/3 cup uncooked oatmeal, each day.  · ¼ cup dry wheat bran or wheat germ, each day.  · 2-3 ounces of meat or poultry, each day.  · 4-6 ounces of shellfish, such as crab, lobster, oysters, or shrimp, each day.  · 1 cup cooked beans, peas, or lentils, each day.  · Soup, broths, or bouillon made from meat or fish. Limit these foods as much as possible.  What foods are not recommended?  The items listed may not be a complete list. Talk with your dietitian about what dietary choices are best for you.  Limit your intake of foods high in purines, including:  · Beer and other alcohol.  · Meat-based gravy or sauce.  · Canned or fresh fish, such as:  ? Anchovies, sardines, herring, and tuna.  ? Mussels and scallops.  ? Codfish, trout, and alber.  · Mccloud.  · Organ meats, such as:  ? Liver or kidney.  ? Tripe.  ? Sweetbreads (thymus gland or  pancreas).  · Wild game or goose.  · Yeast or yeast extract supplements.  · Drinks sweetened with high-fructose corn syrup.  Summary  · Eating a low-purine diet can help control conditions caused by too much uric acid in the body, such as gout or kidney stones.  · Choose low-purine foods, limit alcohol, and limit foods high in fat.  · You will learn over time which foods do or do not affect you. If you find out that a food tends to cause your gout symptoms to flare up, avoid eating that food.  This information is not intended to replace advice given to you by your health care provider. Make sure you discuss any questions you have with your health care provider.  Document Released: 04/13/2012 Document Revised: 11/30/2018 Document Reviewed: 01/31/2018  [employer] Patient Education © 2020 [employer]  Gout    Gout is a condition that causes painful swelling of the joints. Gout is a type of inflammation of the joints (arthritis). This condition is caused by having too much uric acid in the body. Uric acid is a chemical that forms when the body breaks down substances called purines. Purines are important for building body proteins.  When the body has too much uric acid, sharp crystals can form and build up inside the joints. This causes pain and swelling. Gout attacks can happen quickly and may be very painful (acute gout). Over time, the attacks can affect more joints and become more frequent (chronic gout). Gout can also cause uric acid to build up under the skin and inside the kidneys.  What are the causes?  This condition is caused by too much uric acid in your blood. This can happen because:  · Your kidneys do not remove enough uric acid from your blood. This is the most common cause.  · Your body makes too much uric acid. This can happen with some cancers and cancer treatments. It can also occur if your body is breaking down too many red blood cells (hemolytic anemia).  · You eat too many foods that are high in  purines. These foods include organ meats and some seafood. Alcohol, especially beer, is also high in purines.  A gout attack may be triggered by trauma or stress.  What increases the risk?  You are more likely to develop this condition if you:  · Have a family history of gout.  · Are male and middle-aged.  · Are female and have gone through menopause.  · Are obese.  · Frequently drink alcohol, especially beer.  · Are dehydrated.  · Lose weight too quickly.  · Have an organ transplant.  · Have lead poisoning.  · Take certain medicines, including aspirin, cyclosporine, diuretics, levodopa, and niacin.  · Have kidney disease.  · Have a skin condition called psoriasis.  What are the signs or symptoms?  An attack of acute gout happens quickly. It usually occurs in just one joint. The most common place is the big toe. Attacks often start at night. Other joints that may be affected include joints of the feet, ankle, knee, fingers, wrist, or elbow. Symptoms of this condition may include:  · Severe pain.  · Warmth.  · Swelling.  · Stiffness.  · Tenderness. The affected joint may be very painful to touch.  · Shiny, red, or purple skin.  · Chills and fever.  Chronic gout may cause symptoms more frequently. More joints may be involved. You may also have white or yellow lumps (tophi) on your hands or feet or in other areas near your joints.  How is this diagnosed?  This condition is diagnosed based on your symptoms, medical history, and physical exam. You may have tests, such as:  · Blood tests to measure uric acid levels.  · Removal of joint fluid with a thin needle (aspiration) to look for uric acid crystals.  · X-rays to look for joint damage.  How is this treated?  Treatment for this condition has two phases: treating an acute attack and preventing future attacks. Acute gout treatment may include medicines to reduce pain and swelling, including:  · NSAIDs.  · Steroids. These are strong anti-inflammatory medicines that can be  taken by mouth (orally) or injected into a joint.  · Colchicine. This medicine relieves pain and swelling when it is taken soon after an attack. It can be given by mouth or through an IV.  Preventive treatment may include:  · Daily use of smaller doses of NSAIDs or colchicine.  · Use of a medicine that reduces uric acid levels in your blood.  · Changes to your diet. You may need to see a dietitian about what to eat and drink to prevent gout.  Follow these instructions at home:  During a gout attack    · If directed, put ice on the affected area:  ? Put ice in a plastic bag.  ? Place a towel between your skin and the bag.  ? Leave the ice on for 20 minutes, 2-3 times a day.  · Raise (elevate) the affected joint above the level of your heart as often as possible.  · Rest the joint as much as possible. If the affected joint is in your leg, you may be given crutches to use.  · Follow instructions from your health care provider about eating or drinking restrictions.  Avoiding future gout attacks  · Follow a low-purine diet as told by your dietitian or health care provider. Avoid foods and drinks that are high in purines, including liver, kidney, anchovies, asparagus, herring, mushrooms, mussels, and beer.  · Maintain a healthy weight or lose weight if you are overweight. If you want to lose weight, talk with your health care provider. It is important that you do not lose weight too quickly.  · Start or maintain an exercise program as told by your health care provider.  Eating and drinking  · Drink enough fluids to keep your urine pale yellow.  · If you drink alcohol:  ? Limit how much you use to:  § 0-1 drink a day for women.  § 0-2 drinks a day for men.  ? Be aware of how much alcohol is in your drink. In the [place] one drink equals one 12 oz bottle of beer (355 mL) one 5 oz glass of wine (148 mL), or one 1½ oz glass of hard liquor (44 mL).  General instructions  · Take over-the-counter and prescription medicines only as  told by your health care provider.  · Do not drive or use heavy machinery while taking prescription pain medicine.  · Return to your normal activities as told by your health care provider. Ask your health care provider what activities are safe for you.  · Keep all follow-up visits as told by your health care provider. This is important.  Contact a health care provider if you have:  · Another gout attack.  · Continuing symptoms of a gout attack after 10 days of treatment.  · Side effects from your medicines.  · Chills or a fever.  · Burning pain when you urinate.  · Pain in your lower back or belly.  Get help right away if you:  · Have severe or uncontrolled pain.  · Cannot urinate.  Summary  · Gout is painful swelling of the joints caused by inflammation.  · The most common site of pain is the big toe, but it can affect other joints in the body.  · Medicines and dietary changes can help to prevent and treat gout attacks.  This information is not intended to replace advice given to you by your health care provider. Make sure you discuss any questions you have with your health care provider.  Document Released: 12/15/2001 Document Revised: 07/10/2019 Document Reviewed: 07/10/2019  [employer] Patient Education © 2020 [employer]

## 2021-07-18 DIAGNOSIS — M79.671 FOOT PAIN, RIGHT: ICD-10-CM

## 2021-07-18 NOTE — PROGRESS NOTES
Subjective:     Zeinab Loza is a 76 y.o. female who presents for Toe Pain (right toe pain radiating to the ankle, throbbing and shooting pain, getting worse, x1 day)      HPI  Pt presents for evaluation of a new problem. There is a pleasant 76-year-old female presents to urgent care today with her daughter. Yesterday morning very awoke with severe pain in her right first metatarsal. Her pain is rated as an 8/10. Her pain is progressively worsening. She has used ice for relief of her symptoms however, this did not provide any relief. She is unable to bear any weight on her right first metatarsal. She denies any mechanism of injury. Her pain does radiate up to her right medial ankle.    Review of Systems   Constitutional: Negative for chills and fever.   Gastrointestinal: Negative for nausea and vomiting.   Musculoskeletal: Positive for joint pain and myalgias. Negative for back pain, falls and neck pain.   Neurological: Positive for focal weakness.   Endo/Heme/Allergies: Bruises/bleeds easily.       PMH:   Past Medical History:   Diagnosis Date   • Aneurysm artery, celiac (HCC) 2019   • Aneurysm of right renal artery (HCC)    • Atrial fibrillation (HCC)    • Breath shortness     5L O2 all the time   • Chickenpox    • Congestive heart failure (HCC)    • Diastolic heart failure (HCC)    • Maori measles    • Heart burn    • Heart valve disease     MV repair   • Hypertension, essential    • Pulmonary hypertension (HCC)    • Sleep apnea     Bipap   • Snoring    • Warfarin anticoagulation      ALLERGIES:   Allergies   Allergen Reactions   • Betapace [Sotalol] Anaphylaxis   • Tikosyn [Dofetilide] Swelling     TONGUE SWELL.    • Zolpidem Tartrate Unspecified     Lightheaded and confusion     SURGHX:   Past Surgical History:   Procedure Laterality Date   • ANKLE ORIF Left 8/5/2020    Procedure: ORIF, ANKLE;  Surgeon: Tk Humphreys M.D.;  Location: SURGERY [facility];  Service: Orthopedics   • HYSTERECTOMY  "LAPAROSCOPY     • MITRAL VALVE REPAIR     • OTHER      neck surgery   • TONSILLECTOMY     • WRIST FUSION       SOCHX:   Social History     Socioeconomic History   • Marital status:      Spouse name: Not on file   • Number of children: Not on file   • Years of education: Not on file   • Highest education level: Not on file   Occupational History   • Not on file   Tobacco Use   • Smoking status: Never Smoker   • Smokeless tobacco: Never Used   Vaping Use   • Vaping Use: Never used   Substance and Sexual Activity   • Alcohol use: Yes     Alcohol/week: 4.2 oz     Types: 7 Shots of liquor per week     Comment: 1 drink a night   • Drug use: No   • Sexual activity: Not on file   Other Topics Concern   • Not on file   Social History Narrative   • Not on file     Social Determinants of Health     Financial Resource Strain:    • Difficulty of Paying Living Expenses:    Food Insecurity:    • Worried About Running Out of Food in the Last Year:    • Ran Out of Food in the Last Year:    Transportation Needs:    • Lack of Transportation (Medical):    • Lack of Transportation (Non-Medical):    Physical Activity:    • Days of Exercise per Week:    • Minutes of Exercise per Session:    Stress:    • Feeling of Stress :    Social Connections:    • Frequency of Communication with Friends and Family:    • Frequency of Social Gatherings with Friends and Family:    • Attends [employer] Services:    • Active Member of Clubs or Organizations:    • Attends Club or Organization Meetings:    • Marital Status:    Intimate Partner Violence:    • Fear of Current or Ex-Partner:    • Emotionally Abused:    • Physically Abused:    • Sexually Abused:      FH: No family history on file.      Objective:   /70 (BP Location: Right arm, Patient Position: Sitting, BP Cuff Size: Adult)   Pulse 75   Temp 36.7 °C (98 °F) (Temporal)   Resp 18   Ht 1.702 m (5' 7\")   Wt 95.3 kg (210 lb)   SpO2 91%   BMI 32.89 kg/m²     Physical Exam  Vitals and " nursing note reviewed.   Constitutional:       General: She is not in acute distress.     Appearance: Normal appearance. She is not ill-appearing.   HENT:      Head: Normocephalic and atraumatic.      Right Ear: External ear normal.      Left Ear: External ear normal.      Nose: No congestion or rhinorrhea.      Mouth/Throat:      Mouth: Mucous membranes are moist.   Eyes:      Extraocular Movements: Extraocular movements intact.      Pupils: Pupils are equal, round, and reactive to light.   Cardiovascular:      Rate and Rhythm: Normal rate and regular rhythm.      Pulses: Normal pulses.      Heart sounds: Normal heart sounds.   Pulmonary:      Effort: Pulmonary effort is normal.      Breath sounds: Normal breath sounds.   Abdominal:      General: Abdomen is flat. Bowel sounds are normal.      Palpations: Abdomen is soft.   Musculoskeletal:         General: Normal range of motion.      Cervical back: Normal range of motion and neck supple.      Comments: Positive for erythema and swelling plantar aspect of right first metatarsal. Negative for dorsal swelling or erythema. There is severe tenderness to light palpation. She is unable to bear any weight on this foot. Affected area is hot to touch.   Skin:     General: Skin is warm and dry.      Capillary Refill: Capillary refill takes less than 2 seconds.   Neurological:      General: No focal deficit present.      Mental Status: She is alert and oriented to person, place, and time. Mental status is at baseline.   Psychiatric:         Mood and Affect: Mood normal.         Behavior: Behavior normal.         Thought Content: Thought content normal.         Judgment: Judgment normal.         Assessment/Plan:   Assessment      1. Acute gout involving toe of right foot, unspecified cause  colchicine (COLCRYS) 0.6 MG Tab    predniSONE (DELTASONE) 20 MG Tab   At this time I am concerned for acute gout flare. We discussed causes and signs/symptoms of gout. Patient to start  colchicine today followed by prednisone tomorrow if symptoms do not improve. I will order x-ray for patient if symptoms do not improve following medication. She agrees with this plan of care today.  AVS handout given and reviewed with patient. Pt educated on red flags and when to seek treatment back in ER or UC.

## 2021-07-19 ENCOUNTER — HOSPITAL ENCOUNTER (OUTPATIENT)
Dept: RADIOLOGY | Facility: MEDICAL CENTER | Age: 76
End: 2021-07-19
Attending: NURSE PRACTITIONER
Payer: MEDICARE

## 2021-07-19 DIAGNOSIS — M77.52 BONE SPUR OF LEFT FOOT: ICD-10-CM

## 2021-07-19 DIAGNOSIS — M79.671 FOOT PAIN, RIGHT: ICD-10-CM

## 2021-07-19 PROCEDURE — 73630 X-RAY EXAM OF FOOT: CPT | Mod: RT

## 2021-07-22 ENCOUNTER — TELEPHONE (OUTPATIENT)
Dept: CARDIOLOGY | Facility: MEDICAL CENTER | Age: 76
End: 2021-07-22

## 2021-07-22 PROCEDURE — RXMED WILLOW AMBULATORY MEDICATION CHARGE: Performed by: INTERNAL MEDICINE

## 2021-07-22 NOTE — TELEPHONE ENCOUNTER
GERALDINE    Patient and her daughter called and they need a refill of the Eliquis sent to the sample pharmacy on [facility]. It is currently 500$ at her pharmacy as [facility] She is faxing over paperwork from [employer] for assistance. She only has 5 days left of her Eliquis.    Patient daughter called back and stated that per [facility] they have a lot of the Eliquis 2.5 mg if you can send the RX for the 2.5 mg 4 a day instead of the 5 mg twice a day, they will be able to fill no problem.      Thank you,    Kelly LAWRENCE

## 2021-07-26 ENCOUNTER — HOSPITAL ENCOUNTER (OUTPATIENT)
Dept: LAB | Facility: MEDICAL CENTER | Age: 76
End: 2021-07-26
Attending: NURSE PRACTITIONER
Payer: MEDICARE

## 2021-07-26 DIAGNOSIS — E78.49 OTHER HYPERLIPIDEMIA: ICD-10-CM

## 2021-07-26 DIAGNOSIS — I48.0 PAROXYSMAL ATRIAL FIBRILLATION (HCC): ICD-10-CM

## 2021-07-26 DIAGNOSIS — I27.21 PAH (PULMONARY ARTERY HYPERTENSION) (HCC): ICD-10-CM

## 2021-07-26 DIAGNOSIS — Z79.899 HIGH RISK MEDICATION USE: ICD-10-CM

## 2021-07-26 LAB
ALBUMIN SERPL BCP-MCNC: 4.4 G/DL (ref 3.2–4.9)
ALBUMIN/GLOB SERPL: 1.5 G/DL
ALP SERPL-CCNC: 79 U/L (ref 30–99)
ALT SERPL-CCNC: 18 U/L (ref 2–50)
ANION GAP SERPL CALC-SCNC: 16 MMOL/L (ref 7–16)
AST SERPL-CCNC: 20 U/L (ref 12–45)
BILIRUB SERPL-MCNC: 0.4 MG/DL (ref 0.1–1.5)
BUN SERPL-MCNC: 22 MG/DL (ref 8–22)
CALCIUM SERPL-MCNC: 10.9 MG/DL (ref 8.5–10.5)
CHLORIDE SERPL-SCNC: 97 MMOL/L (ref 96–112)
CHOLEST SERPL-MCNC: 211 MG/DL (ref 100–199)
CO2 SERPL-SCNC: 28 MMOL/L (ref 20–33)
CREAT SERPL-MCNC: 1.04 MG/DL (ref 0.5–1.4)
FASTING STATUS PATIENT QL REPORTED: NORMAL
GLOBULIN SER CALC-MCNC: 3 G/DL (ref 1.9–3.5)
GLUCOSE SERPL-MCNC: 129 MG/DL (ref 65–99)
HDLC SERPL-MCNC: 47 MG/DL
LDLC SERPL CALC-MCNC: 118 MG/DL
POTASSIUM SERPL-SCNC: 5 MMOL/L (ref 3.6–5.5)
PROT SERPL-MCNC: 7.4 G/DL (ref 6–8.2)
SODIUM SERPL-SCNC: 141 MMOL/L (ref 135–145)
TRIGL SERPL-MCNC: 231 MG/DL (ref 0–149)

## 2021-07-26 PROCEDURE — 36415 COLL VENOUS BLD VENIPUNCTURE: CPT

## 2021-07-26 PROCEDURE — 80061 LIPID PANEL: CPT

## 2021-07-26 PROCEDURE — 80053 COMPREHEN METABOLIC PANEL: CPT

## 2021-07-26 NOTE — TELEPHONE ENCOUNTER
Pts daughter Vernell called back to check on the [employer] sample and [employer] paperwork. Please call Vernell back at [phone].    Thank you

## 2021-07-27 NOTE — RESULT ENCOUNTER NOTE
Can you let patient know that her lab tests look okay except for her cholesterol panel.  Is she willing to increase her rosuvastatin to 40 mg daily?  We can get a follow-up lipid panel done in 3 months after the med change to see how her cholesterol is at that point.

## 2021-07-28 ENCOUNTER — PHARMACY VISIT (OUTPATIENT)
Dept: PHARMACY | Facility: MEDICAL CENTER | Age: 76
End: 2021-07-28
Payer: COMMERCIAL

## 2021-08-04 ENCOUNTER — TELEPHONE (OUTPATIENT)
Dept: CARDIOLOGY | Facility: MEDICAL CENTER | Age: 76
End: 2021-08-04

## 2021-08-04 ENCOUNTER — PATIENT MESSAGE (OUTPATIENT)
Dept: CARDIOLOGY | Facility: MEDICAL CENTER | Age: 76
End: 2021-08-04

## 2021-08-04 DIAGNOSIS — E78.2 MIXED HYPERLIPIDEMIA: ICD-10-CM

## 2021-08-04 NOTE — TELEPHONE ENCOUNTER
TEODORO Hogan.  Moo Thomas R.N.  Can you let patient know that her lab tests look okay except for her cholesterol panel.  Is she willing to increase her rosuvastatin to 40 mg daily?  We can get a follow-up lipid panel done in 3 months after the med change to see how her cholesterol is at that point.       See MyChart Message

## 2021-08-05 RX ORDER — ROSUVASTATIN CALCIUM 40 MG/1
40 TABLET, COATED ORAL EVERY EVENING
Qty: 90 TABLET | Refills: 3 | Status: SHIPPED | OUTPATIENT
Start: 2021-08-05 | End: 2022-01-24 | Stop reason: SDUPTHER

## 2021-08-05 NOTE — PATIENT COMMUNICATION
Me         8/4/21 3:01 PM  Note     TEODORO Hogan.  Moo Thomas R.N.  Can you let patient know that her lab tests look okay except for her cholesterol panel.  Is she willing to increase her rosuvastatin to 40 mg daily?  We can get a follow-up lipid panel done in 3 months after the med change to see how her cholesterol is at that point.         See MyChart Message

## 2021-08-06 ENCOUNTER — TELEPHONE (OUTPATIENT)
Dept: CARDIOLOGY | Facility: MEDICAL CENTER | Age: 76
End: 2021-08-06

## 2021-08-10 ENCOUNTER — HOSPITAL ENCOUNTER (OUTPATIENT)
Dept: RADIOLOGY | Facility: MEDICAL CENTER | Age: 76
End: 2021-08-10
Payer: MEDICARE

## 2021-08-16 PROBLEM — M17.12 DEGENERATIVE ARTHRITIS OF LEFT KNEE: Status: ACTIVE | Noted: 2021-08-16

## 2021-08-18 ENCOUNTER — TELEPHONE (OUTPATIENT)
Dept: CARDIOLOGY | Facility: MEDICAL CENTER | Age: 76
End: 2021-08-18

## 2021-08-18 NOTE — TELEPHONE ENCOUNTER
8/16/21        To whom it may concern,      Zeinab Loza (1945) has been indicated for a knee replacement by Dr. Mars Mclean at [facility].  Pulmonary Hypertension clearance is being requested.     If possible, please fax a clearance note to ATTN: Dr. Mars Mclean at [phone].     If further information is needed, Dr. Mclean' medical assistant (Delia) can be reached at [phone].  Alternatively, Dr. Mclean' email is [email or web address].           Thank you for your assistance,           Mars Mclean MD  [facility]       To GERALDINE, ok to proceed and hold Eliquis 48hrs prior?

## 2021-08-26 ENCOUNTER — TELEPHONE (OUTPATIENT)
Dept: CARDIOLOGY | Facility: MEDICAL CENTER | Age: 76
End: 2021-08-26

## 2021-08-26 ENCOUNTER — PHARMACY VISIT (OUTPATIENT)
Dept: PHARMACY | Facility: MEDICAL CENTER | Age: 76
End: 2021-08-26
Payer: COMMERCIAL

## 2021-08-26 PROCEDURE — RXMED WILLOW AMBULATORY MEDICATION CHARGE: Performed by: NURSE PRACTITIONER

## 2021-08-26 NOTE — TELEPHONE ENCOUNTER
S/W [facility], they have 5mg tablets available. New RX sent.    Patient aware of dosage change from previous sample.

## 2021-08-26 NOTE — TELEPHONE ENCOUNTER
GERALDINE    Patient called to advise they are needing to have more samples of apixaban (ELIQUIS) 2.5mg Tab to be sent to the [facility] on [address]. Pt has only left for tonight.     Thank you,  Rosie HALL

## 2021-09-02 ENCOUNTER — HOSPITAL ENCOUNTER (OUTPATIENT)
Dept: CARDIOLOGY | Facility: MEDICAL CENTER | Age: 76
End: 2021-09-02
Attending: INTERNAL MEDICINE
Payer: MEDICARE

## 2021-09-02 DIAGNOSIS — R06.02 SHORTNESS OF BREATH: ICD-10-CM

## 2021-09-02 DIAGNOSIS — I27.20 PORTOPULMONARY HYPERTENSION (HCC): ICD-10-CM

## 2021-09-02 DIAGNOSIS — K76.6 PORTOPULMONARY HYPERTENSION (HCC): ICD-10-CM

## 2021-09-02 LAB
LV EJECT FRACT  99904: 75
LV EJECT FRACT MOD 2C 99903: 68.98
LV EJECT FRACT MOD 4C 99902: 53.26
LV EJECT FRACT MOD BP 99901: 59.78

## 2021-09-02 PROCEDURE — 93306 TTE W/DOPPLER COMPLETE: CPT

## 2021-09-02 PROCEDURE — 93306 TTE W/DOPPLER COMPLETE: CPT | Mod: 26 | Performed by: INTERNAL MEDICINE

## 2021-09-03 ENCOUNTER — OFFICE VISIT (OUTPATIENT)
Dept: CARDIOLOGY | Facility: MEDICAL CENTER | Age: 76
End: 2021-09-03
Payer: MEDICARE

## 2021-09-03 ENCOUNTER — HOSPITAL ENCOUNTER (OUTPATIENT)
Dept: LAB | Facility: MEDICAL CENTER | Age: 76
End: 2021-09-03
Attending: NURSE PRACTITIONER
Payer: MEDICARE

## 2021-09-03 VITALS
WEIGHT: 206 LBS | HEART RATE: 133 BPM | SYSTOLIC BLOOD PRESSURE: 96 MMHG | HEIGHT: 67 IN | OXYGEN SATURATION: 96 % | BODY MASS INDEX: 32.33 KG/M2 | RESPIRATION RATE: 14 BRPM | DIASTOLIC BLOOD PRESSURE: 70 MMHG

## 2021-09-03 DIAGNOSIS — I10 ESSENTIAL HYPERTENSION: ICD-10-CM

## 2021-09-03 DIAGNOSIS — Z79.899 HIGH RISK MEDICATION USE: ICD-10-CM

## 2021-09-03 DIAGNOSIS — I48.0 PAROXYSMAL ATRIAL FIBRILLATION (HCC): ICD-10-CM

## 2021-09-03 DIAGNOSIS — I27.21 PAH (PULMONARY ARTERY HYPERTENSION) (HCC): ICD-10-CM

## 2021-09-03 DIAGNOSIS — Z79.01 CHRONIC ANTICOAGULATION: ICD-10-CM

## 2021-09-03 DIAGNOSIS — E78.49 OTHER HYPERLIPIDEMIA: ICD-10-CM

## 2021-09-03 DIAGNOSIS — Z98.890 S/P ABLATION OF ATRIAL FIBRILLATION: ICD-10-CM

## 2021-09-03 DIAGNOSIS — Z86.79 S/P ABLATION OF ATRIAL FIBRILLATION: ICD-10-CM

## 2021-09-03 DIAGNOSIS — R42 DIZZINESS: ICD-10-CM

## 2021-09-03 LAB
ANION GAP SERPL CALC-SCNC: 13 MMOL/L (ref 7–16)
BUN SERPL-MCNC: 24 MG/DL (ref 8–22)
CALCIUM SERPL-MCNC: 11.6 MG/DL (ref 8.5–10.5)
CHLORIDE SERPL-SCNC: 89 MMOL/L (ref 96–112)
CO2 SERPL-SCNC: 38 MMOL/L (ref 20–33)
CREAT SERPL-MCNC: 1.21 MG/DL (ref 0.5–1.4)
EKG IMPRESSION: NORMAL
FASTING STATUS PATIENT QL REPORTED: NORMAL
GLUCOSE SERPL-MCNC: 153 MG/DL (ref 65–99)
POTASSIUM SERPL-SCNC: 3.4 MMOL/L (ref 3.6–5.5)
SODIUM SERPL-SCNC: 140 MMOL/L (ref 135–145)

## 2021-09-03 PROCEDURE — 93000 ELECTROCARDIOGRAM COMPLETE: CPT | Performed by: INTERNAL MEDICINE

## 2021-09-03 PROCEDURE — 80048 BASIC METABOLIC PNL TOTAL CA: CPT

## 2021-09-03 PROCEDURE — 99214 OFFICE O/P EST MOD 30 MIN: CPT | Performed by: NURSE PRACTITIONER

## 2021-09-03 PROCEDURE — 36415 COLL VENOUS BLD VENIPUNCTURE: CPT

## 2021-09-03 RX ORDER — DIGOXIN 125 MCG
125 TABLET ORAL DAILY
Qty: 90 TABLET | Refills: 2 | Status: SHIPPED | OUTPATIENT
Start: 2021-09-03 | End: 2022-01-26

## 2021-09-03 ASSESSMENT — ENCOUNTER SYMPTOMS
FEVER: 0
COUGH: 0
MYALGIAS: 0
PND: 0
ORTHOPNEA: 0
DIZZINESS: 1
SHORTNESS OF BREATH: 1
WHEEZING: 0
PALPITATIONS: 0
ABDOMINAL PAIN: 0
CLAUDICATION: 0
HEARTBURN: 1

## 2021-09-03 ASSESSMENT — FIBROSIS 4 INDEX: FIB4 SCORE: 1.32

## 2021-09-03 NOTE — PROGRESS NOTES
Chief Complaint   Patient presents with   • Atrial Fibrillation     F/V Dx: Paroxysmal atrial fibrillation (HCC)    • Pulmonary Hypertension   • Hypertension       Subjective:   Zeinab Loza is a 76 y.o. female who presents today for follow-up on her pulmonary hypertension, atrial fibrillation with her daughter, Vernell.    Patient of Dr. Galindo and Dr. Eng.  She was last seen in clinic on 7/14/2021.  During that visit, Bumex and potassium were increased for volume overload.    Patient reports she has lost weight with the increased dose.  Her weights are ranging between 203-206 pounds.  She was weighing around 219 pounds.    She states over the past month, she has been dizzier than normal, but last night and today she feels more dizzy and feels like she could pass out.  She also is reporting increased heartburn, chest tightness, shortness of breath.  She denies palpitations, with apnea, PND or edema.    She completed her event monitor.    She denies any known episodes of A. Fib.    He had her echo done yesterday.     She continues to report compliance with sodium intake.     She does continue to follow with pulmonary at [facility] with Dr. Zelaya.      She is hoping to have her knee surgery soon.    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 5.19 L/min, Odin C.I 2.39   PVR 4.8 woods     -Hypertension     -Paroxysmal A. fib, started on Tikosyn on 10/21/2020, on  Eliquis; RF ablation with PVI, septal LA flutter and empiric ablation for mitral isthmus flutter by Dr. Galindo on 4/22/2021     -Hyperlipidemia: Taking rosuvastatin     -Sleep apnea, uses BiPAP with oxygen bleed in 5L, followed by sleep medicine       Past Medical History:   Diagnosis Date   • Aneurysm artery, celiac (HCC) 2019   • Aneurysm of right renal artery (HCC)    • Atrial fibrillation (HCC)    • Breath shortness     5L O2 all the time   • Chickenpox    • Congestive heart failure (HCC)    • Diastolic heart failure (HCC)    • Slovak measles    • Heart burn    • Heart valve disease     MV repair   • Hypertension, essential    • Pulmonary hypertension (HCC)    • Sleep apnea     Bipap   • Snoring    • Warfarin anticoagulation      Past Surgical History:   Procedure Laterality Date   • ANKLE ORIF Left 8/5/2020    Procedure: ORIF, ANKLE;  Surgeon: Tk Humphreys M.D.;  Location: SURGERY [facility];  Service: Orthopedics   • HYSTERECTOMY LAPAROSCOPY     • MITRAL VALVE REPAIR     • OTHER      neck surgery   • TONSILLECTOMY     • WRIST FUSION       History reviewed. No pertinent family history.  Social History     Socioeconomic History   • Marital status:      Spouse name: Not on file   • Number of children: Not on file   • Years of education: Not on file   • Highest education level: Not on file   Occupational History   • Not on file   Tobacco Use   • Smoking status: Never Smoker   • Smokeless tobacco: Never Used   Vaping Use   • Vaping Use: Never used   Substance and Sexual Activity   • Alcohol use: Yes     Alcohol/week: 4.2 oz     Types: 7 Shots of liquor per week     Comment: 1 drink a night   • Drug use: No   • Sexual activity: Not on file   Other Topics Concern   • Not on file   Social History Narrative   • Not on file     Social Determinants of Health     Financial Resource Strain:    • Difficulty of Paying Living Expenses:    Food Insecurity:    • Worried About Running Out of Food in the Last  Year:    • Ran Out of Food in the Last Year:    Transportation Needs:    • Lack of Transportation (Medical):    • Lack of Transportation (Non-Medical):    Physical Activity:    • Days of Exercise per Week:    • Minutes of Exercise per Session:    Stress:    • Feeling of Stress :    Social Connections:    • Frequency of Communication with Friends and Family:    • Frequency of Social Gatherings with Friends and Family:    • Attends [employer] Services:    • Active Member of Clubs or Organizations:    • Attends Club or Organization Meetings:    • Marital Status:    Intimate Partner Violence:    • Fear of Current or Ex-Partner:    • Emotionally Abused:    • Physically Abused:    • Sexually Abused:      Allergies   Allergen Reactions   • Betapace [Sotalol] Anaphylaxis   • Tikosyn [Dofetilide] Swelling     TONGUE SWELL.    • Zolpidem Tartrate Unspecified     Lightheaded and confusion     Outpatient Encounter Medications as of 9/3/2021   Medication Sig Dispense Refill   • digoxin (LANOXIN) 125 MCG Tab Take 1 Tablet by mouth every day. 90 Tablet 2   • rosuvastatin (CRESTOR) 40 MG tablet Take 1 tablet by mouth every evening. 90 tablet 3   • colchicine (COLCRYS) 0.6 MG Tab Take 2 tablets (1.2mg) at once followed by 1  tablet (0.6 mg) one hour later. 3 tablet 0   • bumetanide (BUMEX) 2 MG tablet Take 2 Tablets by mouth 2 times a day. 120 tablet 11   • tretinoin (RETIN-A) 0.05 % cream APPLY TO FACE EVERY 3 NIGHTS AND SLOWLY INCREASE TO NIGHTLY AS IRRITATION ALLOWS.     • metOLazone (ZAROXOLYN) 5 MG Tab Take 5 mg by mouth 1 time a day as needed.     • potassium chloride SA (KDUR) 20 MEQ Tab CR Take 3 Tablets by mouth 2 times a day. 180 tablet 11   • apixaban (ELIQUIS) 5mg Tab Take 1 tablet by mouth 2 times a day. 180 tablet 3   • metoprolol tartrate (LOPRESSOR) 50 MG Tab Take 1 tablet by mouth 2 times a day. 60 tablet 11   • valacyclovir (VALTREX) 1 GM Tab Take 1 tablet by mouth every 8 hours as needed.     •  "HYDROcodone-acetaminophen (NORCO) 7.5-325 MG per tablet Take 1 tablet by mouth every 8 hours as needed. Indications: Pain     • Tadalafil, PAH, (ADCIRCA) 20 MG Tab Take 40 mg by mouth every bedtime. Indications: Pulmonary Arterial Hypertension     • PARoxetine (PAXIL) 20 MG Tab Take 20 mg by mouth every morning.     • levothyroxine (SYNTHROID) 75 MCG Tab Take 75 mcg by mouth Every morning on an empty stomach.     • Cholecalciferol (VITAMIN D) 2000 units Cap Take 2,000 Units by mouth every day.     • [DISCONTINUED] apixaban (ELIQUIS) 5mg Tab Take 1 Tablet by mouth 2 times a day. (Patient not taking: Reported on 9/3/2021) 60 Tablet 0   • [DISCONTINUED] digoxin (LANOXIN) 125 MCG Tab Take 1 tablet by mouth every day. 90 tablet 2     No facility-administered encounter medications on file as of 9/3/2021.     Review of Systems   Constitutional: Positive for malaise/fatigue (improved). Negative for fever.   Respiratory: Positive for shortness of breath. Negative for cough and wheezing.    Cardiovascular: Positive for chest pain (tightness). Negative for palpitations, orthopnea, claudication, leg swelling and PND.   Gastrointestinal: Positive for heartburn. Negative for abdominal pain.   Musculoskeletal: Positive for joint pain (knee pain ). Negative for myalgias.   Neurological: Positive for dizziness.   All other systems reviewed and are negative.       Objective:   BP (!) 96/70 (BP Location: Left arm, Patient Position: Sitting, BP Cuff Size: Adult)   Pulse (!) 133   Resp 14   Ht 1.702 m (5' 7\")   Wt 93.4 kg (206 lb)   SpO2 96%   BMI 32.26 kg/m²     Physical Exam   Constitutional: She is oriented to person, place, and time. She appears well-developed.   HENT:   Head: Normocephalic and atraumatic.   Eyes: Pupils are equal, round, and reactive to light.   Neck: No JVD present.   Cardiovascular: Normal rate, regular rhythm and normal heart sounds.   Pulmonary/Chest: Effort normal and breath sounds normal. No respiratory " distress. She has no wheezes. She has no rales.   Abdominal: Soft. Bowel sounds are normal.   Musculoskeletal:      Cervical back: Normal range of motion and neck supple.      Right lower leg: No edema.      Left lower leg: No edema.   Neurological: She is alert and oriented to person, place, and time.   Skin: Skin is warm and dry.   Psychiatric: Her behavior is normal.   Vitals reviewed.    Lab Results   Component Value Date/Time    CHOLSTRLTOT 211 (H) 07/26/2021 01:02 PM     (H) 07/26/2021 01:02 PM    HDL 47 07/26/2021 01:02 PM    TRIGLYCERIDE 231 (H) 07/26/2021 01:02 PM       Lab Results   Component Value Date/Time    SODIUM 141 07/26/2021 01:02 PM    POTASSIUM 5.0 07/26/2021 01:02 PM    CHLORIDE 97 07/26/2021 01:02 PM    CO2 28 07/26/2021 01:02 PM    GLUCOSE 129 (H) 07/26/2021 01:02 PM    BUN 22 07/26/2021 01:02 PM    CREATININE 1.04 07/26/2021 01:02 PM     Lab Results   Component Value Date/Time    ALKPHOSPHAT 79 07/26/2021 01:02 PM    ASTSGOT 20 07/26/2021 01:02 PM    ALTSGPT 18 07/26/2021 01:02 PM    TBILIRUBIN 0.4 07/26/2021 01:02 PM      Transthoracic Echo Report 1/16/2020  Normal left ventricular systolic function. Left ventricular ejection fraction is visually estimated to be 65%.  A reliable estimation of diastolic function cannot be made due to mitral valve disease.  Normal inferior vena cava size and inspiratory collapse.  Known mitral valve repair which is functioning normally with   appropriate transvalvular gradient. Mean transvalvular gradient is 3 mmHg at a heart rate of 66 BPM.  Estimated right ventricular systolic pressure  is 32 mmHg.     Transthoracic Echo Report 8/5/2020  Normal left ventricular size, wall thickness, and systolic function.  Dilated right ventricle with preserved systolic function.  No significant valvular pathology.  Estimated right ventricular systolic pressure  is 25 mmHg; normal.  Normal pericardium without effusion.     No prior study is available for comparison.       Transthoracic Echo Report 3/5/2021  Normal left ventricular size and systolic function.  Mild concentric left ventricular hypertrophy.  Left ventricular ejection fraction is visually estimated to be 60%.  Known mitral valve repair which is functioning normally with   appropriate transvalvular gradient.  Mild mitral regurgitation.  Normal inferior vena cava size and inspiratory collapse.    Transesophageal Echo Report 4/22/2021  LV function appears preserved. Sigmoid septum.  Known mitral valve repair, mild MR.  NED has been ligated, no residual NED is seen.  No thrombus is seen in the left atrium.  Moderate tricuspid regurgitation.  Trileaflet aortic valve.  No ASD is seen.     Assessment:     1. Essential hypertension  EKG    Basic Metabolic Panel   2. Paroxysmal atrial fibrillation (HCC)  Basic Metabolic Panel   3. PAH (pulmonary artery hypertension) (HCC)  Basic Metabolic Panel   4. High risk medication use  Basic Metabolic Panel   5. Chronic anticoagulation  Basic Metabolic Panel   6. Other hyperlipidemia  Basic Metabolic Panel   7. S/P repeat ablation of atrial fibrillation/atrial flutter  Basic Metabolic Panel   8. Dizziness  Basic Metabolic Panel       Medical Decision Making:  Today's Assessment / Status / Plan:   Discussed with patient and daughter with her symptoms and feelings of passing out.  Would like patient to be evaluated the ER.  She could be dehydrated due to the weight loss and now that she is down to her dry weight.  There is also concern for kidney injury or A. fib with RVR.    EKG today shows A. fib with RVR at rate 130.    Likely diuretics to be adjusted down.    Pt now maybe deciding to monitor her symptoms, if she does not go to ER, pt to reconsider if her symptoms worsen.  At minimum, patient to get lab testing done today and decrease Bumex to 4 mg daily and potassium to 60 mEq daily.    1. Pulmonary hypertension, WHO group 2-3, functional class III:  -Would recommend decreasing  diuretics down to 40 mg daily along with potassium daily  -Hold off on taking metolazone unless directed  -Continue tadalafil 40 mg daily  -Unable to take Letairis due to hypotension  -Encourage patient to monitor sodium intake  -Reinforced s/sx of worsening symptoms with patient and weight monitoring. Pt verbalizes understanding. Pt to call office or RTC if present.  -Continue to follow up with [facility]      2.  Sleep apnea:  -Continue BiPAP with oxygen bleeding, using 5 L     3.  Atrial fibrillation:    -Completed event monitor, showing paroxysmal A. fib with RVR  -Patient had A. fib ablation on 4/22/2021  -Continue Eliquis 5 mg twice a day  -Continue metoprolol 50 mg twice a day  -Recommend patient getting back in with Dr. Galindo or Yaritza     4.  Hyperlipidemia: Last  on 7/26/2021  -Continue rosuvastatin 20 mg daily  -We will discuss increasing cholesterol med at follow-up after ER visit     5.  Hypertension:   -BP low likely due to dehydration  -Continue metoprolol 50 mg twice a day     FU in clinic in 1-2 weeks after ER visit and for follow up. Sooner if needed.     Patient verbalizes understanding and agrees with the plan of care.      PLEASE NOTE: This Note was created using voice recognition Software. I have made every reasonable attempt to correct obvious errors, but I expect that there are errors of grammar and possibly content that I did not discover before finalizing the note

## 2021-09-08 ENCOUNTER — OFFICE VISIT (OUTPATIENT)
Dept: CARDIOLOGY | Facility: MEDICAL CENTER | Age: 76
End: 2021-09-08
Payer: MEDICARE

## 2021-09-08 VITALS
OXYGEN SATURATION: 92 % | BODY MASS INDEX: 33.37 KG/M2 | SYSTOLIC BLOOD PRESSURE: 120 MMHG | RESPIRATION RATE: 14 BRPM | DIASTOLIC BLOOD PRESSURE: 80 MMHG | WEIGHT: 212.6 LBS | HEIGHT: 67 IN | HEART RATE: 78 BPM

## 2021-09-08 DIAGNOSIS — I48.0 PAROXYSMAL ATRIAL FIBRILLATION (HCC): ICD-10-CM

## 2021-09-08 DIAGNOSIS — Z79.01 CHRONIC ANTICOAGULATION: ICD-10-CM

## 2021-09-08 DIAGNOSIS — I27.20 PULMONARY HTN (HCC): ICD-10-CM

## 2021-09-08 DIAGNOSIS — I10 ESSENTIAL HYPERTENSION: ICD-10-CM

## 2021-09-08 DIAGNOSIS — Z98.890 S/P ABLATION OF ATRIAL FIBRILLATION: ICD-10-CM

## 2021-09-08 DIAGNOSIS — R06.02 SHORTNESS OF BREATH: ICD-10-CM

## 2021-09-08 DIAGNOSIS — Z79.899 HIGH RISK MEDICATION USE: ICD-10-CM

## 2021-09-08 DIAGNOSIS — E87.6 HYPOKALEMIA: ICD-10-CM

## 2021-09-08 DIAGNOSIS — E78.2 MIXED HYPERLIPIDEMIA: ICD-10-CM

## 2021-09-08 DIAGNOSIS — Z86.79 S/P ABLATION OF ATRIAL FIBRILLATION: ICD-10-CM

## 2021-09-08 PROCEDURE — 99214 OFFICE O/P EST MOD 30 MIN: CPT | Performed by: NURSE PRACTITIONER

## 2021-09-08 ASSESSMENT — FIBROSIS 4 INDEX: FIB4 SCORE: 1.32

## 2021-09-08 ASSESSMENT — ENCOUNTER SYMPTOMS
ABDOMINAL PAIN: 0
PALPITATIONS: 0
MYALGIAS: 0
SHORTNESS OF BREATH: 0
PND: 0
COUGH: 0
FEVER: 0
CLAUDICATION: 0
ORTHOPNEA: 0
DIZZINESS: 1
HEARTBURN: 1

## 2021-09-08 NOTE — PROGRESS NOTES
Chief Complaint   Patient presents with   • Atrial Fibrillation     F/V Dx: Paroxysmal atrial fibrillation (HCC)    • Hypertension     F/V Dx: Essential hypertension        Subjective:   Zeinab Loza is a 76 y.o. female who presents today for follow-up on her pulmonary hypertension, atrial fibrillation with her daughter, Vernell.    Patient of Dr. Galindo and Dr. Eng.  She was last seen in clinic on 9/3/2021.  During that visit, pt was having symptoms and was recommended to go to the ER for further recommendations but pt opted to not go.    She did get labs done and reduced her bumex down to 4 mg daily.     She reports feeling better, but continues to have some dizziness and heartburn.  She denies chest pain, palpitations, orthopnea, PND or edema.  She continues have shortness of breath, using oxygen at 5 L.    She reports her weights are stable at 205 pounds.    Her blood pressure is ranging higher between the 110s-120s, systolic.     She continues to report compliance with sodium intake.     She does continue to follow with pulmonary at [facility] with Dr. Zelaya.      She is hoping to have her knee surgery soon.    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 5.19 L/min, Odin C.I 2.39   PVR 4.8 woods     -Hypertension     -Paroxysmal A. fib, started on Tikosyn on 10/21/2020, on Eliquis; RF ablation with PVI, septal LA flutter and empiric ablation for mitral isthmus  ben by Dr. Galindo on 4/22/2021     -Hyperlipidemia: Taking rosuvastatin     -Sleep apnea, uses BiPAP with oxygen bleed in 5L, followed by sleep medicine       Past Medical History:   Diagnosis Date   • Aneurysm artery, celiac (HCC) 2019   • Aneurysm of right renal artery (HCC)    • Atrial fibrillation (HCC)    • Breath shortness     5L O2 all the time   • Chickenpox    • Congestive heart failure (HCC)    • Diastolic heart failure (HCC)    • [place] measles    • Heart burn    • Heart valve disease     MV repair   • Hypertension, essential    • Pulmonary hypertension (HCC)    • Sleep apnea     Bipap   • Snoring    • Warfarin anticoagulation      Past Surgical History:   Procedure Laterality Date   • ANKLE ORIF Left 8/5/2020    Procedure: ORIF, ANKLE;  Surgeon: Tk Humphreys M.D.;  Location: SURGERY [facility];  Service: Orthopedics   • HYSTERECTOMY LAPAROSCOPY     • MITRAL VALVE REPAIR     • OTHER      neck surgery   • TONSILLECTOMY     • WRIST FUSION       History reviewed. No pertinent family history.  Social History     Socioeconomic History   • Marital status:      Spouse name: Not on file   • Number of children: Not on file   • Years of education: Not on file   • Highest education level: Not on file   Occupational History   • Not on file   Tobacco Use   • Smoking status: Never Smoker   • Smokeless tobacco: Never Used   Vaping Use   • Vaping Use: Never used   Substance and Sexual Activity   • Alcohol use: Yes     Alcohol/week: 4.2 oz     Types: 7 Shots of liquor per week     Comment: 1 drink a night   • Drug use: No   • Sexual activity: Not on file   Other Topics Concern   • Not on file   Social History Narrative   • Not on file     Social Determinants of Health     Financial Resource Strain:    • Difficulty of Paying Living Expenses:    Food Insecurity:    • Worried About Running Out of Food in the Last Year:    • Ran Out of Food in the Last Year:    Transportation Needs:    • Lack of  Transportation (Medical):    • Lack of Transportation (Non-Medical):    Physical Activity:    • Days of Exercise per Week:    • Minutes of Exercise per Session:    Stress:    • Feeling of Stress :    Social Connections:    • Frequency of Communication with Friends and Family:    • Frequency of Social Gatherings with Friends and Family:    • Attends Congregational Services:    • Active Member of Clubs or Organizations:    • Attends Club or Organization Meetings:    • Marital Status:    Intimate Partner Violence:    • Fear of Current or Ex-Partner:    • Emotionally Abused:    • Physically Abused:    • Sexually Abused:      Allergies   Allergen Reactions   • Betapace [Sotalol] Anaphylaxis   • Tikosyn [Dofetilide] Swelling     TONGUE SWELL.    • Zolpidem Tartrate Unspecified     Lightheaded and confusion     Outpatient Encounter Medications as of 9/8/2021   Medication Sig Dispense Refill   • digoxin (LANOXIN) 125 MCG Tab Take 1 Tablet by mouth every day. 90 Tablet 2   • rosuvastatin (CRESTOR) 40 MG tablet Take 1 tablet by mouth every evening. 90 tablet 3   • colchicine (COLCRYS) 0.6 MG Tab Take 2 tablets (1.2mg) at once followed by 1  tablet (0.6 mg) one hour later. 3 tablet 0   • bumetanide (BUMEX) 2 MG tablet Take 2 Tablets by mouth 2 times a day. 120 tablet 11   • tretinoin (RETIN-A) 0.05 % cream APPLY TO FACE EVERY 3 NIGHTS AND SLOWLY INCREASE TO NIGHTLY AS IRRITATION ALLOWS.     • metOLazone (ZAROXOLYN) 5 MG Tab Take 5 mg by mouth 1 time a day as needed.     • potassium chloride SA (KDUR) 20 MEQ Tab CR Take 3 Tablets by mouth 2 times a day. 180 tablet 11   • apixaban (ELIQUIS) 5mg Tab Take 1 tablet by mouth 2 times a day. 180 tablet 3   • metoprolol tartrate (LOPRESSOR) 50 MG Tab Take 1 tablet by mouth 2 times a day. 60 tablet 11   • valacyclovir (VALTREX) 1 GM Tab Take 1 tablet by mouth every 8 hours as needed.     • HYDROcodone-acetaminophen (NORCO) 7.5-325 MG per tablet Take 1 tablet by mouth every 8 hours as needed.  "Indications: Pain     • Tadalafil, PAH, (ADCIRCA) 20 MG Tab Take 40 mg by mouth every bedtime. Indications: Pulmonary Arterial Hypertension     • PARoxetine (PAXIL) 20 MG Tab Take 20 mg by mouth every morning.     • levothyroxine (SYNTHROID) 75 MCG Tab Take 75 mcg by mouth Every morning on an empty stomach.     • Cholecalciferol (VITAMIN D) 2000 units Cap Take 2,000 Units by mouth every day.       No facility-administered encounter medications on file as of 9/8/2021.     Review of Systems   Constitutional: Negative for fever and malaise/fatigue.   Respiratory: Negative for cough and shortness of breath.    Cardiovascular: Negative for chest pain, palpitations, orthopnea, claudication, leg swelling and PND.   Gastrointestinal: Positive for heartburn. Negative for abdominal pain.   Musculoskeletal: Negative for myalgias.   Neurological: Positive for dizziness (slight).   All other systems reviewed and are negative.       Objective:   /80 (BP Location: Left arm, Patient Position: Sitting, BP Cuff Size: Adult)   Pulse 78   Resp 14   Ht 1.702 m (5' 7\")   Wt 96.4 kg (212 lb 9.6 oz)   SpO2 92%   BMI 33.30 kg/m²     Physical Exam  Vitals reviewed.   Constitutional:       Appearance: She is well-developed.   HENT:      Head: Normocephalic and atraumatic.   Eyes:      Pupils: Pupils are equal, round, and reactive to light.   Neck:      Vascular: No JVD.   Cardiovascular:      Rate and Rhythm: Normal rate and regular rhythm.      Heart sounds: Normal heart sounds.   Pulmonary:      Effort: Pulmonary effort is normal. No respiratory distress.      Breath sounds: Normal breath sounds. No wheezing or rales.   Abdominal:      General: Bowel sounds are normal.      Palpations: Abdomen is soft.   Musculoskeletal:      Cervical back: Normal range of motion and neck supple.      Right lower leg: No edema.      Left lower leg: No edema.   Skin:     General: Skin is warm and dry.   Neurological:      Mental Status: She is " alert and oriented to person, place, and time.   Psychiatric:         Behavior: Behavior normal.       Lab Results   Component Value Date/Time    CHOLSTRLTOT 211 (H) 07/26/2021 01:02 PM     (H) 07/26/2021 01:02 PM    HDL 47 07/26/2021 01:02 PM    TRIGLYCERIDE 231 (H) 07/26/2021 01:02 PM       Lab Results   Component Value Date/Time    SODIUM 140 09/03/2021 12:20 PM    POTASSIUM 3.4 (L) 09/03/2021 12:20 PM    CHLORIDE 89 (L) 09/03/2021 12:20 PM    CO2 38 (H) 09/03/2021 12:20 PM    GLUCOSE 153 (H) 09/03/2021 12:20 PM    BUN 24 (H) 09/03/2021 12:20 PM    CREATININE 1.21 09/03/2021 12:20 PM     Lab Results   Component Value Date/Time    ALKPHOSPHAT 79 07/26/2021 01:02 PM    ASTSGOT 20 07/26/2021 01:02 PM    ALTSGPT 18 07/26/2021 01:02 PM    TBILIRUBIN 0.4 07/26/2021 01:02 PM      Transthoracic Echo Report 1/16/2020  Normal left ventricular systolic function. Left ventricular ejection fraction is visually estimated to be 65%.  A reliable estimation of diastolic function cannot be made due to mitral valve disease.  Normal inferior vena cava size and inspiratory collapse.  Known mitral valve repair which is functioning normally with   appropriate transvalvular gradient. Mean transvalvular gradient is 3 mmHg at a heart rate of 66 BPM.  Estimated right ventricular systolic pressure  is 32 mmHg.     Transthoracic Echo Report 8/5/2020  Normal left ventricular size, wall thickness, and systolic function.  Dilated right ventricle with preserved systolic function.  No significant valvular pathology.  Estimated right ventricular systolic pressure  is 25 mmHg; normal.  Normal pericardium without effusion.     No prior study is available for comparison.      Transthoracic Echo Report 3/5/2021  Normal left ventricular size and systolic function.  Mild concentric left ventricular hypertrophy.  Left ventricular ejection fraction is visually estimated to be 60%.  Known mitral valve repair which is functioning normally with    appropriate transvalvular gradient.  Mild mitral regurgitation.  Normal inferior vena cava size and inspiratory collapse.    Transesophageal Echo Report 4/22/2021  LV function appears preserved. Sigmoid septum.  Known mitral valve repair, mild MR.  NED has been ligated, no residual NED is seen.  No thrombus is seen in the left atrium.  Moderate tricuspid regurgitation.  Trileaflet aortic valve.  No ASD is seen.     Assessment:     1. High risk medication use  Basic Metabolic Panel   2. Pulmonary HTN (HCC)     3. Chronic anticoagulation     4. Paroxysmal atrial fibrillation (HCC)     5. Essential hypertension     6. S/P repeat ablation of atrial fibrillation/atrial flutter     7. Hypokalemia     8. Shortness of breath     9. Mixed hyperlipidemia         Medical Decision Making:  Today's Assessment / Status / Plan:   1. Pulmonary hypertension, WHO group 2-3, functional class III:  -Likely dehydration caused her symptoms at her last visit  -Continue Bumex 2 mg twice a day and decrease potassium 40 mEq twice a day.   -Patient appears fairly euvolemic at this time  -Hold off on taking metolazone unless directed  -Continue tadalafil 40 mg daily  -Unable to take Letairis due to hypotension  -Encourage patient to monitor sodium intake  -Reinforced s/sx of worsening symptoms with patient and weight monitoring. Pt verbalizes understanding. Pt to call office or RTC if present.  -Continue to follow up with [facility]      2.  Sleep apnea:  -Continue BiPAP with oxygen bleeding, using 5 L     3.  Atrial fibrillation:    -Completed event monitor, showing paroxysmal A. fib with RVR  -Patient to get back in with Dr. Galindo or Yaritza for further recommendations  -Patient had A. fib ablation on 4/22/2021  -Continue Eliquis 5 mg twice a day  -Continue metoprolol 50 mg twice a day, I do not want to decrease any medications at this time for dizziness due to her A. fib     4.  Hyperlipidemia: Last  on 7/26/2021  -Continue rosuvastatin  40 mg daily, Increased mid August 5.  Hypertension:   -BP stable at this time  -Continue metoprolol 50 mg twice a day     FU in clinic in  6 weeks with Dr. Eng or myself and at next available visit with EP. Sooner if needed.     Patient verbalizes understanding and agrees with the plan of care.      PLEASE NOTE: This Note was created using voice recognition Software. I have made every reasonable attempt to correct obvious errors, but I expect that there are errors of grammar and possibly content that I did not discover before finalizing the note

## 2021-09-09 ENCOUNTER — APPOINTMENT (OUTPATIENT)
Dept: RADIOLOGY | Facility: MEDICAL CENTER | Age: 76
End: 2021-09-09
Attending: FAMILY MEDICINE
Payer: MEDICARE

## 2021-09-23 ENCOUNTER — HOSPITAL ENCOUNTER (OUTPATIENT)
Dept: LAB | Facility: MEDICAL CENTER | Age: 76
DRG: 292 | End: 2021-09-23
Attending: NURSE PRACTITIONER
Payer: MEDICARE

## 2021-09-23 DIAGNOSIS — Z79.899 HIGH RISK MEDICATION USE: ICD-10-CM

## 2021-09-23 LAB
ANION GAP SERPL CALC-SCNC: 10 MMOL/L (ref 7–16)
BUN SERPL-MCNC: 17 MG/DL (ref 8–22)
CALCIUM SERPL-MCNC: 11.1 MG/DL (ref 8.5–10.5)
CHLORIDE SERPL-SCNC: 88 MMOL/L (ref 96–112)
CO2 SERPL-SCNC: 40 MMOL/L (ref 20–33)
CREAT SERPL-MCNC: 1.01 MG/DL (ref 0.5–1.4)
GLUCOSE SERPL-MCNC: 121 MG/DL (ref 65–99)
POTASSIUM SERPL-SCNC: 3 MMOL/L (ref 3.6–5.5)
SODIUM SERPL-SCNC: 138 MMOL/L (ref 135–145)

## 2021-09-23 PROCEDURE — 80048 BASIC METABOLIC PNL TOTAL CA: CPT

## 2021-09-23 PROCEDURE — 36415 COLL VENOUS BLD VENIPUNCTURE: CPT

## 2021-09-24 ENCOUNTER — APPOINTMENT (OUTPATIENT)
Dept: RADIOLOGY | Facility: MEDICAL CENTER | Age: 76
DRG: 292 | End: 2021-09-24
Attending: STUDENT IN AN ORGANIZED HEALTH CARE EDUCATION/TRAINING PROGRAM
Payer: MEDICARE

## 2021-09-24 ENCOUNTER — APPOINTMENT (OUTPATIENT)
Dept: RADIOLOGY | Facility: MEDICAL CENTER | Age: 76
DRG: 292 | End: 2021-09-24
Attending: EMERGENCY MEDICINE
Payer: MEDICARE

## 2021-09-24 ENCOUNTER — HOSPITAL ENCOUNTER (INPATIENT)
Facility: MEDICAL CENTER | Age: 76
LOS: 4 days | DRG: 292 | End: 2021-09-28
Attending: EMERGENCY MEDICINE | Admitting: STUDENT IN AN ORGANIZED HEALTH CARE EDUCATION/TRAINING PROGRAM
Payer: MEDICARE

## 2021-09-24 DIAGNOSIS — E03.9 HYPOTHYROIDISM, UNSPECIFIED TYPE: ICD-10-CM

## 2021-09-24 DIAGNOSIS — H43.392 VITREOUS FLOATERS OF LEFT EYE: ICD-10-CM

## 2021-09-24 DIAGNOSIS — G89.29 CHRONIC MIDLINE LOW BACK PAIN WITH BILATERAL SCIATICA: ICD-10-CM

## 2021-09-24 DIAGNOSIS — I50.82 ACUTE ON CHRONIC CONGESTIVE HEART FAILURE WITH RIGHT VENTRICULAR DIASTOLIC DYSFUNCTION (HCC): ICD-10-CM

## 2021-09-24 DIAGNOSIS — I10 ESSENTIAL HYPERTENSION: ICD-10-CM

## 2021-09-24 DIAGNOSIS — S82.892K: ICD-10-CM

## 2021-09-24 DIAGNOSIS — N39.0 E. COLI UTI: ICD-10-CM

## 2021-09-24 DIAGNOSIS — Z78.9 NON-SMOKER: ICD-10-CM

## 2021-09-24 DIAGNOSIS — G62.9 NEUROPATHY: ICD-10-CM

## 2021-09-24 DIAGNOSIS — R27.9 INCOORDINATION: ICD-10-CM

## 2021-09-24 DIAGNOSIS — E03.8 OTHER SPECIFIED HYPOTHYROIDISM: ICD-10-CM

## 2021-09-24 DIAGNOSIS — I27.21 PAH (PULMONARY ARTERY HYPERTENSION) (HCC): ICD-10-CM

## 2021-09-24 DIAGNOSIS — I48.4 ATYPICAL ATRIAL FLUTTER (HCC): ICD-10-CM

## 2021-09-24 DIAGNOSIS — Z98.890 S/P ABLATION OF ATRIAL FIBRILLATION: ICD-10-CM

## 2021-09-24 DIAGNOSIS — R06.09 DYSPNEA ON EXERTION: ICD-10-CM

## 2021-09-24 DIAGNOSIS — N18.30 CHRONIC RENAL IMPAIRMENT, STAGE 3 (MODERATE), UNSPECIFIED WHETHER STAGE 3A OR 3B CKD: ICD-10-CM

## 2021-09-24 DIAGNOSIS — E66.9 OBESITY (BMI 30.0-34.9): ICD-10-CM

## 2021-09-24 DIAGNOSIS — K59.03 DRUG-INDUCED CONSTIPATION: ICD-10-CM

## 2021-09-24 DIAGNOSIS — B96.20 E. COLI UTI: ICD-10-CM

## 2021-09-24 DIAGNOSIS — R06.89 HYPERCAPNIA: ICD-10-CM

## 2021-09-24 DIAGNOSIS — Z79.899 HIGH RISK MEDICATION USE: ICD-10-CM

## 2021-09-24 DIAGNOSIS — J96.11 CHRONIC RESPIRATORY FAILURE WITH HYPOXIA (HCC): ICD-10-CM

## 2021-09-24 DIAGNOSIS — Z79.01 CHRONIC ANTICOAGULATION: ICD-10-CM

## 2021-09-24 DIAGNOSIS — M54.42 CHRONIC MIDLINE LOW BACK PAIN WITH BILATERAL SCIATICA: ICD-10-CM

## 2021-09-24 DIAGNOSIS — E83.52 HYPERCALCEMIA: ICD-10-CM

## 2021-09-24 DIAGNOSIS — E87.70 HYPERVOLEMIA, UNSPECIFIED HYPERVOLEMIA TYPE: ICD-10-CM

## 2021-09-24 DIAGNOSIS — E78.49 OTHER HYPERLIPIDEMIA: ICD-10-CM

## 2021-09-24 DIAGNOSIS — Z85.41 PERSONAL HISTORY OF MALIGNANT NEOPLASM OF CERVIX UTERI: ICD-10-CM

## 2021-09-24 DIAGNOSIS — I50.33 ACUTE ON CHRONIC CONGESTIVE HEART FAILURE WITH RIGHT VENTRICULAR DIASTOLIC DYSFUNCTION (HCC): ICD-10-CM

## 2021-09-24 DIAGNOSIS — I48.0 PAROXYSMAL ATRIAL FIBRILLATION (HCC): ICD-10-CM

## 2021-09-24 DIAGNOSIS — G47.39 OTHER SLEEP APNEA: ICD-10-CM

## 2021-09-24 DIAGNOSIS — D53.9 MACROCYTIC ANEMIA: ICD-10-CM

## 2021-09-24 DIAGNOSIS — I48.91 ATRIAL FIBRILLATION, UNSPECIFIED TYPE (HCC): ICD-10-CM

## 2021-09-24 DIAGNOSIS — I73.89 ACROCYANOSIS (HCC): ICD-10-CM

## 2021-09-24 DIAGNOSIS — Z86.79 S/P ABLATION OF ATRIAL FIBRILLATION: ICD-10-CM

## 2021-09-24 DIAGNOSIS — M54.41 CHRONIC MIDLINE LOW BACK PAIN WITH BILATERAL SCIATICA: ICD-10-CM

## 2021-09-24 DIAGNOSIS — M17.12 OSTEOARTHRITIS OF LEFT KNEE, UNSPECIFIED OSTEOARTHRITIS TYPE: ICD-10-CM

## 2021-09-24 DIAGNOSIS — E87.6 HYPOKALEMIA: ICD-10-CM

## 2021-09-24 PROBLEM — I50.9 ACUTE EXACERBATION OF CHF (CONGESTIVE HEART FAILURE) (HCC): Status: ACTIVE | Noted: 2021-09-24

## 2021-09-24 LAB
ALBUMIN SERPL BCP-MCNC: 4 G/DL (ref 3.2–4.9)
ALBUMIN/GLOB SERPL: 1.5 G/DL
ALP SERPL-CCNC: 63 U/L (ref 30–99)
ALT SERPL-CCNC: 15 U/L (ref 2–50)
ANION GAP SERPL CALC-SCNC: 11 MMOL/L (ref 7–16)
AST SERPL-CCNC: 20 U/L (ref 12–45)
BASOPHILS # BLD AUTO: 0.5 % (ref 0–1.8)
BASOPHILS # BLD: 0.03 K/UL (ref 0–0.12)
BILIRUB SERPL-MCNC: 0.6 MG/DL (ref 0.1–1.5)
BUN SERPL-MCNC: 16 MG/DL (ref 8–22)
CALCIUM SERPL-MCNC: 11 MG/DL (ref 8.5–10.5)
CHLORIDE SERPL-SCNC: 92 MMOL/L (ref 96–112)
CO2 SERPL-SCNC: 40 MMOL/L (ref 20–33)
CREAT SERPL-MCNC: 0.98 MG/DL (ref 0.5–1.4)
DIGOXIN SERPL-MCNC: 0.6 NG/ML (ref 0.8–2)
EKG IMPRESSION: NORMAL
EOSINOPHIL # BLD AUTO: 0.12 K/UL (ref 0–0.51)
EOSINOPHIL NFR BLD: 1.9 % (ref 0–6.9)
ERYTHROCYTE [DISTWIDTH] IN BLOOD BY AUTOMATED COUNT: 55.5 FL (ref 35.9–50)
GLOBULIN SER CALC-MCNC: 2.6 G/DL (ref 1.9–3.5)
GLUCOSE SERPL-MCNC: 127 MG/DL (ref 65–99)
HCT VFR BLD AUTO: 36 % (ref 37–47)
HGB BLD-MCNC: 11.6 G/DL (ref 12–16)
HGB RETIC QN AUTO: 34.1 PG/CELL (ref 29–35)
IMM GRANULOCYTES # BLD AUTO: 0.01 K/UL (ref 0–0.11)
IMM GRANULOCYTES NFR BLD AUTO: 0.2 % (ref 0–0.9)
IMM RETICS NFR: 30.4 % (ref 9.3–17.4)
IRON SATN MFR SERPL: 24 % (ref 15–55)
IRON SERPL-MCNC: 86 UG/DL (ref 40–170)
LYMPHOCYTES # BLD AUTO: 1.08 K/UL (ref 1–4.8)
LYMPHOCYTES NFR BLD: 17.2 % (ref 22–41)
MAGNESIUM SERPL-MCNC: 2.2 MG/DL (ref 1.5–2.5)
MCH RBC QN AUTO: 32.1 PG (ref 27–33)
MCHC RBC AUTO-ENTMCNC: 32.2 G/DL (ref 33.6–35)
MCV RBC AUTO: 99.7 FL (ref 81.4–97.8)
MONOCYTES # BLD AUTO: 0.57 K/UL (ref 0–0.85)
MONOCYTES NFR BLD AUTO: 9.1 % (ref 0–13.4)
NEUTROPHILS # BLD AUTO: 4.46 K/UL (ref 2–7.15)
NEUTROPHILS NFR BLD: 71.1 % (ref 44–72)
NRBC # BLD AUTO: 0 K/UL
NRBC BLD-RTO: 0 /100 WBC
NT-PROBNP SERPL IA-MCNC: 741 PG/ML (ref 0–125)
PLATELET # BLD AUTO: 216 K/UL (ref 164–446)
PMV BLD AUTO: 8.7 FL (ref 9–12.9)
POTASSIUM SERPL-SCNC: 3.1 MMOL/L (ref 3.6–5.5)
PROT SERPL-MCNC: 6.6 G/DL (ref 6–8.2)
RBC # BLD AUTO: 3.61 M/UL (ref 4.2–5.4)
RETICS # AUTO: 0.09 M/UL (ref 0.04–0.06)
RETICS/RBC NFR: 2.5 % (ref 0.8–2.1)
SODIUM SERPL-SCNC: 143 MMOL/L (ref 135–145)
TIBC SERPL-MCNC: 356 UG/DL (ref 250–450)
TROPONIN T SERPL-MCNC: 22 NG/L (ref 6–19)
TROPONIN T SERPL-MCNC: 24 NG/L (ref 6–19)
UIBC SERPL-MCNC: 270 UG/DL (ref 110–370)
WBC # BLD AUTO: 6.3 K/UL (ref 4.8–10.8)

## 2021-09-24 PROCEDURE — 99223 1ST HOSP IP/OBS HIGH 75: CPT | Mod: AI | Performed by: STUDENT IN AN ORGANIZED HEALTH CARE EDUCATION/TRAINING PROGRAM

## 2021-09-24 PROCEDURE — 3E02340 INTRODUCTION OF INFLUENZA VACCINE INTO MUSCLE, PERCUTANEOUS APPROACH: ICD-10-PCS | Performed by: STUDENT IN AN ORGANIZED HEALTH CARE EDUCATION/TRAINING PROGRAM

## 2021-09-24 PROCEDURE — 71045 X-RAY EXAM CHEST 1 VIEW: CPT

## 2021-09-24 PROCEDURE — 700111 HCHG RX REV CODE 636 W/ 250 OVERRIDE (IP): Performed by: EMERGENCY MEDICINE

## 2021-09-24 PROCEDURE — 84484 ASSAY OF TROPONIN QUANT: CPT

## 2021-09-24 PROCEDURE — 96375 TX/PRO/DX INJ NEW DRUG ADDON: CPT

## 2021-09-24 PROCEDURE — 96365 THER/PROPH/DIAG IV INF INIT: CPT

## 2021-09-24 PROCEDURE — 83735 ASSAY OF MAGNESIUM: CPT

## 2021-09-24 PROCEDURE — 82607 VITAMIN B-12: CPT

## 2021-09-24 PROCEDURE — 93005 ELECTROCARDIOGRAM TRACING: CPT | Performed by: EMERGENCY MEDICINE

## 2021-09-24 PROCEDURE — 99285 EMERGENCY DEPT VISIT HI MDM: CPT

## 2021-09-24 PROCEDURE — 85046 RETICYTE/HGB CONCENTRATE: CPT

## 2021-09-24 PROCEDURE — 700102 HCHG RX REV CODE 250 W/ 637 OVERRIDE(OP): Performed by: EMERGENCY MEDICINE

## 2021-09-24 PROCEDURE — 83550 IRON BINDING TEST: CPT

## 2021-09-24 PROCEDURE — 93005 ELECTROCARDIOGRAM TRACING: CPT

## 2021-09-24 PROCEDURE — 80053 COMPREHEN METABOLIC PANEL: CPT

## 2021-09-24 PROCEDURE — 82728 ASSAY OF FERRITIN: CPT

## 2021-09-24 PROCEDURE — 83880 ASSAY OF NATRIURETIC PEPTIDE: CPT

## 2021-09-24 PROCEDURE — 700102 HCHG RX REV CODE 250 W/ 637 OVERRIDE(OP): Performed by: STUDENT IN AN ORGANIZED HEALTH CARE EDUCATION/TRAINING PROGRAM

## 2021-09-24 PROCEDURE — A9270 NON-COVERED ITEM OR SERVICE: HCPCS | Performed by: EMERGENCY MEDICINE

## 2021-09-24 PROCEDURE — 83540 ASSAY OF IRON: CPT

## 2021-09-24 PROCEDURE — 80162 ASSAY OF DIGOXIN TOTAL: CPT

## 2021-09-24 PROCEDURE — A9270 NON-COVERED ITEM OR SERVICE: HCPCS | Performed by: STUDENT IN AN ORGANIZED HEALTH CARE EDUCATION/TRAINING PROGRAM

## 2021-09-24 PROCEDURE — 99222 1ST HOSP IP/OBS MODERATE 55: CPT | Performed by: INTERNAL MEDICINE

## 2021-09-24 PROCEDURE — 770020 HCHG ROOM/CARE - TELE (206)

## 2021-09-24 PROCEDURE — 85025 COMPLETE CBC W/AUTO DIFF WBC: CPT

## 2021-09-24 RX ORDER — DULOXETIN HYDROCHLORIDE 20 MG/1
20 CAPSULE, DELAYED RELEASE ORAL DAILY
Status: DISCONTINUED | OUTPATIENT
Start: 2021-09-25 | End: 2021-09-28 | Stop reason: HOSPADM

## 2021-09-24 RX ORDER — OXYCODONE HYDROCHLORIDE 10 MG/1
10 TABLET ORAL
Status: DISCONTINUED | OUTPATIENT
Start: 2021-09-24 | End: 2021-09-28 | Stop reason: HOSPADM

## 2021-09-24 RX ORDER — POTASSIUM CHLORIDE 20 MEQ/1
40 TABLET, EXTENDED RELEASE ORAL ONCE
Status: COMPLETED | OUTPATIENT
Start: 2021-09-24 | End: 2021-09-24

## 2021-09-24 RX ORDER — ROSUVASTATIN CALCIUM 20 MG/1
40 TABLET, COATED ORAL EVERY EVENING
Status: DISCONTINUED | OUTPATIENT
Start: 2021-09-24 | End: 2021-09-28 | Stop reason: HOSPADM

## 2021-09-24 RX ORDER — DIGOXIN 125 MCG
125 TABLET ORAL DAILY
Status: DISCONTINUED | OUTPATIENT
Start: 2021-09-25 | End: 2021-09-28 | Stop reason: HOSPADM

## 2021-09-24 RX ORDER — FUROSEMIDE 10 MG/ML
120 INJECTION INTRAMUSCULAR; INTRAVENOUS ONCE
Status: COMPLETED | OUTPATIENT
Start: 2021-09-24 | End: 2021-09-24

## 2021-09-24 RX ORDER — METOPROLOL TARTRATE 50 MG/1
50 TABLET, FILM COATED ORAL 2 TIMES DAILY
Status: DISCONTINUED | OUTPATIENT
Start: 2021-09-24 | End: 2021-09-26

## 2021-09-24 RX ORDER — HYDROMORPHONE HYDROCHLORIDE 1 MG/ML
0.5 INJECTION, SOLUTION INTRAMUSCULAR; INTRAVENOUS; SUBCUTANEOUS
Status: DISCONTINUED | OUTPATIENT
Start: 2021-09-24 | End: 2021-09-28 | Stop reason: HOSPADM

## 2021-09-24 RX ORDER — BUMETANIDE 2 MG/1
4 TABLET ORAL 2 TIMES DAILY
Status: DISCONTINUED | OUTPATIENT
Start: 2021-09-24 | End: 2021-09-24

## 2021-09-24 RX ORDER — MAGNESIUM SULFATE HEPTAHYDRATE 40 MG/ML
2 INJECTION, SOLUTION INTRAVENOUS ONCE
Status: COMPLETED | OUTPATIENT
Start: 2021-09-24 | End: 2021-09-24

## 2021-09-24 RX ORDER — POTASSIUM CHLORIDE 20 MEQ/1
60 TABLET, EXTENDED RELEASE ORAL DAILY
Status: DISCONTINUED | OUTPATIENT
Start: 2021-09-25 | End: 2021-09-24

## 2021-09-24 RX ORDER — AMOXICILLIN 250 MG
2 CAPSULE ORAL 2 TIMES DAILY
Status: DISCONTINUED | OUTPATIENT
Start: 2021-09-24 | End: 2021-09-28 | Stop reason: HOSPADM

## 2021-09-24 RX ORDER — ONDANSETRON 4 MG/1
4 TABLET, ORALLY DISINTEGRATING ORAL EVERY 4 HOURS PRN
Status: DISCONTINUED | OUTPATIENT
Start: 2021-09-24 | End: 2021-09-28 | Stop reason: HOSPADM

## 2021-09-24 RX ORDER — TADALAFIL 20 MG/1
40 TABLET ORAL
Status: DISCONTINUED | OUTPATIENT
Start: 2021-09-24 | End: 2021-09-26

## 2021-09-24 RX ORDER — PAROXETINE HYDROCHLORIDE 20 MG/1
20 TABLET, FILM COATED ORAL EVERY MORNING
Status: DISCONTINUED | OUTPATIENT
Start: 2021-09-25 | End: 2021-09-24

## 2021-09-24 RX ORDER — FUROSEMIDE 10 MG/ML
80 INJECTION INTRAMUSCULAR; INTRAVENOUS
Status: DISCONTINUED | OUTPATIENT
Start: 2021-09-25 | End: 2021-09-27

## 2021-09-24 RX ORDER — ACETAMINOPHEN 325 MG/1
650 TABLET ORAL EVERY 6 HOURS PRN
Status: DISCONTINUED | OUTPATIENT
Start: 2021-09-24 | End: 2021-09-28 | Stop reason: HOSPADM

## 2021-09-24 RX ORDER — FUROSEMIDE 10 MG/ML
40 INJECTION INTRAMUSCULAR; INTRAVENOUS
Status: DISCONTINUED | OUTPATIENT
Start: 2021-09-24 | End: 2021-09-24

## 2021-09-24 RX ORDER — BISACODYL 10 MG
10 SUPPOSITORY, RECTAL RECTAL
Status: DISCONTINUED | OUTPATIENT
Start: 2021-09-24 | End: 2021-09-28 | Stop reason: HOSPADM

## 2021-09-24 RX ORDER — POTASSIUM CHLORIDE 20 MEQ/1
40 TABLET, EXTENDED RELEASE ORAL 3 TIMES DAILY
Status: DISCONTINUED | OUTPATIENT
Start: 2021-09-25 | End: 2021-09-25

## 2021-09-24 RX ORDER — LEVOTHYROXINE SODIUM 0.07 MG/1
75 TABLET ORAL
Status: DISCONTINUED | OUTPATIENT
Start: 2021-09-25 | End: 2021-09-28 | Stop reason: HOSPADM

## 2021-09-24 RX ORDER — POLYETHYLENE GLYCOL 3350 17 G/17G
1 POWDER, FOR SOLUTION ORAL
Status: DISCONTINUED | OUTPATIENT
Start: 2021-09-24 | End: 2021-09-28 | Stop reason: HOSPADM

## 2021-09-24 RX ORDER — OXYCODONE HYDROCHLORIDE 5 MG/1
5 TABLET ORAL
Status: DISCONTINUED | OUTPATIENT
Start: 2021-09-24 | End: 2021-09-28 | Stop reason: HOSPADM

## 2021-09-24 RX ORDER — HYDROCODONE BITARTRATE AND ACETAMINOPHEN 5; 325 MG/1; MG/1
2 TABLET ORAL ONCE
Status: COMPLETED | OUTPATIENT
Start: 2021-09-24 | End: 2021-09-24

## 2021-09-24 RX ORDER — LORAZEPAM 0.5 MG/1
0.5 TABLET ORAL
Status: DISCONTINUED | OUTPATIENT
Start: 2021-09-24 | End: 2021-09-28 | Stop reason: HOSPADM

## 2021-09-24 RX ORDER — ENALAPRILAT 1.25 MG/ML
1.25 INJECTION INTRAVENOUS EVERY 6 HOURS PRN
Status: DISCONTINUED | OUTPATIENT
Start: 2021-09-24 | End: 2021-09-28 | Stop reason: HOSPADM

## 2021-09-24 RX ORDER — ONDANSETRON 2 MG/ML
4 INJECTION INTRAMUSCULAR; INTRAVENOUS EVERY 4 HOURS PRN
Status: DISCONTINUED | OUTPATIENT
Start: 2021-09-24 | End: 2021-09-28 | Stop reason: HOSPADM

## 2021-09-24 RX ADMIN — OXYCODONE 5 MG: 5 TABLET ORAL at 23:48

## 2021-09-24 RX ADMIN — APIXABAN 5 MG: 5 TABLET, FILM COATED ORAL at 22:00

## 2021-09-24 RX ADMIN — MAGNESIUM SULFATE IN WATER 2 G: 40 INJECTION, SOLUTION INTRAVENOUS at 20:00

## 2021-09-24 RX ADMIN — HYDROCODONE BITARTRATE AND ACETAMINOPHEN 2 TABLET: 5; 325 TABLET ORAL at 21:07

## 2021-09-24 RX ADMIN — POTASSIUM CHLORIDE 40 MEQ: 1500 TABLET, EXTENDED RELEASE ORAL at 19:56

## 2021-09-24 RX ADMIN — ROSUVASTATIN CALCIUM 40 MG: 20 TABLET, FILM COATED ORAL at 22:00

## 2021-09-24 RX ADMIN — FUROSEMIDE 120 MG: 10 INJECTION, SOLUTION INTRAMUSCULAR; INTRAVENOUS at 19:57

## 2021-09-24 RX ADMIN — DOCUSATE SODIUM 50 MG AND SENNOSIDES 8.6 MG 2 TABLET: 8.6; 5 TABLET, FILM COATED ORAL at 22:00

## 2021-09-24 ASSESSMENT — ENCOUNTER SYMPTOMS
LOSS OF CONSCIOUSNESS: 0
SORE THROAT: 0
ABDOMINAL PAIN: 0
DIARRHEA: 0
WHEEZING: 0
DOUBLE VISION: 0
BRUISES/BLEEDS EASILY: 0
HEADACHES: 0
VOMITING: 0
CONSTIPATION: 0
FALLS: 1
PND: 1
DEPRESSION: 0
INSOMNIA: 0
PALPITATIONS: 0
CHILLS: 0
ORTHOPNEA: 1
FEVER: 0
FOCAL WEAKNESS: 0
BLOOD IN STOOL: 0
WEAKNESS: 1
COUGH: 0
BACK PAIN: 1
DIZZINESS: 1
NECK PAIN: 0
BLURRED VISION: 0
HEARTBURN: 0
NAUSEA: 0
SHORTNESS OF BREATH: 1

## 2021-09-24 ASSESSMENT — CHA2DS2 SCORE
VASCULAR DISEASE: YES
CHF OR LEFT VENTRICULAR DYSFUNCTION: NO
SEX: FEMALE
HYPERTENSION: YES
AGE 75 OR GREATER: NO
DIABETES: NO
PRIOR STROKE OR TIA OR THROMBOEMBOLISM: NO
CHA2DS2 VASC SCORE: 4
AGE 65 TO 74: YES

## 2021-09-24 ASSESSMENT — FIBROSIS 4 INDEX: FIB4 SCORE: 1.32

## 2021-09-24 ASSESSMENT — PAIN DESCRIPTION - PAIN TYPE: TYPE: ACUTE PAIN

## 2021-09-24 NOTE — ED TRIAGE NOTES
.  Chief Complaint   Patient presents with   • Sent by MD   • Shortness of Breath   • Atrial Fibrillation     chronic   • Peripheral Edema   to triage with daughter. EKG complete on arrival. Pt having increase edema and sob, with hx a fib and ablation. Wearing home o2 5l.

## 2021-09-25 PROBLEM — I50.82 ACUTE ON CHRONIC CONGESTIVE HEART FAILURE WITH RIGHT VENTRICULAR DIASTOLIC DYSFUNCTION (HCC): Status: ACTIVE | Noted: 2021-09-24

## 2021-09-25 PROBLEM — I50.33 ACUTE ON CHRONIC CONGESTIVE HEART FAILURE WITH RIGHT VENTRICULAR DIASTOLIC DYSFUNCTION (HCC): Status: ACTIVE | Noted: 2021-09-24

## 2021-09-25 LAB
ANION GAP SERPL CALC-SCNC: 13 MMOL/L (ref 7–16)
BUN SERPL-MCNC: 14 MG/DL (ref 8–22)
CALCIUM SERPL-MCNC: 10.3 MG/DL (ref 8.5–10.5)
CHLORIDE SERPL-SCNC: 94 MMOL/L (ref 96–112)
CO2 SERPL-SCNC: 37 MMOL/L (ref 20–33)
CREAT SERPL-MCNC: 0.98 MG/DL (ref 0.5–1.4)
ERYTHROCYTE [DISTWIDTH] IN BLOOD BY AUTOMATED COUNT: 56.6 FL (ref 35.9–50)
FERRITIN SERPL-MCNC: 49.7 NG/ML (ref 10–291)
GLUCOSE SERPL-MCNC: 120 MG/DL (ref 65–99)
HCT VFR BLD AUTO: 35.1 % (ref 37–47)
HGB BLD-MCNC: 11.5 G/DL (ref 12–16)
MCH RBC QN AUTO: 32.9 PG (ref 27–33)
MCHC RBC AUTO-ENTMCNC: 32.8 G/DL (ref 33.6–35)
MCV RBC AUTO: 100.3 FL (ref 81.4–97.8)
PLATELET # BLD AUTO: 220 K/UL (ref 164–446)
PMV BLD AUTO: 9 FL (ref 9–12.9)
POTASSIUM SERPL-SCNC: 2.9 MMOL/L (ref 3.6–5.5)
POTASSIUM SERPL-SCNC: 3.6 MMOL/L (ref 3.6–5.5)
RBC # BLD AUTO: 3.5 M/UL (ref 4.2–5.4)
SODIUM SERPL-SCNC: 144 MMOL/L (ref 135–145)
VIT B12 SERPL-MCNC: 612 PG/ML (ref 211–911)
WBC # BLD AUTO: 6.7 K/UL (ref 4.8–10.8)

## 2021-09-25 PROCEDURE — 85027 COMPLETE CBC AUTOMATED: CPT

## 2021-09-25 PROCEDURE — 70450 CT HEAD/BRAIN W/O DYE: CPT | Mod: MG

## 2021-09-25 PROCEDURE — 700102 HCHG RX REV CODE 250 W/ 637 OVERRIDE(OP): Performed by: STUDENT IN AN ORGANIZED HEALTH CARE EDUCATION/TRAINING PROGRAM

## 2021-09-25 PROCEDURE — 36415 COLL VENOUS BLD VENIPUNCTURE: CPT

## 2021-09-25 PROCEDURE — 700102 HCHG RX REV CODE 250 W/ 637 OVERRIDE(OP): Performed by: NURSE PRACTITIONER

## 2021-09-25 PROCEDURE — 96376 TX/PRO/DX INJ SAME DRUG ADON: CPT

## 2021-09-25 PROCEDURE — A9270 NON-COVERED ITEM OR SERVICE: HCPCS | Performed by: NURSE PRACTITIONER

## 2021-09-25 PROCEDURE — 700111 HCHG RX REV CODE 636 W/ 250 OVERRIDE (IP): Performed by: INTERNAL MEDICINE

## 2021-09-25 PROCEDURE — A9270 NON-COVERED ITEM OR SERVICE: HCPCS | Performed by: STUDENT IN AN ORGANIZED HEALTH CARE EDUCATION/TRAINING PROGRAM

## 2021-09-25 PROCEDURE — 96375 TX/PRO/DX INJ NEW DRUG ADDON: CPT

## 2021-09-25 PROCEDURE — 84132 ASSAY OF SERUM POTASSIUM: CPT

## 2021-09-25 PROCEDURE — 99232 SBSQ HOSP IP/OBS MODERATE 35: CPT | Performed by: INTERNAL MEDICINE

## 2021-09-25 PROCEDURE — 99231 SBSQ HOSP IP/OBS SF/LOW 25: CPT | Performed by: HOSPITALIST

## 2021-09-25 PROCEDURE — 80048 BASIC METABOLIC PNL TOTAL CA: CPT

## 2021-09-25 PROCEDURE — 770020 HCHG ROOM/CARE - TELE (206)

## 2021-09-25 RX ORDER — POTASSIUM CHLORIDE 20 MEQ/1
40 TABLET, EXTENDED RELEASE ORAL DAILY
Status: DISCONTINUED | OUTPATIENT
Start: 2021-09-25 | End: 2021-09-25

## 2021-09-25 RX ORDER — POTASSIUM CHLORIDE 20 MEQ/1
40 TABLET, EXTENDED RELEASE ORAL 3 TIMES DAILY
Status: DISCONTINUED | OUTPATIENT
Start: 2021-09-25 | End: 2021-09-27

## 2021-09-25 RX ADMIN — FUROSEMIDE 80 MG: 10 INJECTION, SOLUTION INTRAMUSCULAR; INTRAVENOUS at 11:37

## 2021-09-25 RX ADMIN — APIXABAN 5 MG: 5 TABLET, FILM COATED ORAL at 05:38

## 2021-09-25 RX ADMIN — OXYCODONE HYDROCHLORIDE 10 MG: 10 TABLET ORAL at 20:19

## 2021-09-25 RX ADMIN — DULOXETINE 20 MG: 20 CAPSULE, DELAYED RELEASE ORAL at 05:38

## 2021-09-25 RX ADMIN — FUROSEMIDE 80 MG: 10 INJECTION, SOLUTION INTRAMUSCULAR; INTRAVENOUS at 17:26

## 2021-09-25 RX ADMIN — OXYCODONE HYDROCHLORIDE 10 MG: 10 TABLET ORAL at 10:47

## 2021-09-25 RX ADMIN — OXYCODONE HYDROCHLORIDE 10 MG: 10 TABLET ORAL at 15:13

## 2021-09-25 RX ADMIN — DOCUSATE SODIUM 50 MG AND SENNOSIDES 8.6 MG 2 TABLET: 8.6; 5 TABLET, FILM COATED ORAL at 17:26

## 2021-09-25 RX ADMIN — APIXABAN 5 MG: 5 TABLET, FILM COATED ORAL at 17:25

## 2021-09-25 RX ADMIN — POTASSIUM CHLORIDE 40 MEQ: 1500 TABLET, EXTENDED RELEASE ORAL at 17:25

## 2021-09-25 RX ADMIN — FUROSEMIDE 80 MG: 10 INJECTION, SOLUTION INTRAMUSCULAR; INTRAVENOUS at 05:39

## 2021-09-25 RX ADMIN — POTASSIUM CHLORIDE 40 MEQ: 1500 TABLET, EXTENDED RELEASE ORAL at 08:37

## 2021-09-25 RX ADMIN — POTASSIUM CHLORIDE 40 MEQ: 1500 TABLET, EXTENDED RELEASE ORAL at 05:42

## 2021-09-25 RX ADMIN — OXYCODONE HYDROCHLORIDE 10 MG: 10 TABLET ORAL at 02:54

## 2021-09-25 RX ADMIN — OXYCODONE HYDROCHLORIDE 10 MG: 10 TABLET ORAL at 05:39

## 2021-09-25 RX ADMIN — POTASSIUM CHLORIDE 40 MEQ: 1500 TABLET, EXTENDED RELEASE ORAL at 11:38

## 2021-09-25 RX ADMIN — LEVOTHYROXINE SODIUM 75 MCG: 0.07 TABLET ORAL at 05:38

## 2021-09-25 RX ADMIN — ROSUVASTATIN CALCIUM 40 MG: 20 TABLET, FILM COATED ORAL at 17:25

## 2021-09-25 ASSESSMENT — FIBROSIS 4 INDEX: FIB4 SCORE: 1.74

## 2021-09-25 ASSESSMENT — ENCOUNTER SYMPTOMS
COUGH: 0
CHEST TIGHTNESS: 0
CHOKING: 0
SHORTNESS OF BREATH: 0

## 2021-09-25 ASSESSMENT — PAIN DESCRIPTION - PAIN TYPE
TYPE: CHRONIC PAIN
TYPE: ACUTE PAIN
TYPE: ACUTE PAIN
TYPE: CHRONIC PAIN
TYPE: ACUTE PAIN

## 2021-09-25 NOTE — CONSULTS
Reason for Consult:  Asked by Dr Tong Kaye to see this patient with acute right-sided heart failure  Patient's PCP: Ashia Tidwell M.D.    CC:   Chief Complaint   Patient presents with   • Sent by MD   • Shortness of Breath   • Atrial Fibrillation     chronic   • Peripheral Edema       HPI: This is a 76-year-old woman with a complex medical history extensively pulmonary hypertension right heart failure A. fib who presents with increasing abdominal bloating and reduced responsiveness to oral diuretics she typically had been on torsemide switched to Bumex this year follows closely with her heart failure program as well as pulmonary hypertension clinic with Dr. Eng as well as outside pulmonology with history of A. fib difficult to control on chronic anticoagulation rate control who presents after recent trip to [place] where she drove and been off her diuretics just for the driving days 8 healthy with a low-salt diet and was breathing better when she returned she has had persistent weight gain despite taking 4 mg twice daily of Bumex and finally presents to the emergency room where she is found to have 15 pounds of volume overload with abdominal distention.    Medications / Drug list prior to admission:  No current facility-administered medications on file prior to encounter.     Current Outpatient Medications on File Prior to Encounter   Medication Sig Dispense Refill   • digoxin (LANOXIN) 125 MCG Tab Take 1 Tablet by mouth every day. 90 Tablet 2   • rosuvastatin (CRESTOR) 40 MG tablet Take 1 tablet by mouth every evening. 90 tablet 3   • bumetanide (BUMEX) 2 MG tablet Take 2 Tablets by mouth 2 times a day. 120 tablet 11   • tretinoin (RETIN-A) 0.05 % cream Apply 1 Application topically every evening. Apply to face every 3 nights and slowly increase to nightly as irritation allows     • potassium chloride SA (KDUR) 20 MEQ Tab CR Take 3 Tablets by mouth 2 times a day. 180 tablet 11   • apixaban (ELIQUIS) 5mg  Tab Take 1 tablet by mouth 2 times a day. 180 tablet 3   • metoprolol tartrate (LOPRESSOR) 50 MG Tab Take 1 tablet by mouth 2 times a day. 60 tablet 11   • valacyclovir (VALTREX) 1 GM Tab Take 1 tablet by mouth every 8 hours as needed.     • HYDROcodone-acetaminophen (NORCO) 7.5-325 MG per tablet Take 1 tablet by mouth every 8 hours as needed. Indications: Pain     • Tadalafil, PAH, (ADCIRCA) 20 MG Tab Take 40 mg by mouth every bedtime. Indications: Pulmonary Arterial Hypertension     • PARoxetine (PAXIL) 20 MG Tab Take 20 mg by mouth every morning.     • levothyroxine (SYNTHROID) 75 MCG Tab Take 75 mcg by mouth Every morning on an empty stomach.     • Cholecalciferol (VITAMIN D) 2000 units Cap Take 2,000 Units by mouth every day.         Current list of administered Medications:    Current Facility-Administered Medications:   •  apixaban (ELIQUIS) tablet 5 mg, 5 mg, Oral, BID, Johnny Burns, D.O.  •  [START ON 9/25/2021] digoxin (LANOXIN) tablet 125 mcg, 125 mcg, Oral, DAILY, Johnny Burns, D.O.  •  [START ON 9/25/2021] levothyroxine (SYNTHROID) tablet 75 mcg, 75 mcg, Oral, AM ES, Johnny Burns, D.O.  •  [Held by provider] metoprolol tartrate (LOPRESSOR) tablet 50 mg, 50 mg, Oral, BID, Johnny Burns D.O.  •  [START ON 9/25/2021] potassium chloride SA (Kdur) tablet 60 mEq, 60 mEq, Oral, DAILY, Johnny Burns, D.O.  •  rosuvastatin (CRESTOR) tablet 40 mg, 40 mg, Oral, Q EVENING, Johnny Burns D.O.  •  [Held by provider] Tadalafil (PAH) TABS 40 mg, 40 mg, Oral, QHS, Johnny Burns D.O.  •  senna-docusate (PERICOLACE or SENOKOT S) 8.6-50 MG per tablet 2 Tablet, 2 Tablet, Oral, BID **AND** polyethylene glycol/lytes (MIRALAX) PACKET 1 Packet, 1 Packet, Oral, QDAY PRN **AND** magnesium hydroxide (MILK OF MAGNESIA) suspension 30 mL, 30 mL, Oral, QDAY PRN **AND** bisacodyl (DULCOLAX) suppository 10 mg, 10 mg, Rectal, QDAY PRN, Johnny Burns D.O.  •  acetaminophen (Tylenol) tablet 650 mg,  650 mg, Oral, Q6HRS PRN, Johnny Burns D.O.  •  Notify provider if pain remains uncontrolled, , , CONTINUOUS **AND** Use the Numeric Rating Scale (NRS), Mejia-Baker Faces (WBF), or FLACC on regular floors and Critical-Care Pain Observation Tool (CPOT) on ICUs/Trauma to assess pain, , , CONTINUOUS **AND** [COMPLETED] Pulse Ox, , , CONTINUOUS **AND** Pharmacy Consult Request ...Pain Management Review 1 Each, 1 Each, Other, PHARMACY TO DOSE **AND** If patient difficult to arouse and/or has respiratory depression (respiratory rate of 10 or less), stop any opiates that are currently infusing and call a Rapid Response., , , CONTINUOUS, Johnny Burns D.O.  •  oxyCODONE immediate-release (ROXICODONE) tablet 5 mg, 5 mg, Oral, Q3HRS PRN **OR** oxyCODONE immediate-release (ROXICODONE) tablet 10 mg, 10 mg, Oral, Q3HRS PRN **OR** HYDROmorphone (Dilaudid) injection 0.5 mg, 0.5 mg, Intravenous, Q3HRS PRN, Johnny Burns, D.O.  •  enalaprilat (Vasotec) injection 1.25 mg 1 mL, 1.25 mg, Intravenous, Q6HRS PRN, Johnny Burns, D.O.  •  ondansetron (ZOFRAN) syringe/vial injection 4 mg, 4 mg, Intravenous, Q4HRS PRN, Johnny Burns D.O.  •  ondansetron (ZOFRAN ODT) dispertab 4 mg, 4 mg, Oral, Q4HRS PRN, Johnny Burns, D.O.  •  furosemide (LASIX) injection 40 mg, 40 mg, Intravenous, BID DIURETIC, Johnny Burns D.O.  •  [START ON 9/25/2021] DULoxetine (CYMBALTA) capsule 20 mg, 20 mg, Oral, DAILY, Johnny Burns D.O.    Current Outpatient Medications:   •  digoxin (LANOXIN) 125 MCG Tab, Take 1 Tablet by mouth every day., Disp: 90 Tablet, Rfl: 2  •  rosuvastatin (CRESTOR) 40 MG tablet, Take 1 tablet by mouth every evening., Disp: 90 tablet, Rfl: 3  •  bumetanide (BUMEX) 2 MG tablet, Take 2 Tablets by mouth 2 times a day., Disp: 120 tablet, Rfl: 11  •  tretinoin (RETIN-A) 0.05 % cream, Apply 1 Application topically every evening. Apply to face every 3 nights and slowly increase to nightly as irritation allows,  Disp: , Rfl:   •  potassium chloride SA (KDUR) 20 MEQ Tab CR, Take 3 Tablets by mouth 2 times a day., Disp: 180 tablet, Rfl: 11  •  apixaban (ELIQUIS) 5mg Tab, Take 1 tablet by mouth 2 times a day., Disp: 180 tablet, Rfl: 3  •  metoprolol tartrate (LOPRESSOR) 50 MG Tab, Take 1 tablet by mouth 2 times a day., Disp: 60 tablet, Rfl: 11  •  valacyclovir (VALTREX) 1 GM Tab, Take 1 tablet by mouth every 8 hours as needed., Disp: , Rfl:   •  HYDROcodone-acetaminophen (NORCO) 7.5-325 MG per tablet, Take 1 tablet by mouth every 8 hours as needed. Indications: Pain, Disp: , Rfl:   •  Tadalafil, PAH, (ADCIRCA) 20 MG Tab, Take 40 mg by mouth every bedtime. Indications: Pulmonary Arterial Hypertension, Disp: , Rfl:   •  PARoxetine (PAXIL) 20 MG Tab, Take 20 mg by mouth every morning., Disp: , Rfl:   •  levothyroxine (SYNTHROID) 75 MCG Tab, Take 75 mcg by mouth Every morning on an empty stomach., Disp: , Rfl:   •  Cholecalciferol (VITAMIN D) 2000 units Cap, Take 2,000 Units by mouth every day., Disp: , Rfl:     Past Medical History:   Diagnosis Date   • Aneurysm artery, celiac (HCC) 2019   • Aneurysm of right renal artery (HCC)    • Atrial fibrillation (HCC)    • Breath shortness     5L O2 all the time   • Chickenpox    • Congestive heart failure (HCC)    • Diastolic heart failure (HCC)    • [place] measles    • Heart burn    • Heart valve disease     MV repair   • Hypertension, essential    • Pulmonary hypertension (HCC)    • Sleep apnea     Bipap   • Snoring    • Warfarin anticoagulation        Past Surgical History:   Procedure Laterality Date   • ANKLE ORIF Left 8/5/2020    Procedure: ORIF, ANKLE;  Surgeon: Tk Humphreys M.D.;  Location: SURGERY [facility];  Service: Orthopedics   • HYSTERECTOMY LAPAROSCOPY     • MITRAL VALVE REPAIR     • OTHER      neck surgery   • TONSILLECTOMY     • WRIST FUSION         Family History   Problem Relation Age of Onset   • Heart Disease Maternal Grandfather    • Heart Disease  "Paternal Grandfather      Patient family history was personally reviewed, no pertinent family history to current presentation    Social History     Tobacco Use   • Smoking status: Never Smoker   • Smokeless tobacco: Never Used   Vaping Use   • Vaping Use: Never used   Substance Use Topics   • Alcohol use: Yes     Alcohol/week: 4.2 oz     Types: 7 Shots of liquor per week     Comment: 1 drink a night   • Drug use: No       ALLERGIES:  Allergies   Allergen Reactions   • Betapace [Sotalol] Anaphylaxis   • Tikosyn [Dofetilide] Swelling     TONGUE SWELL.    • Zolpidem Tartrate Unspecified     Lightheaded and confusion       Review of systems:  A complete review of symptoms was reviewed with jaylen. This is reviewed in H&P and PMH. ALL OTHERS reviewed and negative    Physical exam:  Patient Vitals for the past 24 hrs:   BP Temp Temp src Pulse Resp SpO2 Height Weight   21 1900 156/70 -- -- (!) 48 17 96 % -- --   21 1820 (!) 174/80 -- -- (!) 50 (!) 26 98 % -- --   21 1657 136/74 -- -- (!) 57 16 96 % -- --   21 1225 143/91 36.3 °C (97.3 °F) Temporal 91 18 96 % 1.702 m (5' 7\") 99.8 kg (220 lb)     General: No acute distress.   EYES: no jaundice  HEENT: OP clear   Neck:  No JVD.   CVS: Irregular  Resp: CTAB. No wheezing or crackles/rhonchi.  Abdomen: Soft, distended obesity  Skin: Grossly nothing acute no obvious rashes  Neurological: Alert, Moves all extremities, no cranial nerve defects on limited exam  Extremities: No edema. No cyanosis.       Data:  Laboratory studies personally reviewed by me:  Recent Results (from the past 24 hour(s))   EKG (NOW)    Collection Time: 21 12:33 PM   Result Value Ref Range    Report       [facility] Emergency Dept.    Test Date:  2021  Pt Name:    NATALIE STAPLES                Department: ER  MRN:        [id number]                      Room:       [facility]  Gender:     Female                       Technician: [id number]  :        [id number]      "              Requested By:ER TRIAGE PROTOCOL  Order #:    [id number]                    Reading MD: Tong Kaye    Measurements  Intervals                                Axis  Rate:       80                           P:  MD:                                      QRS:        -29  QRSD:       92                           T:          198  QT:         428  QTc:        494    Interpretive Statements  ATRIAL FLUTTER, A-RATE 217  LVH WITH SECONDARY REPOLARIZATION ABNORMALITY  BORDERLINE PROLONGED QT INTERVAL  ARTIFACT IN LEAD(S) II,III,aVR,aVL,aVF AND BASELINE WANDER IN LEAD(S)  II,III,aVF  Compared to ECG 09/03/2021 10:04:59  Left ventricular hypertrophy now present  Early repolarization now present  Supraventricular tac hycardia no longer present  Possible ischemia no longer present  Electronically Signed On 9- 22:12:37 PDT by Tong Kaye     CBC with Differential    Collection Time: 09/24/21  1:58 PM   Result Value Ref Range    WBC 6.3 4.8 - 10.8 K/uL    RBC 3.61 (L) 4.20 - 5.40 M/uL    Hemoglobin 11.6 (L) 12.0 - 16.0 g/dL    Hematocrit 36.0 (L) 37.0 - 47.0 %    MCV 99.7 (H) 81.4 - 97.8 fL    MCH 32.1 27.0 - 33.0 pg    MCHC 32.2 (L) 33.6 - 35.0 g/dL    RDW 55.5 (H) 35.9 - 50.0 fL    Platelet Count 216 164 - 446 K/uL    MPV 8.7 (L) 9.0 - 12.9 fL    Neutrophils-Polys 71.10 44.00 - 72.00 %    Lymphocytes 17.20 (L) 22.00 - 41.00 %    Monocytes 9.10 0.00 - 13.40 %    Eosinophils 1.90 0.00 - 6.90 %    Basophils 0.50 0.00 - 1.80 %    Immature Granulocytes 0.20 0.00 - 0.90 %    Nucleated RBC 0.00 /100 WBC    Neutrophils (Absolute) 4.46 2.00 - 7.15 K/uL    Lymphs (Absolute) 1.08 1.00 - 4.80 K/uL    Monos (Absolute) 0.57 0.00 - 0.85 K/uL    Eos (Absolute) 0.12 0.00 - 0.51 K/uL    Baso (Absolute) 0.03 0.00 - 0.12 K/uL    Immature Granulocytes (abs) 0.01 0.00 - 0.11 K/uL    NRBC (Absolute) 0.00 K/uL   Complete Metabolic Panel (CMP)    Collection Time: 09/24/21  1:58 PM   Result Value Ref Range    Sodium 143 135 - 145 mmol/L     Potassium 3.1 (L) 3.6 - 5.5 mmol/L    Chloride 92 (L) 96 - 112 mmol/L    Co2 40 (H) 20 - 33 mmol/L    Anion Gap 11.0 7.0 - 16.0    Glucose 127 (H) 65 - 99 mg/dL    Bun 16 8 - 22 mg/dL    Creatinine 0.98 0.50 - 1.40 mg/dL    Calcium 11.0 (H) 8.5 - 10.5 mg/dL    AST(SGOT) 20 12 - 45 U/L    ALT(SGPT) 15 2 - 50 U/L    Alkaline Phosphatase 63 30 - 99 U/L    Total Bilirubin 0.6 0.1 - 1.5 mg/dL    Albumin 4.0 3.2 - 4.9 g/dL    Total Protein 6.6 6.0 - 8.2 g/dL    Globulin 2.6 1.9 - 3.5 g/dL    A-G Ratio 1.5 g/dL   Troponin    Collection Time: 09/24/21  1:58 PM   Result Value Ref Range    Troponin T 24 (H) 6 - 19 ng/L   ESTIMATED GFR    Collection Time: 09/24/21  1:58 PM   Result Value Ref Range    GFR If African American >60 >60 mL/min/1.73 m 2    GFR If Non African American 55 (A) >60 mL/min/1.73 m 2   TROPONIN    Collection Time: 09/24/21  8:07 PM   Result Value Ref Range    Troponin T 22 (H) 6 - 19 ng/L   proBrain Natriuretic Peptide, NT    Collection Time: 09/24/21  8:07 PM   Result Value Ref Range    NT-proBNP 741 (H) 0 - 125 pg/mL       Imaging:  DX-CHEST-PORTABLE (1 VIEW)   Final Result      1.  Mild interstitial prominence in the lower lobes bilaterally may represent mild dependent edema, atelectasis or pneumonitis.   2.  Blunting of the left costophrenic angle related to pleural thickening or trace pleural fluid.   3.  Cardiomegaly.   4.  Linear left basilar atelectasis or scarring.      CT-HEAD W/O    (Results Pending)           EKG tracings personally reviewed by delonte hutton    Echocardiogram images personally reviewed by me show preserved ejection fraction the right ventricular systolic pressure estimate appears to be low unsure if this is due to increased right atrial pressure    All pertinent features of laboratory and imaging reviewed including primary images where applicable      Principal Problem:    Acute exacerbation of CHF (congestive heart failure) (HCC) POA: Yes  Active Problems:    Essential  hypertension POA: Yes      Overview: Overview:       IMO Load 2014 1.1    Hypothyroidism POA: Yes      Overview: Overview:       IMO Load 2014 1.1    A-fib (HCC) POA: Yes    Chronic respiratory failure (HCC) POA: Yes    Macrocytic anemia POA: Yes    Hypokalemia POA: Yes    Obesity (BMI 30.0-34.9) POA: Yes    Neuropathy POA: Yes    Incoordination POA: Yes  Resolved Problems:    * No resolved hospital problems. *      Assessment / Plan:  Acute heart failure with preserved ejection fraction in the setting of pulmonary hypertension and A. fib likely due to recent travel and being off of her diuretics for short period  Continue aggressive IV diuresis 80 mg 3 times daily as tolerated  Agree with holding metoprolol to see if increased heart rate can help improve RV function  Continue pulmonary hypertension meds as possible  Ideally diuresed down to her dry weight of 72504 pounds please check standing scale weight daily  Replete K aggressively increased her standing potassium supplement to 40 3 times daily    A. Fib  Anticoagulation        I personally discussed her case with  Dr Johnny Burns D.O.    Future Appointments   Date Time Provider Department Center   10/4/2021 12:00 PM Christopher Warren M.D. [facility] None   10/5/2021  3:00 PM Thee Galindo M.D. [facility] None   10/14/2021 10:45 AM [facility]   10/20/2021  1:15 PM KIRILL Hogan [facility] None       It is my pleasure to participate in the care of Ms. Loza.  Please do not hesitate to contact me with questions or concerns.    Ramiro Moore MD PhD FACC  Cardiologist [facility]    9/24/2021    Please note that this dictation was created using voice recognition software. There may be errors I did not discover before finalizing the note.

## 2021-09-25 NOTE — PROGRESS NOTES
Assumed day shift care at start of shift  Patient a+o x 4  Edema to abdomen and ble's - iv lasix and po K a/o  Monitored on telemetry - NSR presently - HR 60, vss (patient goes in an out of Afib)  +sob/+lightheadedness/+dizziness  Denies chest pain/numbness/tingling  No needs at this time, wctm      Fall precautions/hourly rounding maintained, call light within reach and functioning, all items within reach.  Patient encouraged to call for assistance, poc reviewed with patient, ?'s/concerns answered.

## 2021-09-25 NOTE — PROGRESS NOTES
Assumed care of pt at this time. Pt placed on cardiac monitoring, a-fib noted with rate of 52, pt medicated for chronic back pain, on 5L nasal cannula, denies further needs or concerns. Pt transported to CT with transporter in NAD at time of departure.

## 2021-09-25 NOTE — ED PROVIDER NOTES
ED Provider Note    Scribed for Tong Kaye M.D. by Megan Lobo. 9/24/2021,  7:02 PM.    Means of Arrival: Wheel Chair  History obtained from: Patient  History limited by: None    CHIEF COMPLAINT  Chief Complaint   Patient presents with   • Sent by MD   • Shortness of Breath   • Atrial Fibrillation     chronic   • Peripheral Edema       HPI  Zeinab Loza is a 76 y.o. female who presents to the Emergency Department with worsening bilateral lower extremity edema onset 1 week ago. She states she traveled to her son's house in [place] a little over a week ago, and when she returned, she had gained 17 lbs. She has taken 40 mg of Bumex twice daily with no alleviation. She additionally takes a potassium supplement daily. She is additionally experiencing worsening shortness of breath accompanied by chest discomfort and generalized weakness onset 1 week ago. She describes her chest discomfort as a pressure. She denies current chest discomfort. She is on 5 L of Oxygen at home. When she gets up and walks a short distance, she becomes short of breath even with the oxygen on. She states she felt light headed during one of these episodes and had a syncopal event. She states she hit her head but denies seeking any medical attention. She endorses headaches since the syncopal event. She has a history of atrial fibrillation and congestive heart failure. Dr. Eng is her cardiologist and Dr. Reed is her pulmonologist. She spoke with her Dr. Reed who advised she be evaluated at the Emergency Department for potential IV diuretics. She denies fever, chills, nausea, vomiting.     REVIEW OF SYSTEMS  CONSTITUTIONAL:  Generalized weakness present. No fever, No chills.  CARDIOVASCULAR:  Chest discomfort present. Bilateral lower extremity edema.   RESPIRATORY:  Shortness of breath present.   GASTROINTESTINAL:  No abdominal pain, No nausea, No vomiting.   NEUROLOGIC:  Recent syncopal episode. No headache.  See HPI for further  details.   All other systems are negative.     PAST MEDICAL HISTORY  Past Medical History:   Diagnosis Date   • Aneurysm artery, celiac (HCC) 2019   • Aneurysm of right renal artery (HCC)    • Atrial fibrillation (HCC)    • Breath shortness     5L O2 all the time   • Chickenpox    • Congestive heart failure (HCC)    • Diastolic heart failure (HCC)    • Bengali measles    • Heart burn    • Heart valve disease     MV repair   • Hypertension, essential    • Pulmonary hypertension (HCC)    • Sleep apnea     Bipap   • Snoring    • Warfarin anticoagulation        FAMILY HISTORY  No family history noted.    SOCIAL HISTORY   reports that she has never smoked. She has never used smokeless tobacco. She reports current alcohol use of about 4.2 oz of alcohol per week. She reports that she does not use drugs.    SURGICAL HISTORY  Past Surgical History:   Procedure Laterality Date   • ANKLE ORIF Left 8/5/2020    Procedure: ORIF, ANKLE;  Surgeon: Tk Humphreys M.D.;  Location: SURGERY [facility];  Service: Orthopedics   • HYSTERECTOMY LAPAROSCOPY     • MITRAL VALVE REPAIR     • OTHER      neck surgery   • TONSILLECTOMY     • WRIST FUSION         CURRENT MEDICATIONS  Home Medications     Reviewed by Kalie Vogel (Pharmacy Tech) on 09/24/21 at 2149  Med List Status: Complete   Medication Last Dose Status   apixaban (ELIQUIS) 5mg Tab 9/24/2021 Active   bumetanide (BUMEX) 2 MG tablet 9/23/2021 Active   Cholecalciferol (VITAMIN D) 2000 units Cap 9/24/2021 Active   digoxin (LANOXIN) 125 MCG Tab 9/24/2021 Active   HYDROcodone-acetaminophen (NORCO) 7.5-325 MG per tablet 9/24/2021 Active   levothyroxine (SYNTHROID) 75 MCG Tab 9/24/2021 Active   metoprolol tartrate (LOPRESSOR) 50 MG Tab 9/24/2021 Active   PARoxetine (PAXIL) 20 MG Tab 9/24/2021 Active   potassium chloride SA (KDUR) 20 MEQ Tab CR 9/24/2021 Active   rosuvastatin (CRESTOR) 40 MG tablet 9/23/2021 Active   Tadalafil, PAH, (ADCIRCA) 20 MG Tab 9/23/2021 Active  "  tretinoin (RETIN-A) 0.05 % cream 2021 Active   valacyclovir (VALTREX) 1 GM Tab prn Active                ALLERGIES  Allergies   Allergen Reactions   • Betapace [Sotalol] Anaphylaxis   • Tikosyn [Dofetilide] Swelling     TONGUE SWELL.    • Zolpidem Tartrate Unspecified     Lightheaded and confusion       PHYSICAL EXAM  VITAL SIGNS: BP (!) 174/80   Pulse (!) 50   Temp 36.3 °C (97.3 °F) (Temporal)   Resp (!) 26   Ht 1.702 m (5' 7\")   Wt 99.8 kg (220 lb)   SpO2 98%   BMI 34.46 kg/m²    Gen: Alert, no acute distress  HEENT: ATNC  Eyes: PERRL, EOMI, normal conjunctiva.   Neck: trachea midline  Resp: Faint crackles to right lung base. No respiratory distress  CV: No JVD, Regular rate and rhythm, No murmurs.   Abd: non-distended  Ext: 1+ pitting edema in bilateral lower extremities, equal radial pulses.  No deformities  Psych: normal mood  Neuro: speech fluent     DIAGNOSTIC STUDIES / PROCEDURES     EKG  Results for orders placed or performed during the hospital encounter of 21   EKG (NOW)   Result Value Ref Range    Report       [facility] Emergency Dept.    Test Date:  2021  Pt Name:    NATALIE STAPLES                Department: ER  MRN:        [id number]                      Room:       [facility]  Gender:     Female                       Technician: [id number]  :        [id number]                   Requested By:ER TRIAGE PROTOCOL  Order #:    [id number]                    Reading MD: Tong Kaye    Measurements  Intervals                                Axis  Rate:       80                           P:  NE:                                      QRS:        -29  QRSD:       92                           T:          198  QT:         428  QTc:        494    Interpretive Statements  ATRIAL FLUTTER, A-RATE 217  LVH WITH SECONDARY REPOLARIZATION ABNORMALITY  BORDERLINE PROLONGED QT INTERVAL  ARTIFACT IN LEAD(S) II,III,aVR,aVL,aVF AND BASELINE WANDER IN LEAD(S)  II,III,aVF  Compared to ECG " 09/03/2021 10:04:59  Left ventricular hypertrophy now present  Early repolarization now present  Supraventricular tac hycardia no longer present  Possible ischemia no longer present  Electronically Signed On 9- 22:12:37 PDT by Tong Henderson          LABS  Labs Reviewed   CBC WITH DIFFERENTIAL - Abnormal; Notable for the following components:       Result Value    RBC 3.61 (*)     Hemoglobin 11.6 (*)     Hematocrit 36.0 (*)     MCV 99.7 (*)     MCHC 32.2 (*)     RDW 55.5 (*)     MPV 8.7 (*)     Lymphocytes 17.20 (*)     All other components within normal limits   COMP METABOLIC PANEL - Abnormal; Notable for the following components:    Potassium 3.1 (*)     Chloride 92 (*)     Co2 40 (*)     Glucose 127 (*)     Calcium 11.0 (*)     All other components within normal limits   TROPONIN - Abnormal; Notable for the following components:    Troponin T 24 (*)     All other components within normal limits   ESTIMATED GFR - Abnormal; Notable for the following components:    GFR If Non  55 (*)     All other components within normal limits   TROPONIN - Abnormal; Notable for the following components:    Troponin T 22 (*)     All other components within normal limits   PROBRAIN NATRIURETIC PEPTIDE, NT - Abnormal; Notable for the following components:    NT-proBNP 741 (*)     All other components within normal limits   MAGNESIUM     All labs reviewed by me.    RADIOLOGY  DX-CHEST-PORTABLE (1 VIEW)   Final Result      1.  Mild interstitial prominence in the lower lobes bilaterally may represent mild dependent edema, atelectasis or pneumonitis.   2.  Blunting of the left costophrenic angle related to pleural thickening or trace pleural fluid.   3.  Cardiomegaly.   4.  Linear left basilar atelectasis or scarring.        The radiologist’s interpretation of all radiology studies have been reviewed by me.    COURSE & MEDICAL DECISION MAKING  Pertinent Labs & Imaging studies reviewed. (See chart for details)    7:02  PM - Patient seen and examined at bedside. Ordered for labs and imaging to evaluate. Patient will be treated with magnesium sulfate 2 g, Lasix 120 mg, and potassium chloride 40 mEq for her symptoms.     8:59 PM - Treated patient with Norco 5-325 mg.     9:01 PM - Paged Cardiology.     9:20 PM - Re-Paged Cardiology.    9:25 PM - I discussed the patient's case and the above findings with Dr. Moore (Cardiology) who states the patient will need inpatient diuresis at this time. Her condition will not be adequately managed at home. He will follow the patient and make further recommendations.     9:33 PM - Patient was reevaluated at bedside. Discussed lab and radiology results with the patient and informed them about the plan for hospitalization at this time. Patient verbalizes understanding and agreement to this plan of care.     9:32 PM - Paged Hospitalist.     9:35 PM - I discussed the patient's case and the above findings with Dr. Burns (Hospitalist) who agrees to assess the patient for hospitalization.       PPE Note: I personally donned full PPE for all patient encounters during this visit, including wearing an N95 respirator mask and eye protection. Scribe remained outside the patient's room and did not have any contact with the patient for the duration of patient encounter.      Medical Decision Making:  Patient presents with shortness of breath, fluid overload.  She is up approximately 15 to 18 pounds from her dry weight.  She does have scant crackles on lung exam, possible pulmonary edema on chest x-ray.  Labs demonstrate hypokalemia, which is likely limiting the effectiveness of her loop diuretics.  Patient given IV diuretics.  Case discussed with cardiology who recommended hospitalization for medical optimization.  Patient is agreeable with this plan.    Troponin minimally elevated, however no change over time.  Low suspicion for type I NSTEMI.  Likely demand ischemia.    DISPOSITION:  Patient will be  hospitalized by Dr. Burns in guarded condition.      FINAL IMPRESSION  1. Hypervolemia, unspecified hypervolemia type    2. Dyspnea on exertion    3. Hypokalemia    4. Atrial fibrillation, unspecified type (HCC)            Megan FONTANA (Scribe), am scribing for, and in the presence of, Tong Kaye M.D..    Electronically signed by: Megan Lobo (Sharon), 9/24/2021    ITong M.D. personally performed the services described in this documentation, as scribed by Megan Lobo in my presence, and it is both accurate and complete.    The note accurately reflects work and decisions made by me.  Tong Kaye M.D.  9/24/2021  10:13 PM      This dictation was created using voice recognition software. The accuracy of the dictation is limited to the abilities of the software. I expect there may be some errors of grammar and possibly content. The nursing notes were reviewed and certain aspects of this information were incorporated into this note.

## 2021-09-25 NOTE — HOSPITAL COURSE
"Zeinab Loza is a 76 y.o. female who presented 9/24/2021 with complaints of orthopnea, persistent shortness of breath despite being on 5 L nasal cannula continuously, paroxysmal nocturnal dyspnea, bilateral lower extremity edema worsened for the past week.  Patient states that she also had fall 2 weeks ago and hit her head without loss of consciousness.  She states that her legs were heavy and fell secondary to this.  She also states that since then, she has had left eye visual disturbances with partial \"black spots\" intermittently in her central visual field.  She is in agreement with CT head stat which has been ordered and currently pending evaluation.  She otherwise states that she is compliant with all of her home medication regimen and has had heart failure for the past 10 years however has never had to be hospitalized for this.  She also mentions that a few months ago they had transitioned her to Bumex as opposed to furosemide and she feels that this may not be working as well and agreeable to transition back to Lasix.  She states she has been vaccinated for COVID-19 x1 and unsure if she wants to get her second or third vaccine at this time.  She otherwise denies fever, chills, anosmia, ageusia, diaphoresis, cough with sputum production, hemoptysis, nausea, vomiting, diarrhea, melena, hematochezia, dysuria, hematuria, loss of consciousness, syncope.  She does admit to vertigo, incoordination, visual field deficit, bilateral lower extremity neuropathy and orthopnea and paroxysmal nocturnal dyspnea as well as dyspnea on exertion.     Vital signs at time of evaluation were as follows: 97.3, 48, 70, 156/70, 96% 5 L nasal cannula.     Chest x-ray performed and reveals mild interstitial prominence in the lower lobes bilaterally which may represent mild dependent edema atelectasis or pneumonitis.  Blunting of left costophrenic angle related to pleural thickening or trace pleural fluid, cardiomegaly and linear " left basilar atelectasis or scarring appreciated.     Twelve-lead EKG reveals atrial fibrillation with T wave inversions in inferior leads otherwise unremarkable for ischemic changes.  QTc borderline prolonged and good R wave progression precordial leads.     Labs were obtained on admission and revealed macrocytic mild anemia with hemoglobin 11.6, hematocrit 36.0 and MCV of 99.7 otherwise unremarkable CBC.  Troponin measured at 24 and subsequently 22.  proBNP elevated at 741 and CMP reveals hypokalemia of 3.1, metabolic alkalosis at 40 and hypercalcemia of 11.0 otherwise relatively unremarkable.     Patient administered 120 mg Lasix IV in ED.  Hospitalist consulted for admission.  Patient agrees to inpatient hospitalization for acute exacerbation of heart failure.  She agrees to full CODE STATUS at time of evaluation and alert and oriented x4.  She will be optimized in ED and subsequently transferred to medical cervantes floor for further optimization of medical management.

## 2021-09-25 NOTE — PROGRESS NOTES
PT arrived to unit via hospital bed escorted by ACLS RN. VSS. PT A&OX4. Monitor leads in place. Monitor room notified. Medicated per MAR. PT is orientated to the unit, call light, phone system, fall precautions in place, appropriate signs in place, bed in low and locked positions. Pt educated regarded fall precautions and importance of calling staff for assistance. Pt denies further needs at the time. Will continue to monitor. COVID 19 surge in effect.

## 2021-09-25 NOTE — ED NOTES
Patient requesting PRN Mohini, takes at home for chronic back pain.  Spoke to ERP and ERP ordered.  Patient medicated per MAR

## 2021-09-25 NOTE — PROGRESS NOTES
Cardiology Follow Up Progress Note    Date of Service  9/25/2021    Attending Physician  RADHA Deras*    Presented with volume overload for 15 pounds weight gain, abdominal distention, dyspnea, peripheral edema    PMH: Chronic hypoxemic respiratory failure secondary to PAH on Tadalafil, chronic A. fib on anticoagulation with Eliquis (failed sotalol, Tikosyn, RF ablation on 4/22/2021), MV repair, MAZE, NED [facility], on BiPAP for sleep apnea      Interim Events    No overnight cardiac events  Telemetry-A. fib, rate well controlled  Reports symptoms improved, on IV furosemide  Labs reviewed  Low K, replete  NA, CR stable  NT proBNP 741  BP appropriate      Review of Systems  Review of Systems   Respiratory: Negative for cough, choking, chest tightness and shortness of breath.    Cardiovascular: Negative for chest pain.       Vital signs in last 24 hours  Temp:  [36.3 °C (97.3 °F)-36.8 °C (98.3 °F)] 36.3 °C (97.4 °F)  Pulse:  [48-91] 61  Resp:  [16-49] 27  BP: (128-174)/(60-91) 128/60  SpO2:  [94 %-98 %] 94 %    Physical Exam  Physical Exam  Cardiovascular:      Rate and Rhythm: Normal rate. Rhythm irregular.      Pulses: Normal pulses.   Pulmonary:      Effort: Pulmonary effort is normal.   Skin:     General: Skin is warm.   Neurological:      Mental Status: She is alert. Mental status is at baseline.   Psychiatric:         Mood and Affect: Mood normal.         Lab Review  Lab Results   Component Value Date/Time    WBC 6.7 09/25/2021 02:02 AM    RBC 3.50 (L) 09/25/2021 02:02 AM    HEMOGLOBIN 11.5 (L) 09/25/2021 02:02 AM    HEMATOCRIT 35.1 (L) 09/25/2021 02:02 AM    .3 (H) 09/25/2021 02:02 AM    MCH 32.9 09/25/2021 02:02 AM    MCHC 32.8 (L) 09/25/2021 02:02 AM    MPV 9.0 09/25/2021 02:02 AM      Lab Results   Component Value Date/Time    SODIUM 144 09/25/2021 02:02 AM    POTASSIUM 2.9 (L) 09/25/2021 02:02 AM    CHLORIDE 94 (L) 09/25/2021 02:02 AM    CO2 37 (H) 09/25/2021 02:02 AM    GLUCOSE 120  (H) 09/25/2021 02:02 AM    BUN 14 09/25/2021 02:02 AM    CREATININE 0.98 09/25/2021 02:02 AM      Lab Results   Component Value Date/Time    ASTSGOT 20 09/24/2021 01:58 PM    ALTSGPT 15 09/24/2021 01:58 PM     Lab Results   Component Value Date/Time    CHOLSTRLTOT 211 (H) 07/26/2021 01:02 PM     (H) 07/26/2021 01:02 PM    HDL 47 07/26/2021 01:02 PM    TRIGLYCERIDE 231 (H) 07/26/2021 01:02 PM    TROPONINT 22 (H) 09/24/2021 08:07 PM       Recent Labs     09/24/21 2007   NTPROBNP 741*       Cardiac Imaging and Procedures Review  EKG: Atrial fibrillation, rate well controlled 80s    Echocardiogram:  9/2/21  Patient atrial fibrillation, heart rate 110 bpm.  Left ventricle is small in size.  Left ventricular ejection fraction is visually estimated to be 75%,   hyperdynamic.  Reduced right ventricular systolic function.  Biatrial enlargement.  Mitral valve repair functioning normally: MG 3 mmHg,  BPM.  Aortic valve not well visualized, no significant abnormalities.  Estimated right ventricular systolic pressure  is 30 mmHg.    Cardiac Catheterization: Not applicable    Imaging  Chest X-Ray:  Mild interstitial prominence in the lower lobes bilaterally may represent mild dependent edema, atelectasis or pneumonitis.  2.  Blunting of the left costophrenic angle related to pleural thickening or trace pleural fluid.  3.  Cardiomegaly.  4.  Linear left basilar atelectasis or scarring.    Stress Test: Not applicable    Assessment/Plan    HFpEF acute on chronic  In the setting of PAH & chronic atrial fibrillation  Off of her diuretics for short period while traveling to [place].  -Continue with aggressive IV diuresis  -Furosemide 80 mg IV 3 times daily  -Holding metoprolol at this time  -Dry weight 205 #  -Keep K above 4, Mg above 2  -Apixaban 5 twice daily  -Strict intake and output  -Daily standing weight    Hypertension  -Well-controlled, 128/60    Chronic A. Fib  -Rate well controlled  -Continue with  apixaban    Obesity, BMI 35  -TLC    Sleep apnea  -CPAP    Cardiology will follow along      Thank you for allowing me to participate in the care of this patient.      Please contact me with any questions.    NYASIA Keith.   Cardiologist, [facility]  [phone]

## 2021-09-25 NOTE — ASSESSMENT & PLAN NOTE
Admitting physician discontinued paroxetine and initiated duloxetine for off label benefit of neuropathic pain relief

## 2021-09-25 NOTE — ASSESSMENT & PLAN NOTE
CT head ordered as patient had fall with hitting her head 2 weeks ago and persistent vertigo as well as visual field deficit   ophthalmology consultation  for evaluation of left eye visual field deficit.  PT /OT

## 2021-09-25 NOTE — ED NOTES
Med Rec completed: per Pt at bedside      No ORAL antibiotics in last 30 days    [facility]     Pt confirmed following allergies:  Allergies   Allergen Reactions   • Betapace [Sotalol] Anaphylaxis   • Tikosyn [Dofetilide] Swelling     TONGUE SWELL.    • Zolpidem Tartrate Unspecified     Lightheaded and confusion      Pt's home medications:   Medication Sig   • digoxin (LANOXIN) 125 MCG Tab Take 1 Tablet by mouth every day.   • rosuvastatin (CRESTOR) 40 MG tablet Take 1 tablet by mouth every evening.   • bumetanide (BUMEX) 2 MG tablet Take 2 Tablets by mouth 2 times a day.   • tretinoin (RETIN-A) 0.05 % cream Apply 1 Application topically every evening. Apply to face every 3 nights and slowly increase to nightly as irritation allows   • potassium chloride SA (KDUR) 20 MEQ Tab CR Take 3 Tablets by mouth 2 times a day.   • apixaban (ELIQUIS) 5mg Tab Take 1 tablet by mouth 2 times a day.   • metoprolol tartrate (LOPRESSOR) 50 MG Tab Take 1 tablet by mouth 2 times a day.   • valacyclovir (VALTREX) 1 GM Tab Take 1 tablet by mouth every 8 hours as needed.   • HYDROcodone-acetaminophen (NORCO) 7.5-325 MG per tablet Take 1 tablet by mouth every 8 hours as needed. Indications: Pain   • Tadalafil, PAH, (ADCIRCA) 20 MG Tab Take 40 mg by mouth every bedtime. Indications: Pulmonary Arterial Hypertension   • PARoxetine (PAXIL) 20 MG Tab Take 20 mg by mouth every morning.   • levothyroxine (SYNTHROID) 75 MCG Tab Take 75 mcg by mouth Every morning on an empty stomach.   • Cholecalciferol (VITAMIN D) 2000 units Cap Take 2,000 Units by mouth every day.     Removed medications:   Medication Removal Reason   • [DISCONTINUED] colchicine (COLCRYS) 0.6 MG Tab Pt reports not taking     • [DISCONTINUED] metOLazone (ZAROXOLYN) 5 MG Tab Pt reports not taking

## 2021-09-25 NOTE — H&P
"Hospital Medicine History & Physical Note    Date of Service  9/24/2021    Primary Care Physician  Ashia Tidwell M.D.    Consultants  None    Code Status  Full Code    Chief Complaint  Chief Complaint   Patient presents with   • Sent by MD   • Shortness of Breath   • Atrial Fibrillation     chronic   • Peripheral Edema       History of Presenting Illness  Zeinab Loza is a 76 y.o. female who presented 9/24/2021 with complaints of orthopnea, persistent shortness of breath despite being on 5 L nasal cannula continuously, paroxysmal nocturnal dyspnea, bilateral lower extremity edema worsened for the past week.  Patient states that she also had fall 2 weeks ago and hit her head without loss of consciousness.  She states that her legs were heavy and fell secondary to this.  She also states that since then, she has had left eye visual disturbances with partial \"black spots\" intermittently in her central visual field.  She is in agreement with CT head stat which has been ordered and currently pending evaluation.  She otherwise states that she is compliant with all of her home medication regimen and has had heart failure for the past 10 years however has never had to be hospitalized for this.  She also mentions that a few months ago they had transitioned her to Bumex as opposed to furosemide and she feels that this may not be working as well and agreeable to transition back to Lasix.  She states she has been vaccinated for COVID-19 x1 and unsure if she wants to get her second or third vaccine at this time.  She otherwise denies fever, chills, anosmia, ageusia, diaphoresis, cough with sputum production, hemoptysis, nausea, vomiting, diarrhea, melena, hematochezia, dysuria, hematuria, loss of consciousness, syncope.  She does admit to vertigo, incoordination, visual field deficit, bilateral lower extremity neuropathy and orthopnea and paroxysmal nocturnal dyspnea as well as dyspnea on exertion.    Vital signs at " time of evaluation were as follows: 97.3, 48, 70, 156/70, 96% 5 L nasal cannula.    Chest x-ray performed and reveals mild interstitial prominence in the lower lobes bilaterally which may represent mild dependent edema atelectasis or pneumonitis.  Blunting of left costophrenic angle related to pleural thickening or trace pleural fluid, cardiomegaly and linear left basilar atelectasis or scarring appreciated.    Twelve-lead EKG reveals atrial fibrillation with T wave inversions in inferior leads otherwise unremarkable for ischemic changes.  QTc borderline prolonged and good R wave progression precordial leads.    Labs were obtained on admission and revealed macrocytic mild anemia with hemoglobin 11.6, hematocrit 36.0 and MCV of 99.7 otherwise unremarkable CBC.  Troponin measured at 24 and subsequently 22.  proBNP elevated at 741 and CMP reveals hypokalemia of 3.1, metabolic alkalosis at 40 and hypercalcemia of 11.0 otherwise relatively unremarkable.    Patient administered 120 mg Lasix IV in ED.  Hospitalist consulted for admission.  Patient agrees to inpatient hospitalization for acute exacerbation of heart failure.  She agrees to full CODE STATUS at time of evaluation and alert and oriented x4.  She will be optimized in ED and subsequently transferred to [facility] floor for further optimization of medical management.    I discussed the plan of care with patient.    Review of Systems  Review of Systems   Constitutional: Positive for malaise/fatigue. Negative for chills and fever.   HENT: Negative for congestion and sore throat.    Eyes: Negative for blurred vision and double vision.   Respiratory: Positive for shortness of breath. Negative for cough and wheezing.    Cardiovascular: Positive for orthopnea and PND. Negative for chest pain and palpitations.   Gastrointestinal: Negative for abdominal pain, blood in stool, constipation, diarrhea, heartburn, melena, nausea and vomiting.   Genitourinary: Negative for  dysuria and frequency.   Musculoskeletal: Positive for back pain and falls. Negative for neck pain.   Skin: Negative for itching and rash.   Neurological: Positive for dizziness and weakness. Negative for focal weakness, loss of consciousness and headaches.   Endo/Heme/Allergies: Negative for environmental allergies. Does not bruise/bleed easily.   Psychiatric/Behavioral: Negative for depression. The patient does not have insomnia.        Past Medical History   has a past medical history of Aneurysm artery, celiac (HCC) (2019), Aneurysm of right renal artery (HCC), Atrial fibrillation (HCC), Breath shortness, Chickenpox, Congestive heart failure (HCC), Diastolic heart failure (HCC), Vietnamese measles, Heart burn, Heart valve disease, Hypertension, essential, Pulmonary hypertension (HCC), Sleep apnea, Snoring, and Warfarin anticoagulation.    Surgical History   has a past surgical history that includes ankle orif (Left, 8/5/2020); tonsillectomy; hysterectomy laparoscopy; wrist fusion; mitral valve repair; and other.     Family History  family history includes Heart Disease in her maternal grandfather and paternal grandfather.   Family history reviewed with patient. There is no family history that is pertinent to the chief complaint.     Social History   reports that she has never smoked. She has never used smokeless tobacco. She reports current alcohol use of about 4.2 oz of alcohol per week. She reports that she does not use drugs.    Allergies  Allergies   Allergen Reactions   • Betapace [Sotalol] Anaphylaxis   • Tikosyn [Dofetilide] Swelling     TONGUE SWELL.    • Zolpidem Tartrate Unspecified     Lightheaded and confusion       Medications  Prior to Admission Medications   Prescriptions Last Dose Informant Patient Reported? Taking?   Cholecalciferol (VITAMIN D) 2000 units Cap 9/24/2021 at am Patient Yes No   Sig: Take 2,000 Units by mouth every day.   HYDROcodone-acetaminophen (NORCO) 7.5-325 MG per tablet 9/24/2021  at 1100 Patient Yes No   Sig: Take 1 tablet by mouth every 8 hours as needed. Indications: Pain   PARoxetine (PAXIL) 20 MG Tab 9/24/2021 at am Patient Yes No   Sig: Take 20 mg by mouth every morning.   Tadalafil, PAH, (ADCIRCA) 20 MG Tab 9/23/2021 at pm Patient Yes No   Sig: Take 40 mg by mouth every bedtime. Indications: Pulmonary Arterial Hypertension   apixaban (ELIQUIS) 5mg Tab 9/24/2021 at am  No No   Sig: Take 1 tablet by mouth 2 times a day.   bumetanide (BUMEX) 2 MG tablet 9/23/2021 at pm  No No   Sig: Take 2 Tablets by mouth 2 times a day.   digoxin (LANOXIN) 125 MCG Tab 9/24/2021 at am  No No   Sig: Take 1 Tablet by mouth every day.   levothyroxine (SYNTHROID) 75 MCG Tab 9/24/2021 at am Patient Yes No   Sig: Take 75 mcg by mouth Every morning on an empty stomach.   metoprolol tartrate (LOPRESSOR) 50 MG Tab 9/24/2021 at am  No No   Sig: Take 1 tablet by mouth 2 times a day.   potassium chloride SA (KDUR) 20 MEQ Tab CR 9/24/2021 at am  No No   Sig: Take 3 Tablets by mouth 2 times a day.   rosuvastatin (CRESTOR) 40 MG tablet 9/23/2021 at pm  No No   Sig: Take 1 tablet by mouth every evening.   tretinoin (RETIN-A) 0.05 % cream 9/23/2021 at pm  Yes No   Sig: Apply 1 Application topically every evening. Apply to face every 3 nights and slowly increase to nightly as irritation allows   valacyclovir (VALTREX) 1 GM Tab prn at prn Patient Yes No   Sig: Take 1 tablet by mouth every 8 hours as needed.      Facility-Administered Medications: None       Physical Exam  Temp:  [36.3 °C (97.3 °F)] 36.3 °C (97.3 °F)  Pulse:  [48-91] 48  Resp:  [16-26] 17  BP: (136-174)/(70-91) 156/70  SpO2:  [96 %-98 %] 96 %  Blood Pressure : 156/70   Temperature: 36.3 °C (97.3 °F)   Pulse: (!) 48   Respiration: 17   Pulse Oximetry: 96 %       Physical Exam  Constitutional:       General: She is not in acute distress.     Appearance: Normal appearance. She is obese. She is not ill-appearing, toxic-appearing or diaphoretic.   HENT:       Head: Normocephalic and atraumatic.      Mouth/Throat:      Mouth: Mucous membranes are moist.      Pharynx: Oropharynx is clear. No posterior oropharyngeal erythema.      Comments: Mallampati class IV  Eyes:      General: No scleral icterus.     Extraocular Movements: Extraocular movements intact.      Pupils: Pupils are equal, round, and reactive to light.      Comments: Pale conjunctiva   Neck:      Vascular: No carotid bruit.   Cardiovascular:      Rate and Rhythm: Normal rate. Rhythm irregular.      Pulses: Normal pulses.      Heart sounds: Murmur (Grade 1 systolic ejection murmur left sternal border) heard.   No friction rub. No gallop.    Pulmonary:      Effort: Pulmonary effort is normal.      Breath sounds: Rales (Bibasilar rales) present. No wheezing or rhonchi.   Abdominal:      General: Abdomen is flat. Bowel sounds are normal. There is no distension.      Palpations: There is no mass.      Tenderness: There is no abdominal tenderness. There is no rebound.   Musculoskeletal:         General: No swelling. Normal range of motion.      Cervical back: Normal range of motion.      Right lower leg: Edema present.      Left lower leg: Edema present.   Lymphadenopathy:      Cervical: No cervical adenopathy.   Skin:     General: Skin is warm and dry.      Capillary Refill: Capillary refill takes less than 2 seconds.      Findings: No erythema or rash.   Neurological:      General: No focal deficit present.      Mental Status: She is alert and oriented to person, place, and time. Mental status is at baseline.      Cranial Nerves: No cranial nerve deficit.      Sensory: No sensory deficit.      Motor: No weakness.   Psychiatric:         Mood and Affect: Mood normal.         Behavior: Behavior normal.         Laboratory:  Recent Labs     09/24/21  1358   WBC 6.3   RBC 3.61*   HEMOGLOBIN 11.6*   HEMATOCRIT 36.0*   MCV 99.7*   MCH 32.1   MCHC 32.2*   RDW 55.5*   PLATELETCT 216   MPV 8.7*     Recent Labs      09/23/21  1540 09/24/21  1358   SODIUM 138 143   POTASSIUM 3.0* 3.1*   CHLORIDE 88* 92*   CO2 40* 40*   GLUCOSE 121* 127*   BUN 17 16   CREATININE 1.01 0.98   CALCIUM 11.1* 11.0*     Recent Labs     09/23/21  1540 09/24/21  1358   ALTSGPT  --  15   ASTSGOT  --  20   ALKPHOSPHAT  --  63   TBILIRUBIN  --  0.6   GLUCOSE 121* 127*         Recent Labs     09/24/21 2007   NTPROBNP 741*         Recent Labs     09/24/21  1358 09/24/21 2007   TROPONINT 24* 22*     I have reviewed and interpreted the above twelve-lead EKG and do appreciate atrial fibrillation, inferior T wave inversion and mildly prolonged QTC with good precordial R wave progression.    Imaging:  DX-CHEST-PORTABLE (1 VIEW)   Final Result      1.  Mild interstitial prominence in the lower lobes bilaterally may represent mild dependent edema, atelectasis or pneumonitis.   2.  Blunting of the left costophrenic angle related to pleural thickening or trace pleural fluid.   3.  Cardiomegaly.   4.  Linear left basilar atelectasis or scarring.          I reviewed and interpreted the above chest x-ray and do appreciate sternotomy wires, cardiomegaly and left-sided trace pleural effusion consistent with heart failure exacerbation as seen above.      Assessment/Plan:  I anticipate this patient will require at least two midnights for appropriate medical management, necessitating inpatient admission.    * Acute exacerbation of CHF (congestive heart failure) (HCC)- (present on admission)  Assessment & Plan  Daily weights  Strict I's and O's  Fluid restriction 1200 cc daily  Cardiac diet with 2 g sodium restriction  Furosemide 40 mg IV twice daily  Hold metoprolol secondary to bradycardia  Continue Crestor home medication regiment    Chronic respiratory failure (HCC)- (present on admission)  Assessment & Plan  Continue supplemental O2 to maintain SPO2 greater than 88%  Chest x-ray consistent with heart failure exacerbation  Lasix 40 mg IV twice daily    A-fib ([facility])-  (present on admission)  Assessment & Plan  Continue home regimen of digoxin 125 mcg daily  Digoxin toxicity levels ordered  Continue Eliquis 5 mg p.o. twice daily  Hold metoprolol secondary to heart rate in the 40s  Follow-up outpatient PCP and cardiology after discharge    Incoordination- (present on admission)  Assessment & Plan  CT head ordered as patient had fall with hitting her head 2 weeks ago and persistent vertigo as well as visual field deficit  Consider ophthalmology consultation in a.m. for evaluation of left eye visual field deficit.    Neuropathy- (present on admission)  Assessment & Plan  Discontinue paroxetine and initiated duloxetine for off label benefit of neuropathic pain relief  Patient in agreement with the proposed change.    Obesity (BMI 30.0-34.9)- (present on admission)  Assessment & Plan  Strong recommendation for follow-up with PCP for lifestyle modifications including diet and exercise regiment of at least 30 minutes cardiovascular exercise 3-5 times weekly.      Hypokalemia- (present on admission)  Assessment & Plan  Magnesium sulfate given in ED  Magnesium level ordered and pending  Potassium level at 3.1 and replenishment in ED  CMP in a.m.    Macrocytic anemia- (present on admission)  Assessment & Plan  Transfuse hemoglobin less than 7  CBC in a.m.  Iron/TIBC/ferritin/B12/reticulocyte count  Unable to order folate through epic order set    Hypothyroidism- (present on admission)  Assessment & Plan  No indication to check TSH/free T4 at this time  Continue Synthroid 75 mcg p.o. daily    Essential hypertension- (present on admission)  Assessment & Plan  Hold metoprolol secondary to bradycardia  2 g sodium restricted diet        VTE prophylaxis: therapeutic anticoagulation with eliquis

## 2021-09-25 NOTE — PROGRESS NOTES
"Hospital Medicine Daily Progress Note    Date of Service  9/25/2021    Chief Complaint  Zeinab Loza is a 76 y.o. female admitted 9/24/2021 with shortness of breath    Hospital Course  Zeinab Loza is a 76 y.o. female who presented 9/24/2021 with complaints of orthopnea, persistent shortness of breath despite being on 5 L nasal cannula continuously, paroxysmal nocturnal dyspnea, bilateral lower extremity edema worsened for the past week.  Patient states that she also had fall 2 weeks ago and hit her head without loss of consciousness.  She states that her legs were heavy and fell secondary to this.  She also states that since then, she has had left eye visual disturbances with partial \"black spots\" intermittently in her central visual field.  She is in agreement with CT head stat which has been ordered and currently pending evaluation.  She otherwise states that she is compliant with all of her home medication regimen and has had heart failure for the past 10 years however has never had to be hospitalized for this.  She also mentions that a few months ago they had transitioned her to Bumex as opposed to furosemide and she feels that this may not be working as well and agreeable to transition back to Lasix.  She states she has been vaccinated for COVID-19 x1 and unsure if she wants to get her second or third vaccine at this time.  She otherwise denies fever, chills, anosmia, ageusia, diaphoresis, cough with sputum production, hemoptysis, nausea, vomiting, diarrhea, melena, hematochezia, dysuria, hematuria, loss of consciousness, syncope.  She does admit to vertigo, incoordination, visual field deficit, bilateral lower extremity neuropathy and orthopnea and paroxysmal nocturnal dyspnea as well as dyspnea on exertion.     Vital signs at time of evaluation were as follows: 97.3, 48, 70, 156/70, 96% 5 L nasal cannula.     Chest x-ray performed and reveals mild interstitial prominence in the lower lobes " bilaterally which may represent mild dependent edema atelectasis or pneumonitis.  Blunting of left costophrenic angle related to pleural thickening or trace pleural fluid, cardiomegaly and linear left basilar atelectasis or scarring appreciated.     Twelve-lead EKG reveals atrial fibrillation with T wave inversions in inferior leads otherwise unremarkable for ischemic changes.  QTc borderline prolonged and good R wave progression precordial leads.     Labs were obtained on admission and revealed macrocytic mild anemia with hemoglobin 11.6, hematocrit 36.0 and MCV of 99.7 otherwise unremarkable CBC.  Troponin measured at 24 and subsequently 22.  proBNP elevated at 741 and CMP reveals hypokalemia of 3.1, metabolic alkalosis at 40 and hypercalcemia of 11.0 otherwise relatively unremarkable.     Patient administered 120 mg Lasix IV in ED.  Hospitalist consulted for admission.  Patient agrees to inpatient hospitalization for acute exacerbation of heart failure.  She agrees to full CODE STATUS at time of evaluation and alert and oriented x4.  She will be optimized in ED and subsequently transferred to [facility] floor for further optimization of medical management.      Interval Problem Update  9/25 patient is resting in bed, she still in the emergency room, she stated that she is breathing better, denies any fever chills nausea vomiting no COVID-19 contact.        Consultants/Specialty  cardiology    Code Status  Full Code    Disposition  Patient is not medically cleared.   Anticipate discharge to to home with close outpatient follow-up.  I have placed the appropriate orders for post-discharge needs.    Review of Systems  ROS     Physical Exam  Temp:  [36.3 °C (97.4 °F)-36.8 °C (98.3 °F)] 36.3 °C (97.4 °F)  Pulse:  [48-65] 65  Resp:  [14-49] 22  BP: (128-174)/(58-80) 149/71  SpO2:  [94 %-98 %] 94 %    Physical Exam    Fluids    Intake/Output Summary (Last 24 hours) at 9/25/2021 1306  Last data filed at 9/25/2021  1200  Gross per 24 hour   Intake 500 ml   Output 6000 ml   Net -5500 ml       Laboratory  Recent Labs     09/24/21  1358 09/25/21  0202   WBC 6.3 6.7   RBC 3.61* 3.50*   HEMOGLOBIN 11.6* 11.5*   HEMATOCRIT 36.0* 35.1*   MCV 99.7* 100.3*   MCH 32.1 32.9   MCHC 32.2* 32.8*   RDW 55.5* 56.6*   PLATELETCT 216 220   MPV 8.7* 9.0     Recent Labs     09/23/21  1540 09/24/21  1358 09/25/21  0202   SODIUM 138 143 144   POTASSIUM 3.0* 3.1* 2.9*   CHLORIDE 88* 92* 94*   CO2 40* 40* 37*   GLUCOSE 121* 127* 120*   BUN 17 16 14   CREATININE 1.01 0.98 0.98   CALCIUM 11.1* 11.0* 10.3                   Imaging  CT-HEAD W/O   Final Result      1.  Cerebral atrophy.      2.  White matter hypodensity most consistent with small vessel ischemic change versus demyelination or gliosis, unchanged from 2/3/2020.      3.  Otherwise, Head CT without contrast with no acute findings. No evidence of acute cerebral infarction, hemorrhage or mass lesion.      DX-CHEST-PORTABLE (1 VIEW)   Final Result      1.  Mild interstitial prominence in the lower lobes bilaterally may represent mild dependent edema, atelectasis or pneumonitis.   2.  Blunting of the left costophrenic angle related to pleural thickening or trace pleural fluid.   3.  Cardiomegaly.   4.  Linear left basilar atelectasis or scarring.           Assessment/Plan  * Acute on chronic congestive heart failure with right ventricular diastolic dysfunction (HCC)- (present on admission)  Assessment & Plan  Daily weights  Strict I's and O's  Fluid restriction 1200 cc daily  Cardiac diet with 2 g sodium restriction  Furosemide 40 mg IV twice daily  Hold metoprolol secondary to bradycardia  Continue Crestor home medication regiment    Incoordination- (present on admission)  Assessment & Plan  CT head ordered as patient had fall with hitting her head 2 weeks ago and persistent vertigo as well as visual field deficit  Consider ophthalmology consultation in a.m. for evaluation of left eye visual  field deficit.    Neuropathy- (present on admission)  Assessment & Plan  Discontinue paroxetine and initiated duloxetine for off label benefit of neuropathic pain relief  Patient in agreement with the proposed change.    Obesity (BMI 30.0-34.9)- (present on admission)  Assessment & Plan  Strong recommendation for follow-up with PCP for lifestyle modifications including diet and exercise regiment of at least 30 minutes cardiovascular exercise 3-5 times weekly.      Hypokalemia- (present on admission)  Assessment & Plan  Magnesium sulfate given in ED  Magnesium level ordered and pending  Potassium level at 3.1 and replenishment in ED  CMP in a.m.    Macrocytic anemia- (present on admission)  Assessment & Plan  Transfuse hemoglobin less than 7  CBC in a.m.  Iron/TIBC/ferritin/B12/reticulocyte count  Unable to order folate through epic order set    Chronic respiratory failure (HCC)- (present on admission)  Assessment & Plan  Continue supplemental O2 to maintain SPO2 greater than 88%  Chest x-ray consistent with heart failure exacerbation  Lasix 40 mg IV twice daily    A-fib (HCC)- (present on admission)  Assessment & Plan  Continue home regimen of digoxin 125 mcg daily  Digoxin toxicity levels ordered  Continue Eliquis 5 mg p.o. twice daily  Hold metoprolol secondary to heart rate in the 40s  Follow-up outpatient PCP and cardiology after discharge    Hypothyroidism- (present on admission)  Assessment & Plan  No indication to check TSH/free T4 at this time  Continue Synthroid 75 mcg p.o. daily    Essential hypertension- (present on admission)  Assessment & Plan  Hold metoprolol secondary to bradycardia  2 g sodium restricted diet           VTE prophylaxis: therapeutic anticoagulation with Eliquis    I have performed a physical exam and reviewed and updated ROS and Plan today (9/25/2021). In review of yesterday's note (9/24/2021), there are no changes except as documented above.      Case and plan of care discussed with  patient, cardiology team, nurse staff.

## 2021-09-26 ENCOUNTER — APPOINTMENT (OUTPATIENT)
Dept: RADIOLOGY | Facility: MEDICAL CENTER | Age: 76
DRG: 292 | End: 2021-09-26
Attending: STUDENT IN AN ORGANIZED HEALTH CARE EDUCATION/TRAINING PROGRAM
Payer: MEDICARE

## 2021-09-26 PROBLEM — H43.392 VITREOUS FLOATERS OF LEFT EYE: Status: ACTIVE | Noted: 2021-09-26

## 2021-09-26 LAB
ANION GAP SERPL CALC-SCNC: 13 MMOL/L (ref 7–16)
BASOPHILS # BLD AUTO: 0.3 % (ref 0–1.8)
BASOPHILS # BLD: 0.02 K/UL (ref 0–0.12)
BUN SERPL-MCNC: 14 MG/DL (ref 8–22)
CALCIUM SERPL-MCNC: 10.2 MG/DL (ref 8.5–10.5)
CHLORIDE SERPL-SCNC: 96 MMOL/L (ref 96–112)
CO2 SERPL-SCNC: 35 MMOL/L (ref 20–33)
CREAT SERPL-MCNC: 1.12 MG/DL (ref 0.5–1.4)
EOSINOPHIL # BLD AUTO: 0.25 K/UL (ref 0–0.51)
EOSINOPHIL NFR BLD: 3.6 % (ref 0–6.9)
ERYTHROCYTE [DISTWIDTH] IN BLOOD BY AUTOMATED COUNT: 56.4 FL (ref 35.9–50)
GLUCOSE SERPL-MCNC: 120 MG/DL (ref 65–99)
HCT VFR BLD AUTO: 43.3 % (ref 37–47)
HGB BLD-MCNC: 13.7 G/DL (ref 12–16)
IMM GRANULOCYTES # BLD AUTO: 0.04 K/UL (ref 0–0.11)
IMM GRANULOCYTES NFR BLD AUTO: 0.6 % (ref 0–0.9)
LYMPHOCYTES # BLD AUTO: 1.27 K/UL (ref 1–4.8)
LYMPHOCYTES NFR BLD: 18.5 % (ref 22–41)
MCH RBC QN AUTO: 31.1 PG (ref 27–33)
MCHC RBC AUTO-ENTMCNC: 31.6 G/DL (ref 33.6–35)
MCV RBC AUTO: 98.4 FL (ref 81.4–97.8)
MONOCYTES # BLD AUTO: 0.63 K/UL (ref 0–0.85)
MONOCYTES NFR BLD AUTO: 9.2 % (ref 0–13.4)
NEUTROPHILS # BLD AUTO: 4.67 K/UL (ref 2–7.15)
NEUTROPHILS NFR BLD: 67.8 % (ref 44–72)
NRBC # BLD AUTO: 0 K/UL
NRBC BLD-RTO: 0 /100 WBC
NT-PROBNP SERPL IA-MCNC: 420 PG/ML (ref 0–125)
PLATELET # BLD AUTO: 230 K/UL (ref 164–446)
PMV BLD AUTO: 8.9 FL (ref 9–12.9)
POTASSIUM SERPL-SCNC: 3.7 MMOL/L (ref 3.6–5.5)
RBC # BLD AUTO: 4.4 M/UL (ref 4.2–5.4)
SODIUM SERPL-SCNC: 144 MMOL/L (ref 135–145)
WBC # BLD AUTO: 6.9 K/UL (ref 4.8–10.8)

## 2021-09-26 PROCEDURE — 85025 COMPLETE CBC W/AUTO DIFF WBC: CPT

## 2021-09-26 PROCEDURE — 36415 COLL VENOUS BLD VENIPUNCTURE: CPT

## 2021-09-26 PROCEDURE — A9270 NON-COVERED ITEM OR SERVICE: HCPCS | Performed by: STUDENT IN AN ORGANIZED HEALTH CARE EDUCATION/TRAINING PROGRAM

## 2021-09-26 PROCEDURE — 97161 PT EVAL LOW COMPLEX 20 MIN: CPT

## 2021-09-26 PROCEDURE — 99232 SBSQ HOSP IP/OBS MODERATE 35: CPT | Performed by: INTERNAL MEDICINE

## 2021-09-26 PROCEDURE — 770020 HCHG ROOM/CARE - TELE (206)

## 2021-09-26 PROCEDURE — 80048 BASIC METABOLIC PNL TOTAL CA: CPT

## 2021-09-26 PROCEDURE — 700102 HCHG RX REV CODE 250 W/ 637 OVERRIDE(OP): Performed by: NURSE PRACTITIONER

## 2021-09-26 PROCEDURE — 700111 HCHG RX REV CODE 636 W/ 250 OVERRIDE (IP): Performed by: INTERNAL MEDICINE

## 2021-09-26 PROCEDURE — A9270 NON-COVERED ITEM OR SERVICE: HCPCS | Performed by: NURSE PRACTITIONER

## 2021-09-26 PROCEDURE — 83880 ASSAY OF NATRIURETIC PEPTIDE: CPT

## 2021-09-26 PROCEDURE — 700102 HCHG RX REV CODE 250 W/ 637 OVERRIDE(OP): Performed by: STUDENT IN AN ORGANIZED HEALTH CARE EDUCATION/TRAINING PROGRAM

## 2021-09-26 PROCEDURE — 99233 SBSQ HOSP IP/OBS HIGH 50: CPT | Performed by: STUDENT IN AN ORGANIZED HEALTH CARE EDUCATION/TRAINING PROGRAM

## 2021-09-26 RX ORDER — TADALAFIL 20 MG/1
40 TABLET ORAL
Status: DISCONTINUED | OUTPATIENT
Start: 2021-09-26 | End: 2021-09-26

## 2021-09-26 RX ORDER — METOPROLOL TARTRATE 50 MG/1
50 TABLET, FILM COATED ORAL TWICE DAILY
Status: DISCONTINUED | OUTPATIENT
Start: 2021-09-27 | End: 2021-09-28 | Stop reason: HOSPADM

## 2021-09-26 RX ORDER — TADALAFIL 20 MG/1
40 TABLET ORAL
Status: DISCONTINUED | OUTPATIENT
Start: 2021-09-26 | End: 2021-09-28 | Stop reason: HOSPADM

## 2021-09-26 RX ADMIN — FUROSEMIDE 80 MG: 10 INJECTION, SOLUTION INTRAMUSCULAR; INTRAVENOUS at 13:20

## 2021-09-26 RX ADMIN — TADALAFIL 40 MG: 20 TABLET ORAL at 21:00

## 2021-09-26 RX ADMIN — OXYCODONE HYDROCHLORIDE 10 MG: 10 TABLET ORAL at 09:11

## 2021-09-26 RX ADMIN — DULOXETINE 20 MG: 20 CAPSULE, DELAYED RELEASE ORAL at 05:18

## 2021-09-26 RX ADMIN — OXYCODONE HYDROCHLORIDE 10 MG: 10 TABLET ORAL at 17:11

## 2021-09-26 RX ADMIN — APIXABAN 5 MG: 5 TABLET, FILM COATED ORAL at 17:10

## 2021-09-26 RX ADMIN — METOPROLOL TARTRATE 12.5 MG: 25 TABLET, FILM COATED ORAL at 12:23

## 2021-09-26 RX ADMIN — DIGOXIN 125 MCG: 125 TABLET ORAL at 05:17

## 2021-09-26 RX ADMIN — DOCUSATE SODIUM 50 MG AND SENNOSIDES 8.6 MG 2 TABLET: 8.6; 5 TABLET, FILM COATED ORAL at 17:10

## 2021-09-26 RX ADMIN — OXYCODONE HYDROCHLORIDE 10 MG: 10 TABLET ORAL at 20:51

## 2021-09-26 RX ADMIN — POTASSIUM CHLORIDE 40 MEQ: 1500 TABLET, EXTENDED RELEASE ORAL at 17:10

## 2021-09-26 RX ADMIN — POTASSIUM CHLORIDE 40 MEQ: 1500 TABLET, EXTENDED RELEASE ORAL at 05:18

## 2021-09-26 RX ADMIN — APIXABAN 5 MG: 5 TABLET, FILM COATED ORAL at 05:17

## 2021-09-26 RX ADMIN — ROSUVASTATIN CALCIUM 40 MG: 20 TABLET, FILM COATED ORAL at 17:10

## 2021-09-26 RX ADMIN — LEVOTHYROXINE SODIUM 75 MCG: 0.07 TABLET ORAL at 05:17

## 2021-09-26 RX ADMIN — OXYCODONE HYDROCHLORIDE 10 MG: 10 TABLET ORAL at 12:23

## 2021-09-26 RX ADMIN — FUROSEMIDE 80 MG: 10 INJECTION, SOLUTION INTRAMUSCULAR; INTRAVENOUS at 17:11

## 2021-09-26 RX ADMIN — DOCUSATE SODIUM 50 MG AND SENNOSIDES 8.6 MG 2 TABLET: 8.6; 5 TABLET, FILM COATED ORAL at 05:18

## 2021-09-26 RX ADMIN — POTASSIUM CHLORIDE 40 MEQ: 1500 TABLET, EXTENDED RELEASE ORAL at 12:24

## 2021-09-26 ASSESSMENT — ENCOUNTER SYMPTOMS
CHOKING: 0
SPUTUM PRODUCTION: 0
SHORTNESS OF BREATH: 0
NAUSEA: 0
FEVER: 0
CHEST TIGHTNESS: 0
COUGH: 0

## 2021-09-26 ASSESSMENT — GAIT ASSESSMENTS
DISTANCE (FEET): 100
DEVIATION: BRADYKINETIC
GAIT LEVEL OF ASSIST: SUPERVISED

## 2021-09-26 ASSESSMENT — COGNITIVE AND FUNCTIONAL STATUS - GENERAL
MOBILITY SCORE: 22
SUGGESTED CMS G CODE MODIFIER MOBILITY: CJ
CLIMB 3 TO 5 STEPS WITH RAILING: A LITTLE
WALKING IN HOSPITAL ROOM: A LITTLE

## 2021-09-26 ASSESSMENT — FIBROSIS 4 INDEX
FIB4 SCORE: 1.66

## 2021-09-26 ASSESSMENT — PAIN DESCRIPTION - PAIN TYPE
TYPE: ACUTE PAIN

## 2021-09-26 NOTE — PROGRESS NOTES
Hospital Medicine Daily Progress Note    Date of Service  9/26/2021    Chief Complaint  Zeinab Loza is a 76 y.o. female admitted 9/24/2021 with shortness of breath    Hospital Course  76 female with past medical history of  pulmonary hypertension on 5 L oxygen, A. fib on Eliquis, right heart failure presented to ED with worsening shortness of breath.  Evaluate cardiology.  Admitted for acute CHF exacerbation.    Interval Problem Update  9/26/2021  Vitals remained stable.  On baseline 5 L nasal cannula saturating over 90  Labs unremarkable  Bilateral basilar rales present auscultation  Currently on Eliquis, digoxin, Lasix 80 mg IV 3 times daily.  Cardiology following closely    Patient's reports of left eye floater past 2 weeks.  Consider ophthalmology evaluation.    I have personally seen and examined the patient at bedside. I discussed the plan of care with patient, bedside RN and charge RN.    Consultants/Specialty  cardiology    Code Status  Full Code    Disposition  Patient is not medically cleared.   Anticipate discharge to to home with close outpatient follow-up.  I have placed the appropriate orders for post-discharge needs.    Review of Systems  Review of Systems   Constitutional: Negative for fever.   HENT: Negative for hearing loss.    Eyes:        Left eye floater    Respiratory: Negative for cough, sputum production and shortness of breath.    Cardiovascular: Negative for chest pain and leg swelling.   Gastrointestinal: Negative for nausea.   Genitourinary: Negative for dysuria.        Physical Exam  Temp:  [36 °C (96.8 °F)-36.7 °C (98 °F)] 36.7 °C (98 °F)  Pulse:  [] 69  Resp:  [14-27] 18  BP: (113-160)/() 160/103  SpO2:  [92 %-97 %] 96 %    Physical Exam  Constitutional:       General: She is not in acute distress.     Appearance: She is obese.   HENT:      Head: Normocephalic.      Right Ear: Tympanic membrane normal.      Left Ear: Tympanic membrane normal.      Mouth/Throat:       Mouth: Mucous membranes are moist.   Eyes:      General:         Right eye: No discharge.      Extraocular Movements: Extraocular movements intact.      Pupils: Pupils are equal, round, and reactive to light.   Cardiovascular:      Rate and Rhythm: Normal rate and regular rhythm.      Pulses: Normal pulses.      Heart sounds: Normal heart sounds.   Pulmonary:      Effort: Pulmonary effort is normal.      Breath sounds: Rales present.   Abdominal:      General: Abdomen is flat. There is no distension.      Palpations: Abdomen is soft.   Musculoskeletal:      Right lower leg: No edema.      Left lower leg: No edema.   Skin:     Findings: No lesion or rash.   Neurological:      General: No focal deficit present.      Mental Status: She is alert and oriented to person, place, and time. Mental status is at baseline.   Psychiatric:         Mood and Affect: Mood normal.         Fluids    Intake/Output Summary (Last 24 hours) at 9/26/2021 1052  Last data filed at 9/25/2021 1543  Gross per 24 hour   Intake 380 ml   Output 1800 ml   Net -1420 ml       Laboratory  Recent Labs     09/24/21  1358 09/25/21  0202 09/26/21  0132   WBC 6.3 6.7 6.9   RBC 3.61* 3.50* 4.40   HEMOGLOBIN 11.6* 11.5* 13.7   HEMATOCRIT 36.0* 35.1* 43.3   MCV 99.7* 100.3* 98.4*   MCH 32.1 32.9 31.1   MCHC 32.2* 32.8* 31.6*   RDW 55.5* 56.6* 56.4*   PLATELETCT 216 220 230   MPV 8.7* 9.0 8.9*     Recent Labs     09/24/21  1358 09/24/21  1358 09/25/21  0202 09/25/21  1454 09/26/21  0132   SODIUM 143  --  144  --  144   POTASSIUM 3.1*   < > 2.9* 3.6 3.7   CHLORIDE 92*  --  94*  --  96   CO2 40*  --  37*  --  35*   GLUCOSE 127*  --  120*  --  120*   BUN 16  --  14  --  14   CREATININE 0.98  --  0.98  --  1.12   CALCIUM 11.0*  --  10.3  --  10.2    < > = values in this interval not displayed.                   Imaging  CT-HEAD W/O   Final Result      1.  Cerebral atrophy.      2.  White matter hypodensity most consistent with small vessel ischemic change versus  demyelination or gliosis, unchanged from 2/3/2020.      3.  Otherwise, Head CT without contrast with no acute findings. No evidence of acute cerebral infarction, hemorrhage or mass lesion.      DX-CHEST-PORTABLE (1 VIEW)   Final Result      1.  Mild interstitial prominence in the lower lobes bilaterally may represent mild dependent edema, atelectasis or pneumonitis.   2.  Blunting of the left costophrenic angle related to pleural thickening or trace pleural fluid.   3.  Cardiomegaly.   4.  Linear left basilar atelectasis or scarring.      IR-MIDLINE CATHETER INSERTION WO GUIDANCE > AGE 3    (Results Pending)        Assessment/Plan  * Acute on chronic congestive heart failure with right ventricular diastolic dysfunction (HCC)- (present on admission)  Assessment & Plan  Cardiology consulted  Continue IV diuresis, continue strict I's and O's    Incoordination- (present on admission)  Assessment & Plan  CT head ordered as patient had fall with hitting her head 2 weeks ago and persistent vertigo as well as visual field deficit   ophthalmology consultation  for evaluation of left eye visual field deficit.  PT /OT    Neuropathy- (present on admission)  Assessment & Plan  Admitting physician discontinued paroxetine and initiated duloxetine for off label benefit of neuropathic pain relief      Obesity (BMI 30.0-34.9)- (present on admission)  Assessment & Plan  Needs to lose weight      Hypokalemia- (present on admission)  Assessment & Plan  Continue potassium supplement    Macrocytic anemia- (present on admission)  Assessment & Plan  Transfuse hemoglobin less than 7  CBC in a.m.  Hemoglobin stable      Chronic respiratory failure (HCC)- (present on admission)  Assessment & Plan  Patient is on 5 L of oxygen, IV diuresis per cardiology    A-fib (HCC)- (present on admission)  Assessment & Plan  Continue Eliquis digoxin, metoprolol    Hypothyroidism- (present on admission)  Assessment & Plan  Continue supplement    Essential  hypertension- (present on admission)  Assessment & Plan  Continue monitoring         VTE prophylaxis: SCDs/TEDs and therapeutic anticoagulation with Eliquis     I have performed a physical exam and reviewed and updated ROS and Plan today (9/26/2021). In review of yesterday's note (9/25/2021), there are no changes except as documented above.

## 2021-09-26 NOTE — PROGRESS NOTES
Cardiology Follow Up Progress Note    Date of Service  9/26/2021    Attending Physician  RADHA Deras*    Presented with volume overload for 15 pounds weight gain, abdominal distention, dyspnea, peripheral edema    PMH: Chronic hypoxemic respiratory failure secondary to PAH on Tadalafil, chronic A. fib on anticoagulation with Eliquis (failed sotalol, Tikosyn, RF ablation on 4/22/2021), MV repair, MAZE, NED 2011 [facility], on BiPAP for sleep apnea      Interim Events    No overnight cardiac events  Telemetry-A. fib, rate well controlled overnight, RVR this morning  Labs reviewed  NA, CR, K stable  NT proBNP 741--> 420  Hypertensive this morning        Review of Systems  Review of Systems   Respiratory: Negative for cough, choking, chest tightness and shortness of breath.    Cardiovascular: Negative for chest pain.       Vital signs in last 24 hours  Temp:  [36 °C (96.8 °F)-36.7 °C (98 °F)] 36.7 °C (98 °F)  Pulse:  [] 69  Resp:  [14-27] 18  BP: (113-160)/() 160/103  SpO2:  [92 %-97 %] 96 %    Physical Exam  Physical Exam  Cardiovascular:      Rate and Rhythm: Normal rate. Rhythm irregular.      Pulses: Normal pulses.   Pulmonary:      Effort: Pulmonary effort is normal.      Breath sounds: Rales present.   Skin:     General: Skin is warm.   Neurological:      Mental Status: She is alert. Mental status is at baseline.   Psychiatric:         Mood and Affect: Mood normal.         Lab Review  Lab Results   Component Value Date/Time    WBC 6.9 09/26/2021 01:32 AM    RBC 4.40 09/26/2021 01:32 AM    HEMOGLOBIN 13.7 09/26/2021 01:32 AM    HEMATOCRIT 43.3 09/26/2021 01:32 AM    MCV 98.4 (H) 09/26/2021 01:32 AM    MCH 31.1 09/26/2021 01:32 AM    MCHC 31.6 (L) 09/26/2021 01:32 AM    MPV 8.9 (L) 09/26/2021 01:32 AM      Lab Results   Component Value Date/Time    SODIUM 144 09/26/2021 01:32 AM    POTASSIUM 3.7 09/26/2021 01:32 AM    CHLORIDE 96 09/26/2021 01:32 AM    CO2 35 (H) 09/26/2021 01:32 AM    GLUCOSE  120 (H) 09/26/2021 01:32 AM    BUN 14 09/26/2021 01:32 AM    CREATININE 1.12 09/26/2021 01:32 AM      Lab Results   Component Value Date/Time    ASTSGOT 20 09/24/2021 01:58 PM    ALTSGPT 15 09/24/2021 01:58 PM     Lab Results   Component Value Date/Time    CHOLSTRLTOT 211 (H) 07/26/2021 01:02 PM     (H) 07/26/2021 01:02 PM    HDL 47 07/26/2021 01:02 PM    TRIGLYCERIDE 231 (H) 07/26/2021 01:02 PM    TROPONINT 22 (H) 09/24/2021 08:07 PM       Recent Labs     09/24/21 2007 09/26/21  0132   NTPROBNP 741* 420*       Cardiac Imaging and Procedures Review  EKG: Atrial fibrillation, rate well controlled 80s    Echocardiogram:  9/2/21  Patient atrial fibrillation, heart rate 110 bpm.  Left ventricle is small in size.  Left ventricular ejection fraction is visually estimated to be 75%,   hyperdynamic.  Reduced right ventricular systolic function.  Biatrial enlargement.  Mitral valve repair functioning normally: MG 3 mmHg,  BPM.  Aortic valve not well visualized, no significant abnormalities.  Estimated right ventricular systolic pressure  is 30 mmHg.    Cardiac Catheterization: Not applicable    Imaging  Chest X-Ray:  Mild interstitial prominence in the lower lobes bilaterally may represent mild dependent edema, atelectasis or pneumonitis.  2.  Blunting of the left costophrenic angle related to pleural thickening or trace pleural fluid.  3.  Cardiomegaly.  4.  Linear left basilar atelectasis or scarring.    Stress Test: Not applicable    Assessment/Plan     Acute on chronic right heart failure  In the setting of PAH & chronic atrial fibrillation  -Furosemide 80 mg IV 3 times daily  -Diuresed - 3800 overnight  -Labs stable  -Resume low-dose metoprolol  -Dry weight 205 #  -Pending standing weight   -Keep K > 4, Mg > 2  -Strict intake and output      Hypertension  -Well-controlled  -Hypertensive this morning likely secondary to difficulty accessing IV    Chronic A. Fib  -RVR this morning  -Continue with  apixaban  -Digoxin  -Resume low-dose metoprolol , will uptitrate to home dose Toprol-XL on discharge    Obesity, BMI 35  -TLC    Sleep apnea  -CPAP    Cardiology will follow along      Thank you for allowing me to participate in the care of this patient.      Please contact me with any questions.    NYASIA Keith.   Cardiologist, [facility]  [phone]

## 2021-09-26 NOTE — PROGRESS NOTES
Report received, pt care assumed, tele box on and rate verified. VSS and pt is on 5 L O2 via NC. Pt aaox4, no signs of distress noted at this time. POC discussed with pt and verbalizes no questions. Pt c/o of 7/10 pain in lower back, PRN pain med oxycodone administered. Pt denies any additional needs at this time. Bed in lowest position, bed alarm on, pt educated on fall risk and verbalized understanding, call light within reach, will continue with plan of care.      Covid-19 surge in effect

## 2021-09-26 NOTE — THERAPY
Physical Therapy   Initial Evaluation     Patient Name: Zeinab Loza  Age:  76 y.o., Sex:  female  Medical Record #: [id number]  Today's Date: 9/26/2021          Assessment  Patient is 76 y.o. female admitted for CHF exacerbation. Patient was seen for PT evaluation and presents near baseline function. Able to demonstrate all functional mobility SPV and able to ambulate in hallway with no LOB. Educated on safety in home and pacing strategies. On 5L O2 at baseline. Owns appropriate AD. No further IPPT needs at this time.     Plan    Recommend Physical Therapy for Evaluation only     DC Equipment Recommendations: (P) None  Discharge Recommendations: (P) Anticipate that the patient will have no further physical therapy needs after discharge from the hospital       Objective       09/26/21 1113   Prior Living Situation   Housing / Facility 1 Story House   Steps Into Home 0   Steps In Home 0   Bathroom Set up Walk In Shower   Equipment Owned 4-Wheel Walker;Bed Side Commode;Single Point Cane;Wheelchair   Lives with - Patient's Self Care Capacity Adult Children   Comments lives with daughter who can assist   Prior Level of Functional Mobility   Bed Mobility Independent   Transfer Status Independent   Ambulation Independent   Distance Ambulation (Feet)   (limited community)   Assistive Devices Used None   Stairs Independent   Gait Analysis   Gait Level Of Assist Supervised   Assistive Device None   Distance (Feet) 100   # of Times Distance was Traveled 1   Deviation Bradykinetic   # of Stairs Climbed 0   Bed Mobility    Supine to Sit Supervised   Sit to Supine Supervised   Scooting Supervised   Rolling Supervised   Functional Mobility   Sit to Stand Supervised   Bed, Chair, Wheelchair Transfer Supervised   Transfer Method Stand Step   Mobility no AD   Anticipated Discharge Equipment and Recommendations   DC Equipment Recommendations None   Discharge Recommendations Anticipate that the patient will have no further  physical therapy needs after discharge from the hospital

## 2021-09-26 NOTE — ASSESSMENT & PLAN NOTE
Discussed the case with ophthalmology Dr. Craft, [facility] ( [phone])    Recommended outpatient follow-up on discharge

## 2021-09-27 ENCOUNTER — PATIENT OUTREACH (OUTPATIENT)
Dept: HEALTH INFORMATION MANAGEMENT | Facility: OTHER | Age: 76
End: 2021-09-27

## 2021-09-27 PROBLEM — I48.92 ATRIAL FLUTTER (HCC): Status: ACTIVE | Noted: 2021-09-27

## 2021-09-27 LAB
ALBUMIN SERPL BCP-MCNC: 4.1 G/DL (ref 3.2–4.9)
ALBUMIN/GLOB SERPL: 1.5 G/DL
ALP SERPL-CCNC: 70 U/L (ref 30–99)
ALT SERPL-CCNC: 17 U/L (ref 2–50)
ANION GAP SERPL CALC-SCNC: 12 MMOL/L (ref 7–16)
AST SERPL-CCNC: 25 U/L (ref 12–45)
BASOPHILS # BLD AUTO: 0.5 % (ref 0–1.8)
BASOPHILS # BLD: 0.04 K/UL (ref 0–0.12)
BILIRUB SERPL-MCNC: 0.6 MG/DL (ref 0.1–1.5)
BUN SERPL-MCNC: 20 MG/DL (ref 8–22)
CALCIUM SERPL-MCNC: 8.6 MG/DL (ref 8.5–10.5)
CHLORIDE SERPL-SCNC: 98 MMOL/L (ref 96–112)
CO2 SERPL-SCNC: 29 MMOL/L (ref 20–33)
CREAT SERPL-MCNC: 1.13 MG/DL (ref 0.5–1.4)
EKG IMPRESSION: NORMAL
EOSINOPHIL # BLD AUTO: 0.31 K/UL (ref 0–0.51)
EOSINOPHIL NFR BLD: 4.2 % (ref 0–6.9)
ERYTHROCYTE [DISTWIDTH] IN BLOOD BY AUTOMATED COUNT: 55.5 FL (ref 35.9–50)
GLOBULIN SER CALC-MCNC: 2.8 G/DL (ref 1.9–3.5)
GLUCOSE SERPL-MCNC: 148 MG/DL (ref 65–99)
HCT VFR BLD AUTO: 36.9 % (ref 37–47)
HGB BLD-MCNC: 11.8 G/DL (ref 12–16)
IMM GRANULOCYTES # BLD AUTO: 0.04 K/UL (ref 0–0.11)
IMM GRANULOCYTES NFR BLD AUTO: 0.5 % (ref 0–0.9)
LYMPHOCYTES # BLD AUTO: 1.45 K/UL (ref 1–4.8)
LYMPHOCYTES NFR BLD: 19.7 % (ref 22–41)
MCH RBC QN AUTO: 31.3 PG (ref 27–33)
MCHC RBC AUTO-ENTMCNC: 32 G/DL (ref 33.6–35)
MCV RBC AUTO: 97.9 FL (ref 81.4–97.8)
MONOCYTES # BLD AUTO: 0.82 K/UL (ref 0–0.85)
MONOCYTES NFR BLD AUTO: 11.1 % (ref 0–13.4)
NEUTROPHILS # BLD AUTO: 4.71 K/UL (ref 2–7.15)
NEUTROPHILS NFR BLD: 64 % (ref 44–72)
NRBC # BLD AUTO: 0 K/UL
NRBC BLD-RTO: 0 /100 WBC
PLATELET # BLD AUTO: 239 K/UL (ref 164–446)
PMV BLD AUTO: 8.5 FL (ref 9–12.9)
POTASSIUM SERPL-SCNC: 3.8 MMOL/L (ref 3.6–5.5)
PROT SERPL-MCNC: 6.9 G/DL (ref 6–8.2)
RBC # BLD AUTO: 3.77 M/UL (ref 4.2–5.4)
SODIUM SERPL-SCNC: 139 MMOL/L (ref 135–145)
TROPONIN T SERPL-MCNC: 20 NG/L (ref 6–19)
TROPONIN T SERPL-MCNC: 21 NG/L (ref 6–19)
WBC # BLD AUTO: 7.4 K/UL (ref 4.8–10.8)

## 2021-09-27 PROCEDURE — 85025 COMPLETE CBC W/AUTO DIFF WBC: CPT

## 2021-09-27 PROCEDURE — A9270 NON-COVERED ITEM OR SERVICE: HCPCS | Performed by: INTERNAL MEDICINE

## 2021-09-27 PROCEDURE — 770020 HCHG ROOM/CARE - TELE (206)

## 2021-09-27 PROCEDURE — 700111 HCHG RX REV CODE 636 W/ 250 OVERRIDE (IP): Performed by: STUDENT IN AN ORGANIZED HEALTH CARE EDUCATION/TRAINING PROGRAM

## 2021-09-27 PROCEDURE — 80053 COMPREHEN METABOLIC PANEL: CPT

## 2021-09-27 PROCEDURE — A9270 NON-COVERED ITEM OR SERVICE: HCPCS | Performed by: STUDENT IN AN ORGANIZED HEALTH CARE EDUCATION/TRAINING PROGRAM

## 2021-09-27 PROCEDURE — A9270 NON-COVERED ITEM OR SERVICE: HCPCS | Performed by: NURSE PRACTITIONER

## 2021-09-27 PROCEDURE — 700102 HCHG RX REV CODE 250 W/ 637 OVERRIDE(OP): Performed by: NURSE PRACTITIONER

## 2021-09-27 PROCEDURE — 84484 ASSAY OF TROPONIN QUANT: CPT | Mod: 91

## 2021-09-27 PROCEDURE — 36415 COLL VENOUS BLD VENIPUNCTURE: CPT

## 2021-09-27 PROCEDURE — 99232 SBSQ HOSP IP/OBS MODERATE 35: CPT | Performed by: STUDENT IN AN ORGANIZED HEALTH CARE EDUCATION/TRAINING PROGRAM

## 2021-09-27 PROCEDURE — 93010 ELECTROCARDIOGRAM REPORT: CPT | Performed by: INTERNAL MEDICINE

## 2021-09-27 PROCEDURE — 99233 SBSQ HOSP IP/OBS HIGH 50: CPT | Performed by: INTERNAL MEDICINE

## 2021-09-27 PROCEDURE — 700111 HCHG RX REV CODE 636 W/ 250 OVERRIDE (IP): Performed by: INTERNAL MEDICINE

## 2021-09-27 PROCEDURE — 700102 HCHG RX REV CODE 250 W/ 637 OVERRIDE(OP): Performed by: STUDENT IN AN ORGANIZED HEALTH CARE EDUCATION/TRAINING PROGRAM

## 2021-09-27 PROCEDURE — 90662 IIV NO PRSV INCREASED AG IM: CPT | Performed by: STUDENT IN AN ORGANIZED HEALTH CARE EDUCATION/TRAINING PROGRAM

## 2021-09-27 PROCEDURE — 90471 IMMUNIZATION ADMIN: CPT

## 2021-09-27 PROCEDURE — 700102 HCHG RX REV CODE 250 W/ 637 OVERRIDE(OP): Performed by: INTERNAL MEDICINE

## 2021-09-27 PROCEDURE — 700111 HCHG RX REV CODE 636 W/ 250 OVERRIDE (IP): Performed by: NURSE PRACTITIONER

## 2021-09-27 PROCEDURE — 97165 OT EVAL LOW COMPLEX 30 MIN: CPT

## 2021-09-27 PROCEDURE — 93005 ELECTROCARDIOGRAM TRACING: CPT | Performed by: STUDENT IN AN ORGANIZED HEALTH CARE EDUCATION/TRAINING PROGRAM

## 2021-09-27 RX ORDER — AMIODARONE HYDROCHLORIDE 200 MG/1
400 TABLET ORAL TWICE DAILY
Status: DISCONTINUED | OUTPATIENT
Start: 2021-09-27 | End: 2021-09-27

## 2021-09-27 RX ORDER — AMIODARONE HYDROCHLORIDE 200 MG/1
200 TABLET ORAL 2 TIMES DAILY
Qty: 120 TABLET | Refills: 3 | Status: SHIPPED | OUTPATIENT
Start: 2021-09-27 | End: 2022-01-24 | Stop reason: SDUPTHER

## 2021-09-27 RX ORDER — BUMETANIDE 1 MG/1
2 TABLET ORAL
Status: DISCONTINUED | OUTPATIENT
Start: 2021-09-27 | End: 2021-09-28 | Stop reason: HOSPADM

## 2021-09-27 RX ORDER — SPIRONOLACTONE 25 MG/1
12.5 TABLET ORAL DAILY
Qty: 15 TABLET | Refills: 0 | Status: SHIPPED | OUTPATIENT
Start: 2021-09-27 | End: 2021-10-20 | Stop reason: SDUPTHER

## 2021-09-27 RX ORDER — SPIRONOLACTONE 25 MG/1
12.5 TABLET ORAL
Status: DISCONTINUED | OUTPATIENT
Start: 2021-09-27 | End: 2021-09-28 | Stop reason: HOSPADM

## 2021-09-27 RX ORDER — POTASSIUM CHLORIDE 20 MEQ/1
40 TABLET, EXTENDED RELEASE ORAL 2 TIMES DAILY
Status: DISCONTINUED | OUTPATIENT
Start: 2021-09-27 | End: 2021-09-28 | Stop reason: HOSPADM

## 2021-09-27 RX ADMIN — OXYCODONE HYDROCHLORIDE 10 MG: 10 TABLET ORAL at 08:03

## 2021-09-27 RX ADMIN — DOCUSATE SODIUM 50 MG AND SENNOSIDES 8.6 MG 2 TABLET: 8.6; 5 TABLET, FILM COATED ORAL at 05:45

## 2021-09-27 RX ADMIN — AMIODARONE HYDROCHLORIDE 150 MG: 1.5 INJECTION, SOLUTION INTRAVENOUS at 10:50

## 2021-09-27 RX ADMIN — OXYCODONE HYDROCHLORIDE 10 MG: 10 TABLET ORAL at 21:29

## 2021-09-27 RX ADMIN — OXYCODONE HYDROCHLORIDE 10 MG: 10 TABLET ORAL at 17:28

## 2021-09-27 RX ADMIN — DOCUSATE SODIUM 50 MG AND SENNOSIDES 8.6 MG 2 TABLET: 8.6; 5 TABLET, FILM COATED ORAL at 17:29

## 2021-09-27 RX ADMIN — SPIRONOLACTONE 12.5 MG: 25 TABLET ORAL at 10:14

## 2021-09-27 RX ADMIN — OXYCODONE HYDROCHLORIDE 10 MG: 10 TABLET ORAL at 03:43

## 2021-09-27 RX ADMIN — METOPROLOL TARTRATE 50 MG: 50 TABLET, FILM COATED ORAL at 08:03

## 2021-09-27 RX ADMIN — APIXABAN 5 MG: 5 TABLET, FILM COATED ORAL at 17:28

## 2021-09-27 RX ADMIN — ROSUVASTATIN CALCIUM 40 MG: 20 TABLET, FILM COATED ORAL at 17:28

## 2021-09-27 RX ADMIN — ENALAPRILAT 1.25 MG: 1.25 INJECTION INTRAVENOUS at 02:35

## 2021-09-27 RX ADMIN — POTASSIUM CHLORIDE 40 MEQ: 1500 TABLET, EXTENDED RELEASE ORAL at 17:28

## 2021-09-27 RX ADMIN — BUMETANIDE 2 MG: 1 TABLET ORAL at 17:29

## 2021-09-27 RX ADMIN — LEVOTHYROXINE SODIUM 75 MCG: 0.07 TABLET ORAL at 05:47

## 2021-09-27 RX ADMIN — DULOXETINE 20 MG: 20 CAPSULE, DELAYED RELEASE ORAL at 05:44

## 2021-09-27 RX ADMIN — METOPROLOL TARTRATE 50 MG: 50 TABLET, FILM COATED ORAL at 17:28

## 2021-09-27 RX ADMIN — BUMETANIDE 2 MG: 1 TABLET ORAL at 10:14

## 2021-09-27 RX ADMIN — POTASSIUM CHLORIDE 40 MEQ: 1500 TABLET, EXTENDED RELEASE ORAL at 08:03

## 2021-09-27 RX ADMIN — INFLUENZA A VIRUS A/VICTORIA/2570/2019 IVR-215 (H1N1) ANTIGEN (FORMALDEHYDE INACTIVATED), INFLUENZA A VIRUS A/TASMANIA/503/2020 IVR-221 (H3N2) ANTIGEN (FORMALDEHYDE INACTIVATED), INFLUENZA B VIRUS B/PHUKET/3073/2013 ANTIGEN (FORMALDEHYDE INACTIVATED), AND INFLUENZA B VIRUS B/WASHINGTON/02/2019 ANTIGEN (FORMALDEHYDE INACTIVATED) 0.7 ML: 60; 60; 60; 60 INJECTION, SUSPENSION INTRAMUSCULAR at 21:39

## 2021-09-27 RX ADMIN — APIXABAN 5 MG: 5 TABLET, FILM COATED ORAL at 05:45

## 2021-09-27 RX ADMIN — TADALAFIL 40 MG: 20 TABLET ORAL at 21:00

## 2021-09-27 RX ADMIN — AMIODARONE HYDROCHLORIDE 400 MG: 200 TABLET ORAL at 11:07

## 2021-09-27 RX ADMIN — DIGOXIN 125 MCG: 125 TABLET ORAL at 08:03

## 2021-09-27 RX ADMIN — FUROSEMIDE 80 MG: 10 INJECTION, SOLUTION INTRAMUSCULAR; INTRAVENOUS at 08:03

## 2021-09-27 ASSESSMENT — ENCOUNTER SYMPTOMS
NERVOUS/ANXIOUS: 0
FEVER: 0
SHORTNESS OF BREATH: 1
LIGHT-HEADEDNESS: 0
ABDOMINAL DISTENTION: 0
PALPITATIONS: 0
CHEST TIGHTNESS: 0
COUGH: 0
FATIGUE: 0
DIZZINESS: 0
ACTIVITY CHANGE: 0
ABDOMINAL PAIN: 0
CONFUSION: 0
BRUISES/BLEEDS EASILY: 0

## 2021-09-27 ASSESSMENT — COGNITIVE AND FUNCTIONAL STATUS - GENERAL
SUGGESTED CMS G CODE MODIFIER DAILY ACTIVITY: CH
DAILY ACTIVITIY SCORE: 24

## 2021-09-27 ASSESSMENT — ACTIVITIES OF DAILY LIVING (ADL): TOILETING: INDEPENDENT

## 2021-09-27 ASSESSMENT — PAIN DESCRIPTION - PAIN TYPE
TYPE: ACUTE PAIN

## 2021-09-27 ASSESSMENT — GAIT ASSESSMENTS: DISTANCE (FEET): 15

## 2021-09-27 ASSESSMENT — FIBROSIS 4 INDEX: FIB4 SCORE: 1.88

## 2021-09-27 NOTE — HEART FAILURE PROGRAM
Right Heart Failure Discharge Order Check up.    Patient has the below appointments which are appropriate for HF f/u. Cardiology consulted.    Nursing: please complete the HF Discharge Checklist (pink sheet in hard chart) and have it cosigned by your charge RN before patient leaves the hospital. Thank you!    Providers: below are all Guideline Directed Medical Therapy (GDMT) indicated for HFpEF and where we stand right now with compliance. If any can not be prescribed by discharge, would you please note the clinical reason for each in your discharge summary? Thanks!      • Residence: [place]  • Insurance: Medicare and AARP  • [employer] [facility] Paramedicine Program Eligible?: no- insurance    [email or web address]    Where we stand right now on HFpEF MEASURES    • Anticoagulation for atrial arrhythmia: apixaban  • Glycemic control for DM + HF: dm not dx  • Lipid lowering medication for DM + HF: dm not dx  • Pneumococcal vaccine: 2011  • Influenza vaccine for the current flu season: n/a  • Smoking cessation counseling: never smoker per H&P  • HF Education documented: added to education tab and messaged bedside RN Larissa Cifuentes  • HF Care Plan documented: added to care plan tab      Daily Nurse: please begin to fill out the HF checklist (pink sheet in hard chart) and use it to guide your daily care.    Discharge Nurse: please ensure completeness of the HF checklist (pink sheet in hard chart) and have it co-signed by the charge RN before the patient leaves the hospital.    Thank you, Selena, Cardio RN Navigator [phone]    Your appointments    Oct 04, 2021 12:00 PM   Recheck with Christopher Warren M.D.   [facility] (--) [address]   [place] [address]   [phone]      Oct 05, 2021  3:00 PM   Established Patient with Thee Glaindo M.D.   [facility] (--) [address]   [place] [address]   [phone]      Oct 14, 2021  10:45 AM   MA [id number] with [facility] 1   [facility] [address]   [place] [address] [phone]   No deodorant, powder, perfume or lotion under the arm or breast area.   Oct 20, 2021  1:15 PM   Established Patient with KIRILL Hogan   [facility] (--) [address]   [place] [id number] [phone]        no

## 2021-09-27 NOTE — PROGRESS NOTES
Cardiology Follow Up Progress Note    Date of Service  9/27/2021    Attending Physician  RADHA Deras*    Presented with volume overload for 15 pounds weight gain, abdominal distention, dyspnea, peripheral edema    PMH: Chronic hypoxemic respiratory failure secondary to PAH on Tadalafil, chronic A. fib on anticoagulation with Eliquis (failed sotalol, Tikosyn, RF ablation on 4/22/2021), MV repair, MAZE, NED 2011 [facility], on BiPAP for sleep apnea      Interim Events  9/27/2021: No overnight cardiac events. Reports improved dizziness with ambulation. Tele monitoring personally interpreted by me shows SR with paroxymal Aflutter; patient denies symptoms. VSS; 5L NC/BIPAP at night; Daily weight 93.3 kg-downtrending. Labs reviewed; K- 3.8, BUN/Crt- 20/1.13. Transition to home diuretic dose. Start amio load  9/26/2021:No overnight cardiac events  Telemetry-A. fib, rate well controlled overnight, RVR this morning  Labs reviewed  NA, CR, K stable  NT proBNP 741--> 420  Hypertensive this morning        Review of Systems  Review of Systems   Constitutional: Negative for activity change, fatigue and fever.   Respiratory: Positive for shortness of breath (baseline). Negative for cough and chest tightness.    Cardiovascular: Negative for chest pain, palpitations and leg swelling.   Gastrointestinal: Negative for abdominal distention and abdominal pain.   Genitourinary: Negative for hematuria.   Skin: Negative for pallor and rash.   Neurological: Negative for dizziness and light-headedness.   Hematological: Does not bruise/bleed easily.   Psychiatric/Behavioral: Negative for confusion. The patient is not nervous/anxious.        Vital signs in last 24 hours  Temp:  [36 °C (96.8 °F)-36.7 °C (98 °F)] 36.6 °C (97.8 °F)  Pulse:  [] 117  Resp:  [17-20] 20  BP: (103-160)/() 103/41  SpO2:  [94 %-96 %] 95 %    Physical Exam  Physical Exam  Constitutional:       General: She is not in acute distress.     Appearance:  Normal appearance.   HENT:      Head: Normocephalic and atraumatic.   Eyes:      Extraocular Movements: Extraocular movements intact.      Conjunctiva/sclera: Conjunctivae normal.   Cardiovascular:      Rate and Rhythm: Normal rate and regular rhythm.      Pulses: Normal pulses.      Heart sounds: Normal heart sounds.   Pulmonary:      Effort: Pulmonary effort is normal.      Breath sounds: Normal breath sounds.      Comments: 5L NC  Abdominal:      General: There is distension.      Tenderness: There is no abdominal tenderness.   Musculoskeletal:      Right lower le+ Edema present.      Left lower le+ Edema present.   Skin:     General: Skin is warm and dry.      Findings: No rash.   Neurological:      General: No focal deficit present.      Mental Status: She is alert and oriented to person, place, and time.         Lab Review  Lab Results   Component Value Date/Time    WBC 7.4 2021 01:25 AM    RBC 3.77 (L) 2021 01:25 AM    HEMOGLOBIN 11.8 (L) 2021 01:25 AM    HEMATOCRIT 36.9 (L) 2021 01:25 AM    MCV 97.9 (H) 2021 01:25 AM    MCH 31.3 2021 01:25 AM    MCHC 32.0 (L) 2021 01:25 AM    MPV 8.5 (L) 2021 01:25 AM      Lab Results   Component Value Date/Time    SODIUM 139 2021 01:25 AM    POTASSIUM 3.8 2021 01:25 AM    CHLORIDE 98 2021 01:25 AM    CO2 29 2021 01:25 AM    GLUCOSE 148 (H) 2021 01:25 AM    BUN 20 2021 01:25 AM    CREATININE 1.13 2021 01:25 AM      Lab Results   Component Value Date/Time    ASTSGOT 25 2021 01:25 AM    ALTSGPT 17 2021 01:25 AM     Lab Results   Component Value Date/Time    CHOLSTRLTOT 211 (H) 2021 01:02 PM     (H) 2021 01:02 PM    HDL 47 2021 01:02 PM    TRIGLYCERIDE 231 (H) 2021 01:02 PM    TROPONINT 20 (H) 2021 04:14 AM       Recent Labs     21  0132   NTPROBNP 741* 420*       Cardiac Imaging and Procedures Review  EKG:  Atrial fibrillation, rate well controlled 80s    Echocardiogram:  9/2/21  Patient atrial fibrillation, heart rate 110 bpm.  Left ventricle is small in size.  Left ventricular ejection fraction is visually estimated to be 75%,   hyperdynamic.  Reduced right ventricular systolic function.  Biatrial enlargement.  Mitral valve repair functioning normally: MG 3 mmHg,  BPM.  Aortic valve not well visualized, no significant abnormalities.  Estimated right ventricular systolic pressure  is 30 mmHg.    Cardiac Catheterization: Not applicable    Imaging  Chest X-Ray:  Mild interstitial prominence in the lower lobes bilaterally may represent mild dependent edema, atelectasis or pneumonitis.  2.  Blunting of the left costophrenic angle related to pleural thickening or trace pleural fluid.  3.  Cardiomegaly.  4.  Linear left basilar atelectasis or scarring.    Stress Test: Not applicable    Assessment/Plan     Acute on chronic right heart failure  In the setting of PAH & chronic atrial flutter. Exacerbation d/t traveling  -Euvolemic. Transition to home diuretic dosing. Add aldactone 12.5 mg daily. FVO [place] number in discharge instructions   -KCL 40mEQ BID  -Diuresed - 1000 overnight  -Labs stable  -Resume metoprolol 25 mg BID  -Dry weight 205 #. At dry weight  -Keep K > 4, Mg > 2  -Strict intake and output    Hypertension  -Well-controlled    Paroxymal Afutter  -120's with self conversion overnight  -Continue with apixaban  -Digoxin  -Resume metoprolol 25 mg BID  -Amio IV bolus with PO load 400 mg BID x1 week, then 200 mg BID x1 week, then 200 mg daily  -FU outpatient EP clinic next week.     Obesity, BMI 35  -TLC    Sleep apnea  -Bipap with 5L bleed in    Cardiology will continue to follow.     Thank you for allowing me to participate in the care of this patient.      Please contact me with any questions.    TEODORO Monzon.   [facility]  [phone]

## 2021-09-27 NOTE — PROGRESS NOTES
Received report from NOC shift RN. Patient is A&Ox4, complains of back pain, VSS, no signs of distress. All questions and concerns answered, bed in lowest and locked position, call light in reach, will continue to monitor.    Crisis Charting: COVID-19 surge in effect

## 2021-09-27 NOTE — PROGRESS NOTES
"2:25: Patient reported 8/10 \"vice-like\" pain in chest. Patient BP was 156/108 but other vitals were WDL. Mentation unchanged (patient AOx4) A stat EKG was ordered. Patient chest pain completely resolved during the time EKG was taken. PRN vasotec given for elevated diastolic BP and BP returned to defined limits. Dr. Cabrera notified. Stat troponin ordered.  "

## 2021-09-27 NOTE — PROGRESS NOTES
Report received, pt care assumed, tele box on and rate verified. VSS and pt is on 5 L O2 via bipap. Pt aaox4, no signs of distress noted at this time. POC discussed with pt and verbalizes no questions. Pt c/o of 8/10  pain in head, neck, and back, PRN pain med oxycodone administered. Pt denies any additional needs at this time. Bed in lowest position, pt educated on fall risk and verbalized understanding, call light within reach, will continue with plan of care.    Covid-19 surge in effect

## 2021-09-27 NOTE — PROGRESS NOTES
Hospital Medicine Daily Progress Note    Date of Service  9/27/2021  Chief Complaint  Zeinab Loza is a 76 y.o. female admitted 9/24/2021 with shortness of breath     Hospital Course  76 female with past medical history of  pulmonary hypertension on 5 L oxygen, A. fib on Eliquis, right heart failure presented to ED with worsening shortness of breath.  Evaluate cardiology.  Admitted for acute CHF exacerbation.     Interval Problem Update  9/26/2021  Vitals remained stable.  On baseline 5 L nasal cannula saturating over 90  Labs unremarkable  Bilateral basilar rales present auscultation  Currently on Eliquis, digoxin, Lasix 80 mg IV 3 times daily.  Cardiology following closely     Patient's reports of left eye floater past 2 weeks.  Consider ophthalmology evaluation.     9/27/2021  Vitals remained stable  Labs remain unremarkable  Loaded with IV amiodarone for A flutter by cardiology.  No rales on auscultation  Cardiology recommending to discharge in a.m. if heart rate remain controlled.    I have personally seen and examined the patient at bedside. I discussed the plan of care with patient, bedside RN and charge RN.     Consultants/Specialty  cardiology     Code Status  Full Code     Disposition  Patient is not medically cleared.   Anticipate discharge to to home with close outpatient follow-up.  I have placed the appropriate orders for post-discharge needs.     Review of Systems  Review of Systems   Constitutional: Negative for fever.   HENT: Negative for hearing loss.    Eyes:        Left eye floater    Respiratory: Negative for cough, sputum production and shortness of breath.    Cardiovascular: Negative for chest pain and leg swelling.   Gastrointestinal: Negative for nausea.   Genitourinary: Negative for dysuria.         Physical Exam  Temp:  [36 °C (96.8 °F)-36.7 °C (98 °F)] 36.7 °C (98 °F)  Pulse:  [] 69  Resp:  [14-27] 18  BP: (113-160)/() 160/103  SpO2:  [92 %-97 %] 96 %     Physical  Exam  Constitutional:       General: She is not in acute distress.     Appearance: She is obese.   HENT:      Head: Normocephalic.      Right Ear: Tympanic membrane normal.      Left Ear: Tympanic membrane normal.      Mouth/Throat:      Mouth: Mucous membranes are moist.   Eyes:      General:         Right eye: No discharge.      Extraocular Movements: Extraocular movements intact.      Pupils: Pupils are equal, round, and reactive to light.   Cardiovascular:      Rate and Rhythm: Normal rate and regular rhythm.      Pulses: Normal pulses.      Heart sounds: Normal heart sounds.   Pulmonary:      Effort: Pulmonary effort is normal.      Breath sounds: NO rales   Abdominal:      General: Abdomen is flat. There is no distension.      Palpations: Abdomen is soft.   Musculoskeletal:      Right lower leg: No edema.      Left lower leg: No edema.   Skin:     Findings: No lesion or rash.   Neurological:      General: No focal deficit present.      Mental Status: She is alert and oriented to person, place, and time. Mental status is at baseline.   Psychiatric:         Mood and Affect: Mood normal.           Fluids    Intake/Output Summary (Last 24 hours) at 9/27/2021 1458  Last data filed at 9/27/2021 1017  Gross per 24 hour   Intake 480 ml   Output 3800 ml   Net -3320 ml       Laboratory  Recent Labs     09/25/21  0202 09/26/21  0132 09/27/21  0125   WBC 6.7 6.9 7.4   RBC 3.50* 4.40 3.77*   HEMOGLOBIN 11.5* 13.7 11.8*   HEMATOCRIT 35.1* 43.3 36.9*   .3* 98.4* 97.9*   MCH 32.9 31.1 31.3   MCHC 32.8* 31.6* 32.0*   RDW 56.6* 56.4* 55.5*   PLATELETCT 220 230 239   MPV 9.0 8.9* 8.5*     Recent Labs     09/25/21  0202 09/25/21  0202 09/25/21  1454 09/26/21  0132 09/27/21  0125   SODIUM 144  --   --  144 139   POTASSIUM 2.9*   < > 3.6 3.7 3.8   CHLORIDE 94*  --   --  96 98   CO2 37*  --   --  35* 29   GLUCOSE 120*  --   --  120* 148*   BUN 14  --   --  14 20   CREATININE 0.98  --   --  1.12 1.13   CALCIUM 10.3  --   --   10.2 8.6    < > = values in this interval not displayed.                   Imaging  IR-US GUIDED PIV   Final Result    Ultrasound-guided PERIPHERAL IV INSERTION performed by    qualified nursing staff as above.      CT-HEAD W/O   Final Result      1.  Cerebral atrophy.      2.  White matter hypodensity most consistent with small vessel ischemic change versus demyelination or gliosis, unchanged from 2/3/2020.      3.  Otherwise, Head CT without contrast with no acute findings. No evidence of acute cerebral infarction, hemorrhage or mass lesion.      DX-CHEST-PORTABLE (1 VIEW)   Final Result      1.  Mild interstitial prominence in the lower lobes bilaterally may represent mild dependent edema, atelectasis or pneumonitis.   2.  Blunting of the left costophrenic angle related to pleural thickening or trace pleural fluid.   3.  Cardiomegaly.   4.  Linear left basilar atelectasis or scarring.           Assessment/Plan  * Acute on chronic congestive heart failure with right ventricular diastolic dysfunction (HCC)- (present on admission)  Assessment & Plan  Cardiology consulted  Continue IV diuresis, continue strict I's and O's    Atrial flutter (HCC)  Assessment & Plan  Loaded with amiodarone  Will be discharged on amnio .  Prescription already sent by cardiology    floaters of left eye  Assessment & Plan  Discussed the case with ophthalmology Dr. Craft, [facility] ( [phone])    Recommended outpatient follow-up on discharge    Incoordination- (present on admission)  Assessment & Plan  CT head ordered as patient had fall with hitting her head 2 weeks ago and persistent vertigo as well as visual field deficit   ophthalmology consultation  for evaluation of left eye visual field deficit.  PT /OT    Neuropathy- (present on admission)  Assessment & Plan  Admitting physician discontinued paroxetine and initiated duloxetine for off label benefit of neuropathic pain relief      Obesity (BMI 30.0-34.9)-  (present on admission)  Assessment & Plan  Needs to lose weight      Hypokalemia- (present on admission)  Assessment & Plan  Continue potassium supplement    Macrocytic anemia- (present on admission)  Assessment & Plan  Transfuse hemoglobin less than 7  CBC in a.m.  Hemoglobin stable      Chronic respiratory failure (HCC)- (present on admission)  Assessment & Plan  Patient is on 5 L of oxygen, IV diuresis per cardiology    A-fib (HCC)- (present on admission)  Assessment & Plan  Continue Eliquis digoxin, metoprolol    Hypothyroidism- (present on admission)  Assessment & Plan  Continue supplement    Essential hypertension- (present on admission)  Assessment & Plan  Continue monitoring         VTE prophylaxis: SCDs/TEDs and therapeutic anticoagulation with eliquis    I have performed a physical exam and reviewed and updated ROS and Plan today (9/27/2021). In review of yesterday's note (9/26/2021), there are no changes except as documented above.

## 2021-09-28 VITALS
RESPIRATION RATE: 18 BRPM | BODY MASS INDEX: 32.46 KG/M2 | HEIGHT: 67 IN | TEMPERATURE: 97.4 F | OXYGEN SATURATION: 95 % | DIASTOLIC BLOOD PRESSURE: 67 MMHG | HEART RATE: 70 BPM | WEIGHT: 206.79 LBS | SYSTOLIC BLOOD PRESSURE: 116 MMHG

## 2021-09-28 PROBLEM — E87.6 HYPOKALEMIA: Status: RESOLVED | Noted: 2020-01-15 | Resolved: 2021-09-28

## 2021-09-28 LAB
ANION GAP SERPL CALC-SCNC: 8 MMOL/L (ref 7–16)
BASOPHILS # BLD AUTO: 0.4 % (ref 0–1.8)
BASOPHILS # BLD: 0.03 K/UL (ref 0–0.12)
BUN SERPL-MCNC: 26 MG/DL (ref 8–22)
CALCIUM SERPL-MCNC: 10.3 MG/DL (ref 8.5–10.5)
CHLORIDE SERPL-SCNC: 101 MMOL/L (ref 96–112)
CO2 SERPL-SCNC: 29 MMOL/L (ref 20–33)
CREAT SERPL-MCNC: 1.18 MG/DL (ref 0.5–1.4)
EOSINOPHIL # BLD AUTO: 0.34 K/UL (ref 0–0.51)
EOSINOPHIL NFR BLD: 4.4 % (ref 0–6.9)
ERYTHROCYTE [DISTWIDTH] IN BLOOD BY AUTOMATED COUNT: 56.9 FL (ref 35.9–50)
GLUCOSE SERPL-MCNC: 143 MG/DL (ref 65–99)
HCT VFR BLD AUTO: 36.9 % (ref 37–47)
HGB BLD-MCNC: 12.3 G/DL (ref 12–16)
IMM GRANULOCYTES # BLD AUTO: 0.04 K/UL (ref 0–0.11)
IMM GRANULOCYTES NFR BLD AUTO: 0.5 % (ref 0–0.9)
LYMPHOCYTES # BLD AUTO: 1.7 K/UL (ref 1–4.8)
LYMPHOCYTES NFR BLD: 21.8 % (ref 22–41)
MAGNESIUM SERPL-MCNC: 2 MG/DL (ref 1.5–2.5)
MCH RBC QN AUTO: 32.7 PG (ref 27–33)
MCHC RBC AUTO-ENTMCNC: 33.3 G/DL (ref 33.6–35)
MCV RBC AUTO: 98.1 FL (ref 81.4–97.8)
MONOCYTES # BLD AUTO: 0.86 K/UL (ref 0–0.85)
MONOCYTES NFR BLD AUTO: 11 % (ref 0–13.4)
NEUTROPHILS # BLD AUTO: 4.82 K/UL (ref 2–7.15)
NEUTROPHILS NFR BLD: 61.9 % (ref 44–72)
NRBC # BLD AUTO: 0 K/UL
NRBC BLD-RTO: 0 /100 WBC
PLATELET # BLD AUTO: 275 K/UL (ref 164–446)
PMV BLD AUTO: 9.1 FL (ref 9–12.9)
POTASSIUM SERPL-SCNC: 3.8 MMOL/L (ref 3.6–5.5)
RBC # BLD AUTO: 3.76 M/UL (ref 4.2–5.4)
SODIUM SERPL-SCNC: 138 MMOL/L (ref 135–145)
WBC # BLD AUTO: 7.8 K/UL (ref 4.8–10.8)

## 2021-09-28 PROCEDURE — 85025 COMPLETE CBC W/AUTO DIFF WBC: CPT

## 2021-09-28 PROCEDURE — 83735 ASSAY OF MAGNESIUM: CPT

## 2021-09-28 PROCEDURE — A9270 NON-COVERED ITEM OR SERVICE: HCPCS | Performed by: NURSE PRACTITIONER

## 2021-09-28 PROCEDURE — 700102 HCHG RX REV CODE 250 W/ 637 OVERRIDE(OP): Performed by: STUDENT IN AN ORGANIZED HEALTH CARE EDUCATION/TRAINING PROGRAM

## 2021-09-28 PROCEDURE — 36415 COLL VENOUS BLD VENIPUNCTURE: CPT

## 2021-09-28 PROCEDURE — 700102 HCHG RX REV CODE 250 W/ 637 OVERRIDE(OP): Performed by: INTERNAL MEDICINE

## 2021-09-28 PROCEDURE — A9270 NON-COVERED ITEM OR SERVICE: HCPCS | Performed by: INTERNAL MEDICINE

## 2021-09-28 PROCEDURE — 99239 HOSP IP/OBS DSCHRG MGMT >30: CPT | Performed by: STUDENT IN AN ORGANIZED HEALTH CARE EDUCATION/TRAINING PROGRAM

## 2021-09-28 PROCEDURE — 700102 HCHG RX REV CODE 250 W/ 637 OVERRIDE(OP): Performed by: NURSE PRACTITIONER

## 2021-09-28 PROCEDURE — 99232 SBSQ HOSP IP/OBS MODERATE 35: CPT | Performed by: NURSE PRACTITIONER

## 2021-09-28 PROCEDURE — 80048 BASIC METABOLIC PNL TOTAL CA: CPT

## 2021-09-28 PROCEDURE — A9270 NON-COVERED ITEM OR SERVICE: HCPCS | Performed by: STUDENT IN AN ORGANIZED HEALTH CARE EDUCATION/TRAINING PROGRAM

## 2021-09-28 RX ADMIN — OXYCODONE HYDROCHLORIDE 10 MG: 10 TABLET ORAL at 03:34

## 2021-09-28 RX ADMIN — POTASSIUM CHLORIDE 40 MEQ: 1500 TABLET, EXTENDED RELEASE ORAL at 06:27

## 2021-09-28 RX ADMIN — BUMETANIDE 2 MG: 1 TABLET ORAL at 06:26

## 2021-09-28 RX ADMIN — OXYCODONE HYDROCHLORIDE 10 MG: 10 TABLET ORAL at 06:37

## 2021-09-28 RX ADMIN — APIXABAN 5 MG: 5 TABLET, FILM COATED ORAL at 06:26

## 2021-09-28 RX ADMIN — DOCUSATE SODIUM 50 MG AND SENNOSIDES 8.6 MG 2 TABLET: 8.6; 5 TABLET, FILM COATED ORAL at 06:26

## 2021-09-28 RX ADMIN — OXYCODONE HYDROCHLORIDE 10 MG: 10 TABLET ORAL at 10:11

## 2021-09-28 RX ADMIN — DIGOXIN 125 MCG: 125 TABLET ORAL at 06:28

## 2021-09-28 RX ADMIN — LEVOTHYROXINE SODIUM 75 MCG: 0.07 TABLET ORAL at 06:26

## 2021-09-28 RX ADMIN — SPIRONOLACTONE 12.5 MG: 25 TABLET ORAL at 06:27

## 2021-09-28 RX ADMIN — METOPROLOL TARTRATE 50 MG: 50 TABLET, FILM COATED ORAL at 06:27

## 2021-09-28 RX ADMIN — DULOXETINE 20 MG: 20 CAPSULE, DELAYED RELEASE ORAL at 06:28

## 2021-09-28 ASSESSMENT — FIBROSIS 4 INDEX: FIB4 SCORE: 1.63

## 2021-09-28 NOTE — DISCHARGE SUMMARY
"Discharge Summary    CHIEF COMPLAINT ON ADMISSION  Chief Complaint   Patient presents with   • Sent by MD   • Shortness of Breath   • Atrial Fibrillation     chronic   • Peripheral Edema       Reason for Admission  Sent by SIRI PENALOZA     Admission Date  9/24/2021    CODE STATUS  Full Code    HPI & HOSPITAL COURSE     Zienab Loza is a 76 y.o. female who presented 9/24/2021 with complaints of orthopnea, persistent shortness of breath despite being on 5 L nasal cannula continuously, paroxysmal nocturnal dyspnea, bilateral lower extremity edema worsened for the past week.  Patient states that she also had fall 2 weeks ago and hit her head without loss of consciousness.  She states that her legs were heavy and fell secondary to this.  She also states that since then, she has had left eye visual disturbances with partial \"black spots\" intermittently in her central visual field.  She is in agreement with CT head stat which has been ordered and currently pending evaluation.  She otherwise states that she is compliant with all of her home medication regimen and has had heart failure for the past 10 years however has never had to be hospitalized for this.  She also mentions that a few months ago they had transitioned her to Bumex as opposed to furosemide and she feels that this may not be working as well and agreeable to transition back to Lasix.  She states she has been vaccinated for COVID-19 x1 and unsure if she wants to get her second or third vaccine at this time.  She otherwise denies fever, chills, anosmia, ageusia, diaphoresis, cough with sputum production, hemoptysis, nausea, vomiting, diarrhea, melena, hematochezia, dysuria, hematuria, loss of consciousness, syncope.  She does admit to vertigo, incoordination, visual field deficit, bilateral lower extremity neuropathy and orthopnea and paroxysmal nocturnal dyspnea as well as dyspnea on exertion.     Vital signs at time of evaluation were as follows: 97.3, 48, " 70, 156/70, 96% 5 L nasal cannula.     Chest x-ray performed and reveals mild interstitial prominence in the lower lobes bilaterally which may represent mild dependent edema atelectasis or pneumonitis.  Blunting of left costophrenic angle related to pleural thickening or trace pleural fluid, cardiomegaly and linear left basilar atelectasis or scarring appreciated.     Twelve-lead EKG reveals atrial fibrillation with T wave inversions in inferior leads otherwise unremarkable for ischemic changes.  QTc borderline prolonged and good R wave progression precordial leads.     Labs were obtained on admission and revealed macrocytic mild anemia with hemoglobin 11.6, hematocrit 36.0 and MCV of 99.7 otherwise unremarkable CBC.  Troponin measured at 24 and subsequently 22.  proBNP elevated at 741 and CMP reveals hypokalemia of 3.1, metabolic alkalosis at 40 and hypercalcemia of 11.0 otherwise relatively unremarkable.     Patient administered 120 mg Lasix IV in ED.  Hospitalist consulted for admission.  Patient agrees to inpatient hospitalization for acute exacerbation of heart failure.  She agrees to full CODE STATUS at time of evaluation and alert and oriented x4.  She will be optimized in ED and subsequently transferred to medical [facility] floor for further optimization of medical management.    9/26/2021: Vitals remained stable.  On baseline 5 L nasal cannula saturating over 90. Labs unremarkable. Bilateral basilar rales present auscultation. Currently on Eliquis, digoxin, Lasix 80 mg IV 3 times daily. Cardiology following closely. Patient's reports of left eye floater past 2 weeks.  Consider ophthalmology evaluation.     9/27/2021: Vitals remained stable. Labs remain unremarkable. Loaded with IV amiodarone for A flutter by cardiology. No rales on auscultation. Cardiology recommending to discharge in a.m. if heart rate remain controlled.    9/28/2021: Denies any pain. Afebrile, hemodynamically stable. Heart rated 60-71. On 5 NC  oxygen (baseline oxygen 5 L NC at home).    Therefore, she is discharged in fair and stable condition to home with close outpatient follow-up.    The patient met 2-midnight criteria for an inpatient stay at the time of discharge.    Discharge Date  9/28/2021    FOLLOW UP ITEMS POST DISCHARGE  Follow up with PCP within one week of discharge  Follow up with cardiology  Ophthalmology follow up as outpatient for left eye floaters    DISCHARGE DIAGNOSES  Principal Problem:    Acute on chronic congestive heart failure with right ventricular diastolic dysfunction (HCC) POA: Yes  Active Problems:    Essential hypertension POA: Yes      Overview: Overview:       IMO Load 2014 1.1    Hypothyroidism POA: Yes      Overview: Overview:       IMO Load 2014 1.1    A-fib (HCC) POA: Yes    Chronic respiratory failure (HCC) POA: Yes    Macrocytic anemia POA: Yes    Obesity (BMI 30.0-34.9) POA: Yes    Neuropathy POA: Yes    Incoordination POA: Yes    floaters of left eye POA: Unknown    Atrial flutter (HCC) POA: Unknown  Resolved Problems:    Hypokalemia POA: Yes      FOLLOW UP  Future Appointments   Date Time Provider Department Center   10/4/2021 12:00 PM Christopher Warren M.D. [facility] None   10/5/2021  3:00 PM Thee Galindo M.D. [facility] None   10/14/2021 10:45 AM [facility]   10/20/2021  1:15 PM JENNA HoganRVeroN. [facility] None   10/21/2021 11:15 AM MELIDA MonzonP.RVeroN. [facility] None     [facility]  [address]  [place] [address]  [phone]  Go on 10/4/2021  Please go to your walk in appointment on 10/4/21 at 9 am to discuss your Heart Failure diagnosis. Thank you.    Ashia Tidwell M.D.  [address]  [place] [address]  [phone]    In 1 week        MEDICATIONS ON DISCHARGE     Medication List      START taking these medications      Instructions   amiodarone 200 MG Tabs  Commonly known as: Cordarone   Doctor's comments: 400 mg by mouth twice daily for  2 weeks then 200 mg twice daily for 2 weeks then 200 mg daily  Take 1 Tablet by mouth 2 times a day.  Dose: 200 mg     spironolactone 25 MG Tabs  Commonly known as: ALDACTONE   Take 0.5 Tablets by mouth every day for 30 days.  Dose: 12.5 mg        CONTINUE taking these medications      Instructions   Adcirca 20 MG Tabs  Generic drug: Tadalafil (PAH)   Take 40 mg by mouth every bedtime. Indications: Pulmonary Arterial Hypertension  Dose: 40 mg     apixaban 5mg Tabs  Commonly known as: ELIQUIS   Take 1 tablet by mouth 2 times a day.  Dose: 5 mg     bumetanide 2 MG tablet  Commonly known as: BUMEX   Take 2 Tablets by mouth 2 times a day.  Dose: 4 mg     digoxin 125 MCG Tabs  Commonly known as: LANOXIN   Take 1 Tablet by mouth every day.  Dose: 125 mcg     HYDROcodone-acetaminophen 7.5-325 MG per tablet  Commonly known as: NORCO   Take 1 tablet by mouth every 8 hours as needed. Indications: Pain  Dose: 1 Tablet     levothyroxine 75 MCG Tabs  Commonly known as: SYNTHROID   Take 75 mcg by mouth Every morning on an empty stomach.  Dose: 75 mcg     metoprolol tartrate 50 MG Tabs  Commonly known as: LOPRESSOR   Take 1 tablet by mouth 2 times a day.  Dose: 50 mg     PARoxetine 20 MG Tabs  Commonly known as: PAXIL   Take 20 mg by mouth every morning.  Dose: 20 mg     potassium chloride SA 20 MEQ Tbcr  Commonly known as: Kdur   Doctor's comments: Dose increased to 60 mEq BID  Take 3 Tablets by mouth 2 times a day.  Dose: 60 mEq     rosuvastatin 40 MG tablet  Commonly known as: CRESTOR   Doctor's comments: Increase to 40 mg daily  Take 1 tablet by mouth every evening.  Dose: 40 mg     tretinoin 0.05 % cream  Commonly known as: RETIN-A   Apply 1 Application topically every evening. Apply to face every 3 nights and slowly increase to nightly as irritation allows  Dose: 1 Application     valacyclovir 1 GM Tabs  Commonly known as: VALTREX   Take 1 tablet by mouth every 8 hours as needed.  Dose: 1 Tablet     Vitamin D 50 MCG (2000  UT) Caps   Take 2,000 Units by mouth every day.  Dose: 2,000 Units            Allergies  Allergies   Allergen Reactions   • Betapace [Sotalol] Anaphylaxis   • Tikosyn [Dofetilide] Swelling     TONGUE SWELL.    • Zolpidem Tartrate Unspecified     Lightheaded and confusion       DIET  Orders Placed This Encounter   Procedures   • Diet Order Diet: Cardiac; Second Modifier: (optional): 2 Gram Sodium; Fluid modifications: (optional): 1200 ml Fluid Restriction     Standing Status:   Standing     Number of Occurrences:   1     Order Specific Question:   Diet:     Answer:   Cardiac [6]     Order Specific Question:   Second Modifier: (optional)     Answer:   2 Gram Sodium [7]     Order Specific Question:   Fluid modifications: (optional)     Answer:   1200 ml Fluid Restriction [8]       ACTIVITY  As tolerated.  Weight bearing as tolerated    CONSULTATIONS  Cardiology    PROCEDURES  None    LABORATORY  Lab Results   Component Value Date    SODIUM 138 09/28/2021    POTASSIUM 3.8 09/28/2021    CHLORIDE 101 09/28/2021    CO2 29 09/28/2021    GLUCOSE 143 (H) 09/28/2021    BUN 26 (H) 09/28/2021    CREATININE 1.18 09/28/2021        Lab Results   Component Value Date    WBC 7.8 09/28/2021    HEMOGLOBIN 12.3 09/28/2021    HEMATOCRIT 36.9 (L) 09/28/2021    PLATELETCT 275 09/28/2021        Total time of the discharge process exceeds 35 minutes.

## 2021-09-28 NOTE — PROGRESS NOTES
Received bedside report from ABDOUL Dias, pt care assumed. No signs of acute distress noted at this time. Bed in lowest position. Pt educated on fall risk and use of call light.

## 2021-09-28 NOTE — THERAPY
"Occupational Therapy   Initial Evaluation     Patient Name: Zeinab Loza  Age:  76 y.o., Sex:  female  Medical Record #: [id number]  Today's Date: 9/27/2021       Assessment    Patient is 76 y.o. female with h/o celiac, CHF, HTN, a-fib, on home O2, admitted with SOB, BLE edema, L visual changes (\"black spots\"). Pt dx with acute on chronic CHF, chronic respiratory failure. Pt seen for OT eval. Pt completed transfers, LB dressing, toileting, standing grooming. Able to complete BADL and transfers with no more than supv. See below for visual assessment. \"Floaters\" do not impact basic visual functioning and pt no longer drives. Pt appears to be at or near baseline function from OT standpoint in this setting. Has good support from daughter. No further acute OT needs at this time.     Plan    Recommend Occupational Therapy for Evaluation only     DC Equipment Recommendations: None (All items in place )  Discharge Recommendations: Anticipate that the patient will have no further occupational therapy needs after discharge from the hospital     Subjective    \"I've had these floaters for about two weeks.\"     Objective     09/27/21 1723   Prior Living Situation   Housing / Facility [address]   Steps Into Home 0   Bathroom Set up Walk In Shower;Shower Chair   Equipment Owned 4-Wheel Walker;Bed Side Commode;Wheelchair;Single Point Cane;Tub / Shower Seat;Hand Held Shower;Oxygen   Comments Pt lives with daughter. Has multiple pieces of DME leftover from late spouse.    Prior Level of ADL Function   Self Feeding Independent   Grooming / Hygiene Independent   Bathing Independent   Dressing Independent   Toileting Independent   Comments Reports she uses shower chair if feeling dizzy    Prior Level of IADL Function   Medication Management Independent   Laundry Requires Assist   Kitchen Mobility Independent   Finances Requires Assist   Home Management Requires Assist   Shopping Requires Assist   Prior Level Of Mobility " "Independent Without Device in Community;Independent Without Device in Home  (Limited community mobility due to COVID)   Driving / Transportation Relatives / Others Provide Transportation   Cognition    Comments very pleasant & cooperative    Active ROM Upper Body   Active ROM Upper Body  WDL   Strength Upper Body   Upper Body Strength  WDL   Coordination Upper Body   Comments MIGUELITO, finger-to-nose, opposition    Balance Assessment   Sitting Balance (Static) Good   Sitting Balance (Dynamic) Good   Standing Balance (Static) Good   Standing Balance (Dynamic) Fair +   Weight Shift Sitting Good   Weight Shift Standing Good   Comments no AD, no LOB   Bed Mobility    Supine to Sit Modified Independent   Scooting Modified Independent   Comments flat bed, no rail    ADL Assessment   Grooming Supervision;Standing  (oral care at sink )   Lower Body Dressing Supervision  (don B socks )   Toileting Supervision  (urination on toilet )   Functional Mobility   Sit to Stand Supervised   Bed, Chair, Wheelchair Transfer Supervised   Toilet Transfers Supervised   Transfer Method Stand Step  (no AD)   Mobility Supine > EOB, short gait and transfers in room    Visual Perception   Comments Pt describes \"white floaters\" in L visual field of L eye for past two weeks; denies changes in acuity; able to track in all quadrants and perform saccades without delay or deviation                  "

## 2021-09-28 NOTE — PROGRESS NOTES
Cardiology Follow Up Progress Note    Date of Service  9/28/2021    Attending Physician  RADHA Deras*    Presented with volume overload for 15 pounds weight gain, abdominal distention, dyspnea, peripheral edema    PMH: Chronic hypoxemic respiratory failure secondary to PAH on Tadalafil, chronic A. fib on anticoagulation with Eliquis (failed sotalol, Tikosyn, RF ablation on 4/22/2021), MV repair, MAZE, NED [facility], on BiPAP for sleep apnea      Interim Events  9/28/2021: No overnight cardiac events. Tele monitoring personally interpreted by me shows SR 60-70's. VSS; 5L NC; Daily weight 93.8 kg- Stable; discussed low sodium diet while traveling. Labs reviewed; K- 3.8, BUN/Crt- 26/1.18. Tolerating PO amio. FU per below.  9/27/2021: No overnight cardiac events. Reports improved dizziness with ambulation. Tele monitoring personally interpreted by me shows SR with paroxymal Aflutter; patient denies symptoms. VSS; 5L NC/BIPAP at night; Daily weight 93.3 kg-downtrending. Labs reviewed; K- 3.8, BUN/Crt- 20/1.13. Transition to home diuretic dose. Start amio load  9/26/2021:No overnight cardiac events  Telemetry-A. fib, rate well controlled overnight, RVR this morning  Labs reviewed  NA, CR, K stable  NT proBNP 741--> 420  Hypertensive this morning        Review of Systems  Review of Systems    Vital signs in last 24 hours  Temp:  [35.8 °C (96.5 °F)-36.6 °C (97.8 °F)] 36.3 °C (97.4 °F)  Pulse:  [60-71] 70  Resp:  [18-20] 18  BP: (106-127)/(51-70) 116/67  SpO2:  [94 %-98 %] 95 %    Physical Exam  Physical Exam    Lab Review  Lab Results   Component Value Date/Time    WBC 7.8 09/28/2021 03:31 AM    RBC 3.76 (L) 09/28/2021 03:31 AM    HEMOGLOBIN 12.3 09/28/2021 03:31 AM    HEMATOCRIT 36.9 (L) 09/28/2021 03:31 AM    MCV 98.1 (H) 09/28/2021 03:31 AM    MCH 32.7 09/28/2021 03:31 AM    MCHC 33.3 (L) 09/28/2021 03:31 AM    MPV 9.1 09/28/2021 03:31 AM      Lab Results   Component Value Date/Time    SODIUM 138  09/28/2021 03:31 AM    POTASSIUM 3.8 09/28/2021 03:31 AM    CHLORIDE 101 09/28/2021 03:31 AM    CO2 29 09/28/2021 03:31 AM    GLUCOSE 143 (H) 09/28/2021 03:31 AM    BUN 26 (H) 09/28/2021 03:31 AM    CREATININE 1.18 09/28/2021 03:31 AM      Lab Results   Component Value Date/Time    ASTSGOT 25 09/27/2021 01:25 AM    ALTSGPT 17 09/27/2021 01:25 AM     Lab Results   Component Value Date/Time    CHOLSTRLTOT 211 (H) 07/26/2021 01:02 PM     (H) 07/26/2021 01:02 PM    HDL 47 07/26/2021 01:02 PM    TRIGLYCERIDE 231 (H) 07/26/2021 01:02 PM    TROPONINT 21 (H) 09/27/2021 07:45 AM       Recent Labs     09/26/21  0132   NTPROBNP 420*       Cardiac Imaging and Procedures Review  EKG: Atrial fibrillation, rate well controlled 80s    Echocardiogram:  9/2/21  Patient atrial fibrillation, heart rate 110 bpm.  Left ventricle is small in size.  Left ventricular ejection fraction is visually estimated to be 75%,   hyperdynamic.  Reduced right ventricular systolic function.  Biatrial enlargement.  Mitral valve repair functioning normally: MG 3 mmHg,  BPM.  Aortic valve not well visualized, no significant abnormalities.  Estimated right ventricular systolic pressure  is 30 mmHg.    Cardiac Catheterization: Not applicable    Imaging  Chest X-Ray:  Mild interstitial prominence in the lower lobes bilaterally may represent mild dependent edema, atelectasis or pneumonitis.  2.  Blunting of the left costophrenic angle related to pleural thickening or trace pleural fluid.  3.  Cardiomegaly.  4.  Linear left basilar atelectasis or scarring.    Stress Test: Not applicable    Assessment/Plan     Acute on chronic right heart failure  In the setting of PAH & chronic atrial flutter. Exacerbation d/t traveling  -Euvolemic. Transition to home diuretic dosing. Add aldactone 12.5 mg daily. FVO [place] number in discharge instructions   -KCL 40mEQ BID  -Diuresed - 1000 overnight  -Labs stable  -Resume metoprolol 25 mg BID  -Dry weight 205 #.  At dry weight  -Keep K > 4, Mg > 2  -Strict intake and output  -BMP 5 days after discharge    Hypertension  -Well-controlled    Paroxymal Aflutter  -SR overnight 60-70's  -Continue with apixaban  -Digoxin  -Continue metoprolol 25 mg BID. Consider decreasing in FU  -continue amiodarone 400 mg twice daily for 2 weeks, (on 10/11/2021) 200 mg twice daily for 2 weeks, (on 10/25/2021) 200 mg daily  -FU outpatient EP clinic next week.     Obesity, BMI 35  -TLC    Sleep apnea  -Compliant with Bipap with 5L bleed in    Cardiology will sign-off .   Future Appointments   Date Time Provider Department Center   10/4/2021 12:00 PM Christopher Warren M.D. [facility] None   10/5/2021  3:00 PM Thee Galindo M.D. [facility] None   10/14/2021 10:45 AM [facility]   10/20/2021  1:15 PM KIRILL Hogan [facility] None   10/21/2021 11:15 AM TEODORO Monzon. LD None       Thank you for allowing me to participate in the care of this patient.      Please contact me with any questions.    TEODORO Monzon.   [facility]  [phone]

## 2021-09-28 NOTE — PROGRESS NOTES
Patient is A&Ox4. Discharge instructions. Personal belongings in possession with patient. PIV and tele monitor removed. Copy of discharge instructions in patient chart, signed and reviewed. Patient verbalizes the understanding of the discharge instructions. Questions and concerns addressed prior to leaving the unit. Transported via wheelchair by RN. Patient discharged to home. Family present.   Home medication given to patient

## 2021-09-28 NOTE — PROGRESS NOTES
Received report from NOC shift RN. Patient is A&Ox4, no complaints of pain, VSS, no signs of distress. All questions and concerns answered, bed in lowest and locked position, call light in reach, will continue to monitor.    Crisis Charting: COVID-19 surge in effect

## 2021-09-28 NOTE — DISCHARGE INSTRUCTIONS
-Discussed the importance of monitoring weights at home daily. Call cardiology office [phone] if weight increasing greater than 3 lbs in 1 day or greater than 5 lbs in 1 week. Dry weight is 204 lbs    -continue amiodarone 400 mg twice daily for 2 weeks, (on 10/11/2021) 200 mg twice daily for 2 weeks, (on 10/25/2021) 200 mg daily    Discharge Instructions    Discharged to home by car with relative. Discharged via wheelchair, hospital escort: Yes.  Special equipment needed: Not Applicable    Be sure to schedule a follow-up appointment with your primary care doctor or any specialists as instructed.     Discharge Plan:   Diet Plan: Discussed  Activity Level: Discussed  Confirmed Follow up Appointment: Appointment Scheduled  Confirmed Symptoms Management: Discussed  Medication Reconciliation Updated: Yes  Influenza Vaccine Indication: Indicated: 65 years and older  Influenza Vaccine Given - only chart on this line when given: Influenza Vaccine Given (See MAR)    I understand that a diet low in cholesterol, fat, and sodium is recommended for good health. Unless I have been given specific instructions below for another diet, I accept this instruction as my diet prescription.       Special Instructions:   HF Patient Discharge Instructions  · Monitor your weight daily, and maintain a weight chart, to track your weight changes.   · Activity as tolerated, unless your Doctor has ordered otherwise.  · Follow a low fat, low cholesterol, low salt diet unless instructed otherwise by your Doctor. Read the labels on the back of food products and track your intake of fat, cholesterol and salt.   · Fluid Restriction No. If a Fluid Restriction has been ordered by your Doctor, measure fluids with a measuring cup to ensure that you are not exceeding the restriction.   · No smoking.  · Oxygen Yes. If your Doctor has ordered that you wear Oxygen at home, it is important to wear it as ordered.  · Did you receive an explanation from  staff on the importance of taking each of your medications and why it is necessary to keep taking them unless your doctor says to stop? Yes  · Were all of your questions answered about how to manage your heart failure and what to do if you have increased signs and symptoms after you go home? Yes  · Do you feel like your heart failure care team involved you in the care treatment plan and allowed you to make decisions regarding your care while in the hospital and addressed any discharge needs you might have? Yes    See the educational handout provided at discharge for more information on monitoring your daily weight, activity and diet. This also explains more about Heart Failure, symptoms of a flare-up and some of the tests that you have undergone.     Warning Signs of a Flare-Up include:  · Swelling in the ankles or lower legs.  · Shortness of breath, while at rest, or while doing normal activities.   · Shortness of breath at night when in bed, or coughing in bed.   · Requiring more pillows to sleep at night, or needing to sit up at night to sleep.  · Feeling weak, dizzy or fatigued.     When to call your Doctor:  · [facility] a week from 8:00 a.m. to 8:00 p.m. for medical questions [phone].  · Call your Primary Care Physician or Cardiologist if:   1. You experience any pain radiating to your jaw or neck.  2. You have any difficulty breathing.  3. You experience weight gain of 3 lbs in a day or 5 lbs in a week.   4. You feel any palpitations or irregular heartbeats.  5. You become dizzy or lose consciousness.   If you have had an angiogram or had a pacemaker or AICD placed, and experience:  1. Bleeding, drainage or swelling at the surgical / puncture site.  2. Fever greater than 100.0 F  3. Shock from internal defibrillator.  4. Cool and / or numb extremities.      · Is patient discharged on Warfarin / Coumadin?   No     Depression / Suicide Risk    As you are discharged from this  [facility] facility, it is important to learn how to keep safe from harming yourself.    Recognize the warning signs:  · Abrupt changes in personality, positive or negative- including increase in energy   · Giving away possessions  · Change in eating patterns- significant weight changes-  positive or negative  · Change in sleeping patterns- unable to sleep or sleeping all the time   · Unwillingness or inability to communicate  · Depression  · Unusual sadness, discouragement and loneliness  · Talk of wanting to die  · Neglect of personal appearance   · Rebelliousness- reckless behavior  · Withdrawal from people/activities they love  · Confusion- inability to concentrate     If you or a loved one observes any of these behaviors or has concerns about self-harm, here's what you can do:  · Talk about it- your feelings and reasons for harming yourself  · Remove any means that you might use to hurt yourself (examples: pills, rope, extension cords, firearm)  · Get professional help from the community (Mental Health, Substance Abuse, psychological counseling)  · Do not be alone:Call your Safe Contact- someone whom you trust who will be there for you.  · Call your local CRISIS HOTLINE 833-6931 or 165-217-9972  · Call your local Children's Mobile Crisis Response Team [place] (686) 277-9206 or www.Novint  · Call the toll free National Suicide Prevention Hotlines   · National Suicide Prevention Lifeline 339-385-VHAQ (7980)  · National Hope Line Network 800-SUICIDE (462-8141)          Atrial Flutter       Atrial flutter is a type of abnormal heart rhythm (arrhythmia). The heart has an electrical system that tells the heart how to beat. In atrial flutter, the signals move rapidly in the top chambers of the heart (the atria). This makes your heart beat very fast. Atrial flutter can come and go, or it can be permanent.  If this condition is not treated it can cause serious complications, such as stroke or weakened  heart muscle (cardiomyopathy).  What are the causes?  This condition may be caused by:  · A heart condition or problem, such as:  ? A heart attack.  ? Heart failure.  ? A heart valve problem.  ? Heart surgery.  · A lung problem, such as:  ? A blood clot in the lungs (pulmonary embolism, or PE).  ? Chronic obstructive pulmonary disease.  · Poorly controlled high blood pressure (hypertension).  · Overactive thyroid (hyperthyroidism).  · Caffeine.  · Some decongestant cold medicines.  · Low levels of minerals called electrolytes in the blood.  · Cocaine.  What increases the risk?  You are more likely to develop this condition if:  · You are an elderly adult.  · You are a man.  · You are obese.  · You have obstructive sleep apnea.  · You have a family history of atrial flutter.  · You have diabetes.  What are the signs or symptoms?  Symptoms of this condition include:  · A feeling that your heart is pounding or racing (palpitations).  · Shortness of breath.  · Chest pain.  · Feeling light-headed.  · Dizziness.  · Fainting.  · Low blood pressure (hypotension).  · Fatigue.  Sometimes there are no symptoms associated with arrhythmia.  How is this diagnosed?  This condition may be diagnosed based on:  · An electrocardiogram (ECG). This is a test that records the electrical signals in the heart.  · Ambulatory cardiac monitoring. This is a small recording device that is connected by wires to flat, sticky disks (electrodes) that are attached to your chest.  · An echocardiogram. This is a test that uses sound waves to create pictures of your heart.  · A transesophageal echocardiogram (HANNAH). In this test, a device is placed down your esophagus. This device then uses sounds waves to create even closer pictures of your heart.  · Stress test. This test records your heartbeat while you exercise and checks to see if the heart muscle is receiving adequate blood supply.  How is this treated?  This condition may be treated  "with:  · Medicines to:  ? Make your heart beat more slowly.  ? Keep your heart in normal rhythm.  ? Prevent a stroke.  · Cardioversion. This uses medicines or an electrical shock to make the heart beat normally.  · Ablation. This destroys the heart tissue that is causing the problem.  In some cases, your health care provider will treat other underlying conditions.  Follow these instructions at home:  Medicines  · Take over-the-counter and prescription medicines only as told by your health care provider.  ? Make sure you take your medicines exactly as told by your health care provider.  ? Do not miss any doses.  · Do not take any new medicines without talking to your health care provider.  Lifestyle  · Eat heart-healthy foods. Talk with a dietitian to make an eating plan that is right for you.  · Do not use any products that contain nicotine or tobacco, such as cigarettes and e-cigarettes. If you need help quitting, ask your health care provider.  · Limit alcohol intake to no more than 1 drink per day for nonpregnant women and 2 drinks per day for men. One drink equals 12 oz of beer, 5 oz of wine, or 1½ oz of hard liquor.  · Try to reduce any stress. Stress can make your symptoms worse.  · Get screened for sleep apnea. If you have the condition, work with your health care provider to find a treatment that works for you.  · Do not use drugs.  · Avoid excessive caffeine.  General instructions  · Lose weight if your health care provider tells you to do that.  · Keep all follow-up visits as told by your health care provider. This is important.  Contact a health care provider if:  · Your symptoms get worse.  · You notice that your palpitations are increasing.  Get help right away if:  · You have any symptoms of a stroke. \"BE FAST\" is an easy way to remember the main warning signs of a stroke:  ? B - Balance. Signs are dizziness, sudden trouble walking, or loss of balance.  ? E - Eyes. Signs are trouble seeing or a sudden " change in vision.  ? F - Face. Signs are sudden weakness or numbness of the face, or the face or eyelid drooping on one side.  ? A - Arms. Signs are weakness or numbness in an arm. This happens suddenly and usually on one side of the body.  ? S - Speech. Signs are sudden trouble speaking, slurred speech, or trouble understanding what people say.  ? T - Time. Time to call emergency services. Write down what time symptoms started.  · You have other signs of a stroke, such as:  ? A sudden, severe headache with no known cause.  ? Nausea or vomiting.  ? Seizure.  · You have additional symptoms, such as:  ? Fainting.  ? Shortness of breath.  ? Pain or pressure in your chest.  ? Suddenly feeling nauseous or suddenly vomiting.  ? Increased sweating with no known cause.  · These symptoms may represent a serious problem that is an emergency. Do not wait to see if the symptoms will go away. Get medical help right away. Call your local emergency services (911 in the [place] Do not drive yourself to the hospital.  Summary  · Atrial flutter is an abnormal heart rhythm that can give you symptoms of palpitations, shortness of breath, or fatigue.  · Atrial flutter is often treated with medicines to keep your heart in a normal rhythm and to prevent a stroke.  · You should seek immediate help if you cannot catch your breath, have chest pain or pressure, or have weakness, especially on one side of your body.  This information is not intended to replace advice given to you by your health care provider. Make sure you discuss any questions you have with your health care provider.  Document Released: 05/06/2010 Document Revised: 09/06/2019 Document Reviewed: 09/20/2018  Elsevier Patient Education © 2020 [employer]

## 2021-10-02 ENCOUNTER — HOSPITAL ENCOUNTER (OUTPATIENT)
Dept: RADIOLOGY | Facility: MEDICAL CENTER | Age: 76
End: 2021-10-02
Attending: STUDENT IN AN ORGANIZED HEALTH CARE EDUCATION/TRAINING PROGRAM
Payer: MEDICARE

## 2021-10-02 DIAGNOSIS — M54.42 CHRONIC MIDLINE LOW BACK PAIN WITH LEFT-SIDED SCIATICA: ICD-10-CM

## 2021-10-02 DIAGNOSIS — G89.29 CHRONIC MIDLINE LOW BACK PAIN WITH LEFT-SIDED SCIATICA: ICD-10-CM

## 2021-10-02 PROCEDURE — 72148 MRI LUMBAR SPINE W/O DYE: CPT | Mod: ME

## 2021-10-05 ENCOUNTER — OFFICE VISIT (OUTPATIENT)
Dept: CARDIOLOGY | Facility: MEDICAL CENTER | Age: 76
End: 2021-10-05
Payer: MEDICARE

## 2021-10-05 VITALS
RESPIRATION RATE: 14 BRPM | DIASTOLIC BLOOD PRESSURE: 82 MMHG | HEIGHT: 67 IN | WEIGHT: 204.2 LBS | SYSTOLIC BLOOD PRESSURE: 130 MMHG | BODY MASS INDEX: 32.05 KG/M2 | OXYGEN SATURATION: 90 % | HEART RATE: 62 BPM

## 2021-10-05 DIAGNOSIS — I48.91 ATRIAL FIBRILLATION, UNSPECIFIED TYPE (HCC): ICD-10-CM

## 2021-10-05 DIAGNOSIS — I27.20 PULMONARY HYPERTENSION (HCC): ICD-10-CM

## 2021-10-05 PROCEDURE — 99214 OFFICE O/P EST MOD 30 MIN: CPT | Performed by: INTERNAL MEDICINE

## 2021-10-05 PROCEDURE — 93000 ELECTROCARDIOGRAM COMPLETE: CPT | Performed by: INTERNAL MEDICINE

## 2021-10-05 RX ORDER — OMEPRAZOLE 40 MG/1
40 CAPSULE, DELAYED RELEASE ORAL DAILY
Qty: 90 CAPSULE | Refills: 3 | Status: SHIPPED | OUTPATIENT
Start: 2021-10-05 | End: 2022-01-24 | Stop reason: SDUPTHER

## 2021-10-05 ASSESSMENT — FIBROSIS 4 INDEX: FIB4 SCORE: 1.63

## 2021-10-06 NOTE — PROGRESS NOTES
Arrhythmia Clinic Note (Established patient)    DOS: 10/5/2021    Chief complaint/Reason for consult: AF/AFL    Interval History: 75 y/o F with pAF/AFL. Tried dofetilide, sotalol, had SE of tongue swelling on both. Took for ablation in April, unfortunately still having AF/AFL. Having GERD ever since as well. Recently admitted with atypical AFL and CHF exacerbation. Having dizzy episodes and SOB. Was started on amiodarone at the hospital which has been controlling her arrhythmias.    ROS (+ highlighted in bold):  Constitutional: Fevers/chills/fatigue/weightloss  HEENT: Blurry vision/eye pain/sore throat/hearing loss  Respiratory: Shortness of breath/cough  Cardiovascular: Chest pain/palpitations/edema/orthopnea/syncope  GI: Nausea/vomitting/diarrhea  MSK: Arthralgias/myagias/muscle weakness  Skin: Rash/sores  Neurological: Numbness/tremors/vertigo  Endocrine: Excessive thirst/polyuria/cold intolerance/heat intolerance  Psych: Depression/anxiety    Past Medical History:   Diagnosis Date   • Aneurysm artery, celiac (HCC) 2019   • Aneurysm of right renal artery (HCC)    • Atrial fibrillation (HCC)    • Breath shortness     5L O2 all the time   • Chickenpox    • Congestive heart failure (HCC)    • Diastolic heart failure (HCC)    • Arabic measles    • Heart burn    • Heart valve disease     MV repair   • Hypertension, essential    • Pulmonary hypertension (HCC)    • Sleep apnea     Bipap   • Snoring    • Warfarin anticoagulation        Past Surgical History:   Procedure Laterality Date   • ANKLE ORIF Left 8/5/2020    Procedure: ORIF, ANKLE;  Surgeon: Tk Humphreys M.D.;  Location: SURGERY [facility];  Service: Orthopedics   • HYSTERECTOMY LAPAROSCOPY     • MITRAL VALVE REPAIR     • OTHER      neck surgery   • TONSILLECTOMY     • WRIST FUSION         Social History     Socioeconomic History   • Marital status:      Spouse name: Not on file   • Number of children: Not on file   • Years of education: Not on  file   • Highest education level: Not on file   Occupational History   • Not on file   Tobacco Use   • Smoking status: Never Smoker   • Smokeless tobacco: Never Used   Vaping Use   • Vaping Use: Never used   Substance and Sexual Activity   • Alcohol use: Yes     Alcohol/week: 4.2 oz     Types: 7 Shots of liquor per week     Comment: 1 drink a night   • Drug use: No   • Sexual activity: Not on file   Other Topics Concern   • Not on file   Social History Narrative   • Not on file     Social Determinants of Health     Financial Resource Strain:    • Difficulty of Paying Living Expenses:    Food Insecurity:    • Worried About Running Out of Food in the Last Year:    • Ran Out of Food in the Last Year:    Transportation Needs:    • Lack of Transportation (Medical):    • Lack of Transportation (Non-Medical):    Physical Activity:    • Days of Exercise per Week:    • Minutes of Exercise per Session:    Stress:    • Feeling of Stress :    Social Connections:    • Frequency of Communication with Friends and Family:    • Frequency of Social Gatherings with Friends and Family:    • Attends Zoroastrian Services:    • Active Member of Clubs or Organizations:    • Attends Club or Organization Meetings:    • Marital Status:    Intimate Partner Violence:    • Fear of Current or Ex-Partner:    • Emotionally Abused:    • Physically Abused:    • Sexually Abused:        Family History   Problem Relation Age of Onset   • Heart Disease Maternal Grandfather    • Heart Disease Paternal Grandfather        Allergies   Allergen Reactions   • Betapace [Sotalol] Anaphylaxis   • Tikosyn [Dofetilide] Swelling     TONGUE SWELL.    • Zolpidem Tartrate Unspecified     Lightheaded and confusion       Current Outpatient Medications   Medication Sig Dispense Refill   • omeprazole (PRILOSEC) 40 MG delayed-release capsule Take 1 Capsule by mouth every day. 90 Capsule 3   • spironolactone (ALDACTONE) 25 MG Tab Take 0.5 Tablets by mouth every day for 30  "days. 15 Tablet 0   • amiodarone (CORDARONE) 200 MG Tab Take 1 Tablet by mouth 2 times a day. 120 Tablet 3   • digoxin (LANOXIN) 125 MCG Tab Take 1 Tablet by mouth every day. 90 Tablet 2   • rosuvastatin (CRESTOR) 40 MG tablet Take 1 tablet by mouth every evening. 90 tablet 3   • bumetanide (BUMEX) 2 MG tablet Take 2 Tablets by mouth 2 times a day. 120 tablet 11   • tretinoin (RETIN-A) 0.05 % cream Apply 1 Application topically every evening. Apply to face every 3 nights and slowly increase to nightly as irritation allows     • potassium chloride SA (KDUR) 20 MEQ Tab CR Take 3 Tablets by mouth 2 times a day. 180 tablet 11   • apixaban (ELIQUIS) 5mg Tab Take 1 tablet by mouth 2 times a day. 180 tablet 3   • metoprolol tartrate (LOPRESSOR) 50 MG Tab Take 1 tablet by mouth 2 times a day. 60 tablet 11   • valacyclovir (VALTREX) 1 GM Tab Take 1 tablet by mouth every 8 hours as needed.     • HYDROcodone-acetaminophen (NORCO) 7.5-325 MG per tablet Take 1 tablet by mouth every 8 hours as needed. Indications: Pain     • Tadalafil, PAH, (ADCIRCA) 20 MG Tab Take 40 mg by mouth every bedtime. Indications: Pulmonary Arterial Hypertension     • PARoxetine (PAXIL) 20 MG Tab Take 20 mg by mouth every morning.     • levothyroxine (SYNTHROID) 75 MCG Tab Take 75 mcg by mouth Every morning on an empty stomach.     • Cholecalciferol (VITAMIN D) 2000 units Cap Take 2,000 Units by mouth every day.       No current facility-administered medications for this visit.       Physical Exam:  Vitals:    10/05/21 1236   BP: 130/82   BP Location: Left arm   Patient Position: Sitting   BP Cuff Size: Adult   Pulse: 62   Resp: 14   SpO2: 90%   Weight: 92.6 kg (204 lb 3.2 oz)   Height: 1.702 m (5' 7\")     General appearance: NAD, conversant   Eyes: anicteric sclerae, moist conjunctivae; no lid-lag; PERRLA  HENT: Atraumatic; oropharynx clear with moist mucous membranes and no mucosal ulcerations; normal hard and soft palate  Neck: Trachea midline; FROM, " supple, no thyromegaly or lymphadenopathy  Lungs: CTA, with normal respiratory effort and no intercostal retractions  CV: RRR, no MRGs, no JVD  Abdomen: Soft, non-tender; no masses or HSM  Extremities: No peripheral edema or extremity lymphadenopathy  Skin: Normal temperature, turgor and texture; no rash, ulcers or subcutaneous nodules  Psych: Appropriate affect, alert and oriented to person, place and time    Data:  Lipids:   Lab Results   Component Value Date/Time    CHOLSTRLTOT 211 (H) 07/26/2021 01:02 PM    TRIGLYCERIDE 231 (H) 07/26/2021 01:02 PM    HDL 47 07/26/2021 01:02 PM     (H) 07/26/2021 01:02 PM        BMP:  Lab Results   Component Value Date/Time    SODIUM 138 09/28/2021 0331    POTASSIUM 3.8 09/28/2021 0331    CHLORIDE 101 09/28/2021 0331    CO2 29 09/28/2021 0331    GLUCOSE 143 (H) 09/28/2021 0331    BUN 26 (H) 09/28/2021 0331    CREATININE 1.18 09/28/2021 0331    CALCIUM 10.3 09/28/2021 0331    ANION 8.0 09/28/2021 0331        TSH:   Lab Results   Component Value Date/Time    TSHULTRASEN 1.270 12/29/2020 1230        THYROXINE (T4):   No results found for: OGIR     CBC:   Lab Results   Component Value Date/Time    WBC 7.8 09/28/2021 03:31 AM    RBC 3.76 (L) 09/28/2021 03:31 AM    HEMOGLOBIN 12.3 09/28/2021 03:31 AM    HEMATOCRIT 36.9 (L) 09/28/2021 03:31 AM    MCV 98.1 (H) 09/28/2021 03:31 AM    MCH 32.7 09/28/2021 03:31 AM    MCHC 33.3 (L) 09/28/2021 03:31 AM    RDW 56.9 (H) 09/28/2021 03:31 AM    PLATELETCT 275 09/28/2021 03:31 AM    MPV 9.1 09/28/2021 03:31 AM    NEUTSPOLYS 61.90 09/28/2021 03:31 AM    LYMPHOCYTES 21.80 (L) 09/28/2021 03:31 AM    MONOCYTES 11.00 09/28/2021 03:31 AM    EOSINOPHILS 4.40 09/28/2021 03:31 AM    BASOPHILS 0.40 09/28/2021 03:31 AM    IMMGRAN 0.50 09/28/2021 03:31 AM    NRBC 0.00 09/28/2021 03:31 AM    NEUTS 4.82 09/28/2021 03:31 AM    LYMPHS 1.70 09/28/2021 03:31 AM    LYMPHS 16 07/09/2019 09:30 AM    MONOS 0.86 (H) 09/28/2021 03:31 AM    EOS 0.34 09/28/2021  03:31 AM    BASO 0.03 09/28/2021 03:31 AM    IMMGRANAB 0.04 09/28/2021 03:31 AM    NRBCAB 0.00 09/28/2021 03:31 AM        CBC w/o DIFF  Lab Results   Component Value Date/Time    WBC 7.8 09/28/2021 03:31 AM    RBC 3.76 (L) 09/28/2021 03:31 AM    HEMOGLOBIN 12.3 09/28/2021 03:31 AM    MCV 98.1 (H) 09/28/2021 03:31 AM    MCH 32.7 09/28/2021 03:31 AM    MCHC 33.3 (L) 09/28/2021 03:31 AM    RDW 56.9 (H) 09/28/2021 03:31 AM    MPV 9.1 09/28/2021 03:31 AM       EKG interpreted by me: NSR    Impression/Plan:  1. Atrial fibrillation, unspecified type (HCC)  EKG   2. Pulmonary hypertension (HCC)  PULMONARY FUNCTION TESTS -Test requested: Complete Pulmonary Function Test     1. AF s/p ablation with recurrence  2. Atypical flutter s/p ablation with recurrence  3. Amiodarone use high risk medication  4. Pulmonary hypertension  5. OAC on Eliquis    - Continue amiodarone. Obtain PFTs. LFTs, TSH in 6 months.  - If she has to come off amiodarone, would have to consider AVJ/PPM. Not likely to have a good response to repeat ablation, too many atypical flutters.  - Continue OAC with Eliquis    FV 6 months    Thee Galindo MD  Cardiac Electrophysiology

## 2021-10-11 LAB — EKG IMPRESSION: NORMAL

## 2021-10-12 ENCOUNTER — TELEPHONE (OUTPATIENT)
Dept: CARDIOLOGY | Facility: MEDICAL CENTER | Age: 76
End: 2021-10-12

## 2021-10-12 NOTE — TELEPHONE ENCOUNTER
Patient Call    Thee Galindo M.D.  You 23 minutes ago (4:06 PM)     Yes that is ok, low risk    Message text        Faxed clearance to [phone], transmission complete

## 2021-10-12 NOTE — TELEPHONE ENCOUNTER
Received stratification request from [facility]  [phone] fax    Procedure: knee replacement    To , ok for patient to proceed and hold Eliquis 48hrs prior? Last OV 10/5/21

## 2021-10-14 ENCOUNTER — NON-PROVIDER VISIT (OUTPATIENT)
Dept: SLEEP MEDICINE | Facility: MEDICAL CENTER | Age: 76
End: 2021-10-14
Attending: INTERNAL MEDICINE
Payer: MEDICARE

## 2021-10-14 ENCOUNTER — APPOINTMENT (OUTPATIENT)
Dept: RADIOLOGY | Facility: MEDICAL CENTER | Age: 76
End: 2021-10-14
Attending: FAMILY MEDICINE
Payer: MEDICARE

## 2021-10-14 VITALS — HEIGHT: 67 IN | BODY MASS INDEX: 31.39 KG/M2 | WEIGHT: 200 LBS

## 2021-10-14 DIAGNOSIS — I27.20 PULMONARY HYPERTENSION (HCC): ICD-10-CM

## 2021-10-14 PROCEDURE — 94726 PLETHYSMOGRAPHY LUNG VOLUMES: CPT | Performed by: INTERNAL MEDICINE

## 2021-10-14 PROCEDURE — 94729 DIFFUSING CAPACITY: CPT | Performed by: INTERNAL MEDICINE

## 2021-10-14 PROCEDURE — 94060 EVALUATION OF WHEEZING: CPT | Performed by: INTERNAL MEDICINE

## 2021-10-14 ASSESSMENT — PULMONARY FUNCTION TESTS
FEV1_PERCENT_CHANGE: -3
FVC_PERCENT_PREDICTED: 83
FEV1_PREDICTED: 2.31
FEV1: 1.96
FEV1/FVC_PERCENT_PREDICTED: 100
FEV1/FVC: 77
FEV1/FVC_PERCENT_CHANGE: 1
FEV1_PERCENT_PREDICTED: 87
FEV1/FVC_PERCENT_PREDICTED: 99
FEV1/FVC_PERCENT_PREDICTED: 76
FEV1/FVC_PERCENT_PREDICTED: 101
FVC_PREDICTED: 3.02
FVC_LLN: 2.52
FVC: 2.61
FEV1/FVC: 78
FEV1_PERCENT_PREDICTED: 85
FEV1_PERCENT_CHANGE: -2
FEV1/FVC: 77.78
FEV1/FVC_PREDICTED: 77
FEV1: 2.01
FEV1/FVC_PERCENT_LLN: 65
FVC: 2.52
FEV1/FVC_PERCENT_CHANGE: 67
FEV1/FVC_PERCENT_PREDICTED: 102
FEV1/FVC: 77
FEV1_LLN: 1.93
FVC_PERCENT_PREDICTED: 86

## 2021-10-14 ASSESSMENT — FIBROSIS 4 INDEX: FIB4 SCORE: 1.63

## 2021-10-14 NOTE — PROCEDURES
Tech: Bianka Ortiz, RRT, CPFT  Tech notes: Good patient effort & cooperation.  The results of this test meet the ATS/ERS standards for acceptability & reproducibility.  Test was performed on the [employer] Plethysmograph-Elite DX system.  Predicted values were GLI-2012 for spirometry, GLI- 2017 for DLCO, ITS for Lung Volumes.  The DLCO was uncorrected for Hgb.  A bronchodilator of Xopenex HFA -2puffs via spacer administered.  DLCO performed during dilation period.    :1. Baseline spirometry demonstrates a normal  FEV1 and FVC. FEV1/FVC ratio is 77%.  FEV1 is 2.01 L which is 87% of predicted.    2. After administration of an inhaled bronchodilator there is no improvement in FEV1.    3. Lung volumes demonstrate a normal total lung capacity at 97% of predicted.    4. Gas exchange as estimated by DLCO is mildly reduced at 79% predicted.    5. Airway resistance is in the normal range.      Impression:    This study demonstrates the presence of a mild reduction in DLCO which is consistent with the patient's clinical diagnosis of pulmonary hypertension.  No reversibility is noted on the study.

## 2021-10-20 ENCOUNTER — OFFICE VISIT (OUTPATIENT)
Dept: CARDIOLOGY | Facility: MEDICAL CENTER | Age: 76
End: 2021-10-20
Payer: MEDICARE

## 2021-10-20 VITALS
RESPIRATION RATE: 12 BRPM | DIASTOLIC BLOOD PRESSURE: 84 MMHG | SYSTOLIC BLOOD PRESSURE: 140 MMHG | HEART RATE: 70 BPM | HEIGHT: 67 IN | WEIGHT: 204.2 LBS | OXYGEN SATURATION: 93 % | BODY MASS INDEX: 32.05 KG/M2

## 2021-10-20 DIAGNOSIS — Z86.79 S/P ABLATION OF ATRIAL FIBRILLATION: ICD-10-CM

## 2021-10-20 DIAGNOSIS — Z79.899 HIGH RISK MEDICATION USE: ICD-10-CM

## 2021-10-20 DIAGNOSIS — Z79.01 CHRONIC ANTICOAGULATION: ICD-10-CM

## 2021-10-20 DIAGNOSIS — I27.20 PULMONARY HYPERTENSION (HCC): ICD-10-CM

## 2021-10-20 DIAGNOSIS — I10 ESSENTIAL HYPERTENSION: ICD-10-CM

## 2021-10-20 DIAGNOSIS — I48.91 ATRIAL FIBRILLATION, UNSPECIFIED TYPE (HCC): ICD-10-CM

## 2021-10-20 DIAGNOSIS — Z98.890 S/P ABLATION OF ATRIAL FIBRILLATION: ICD-10-CM

## 2021-10-20 DIAGNOSIS — E78.2 MIXED HYPERLIPIDEMIA: ICD-10-CM

## 2021-10-20 DIAGNOSIS — J96.11 CHRONIC RESPIRATORY FAILURE WITH HYPOXIA (HCC): ICD-10-CM

## 2021-10-20 DIAGNOSIS — G47.39 OTHER SLEEP APNEA: ICD-10-CM

## 2021-10-20 DIAGNOSIS — E87.6 HYPOKALEMIA: ICD-10-CM

## 2021-10-20 PROCEDURE — 99214 OFFICE O/P EST MOD 30 MIN: CPT | Performed by: NURSE PRACTITIONER

## 2021-10-20 RX ORDER — HYDROCODONE BITARTRATE AND ACETAMINOPHEN 10; 325 MG/1; MG/1
1 TABLET ORAL 2 TIMES DAILY PRN
Status: ON HOLD | COMMUNITY
Start: 2021-10-17 | End: 2021-11-23

## 2021-10-20 RX ORDER — SPIRONOLACTONE 25 MG/1
12.5 TABLET ORAL DAILY
Qty: 45 TABLET | Refills: 3 | Status: SHIPPED | OUTPATIENT
Start: 2021-10-20 | End: 2021-11-11

## 2021-10-20 RX ORDER — TRETINOIN 1 MG/G
1 CREAM TOPICAL NIGHTLY
COMMUNITY
End: 2022-12-30

## 2021-10-20 RX ORDER — ALPRAZOLAM 0.5 MG/1
1 TABLET ORAL
COMMUNITY
Start: 2021-10-10 | End: 2021-11-11

## 2021-10-20 ASSESSMENT — ENCOUNTER SYMPTOMS
HEARTBURN: 1
FEVER: 0
ABDOMINAL PAIN: 0
PALPITATIONS: 0
MYALGIAS: 0
PND: 0
COUGH: 0
SHORTNESS OF BREATH: 1
CLAUDICATION: 0
DIZZINESS: 1
ORTHOPNEA: 1

## 2021-10-20 ASSESSMENT — FIBROSIS 4 INDEX: FIB4 SCORE: 1.63

## 2021-10-20 NOTE — PROGRESS NOTES
Chief Complaint   Patient presents with   • Atrial Fibrillation   • Congestive Heart Failure     F/V Dx: Acute on chronic congestive heart failure with right ventricular diastolic dysfunction (HCC)   • Hypertension     F/V Dx: Essential hypertension       Subjective:   Zeinab Loza is a 76 y.o. female who presents today for follow-up on her pulmonary hypertension, atrial fibrillation with her daughter, Vernell.    Patient of Dr. Galindo and Dr. Eng.  She was last seen in clinic on 10/4/2021 with Dr. Galindo.  During that visit, she was sent for PFT and lab testing and started on Omeprazole. Possible repeat ablation and PPM may be needed if amiodarone discontinued.    She reports doing better, she does report increasing SOB, dizziness, orthopnea, decreased appetite. She states her Omeprazole is  Helping her GERD but does continue to have symptoms.     She denies Chest pain, palpitations, Edema or PND.    She reports her weights are down to 194-196 lbs. Her prior dry weight around 204 lbs.     She continues to have left knee pain and wants to have surgery.     She continues to report compliance with sodium intake.     She does continue to follow with pulmonary at [facility] with Dr. Zelaya.       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 5.19 L/min, Odin C.I 2.39   PVR 4.8 woods     -Hypertension     -Paroxysmal A. fib, started on Tikosyn on 10/21/2020, on Eliquis; RF  ablation with PVI, septal LA flutter and empiric ablation for mitral isthmus flutter by Dr. Galindo on 4/22/2021     -Hyperlipidemia: Taking rosuvastatin     -Sleep apnea, uses BiPAP with oxygen bleed in 5L, followed by sleep medicine       Past Medical History:   Diagnosis Date   • Aneurysm artery, celiac (HCC) 2019   • Aneurysm of right renal artery (HCC)    • Atrial fibrillation (HCC)    • Breath shortness     5L O2 all the time   • Chickenpox    • Congestive heart failure (HCC)    • Diastolic heart failure (HCC)    • English measles    • Heart burn    • Heart valve disease     MV repair   • Hypertension, essential    • Pulmonary hypertension (HCC)    • Sleep apnea     Bipap   • Snoring    • Warfarin anticoagulation      Past Surgical History:   Procedure Laterality Date   • ANKLE ORIF Left 8/5/2020    Procedure: ORIF, ANKLE;  Surgeon: Tk Humphreys M.D.;  Location: SURGERY [facility];  Service: Orthopedics   • HYSTERECTOMY LAPAROSCOPY     • MITRAL VALVE REPAIR     • OTHER      neck surgery   • TONSILLECTOMY     • WRIST FUSION       Family History   Problem Relation Age of Onset   • Heart Disease Maternal Grandfather    • Heart Disease Paternal Grandfather      Social History     Socioeconomic History   • Marital status:      Spouse name: Not on file   • Number of children: Not on file   • Years of education: Not on file   • Highest education level: Not on file   Occupational History   • Not on file   Tobacco Use   • Smoking status: Never Smoker   • Smokeless tobacco: Never Used   Vaping Use   • Vaping Use: Never used   Substance and Sexual Activity   • Alcohol use: Yes     Alcohol/week: 4.2 oz     Types: 7 Shots of liquor per week     Comment: 1 drink a night   • Drug use: No   • Sexual activity: Not on file   Other Topics Concern   • Not on file   Social History Narrative   • Not on file     Social Determinants of Health     Financial Resource Strain:    • Difficulty of Paying Living Expenses:     Food Insecurity:    • Worried About Running Out of Food in the Last Year:    • Ran Out of Food in the Last Year:    Transportation Needs:    • Lack of Transportation (Medical):    • Lack of Transportation (Non-Medical):    Physical Activity:    • Days of Exercise per Week:    • Minutes of Exercise per Session:    Stress:    • Feeling of Stress :    Social Connections:    • Frequency of Communication with Friends and Family:    • Frequency of Social Gatherings with Friends and Family:    • Attends Judaism Services:    • Active Member of Clubs or Organizations:    • Attends Club or Organization Meetings:    • Marital Status:    Intimate Partner Violence:    • Fear of Current or Ex-Partner:    • Emotionally Abused:    • Physically Abused:    • Sexually Abused:      Allergies   Allergen Reactions   • Betapace [Sotalol] Anaphylaxis   • Tikosyn [Dofetilide] Swelling     TONGUE SWELL.    • Zolpidem Tartrate Unspecified     Lightheaded and confusion     Outpatient Encounter Medications as of 10/20/2021   Medication Sig Dispense Refill   • tretinoin (RETIN-A) 0.1 % cream Apply 1 Application topically every evening.     • spironolactone (ALDACTONE) 25 MG Tab Take 0.5 Tablets by mouth every day. 45 Tablet 3   • omeprazole (PRILOSEC) 40 MG delayed-release capsule Take 1 Capsule by mouth every day. 90 Capsule 3   • amiodarone (CORDARONE) 200 MG Tab Take 1 Tablet by mouth 2 times a day. (Patient taking differently: Take 200 mg by mouth every day.) 120 Tablet 3   • digoxin (LANOXIN) 125 MCG Tab Take 1 Tablet by mouth every day. 90 Tablet 2   • rosuvastatin (CRESTOR) 40 MG tablet Take 1 tablet by mouth every evening. 90 tablet 3   • bumetanide (BUMEX) 2 MG tablet Take 2 Tablets by mouth 2 times a day. 120 tablet 11   • potassium chloride SA (KDUR) 20 MEQ Tab CR Take 3 Tablets by mouth 2 times a day. 180 tablet 11   • apixaban (ELIQUIS) 5mg Tab Take 1 tablet by mouth 2 times a day. 180 tablet 3   • metoprolol tartrate  (LOPRESSOR) 50 MG Tab Take 1 tablet by mouth 2 times a day. 60 tablet 11   • valacyclovir (VALTREX) 1 GM Tab Take 1 tablet by mouth every 8 hours as needed.     • Tadalafil, PAH, (ADCIRCA) 20 MG Tab Take 40 mg by mouth every bedtime. Indications: Pulmonary Arterial Hypertension     • PARoxetine (PAXIL) 20 MG Tab Take 20 mg by mouth every morning.     • levothyroxine (SYNTHROID) 75 MCG Tab Take 75 mcg by mouth Every morning on an empty stomach.     • Cholecalciferol (VITAMIN D) 2000 units Cap Take 2,000 Units by mouth every day.     • ALPRAZolam (XANAX) 0.5 MG Tab Take 1 Tablet by mouth 1 time a day as needed.     • HYDROcodone/acetaminophen (NORCO)  MG Tab Take 1 Tablet by mouth 2 times a day as needed.     • [DISCONTINUED] spironolactone (ALDACTONE) 25 MG Tab Take 0.5 Tablets by mouth every day for 30 days. (Patient not taking: Reported on 10/20/2021) 15 Tablet 0   • [DISCONTINUED] tretinoin (RETIN-A) 0.05 % cream Apply 1 Application topically every evening. Apply to face every 3 nights and slowly increase to nightly as irritation allows     • [DISCONTINUED] HYDROcodone-acetaminophen (NORCO) 7.5-325 MG per tablet Take 1 tablet by mouth every 8 hours as needed. Indications: Pain       No facility-administered encounter medications on file as of 10/20/2021.     Review of Systems   Constitutional: Negative for fever and malaise/fatigue.   Respiratory: Positive for shortness of breath. Negative for cough.    Cardiovascular: Positive for orthopnea. Negative for chest pain, palpitations, claudication, leg swelling and PND.   Gastrointestinal: Positive for heartburn (better). Negative for abdominal pain.   Musculoskeletal: Positive for joint pain (left knee). Negative for myalgias.   Neurological: Positive for dizziness (slight).   All other systems reviewed and are negative.       Objective:   /84 (BP Location: Right arm, Patient Position: Sitting, BP Cuff Size: Adult)   Pulse 70   Resp 12   Ht 1.702 m (5'  "7\")   Wt 92.6 kg (204 lb 3.2 oz)   SpO2 93%   BMI 31.98 kg/m²     Physical Exam  Vitals reviewed.   Constitutional:       Appearance: She is well-developed.   HENT:      Head: Normocephalic and atraumatic.   Eyes:      Pupils: Pupils are equal, round, and reactive to light.   Neck:      Vascular: No JVD.   Cardiovascular:      Rate and Rhythm: Normal rate and regular rhythm.      Heart sounds: Normal heart sounds.   Pulmonary:      Effort: Pulmonary effort is normal. No respiratory distress.      Breath sounds: Normal breath sounds. No wheezing or rales.      Comments: Using continuous oxygen at 5L  Abdominal:      General: Bowel sounds are normal.      Palpations: Abdomen is soft.   Musculoskeletal:      Cervical back: Normal range of motion and neck supple.      Right lower leg: No edema.      Left lower leg: No edema.   Skin:     General: Skin is warm and dry.   Neurological:      Mental Status: She is alert and oriented to person, place, and time.   Psychiatric:         Behavior: Behavior normal.       Lab Results   Component Value Date/Time    CHOLSTRLTOT 211 (H) 07/26/2021 01:02 PM     (H) 07/26/2021 01:02 PM    HDL 47 07/26/2021 01:02 PM    TRIGLYCERIDE 231 (H) 07/26/2021 01:02 PM       Lab Results   Component Value Date/Time    SODIUM 138 09/28/2021 03:31 AM    POTASSIUM 3.8 09/28/2021 03:31 AM    CHLORIDE 101 09/28/2021 03:31 AM    CO2 29 09/28/2021 03:31 AM    GLUCOSE 143 (H) 09/28/2021 03:31 AM    BUN 26 (H) 09/28/2021 03:31 AM    CREATININE 1.18 09/28/2021 03:31 AM     Lab Results   Component Value Date/Time    ALKPHOSPHAT 70 09/27/2021 01:25 AM    ASTSGOT 25 09/27/2021 01:25 AM    ALTSGPT 17 09/27/2021 01:25 AM    TBILIRUBIN 0.6 09/27/2021 01:25 AM      Transthoracic Echo Report 1/16/2020  Normal left ventricular systolic function. Left ventricular ejection fraction is visually estimated to be 65%.  A reliable estimation of diastolic function cannot be made due to mitral valve disease.  Normal " inferior vena cava size and inspiratory collapse.  Known mitral valve repair which is functioning normally with   appropriate transvalvular gradient. Mean transvalvular gradient is 3 mmHg at a heart rate of 66 BPM.  Estimated right ventricular systolic pressure  is 32 mmHg.     Transthoracic Echo Report 8/5/2020  Normal left ventricular size, wall thickness, and systolic function.  Dilated right ventricle with preserved systolic function.  No significant valvular pathology.  Estimated right ventricular systolic pressure  is 25 mmHg; normal.  Normal pericardium without effusion.     No prior study is available for comparison.      Transthoracic Echo Report 3/5/2021  Normal left ventricular size and systolic function.  Mild concentric left ventricular hypertrophy.  Left ventricular ejection fraction is visually estimated to be 60%.  Known mitral valve repair which is functioning normally with   appropriate transvalvular gradient.  Mild mitral regurgitation.  Normal inferior vena cava size and inspiratory collapse.    Transesophageal Echo Report 4/22/2021  LV function appears preserved. Sigmoid septum.  Known mitral valve repair, mild MR.  NED has been ligated, no residual NED is seen.  No thrombus is seen in the left atrium.  Moderate tricuspid regurgitation.  Trileaflet aortic valve.  No ASD is seen.    Transthoracic Echo Report 9/2/2021  Patient atrial fibrillation, heart rate 110 bpm.  Left ventricle is small in size.  Left ventricular ejection fraction is visually estimated to be 75%,   hyperdynamic.  Reduced right ventricular systolic function.  Biatrial enlargement.  Mitral valve repair functioning normally: MG 3 mmHg,  BPM.  Aortic valve not well visualized, no significant abnormalities.  Estimated right ventricular systolic pressure  is 30 mmHg.     Compared to prior echo 03/05/21, no significant change.     Assessment:     1. Pulmonary hypertension (HCC)  Basic Metabolic Panel   2. High risk medication  use  Basic Metabolic Panel   3. Chronic anticoagulation     4. Atrial fibrillation, unspecified type (HCC)     5. S/P repeat ablation of atrial fibrillation/atrial flutter     6. Essential hypertension     7. Mixed hyperlipidemia     8. Other sleep apnea     9. Chronic respiratory failure with hypoxia (HCC)     10. Hypokalemia         Medical Decision Making:  Today's Assessment / Status / Plan:   1. Pulmonary hypertension, WHO group 2-3, functional class III:  -ok to decrease Bumex dosing.  Continue 4 mg in am and decrease to 2 mg in PM.   -Restart Spironolactone 12.5 mg daily (she had run out of RX)  -Continue potassium 60 mEq BID  -Continue tadalafil 40 mg daily  -Unable to take Letairis due to hypotension  -BMP in 1 week  -Encourage patient to monitor sodium intake  -Reinforced s/sx of worsening symptoms with patient and weight monitoring. Pt verbalizes understanding. Pt to call office or RTC if present. Pt encouraged to take scale when traveling  -Continue to follow up with [facility]      2.  Sleep apnea:  -Continue BiPAP with oxygen bleeding, using 5 L     3.  Atrial fibrillation:    -followed by EP  -Patient had A. fib ablation on 4/22/2021  -Continue Eliquis 5 mg twice a day  -Continue Amiodarone 200 mg daily, follow up labs in 6 months  -Continue metoprolol 50 mg twice a day     4.  Hyperlipidemia: Last  on 7/26/2021  -Continue rosuvastatin 40 mg daily, Increased mid August     5.  Hypertension:   -BP stable at this time   -Continue metoprolol 50 mg twice a day    Ok to proceed with Left knee surgery from cardiovascular standpoint but will need to follow up with her Pulmonologist for clearance.     Pt to see GI for GERD.      FU in clinic in 3 months. Sooner if needed.     Patient verbalizes understanding and agrees with the plan of care.      PLEASE NOTE: This Note was created using voice recognition Software. I have made every reasonable attempt to correct obvious errors, but I expect that there are  errors of grammar and possibly content that I did not discover before finalizing the note

## 2021-11-01 ENCOUNTER — HOSPITAL ENCOUNTER (OUTPATIENT)
Dept: LAB | Facility: MEDICAL CENTER | Age: 76
End: 2021-11-01
Attending: UROLOGY
Payer: MEDICARE

## 2021-11-01 DIAGNOSIS — Z79.899 HIGH RISK MEDICATION USE: ICD-10-CM

## 2021-11-01 DIAGNOSIS — I27.20 PULMONARY HYPERTENSION (HCC): ICD-10-CM

## 2021-11-01 LAB
ANION GAP SERPL CALC-SCNC: 14 MMOL/L (ref 7–16)
BUN SERPL-MCNC: 29 MG/DL (ref 8–22)
CALCIUM SERPL-MCNC: 10.5 MG/DL (ref 8.5–10.5)
CHLORIDE SERPL-SCNC: 98 MMOL/L (ref 96–112)
CO2 SERPL-SCNC: 30 MMOL/L (ref 20–33)
CREAT SERPL-MCNC: 1.71 MG/DL (ref 0.5–1.4)
GLUCOSE SERPL-MCNC: 114 MG/DL (ref 65–99)
POTASSIUM SERPL-SCNC: 4.1 MMOL/L (ref 3.6–5.5)
SODIUM SERPL-SCNC: 142 MMOL/L (ref 135–145)

## 2021-11-01 PROCEDURE — 80048 BASIC METABOLIC PNL TOTAL CA: CPT

## 2021-11-01 PROCEDURE — 36415 COLL VENOUS BLD VENIPUNCTURE: CPT

## 2021-11-02 DIAGNOSIS — Z79.899 HIGH RISK MEDICATION USE: ICD-10-CM

## 2021-11-02 DIAGNOSIS — I27.20 PULMONARY HYPERTENSION (HCC): ICD-10-CM

## 2021-11-03 NOTE — RESULT ENCOUNTER NOTE
Called patient to discuss kidney function on recent lab test.    Patient feeling okay, reports Swelling is down.    Weights are at 200 lbs. Patient admits to eating out for couple days and states that has been causing problems.    Encourage patient to get back on low-sodium diet.    Stop spironolactone    Repeat a BMP in 1 to 2 weeks    Patient encouraged to contact clinic if weights continue to increase on current dose of Bumex for further recommendations.    Patient asking about surgical clearance for her surgery with Dr. Mclean.  Discussed with patient that I will have RN send over my note.  Patient is still in contact with pulmonary for pulmonary clearance.

## 2021-11-03 NOTE — PROGRESS NOTES
See result note  Stop spironolactone  Repeat BMP in 1 to 2 weeks  
You can access the FollowMyHealth Patient Portal offered by [facility] by registering at the following website: [email or web address]. By joining [facility]’s FollowMyHealth portal, you will also be able to view your health information using other applications (apps) compatible with our system.

## 2021-11-04 ENCOUNTER — GYNECOLOGY VISIT (OUTPATIENT)
Dept: OBGYN | Facility: CLINIC | Age: 76
End: 2021-11-04
Payer: MEDICARE

## 2021-11-04 ENCOUNTER — HOSPITAL ENCOUNTER (OUTPATIENT)
Facility: MEDICAL CENTER | Age: 76
End: 2021-11-04
Attending: STUDENT IN AN ORGANIZED HEALTH CARE EDUCATION/TRAINING PROGRAM
Payer: MEDICARE

## 2021-11-04 VITALS
DIASTOLIC BLOOD PRESSURE: 77 MMHG | HEART RATE: 69 BPM | WEIGHT: 203 LBS | SYSTOLIC BLOOD PRESSURE: 111 MMHG | HEIGHT: 67 IN | BODY MASS INDEX: 31.86 KG/M2

## 2021-11-04 DIAGNOSIS — N95.2 ATROPHIC VAGINITIS: ICD-10-CM

## 2021-11-04 DIAGNOSIS — N39.0 FREQUENT UTI: ICD-10-CM

## 2021-11-04 DIAGNOSIS — N39.46 URINARY INCONTINENCE, MIXED: Primary | ICD-10-CM

## 2021-11-04 DIAGNOSIS — R39.9 UTI SYMPTOMS: ICD-10-CM

## 2021-11-04 LAB
APPEARANCE UR: CLEAR
BILIRUB UR STRIP-MCNC: NORMAL MG/DL
COLOR UR AUTO: YELLOW
GLUCOSE UR STRIP.AUTO-MCNC: NEGATIVE MG/DL
KETONES UR STRIP.AUTO-MCNC: NEGATIVE MG/DL
LEUKOCYTE ESTERASE UR QL STRIP.AUTO: NORMAL
NITRITE UR QL STRIP.AUTO: NEGATIVE
PH UR STRIP.AUTO: 7 [PH] (ref 5–8)
PROT UR QL STRIP: NORMAL MG/DL
RBC UR QL AUTO: NORMAL
SP GR UR STRIP.AUTO: 1.01
UROBILINOGEN UR STRIP-MCNC: NORMAL MG/DL

## 2021-11-04 PROCEDURE — 81002 URINALYSIS NONAUTO W/O SCOPE: CPT | Performed by: STUDENT IN AN ORGANIZED HEALTH CARE EDUCATION/TRAINING PROGRAM

## 2021-11-04 PROCEDURE — 87086 URINE CULTURE/COLONY COUNT: CPT

## 2021-11-04 PROCEDURE — 99204 OFFICE O/P NEW MOD 45 MIN: CPT | Performed by: STUDENT IN AN ORGANIZED HEALTH CARE EDUCATION/TRAINING PROGRAM

## 2021-11-04 RX ORDER — ESTRADIOL 0.1 MG/G
CREAM VAGINAL
Qty: 1 EACH | Refills: 3 | Status: SHIPPED | OUTPATIENT
Start: 2021-11-04 | End: 2021-11-08

## 2021-11-04 ASSESSMENT — FIBROSIS 4 INDEX: FIB4 SCORE: 1.63

## 2021-11-04 NOTE — PROGRESS NOTES
Urogynecology and Pelvic Reconstructive Surgery Consultation Visit    Zeinab Loza MRN:[id number] :1945        Reason for Visit:   Chief Complaint   Patient presents with   • New Patient     Consult         Subjective     History of Presenting Illness:    Ms.Mary Celia Loza is a 76 y.o. year old P2 who presents for for the evaluation and management of urinary incontinence.     She reports that her urinary incontinence really worsened over the last 2 to 4 years since starting diuretic therapy for her cardiovascular issues.  She experiences loss of urine with both cough sneeze laugh and with urgency and running to the bathroom without being able to hold in her urine, and the urgency is more severe than the stress.    Prior Pelvic surgery:   : Total abdominal hysterectomy for cervical cancer.  Ovaries left in situ.     Prior treatment:   none     Fluid intake:   3 cups water  1 cup coffee    Urine cultures:   No recent positive cultures    Pelvic floor symptom review:     Bladder:   Voids per day: 4 Voids per night: 2      Urinary incontinence episodes per day: 2-3    Urge leakage:  On Movement to Bathroom and Full Bladder   Stress leakage: With Cough, With Laugh and With Exercise   Continuous / insensible urine loss: No    Nocturnal enuresis: once   Leakage volume: Large Soaking Episodes   Number of pads/day: 3 big pags    Bladder emptying: Complete   Voiding symptoms: Post-Void Dribble   UTI in last 12 months: yes   Other urologic history: domenic      Prolapse:     Prolapse symptoms: None       Bowel:    Constipation: Yes - uses miralax   Bowel movements per day: 3 per week    Straining to empty bowels: Yes   Splinting to evacuate: No    Painful evacuation: No    Difficulty emptying rectum: No    Incontinence to stool: No   Incontinence to gas: No     Blood in stool: No    Hemorrhoids: No    Bowel conditions: no        Sexual function:    Sexually active: No       Pelvic Pain: No      Past medical  and surgical history    Past obstetric history   Number of vaginal deliveries: 2   Number of  deliveries: 0    Past gynecological history:    Last menstrual period: No LMP recorded. Patient has had a hysterectomy.   History of abnormal uterine bleeding: No    History of fibroids: No    History of endometrial polyps:  No    History of endometriosis: No    History of cervical dysplasia: Yes s/p hyst for dysplasia     Past medical history:  Past Medical History:   Diagnosis Date   • Aneurysm artery, celiac (HCC) 2019   • Aneurysm of right renal artery (HCC)    • Atrial fibrillation (HCC)    • Breath shortness     5L O2 all the time   • Chickenpox    • Congestive heart failure (HCC)    • Diastolic heart failure (HCC)    • [place] measles    • Heart burn    • Heart valve disease     MV repair   • Hypertension, essential    • Pulmonary hypertension (HCC)    • Sleep apnea     Bipap   • Snoring    • Warfarin anticoagulation      Past surgical history:  Past Surgical History:   Procedure Laterality Date   • ANKLE ORIF Left 2020    Procedure: ORIF, ANKLE;  Surgeon: Tk Humphreys M.D.;  Location: SURGERY [facility];  Service: Orthopedics   • HYSTERECTOMY LAPAROSCOPY     • MITRAL VALVE REPAIR     • OTHER      neck surgery   • TONSILLECTOMY     • WRIST FUSION       Medications:has a current medication list which includes the following prescription(s): estradiol, alprazolam, hydrocodone/acetaminophen, tretinoin, spironolactone, omeprazole, amiodarone, digoxin, rosuvastatin, bumetanide, potassium chloride sa, apixaban, metoprolol tartrate, valacyclovir, tadalafil (pah), paroxetine, levothyroxine, and vitamin d.  Allergies:Betapace [sotalol], Tikosyn [dofetilide], and Zolpidem tartrate  Family history:  Family History   Problem Relation Age of Onset   • Heart Disease Maternal Grandfather    • Heart Disease Paternal Grandfather      Social history: reports that she has never smoked. She has never used smokeless  "tobacco. She reports current alcohol use of about 4.2 oz of alcohol per week. She reports that she does not use drugs.    Review of systems: A full review of systems was performed, and negative with the exception of want is noted above in the HPI.        Objective        /77 (BP Location: Left arm, Patient Position: Sitting, BP Cuff Size: Adult)   Pulse 69   Ht 1.702 m (5' 7\")   Wt 92.1 kg (203 lb)   BMI 31.79 kg/m²     Physical Exam  Vitals reviewed. Exam conducted with a chaperone present (MA - see notes.).   Constitutional:       Appearance: Normal appearance.   HENT:      Head: Normocephalic.      Mouth/Throat:      Mouth: Mucous membranes are moist.   Cardiovascular:      Rate and Rhythm: Normal rate.   Pulmonary:      Effort: Pulmonary effort is normal.   Abdominal:      Palpations: Abdomen is soft. There is no mass.      Tenderness: There is no abdominal tenderness.   Skin:     General: Skin is warm and dry.   Neurological:      Mental Status: She is alert.   Psychiatric:         Mood and Affect: Mood normal.         Genitourinary: Deferred to UDS visit      Procedure Performed: No    Diagnostic test and records review:    Urine dipstick: Trace blood, negative nitrites, small leuks     Post-void residual: 4mL, performed by Bladder Scanner      Documentation reviewed: Prior EMR Records             Assessment & Plan     Ms.Mary Celia Loza is a 76 y.o. year old P2 with mixed urinary incontinence. We discussed my recommendations for further diagnosis and treatment at length today.     1. Urinary incontinence, mixed  Ms. Loza reports symptoms of mixed stress and urge urinary incontinence, Urge predominant. She was educated on the pathophysiology of bladder urgency, and that her symptoms are likely due to overactivity of the bladder muscle and nerves. The pathogenesis of SAMIRA is related to weakness in the pelvic structures includes genetic tendency, aging, menopause and childbirth injuries. I " discussed options for management which include both nonsurgical and surgical options.   - For SAMIRA, management includes non-surgical pelvic floor physical therapy and pessary use.  I discussed different types of pessary that may be used for stress urinary incontinence as well as pessary care.  She also may be a candidate for a mid-urethral sling or urethral bulking procedure.  If the patient opts for surgery, I recommend a urodynamics test to further evaluate bladder physiology and provide adequate preopertive counseling.  She may also require cystoscopy pending  the results of the urodynamics test.  She verberlized understanding and had the opportunity to ask questions which were answered by me today.   - For urge incontinence and overactive bladder, I reviewed conservative/behavioral management strategies, including fluid management, avoidance of bladder irritants, urge strategies and Kegel's exercises. Moderate weight loss in obese patients (5-8%) can lead to significant urinary symptom improvement. Overview of pelvic floor physical therapy, biofeedback, and second-line oral medical therapy (anticholinergics, beta-3 agonists) and third-line therapies (intravesical botox injection, PTNS, sacral neuromodulation) discussed.  It should be noted that I do not think she is a candidate for oral medical therapy for her overactive bladder symptoms given both her cardiovascular risk factors and risk of cognitive side effects from anticholinergics.  Plan:   - Urine culture to rule out infections etiology  -Due to the mixed nature of her urinary incontinence I recommend complex urodynamic testing to evaluate the bladder pathophysiology and to guide therapeutic options.  She has an upcoming knee replacement surgery, and I recommend this take place at least 1 month after this procedure is completed.      2. Frequent UTI   she notes a history of recurrent urinary tract infections, although she has not had one in the last 12  months, which is reassuring.  She is counseled that the best therapeutic approach to preventing urinary tract infections in postmenopausal women is vaginal estrogen replacement.  See atrophic vaginitis below.  Some changes consistent with bacteria today on her urinalysis, recommend sending urine culture  - URINE CULTURE(NEW); Future    3. Atrophic vaginitis   Her exam confirms vaginal atrophy / genitorurinary syndrome of menopause. This is very common and due to low estrogen levels, which render the vaginal tissue thin, irritated, and open to colonization with gut matias. This can lead to irritation, dryness, painful sex, urinary infections and urinary urgency. Discussed risks, benefits, and indications for vaginal estrogen therapy.  Vaginal estrogen has negligible absorption into the bloodstream and is not associated with increased risks for uterine or breast cancers. Prescription given for estrace vaginal cream to be placed inside vagina nightly for 2 weeks, then twice weekly thereafter. The effects of vaginal estrogen can take weeks to months.    Other orders  - estradiol (ESTRACE VAGINAL) 0.1 MG/GM vaginal cream; Apply 1g cream inside vagina using applicator nightly for 2 weeks, then twice per week thereafter  Dispense: 1 Each; Refill: 3   ** Addendum 11/8 ** Above med too expensicve, rx for premarin sent. KFMD Jeana Chandler MD, FACOG    Female Pelvic Medicine and Reconstructive Surgery  Department of Obstetrics and Gynecology  [facility]        This medical record contains text that has been entered with the assistance of computer voice recognition and dictation software.  Therefore, it may contain unintended errors in text, spelling, punctuation, or grammar

## 2021-11-04 NOTE — NON-PROVIDER
PT here today for consult   PT States urinary incontinence, urgency onset three years approx.   Hysterectomy? Yes; 1986  Good #: [phone]   Bladder Scan:  4  Pharmacy Verified

## 2021-11-05 ENCOUNTER — TELEPHONE (OUTPATIENT)
Dept: CARDIOLOGY | Facility: MEDICAL CENTER | Age: 76
End: 2021-11-05

## 2021-11-06 NOTE — TELEPHONE ENCOUNTER
TEODORO Hogan.  Moo Thomas R.N.  Can you send over my office note to Dr. Mclean office for patient's knee surgery.

## 2021-11-07 LAB
BACTERIA UR CULT: NORMAL
SIGNIFICANT IND 70042: NORMAL
SITE SITE: NORMAL
SOURCE SOURCE: NORMAL

## 2021-11-08 NOTE — ADDENDUM NOTE
Addended by: RAND SALEH on: 11/8/2021 02:35 PM     Modules accepted: Orders     Constitutional:  No fever, chills  Cardiac:  No chest pain or palpitations  Respiratory:  No cough or respiratory distress.   GI:  No nausea, vomiting, diarrhea or abdominal pain.  Neuro:  No headache. + ETOH intoxication  Skin:  No skin rash or lacerations/abrasions  Except as documented in the HPI,  all other systems are negative

## 2021-11-11 ENCOUNTER — PRE-ADMISSION TESTING (OUTPATIENT)
Dept: ADMISSIONS | Facility: MEDICAL CENTER | Age: 76
End: 2021-11-11
Attending: ORTHOPAEDIC SURGERY
Payer: MEDICARE

## 2021-11-11 DIAGNOSIS — Z01.812 PRE-OPERATIVE LABORATORY EXAMINATION: ICD-10-CM

## 2021-11-11 LAB
ANION GAP SERPL CALC-SCNC: 9 MMOL/L (ref 7–16)
BUN SERPL-MCNC: 19 MG/DL (ref 8–22)
CALCIUM SERPL-MCNC: 10.5 MG/DL (ref 8.4–10.2)
CHLORIDE SERPL-SCNC: 100 MMOL/L (ref 96–112)
CO2 SERPL-SCNC: 32 MMOL/L (ref 20–33)
CREAT SERPL-MCNC: 1.24 MG/DL (ref 0.5–1.4)
ERYTHROCYTE [DISTWIDTH] IN BLOOD BY AUTOMATED COUNT: 53.6 FL (ref 35.9–50)
GLUCOSE SERPL-MCNC: 137 MG/DL (ref 65–99)
HCT VFR BLD AUTO: 38.3 % (ref 37–47)
HGB BLD-MCNC: 11.8 G/DL (ref 12–16)
MCH RBC QN AUTO: 32 PG (ref 27–33)
MCHC RBC AUTO-ENTMCNC: 30.8 G/DL (ref 33.6–35)
MCV RBC AUTO: 103.8 FL (ref 81.4–97.8)
PLATELET # BLD AUTO: 230 K/UL (ref 164–446)
PMV BLD AUTO: 9 FL (ref 9–12.9)
POTASSIUM SERPL-SCNC: 4.7 MMOL/L (ref 3.6–5.5)
RBC # BLD AUTO: 3.69 M/UL (ref 4.2–5.4)
SODIUM SERPL-SCNC: 141 MMOL/L (ref 135–145)
WBC # BLD AUTO: 5.7 K/UL (ref 4.8–10.8)

## 2021-11-11 PROCEDURE — 87641 MR-STAPH DNA AMP PROBE: CPT

## 2021-11-11 PROCEDURE — 87640 STAPH A DNA AMP PROBE: CPT | Mod: XU

## 2021-11-11 PROCEDURE — 80048 BASIC METABOLIC PNL TOTAL CA: CPT

## 2021-11-11 PROCEDURE — 85027 COMPLETE CBC AUTOMATED: CPT

## 2021-11-11 PROCEDURE — 36415 COLL VENOUS BLD VENIPUNCTURE: CPT

## 2021-11-11 RX ORDER — CEFAZOLIN SODIUM IN 0.9 % NACL 2 G/100 ML
2 PLASTIC BAG, INJECTION (ML) INTRAVENOUS ONCE
Status: CANCELLED | OUTPATIENT
Start: 2021-11-23 | End: 2021-11-24

## 2021-11-11 ASSESSMENT — FIBROSIS 4 INDEX: FIB4 SCORE: 1.63

## 2021-11-11 NOTE — PREPROCEDURE INSTRUCTIONS
Pre admit apt: Pt. Instructed to continue regularly prescribed medications through day before surgery.  Instructed to take the following medications, the day of surgery, with a sip of water per anesthesia protocol:amiodarone, lanoxin, hydrocodone, levothyroxine, metoprolol, omeprazole, paxil, tadalafil,     Covid vaccinated x 1 shot, instructed pt to avoid large crowds, wear a mask in public, and to report any new sxs of covid/illness to surgeon.    Pt states, no previous issues with anesthesia.

## 2021-11-11 NOTE — OR NURSING
Good afternoon,    Can you please call our office at [phone] and request a cardiac anesthesiologist for this patient given the history of pulmonary hypertension and the recommendations as noted. We will then arrange for someone to be available for this patient. Thank you.    Ash Lozoya M.D.  [facility]      On Nov 11, 2021, at 13:01, Yamile Alatorre [email or web address] wrote:  ?   Ayde Lozoya, this pt is having a TKA on 11/23, with Dr. Mclean.       Pt has pulmonary htn, takes tadalfil  Pt has hx of a-fib, currently on amiodarone, and eliquis.  Pt has sleep apnea, uses bipap with 02@5L continuously.  Mitral valve repair in 2005     Pt does have a pulmonary clearance from [facility], department of pulmonology- Pulmonologist states pt would be cleared for a total knee replacement as long as her anesthesia is monitored by a cardiac anesthesia specialist.  Ideally the patient would do best with regional anesthesia.     [facility] stating Ok to proceed with left knee surgery from cardiovascular standpoint but will need to f/u with her pulmonologist for clearance.     Pt states she hasn’t had any previous issues with anesthesia, but she and her daughter are interested in talking with the anesthesiologist, before surgery.  Thank you              Anesthesia Summary      Patient Name: Zeinab Loza MRN: [id number] Admission Date: Patient not admitted   Allergies: Betapace [Sotalol], Tikosyn [Dofetilide], Zolpidem Tartrate

## 2021-11-11 NOTE — DISCHARGE PLANNING
DISCHARGE PLANNING NOTE - TOTAL JOINT    Procedure: Procedure(s):  ARTHROPLASTY, KNEE, TOTAL  Procedure Date: 11/23/2021  Insurance: Payor: MEDICARE / Plan: MEDICARE PART A & B / Product Type: *No Product type* /    Equipment currently available at home?  front-wheel walker and shower chair  Steps into the home? 1  Steps within the home? 0  Toilet height? ADA  Type of shower? walk-in shower  Who will be with you during your recovery? Daughter, Vernell  Is Outpatient Physical Therapy set up after surgery? Yes  Did you take the Total Joint Class and where? Yes received NAON book today.  Planning same day discharge?No     This writer met with pt and her daughter, Vernell, during her preadmission appointment. Pt states she has all needed equipment. Pt is on oxygen at 5 liters/min continuously and will bring her portable tank with her on day of surgery. Her provider is Preferred.  Home safety checklist reviewed and copy given to pt. Pt educated to dc criteria. All questions answered and verbalizes understanding of all instructions. No dc needs identified at this time. Anticipate dc to home without barriers.

## 2021-11-11 NOTE — OR NURSING
Dr. Lozoya requested I call his office and request a cardiac anesthesiologist for this patient, given the history of pulmonary hypertension and the  recommendations noted by pulmonologist.  Called patient and said cardiac anesthesiologist would call her the day before surgery.    Pt is contacting her cardiologist with instructions when to stop eliquis.

## 2021-11-12 LAB
SCCMEC + MECA PNL NOSE NAA+PROBE: NEGATIVE
SCCMEC + MECA PNL NOSE NAA+PROBE: NEGATIVE

## 2021-11-19 ENCOUNTER — HOSPITAL ENCOUNTER (OUTPATIENT)
Dept: RADIOLOGY | Facility: MEDICAL CENTER | Age: 76
End: 2021-11-19
Attending: FAMILY MEDICINE | Admitting: ORTHOPAEDIC SURGERY
Payer: MEDICARE

## 2021-11-19 ENCOUNTER — PRE-ADMISSION TESTING (OUTPATIENT)
Dept: ADMISSIONS | Facility: MEDICAL CENTER | Age: 76
End: 2021-11-19
Attending: ORTHOPAEDIC SURGERY
Payer: MEDICARE

## 2021-11-19 DIAGNOSIS — Z12.31 VISIT FOR SCREENING MAMMOGRAM: ICD-10-CM

## 2021-11-19 DIAGNOSIS — Z01.812 PRE-OPERATIVE LABORATORY EXAMINATION: ICD-10-CM

## 2021-11-19 LAB — COVID ORDER STATUS COVID19: NORMAL

## 2021-11-19 PROCEDURE — U0003 INFECTIOUS AGENT DETECTION BY NUCLEIC ACID (DNA OR RNA); SEVERE ACUTE RESPIRATORY SYNDROME CORONAVIRUS 2 (SARS-COV-2) (CORONAVIRUS DISEASE [COVID-19]), AMPLIFIED PROBE TECHNIQUE, MAKING USE OF HIGH THROUGHPUT TECHNOLOGIES AS DESCRIBED BY CMS-2020-01-R: HCPCS

## 2021-11-19 PROCEDURE — U0005 INFEC AGEN DETEC AMPLI PROBE: HCPCS

## 2021-11-19 PROCEDURE — 77063 BREAST TOMOSYNTHESIS BI: CPT

## 2021-11-20 LAB
SARS-COV-2 RNA RESP QL NAA+PROBE: NOTDETECTED
SPECIMEN SOURCE: NORMAL

## 2021-11-22 ENCOUNTER — ANESTHESIA EVENT (OUTPATIENT)
Dept: SURGERY | Facility: MEDICAL CENTER | Age: 76
End: 2021-11-22
Payer: MEDICARE

## 2021-11-23 ENCOUNTER — ANESTHESIA (OUTPATIENT)
Dept: SURGERY | Facility: MEDICAL CENTER | Age: 76
End: 2021-11-23
Payer: MEDICARE

## 2021-11-23 ENCOUNTER — HOSPITAL ENCOUNTER (OUTPATIENT)
Facility: MEDICAL CENTER | Age: 76
End: 2021-11-23
Attending: ORTHOPAEDIC SURGERY | Admitting: ORTHOPAEDIC SURGERY
Payer: MEDICARE

## 2021-11-23 VITALS
RESPIRATION RATE: 16 BRPM | OXYGEN SATURATION: 100 % | TEMPERATURE: 98 F | HEART RATE: 68 BPM | BODY MASS INDEX: 32.46 KG/M2 | WEIGHT: 206.79 LBS | HEIGHT: 67 IN | DIASTOLIC BLOOD PRESSURE: 71 MMHG | SYSTOLIC BLOOD PRESSURE: 153 MMHG

## 2021-11-23 DIAGNOSIS — G89.18 POST-OPERATIVE PAIN: ICD-10-CM

## 2021-11-23 DIAGNOSIS — R92.8 ABNORMAL MAMMOGRAM: ICD-10-CM

## 2021-11-23 DIAGNOSIS — M17.12 PRIMARY OSTEOARTHRITIS OF LEFT KNEE: ICD-10-CM

## 2021-11-23 PROCEDURE — G0378 HOSPITAL OBSERVATION PER HR: HCPCS

## 2021-11-23 PROCEDURE — 97162 PT EVAL MOD COMPLEX 30 MIN: CPT

## 2021-11-23 PROCEDURE — 700111 HCHG RX REV CODE 636 W/ 250 OVERRIDE (IP): Performed by: STUDENT IN AN ORGANIZED HEALTH CARE EDUCATION/TRAINING PROGRAM

## 2021-11-23 PROCEDURE — 160009 HCHG ANES TIME/MIN: Performed by: ORTHOPAEDIC SURGERY

## 2021-11-23 PROCEDURE — C1776 JOINT DEVICE (IMPLANTABLE): HCPCS | Performed by: ORTHOPAEDIC SURGERY

## 2021-11-23 PROCEDURE — 700102 HCHG RX REV CODE 250 W/ 637 OVERRIDE(OP): Performed by: STUDENT IN AN ORGANIZED HEALTH CARE EDUCATION/TRAINING PROGRAM

## 2021-11-23 PROCEDURE — 160036 HCHG PACU - EA ADDL 30 MINS PHASE I: Performed by: ORTHOPAEDIC SURGERY

## 2021-11-23 PROCEDURE — 160002 HCHG RECOVERY MINUTES (STAT): Performed by: ORTHOPAEDIC SURGERY

## 2021-11-23 PROCEDURE — 502000 HCHG MISC OR IMPLANTS RC 0278: Performed by: ORTHOPAEDIC SURGERY

## 2021-11-23 PROCEDURE — 700101 HCHG RX REV CODE 250: Performed by: ORTHOPAEDIC SURGERY

## 2021-11-23 PROCEDURE — A9270 NON-COVERED ITEM OR SERVICE: HCPCS | Performed by: STUDENT IN AN ORGANIZED HEALTH CARE EDUCATION/TRAINING PROGRAM

## 2021-11-23 PROCEDURE — 700111 HCHG RX REV CODE 636 W/ 250 OVERRIDE (IP)

## 2021-11-23 PROCEDURE — 160048 HCHG OR STATISTICAL LEVEL 1-5: Performed by: ORTHOPAEDIC SURGERY

## 2021-11-23 PROCEDURE — 502240 HCHG MISC OR SUPPLY RC 0272: Performed by: ORTHOPAEDIC SURGERY

## 2021-11-23 PROCEDURE — 27447 TOTAL KNEE ARTHROPLASTY: CPT | Mod: ASROC,LT | Performed by: STUDENT IN AN ORGANIZED HEALTH CARE EDUCATION/TRAINING PROGRAM

## 2021-11-23 PROCEDURE — 27447 TOTAL KNEE ARTHROPLASTY: CPT | Mod: LT | Performed by: ORTHOPAEDIC SURGERY

## 2021-11-23 PROCEDURE — 160041 HCHG SURGERY MINUTES - EA ADDL 1 MIN LEVEL 4: Performed by: ORTHOPAEDIC SURGERY

## 2021-11-23 PROCEDURE — 700101 HCHG RX REV CODE 250: Performed by: STUDENT IN AN ORGANIZED HEALTH CARE EDUCATION/TRAINING PROGRAM

## 2021-11-23 PROCEDURE — 64447 NJX AA&/STRD FEMORAL NRV IMG: CPT | Performed by: ORTHOPAEDIC SURGERY

## 2021-11-23 PROCEDURE — 700105 HCHG RX REV CODE 258: Performed by: ORTHOPAEDIC SURGERY

## 2021-11-23 PROCEDURE — 700111 HCHG RX REV CODE 636 W/ 250 OVERRIDE (IP): Performed by: ORTHOPAEDIC SURGERY

## 2021-11-23 PROCEDURE — 502579 HCHG PACK, TOTAL KNEE: Performed by: ORTHOPAEDIC SURGERY

## 2021-11-23 PROCEDURE — C1713 ANCHOR/SCREW BN/BN,TIS/BN: HCPCS | Performed by: ORTHOPAEDIC SURGERY

## 2021-11-23 PROCEDURE — 160035 HCHG PACU - 1ST 60 MINS PHASE I: Performed by: ORTHOPAEDIC SURGERY

## 2021-11-23 PROCEDURE — 94760 N-INVAS EAR/PLS OXIMETRY 1: CPT

## 2021-11-23 PROCEDURE — 160029 HCHG SURGERY MINUTES - 1ST 30 MINS LEVEL 4: Performed by: ORTHOPAEDIC SURGERY

## 2021-11-23 PROCEDURE — 97165 OT EVAL LOW COMPLEX 30 MIN: CPT

## 2021-11-23 DEVICE — IMPLANTABLE DEVICE: Type: IMPLANTABLE DEVICE | Site: KNEE | Status: FUNCTIONAL

## 2021-11-23 RX ORDER — LABETALOL HYDROCHLORIDE 5 MG/ML
5 INJECTION, SOLUTION INTRAVENOUS
Status: DISCONTINUED | OUTPATIENT
Start: 2021-11-23 | End: 2021-11-23 | Stop reason: HOSPADM

## 2021-11-23 RX ORDER — PHENYLEPHRINE HYDROCHLORIDE 10 MG/ML
INJECTION, SOLUTION INTRAMUSCULAR; INTRAVENOUS; SUBCUTANEOUS PRN
Status: DISCONTINUED | OUTPATIENT
Start: 2021-11-23 | End: 2021-11-23 | Stop reason: SURG

## 2021-11-23 RX ORDER — DIPHENHYDRAMINE HCL 25 MG
25 TABLET ORAL EVERY 6 HOURS PRN
Status: DISCONTINUED | OUTPATIENT
Start: 2021-11-23 | End: 2021-11-23 | Stop reason: HOSPADM

## 2021-11-23 RX ORDER — OXYCODONE HCL 5 MG/5 ML
10 SOLUTION, ORAL ORAL
Status: COMPLETED | OUTPATIENT
Start: 2021-11-23 | End: 2021-11-23

## 2021-11-23 RX ORDER — HYDROMORPHONE HYDROCHLORIDE 1 MG/ML
0.5 INJECTION, SOLUTION INTRAMUSCULAR; INTRAVENOUS; SUBCUTANEOUS
Status: DISCONTINUED | OUTPATIENT
Start: 2021-11-23 | End: 2021-11-23 | Stop reason: HOSPADM

## 2021-11-23 RX ORDER — PAROXETINE HYDROCHLORIDE 20 MG/1
20 TABLET, FILM COATED ORAL EVERY MORNING
Status: DISCONTINUED | OUTPATIENT
Start: 2021-11-23 | End: 2021-11-23 | Stop reason: HOSPADM

## 2021-11-23 RX ORDER — ACETAMINOPHEN 500 MG
1000 TABLET ORAL EVERY 6 HOURS
Status: DISCONTINUED | OUTPATIENT
Start: 2021-11-23 | End: 2021-11-23 | Stop reason: HOSPADM

## 2021-11-23 RX ORDER — DIPHENHYDRAMINE HCL 25 MG
25 TABLET ORAL NIGHTLY PRN
Status: DISCONTINUED | OUTPATIENT
Start: 2021-11-24 | End: 2021-11-23 | Stop reason: HOSPADM

## 2021-11-23 RX ORDER — ACETAMINOPHEN 500 MG
1000 TABLET ORAL ONCE
Status: COMPLETED | OUTPATIENT
Start: 2021-11-23 | End: 2021-11-23

## 2021-11-23 RX ORDER — ACETAMINOPHEN 500 MG
1000 TABLET ORAL EVERY 6 HOURS PRN
Status: DISCONTINUED | OUTPATIENT
Start: 2021-11-28 | End: 2021-11-23 | Stop reason: HOSPADM

## 2021-11-23 RX ORDER — DOCUSATE SODIUM 100 MG/1
100 CAPSULE, LIQUID FILLED ORAL 2 TIMES DAILY
Status: DISCONTINUED | OUTPATIENT
Start: 2021-11-23 | End: 2021-11-23 | Stop reason: HOSPADM

## 2021-11-23 RX ORDER — POLYETHYLENE GLYCOL 3350 17 G/17G
1 POWDER, FOR SOLUTION ORAL 2 TIMES DAILY PRN
Status: DISCONTINUED | OUTPATIENT
Start: 2021-11-23 | End: 2021-11-23 | Stop reason: HOSPADM

## 2021-11-23 RX ORDER — OXYCODONE HCL 5 MG/5 ML
5 SOLUTION, ORAL ORAL
Status: COMPLETED | OUTPATIENT
Start: 2021-11-23 | End: 2021-11-23

## 2021-11-23 RX ORDER — TRANEXAMIC ACID 100 MG/ML
INJECTION, SOLUTION INTRAVENOUS PRN
Status: DISCONTINUED | OUTPATIENT
Start: 2021-11-23 | End: 2021-11-23 | Stop reason: SURG

## 2021-11-23 RX ORDER — HYDRALAZINE HYDROCHLORIDE 20 MG/ML
5 INJECTION INTRAMUSCULAR; INTRAVENOUS
Status: DISCONTINUED | OUTPATIENT
Start: 2021-11-23 | End: 2021-11-23 | Stop reason: HOSPADM

## 2021-11-23 RX ORDER — DEXAMETHASONE SODIUM PHOSPHATE 4 MG/ML
INJECTION, SOLUTION INTRA-ARTICULAR; INTRALESIONAL; INTRAMUSCULAR; INTRAVENOUS; SOFT TISSUE PRN
Status: DISCONTINUED | OUTPATIENT
Start: 2021-11-23 | End: 2021-11-23 | Stop reason: SURG

## 2021-11-23 RX ORDER — OXYCODONE HYDROCHLORIDE 5 MG/1
5 TABLET ORAL
Status: DISCONTINUED | OUTPATIENT
Start: 2021-11-23 | End: 2021-11-23 | Stop reason: HOSPADM

## 2021-11-23 RX ORDER — HYDROMORPHONE HYDROCHLORIDE 1 MG/ML
1 INJECTION, SOLUTION INTRAMUSCULAR; INTRAVENOUS; SUBCUTANEOUS
Status: DISCONTINUED | OUTPATIENT
Start: 2021-11-23 | End: 2021-11-23 | Stop reason: HOSPADM

## 2021-11-23 RX ORDER — BISACODYL 10 MG
10 SUPPOSITORY, RECTAL RECTAL
Status: DISCONTINUED | OUTPATIENT
Start: 2021-11-23 | End: 2021-11-23 | Stop reason: HOSPADM

## 2021-11-23 RX ORDER — ROPIVACAINE HYDROCHLORIDE 5 MG/ML
INJECTION, SOLUTION EPIDURAL; INFILTRATION; PERINEURAL
Status: DISCONTINUED | OUTPATIENT
Start: 2021-11-23 | End: 2021-11-23 | Stop reason: HOSPADM

## 2021-11-23 RX ORDER — AMOXICILLIN 250 MG
1 CAPSULE ORAL
Status: DISCONTINUED | OUTPATIENT
Start: 2021-11-23 | End: 2021-11-23 | Stop reason: HOSPADM

## 2021-11-23 RX ORDER — ENEMA 19; 7 G/133ML; G/133ML
1 ENEMA RECTAL
Status: DISCONTINUED | OUTPATIENT
Start: 2021-11-23 | End: 2021-11-23 | Stop reason: HOSPADM

## 2021-11-23 RX ORDER — EPINEPHRINE 1 MG/ML(1)
AMPUL (ML) INJECTION
Status: DISCONTINUED | OUTPATIENT
Start: 2021-11-23 | End: 2021-11-23 | Stop reason: HOSPADM

## 2021-11-23 RX ORDER — HALOPERIDOL 5 MG/ML
1 INJECTION INTRAMUSCULAR
Status: DISCONTINUED | OUTPATIENT
Start: 2021-11-23 | End: 2021-11-23 | Stop reason: HOSPADM

## 2021-11-23 RX ORDER — CEFAZOLIN SODIUM IN 0.9 % NACL 2 G/100 ML
2 PLASTIC BAG, INJECTION (ML) INTRAVENOUS ONCE
Status: DISCONTINUED | OUTPATIENT
Start: 2021-11-23 | End: 2021-11-23 | Stop reason: HOSPADM

## 2021-11-23 RX ORDER — CHLORPROMAZINE HYDROCHLORIDE 25 MG/ML
25 INJECTION INTRAMUSCULAR EVERY 6 HOURS PRN
Status: DISCONTINUED | OUTPATIENT
Start: 2021-11-23 | End: 2021-11-23 | Stop reason: HOSPADM

## 2021-11-23 RX ORDER — OXYCODONE HYDROCHLORIDE 5 MG/1
5-10 TABLET ORAL EVERY 4 HOURS PRN
Qty: 42 TABLET | Refills: 0 | Status: SHIPPED | OUTPATIENT
Start: 2021-11-23 | End: 2021-11-30

## 2021-11-23 RX ORDER — DEXAMETHASONE SODIUM PHOSPHATE 4 MG/ML
4 INJECTION, SOLUTION INTRA-ARTICULAR; INTRALESIONAL; INTRAMUSCULAR; INTRAVENOUS; SOFT TISSUE
Status: DISCONTINUED | OUTPATIENT
Start: 2021-11-23 | End: 2021-11-23 | Stop reason: HOSPADM

## 2021-11-23 RX ORDER — ROCURONIUM BROMIDE 10 MG/ML
INJECTION, SOLUTION INTRAVENOUS PRN
Status: DISCONTINUED | OUTPATIENT
Start: 2021-11-23 | End: 2021-11-23 | Stop reason: SURG

## 2021-11-23 RX ORDER — SODIUM CHLORIDE, SODIUM LACTATE, POTASSIUM CHLORIDE, CALCIUM CHLORIDE 600; 310; 30; 20 MG/100ML; MG/100ML; MG/100ML; MG/100ML
INJECTION, SOLUTION INTRAVENOUS CONTINUOUS
Status: ACTIVE | OUTPATIENT
Start: 2021-11-23 | End: 2021-11-23

## 2021-11-23 RX ORDER — BUPIVACAINE HYDROCHLORIDE 2.5 MG/ML
INJECTION, SOLUTION EPIDURAL; INFILTRATION; INTRACAUDAL
Status: COMPLETED | OUTPATIENT
Start: 2021-11-23 | End: 2021-11-23

## 2021-11-23 RX ORDER — DIGOXIN 125 MCG
125 TABLET ORAL DAILY
Status: DISCONTINUED | OUTPATIENT
Start: 2021-11-24 | End: 2021-11-23 | Stop reason: HOSPADM

## 2021-11-23 RX ORDER — ONDANSETRON 2 MG/ML
INJECTION INTRAMUSCULAR; INTRAVENOUS PRN
Status: DISCONTINUED | OUTPATIENT
Start: 2021-11-23 | End: 2021-11-23 | Stop reason: SURG

## 2021-11-23 RX ORDER — SODIUM CHLORIDE, SODIUM LACTATE, POTASSIUM CHLORIDE, CALCIUM CHLORIDE 600; 310; 30; 20 MG/100ML; MG/100ML; MG/100ML; MG/100ML
INJECTION, SOLUTION INTRAVENOUS CONTINUOUS
Status: DISCONTINUED | OUTPATIENT
Start: 2021-11-23 | End: 2021-11-23 | Stop reason: HOSPADM

## 2021-11-23 RX ORDER — HALOPERIDOL 5 MG/ML
1 INJECTION INTRAMUSCULAR EVERY 6 HOURS PRN
Status: DISCONTINUED | OUTPATIENT
Start: 2021-11-23 | End: 2021-11-23 | Stop reason: HOSPADM

## 2021-11-23 RX ORDER — LEVOTHYROXINE SODIUM 0.07 MG/1
75 TABLET ORAL
Status: DISCONTINUED | OUTPATIENT
Start: 2021-11-24 | End: 2021-11-23 | Stop reason: HOSPADM

## 2021-11-23 RX ORDER — CHLORPROMAZINE HYDROCHLORIDE 25 MG/1
25 TABLET, FILM COATED ORAL EVERY 6 HOURS PRN
Status: DISCONTINUED | OUTPATIENT
Start: 2021-11-23 | End: 2021-11-23 | Stop reason: HOSPADM

## 2021-11-23 RX ORDER — LIDOCAINE HYDROCHLORIDE 10 MG/ML
INJECTION, SOLUTION EPIDURAL; INFILTRATION; INTRACAUDAL; PERINEURAL
Status: COMPLETED
Start: 2021-11-23 | End: 2021-11-23

## 2021-11-23 RX ORDER — DIPHENHYDRAMINE HYDROCHLORIDE 50 MG/ML
12.5 INJECTION INTRAMUSCULAR; INTRAVENOUS
Status: DISCONTINUED | OUTPATIENT
Start: 2021-11-23 | End: 2021-11-23 | Stop reason: HOSPADM

## 2021-11-23 RX ORDER — AMIODARONE HYDROCHLORIDE 200 MG/1
200 TABLET ORAL 2 TIMES DAILY
Status: DISCONTINUED | OUTPATIENT
Start: 2021-11-23 | End: 2021-11-23 | Stop reason: HOSPADM

## 2021-11-23 RX ORDER — DIPHENHYDRAMINE HYDROCHLORIDE 50 MG/ML
25 INJECTION INTRAMUSCULAR; INTRAVENOUS EVERY 6 HOURS PRN
Status: DISCONTINUED | OUTPATIENT
Start: 2021-11-23 | End: 2021-11-23 | Stop reason: HOSPADM

## 2021-11-23 RX ORDER — SCOLOPAMINE TRANSDERMAL SYSTEM 1 MG/1
1 PATCH, EXTENDED RELEASE TRANSDERMAL
Status: DISCONTINUED | OUTPATIENT
Start: 2021-11-23 | End: 2021-11-23 | Stop reason: HOSPADM

## 2021-11-23 RX ORDER — VANCOMYCIN HYDROCHLORIDE 1 G/20ML
INJECTION, POWDER, LYOPHILIZED, FOR SOLUTION INTRAVENOUS
Status: COMPLETED | OUTPATIENT
Start: 2021-11-23 | End: 2021-11-23

## 2021-11-23 RX ORDER — TRAMADOL HYDROCHLORIDE 50 MG/1
50-100 TABLET ORAL EVERY 6 HOURS PRN
Qty: 80 TABLET | Refills: 0 | Status: SHIPPED | OUTPATIENT
Start: 2021-11-23 | End: 2021-12-07

## 2021-11-23 RX ORDER — OXYCODONE HYDROCHLORIDE 10 MG/1
10 TABLET ORAL
Status: DISCONTINUED | OUTPATIENT
Start: 2021-11-23 | End: 2021-11-23 | Stop reason: HOSPADM

## 2021-11-23 RX ORDER — ONDANSETRON 2 MG/ML
4 INJECTION INTRAMUSCULAR; INTRAVENOUS
Status: DISCONTINUED | OUTPATIENT
Start: 2021-11-23 | End: 2021-11-23 | Stop reason: HOSPADM

## 2021-11-23 RX ORDER — ONDANSETRON 2 MG/ML
4 INJECTION INTRAMUSCULAR; INTRAVENOUS EVERY 4 HOURS PRN
Status: DISCONTINUED | OUTPATIENT
Start: 2021-11-23 | End: 2021-11-23 | Stop reason: HOSPADM

## 2021-11-23 RX ORDER — SODIUM CHLORIDE 9 MG/ML
INJECTION, SOLUTION INTRAMUSCULAR; INTRAVENOUS; SUBCUTANEOUS
Status: DISCONTINUED | OUTPATIENT
Start: 2021-11-23 | End: 2021-11-23 | Stop reason: HOSPADM

## 2021-11-23 RX ORDER — HYDROMORPHONE HYDROCHLORIDE 1 MG/ML
INJECTION, SOLUTION INTRAMUSCULAR; INTRAVENOUS; SUBCUTANEOUS
Status: DISCONTINUED
Start: 2021-11-23 | End: 2021-11-23 | Stop reason: HOSPADM

## 2021-11-23 RX ORDER — AMOXICILLIN 250 MG
1 CAPSULE ORAL NIGHTLY
Status: DISCONTINUED | OUTPATIENT
Start: 2021-11-23 | End: 2021-11-23 | Stop reason: HOSPADM

## 2021-11-23 RX ORDER — TRAMADOL HYDROCHLORIDE 50 MG/1
50 TABLET ORAL EVERY 4 HOURS PRN
Status: DISCONTINUED | OUTPATIENT
Start: 2021-11-23 | End: 2021-11-23 | Stop reason: HOSPADM

## 2021-11-23 RX ORDER — CEFAZOLIN SODIUM IN 0.9 % NACL 2 G/100 ML
2 PLASTIC BAG, INJECTION (ML) INTRAVENOUS EVERY 8 HOURS
Status: DISCONTINUED | OUTPATIENT
Start: 2021-11-23 | End: 2021-11-23 | Stop reason: HOSPADM

## 2021-11-23 RX ORDER — DEXAMETHASONE SODIUM PHOSPHATE 4 MG/ML
10 INJECTION, SOLUTION INTRA-ARTICULAR; INTRALESIONAL; INTRAMUSCULAR; INTRAVENOUS; SOFT TISSUE ONCE
Status: DISCONTINUED | OUTPATIENT
Start: 2021-11-24 | End: 2021-11-23 | Stop reason: HOSPADM

## 2021-11-23 RX ORDER — METOPROLOL TARTRATE 50 MG/1
50 TABLET, FILM COATED ORAL 2 TIMES DAILY
Status: DISCONTINUED | OUTPATIENT
Start: 2021-11-23 | End: 2021-11-23 | Stop reason: HOSPADM

## 2021-11-23 RX ORDER — CEFAZOLIN SODIUM 1 G/3ML
INJECTION, POWDER, FOR SOLUTION INTRAMUSCULAR; INTRAVENOUS PRN
Status: DISCONTINUED | OUTPATIENT
Start: 2021-11-23 | End: 2021-11-23 | Stop reason: SURG

## 2021-11-23 RX ADMIN — PROPOFOL 100 MG: 10 INJECTION, EMULSION INTRAVENOUS at 08:47

## 2021-11-23 RX ADMIN — FENTANYL CITRATE 50 MCG: 50 INJECTION, SOLUTION INTRAMUSCULAR; INTRAVENOUS at 09:58

## 2021-11-23 RX ADMIN — BUPIVACAINE HYDROCHLORIDE 20 ML: 2.5 INJECTION, SOLUTION EPIDURAL; INFILTRATION; INTRACAUDAL; PERINEURAL at 08:10

## 2021-11-23 RX ADMIN — ACETAMINOPHEN 1000 MG: 500 TABLET ORAL at 07:24

## 2021-11-23 RX ADMIN — CEFAZOLIN 2 G: 330 INJECTION, POWDER, FOR SOLUTION INTRAMUSCULAR; INTRAVENOUS at 08:49

## 2021-11-23 RX ADMIN — FENTANYL CITRATE 50 MCG: 50 INJECTION, SOLUTION INTRAMUSCULAR; INTRAVENOUS at 09:29

## 2021-11-23 RX ADMIN — HYDROMORPHONE HYDROCHLORIDE 0.4 MG: 1 INJECTION, SOLUTION INTRAMUSCULAR; INTRAVENOUS; SUBCUTANEOUS at 10:39

## 2021-11-23 RX ADMIN — DOCUSATE SODIUM 100 MG: 100 CAPSULE ORAL at 11:56

## 2021-11-23 RX ADMIN — Medication 2 ML: at 07:24

## 2021-11-23 RX ADMIN — PHENYLEPHRINE HYDROCHLORIDE 200 MCG: 10 INJECTION INTRAVENOUS at 08:55

## 2021-11-23 RX ADMIN — LIDOCAINE HYDROCHLORIDE 2 ML: 10 INJECTION, SOLUTION EPIDURAL; INFILTRATION; INTRACAUDAL; PERINEURAL at 07:24

## 2021-11-23 RX ADMIN — FENTANYL CITRATE 50 MCG: 50 INJECTION, SOLUTION INTRAMUSCULAR; INTRAVENOUS at 10:09

## 2021-11-23 RX ADMIN — TRAMADOL HYDROCHLORIDE 50 MG: 50 TABLET ORAL at 16:39

## 2021-11-23 RX ADMIN — ACETAMINOPHEN 1000 MG: 500 TABLET ORAL at 11:56

## 2021-11-23 RX ADMIN — FENTANYL CITRATE 100 MCG: 50 INJECTION, SOLUTION INTRAMUSCULAR; INTRAVENOUS at 08:58

## 2021-11-23 RX ADMIN — HYDROMORPHONE HYDROCHLORIDE 0.4 MG: 1 INJECTION, SOLUTION INTRAMUSCULAR; INTRAVENOUS; SUBCUTANEOUS at 10:49

## 2021-11-23 RX ADMIN — SUGAMMADEX 200 MG: 100 INJECTION, SOLUTION INTRAVENOUS at 09:36

## 2021-11-23 RX ADMIN — ONDANSETRON 4 MG: 2 INJECTION INTRAMUSCULAR; INTRAVENOUS at 09:30

## 2021-11-23 RX ADMIN — TRANEXAMIC ACID 1000 MG: 100 INJECTION, SOLUTION INTRAVENOUS at 09:27

## 2021-11-23 RX ADMIN — DEXAMETHASONE SODIUM PHOSPHATE 4 MG: 4 INJECTION, SOLUTION INTRAMUSCULAR; INTRAVENOUS at 08:52

## 2021-11-23 RX ADMIN — FENTANYL CITRATE 50 MCG: 50 INJECTION, SOLUTION INTRAMUSCULAR; INTRAVENOUS at 09:54

## 2021-11-23 RX ADMIN — OXYCODONE HYDROCHLORIDE 10 MG: 10 TABLET ORAL at 14:33

## 2021-11-23 RX ADMIN — FENTANYL CITRATE 50 MCG: 50 INJECTION, SOLUTION INTRAMUSCULAR; INTRAVENOUS at 09:08

## 2021-11-23 RX ADMIN — TRANEXAMIC ACID 1000 MG: 100 INJECTION, SOLUTION INTRAVENOUS at 08:51

## 2021-11-23 RX ADMIN — OXYCODONE HYDROCHLORIDE 10 MG: 5 SOLUTION ORAL at 09:54

## 2021-11-23 RX ADMIN — GLYCOPYRROLATE 0.2 MG: 0.2 INJECTION, SOLUTION INTRAMUSCULAR; INTRAVENOUS at 09:23

## 2021-11-23 RX ADMIN — FENTANYL CITRATE 150 MCG: 50 INJECTION, SOLUTION INTRAMUSCULAR; INTRAVENOUS at 08:47

## 2021-11-23 RX ADMIN — FENTANYL CITRATE 50 MCG: 50 INJECTION, SOLUTION INTRAMUSCULAR; INTRAVENOUS at 10:31

## 2021-11-23 RX ADMIN — SODIUM CHLORIDE, POTASSIUM CHLORIDE, SODIUM LACTATE AND CALCIUM CHLORIDE: 600; 310; 30; 20 INJECTION, SOLUTION INTRAVENOUS at 07:24

## 2021-11-23 RX ADMIN — ROCURONIUM BROMIDE 70 MG: 10 INJECTION, SOLUTION INTRAVENOUS at 08:47

## 2021-11-23 ASSESSMENT — COGNITIVE AND FUNCTIONAL STATUS - GENERAL
CLIMB 3 TO 5 STEPS WITH RAILING: A LITTLE
DAILY ACTIVITIY SCORE: 20
SUGGESTED CMS G CODE MODIFIER DAILY ACTIVITY: CJ
PERSONAL GROOMING: A LITTLE
HELP NEEDED FOR BATHING: A LITTLE
WALKING IN HOSPITAL ROOM: A LITTLE
MOBILITY SCORE: 22
DRESSING REGULAR LOWER BODY CLOTHING: A LITTLE
TOILETING: A LITTLE
SUGGESTED CMS G CODE MODIFIER MOBILITY: CJ

## 2021-11-23 ASSESSMENT — PAIN DESCRIPTION - PAIN TYPE
TYPE: ACUTE PAIN;SURGICAL PAIN
TYPE: ACUTE PAIN
TYPE: ACUTE PAIN

## 2021-11-23 ASSESSMENT — ACTIVITIES OF DAILY LIVING (ADL): TOILETING: INDEPENDENT

## 2021-11-23 ASSESSMENT — GAIT ASSESSMENTS
DEVIATION: ANTALGIC;STEP TO
DISTANCE (FEET): 130
GAIT LEVEL OF ASSIST: SUPERVISED
ASSISTIVE DEVICE: FRONT WHEEL WALKER
DISTANCE (FEET): 5

## 2021-11-23 ASSESSMENT — FIBROSIS 4 INDEX: FIB4 SCORE: 1.95

## 2021-11-23 ASSESSMENT — PAIN SCALES - GENERAL: PAIN_LEVEL: 3

## 2021-11-23 NOTE — ANESTHESIA PROCEDURE NOTES
Arterial Line  Performed by: Selena Stark M.D.  Authorized by: Selena Stark M.D.     Start Time:  11/23/2021 8:42 AM  End Time:  11/23/2021 8:45 AM  Localization: surface landmarks    Patient Location:  OR  Indication: continuous blood pressure monitoring        Catheter Size:  20 G  Seldinger Technique?: Yes    Laterality:  Right  Site:  Radial artery  Line Secured:  Antimicrobial disc, tape and transparent dressing  Events: patient tolerated procedure well with no complications

## 2021-11-23 NOTE — LETTER
November 2, 2021    Patient Name: Zeinab Loza  Surgeon Name: Mars Mclean M.D.  Surgery Facility: [facility] ([address] [facility])  Surgery Date: 11/23/2021    The time of your surgery is not final and may change up to and until the day of your surgery. You will be contacted 24-48 hours prior to your surgery date with your check-in and surgery time.    If you will not be at one of the below numbers please call/text the surgery scheduler at [phone]  Preferred Phone: [phone]    BEFORE YOUR SURGERY  Pre Registration and/or Lab Work must be done within and no earlier than 28 days prior to your surgery date. Please call [facility] at [phone] for an appointment as soon as possible.     Please refrain from smoking any substance after midnight prior to surgery. Smoking may interfere with the anesthetic and frequently produces nausea during the recovery period.    Continue taking all lifesaving medications. Including the morning of your surgery with small sip of water.    Please read the MEDICATION INSTRUCTIONS below completely.    DAY OF YOUR SURGERY  Nothing to eat or drink after midnight     Please arrive at the hospital/surgery center at the check-in time provided.     An adult will need to bring you and take you home after your surgery.     AFTER YOUR SURGERY  Post op Appointment:   Date: 12/08/2021   Time: 11:15AM   With: CHANTELLE MONTES   Location: [facility] [place] [address]    - Therapy- Your first appointment should be 1  week(s) after your surgery. For your convenience we have 4 Physical Therapy locations: [facility], [place], and [facility]. Call our office ASAP to schedule an appointment at [phone] or take the enclosed Therapy Prescription to a facility of your choice.  - No dental work for 3-6 months after your surgery.    TIME OFF WORK  FMLA or Disability forms can be faxed directly to: [phone] or you may drop them off at [address] [place] [address]. Our office charges a $35.00 fee per form. Forms will be completed within 10 business days of drop off and payment received. For the status of your forms you may contact our disability office directly [phone].    MEDICATION INSTRUCTIONS  The following medications should be stopped a minimum of 10 days prior to surgery:  All over the counter, Supplements & Herbal medications    Anorectics: Phentermine (Adipex-P, Lomaira and Suprenza), Phentermine-topiramate (Qsymia), Bupropion-naltrexone (Contrave)    Opiod Partial Agonists/Opioid Antagonists: Buprenorphine (Subocone, Belbuca, Butrans, Probuphine Implant, Sublocade), Naltrexone (ReVia, Vivitrol), Naloxone    Amphetamines: Dextroamphetamine/Amphetamine (Adderall, Mydayis), Methylphenidate Hydrochloride (Concerta, Metadate, Methylin, Ritalin)    The following medications should be stopped 5 days prior to surgery:  Blood Thinners: Any Aspirin, Aspirin products, anti-inflammatories such as ibuprofen and any blood thinners such as Coumadin and Plavix. Please consult your prescribing physician if you are on life saving blood thinners, in regards to when to stop medications prior to surgery.     The following medications should be stopped a minimum of 3 days prior to surgery:  PDE-5 inhibitors: Sildenafil (Viagra), Tadalafil (Cialis), Vardenafil (Levitra), Avanafil (Stendra)    MAO Inhibitors: Rasagiline (Azilect), Selegiline (Eldepryl, Emsam, Selapar), Isocarboxazid (Marplan), Phenelzine (Nardil)

## 2021-11-23 NOTE — ANESTHESIA POSTPROCEDURE EVALUATION
Patient: Zeinab Loza    Procedure Summary     Date: 11/23/21 Room / Location:  OR  / SURGERY [facility]    Anesthesia Start: 0833 Anesthesia Stop: 0951    Procedure: ARTHROPLASTY, KNEE, TOTAL (Left Knee) Diagnosis:       Degenerative arthritis of left knee      (Degenerative arthritis of left knee [M17.12])    Surgeons: Mars Mclean M.D. Responsible Provider: Selena Stark M.D.    Anesthesia Type: general, peripheral nerve block ASA Status: 3          Final Anesthesia Type: general, peripheral nerve block  Last vitals  BP   Blood Pressure : 154/82    Temp   36.9 °C (98.4 °F)    Pulse   74   Resp   16    SpO2   97 %      Anesthesia Post Evaluation    Patient location during evaluation: PACU  Patient participation: complete - patient participated  Level of consciousness: awake and alert  Pain score: 3    Airway patency: patent  Anesthetic complications: no  Cardiovascular status: hemodynamically stable  Respiratory status: acceptable and face mask  Hydration status: euvolemic    PONV: none          No complications documented.     Nurse Pain Score: 6 (NPRS)

## 2021-11-23 NOTE — THERAPY
Physical Therapy   Initial Evaluation     Patient Name: Zeinab Loza  Age:  76 y.o., Sex:  female  Medical Record #: [id number]  Today's Date: 11/23/2021     Precautions  Precautions: (P) No Weight Bearing Restrictions Left Lower Extremity  Comments: (P) 5 L O2 baseline    Assessment  Patient is 76 y.o. female who was seen s/p L TKA. Pt was agreeable to therapy evaluation and was able to demonstrate safe upright mobility throughout session. Pt was primarily limited due to moderate pain levels and poor WB tolerance on L LE. Pt was able to demonstrate safe gait mechanics with appropriate compensatory strategies. Pt was educated on supine therapeutic exercises, elevation, icing, and positioning. Pt was able to demonstrate safe mechanics going up/down 1 step with FWW use. Pt is in no acute skilled PT needs at this time, anticipate pt to d/c home once medically clear with recs for FWW use. Recommend HH therapy initially and then transition to OP therapy services.      Plan    Recommend Physical Therapy for Evaluation only    DC Equipment Recommendations: (P) Front-Wheel Walker  Discharge Recommendations: (P) Recommend home health for continued physical therapy services (then transition to OP therapy services)     Objective       11/23/21 1527   Initial Contact Note    Initial Contact Note Order Received and Verified, Evaluation Only - Patient Does Not Require Further Acute Physical Therapy at this Time.  However, May Benefit from Post Acute Therapy for Higher Level Functional Deficits.   Precautions   Precautions No Weight Bearing Restrictions Left Lower Extremity   Comments 5 L O2 baseline   Vitals   O2 (LPM) 5   O2 Delivery Device Silicone Nasal Cannula   Pain 0 - 10 Group   Location Knee   Location Orientation Left   Description Aching   Therapist Pain Assessment Prior to Activity;During Activity;Post Activity;Nurse Notified  (c/o moderate pain with mobility; not rated)   Prior Living Situation   Prior Services  None   Housing / Facility [facility]   Steps Into Home   (threshold )   Steps In Home 0   Equipment Owned 4-Wheel Walker;Single Point Cane;Wheelchair   Lives with - Patient's Self Care Capacity Adult Children   Comments pt states dtr will assist upon d/c to home    Prior Level of Functional Mobility   Bed Mobility Independent   Transfer Status Independent   Ambulation Independent   Distance Ambulation (Feet)   (limited community )   Assistive Devices Used 4-Wheel Walker   Comments pt reports of an IPLOF    Cognition    Cognition / Consciousness WDL   Comments pleasant/cooperative   Passive ROM Lower Body   Passive ROM Lower Body X   Comments limited due to pain; able to demo 0 to 90 deg of L knee ROM   Active ROM Lower Body    Active ROM Lower Body  X   Comments same as above    Strength Lower Body   Lower Body Strength  X   Comments limited due to pain; able to demo functional strength for B LE   Sensation Lower Body   Lower Extremity Sensation   WDL   Lower Body Muscle Tone   Lower Body Muscle Tone  WDL   Strength Upper Body   Upper Body Strength  WDL   Upper Body Muscle Tone   Upper Body Muscle Tone  WDL   Coordination Lower Body    Coordination Lower Body  WDL   Balance Assessment   Sitting Balance (Static) Good   Sitting Balance (Dynamic) Fair +   Standing Balance (Static) Fair +   Standing Balance (Dynamic) Fair +   Weight Shift Sitting Good   Weight Shift Standing Fair   Comments w/fww; significant WB on FWW with B UE due to L knee pain    Gait Analysis   Gait Level Of Assist Supervised  (SBA)   Assistive Device Front Wheel Walker   Distance (Feet) 130   # of Times Distance was Traveled 1   Deviation Antalgic;Step To   # of Stairs Climbed 1   Level of Assist with Stairs Supervised  (SBA)   Weight Bearing Status none noted    Comments cues for heel to toe mechanics and WB on L knee; however pt was limited due to pain in L knee and required increased use of B UE WB on FWW; able to demo safe upright  mechanics, no melina LOB noted   Bed Mobility    Supine to Sit Supervised   Sit to Supine Minimal Assist   Scooting Supervised   Rolling Supervised   Comments HOB elevated and rails up    Functional Mobility   Sit to Stand Supervised   Bed, Chair, Wheelchair Transfer Supervised  (SBA)   Transfer Method Stand Step   Mobility EOB, sit<>stand, ambulation, stairs, back to bed    Comments cues for handplacement upon standing    How much difficulty does the patient currently have...   Turning over in bed (including adjusting bedclothes, sheets and blankets)? 4   Sitting down on and standing up from a chair with arms (e.g., wheelchair, bedside commode, etc.) 4   Moving from lying on back to sitting on the side of the bed? 4   How much help from another person does the patient currently need...   Moving to and from a bed to a chair (including a wheelchair)? 4   Need to walk in a hospital room? 3   Climbing 3-5 steps with a railing? 3   6 clicks Mobility Score 22   Activity Tolerance   Sitting in Chair NT   Sitting Edge of Bed 5 mins    Standing 10 mins    Comments no adverse events noted    Edema / Skin Assessment   Edema / Skin  Not Assessed   Education Group   Education Provided Role of Physical Therapist   Role of Physical Therapist Patient Response Patient;Acceptance;Explanation;Demonstration;Verbal Demonstration;Action Demonstration   Anticipated Discharge Equipment and Recommendations   DC Equipment Recommendations Front-Wheel Walker   Discharge Recommendations Recommend home health for continued physical therapy services  (then transition to OP therapy services)   Interdisciplinary Plan of Care Collaboration   IDT Collaboration with  Nursing   Patient Position at End of Therapy In Bed;Call Light within Reach;Tray Table within Reach;Phone within Reach   Collaboration Comments aware of visit and recs    Session Information   Date / Session Number  11/23 eval only    Priority 0

## 2021-11-23 NOTE — ANESTHESIA TIME REPORT
Anesthesia Start and Stop Event Times     Date Time Event    11/23/2021 0817 Ready for Procedure     0833 Anesthesia Start     0951 Anesthesia Stop        Responsible Staff  11/23/21    Name Role Begin End    Selena Stark M.D. Anesth 0833 0951        Preop Diagnosis (Free Text):  Pre-op Diagnosis     Degenerative arthritis of left knee [M17.12]        Preop Diagnosis (Codes):  Diagnosis Information     Diagnosis Code(s): Degenerative arthritis of left knee [M17.12]        Premium Reason  Non-Premium    Comments:

## 2021-11-23 NOTE — OR NURSING
0947: To PACU from OR via bed, Plan to keep pt in PACU for full hour per STOPBANG protocol. Supplemental oxygen at 6 lpm via mask, respirations spontaneous and non-labored via. Dressing on Left Knee. DC Art-line. Patient is awake c/o great pain, no value, no nausea. Plan to treat per MAR.    1000: Still having pain, no nausea, plan to treat per MAR.     1010: Art-line removed.    1015: Pain at 7/10, plan to treat per MAR.    1030: Patient still having pain, attempting to contact DR. Stark for Orders. Patient maxed on Fentanyl. Need Order for Dilaudid. Verbal order given. Patient has a history of using pain medication and discussed ability to manage pain, she understands.    1045: STOPNPatient able to doze off and remain roused by voice.  BANG Complete.     1050: Meets criteria to transfer to Stage 2/floor. Transferred on O2 tank @ 395, report to A[facility]OU[facility] Garcia, room 222, bed 1.    1100: Patient appears to be comfortable, able to doze off and remain roused by voice. Daughter updated by phone.                        **DC Art Line

## 2021-11-23 NOTE — PROGRESS NOTES
Patient arrived to floor. Patient A&Ox4, denies N/V, SOB, at this time. Able to communicate pain needs appropriately. Able to plantarflex, dorsiflex. Pedal pulses palpable. Safety precautions in place. Call light, belongings in reach.

## 2021-11-23 NOTE — OP REPORT
Preop Diagnosis: Advanced left knee arthritis  Postop Diagnosis:  Same  Procedure: Left total knee arthroplasty  Surgeon: Dr. Mars Mclean MD  Assistant: Charly Hodge PA-C  Anesthesia: Dr. Stark. General + Adductor canal block  Estimated Blood Loss: 50cc  Drains: None  Complications: None    Implants: [place] [employer] CR Cementless, size 3 femur, size 3 tibia, with a 13 mm CS insert and 32 oval patella.    Indications: Zeinab Loza is a 76 y.o. female who has had severe progressive knee pain that failed conservative management.  There were severe degenerative changes on radiographs and a valgus deformity.  We discussed the options and she was ultimately indicated for knee replacement.  We discussed the risk of bleeding, transfusion, pain, neurovascular injury, stiffness, fracture, infection, wound complication, loosening of the parts over time, blood clots, and medical complication.  No guarantees were made and she elected to proceed.    Pertinent Findings and Releases: There were severe degenerative changes.  At the end of the case, the knee easily came to full extension and flexed up to calf/thigh impingement with the arthrotomy closed.  The patella tracked centrally.    Informed consent and site marking were confirmed in preop.  An adductor canal block had been performed by the anesthesiologist, and then she was brought back to the OR where anesthesia was performed. Supine positioning was perfmored with all bony prominences well padded with a padded tourniquet on the thigh.  The extremity was prepped and draped in the usual sterile fashion. I performed a pre-procedure timeout to confirm we had the correct patient, side, site, procedure, and presence of necessary personal and equipment.  I confirmed that Ancef and tranexamic acid were given.  The tourniquet was then inflated.    A midline incision was made medial to the tibial tubercle centered over patella taken down to the deep capsule of the knee. A  medial parapatellar arthrotomy was performed.  The fat pad was released. The patella was subluxated and the knee was flexed. The remnants of the anterior horn of the medial and lateral meniscus were removed as well as the ACL from the intercondylar notch. All distal protruding osteophytes were removed. I then drilled and accessed the intramedullary canal of the femur, lavaged it and placed the intramedullary cutting guide. The distal femur was cut in 6 degrees of valgus. I then sized the femur and based rotation off of bony landmarks.  All distal femoral cuts were made.  The tibia was then subluxated forward and cut perpendicular to its long axis.  A spacer block was placed in the knee extension to confirm I had resected enough bone and then sequential releases were performed until there was a rectangular gap.  Collaterals were intact and overall limb alignment was checked and appropriate.  The knee was then tensed at 90 degrees and the meniscal remnants and posterior osteophytes were removed.  The posterior capsule was injected with a local anesthetic cocktail.  The tibia was then subluxed forward, sized, and punched in the appropriate amount of external rotation and mechanical alignment was verified using a drop sofai. Trials were placed, the knee was reduced with a trial insert, it was taken through a range of motion, and found to be stable in the varus/valgus plane 0-30 degrees of flexion and the AP plane at 90 degrees of flexion. Attention was then turned to the patella. A symmetric resection was performed to recreate native patella height and appropriately sized. All extraneous osteophyte and synovium were removed.  With a trial in place, the patella tracked centrally using the no thumbs technique. All trial components were removed. The real components were impacted and the trial liner was place and found to be appropriate.  The tourniquet was let down and hemostasis ensured. The real insert was placed.  Stability and patellar tracking were excellent. The knee was then copiously irrigated. One gram of Vancomycin was left in the wound.  The arthrotomy was closed with number 2 running barbed suture, and the wound was closed in layers.  An occlusive silver dressing was applied and the patient was turned over to anesthesia in stable condition without any apparent intraoperative complications.     Plan:  WBAT, PT/OT evaluation, Aspirin graduating to [facility] for DVT prophylaxis, Leave dressing in place until followup.

## 2021-11-23 NOTE — DISCHARGE PLANNING
TEAGANW notified by bedside RN that pt needs walker. TEAGANW met with pt at bedside to collect DME choice for a FWW. Pt signed consent to send referral to [facility]. LSW faxed choice form to DPA. LSW notified bedside RN and charge RN to grab walker from storage.

## 2021-11-23 NOTE — DISCHARGE PLANNING
Anticipated Discharge Disposition:   Home when medically cleared    Action:   Chart review complete.     This patient was admitted with a left TKA. Per case management note for 11/11, this patient has a FWW and shower chair at home. No anticipated equipment needs.     PT/OT pending. RN CM will continue to follow as needed.     Barriers to Discharge:   Medical Clearance    Plan:   Hospital care management will continue to assist with discharge planning needs.

## 2021-11-23 NOTE — ANESTHESIA PROCEDURE NOTES
Peripheral Block    Date/Time: 11/23/2021 8:10 AM  Performed by: Selena Stark M.D.  Authorized by: Selena Stark M.D.     Patient Location:  Pre-op  Start Time:  11/23/2021 8:10 AM  End Time:  11/23/2021 8:15 AM  Reason for Block: at surgeon's request and post-op pain management ONLY    patient identified, IV checked, site marked, risks and benefits discussed, surgical consent, monitors and equipment checked, pre-op evaluation and timeout performed    Patient Position:  Supine  Prep: ChloraPrep    Monitoring:  Heart rate, continuous pulse ox and cardiac monitor  Block Region:  Lower Extremity  Lower Extremity - Block Type:  Selective FEMORAL nerve block at the Adductor Canal    Laterality:  Left  Procedures: ultrasound guided  Image captured, interpreted and electronically stored.  Local Infiltration:  Lidocaine  Strength:  1 %  Dose:  3 ml  Block Type:  Single-shot  Needle Length:  100mm  Needle Gauge:  21 G  Needle Localization:  Ultrasound guidance  Injection Assessment:  Negative aspiration for heme, no paresthesia on injection, incremental injection and local visualized surrounding nerve on ultrasound  Evidence of intravascular injection: No     US Guided Selective Femoral Nerve Block at Adductor Canal:   US probe placed at mid-thigh level on externally rotated leg and femur identified.  Probe directed medially until Sartorius Muscle (SM), Femoral Artery (FA) and Saphenous Nerve (SN) identified in Adductor Canal (AC).  Needle inserted anterolateral to probe in an in plane approach into a subsartorial perivascular perineural position.  After negative aspiration LA injected with ease and visualized spreading within the AC.

## 2021-11-23 NOTE — DISCHARGE INSTRUCTIONS
Patient will be discharged home and follow up with Dr. Mclean in 2 weeks, for which the patient already has scheduled.    You may call or text Dr. Mclean' medical assistant during business hours at [phone].  You may call the emergency [facility] line during nights/weekends/holidays if needed at [phone].    Instructions:  -Leave the bandage in place until your followup appointment in 2 weeks.  -May shower with bandage in place, if bandage becomes soaked underneath please remove and call the office immediately.  -No baths/tubs/pools/submersion of the wound for now.  -Call if there is significant drainage beneath the bandage    -Put as much weight as comfortable on the operative leg.  Use a walker to assist with balance to avoid any falls.  -It is important to start moving and stretching right away, otherwise the joint can stiffen.    -Blood Thinners:        -First week - aspirin 81mg twice per day (no Elaquis)        -Second week - Elaquis once per day (no aspirin)        -Third week - Usual Elaquis  -Use ice frequently to help with pain and swelling control  -Pain management:  Standard pain regimen should be:        -Tylenol 1,000mg three times per day (take this automatically)        -Tramadol 1-2 pills every 6 hours (take this automatically to start)        -Oxycodone 1-2 pills as needed IN ADDITION to the meds listed above.  Do not take Oxycodone and Tramadol at the same time (space at least 1 hour apart)        -Try to limit the amount of oxycodone since it tends to cause constipation, drowsiness, etc.  But if you need it, take it.   Discharge Instructions    Discharged to home by car with relative. Discharged via wheelchair, hospital escort: Yes.  Special equipment needed: Walker    Be sure to schedule a follow-up appointment with your primary care doctor or any specialists as instructed.     Discharge Plan:   Diet Plan: Discussed  Activity Level: Discussed  Confirmed Follow up Appointment: Patient to Call  and Schedule Appointment  Confirmed Symptoms Management: Discussed  Medication Reconciliation Updated: Yes    I understand that a diet low in cholesterol, fat, and sodium is recommended for good health. Unless I have been given specific instructions below for another diet, I accept this instruction as my diet prescription.   Other diet: Regular    Special Instructions: Discharge instructions for the Orthopedic Patient    Follow up with Primary Care Physician within 2 weeks of discharge to home, regarding:  Review of medications and diagnostic testing.  Surveillance for medical complications.  Workup and treatment of osteoporosis, if appropriate.     -Is this a Hip/Knee/Shoulder Joint Replacement patient? Yes   TOTAL KNEE REPLACEMENT, AFTER-CARE GUIDELINES     These instructions provide you with information on caring for yourself and your knee after surgery. Your health care provider may also give you instructions that are more specific. Your treatment was planned and performed according to current medical practices but problems sometimes occur. Call your health care provider if you have any problems or questions.     WHAT TO EXPECT AFTER THE PROCEDURE   After your procedure, your knee will typically be stiff, sore, and bruised. This will improve over time.     Pain   · Follow your home pain management plan as discussed with your nurse and as directed by your provider.   · It is important to follow any scheduled pain medications for maximal pain relief.   · If prescribed opioid medication, the goal is to use opioids only as needed and to wean off prescription pain medicine as soon as possible.   · Ice can be used for pain control.   · Put ice in a plastic bag.   · Place a towel between your skin and the bag.   · Leave the ice on for 20 minutes, 2-3 times a day at a minimum.   · Most patients are off the pain pills by 3 weeks. If your pain continues to be severe, follow up with your provider.     Infection   Knee  joint infections occur in fewer than 2% of patients. The most common causes of infection following total knee replacement surgery are from bacteria that enter the bloodstream during dental procedures, urinary tract infections, or skin infections. These bacteria can lodge around your knee replacement and cause an infection.   · Keep the incision as clean and dry as possible.   · Always wash your hands before touching your incision.   · Avoid dental care for 3 months after surgery. Your provider may recommend taking a dose of antibiotics an hour prior to any dental procedure. After 2 years, most providers recommend antibiotics only before an extensive procedure. Ask your provider what they recommend.   · Signs and symptoms of infection include low-grade fever, redness, pain, swelling and drainage from your incision. Notify your provider IMMEDIATELY if you develop ANY of these symptoms.     Post op Disturbances   · Bowel Habits - Constipation is extremely common and caused by a combination of anesthesia, lack of mobility, dehydration and pain medicine. Use stool softeners or laxatives if necessary. It is important not to ignore this problem as bowel obstructions can be a serious complication after joint replacement surgery.   · Mood/Energy Level - Many patients experience a lack of energy and endurance for up to 2-3 months after surgery. Some people feel down and can even become depressed. This is likely due to postoperative anemia, change in activity level, lack of sleep, pain medicine and just the emotional reaction to the surgery itself that is a big disruption in a person’s life. This usually passes. If symptoms persist, follow up with your primary care provider.  · Returning to Work - Your provider will give you specific instructions based on your profession. Generally, if you work a sedentary job requiring little standing or walking, most patients may return within 2-6 weeks. Manual labor jobs involving walking,  lifting and standing may take 3-4 months. Your provider’s office can provide a release to part-time or light duty work early on in your recovery and progress you to full duty as able.   · Driving - You can begin driving once cleared by your provider, provided you are no longer taking narcotic pain medication or any other medications that impair driving. Discuss the length of time expected with your provider as returning to driving depends on things such as your vehicle, which knee was replaced (right or left), and knee motion, strength and reflexes returning appropriately.   · Avoiding falls - A fall during the first few weeks after surgery can damage your new knee and may result in a need for further surgery.  throw rugs and tack down loose carpeting. Be aware of floor hazards such as pets, small objects or uneven surfaces. Notify your provider of any falls.   · [employer] Metal Detectors - The sensitivity of metal detectors varies and it is likely that your prosthesis will cause an alarm. Inform the  of your artificial joint.     Diet   · Resume your normal diet as tolerated.   · It is important to achieve a healthy nutritional status by eating a well-balanced diet on a regular basis.   · Your provider may recommend that you take iron and vitamin supplements.   · Continue to drink plenty of fluids.     Shower/Bathing   · You may shower as soon as you get home from the hospital unless otherwise instructed.   · Keep your incision out of water to prevent infection. To keep the incision dry when showering, cover it with a plastic bag or plastic wrap. If your bandage is waterproof, this may not be necessary. o Pat incision dry if it gets wet. Do not rub. Notify your provider.   · Do not submerge in a bath until cleared by your provider. Your staples must be out and the incision completely healed.     Dressing Change: Only change your dressing if directed by your provider.   · Wash hands.   · Open  all dressing change materials.   · Remove old dressing and discard.   · Inspect incision for signs of irritation or infection including redness, increase in clear drainage, yellow/green drainage, odor and surrounding skin hot to touch. Notify your provider if present.   ·  the new dressing by one corner and lay over the incision. Be careful not to touch the inside of the dressing that will lay over the incision.   · Secure in place as instructed. Swelling/Bruising   · Swelling is normal after knee replacement and can involve the thigh, knee, calf and foot.   · Swelling can last from 3-6 months.   · To reduce swelling, elevate your leg higher than your heart while reclining. The first week you are home you should elevate your leg an equal amount of time as you are active.   · The swelling is usually worse after you go home since you are upright for longer periods of time.   · Bruising often does not appear until after you arrive home and can be quite dramatic- appearing purple, black, or green. Bruising is typically not concerning and will subside without any treatment.     Blood Clot Prevention   Your treatment plan includes multiple preventative measures to decrease the risk of blood clots in the legs (DVTs) and the less common, but serious, clots that travel to the lungs (pulmonary emboli). Most patients are at standard risk for them, but people who are at higher risk include those who have had previous clots, a family history of clotting, smoking, diabetes, obesity, advanced age, use estrogen and/or live a sedentary lifestyle.     · Signs of blood clots in legs include - Swelling in thigh, calf or ankle that does not go down with elevation. Pain, heat and tenderness in calf, back of calf or groin area. NOTE: blood clots can occur in either leg.   · Signs of blood clots in lungs include - Sudden increased shortness of breath, sudden onset of chest pain, and localized chest pain with coughing.   · If you  experience any of the above symptoms, notify your provider and seek medical attention immediately.   · You received anticoagulant therapy (blood thinners) in the hospital. Continue the prescribed blood-thinning medication at home, as directed by your provider.   · Your risk for developing a clot continues for up to 2-3 months after surgery. Avoid prolonged sitting and dehydration (long air trips and car trips). If you do take a trip during this time, please get up, move around every 1-1.5 hours, and discuss all travel plans with your provider.     Activity   Once home, stay active. The key is not to overdo it. While you can expect some good days and some bad days, you should notice a gradual improvement and a gradual increase in your endurance over the next 6 to 12 months. Exercise is a critical component of recovery, particularly during the first few weeks after surgery.     · Normal activities of daily living - Expect to resume most within 3 to 6 weeks following surgery. Some pain with activity and at night is common for several weeks after surgery. Walk as much as you like once your doctor gives permission to proceed, but remember that walking is no substitute for the exercises your doctor and physical therapist prescribe. Use a walker, crutches or cane to assist with walking until you can walk smoothly (minimal or no limp) without assistance.   · Physical Therapy Exercises - Follow your home exercise program as instructed by your physical therapist during your hospital stay. Call and set up outpatient physical therapy appointments per your provider’s recommendations. Physical therapy after the hospital stay focuses on increasing your range of motion, strengthening your muscles and improving your gait/walking pattern. Contact your provider for the referral to outpatient physical therapy if you have not yet received this. -   · Riding a stationary bicycle can help maintain muscle tone and keep your knee flexible.  Begin stationary bicycling as directed by your physical therapist or provider.   · Sexual Activity - Your provider can tell you when it safe to resume sexual activity.   · Sleeping Positions - You can safely sleep on your back, on either side, or on your stomach.   · Other Activities - Lower impact activities are preferred. Consult your provider if you have specific questions.     When to Call the Doctor   Call the provider if you experience:   · Fever over 100.5° F   · Increased pain, drainage, redness, odor or heat around the incision area   · Shaking chills   · Increased knee pain with activity and rest   · Increased pain in calf, tenderness or redness above or below the knee   · Increased swelling of calf, ankle, foot   · Sudden increased shortness of breath, sudden onset of chest pain, localized chest pain with coughing   · Incision opening   Or, if there are any questions or concerns about medications or care.     Infection statistic resource:   https://www.Handpressions/contents/prosthetic-joint-infection-epidemiology-microbiology-clinical-manifestations-and-diagnosis     -Is this patient being discharged with medication to prevent blood clots?  Yes, Aspirin Aspirin, ASA oral tablets  What is this medicine?  ASPIRIN (AS pir in) is a pain reliever. It is used to treat mild pain and fever. This medicine is also used as directed by a doctor to prevent and to treat heart attacks, to prevent strokes and blood clots, and to treat arthritis or inflammation.  This medicine may be used for other purposes; ask your health care provider or pharmacist if you have questions.  COMMON BRAND NAME(S): Aspir-Low, Aspir-Marianela, Aspirtab, Ceci Advanced Aspirin, Ceci Aspirin, Ceci Aspirin Extra Strength, Ceci Aspirin Plus, Ceci Extra Strength, Ceci Extra Strength Plus, Ceci Genuine Aspirin, Ceci Womens Aspirin, Bufferin, Bufferin Extra Strength, Bufferin Low Dose  What should I tell my health care provider before I take this  medicine?  They need to know if you have any of these conditions:  · anemia  · asthma  · bleeding problems  · child with chickenpox, the flu, or other viral infection  · diabetes  · gout  · if you frequently drink alcohol containing drinks  · kidney disease  · liver disease  · low level of vitamin K  · lupus  · smoke tobacco  · stomach ulcers or other problems  · an unusual or allergic reaction to aspirin, tartrazine dye, other medicines, dyes, or preservatives  · pregnant or trying to get pregnant  · breast-feeding  How should I use this medicine?  Take this medicine by mouth with a glass of water. Follow the directions on the package or prescription label. You can take this medicine with or without food. If it upsets your stomach, take it with food. Do not take your medicine more often than directed.  Talk to your pediatrician regarding the use of this medicine in children. While this drug may be prescribed for children as young as 12 years of age for selected conditions, precautions do apply. Children and teenagers should not use this medicine to treat chicken pox or flu symptoms unless directed by a doctor.  Patients over 65 years old may have a stronger reaction and need a smaller dose.  Overdosage: If you think you have taken too much of this medicine contact a poison control center or emergency room at once.  NOTE: This medicine is only for you. Do not share this medicine with others.  What if I miss a dose?  If you are taking this medicine on a regular schedule and miss a dose, take it as soon as you can. If it is almost time for your next dose, take only that dose. Do not take double or extra doses.  What may interact with this medicine?  Do not take this medicine with any of the following medications:  · cidofovir  · ketorolac  · probenecid  This medicine may also interact with the following medications:  · alcohol  · alendronate  · bismuth subsalicylate  · flavocoxid  · herbal supplements like feverfew,  garlic, abi, ginkgo biloba, horse chestnut  · medicines for diabetes or glaucoma like acetazolamide, methazolamide  · medicines for gout  · medicines that treat or prevent blood clots like enoxaparin, heparin, ticlopidine, warfarin  · other aspirin and aspirin-like medicines  · NSAIDs, medicines for pain and inflammation, like ibuprofen or naproxen  · pemetrexed  · sulfinpyrazone  · varicella live vaccine  This list may not describe all possible interactions. Give your health care provider a list of all the medicines, herbs, non-prescription drugs, or dietary supplements you use. Also tell them if you smoke, drink alcohol, or use illegal drugs. Some items may interact with your medicine.  What should I watch for while using this medicine?  If you are treating yourself for pain, tell your doctor or health care professional if the pain lasts more than 10 days, if it gets worse, or if there is a new or different kind of pain. Tell your doctor if you see redness or swelling. Also, check with your doctor if you have a fever that lasts for more than 3 days. Only take this medicine to prevent heart attacks or blood clotting if prescribed by your doctor or health care professional.  Do not take aspirin or aspirin-like medicines with this medicine. Too much aspirin can be dangerous. Always read the labels carefully.  This medicine can irritate your stomach or cause bleeding problems. Do not smoke cigarettes or drink alcohol while taking this medicine. Do not lie down for 30 minutes after taking this medicine to prevent irritation to your throat.  If you are scheduled for any medical or dental procedure, tell your healthcare provider that you are taking this medicine. You may need to stop taking this medicine before the procedure.  This medicine may be used to treat migraines. If you take migraine medicines for 10 or more days a month, your migraines may get worse. Keep a diary of headache days and medicine use. Contact  your healthcare professional if your migraine attacks occur more frequently.  What side effects may I notice from receiving this medicine?  Side effects that you should report to your doctor or health care professional as soon as possible:  · allergic reactions like skin rash, itching or hives, swelling of the face, lips, or tongue  · breathing problems  · changes in hearing, ringing in the ears  · confusion  · general ill feeling or flu-like symptoms  · pain on swallowing  · redness, blistering, peeling or loosening of the skin, including inside the mouth or nose  · signs and symptoms of bleeding such as bloody or black, tarry stools; red or dark-brown urine; spitting up blood or brown material that looks like coffee grounds; red spots on the skin; unusual bruising or bleeding from the eye, gums, or nose  · trouble passing urine or change in the amount of urine  · unusually weak or tired  · yellowing of the eyes or skin  Side effects that usually do not require medical attention (report to your doctor or health care professional if they continue or are bothersome):  · diarrhea or constipation  · headache  · nausea, vomiting  · stomach gas, heartburn  This list may not describe all possible side effects. Call your doctor for medical advice about side effects. You may report side effects to FDA at 6-637-FDA-6971.  Where should I keep my medicine?  Keep out of the reach of children.  Store at room temperature between 15 and 30 degrees C (59 and 86 degrees F). Protect from heat and moisture. Do not use this medicine if it has a strong vinegar smell. Throw away any unused medicine after the expiration date.  NOTE: This sheet is a summary. It may not cover all possible information. If you have questions about this medicine, talk to your doctor, pharmacist, or health care provider.  © 2020 [employer] (2018-01-30 10:42:13)      · Is patient discharged on Warfarin / Coumadin?   No     Depression / Suicide  Risk    As you are discharged from this [facility] facility, it is important to learn how to keep safe from harming yourself.    Recognize the warning signs:  · Abrupt changes in personality, positive or negative- including increase in energy   · Giving away possessions  · Change in eating patterns- significant weight changes-  positive or negative  · Change in sleeping patterns- unable to sleep or sleeping all the time   · Unwillingness or inability to communicate  · Depression  · Unusual sadness, discouragement and loneliness  · Talk of wanting to die  · Neglect of personal appearance   · Rebelliousness- reckless behavior  · Withdrawal from people/activities they love  · Confusion- inability to concentrate     If you or a loved one observes any of these behaviors or has concerns about self-harm, here's what you can do:  · Talk about it- your feelings and reasons for harming yourself  · Remove any means that you might use to hurt yourself (examples: pills, rope, extension cords, firearm)  · Get professional help from the community (Mental Health, Substance Abuse, psychological counseling)  · Do not be alone:Call your Safe Contact- someone whom you trust who will be there for you.  · Call your local CRISIS HOTLINE 679-8009 or 984-050-0885  · Call your local Children's Mobile Crisis Response Team [place] (390) 149-0167 or www.Nekst  · Call the toll free National Suicide Prevention Hotlines   · National Suicide Prevention Lifeline 749-106-PWQD (1781)  · National Hope Line Network 800-SUICIDE (243-8941)

## 2021-11-23 NOTE — H&P
Surgery Orthopedic History & Physical Note    Date  11/23/2021    Primary Care Physician  Ashia Tidwell M.D.      Pre-Op Diagnosis Codes:     * Degenerative arthritis of left knee [M17.12]    HPI  This is a 76 y.o. female who presents for a TKA.    Past Medical History:   Diagnosis Date   • Aneurysm artery, celiac (HCC) 2019   • Aneurysm of right renal artery (HCC)    • Atrial fibrillation (HCC)    • Breath shortness     5L O2 all the time   • Chickenpox    • Congestive heart failure (HCC)    • Diastolic heart failure (HCC)    • Latvian measles    • Heart burn    • Heart valve disease     MV repair   • Hypertension, essential    • Pulmonary hypertension (HCC)    • Sleep apnea     Bipap   • Snoring    • Warfarin anticoagulation        Past Surgical History:   Procedure Laterality Date   • ANKLE ORIF Left 8/5/2020    Procedure: ORIF, ANKLE;  Surgeon: Tk Humphreys M.D.;  [place] [facility] [place]opedics   • WRIST FUSION Left 2017    ORIF wrist   • MITRAL VALVE REPAIR  2005   • HYSTERECTOMY LAPAROSCOPY     • OTHER      neck surgery Cervical fusion 4, 5, & 6   • TONSILLECTOMY         Current Facility-Administered Medications   Medication Dose Route Frequency Provider Last Rate Last Admin   • ceFAZolin (ANCEF) 2 g in  mL IVPB premix  2 g Intravenous Q8HR Charly Hodge, P.A.-FRIEDA.       • ceFAZolin (ANCEF) 2 g in  mL IVPB premix  2 g Intravenous Once Mars Mclean M.D.       • Tranexamic Acid (CYKLOKAPRON) 1,000 mg in  mL IVPB  1,000 mg Intravenous Once Mars Mclean M.D.       • lidocaine (XYLOCAINE) 1 % injection 0.5 mL  0.5 mL Intradermal Once PRN Mars Mclean M.D.       • lactated ringers infusion   Intravenous Continuous Mars Mclean M.D. 10 mL/hr at 11/23/21 0724 New Bag at 11/23/21 0724       Social History     Socioeconomic History   • Marital status:      Spouse name: Not on file   • Number of children: Not on file   • Years of education: Not on file   •  Highest education level: Not on file   Occupational History   • Not on file   Tobacco Use   • Smoking status: Never Smoker   • Smokeless tobacco: Never Used   Vaping Use   • Vaping Use: Never used   Substance and Sexual Activity   • Alcohol use: Yes     Alcohol/week: 4.2 oz     Types: 7 Shots of liquor per week     Comment: 1 drink a night   • Drug use: No   • Sexual activity: Not on file   Other Topics Concern   • Not on file   Social History Narrative   • Not on file     Social Determinants of Health     Financial Resource Strain:    • Difficulty of Paying Living Expenses: Not on file   Food Insecurity:    • Worried About Running Out of Food in the Last Year: Not on file   • Ran Out of Food in the Last Year: Not on file   Transportation Needs:    • Lack of Transportation (Medical): Not on file   • Lack of Transportation (Non-Medical): Not on file   Physical Activity:    • Days of Exercise per Week: Not on file   • Minutes of Exercise per Session: Not on file   Stress:    • Feeling of Stress : Not on file   Social Connections:    • Frequency of Communication with Friends and Family: Not on file   • Frequency of Social Gatherings with Friends and Family: Not on file   • Attends Taoism Services: Not on file   • Active Member of Clubs or Organizations: Not on file   • Attends Club or Organization Meetings: Not on file   • Marital Status: Not on file   Intimate Partner Violence:    • Fear of Current or Ex-Partner: Not on file   • Emotionally Abused: Not on file   • Physically Abused: Not on file   • Sexually Abused: Not on file   Housing Stability:    • Unable to Pay for Housing in the Last Year: Not on file   • Number of Places Lived in the Last Year: Not on file   • Unstable Housing in the Last Year: Not on file       Family History   Problem Relation Age of Onset   • Heart Disease Maternal Grandfather    • Heart Disease Paternal Grandfather        Allergies  Betapace [sotalol], Tikosyn [dofetilide], and Zolpidem  tartrate    Review of Systems  Negative    Physical Exam  Regular rate and rhythm  Nonlabored breathing  Abdomen soft and nontender   Neurovascular intact distally  Skin is in good condition    Vital Signs  Blood Pressure : 154/82   Temperature: 36.4 °C (97.5 °F)   Pulse: 74   Respiration: 16   Pulse Oximetry: 97 %       Labs:                    Radiology:  No orders to display         Assessment/Plan:  Pre-Op Diagnosis Codes:     * Degenerative arthritis of left knee [M17.12]  Procedure(s):  ARTHROPLASTY, KNEE, TOTAL

## 2021-11-23 NOTE — ANESTHESIA PREPROCEDURE EVALUATION
Case: [id number] Date/Time: 11/23/21 0830    Procedure: ARTHROPLASTY, KNEE, TOTAL (Left )    Diagnosis: Degenerative arthritis of left knee [M17.12]    Pre-op diagnosis: Degenerative arthritis of left knee [M17.12]    Location:  OR  / SURGERY [facility]    Surgeons: Mars Mclean M.D.          Relevant Problems   ANESTHESIA   (positive) Other sleep apnea      NEURO   (positive) Personal history of malignant neoplasm of cervix uteri   (positive) S/P repeat ablation of atrial fibrillation/atrial flutter      CARDIAC   (positive) A-fib (HCC)   (positive) Acute on chronic congestive heart failure with right ventricular diastolic dysfunction (HCC)   (positive) Atrial flutter (HCC)   (positive) Essential hypertension   (positive) PAH (pulmonary artery hypertension) (HCC)         (positive) Chronic renal insufficiency      ENDO   (positive) Hypothyroidism   (positive) Other specified hypothyroidism       Physical Exam    Airway   Mallampati: II  TM distance: >3 FB  Neck ROM: limited       Cardiovascular - normal exam  Rhythm: irregular  Rate: normal  (-) murmur     Dental - normal exam           Pulmonary - normal exam  Breath sounds clear to auscultation     Abdominal    Neurological - normal exam               Anesthesia Plan    ASA 3   ASA physical status 3 criteria: respiratory insufficiency or compromise    Plan - general and peripheral nerve block     Peripheral nerve block will be post-op pain control  Airway plan will be ETT          Induction: intravenous    Postoperative Plan: Postoperative administration of opioids is intended.    Pertinent diagnostic labs and testing reviewed    Informed Consent:    Anesthetic plan and risks discussed with patient.    Use of blood products discussed with: patient whom consented to blood products.

## 2021-11-23 NOTE — ANESTHESIA PROCEDURE NOTES
Airway    Date/Time: 11/23/2021 8:47 AM  Performed by: Selena Stark M.D.  Authorized by: Selena Stark M.D.     Location:  OR  Urgency:  Elective  Indications for Airway Management:  Anesthesia      Spontaneous Ventilation: absent    Sedation Level:  Deep  Preoxygenated: Yes    Patient Position:  Sniffing  Final Airway Type:  Endotracheal airway  Final Endotracheal Airway:  ETT  Cuffed: Yes    Technique Used for Successful ETT Placement:  Video laryngoscopy    Insertion Site:  Oral  Blade Type:  Glide  Laryngoscope Blade/Videolaryngoscope Blade Size:  3  ETT Size (mm):  7.5  Measured from:  Teeth  ETT to Teeth (cm):  22  Placement Verified by: auscultation and capnometry    Cormack-Lehane Classification:  Grade I - full view of glottis  Number of Attempts at Approach:  1

## 2021-11-24 NOTE — DISCHARGE PLANNING
Received Choice form at 1600  Agency/Facility Name: [facility]  Referral sent per Choice form @ 1600      CM informed

## 2021-11-24 NOTE — THERAPY
Occupational Therapy   Initial Evaluation     Patient Name: Zeinab Loza  Age:  76 y.o., Sex:  female  Medical Record #: [id number]  Today's Date: 11/23/2021     Precautions  Precautions: (P) No Weight Bearing Restrictions Left Lower Extremity  Comments: (P) % L O2 at baseline    Assessment  Patient is 76 y.o. female admit for L TKA.  Pt has h/o B knee pain, back pain, cardiac issues and is on 5L O2 at baseline at home.  She is currently tolerating simple dressing and toileting, walking short distances.  Mobility is limited by decreased endurance and pain.  She will have assist from dtr at home for ADL's and IADL's.  Initially, pt appears she would require a considerable and taxing effort to leave the home for her outpt therapy and would benefit from Home Health therapy for PT.  Do not anticipate pt will need further OT at this time.      Plan    Recommend Occupational Therapy for Evaluation only     DC Equipment Recommendations: (P) None (FWW has been delivered to bedside)  Discharge Recommendations: (P) Recommend home health for continued physical therapy services        11/23/21 2788   Prior Living Situation   Prior Services None   Housing / Facility 1 [facility] [place] [facility]\""   Steps Into Home   (threshold)   Steps In Home 0   Bathroom Set up Walk In Shower;Shower Chair;Grab Bars   Equipment Owned 4-Wheel Walker;Tub / Shower Seat;Grab Bar(s) In Tub / Shower;Grab Bar(s) By Toilet;Hand Held Shower;Oxygen   Lives with - Patient's Self Care Capacity Adult Children   Comments Dtr Vernell lives with her and is able to help her out.  Pt reports MD arranged for home health therapy for the first couple weeks at home 2/2 pt's poor activity tolerance   Prior Level of ADL Function   Self Feeding Independent   Grooming / Hygiene Independent   Bathing Independent   Dressing Independent   Toileting Independent   Prior Level of IADL Function   Medication Management Independent   Laundry Requires Assist   Kitchen Mobility Independent    Finances Independent   Home Management Requires Assist   Shopping Requires Assist   Prior Level Of Mobility Independent With Device in Home   Driving / Transportation Driving Independent   Occupation (Pre-Hospital Vocational) Retired Due To Age   Leisure Interests Unable To Determine At This Time   Precautions   Precautions No Weight Bearing Restrictions Left Lower Extremity   Comments % L O2 at baseline   Vitals   O2 (LPM) 5   O2 Delivery Device Silicone Nasal Cannula   Pain 0 - 10 Group   Location Knee   Location Orientation Left   Therapist Pain Assessment 6;During Activity;Nurse Notified   Cognition    Cognition / Consciousness WDL   Comments pleasant, cooperative, attentive   Active ROM Upper Body   Active ROM Upper Body  WDL   Dominant Hand Right   Strength Upper Body   Upper Body Strength  WDL   Balance Assessment   Sitting Balance (Static) Good   Sitting Balance (Dynamic) Fair +   Standing Balance (Static) Fair +   Standing Balance (Dynamic) Fair +   Weight Shift Sitting Good   Weight Shift Standing Fair   Comments with FWW   Bed Mobility    Supine to Sit Minimal Assist   Sit to Supine Minimal Assist   Scooting Supervised   Rolling Supervised   Comments HOB elevated, use of rails   ADL Assessment   Eating Independent   Upper Body Dressing Modified Independent   Lower Body Dressing Supervision  (pants and slip on shoes.  Has A from dtr if needed)   Toileting   (reports sup with FWW and nursing)   Functional Mobility   Sit to Stand Supervised   Bed, Chair, Wheelchair Transfer Supervised   Transfer Method Stand Step   Mobility bedside only for dressing- limited by pain   Distance (Feet) 5   # of Times Distance was Traveled 2   Visual Perception   Visual Perception  WDL   Activity Tolerance   Sitting Edge of Bed 15 min   Standing 2 min   Patient / Family Goals   Patient / Family Goal #1 home   Education Group   Education Provided Home Safety;Role of Occupational Therapist;Activities of Daily Living;Adaptive  Equipment   Additional Comments Educ on safety with ADl's post TKA, pt has little interest in new AE for dressing, has dtr to assist

## 2021-11-24 NOTE — PROGRESS NOTES
Discharging patient home per MD orders. Patient demonstrated understanding of discharge instructions and educational materials, as well as, follow up appointments, medications, prescriptions and home care for surgical wound. Patient is voiding without difficulty, pain is well controlled, tolerating oral medications. O2 is greater than 90% on baseline oxygen. All questions have been answered. Patient has been discharged off the unit with a hospital escort.

## 2021-11-24 NOTE — CARE PLAN
The patient is Stable - Low risk of patient condition declining or worsening    Shift Goals  Clinical Goals: Pt/OT, walk, pee    Progress made toward(s) clinical / shift goals:  Patient able to pee, walk and work with PT/OT. Patient verbalized understanding of home care for surgical wound.     Patient is not progressing towards the following goals: n/a

## 2022-01-13 ENCOUNTER — HOSPITAL ENCOUNTER (OUTPATIENT)
Dept: RADIOLOGY | Facility: MEDICAL CENTER | Age: 77
End: 2022-01-13
Attending: FAMILY MEDICINE
Payer: MEDICARE

## 2022-01-13 DIAGNOSIS — R92.8 ABNORMAL MAMMOGRAM: ICD-10-CM

## 2022-01-13 PROCEDURE — 76642 ULTRASOUND BREAST LIMITED: CPT | Mod: LT

## 2022-01-24 DIAGNOSIS — I27.21 PAH (PULMONARY ARTERY HYPERTENSION) (HCC): ICD-10-CM

## 2022-01-24 DIAGNOSIS — Z79.899 HIGH RISK MEDICATION USE: ICD-10-CM

## 2022-01-24 DIAGNOSIS — I48.0 PAROXYSMAL ATRIAL FIBRILLATION (HCC): ICD-10-CM

## 2022-01-24 DIAGNOSIS — E78.2 MIXED HYPERLIPIDEMIA: ICD-10-CM

## 2022-01-24 RX ORDER — ROSUVASTATIN CALCIUM 40 MG/1
40 TABLET, COATED ORAL EVERY EVENING
Qty: 90 TABLET | Refills: 3 | Status: SHIPPED | OUTPATIENT
Start: 2022-01-24 | End: 2022-03-15 | Stop reason: SDUPTHER

## 2022-01-24 RX ORDER — OMEPRAZOLE 40 MG/1
40 CAPSULE, DELAYED RELEASE ORAL DAILY
Qty: 90 CAPSULE | Refills: 3 | Status: SHIPPED | OUTPATIENT
Start: 2022-01-24 | End: 2022-12-30

## 2022-01-24 RX ORDER — BUMETANIDE 2 MG/1
4 TABLET ORAL 2 TIMES DAILY
Qty: 360 TABLET | Refills: 3 | Status: SHIPPED | OUTPATIENT
Start: 2022-01-24 | End: 2022-01-27

## 2022-01-24 RX ORDER — AMIODARONE HYDROCHLORIDE 200 MG/1
200 TABLET ORAL DAILY
Qty: 90 TABLET | Refills: 3 | Status: SHIPPED | OUTPATIENT
Start: 2022-01-24 | End: 2022-03-21 | Stop reason: SDUPTHER

## 2022-01-25 NOTE — PROGRESS NOTES
Received request to change medications to [facility]a mail order.     Chart reviewed- new prescriptions.

## 2022-01-26 ENCOUNTER — HOSPITAL ENCOUNTER (OUTPATIENT)
Dept: LAB | Facility: MEDICAL CENTER | Age: 77
End: 2022-01-26
Attending: NURSE PRACTITIONER
Payer: MEDICARE

## 2022-01-26 ENCOUNTER — OFFICE VISIT (OUTPATIENT)
Dept: CARDIOLOGY | Facility: MEDICAL CENTER | Age: 77
End: 2022-01-26
Payer: MEDICARE

## 2022-01-26 VITALS
OXYGEN SATURATION: 92 % | DIASTOLIC BLOOD PRESSURE: 70 MMHG | WEIGHT: 199.2 LBS | HEART RATE: 62 BPM | SYSTOLIC BLOOD PRESSURE: 122 MMHG | BODY MASS INDEX: 31.27 KG/M2 | HEIGHT: 67 IN | RESPIRATION RATE: 15 BRPM

## 2022-01-26 DIAGNOSIS — I48.0 PAROXYSMAL ATRIAL FIBRILLATION (HCC): ICD-10-CM

## 2022-01-26 DIAGNOSIS — G47.39 OTHER SLEEP APNEA: ICD-10-CM

## 2022-01-26 DIAGNOSIS — R00.1 HEART RATE SLOW: ICD-10-CM

## 2022-01-26 DIAGNOSIS — Z79.01 CHRONIC ANTICOAGULATION: ICD-10-CM

## 2022-01-26 DIAGNOSIS — Z98.890 S/P ABLATION OF ATRIAL FIBRILLATION: ICD-10-CM

## 2022-01-26 DIAGNOSIS — I27.20 PULMONARY HYPERTENSION (HCC): ICD-10-CM

## 2022-01-26 DIAGNOSIS — E78.2 MIXED HYPERLIPIDEMIA: ICD-10-CM

## 2022-01-26 DIAGNOSIS — Z79.899 LONG TERM CURRENT USE OF AMIODARONE: ICD-10-CM

## 2022-01-26 DIAGNOSIS — Z86.79 S/P ABLATION OF ATRIAL FIBRILLATION: ICD-10-CM

## 2022-01-26 DIAGNOSIS — I10 ESSENTIAL HYPERTENSION: ICD-10-CM

## 2022-01-26 DIAGNOSIS — Z79.899 HIGH RISK MEDICATION USE: ICD-10-CM

## 2022-01-26 DIAGNOSIS — J96.11 CHRONIC RESPIRATORY FAILURE WITH HYPOXIA (HCC): ICD-10-CM

## 2022-01-26 LAB
ALBUMIN SERPL BCP-MCNC: 4.8 G/DL (ref 3.2–4.9)
ALBUMIN/GLOB SERPL: 1.8 G/DL
ALP SERPL-CCNC: 91 U/L (ref 30–99)
ALT SERPL-CCNC: 19 U/L (ref 2–50)
ANION GAP SERPL CALC-SCNC: 12 MMOL/L (ref 7–16)
AST SERPL-CCNC: 22 U/L (ref 12–45)
BILIRUB SERPL-MCNC: 0.3 MG/DL (ref 0.1–1.5)
BUN SERPL-MCNC: 29 MG/DL (ref 8–22)
CALCIUM SERPL-MCNC: 10.5 MG/DL (ref 8.5–10.5)
CHLORIDE SERPL-SCNC: 100 MMOL/L (ref 96–112)
CO2 SERPL-SCNC: 30 MMOL/L (ref 20–33)
CREAT SERPL-MCNC: 1.65 MG/DL (ref 0.5–1.4)
DIGOXIN SERPL-MCNC: 0.8 NG/ML (ref 0.8–2)
EKG IMPRESSION: NORMAL
GLOBULIN SER CALC-MCNC: 2.7 G/DL (ref 1.9–3.5)
GLUCOSE SERPL-MCNC: 122 MG/DL (ref 65–99)
POTASSIUM SERPL-SCNC: 3.9 MMOL/L (ref 3.6–5.5)
PROT SERPL-MCNC: 7.5 G/DL (ref 6–8.2)
SODIUM SERPL-SCNC: 142 MMOL/L (ref 135–145)
TSH SERPL DL<=0.005 MIU/L-ACNC: 1.18 UIU/ML (ref 0.38–5.33)

## 2022-01-26 PROCEDURE — 36415 COLL VENOUS BLD VENIPUNCTURE: CPT

## 2022-01-26 PROCEDURE — 84443 ASSAY THYROID STIM HORMONE: CPT

## 2022-01-26 PROCEDURE — 80053 COMPREHEN METABOLIC PANEL: CPT

## 2022-01-26 PROCEDURE — 80162 ASSAY OF DIGOXIN TOTAL: CPT

## 2022-01-26 PROCEDURE — 99214 OFFICE O/P EST MOD 30 MIN: CPT | Mod: 25 | Performed by: NURSE PRACTITIONER

## 2022-01-26 PROCEDURE — 93000 ELECTROCARDIOGRAM COMPLETE: CPT | Performed by: INTERNAL MEDICINE

## 2022-01-26 RX ORDER — TADALAFIL 20 MG/1
40 TABLET ORAL
COMMUNITY
Start: 2022-01-20 | End: 2022-04-05

## 2022-01-26 ASSESSMENT — ENCOUNTER SYMPTOMS
SHORTNESS OF BREATH: 1
ABDOMINAL PAIN: 0
HEARTBURN: 0
DIZZINESS: 1
COUGH: 0
PALPITATIONS: 0
CLAUDICATION: 0
ORTHOPNEA: 1
MYALGIAS: 0
PND: 0
FEVER: 0

## 2022-01-26 ASSESSMENT — FIBROSIS 4 INDEX: FIB4 SCORE: 1.95

## 2022-01-26 NOTE — PROGRESS NOTES
Chief Complaint   Patient presents with   • Hypertension     F/V DX: Pulmonary hypertension (HCC)   • Atrial Fibrillation     F/V Dx: Paroxysmal atrial fibrillation (HCC)       Subjective:   Zeinab Loza is a 76 y.o. female who presents today for follow-up on her pulmonary hypertension, atrial fibrillation with her daughter, Vernell.    Patient of Dr. Galindo and Dr. Eng.  She was last seen in clinic on 10/20/2021.  During that visit, patient was sent for follow-up lab testing.  She was found to have some worsening kidney function and recommended to discontinue her spironolactone.  Her repeat lab testing showed improvement of her kidney function.    Patient did have her knee surgery and is currently doing well.  She states initially she had a lot of pain post procedure.    Patient mentions having some episodes of lightheadedness and weakness approximately over the past 2 weeks.  She happened to check her heart rate at one point and found that it was in the 40s.  She previously remembered a conversation about heart rate and digoxin and she decided to hold her digoxin.  Over the past 3 days, she has felt better, but does continue to have some weakness.    Her daughter mentions she has been taking lower doses of her potassium.    Patient otherwise states her shortness of breath, orthopnea have remained stable.  He states her GERD has improved.    She reports her home weights are around 193 lbs. Her prior dry weight around 204 lbs.      She continues to report compliance with sodium intake.     She does continue to follow with pulmonary at [facility] with Dr. Zelaya.     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 5.19 L/min, Odin C.I 2.39   PVR 4.8 woods     -Hypertension     -Paroxysmal A. fib, started on Tikosyn on 10/21/2020, on Eliquis; RF ablation with PVI, septal LA flutter and empiric ablation for mitral isthmus flutter by Dr. Galindo on 4/22/2021     -Hyperlipidemia: Taking rosuvastatin     -Sleep apnea, uses BiPAP with oxygen bleed in 5L, followed by sleep medicine       Past Medical History:   Diagnosis Date   • Aneurysm artery, celiac (HCC) 2019   • Aneurysm of right renal artery (HCC)    • Atrial fibrillation (HCC)    • Breath shortness     5L O2 all the time   • Chickenpox    • Congestive heart failure (HCC)    • Diastolic heart failure (HCC)    • Martiniquais measles    • Heart burn    • Heart valve disease     MV repair   • Hypertension, essential    • Pulmonary hypertension (HCC)    • Sleep apnea     Bipap   • Snoring    • Warfarin anticoagulation      Past Surgical History:   Procedure Laterality Date   • PB TOTAL KNEE ARTHROPLASTY Left 11/23/2021    Procedure: ARTHROPLASTY, KNEE, TOTAL;  Surgeon: Mars Mclean M.D.;  Location: SURGERY [facility];  Service: Orthopedics   • ORIF, ANKLE Left 8/5/2020    Procedure: ORIF, ANKLE;  Surgeon: Tk Humphreys M.D.;  Location: SURGERY [facility];  Service: Orthopedics   • WRIST FUSION Left 2017    ORIF wrist   • MITRAL VALVE REPAIR  2005   • HYSTERECTOMY LAPAROSCOPY     • OTHER      neck surgery Cervical fusion 4, 5, & 6   • TONSILLECTOMY       Family History   Problem Relation Age of Onset   • Heart Disease Maternal Grandfather    • Heart Disease Paternal Grandfather      Social History     Socioeconomic History   • Marital status:      Spouse name: Not on file   • Number of children: Not on file   • Years of  education: Not on file   • Highest education level: Not on file   Occupational History   • Not on file   Tobacco Use   • Smoking status: Never Smoker   • Smokeless tobacco: Never Used   Vaping Use   • Vaping Use: Never used   Substance and Sexual Activity   • Alcohol use: Yes     Alcohol/week: 4.2 oz     Types: 7 Shots of liquor per week     Comment: 1 drink a night   • Drug use: No   • Sexual activity: Not on file   Other Topics Concern   • Not on file   Social History Narrative   • Not on file     Social Determinants of Health     Financial Resource Strain:    • Difficulty of Paying Living Expenses: Not on file   Food Insecurity:    • Worried About Running Out of Food in the Last Year: Not on file   • Ran Out of Food in the Last Year: Not on file   Transportation Needs:    • Lack of Transportation (Medical): Not on file   • Lack of Transportation (Non-Medical): Not on file   Physical Activity:    • Days of Exercise per Week: Not on file   • Minutes of Exercise per Session: Not on file   Stress:    • Feeling of Stress : Not on file   Social Connections:    • Frequency of Communication with Friends and Family: Not on file   • Frequency of Social Gatherings with Friends and Family: Not on file   • Attends [employer] Services: Not on file   • Active Member of Clubs or Organizations: Not on file   • Attends Club or Organization Meetings: Not on file   • Marital Status: Not on file   Intimate Partner Violence:    • Fear of Current or Ex-Partner: Not on file   • Emotionally Abused: Not on file   • Physically Abused: Not on file   • Sexually Abused: Not on file   Housing Stability:    • Unable to Pay for Housing in the Last Year: Not on file   • Number of Places Lived in the Last Year: Not on file   • Unstable Housing in the Last Year: Not on file     Allergies   Allergen Reactions   • Betapace [Sotalol] Anaphylaxis   • Tikosyn [Dofetilide] Swelling     TONGUE SWELL.    • Zolpidem Tartrate Unspecified     Lightheaded and  confusion     Outpatient Encounter Medications as of 1/26/2022   Medication Sig Dispense Refill   • Tadalafil, PAH, 20 MG Tab Take 40 mg by mouth.     • omeprazole (PRILOSEC) 40 MG delayed-release capsule Take 1 Capsule by mouth every day. 90 Capsule 3   • amiodarone (CORDARONE) 200 MG Tab Take 1 Tablet by mouth every day. 90 Tablet 3   • rosuvastatin (CRESTOR) 40 MG tablet Take 1 Tablet by mouth every evening. 90 Tablet 3   • bumetanide (BUMEX) 2 MG tablet Take 2 Tablets by mouth 2 times a day. 360 Tablet 3   • NARCAN 4 MG/0.1ML Liquid      • oxyCODONE-acetaminophen (PERCOCET) 7.5-325 MG per tablet      • traMADol (ULTRAM) 50 MG Tab TAKE 1 TO 2 TABLETS BY MOUTH EVERY 6 HOURS AS NEEDED PAIN     • estradiol (ESTRACE) 0.1 MG/GM vaginal cream APPLY 1 GRAM TO THE INSIDE OF VAGINA USING APPLICATOR EVERY NIGHT AT BEDTIME FOR 2 WEEKS THEN TWICE PER WEEK THEREAFTER     • tretinoin (RETIN-A) 0.1 % cream Apply 1 Application topically every evening.     • potassium chloride SA (KDUR) 20 MEQ Tab CR Take 3 Tablets by mouth 2 times a day. 180 tablet 11   • apixaban (ELIQUIS) 5mg Tab Take 1 tablet by mouth 2 times a day. 180 tablet 3   • metoprolol tartrate (LOPRESSOR) 50 MG Tab Take 1 tablet by mouth 2 times a day. 60 tablet 11   • valacyclovir (VALTREX) 1 GM Tab Take 1 tablet by mouth every 8 hours as needed.     • Tadalafil, PAH, (ADCIRCA) 20 MG Tab Take 40 mg by mouth every bedtime. Indications: Pulmonary Arterial Hypertension     • PARoxetine (PAXIL) 20 MG Tab Take 20 mg by mouth every morning.     • levothyroxine (SYNTHROID) 75 MCG Tab Take 75 mcg by mouth Every morning on an empty stomach.     • Cholecalciferol (VITAMIN D) 2000 units Cap Take 2,000 Units by mouth every day.     • [DISCONTINUED] Tadalafil, PAH, 20 MG Tab Take 2 Tablets by mouth every day.     • [DISCONTINUED] digoxin (LANOXIN) 125 MCG Tab Take 1 Tablet by mouth every day. 90 Tablet 2     No facility-administered encounter medications on file as of 1/26/2022.  "    Review of Systems   Constitutional: Negative for fever and malaise/fatigue.   Respiratory: Positive for shortness of breath. Negative for cough.    Cardiovascular: Positive for orthopnea. Negative for chest pain, palpitations, claudication, leg swelling and PND.   Gastrointestinal: Negative for abdominal pain and heartburn.   Musculoskeletal: Positive for joint pain (left knee). Negative for myalgias.   Neurological: Positive for dizziness (slight).   All other systems reviewed and are negative.       Objective:   /70 (BP Location: Left arm, Patient Position: Sitting, BP Cuff Size: Adult)   Pulse 62   Resp 15   Ht 1.702 m (5' 7\")   Wt 90.4 kg (199 lb 3.2 oz)   SpO2 92%   BMI 31.20 kg/m²     Physical Exam  Vitals reviewed.   Constitutional:       Appearance: She is well-developed.   HENT:      Head: Normocephalic and atraumatic.   Eyes:      Pupils: Pupils are equal, round, and reactive to light.   Neck:      Vascular: No JVD.   Cardiovascular:      Rate and Rhythm: Normal rate and regular rhythm.      Heart sounds: Normal heart sounds.   Pulmonary:      Effort: Pulmonary effort is normal. No respiratory distress.      Breath sounds: Normal breath sounds. No wheezing or rales.      Comments: Using continuous oxygen at 5L  Abdominal:      General: Bowel sounds are normal.      Palpations: Abdomen is soft.   Musculoskeletal:      Cervical back: Normal range of motion and neck supple.      Right lower leg: No edema.      Left lower leg: No edema.   Skin:     General: Skin is warm and dry.   Neurological:      Mental Status: She is alert and oriented to person, place, and time.   Psychiatric:         Behavior: Behavior normal.       Lab Results   Component Value Date/Time    CHOLSTRLTOT 211 (H) 07/26/2021 01:02 PM     (H) 07/26/2021 01:02 PM    HDL 47 07/26/2021 01:02 PM    TRIGLYCERIDE 231 (H) 07/26/2021 01:02 PM       Lab Results   Component Value Date/Time    SODIUM 141 11/11/2021 11:44 AM    " POTASSIUM 4.7 11/11/2021 11:44 AM    CHLORIDE 100 11/11/2021 11:44 AM    CO2 32 11/11/2021 11:44 AM    GLUCOSE 137 (H) 11/11/2021 11:44 AM    BUN 19 11/11/2021 11:44 AM    CREATININE 1.24 11/11/2021 11:44 AM     Lab Results   Component Value Date/Time    ALKPHOSPHAT 70 09/27/2021 01:25 AM    ASTSGOT 25 09/27/2021 01:25 AM    ALTSGPT 17 09/27/2021 01:25 AM    TBILIRUBIN 0.6 09/27/2021 01:25 AM      Transthoracic Echo Report 1/16/2020  Normal left ventricular systolic function. Left ventricular ejection fraction is visually estimated to be 65%.  A reliable estimation of diastolic function cannot be made due to mitral valve disease.  Normal inferior vena cava size and inspiratory collapse.  Known mitral valve repair which is functioning normally with   appropriate transvalvular gradient. Mean transvalvular gradient is 3 mmHg at a heart rate of 66 BPM.  Estimated right ventricular systolic pressure  is 32 mmHg.     Transthoracic Echo Report 8/5/2020  Normal left ventricular size, wall thickness, and systolic function.  Dilated right ventricle with preserved systolic function.  No significant valvular pathology.  Estimated right ventricular systolic pressure  is 25 mmHg; normal.  Normal pericardium without effusion.     No prior study is available for comparison.      Transthoracic Echo Report 3/5/2021  Normal left ventricular size and systolic function.  Mild concentric left ventricular hypertrophy.  Left ventricular ejection fraction is visually estimated to be 60%.  Known mitral valve repair which is functioning normally with   appropriate transvalvular gradient.  Mild mitral regurgitation.  Normal inferior vena cava size and inspiratory collapse.    Transesophageal Echo Report 4/22/2021  LV function appears preserved. Sigmoid septum.  Known mitral valve repair, mild MR.  NED has been ligated, no residual NED is seen.  No thrombus is seen in the left atrium.  Moderate tricuspid regurgitation.  Trileaflet aortic  valve.  No ASD is seen.    Transthoracic Echo Report 9/2/2021  Patient atrial fibrillation, heart rate 110 bpm.  Left ventricle is small in size.  Left ventricular ejection fraction is visually estimated to be 75%,   hyperdynamic.  Reduced right ventricular systolic function.  Biatrial enlargement.  Mitral valve repair functioning normally: MG 3 mmHg,  BPM.  Aortic valve not well visualized, no significant abnormalities.  Estimated right ventricular systolic pressure  is 30 mmHg.     Compared to prior echo 03/05/21, no significant change.     Assessment:     1. Paroxysmal atrial fibrillation (HCC)  EKG    DIGOXIN    Comp Metabolic Panel    TSH WITH REFLEX TO FT4    DX-CHEST-2 VIEWS    CANCELED: Basic Metabolic Panel   2. High risk medication use  EKG    DIGOXIN    Comp Metabolic Panel    TSH WITH REFLEX TO FT4    DX-CHEST-2 VIEWS    CANCELED: Basic Metabolic Panel   3. Heart rate slow  EKG    DIGOXIN    Comp Metabolic Panel    TSH WITH REFLEX TO FT4    DX-CHEST-2 VIEWS    CANCELED: Basic Metabolic Panel   4. Long term current use of amiodarone  Comp Metabolic Panel    TSH WITH REFLEX TO FT4    DX-CHEST-2 VIEWS   5. Mixed hyperlipidemia     6. Chronic anticoagulation     7. S/P repeat ablation of atrial fibrillation/atrial flutter     8. Essential hypertension     9. Other sleep apnea     10. Chronic respiratory failure with hypoxia (HCC)     11. Pulmonary hypertension (HCC)         Medical Decision Making:  Today's Assessment / Status / Plan:   1. Pulmonary hypertension, WHO group 2-3, functional class III:  -Continue 4 mg in am and decrease to 2 mg in PM.   -No spironolactone due to MILAN  -Continue potassium 60 mEq BID  -Continue tadalafil 40 mg daily  -We will get follow-up CMP, dig level  -Unable to take Letairis due to hypotension  -Encourage patient to monitor sodium intake  -Reinforced s/sx of worsening symptoms with patient and weight monitoring. Pt verbalizes understanding. Pt to call office or RTC if  present. Pt encouraged to take scale when traveling  -Continue to follow up with [facility]      2.  Sleep apnea:  -Continue BiPAP with oxygen bleeding, using 5 L     3.  Atrial fibrillation:  EKG today shows sinus rhythm 63  -followed by EP  -Patient had A. fib ablation on 4/22/2021  -Continue Eliquis 5 mg twice a day  -Continue Amiodarone 200 mg daily, follow up labs in 6 months  -Continue metoprolol 50 mg twice a day  -Discontinue digoxin, discussed with patient daughter her symptoms could be low heart rate.  Patient to follow and if she continues to have problems to contact our office.  -Patient to get a chest x-ray, TSH, dig level and CMP  -Long-term plan per Dr. Galindo, short-term amiodarone use patient may need an ablation and PPM     4.  Hyperlipidemia: Last  on 7/26/2021  -Continue rosuvastatin 40 mg daily, Increased mid August     5.  Hypertension:   -BP stable at this time   -Continue metoprolol 50 mg twice a day     FU in clinic in 3 months with Dr. Galindo and myself. Sooner if needed.     Patient verbalizes understanding and agrees with the plan of care.      PLEASE NOTE: This Note was created using voice recognition Software. I have made every reasonable attempt to correct obvious errors, but I expect that there are errors of grammar and possibly content that I did not discover before finalizing the note

## 2022-01-27 ENCOUNTER — PATIENT MESSAGE (OUTPATIENT)
Dept: CARDIOLOGY | Facility: MEDICAL CENTER | Age: 77
End: 2022-01-27

## 2022-01-27 DIAGNOSIS — Z79.899 HIGH RISK MEDICATION USE: ICD-10-CM

## 2022-01-27 DIAGNOSIS — I27.21 PAH (PULMONARY ARTERY HYPERTENSION) (HCC): ICD-10-CM

## 2022-01-27 DIAGNOSIS — I48.0 PAROXYSMAL ATRIAL FIBRILLATION (HCC): ICD-10-CM

## 2022-01-27 RX ORDER — BUMETANIDE 2 MG/1
2 TABLET ORAL 2 TIMES DAILY
Qty: 360 TABLET | Refills: 3 | COMMUNITY
Start: 2022-01-27 | End: 2022-03-15 | Stop reason: SDUPTHER

## 2022-01-27 NOTE — PATIENT COMMUNICATION
TEODORO Hogan.  Moo Thomas R.N.  Please let her know that her kidney function has worsened and she should try to decrease Bumex to 2 mg BID and then repeat a BMP in a few weeks.     Patient notified via [employer]. Med rec updated, BMP ordered

## 2022-01-27 NOTE — RESULT ENCOUNTER NOTE
Please let her know that her kidney function has worsened and she should try to decrease Bumex to 2 mg BID and then repeat a BMP in a few weeks.

## 2022-02-04 ENCOUNTER — APPOINTMENT (OUTPATIENT)
Dept: PULMONOLOGY | Facility: MEDICAL CENTER | Age: 77
End: 2022-02-04
Payer: MEDICARE

## 2022-02-22 ENCOUNTER — HOSPITAL ENCOUNTER (OUTPATIENT)
Dept: RADIOLOGY | Facility: MEDICAL CENTER | Age: 77
End: 2022-02-22
Attending: PHYSICIAN ASSISTANT
Payer: MEDICARE

## 2022-02-22 ENCOUNTER — OFFICE VISIT (OUTPATIENT)
Dept: URGENT CARE | Facility: PHYSICIAN GROUP | Age: 77
End: 2022-02-22
Payer: MEDICARE

## 2022-02-22 ENCOUNTER — HOSPITAL ENCOUNTER (OUTPATIENT)
Dept: RADIOLOGY | Facility: MEDICAL CENTER | Age: 77
End: 2022-02-22
Attending: NURSE PRACTITIONER
Payer: MEDICARE

## 2022-02-22 VITALS
RESPIRATION RATE: 20 BRPM | SYSTOLIC BLOOD PRESSURE: 138 MMHG | WEIGHT: 196 LBS | HEIGHT: 67 IN | HEART RATE: 70 BPM | OXYGEN SATURATION: 84 % | TEMPERATURE: 98.6 F | BODY MASS INDEX: 30.76 KG/M2 | DIASTOLIC BLOOD PRESSURE: 84 MMHG

## 2022-02-22 DIAGNOSIS — M75.31 CALCIFIC TENDINITIS OF RIGHT SHOULDER: ICD-10-CM

## 2022-02-22 DIAGNOSIS — R00.1 HEART RATE SLOW: ICD-10-CM

## 2022-02-22 DIAGNOSIS — Z79.899 LONG TERM CURRENT USE OF AMIODARONE: ICD-10-CM
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
## 2022-02-22 DIAGNOSIS — M25.511 ACUTE PAIN OF RIGHT SHOULDER: ICD-10-CM

## 2022-02-22 DIAGNOSIS — I48.0 PAROXYSMAL ATRIAL FIBRILLATION (HCC): ICD-10-CM

## 2022-02-22 DIAGNOSIS — Z79.899 HIGH RISK MEDICATION USE: ICD-10-CM

## 2022-02-22 PROCEDURE — 71046 X-RAY EXAM CHEST 2 VIEWS: CPT

## 2022-02-22 PROCEDURE — 73030 X-RAY EXAM OF SHOULDER: CPT | Mod: RT

## 2022-02-22 PROCEDURE — 99214 OFFICE O/P EST MOD 30 MIN: CPT | Performed by: PHYSICIAN ASSISTANT

## 2022-02-22 ASSESSMENT — ENCOUNTER SYMPTOMS
DOUBLE VISION: 0
BACK PAIN: 1
MYALGIAS: 1
DIZZINESS: 0
SHORTNESS OF BREATH: 0
ABDOMINAL PAIN: 0
HEADACHES: 0
BLURRED VISION: 0
FALLS: 1
NAUSEA: 0
PALPITATIONS: 0
VOMITING: 0
TINGLING: 0

## 2022-02-22 ASSESSMENT — FIBROSIS 4 INDEX: FIB4 SCORE: 1.62

## 2022-02-22 ASSESSMENT — PAIN SCALES - GENERAL: PAINLEVEL: 8=MODERATE-SEVERE PAIN

## 2022-02-22 NOTE — PROGRESS NOTES
Subjective:   Zeinab Loza is a 76 y.o. female who presents for Shoulder Pain (Right side post fall)      HPI:  This is a very pleasant 76-year-old female with a past medical history significant for PAH, CHF, A. fib on chronic anticoagulation, hypertension and on chronic pain management.  Patient is presenting today after sustaining a ground-level fall onto her right shoulder.  Patient states she tripped over her oxygen hose and fell to the right side.  Landed on the floor predominantly over the right shoulder.  This happened 2 to 3 weeks ago.  She has had continued pain since this event.  Denies any significant swelling.  Pain is reproduced with certain movements as well as palpation.  No numbness or tingling to the upper extremities.  Range of motion is for the most part intact.  She did not hit her head or lose consciousness.  No chest pain or shortness of breath.  Takes oxycodone for chronic pain.      Review of Systems   Eyes: Negative for blurred vision and double vision.   Respiratory: Negative for shortness of breath.    Cardiovascular: Negative for chest pain and palpitations.   Gastrointestinal: Negative for abdominal pain, nausea and vomiting.   Musculoskeletal: Positive for back pain (chronic), falls, joint pain and myalgias.   Neurological: Negative for dizziness, tingling and headaches.   Endo/Heme/Allergies: Positive for environmental allergies.       Medications:    • amiodarone Tabs  • apixaban Tabs  • bumetanide  • estradiol  • levothyroxine Tabs  • metoprolol tartrate Tabs  • Narcan Liqd  • omeprazole  • oxyCODONE-acetaminophen  • PARoxetine Tabs  • potassium chloride SA Tbcr  • rosuvastatin  • Tadalafil (PAH) Tabs  • traMADol Tabs  • tretinoin  • valacyclovir Tabs  • Vitamin D Caps    Allergies: Betapace [sotalol], Tikosyn [dofetilide], and Zolpidem tartrate    Problem List: Zeinab Loza does not have any pertinent problems on file.    Surgical History:  Past Surgical History:  "  Procedure Laterality Date   • PB TOTAL KNEE ARTHROPLASTY Left 11/23/2021    Procedure: ARTHROPLASTY, KNEE, TOTAL;  Surgeon: Mars Mclean M.D.;  Location: SURGERY [facility];  Service: Orthopedics   • ORIF, ANKLE Left 8/5/2020    Procedure: ORIF, ANKLE;  Surgeon: Tk Humphreys M.D.;  Location: SURGERY [facility];  Service: Orthopedics   • WRIST FUSION Left 2017    ORIF wrist   • MITRAL VALVE REPAIR  2005   • HYSTERECTOMY LAPAROSCOPY     • OTHER      neck surgery Cervical fusion 4, 5, & 6   • TONSILLECTOMY         Past Social Hx: Zeinab Loza  reports that she has never smoked. She has never used smokeless tobacco. She reports current alcohol use of about 4.2 oz of alcohol per week. She reports that she does not use drugs.     Past Family Hx:  Zeinab Loza family history includes Heart Disease in her maternal grandfather and paternal grandfather.     Problem list, medications, and allergies reviewed by myself today in Epic.     Objective:     /84   Pulse 70   Temp 37 °C (98.6 °F)   Resp 20   Ht 1.702 m (5' 7\")   Wt 88.9 kg (196 lb)   SpO2 (!) 84%   BMI 30.70 kg/m²     Physical Exam  Constitutional:       General: She is not in acute distress.     Appearance: Normal appearance. She is not ill-appearing, toxic-appearing or diaphoretic.   HENT:      Head: Normocephalic and atraumatic.   Eyes:      Conjunctiva/sclera: Conjunctivae normal.   Cardiovascular:      Rate and Rhythm: Normal rate and regular rhythm.      Pulses: Normal pulses.      Heart sounds: Normal heart sounds.   Pulmonary:      Effort: Pulmonary effort is normal.      Breath sounds: Normal breath sounds. No wheezing.   Musculoskeletal:      Cervical back: Normal range of motion. No rigidity or tenderness.      Comments: Right shoulder: No obvious deformity, discoloration or effusion present.  Patient has generalized tenderness to palpation over the right shoulder.  Pain predominantly located over the proximal " humerus.  Patient does demonstrate full shoulder flexion, extension, internal, and external rotation.   strength is 5/5.  Sensation intact distally.  Positive Neer's, Goldsmith and empty can   Skin:     Capillary Refill: Capillary refill takes less than 2 seconds.      Findings: No bruising, erythema or rash.   Neurological:      General: No focal deficit present.      Mental Status: She is alert and oriented to person, place, and time. Mental status is at baseline.   Psychiatric:         Mood and Affect: Mood normal.         Thought Content: Thought content normal.       RADIOLOGY RESULTS   DX-CHEST-2 VIEWS    Result Date: 2/22/2022 2/22/2022 2:37 PM HISTORY/REASON FOR EXAM:  amiodarone use. TECHNIQUE/EXAM DESCRIPTION AND NUMBER OF VIEWS: Two views of the chest. COMPARISON:  September 24, 2021 FINDINGS: Patient is status post median sternotomy. The cardiac silhouette is enlarged. No pulmonary infiltrates or consolidations are noted. No pleural effusions are appreciated. No pneumothorax. There is an old right mid clavicle fracture. No rib fracture identified. Embolization coils identified in the upper abdomen.     Enlarged cardiac silhouette without evidence of acute disease.    DX-SHOULDER 2+ RIGHT    Result Date: 2/22/2022  HISTORY/REASON FOR EXAM:  Pain/Deformity Following Trauma Ground-level fall 2 weeks ago TECHNIQUE/EXAM DESCRIPTION AND NUMBER OF VIEWS:  3 views of the right shoulder, 2/22/2022 2:36 PM. COMPARISON: Chest x-ray September 24, 2021 and September 29, 2020 FINDINGS: Views of the shoulder demonstrate no evidence of acute fracture or dislocation. There is again evidence of an old mid clavicle fracture with callus formation. AC joint is intact.  The visualized lung parenchyma is clear. There are soft tissue calcifications along the superolateral aspect of the humeral head. There is glenohumeral joint osteoarthritis.     No evidence of acute fracture or dislocation. Old mid clavicle fracture with  callus formation. Glenohumeral joint osteoarthritis with soft tissue calcifications which could indicate calcific bursitis or tendinitis. INTERPRETING LOCATION:  [address] [place] [address] [place], [id number]           Assessment/Plan:     Comments/MDM:     • Patient sustained a ground-level fall 2 to 3 weeks ago.  Has had continued right shoulder pain.  X-ray was performed in clinic that demonstrated calcific tendinitis/bursitis.  This is consistent with exam findings.  No evidence of fracture appreciated on x-ray.  Encouraged continued gentle range of motion and stretching exercises.  We will place a referral for the patient to follow-up with sports medicine for potential intra-articular steroid injection.  Please call with questions or concerns.  Return for any worsening symptoms.     Diagnosis and associated orders:     1. Calcific tendinitis of right shoulder    - DX-SHOULDER 2+ RIGHT; Future  - Referral to Sports Medicine    2. Fall, initial encounter    - DX-SHOULDER 2+ RIGHT; Future           Differential diagnosis, natural history, supportive care, and indications for immediate follow-up discussed.    I personally reviewed prior external notes and test results pertinent to today's visit.     Advised the patient to follow-up with the primary care physician for recheck, reevaluation, and consideration of further management.    Please note that this dictation was created using voice recognition software. I have made reasonable attempt to correct obvious errors, but I expect that there are errors of grammar and possibly content that I did not discover before finalizing the note.    This note was electronically signed by HAJA Croft PA-C

## 2022-02-26 ENCOUNTER — HOSPITAL ENCOUNTER (OUTPATIENT)
Dept: PULMONOLOGY | Facility: MEDICAL CENTER | Age: 77
End: 2022-02-26
Attending: INTERNAL MEDICINE
Payer: MEDICARE

## 2022-02-26 PROCEDURE — 94726 PLETHYSMOGRAPHY LUNG VOLUMES: CPT

## 2022-02-26 PROCEDURE — 94729 DIFFUSING CAPACITY: CPT

## 2022-02-26 PROCEDURE — 94729 DIFFUSING CAPACITY: CPT | Mod: 26 | Performed by: STUDENT IN AN ORGANIZED HEALTH CARE EDUCATION/TRAINING PROGRAM

## 2022-02-26 PROCEDURE — 94010 BREATHING CAPACITY TEST: CPT

## 2022-02-26 PROCEDURE — 94726 PLETHYSMOGRAPHY LUNG VOLUMES: CPT | Mod: 26 | Performed by: STUDENT IN AN ORGANIZED HEALTH CARE EDUCATION/TRAINING PROGRAM

## 2022-02-26 ASSESSMENT — PULMONARY FUNCTION TESTS
FVC_PERCENT_PREDICTED: 77
FEV1/FVC_PREDICTED: 77.16
FEV1: 1.69
FEV1/FVC: 73.15
FVC_LLN: 2.49
FVC_PREDICTED: 2.99
FVC: 2.32
FEV1/FVC_PERCENT_PREDICTED: 96
FEV1_PREDICTED: 2.28
FEV1_PERCENT_PREDICTED: 74
FEV1/FVC_PERCENT_LLN: 64.43
FEV1/FVC_PERCENT_PREDICTED: 76
FEV1/FVC: 73
FEV1/FVC_PERCENT_PREDICTED: 94
FEV1_LLN: 1.9

## 2022-02-27 NOTE — PROCEDURES
DATE OF SERVICE:  02/26/2022     PULMONARY FUNCTION TEST INTERPRETATION     INTERPRETATION:  1.  There is no significant obstruction or restriction.  Bronchodilator   testing was not performed.  2.  Borderline decreased DLCO and normal DL/VA consistent with a loss of whole   lung unit.        ______________________________  MD SNEHAL Thorne/LAI    DD:  02/27/2022 08:44  DT:  02/27/2022 10:09    Job#:  [id number]

## 2022-03-07 SDOH — ECONOMIC STABILITY: TRANSPORTATION INSECURITY
IN THE PAST 12 MONTHS, HAS LACK OF RELIABLE TRANSPORTATION KEPT YOU FROM MEDICAL APPOINTMENTS, MEETINGS, WORK OR FROM GETTING THINGS NEEDED FOR DAILY LIVING?: NO

## 2022-03-07 SDOH — ECONOMIC STABILITY: INCOME INSECURITY: IN THE LAST 12 MONTHS, WAS THERE A TIME WHEN YOU WERE NOT ABLE TO PAY THE MORTGAGE OR RENT ON TIME?: NO

## 2022-03-07 SDOH — ECONOMIC STABILITY: TRANSPORTATION INSECURITY
IN THE PAST 12 MONTHS, HAS THE LACK OF TRANSPORTATION KEPT YOU FROM MEDICAL APPOINTMENTS OR FROM GETTING MEDICATIONS?: NO

## 2022-03-07 SDOH — HEALTH STABILITY: PHYSICAL HEALTH: ON AVERAGE, HOW MANY DAYS PER WEEK DO YOU ENGAGE IN MODERATE TO STRENUOUS EXERCISE (LIKE A BRISK WALK)?: 0 DAYS

## 2022-03-07 SDOH — ECONOMIC STABILITY: HOUSING INSECURITY: IN THE LAST 12 MONTHS, HOW MANY PLACES HAVE YOU LIVED?: 1

## 2022-03-07 SDOH — ECONOMIC STABILITY: HOUSING INSECURITY
IN THE LAST 12 MONTHS, WAS THERE A TIME WHEN YOU DID NOT HAVE A STEADY PLACE TO SLEEP OR SLEPT IN A SHELTER (INCLUDING NOW)?: NO

## 2022-03-07 SDOH — ECONOMIC STABILITY: TRANSPORTATION INSECURITY
IN THE PAST 12 MONTHS, HAS LACK OF TRANSPORTATION KEPT YOU FROM MEETINGS, WORK, OR FROM GETTING THINGS NEEDED FOR DAILY LIVING?: NO

## 2022-03-07 SDOH — HEALTH STABILITY: PHYSICAL HEALTH: ON AVERAGE, HOW MANY MINUTES DO YOU ENGAGE IN EXERCISE AT THIS LEVEL?: 0 MIN

## 2022-03-07 SDOH — ECONOMIC STABILITY: FOOD INSECURITY: WITHIN THE PAST 12 MONTHS, THE FOOD YOU BOUGHT JUST DIDN'T LAST AND YOU DIDN'T HAVE MONEY TO GET MORE.: NEVER TRUE

## 2022-03-07 SDOH — ECONOMIC STABILITY: FOOD INSECURITY: WITHIN THE PAST 12 MONTHS, YOU WORRIED THAT YOUR FOOD WOULD RUN OUT BEFORE YOU GOT MONEY TO BUY MORE.: NEVER TRUE

## 2022-03-07 SDOH — ECONOMIC STABILITY: INCOME INSECURITY: HOW HARD IS IT FOR YOU TO PAY FOR THE VERY BASICS LIKE FOOD, HOUSING, MEDICAL CARE, AND HEATING?: NOT HARD AT ALL

## 2022-03-07 SDOH — HEALTH STABILITY: MENTAL HEALTH
STRESS IS WHEN SOMEONE FEELS TENSE, NERVOUS, ANXIOUS, OR CAN'T SLEEP AT NIGHT BECAUSE THEIR MIND IS TROUBLED. HOW STRESSED ARE YOU?: ONLY A LITTLE

## 2022-03-07 ASSESSMENT — SOCIAL DETERMINANTS OF HEALTH (SDOH)
HOW MANY DRINKS CONTAINING ALCOHOL DO YOU HAVE ON A TYPICAL DAY WHEN YOU ARE DRINKING: 1 OR 2
HOW OFTEN DO YOU GET TOGETHER WITH FRIENDS OR RELATIVES?: ONCE A WEEK
WITHIN THE PAST 12 MONTHS, YOU WORRIED THAT YOUR FOOD WOULD RUN OUT BEFORE YOU GOT THE MONEY TO BUY MORE: NEVER TRUE
HOW OFTEN DO YOU GET TOGETHER WITH FRIENDS OR RELATIVES?: ONCE A WEEK
IN A TYPICAL WEEK, HOW MANY TIMES DO YOU TALK ON THE PHONE WITH FAMILY, FRIENDS, OR NEIGHBORS?: THREE TIMES A WEEK
DO YOU BELONG TO ANY CLUBS OR ORGANIZATIONS SUCH AS CHURCH GROUPS UNIONS, FRATERNAL OR ATHLETIC GROUPS, OR SCHOOL GROUPS?: YES
DO YOU BELONG TO ANY CLUBS OR ORGANIZATIONS SUCH AS CHURCH GROUPS UNIONS, FRATERNAL OR ATHLETIC GROUPS, OR SCHOOL GROUPS?: YES
HOW OFTEN DO YOU ATTENT MEETINGS OF THE CLUB OR ORGANIZATION YOU BELONG TO?: MORE THAN 4 TIMES PER YEAR
HOW OFTEN DO YOU ATTENT MEETINGS OF THE CLUB OR ORGANIZATION YOU BELONG TO?: MORE THAN 4 TIMES PER YEAR
HOW OFTEN DO YOU HAVE SIX OR MORE DRINKS ON ONE OCCASION: NEVER
HOW OFTEN DO YOU ATTEND CHURCH OR RELIGIOUS SERVICES?: MORE THAN 4 TIMES PER YEAR
IN A TYPICAL WEEK, HOW MANY TIMES DO YOU TALK ON THE PHONE WITH FAMILY, FRIENDS, OR NEIGHBORS?: THREE TIMES A WEEK
HOW OFTEN DO YOU ATTEND CHURCH OR RELIGIOUS SERVICES?: MORE THAN 4 TIMES PER YEAR
HOW HARD IS IT FOR YOU TO PAY FOR THE VERY BASICS LIKE FOOD, HOUSING, MEDICAL CARE, AND HEATING?: NOT HARD AT ALL
HOW OFTEN DO YOU HAVE A DRINK CONTAINING ALCOHOL: 2-3 TIMES A WEEK

## 2022-03-07 ASSESSMENT — LIFESTYLE VARIABLES
HOW OFTEN DO YOU HAVE SIX OR MORE DRINKS ON ONE OCCASION: NEVER
HOW MANY STANDARD DRINKS CONTAINING ALCOHOL DO YOU HAVE ON A TYPICAL DAY: 1 OR 2
HOW OFTEN DO YOU HAVE A DRINK CONTAINING ALCOHOL: 2-3 TIMES A WEEK

## 2022-03-08 ENCOUNTER — OFFICE VISIT (OUTPATIENT)
Dept: SPORTS MEDICINE | Facility: CLINIC | Age: 77
End: 2022-03-08
Payer: MEDICARE

## 2022-03-08 VITALS
OXYGEN SATURATION: 96 % | DIASTOLIC BLOOD PRESSURE: 84 MMHG | BODY MASS INDEX: 30.76 KG/M2 | RESPIRATION RATE: 18 BRPM | HEIGHT: 67 IN | HEART RATE: 75 BPM | SYSTOLIC BLOOD PRESSURE: 122 MMHG | TEMPERATURE: 98.9 F | WEIGHT: 196 LBS

## 2022-03-08 DIAGNOSIS — I27.21 PAH (PULMONARY ARTERY HYPERTENSION) (HCC): ICD-10-CM

## 2022-03-08 DIAGNOSIS — Z79.899 HIGH RISK MEDICATION USE: ICD-10-CM

## 2022-03-08 DIAGNOSIS — R29.3 POOR POSTURE: ICD-10-CM

## 2022-03-08 DIAGNOSIS — M75.31 CALCIFIC TENDINITIS OF RIGHT SHOULDER: ICD-10-CM

## 2022-03-08 DIAGNOSIS — I48.0 PAROXYSMAL ATRIAL FIBRILLATION (HCC): ICD-10-CM

## 2022-03-08 PROCEDURE — 20610 DRAIN/INJ JOINT/BURSA W/O US: CPT | Mod: RT | Performed by: FAMILY MEDICINE

## 2022-03-08 PROCEDURE — 99214 OFFICE O/P EST MOD 30 MIN: CPT | Mod: 25 | Performed by: FAMILY MEDICINE

## 2022-03-08 RX ORDER — TRIAMCINOLONE ACETONIDE 40 MG/ML
40 INJECTION, SUSPENSION INTRA-ARTICULAR; INTRAMUSCULAR ONCE
Status: COMPLETED | OUTPATIENT
Start: 2022-03-08 | End: 2022-03-08

## 2022-03-08 RX ORDER — POTASSIUM CHLORIDE 20 MEQ/1
TABLET, EXTENDED RELEASE ORAL
Qty: 540 TABLET | Refills: 3 | Status: SHIPPED | OUTPATIENT
Start: 2022-03-08 | End: 2022-04-05

## 2022-03-08 RX ADMIN — TRIAMCINOLONE ACETONIDE 40 MG: 40 INJECTION, SUSPENSION INTRA-ARTICULAR; INTRAMUSCULAR at 14:55

## 2022-03-08 ASSESSMENT — FIBROSIS 4 INDEX: FIB4 SCORE: 1.65

## 2022-03-08 NOTE — PROGRESS NOTES
CHIEF COMPLAINT:  Zeinab Loza female presenting at the request of Aguila Croft P.A.-C.  for evaluation of Shoulder pain.     Zeinab Loza is complaining of right shoulder pain (right-handed dominant)  Date of injury, roughly February 18, 2022  Tripped on her air hose at home  Pain is at the anterior/dull  Quality is aching  Pain is Radiating anterior arm to the elbow and RIGHT c-spine  Aggravated by movement, chopping/cooking, and sleeping at night  Improved with  rest   previous shoulder injury while sledding in her 20's    Spine fusion c-3 to c-6 and limited use of RIGHT arm after procedure for several weeks  Prior Treatments: seen by PCP  Prior studies: X-Ray   Medications tried for pain include: hydrocodone helps some  Mechanical Symptom history: No Locking, but there is decreased ROM  Doing PT  For L-spine and knee replacement, but has NOT seen PT for the shoulder    Card games, cleaning, art projects and socializing    REVIEW OF SYSTEMS  No Nausea, No Vomiting, No Chest Pain, No Dizziness, No Headache, Shortness of Breath (PAH and hypotension)    PAST MEDICAL HISTORY:   History reviewed. No pertinent past medical history.    PMH:  has a past medical history of Aneurysm artery, celiac (HCC) (2019), Aneurysm of right renal artery (HCC), Atrial fibrillation (HCC), Breath shortness, Chickenpox, Congestive heart failure (HCC), Diastolic heart failure (HCC), [place] measles, Heart burn, Heart valve disease, Hypertension, essential, Pulmonary hypertension (HCC), Sleep apnea, Snoring, and Warfarin anticoagulation.  MEDS:   Current Outpatient Medications:   •  bumetanide (BUMEX) 2 MG tablet, Take 1 Tablet by mouth 2 times a day., Disp: 360 Tablet, Rfl: 3  •  Tadalafil, PAH, 20 MG Tab, Take 40 mg by mouth., Disp: , Rfl:   •  omeprazole (PRILOSEC) 40 MG delayed-release capsule, Take 1 Capsule by mouth every day. (Patient not taking: Reported on 2/22/2022), Disp: 90 Capsule, Rfl: 3  •  amiodarone  (CORDARONE) 200 MG Tab, Take 1 Tablet by mouth every day., Disp: 90 Tablet, Rfl: 3  •  rosuvastatin (CRESTOR) 40 MG tablet, Take 1 Tablet by mouth every evening., Disp: 90 Tablet, Rfl: 3  •  NARCAN 4 MG/0.1ML Liquid, , Disp: , Rfl:   •  oxyCODONE-acetaminophen (PERCOCET) 7.5-325 MG per tablet, , Disp: , Rfl:   •  traMADol (ULTRAM) 50 MG Tab, TAKE 1 TO 2 TABLETS BY MOUTH EVERY 6 HOURS AS NEEDED PAIN (Patient not taking: Reported on 2/22/2022), Disp: , Rfl:   •  estradiol (ESTRACE) 0.1 MG/GM vaginal cream, APPLY 1 GRAM TO THE INSIDE OF VAGINA USING APPLICATOR EVERY NIGHT AT BEDTIME FOR 2 WEEKS THEN TWICE PER WEEK THEREAFTER, Disp: , Rfl:   •  tretinoin (RETIN-A) 0.1 % cream, Apply 1 Application topically every evening., Disp: , Rfl:   •  potassium chloride SA (KDUR) 20 MEQ Tab CR, Take 3 Tablets by mouth 2 times a day., Disp: 180 tablet, Rfl: 11  •  apixaban (ELIQUIS) 5mg Tab, Take 1 tablet by mouth 2 times a day., Disp: 180 tablet, Rfl: 3  •  metoprolol tartrate (LOPRESSOR) 50 MG Tab, Take 1 tablet by mouth 2 times a day., Disp: 60 tablet, Rfl: 11  •  valacyclovir (VALTREX) 1 GM Tab, Take 1 tablet by mouth every 8 hours as needed. (Patient not taking: Reported on 2/22/2022), Disp: , Rfl:   •  Tadalafil, PAH, 20 MG Tab, Take 40 mg by mouth every bedtime. Indications: Pulmonary Arterial Hypertension, Disp: , Rfl:   •  PARoxetine (PAXIL) 20 MG Tab, Take 20 mg by mouth every morning., Disp: , Rfl:   •  levothyroxine (SYNTHROID) 75 MCG Tab, Take 75 mcg by mouth Every morning on an empty stomach., Disp: , Rfl:   •  Cholecalciferol (VITAMIN D) 2000 units Cap, Take 2,000 Units by mouth every day., Disp: , Rfl:   ALLERGIES:   Allergies   Allergen Reactions   • Betapace [Sotalol] Anaphylaxis   • Tikosyn [Dofetilide] Swelling     TONGUE SWELL.    • Zolpidem Tartrate Unspecified     Lightheaded and confusion     SURGHX:   Past Surgical History:   Procedure Laterality Date   • PB TOTAL KNEE ARTHROPLASTY Left 11/23/2021     "Procedure: ARTHROPLASTY, KNEE, TOTAL;  Surgeon: Mars Mclean M.D.;  Location: SURGERY [facility] [place];  Service: Orthopedics   • ORIF, ANKLE Left 8/5/2020    Procedure: ORIF, ANKLE;  Surgeon: Tk Humphreys M.D.;  Location: SURGERY [facility];  Service: Orthopedics   • WRIST FUSION Left 2017    ORIF wrist   • MITRAL VALVE REPAIR  2005   • HYSTERECTOMY LAPAROSCOPY     • OTHER      neck surgery Cervical fusion 4, 5, & 6   • TONSILLECTOMY       SOCHX:  reports that she has never smoked. She has never used smokeless tobacco. She reports current alcohol use of about 4.2 oz of alcohol per week. She reports that she does not use drugs.  FH: Family history was reviewed, no pertinent findings to report     PHYSICAL EXAM:  /84 (BP Location: Left arm, Patient Position: Sitting, BP Cuff Size: Large adult)   Pulse 75   Temp 37.2 °C (98.9 °F) (Temporal)   Resp 18   Ht 1.702 m (5' 7\")   Wt 88.9 kg (196 lb)   SpO2 96%   BMI 30.70 kg/m²      slightly overweight in no apparent distress, alert and oriented x 3.  Gait: normal    Cervical spine:  Range of motion Markedly limited with Extension and Markedllimited with Lateral rotation  Spurling's testing is NEGATIVE but there is some local cervical spine discomfort  Cervical spine tenderness NEGATIVE    Strength testing:     Deltoid, bilateral 5/5  Bicep, bilateral 5/5  Tricep, bilateral 5/5  Wrist Extension, bilateral 5/5  Wrist Flexion, bilateral 5/5  Finger Abduction, bilateral 5/5    Sensation:  INTACT Bilaterally    Reflexes:   Biceps: R 2+/L 2+  Triceps: R 2+/L  2+  Brachial radialis R 2+/L  2+  Lema's testing is NEGATIVE  The arms are otherwise neurovascularly intact     Shoulder Exam:    RIGHT Shoulder:  No visible swelling   Range of motion INTACT with pain at extreme abduction  Tenderness: Supraspinatous tenderness  Empty Can Testing 5/5 with pain  Internal Rotation 5/5  External Rotation 5/5  Lift Off Testing 5/5  Impingement testing Goldsmith  POSITIVE  " Neer's testing POSITIVE    LEFT Shoulder:  No visible swelling   Range of motion INTACT  Tenderness: Non-tender  Empty Can Testing 5/5  Internal Rotation 5/5  External Rotation 5/5  Lift Off Testing 5/5  Impingement testing Goldsmith  NEGATIVE  Neer's testing NEGATIVE    Additional Findings: Flexed Posture      1. Calcific tendinitis of right shoulder  triamcinolone acetonide (KENALOG-40) injection 40 mg    Referral to Physical Therapy   2. Poor posture  Referral to Physical Therapy     Date of injury, roughly February 18, 2022  Tripped on her air hose at home  Pain is at the anterior/dull    RIGHT subacromial corticosteroid injection performed the office TODAY (March 8, 2022)    Return in about 4 weeks (around 4/5/2022).   To see how she is doing formal physical therapy and after RIGHT subacromial corticosteroid injection        HISTORY/REASON FOR EXAM:  Pain/Deformity Following Trauma  Ground-level fall 2 weeks ago     TECHNIQUE/EXAM DESCRIPTION AND NUMBER OF VIEWS:  3 views of the right shoulder, 2/22/2022 2:36 PM.     COMPARISON: Chest x-ray September 24, 2021 and September 29, 2020     FINDINGS:  Views of the shoulder demonstrate no evidence of acute fracture or dislocation. There is again evidence of an old mid clavicle fracture with callus formation. AC joint is intact.  The visualized lung parenchyma is clear.     There are soft tissue calcifications along the superolateral aspect of the humeral head. There is glenohumeral joint osteoarthritis.     IMPRESSION:     No evidence of acute fracture or dislocation.     Old mid clavicle fracture with callus formation.     Glenohumeral joint osteoarthritis with soft tissue calcifications which could indicate calcific bursitis or tendinitis.           INTERPRETING LOCATION:  [address] [place] [address] [place], [id number]             Exam Ended: 02/22/22  2:52 PM Last Resulted: 02/22/22  2:58 PM           done elsewhere and reviewed independently by me    Thank you Aguila  SAUD Croft for allowing me to participate in caring for your patient.

## 2022-03-08 NOTE — PROCEDURES
PROCEDURE NOTE:  right Shoulder subacromial injection  Risks and benefits discussed  Informed consent obtained  Shoulder prepped in sterile fashion utilizing a posterior approach  40 mg of Kenalog and 5 cc of 2% lidocaine injected into the subacromial space  Vapocoolant spray was utilized  Patient tolerated the procedure well  Postprocedure care and red flags discussed

## 2022-03-15 DIAGNOSIS — I48.0 PAROXYSMAL ATRIAL FIBRILLATION (HCC): ICD-10-CM

## 2022-03-15 DIAGNOSIS — I27.21 PAH (PULMONARY ARTERY HYPERTENSION) (HCC): ICD-10-CM

## 2022-03-15 DIAGNOSIS — Z79.899 HIGH RISK MEDICATION USE: ICD-10-CM

## 2022-03-15 DIAGNOSIS — E78.2 MIXED HYPERLIPIDEMIA: ICD-10-CM

## 2022-03-15 RX ORDER — BUMETANIDE 2 MG/1
2 TABLET ORAL 2 TIMES DAILY
Qty: 360 TABLET | Refills: 3 | Status: SHIPPED | OUTPATIENT
Start: 2022-03-15 | End: 2022-03-21

## 2022-03-15 RX ORDER — ROSUVASTATIN CALCIUM 40 MG/1
40 TABLET, COATED ORAL EVERY EVENING
Qty: 90 TABLET | Refills: 3 | Status: SHIPPED | OUTPATIENT
Start: 2022-03-15 | End: 2022-12-30 | Stop reason: SDUPTHER

## 2022-03-17 ENCOUNTER — HOSPITAL ENCOUNTER (OUTPATIENT)
Dept: LAB | Facility: MEDICAL CENTER | Age: 77
End: 2022-03-17
Attending: NURSE PRACTITIONER
Payer: MEDICARE

## 2022-03-17 ENCOUNTER — HOSPITAL ENCOUNTER (OUTPATIENT)
Dept: LAB | Facility: MEDICAL CENTER | Age: 77
End: 2022-03-17
Attending: INTERNAL MEDICINE
Payer: MEDICARE

## 2022-03-17 ENCOUNTER — TELEPHONE (OUTPATIENT)
Dept: CARDIOLOGY | Facility: MEDICAL CENTER | Age: 77
End: 2022-03-17

## 2022-03-17 DIAGNOSIS — Z79.899 HIGH RISK MEDICATION USE: ICD-10-CM

## 2022-03-17 LAB
ALBUMIN SERPL BCP-MCNC: 4.7 G/DL (ref 3.2–4.9)
ALBUMIN/GLOB SERPL: 2.6 G/DL
ALP SERPL-CCNC: 74 U/L (ref 30–99)
ALT SERPL-CCNC: 13 U/L (ref 2–50)
ANION GAP SERPL CALC-SCNC: 10 MMOL/L (ref 7–16)
ANION GAP SERPL CALC-SCNC: 11 MMOL/L (ref 7–16)
AST SERPL-CCNC: 15 U/L (ref 12–45)
BASOPHILS # BLD AUTO: 0.8 % (ref 0–1.8)
BASOPHILS # BLD: 0.04 K/UL (ref 0–0.12)
BILIRUB SERPL-MCNC: 0.4 MG/DL (ref 0.1–1.5)
BUN SERPL-MCNC: 29 MG/DL (ref 8–22)
BUN SERPL-MCNC: 30 MG/DL (ref 8–22)
CALCIUM SERPL-MCNC: 10 MG/DL (ref 8.5–10.5)
CALCIUM SERPL-MCNC: 10.1 MG/DL (ref 8.5–10.5)
CHLORIDE SERPL-SCNC: 103 MMOL/L (ref 96–112)
CHLORIDE SERPL-SCNC: 103 MMOL/L (ref 96–112)
CO2 SERPL-SCNC: 27 MMOL/L (ref 20–33)
CO2 SERPL-SCNC: 28 MMOL/L (ref 20–33)
CREAT SERPL-MCNC: 1.27 MG/DL (ref 0.5–1.4)
CREAT SERPL-MCNC: 1.28 MG/DL (ref 0.5–1.4)
EOSINOPHIL # BLD AUTO: 0.1 K/UL (ref 0–0.51)
EOSINOPHIL NFR BLD: 1.9 % (ref 0–6.9)
ERYTHROCYTE [DISTWIDTH] IN BLOOD BY AUTOMATED COUNT: 51.4 FL (ref 35.9–50)
GFR SERPLBLD CREATININE-BSD FMLA CKD-EPI: 44 ML/MIN/1.73 M 2
GFR SERPLBLD CREATININE-BSD FMLA CKD-EPI: 44 ML/MIN/1.73 M 2
GLOBULIN SER CALC-MCNC: 1.8 G/DL (ref 1.9–3.5)
GLUCOSE SERPL-MCNC: 100 MG/DL (ref 65–99)
GLUCOSE SERPL-MCNC: 101 MG/DL (ref 65–99)
HCT VFR BLD AUTO: 34.9 % (ref 37–47)
HGB BLD-MCNC: 10.7 G/DL (ref 12–16)
IMM GRANULOCYTES # BLD AUTO: 0.01 K/UL (ref 0–0.11)
IMM GRANULOCYTES NFR BLD AUTO: 0.2 % (ref 0–0.9)
LYMPHOCYTES # BLD AUTO: 1.05 K/UL (ref 1–4.8)
LYMPHOCYTES NFR BLD: 19.8 % (ref 22–41)
MCH RBC QN AUTO: 30.1 PG (ref 27–33)
MCHC RBC AUTO-ENTMCNC: 30.7 G/DL (ref 33.6–35)
MCV RBC AUTO: 98.3 FL (ref 81.4–97.8)
MONOCYTES # BLD AUTO: 0.68 K/UL (ref 0–0.85)
MONOCYTES NFR BLD AUTO: 12.9 % (ref 0–13.4)
NEUTROPHILS # BLD AUTO: 3.41 K/UL (ref 2–7.15)
NEUTROPHILS NFR BLD: 64.4 % (ref 44–72)
NRBC # BLD AUTO: 0 K/UL
NRBC BLD-RTO: 0 /100 WBC
NT-PROBNP SERPL IA-MCNC: 319 PG/ML (ref 0–125)
PLATELET # BLD AUTO: 262 K/UL (ref 164–446)
PMV BLD AUTO: 9.3 FL (ref 9–12.9)
POTASSIUM SERPL-SCNC: 4.7 MMOL/L (ref 3.6–5.5)
POTASSIUM SERPL-SCNC: 4.7 MMOL/L (ref 3.6–5.5)
PROT SERPL-MCNC: 6.5 G/DL (ref 6–8.2)
RBC # BLD AUTO: 3.55 M/UL (ref 4.2–5.4)
SODIUM SERPL-SCNC: 141 MMOL/L (ref 135–145)
SODIUM SERPL-SCNC: 141 MMOL/L (ref 135–145)
WBC # BLD AUTO: 5.3 K/UL (ref 4.8–10.8)

## 2022-03-17 PROCEDURE — 85025 COMPLETE CBC W/AUTO DIFF WBC: CPT

## 2022-03-17 PROCEDURE — 83880 ASSAY OF NATRIURETIC PEPTIDE: CPT

## 2022-03-17 PROCEDURE — 80048 BASIC METABOLIC PNL TOTAL CA: CPT

## 2022-03-17 PROCEDURE — 36415 COLL VENOUS BLD VENIPUNCTURE: CPT

## 2022-03-17 PROCEDURE — 80053 COMPREHEN METABOLIC PANEL: CPT

## 2022-03-17 NOTE — TELEPHONE ENCOUNTER
S/W pt, confirmed she has in fact been taking Amio BID, all this time    Medication started during hospitalization from 9/24/21, however, during OV with GERALDINE 10/20/21, it was reported that patient was taking differently, 200mg QD      Most recent OVnote from 1/26/22  3.  Atrial fibrillation:  EKG today shows sinus rhythm 63  -followed by EP  -Patient had A. fib ablation on 4/22/2021  -Continue Eliquis 5 mg twice a day  -Continue Amiodarone 200 mg daily, follow up labs in 6 months  -Continue metoprolol 50 mg twice a day  -Discontinue digoxin, discussed with patient daughter her symptoms could be low heart rate.  Patient to follow and if she continues to have problems to contact our office.  -Patient to get a chest x-ray, TSH, dig level and CMP  -Long-term plan per Dr. Galindo, short-term amiodarone use patient may need an ablation and PPM    Advised pt to continue as she has been unless she hears differently from me

## 2022-03-17 NOTE — TELEPHONE ENCOUNTER
GERALDINE    Pts daughter Vernell called stating Pts prescription of amiodarone says to take 1 pill once a day but she thought she was supposed to take 1 pill twice a day. Please call Pt back at [phone].    Thank you

## 2022-03-21 ENCOUNTER — TELEPHONE (OUTPATIENT)
Dept: CARDIOLOGY | Facility: MEDICAL CENTER | Age: 77
End: 2022-03-21
Payer: MEDICARE

## 2022-03-21 DIAGNOSIS — I48.0 PAROXYSMAL ATRIAL FIBRILLATION (HCC): ICD-10-CM

## 2022-03-21 DIAGNOSIS — I27.21 PAH (PULMONARY ARTERY HYPERTENSION) (HCC): ICD-10-CM

## 2022-03-21 DIAGNOSIS — Z79.899 HIGH RISK MEDICATION USE: ICD-10-CM

## 2022-03-21 RX ORDER — BUMETANIDE 2 MG/1
2-4 TABLET ORAL 2 TIMES DAILY
Qty: 360 TABLET | Refills: 3 | Status: SHIPPED | OUTPATIENT
Start: 2022-03-21 | End: 2022-08-25 | Stop reason: SDUPTHER

## 2022-03-21 RX ORDER — AMIODARONE HYDROCHLORIDE 200 MG/1
200 TABLET ORAL 2 TIMES DAILY
Qty: 180 TABLET | Refills: 3 | Status: SHIPPED | OUTPATIENT
Start: 2022-03-21 | End: 2022-04-05

## 2022-03-21 NOTE — TELEPHONE ENCOUNTER
GERALDINE Cortés,    This patient's daughter Vernell called and stated her Mom's weight is up 15lbs and has edema around her mid section.     Can you please call Vernell back at [phone].      Thank you,    JILL

## 2022-03-31 ENCOUNTER — HOSPITAL ENCOUNTER (OUTPATIENT)
Dept: LAB | Facility: MEDICAL CENTER | Age: 77
End: 2022-03-31
Attending: NURSE PRACTITIONER
Payer: MEDICARE

## 2022-03-31 DIAGNOSIS — Z79.899 HIGH RISK MEDICATION USE: ICD-10-CM

## 2022-03-31 LAB
ANION GAP SERPL CALC-SCNC: 11 MMOL/L (ref 7–16)
BUN SERPL-MCNC: 26 MG/DL (ref 8–22)
CALCIUM SERPL-MCNC: 10.2 MG/DL (ref 8.5–10.5)
CHLORIDE SERPL-SCNC: 103 MMOL/L (ref 96–112)
CO2 SERPL-SCNC: 28 MMOL/L (ref 20–33)
CREAT SERPL-MCNC: 1.42 MG/DL (ref 0.5–1.4)
FASTING STATUS PATIENT QL REPORTED: NORMAL
GFR SERPLBLD CREATININE-BSD FMLA CKD-EPI: 39 ML/MIN/1.73 M 2
GLUCOSE SERPL-MCNC: 99 MG/DL (ref 65–99)
POTASSIUM SERPL-SCNC: 5.6 MMOL/L (ref 3.6–5.5)
SODIUM SERPL-SCNC: 142 MMOL/L (ref 135–145)

## 2022-03-31 PROCEDURE — 36415 COLL VENOUS BLD VENIPUNCTURE: CPT

## 2022-03-31 PROCEDURE — 80048 BASIC METABOLIC PNL TOTAL CA: CPT

## 2022-04-05 ENCOUNTER — OFFICE VISIT (OUTPATIENT)
Dept: CARDIOLOGY | Facility: MEDICAL CENTER | Age: 77
End: 2022-04-05
Payer: MEDICARE

## 2022-04-05 VITALS
BODY MASS INDEX: 33.74 KG/M2 | DIASTOLIC BLOOD PRESSURE: 78 MMHG | RESPIRATION RATE: 16 BRPM | HEIGHT: 67 IN | WEIGHT: 215 LBS | OXYGEN SATURATION: 92 % | HEART RATE: 72 BPM | SYSTOLIC BLOOD PRESSURE: 118 MMHG

## 2022-04-05 VITALS
RESPIRATION RATE: 12 BRPM | BODY MASS INDEX: 33.27 KG/M2 | OXYGEN SATURATION: 93 % | WEIGHT: 212 LBS | DIASTOLIC BLOOD PRESSURE: 80 MMHG | SYSTOLIC BLOOD PRESSURE: 132 MMHG | HEIGHT: 67 IN | HEART RATE: 70 BPM

## 2022-04-05 DIAGNOSIS — I48.0 PAROXYSMAL ATRIAL FIBRILLATION (HCC): ICD-10-CM

## 2022-04-05 DIAGNOSIS — I48.91 ATRIAL FIBRILLATION, UNSPECIFIED TYPE (HCC): ICD-10-CM

## 2022-04-05 DIAGNOSIS — I50.33 ACUTE ON CHRONIC CONGESTIVE HEART FAILURE WITH RIGHT VENTRICULAR DIASTOLIC DYSFUNCTION (HCC): ICD-10-CM

## 2022-04-05 DIAGNOSIS — J96.11 CHRONIC RESPIRATORY FAILURE WITH HYPOXIA (HCC): ICD-10-CM

## 2022-04-05 DIAGNOSIS — I27.21 PAH (PULMONARY ARTERY HYPERTENSION) (HCC): ICD-10-CM

## 2022-04-05 DIAGNOSIS — I50.82 ACUTE ON CHRONIC CONGESTIVE HEART FAILURE WITH RIGHT VENTRICULAR DIASTOLIC DYSFUNCTION (HCC): ICD-10-CM

## 2022-04-05 DIAGNOSIS — Z98.890 S/P ABLATION OF ATRIAL FIBRILLATION: ICD-10-CM

## 2022-04-05 DIAGNOSIS — Z79.01 CHRONIC ANTICOAGULATION: ICD-10-CM

## 2022-04-05 DIAGNOSIS — E78.2 MIXED HYPERLIPIDEMIA: ICD-10-CM

## 2022-04-05 DIAGNOSIS — G47.39 OTHER SLEEP APNEA: ICD-10-CM

## 2022-04-05 DIAGNOSIS — Z79.899 LONG TERM CURRENT USE OF AMIODARONE: ICD-10-CM

## 2022-04-05 DIAGNOSIS — Z79.899 HIGH RISK MEDICATION USE: ICD-10-CM

## 2022-04-05 DIAGNOSIS — Z86.79 S/P ABLATION OF ATRIAL FIBRILLATION: ICD-10-CM

## 2022-04-05 PROCEDURE — 99214 OFFICE O/P EST MOD 30 MIN: CPT | Performed by: NURSE PRACTITIONER

## 2022-04-05 PROCEDURE — 93000 ELECTROCARDIOGRAM COMPLETE: CPT | Performed by: INTERNAL MEDICINE

## 2022-04-05 PROCEDURE — 99214 OFFICE O/P EST MOD 30 MIN: CPT | Mod: 25 | Performed by: INTERNAL MEDICINE

## 2022-04-05 RX ORDER — POTASSIUM CHLORIDE 20 MEQ/1
40 TABLET, EXTENDED RELEASE ORAL DAILY
Qty: 180 TABLET | Refills: 3 | Status: SHIPPED | OUTPATIENT
Start: 2022-04-05 | End: 2022-12-30

## 2022-04-05 RX ORDER — AMIODARONE HYDROCHLORIDE 200 MG/1
200 TABLET ORAL DAILY
Qty: 90 TABLET | Refills: 3 | Status: SHIPPED | OUTPATIENT
Start: 2022-04-05 | End: 2023-01-13 | Stop reason: SDUPTHER

## 2022-04-05 ASSESSMENT — ENCOUNTER SYMPTOMS
DIZZINESS: 1
ABDOMINAL PAIN: 0
PND: 0
ORTHOPNEA: 1
COUGH: 0
ROS GI COMMENTS: ABD BLOATING
SHORTNESS OF BREATH: 1
CLAUDICATION: 0
HEARTBURN: 0
FEVER: 0
PALPITATIONS: 0
MYALGIAS: 0

## 2022-04-05 ASSESSMENT — FIBROSIS 4 INDEX
FIB4 SCORE: 1.19
FIB4 SCORE: 1.19

## 2022-04-05 NOTE — PROGRESS NOTES
Arrhythmia Clinic Note (Established patient)    DOS: 4/5/2022    Chief complaint/Reason for consult: AFL    Interval History: 76 y/o F well known to me. PH and COPD. She has had MAZE and ablations with recurrent flutters. Intolerant to sotalol or dofetilide. Now doing great on amiodarone.    ROS (+ highlighted in bold):  Constitutional: Fevers/chills/fatigue/weightloss  HEENT: Blurry vision/eye pain/sore throat/hearing loss  Respiratory: Shortness of breath/cough  Cardiovascular: Chest pain/palpitations/edema/orthopnea/syncope  GI: Nausea/vomitting/diarrhea  MSK: Arthralgias/myagias/muscle weakness  Skin: Rash/sores  Neurological: Numbness/tremors/vertigo  Endocrine: Excessive thirst/polyuria/cold intolerance/heat intolerance  Psych: Depression/anxiety    Past Medical History:   Diagnosis Date   • Aneurysm artery, celiac (HCC) 2019   • Aneurysm of right renal artery (HCC)    • Atrial fibrillation (HCC)    • Breath shortness     5L O2 all the time   • Chickenpox    • Congestive heart failure (HCC)    • Diastolic heart failure (HCC)    • [place] measles    • Heart burn    • Heart valve disease     MV repair   • Hypertension, essential    • Pulmonary hypertension (HCC)    • Sleep apnea     Bipap   • Snoring    • Warfarin anticoagulation        Past Surgical History:   Procedure Laterality Date   • PB TOTAL KNEE ARTHROPLASTY Left 11/23/2021    Procedure: ARTHROPLASTY, KNEE, TOTAL;  Surgeon: Mars Mclean M.D.;  Location: SURGERY [facility];  Service: Orthopedics   • ORIF, ANKLE Left 8/5/2020    Procedure: ORIF, ANKLE;  Surgeon: Tk Humphreys M.D.;  Location: SURGERY [facility];  Service: Orthopedics   • WRIST FUSION Left 2017    ORIF wrist   • MITRAL VALVE REPAIR  2005   • HYSTERECTOMY LAPAROSCOPY     • OTHER      neck surgery Cervical fusion 4, 5, & 6   • TONSILLECTOMY         Social History     Socioeconomic History   • Marital status:      Spouse name: Not on file   • Number of children: Not on  file   • Years of education: Not on file   • Highest education level: 12th grade   Occupational History   • Not on file   Tobacco Use   • Smoking status: Never Smoker   • Smokeless tobacco: Never Used   Vaping Use   • Vaping Use: Never used   Substance and Sexual Activity   • Alcohol use: Yes     Alcohol/week: 4.2 oz     Types: 7 Shots of liquor per week     Comment: 1 drink a night   • Drug use: No   • Sexual activity: Not on file   Other Topics Concern   • Not on file   Social History Narrative   • Not on file     Social Determinants of Health     Financial Resource Strain: Low Risk    • Difficulty of Paying Living Expenses: Not hard at all   Food Insecurity: No Food Insecurity   • Worried About Running Out of Food in the Last Year: Never true   • Ran Out of Food in the Last Year: Never true   Transportation Needs: No Transportation Needs   • Lack of Transportation (Medical): No   • Lack of Transportation (Non-Medical): No   Physical Activity: Inactive   • Days of Exercise per Week: 0 days   • Minutes of Exercise per Session: 0 min   Stress: No Stress Concern Present   • Feeling of Stress : Only a little   Social Connections: Moderately Integrated   • Frequency of Communication with Friends and Family: Three times a week   • Frequency of Social Gatherings with Friends and Family: Once a week   • Attends Evangelical Services: More than 4 times per year   • Active Member of Clubs or Organizations: Yes   • Attends Club or Organization Meetings: More than 4 times per year   • Marital Status:    Intimate Partner Violence: Not on file   Housing Stability: Low Risk    • Unable to Pay for Housing in the Last Year: No   • Number of Places Lived in the Last Year: 1   • Unstable Housing in the Last Year: No       Family History   Problem Relation Age of Onset   • Heart Disease Maternal Grandfather    • Heart Disease Paternal Grandfather        Allergies   Allergen Reactions   • Betapace [Sotalol] Anaphylaxis   • Tikosyn  "[Dofetilide] Swelling     TONGUE SWELL.    • Zolpidem Tartrate Unspecified     Lightheaded and confusion       Current Outpatient Medications   Medication Sig Dispense Refill   • amiodarone (CORDARONE) 200 MG Tab Take 1 Tablet by mouth every day. 90 Tablet 3   • bumetanide (BUMEX) 2 MG tablet Take 1-2 Tablets by mouth 2 times a day. Take 4mg in the AM and 2mg  Tablet 3   • rosuvastatin (CRESTOR) 40 MG tablet Take 1 Tablet by mouth every evening. 90 Tablet 3   • potassium chloride SA (KDUR) 20 MEQ Tab CR TAKE 3 TABLETS TWICE DAILY 540 Tablet 3   • omeprazole (PRILOSEC) 40 MG delayed-release capsule Take 1 Capsule by mouth every day. 90 Capsule 3   • oxyCODONE-acetaminophen (PERCOCET) 7.5-325 MG per tablet      • tretinoin (RETIN-A) 0.1 % cream Apply 1 Application topically every evening.     • apixaban (ELIQUIS) 5mg Tab Take 1 tablet by mouth 2 times a day. 180 tablet 3   • metoprolol tartrate (LOPRESSOR) 50 MG Tab Take 1 tablet by mouth 2 times a day. 60 tablet 11   • valacyclovir (VALTREX) 1 GM Tab Take 1 Tablet by mouth every 8 hours as needed.     • PARoxetine (PAXIL) 20 MG Tab Take 20 mg by mouth every morning.     • levothyroxine (SYNTHROID) 75 MCG Tab Take 75 mcg by mouth Every morning on an empty stomach.     • Cholecalciferol (VITAMIN D) 2000 units Cap Take 2,000 Units by mouth every day.     • NARCAN 4 MG/0.1ML Liquid  (Patient not taking: Reported on 4/5/2022)       No current facility-administered medications for this visit.       Physical Exam:  Vitals:    04/05/22 1405   BP: 132/80   BP Location: Left arm   Patient Position: Sitting   BP Cuff Size: Adult   Pulse: 70   Resp: 12   SpO2: 93%   Weight: 96.2 kg (212 lb)   Height: 1.702 m (5' 7\")     General appearance: NAD, conversant   Eyes: anicteric sclerae, moist conjunctivae; no lid-lag; PERRLA  HENT: Atraumatic; oropharynx clear with moist mucous membranes and no mucosal ulcerations; normal hard and soft palate  Neck: Trachea midline; FROM, " supple, no thyromegaly or lymphadenopathy  Lungs: CTA, with normal respiratory effort and no intercostal retractions  CV: RRR, no MRGs, no JVD  Abdomen: Soft, non-tender; no masses or HSM  Extremities: No peripheral edema or extremity lymphadenopathy  Skin: Normal temperature, turgor and texture; no rash, ulcers or subcutaneous nodules  Psych: Appropriate affect, alert and oriented to person, place and time    Data:  Lipids:   Lab Results   Component Value Date/Time    CHOLSTRLTOT 211 (H) 07/26/2021 01:02 PM    TRIGLYCERIDE 231 (H) 07/26/2021 01:02 PM    HDL 47 07/26/2021 01:02 PM     (H) 07/26/2021 01:02 PM        BMP:  Lab Results   Component Value Date/Time    SODIUM 142 03/31/2022 1411    POTASSIUM 5.6 (H) 03/31/2022 1411    CHLORIDE 103 03/31/2022 1411    CO2 28 03/31/2022 1411    GLUCOSE 99 03/31/2022 1411    BUN 26 (H) 03/31/2022 1411    CREATININE 1.42 (H) 03/31/2022 1411    CALCIUM 10.2 03/31/2022 1411    ANION 11.0 03/31/2022 1411        TSH:   Lab Results   Component Value Date/Time    TSHULTRASEN 1.180 01/26/2022 1438        THYROXINE (T4):   No results found for: JANETTE     CBC:   Lab Results   Component Value Date/Time    WBC 5.3 03/17/2022 02:30 PM    RBC 3.55 (L) 03/17/2022 02:30 PM    HEMOGLOBIN 10.7 (L) 03/17/2022 02:30 PM    HEMATOCRIT 34.9 (L) 03/17/2022 02:30 PM    MCV 98.3 (H) 03/17/2022 02:30 PM    MCH 30.1 03/17/2022 02:30 PM    MCHC 30.7 (L) 03/17/2022 02:30 PM    RDW 51.4 (H) 03/17/2022 02:30 PM    PLATELETCT 262 03/17/2022 02:30 PM    MPV 9.3 03/17/2022 02:30 PM    NEUTSPOLYS 64.40 03/17/2022 02:30 PM    LYMPHOCYTES 19.80 (L) 03/17/2022 02:30 PM    MONOCYTES 12.90 03/17/2022 02:30 PM    EOSINOPHILS 1.90 03/17/2022 02:30 PM    BASOPHILS 0.80 03/17/2022 02:30 PM    IMMGRAN 0.20 03/17/2022 02:30 PM    NRBC 0.00 03/17/2022 02:30 PM    NEUTS 3.41 03/17/2022 02:30 PM    LYMPHS 1.05 03/17/2022 02:30 PM    LYMPHS 16 07/09/2019 09:30 AM    MONOS 0.68 03/17/2022 02:30 PM    EOS 0.10  03/17/2022 02:30 PM    BASO 0.04 03/17/2022 02:30 PM    IMMGRANAB 0.01 03/17/2022 02:30 PM    NRBCAB 0.00 03/17/2022 02:30 PM        CBC w/o DIFF  Lab Results   Component Value Date/Time    WBC 5.3 03/17/2022 02:30 PM    RBC 3.55 (L) 03/17/2022 02:30 PM    HEMOGLOBIN 10.7 (L) 03/17/2022 02:30 PM    MCV 98.3 (H) 03/17/2022 02:30 PM    MCH 30.1 03/17/2022 02:30 PM    MCHC 30.7 (L) 03/17/2022 02:30 PM    RDW 51.4 (H) 03/17/2022 02:30 PM    MPV 9.3 03/17/2022 02:30 PM       Prior echo/stress reviewed: Normal EF    EKG interpreted by me: NSR     Impression/Plan:  1. Acute on chronic congestive heart failure with right ventricular diastolic dysfunction (HCC)     2. Atrial fibrillation, unspecified type (HCC)  EKG     1. AF s/p MAZE  2. AFL s/p ablation  3. High risk medication, amiodarone  4. Anticoagulation management, Eliquis    - Maintaining NSR on amio  - Decrease amio to 200mg daily  - Check TSH, CMP in 6 months  - Continue Eliquis    FV 6 months    Thee Galindo MD  Cardiac Electrophysiology

## 2022-04-05 NOTE — PROGRESS NOTES
Chief Complaint   Patient presents with   • Atrial Fibrillation   • Pulmonary Hypertension   • Congestive Heart Failure     F/V DX: Acute on chronic congestive heart failure with right ventricular diastolic dysfunction (HCC)       Subjective:   Zeinab Loza is a 77 y.o. female who presents today for follow-up on her pulmonary hypertension, atrial fibrillation with her daughter, Vernell.    Patient of Dr. Galindo and Dr. Eng.  She was last seen in clinic on 1/26/2022 prior to today. She did see Dr. Galindo today.  During her last visit, patient was sent for follow-up lab testing chest x-ray and she was recommended to stop digoxin.    Patient reports Dr. Galindo had decreased her amiodarone to 200 mg today.    Patient has been feeling well, she recently had a trial stimulator placed and has been able to perform more activities at home.  She has had more energy.  Patient does continue to have some episodes of dyspnea, but does feel less dizzy.  She recently had concerns of increased weight gain due to eating salty foods.  She did indulge at a Mexican food restaurant.    She reports her abdominal bloating has been improving and her weights have been decreasing.  She is currently weighing around 209 pounds at home.    She denies chest pain, palpitations, edema, PND.    She did see her pulmonologist at [facility] earlier in the month through a telemedicine visit.    She currently is taking potassium 80 mEq daily and her Bumex at 4 mg in the morning and 2 mg in the afternoon.    She does continue to follow with pulmonary at [facility] with Dr. Zelaya.     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 5.19 L/min, Odin C.I 2.39   PVR 4.8 woods     -Hypertension     -Paroxysmal A. fib, started on Tikosyn on 10/21/2020, on Eliquis; RF ablation with PVI, septal LA flutter and empiric ablation for mitral isthmus flutter by Dr. Galindo on 4/22/2021.  She is currently taking amiodarone and Eliquis     -Hyperlipidemia: Taking rosuvastatin     -Sleep apnea, uses BiPAP with oxygen bleed in 5L, followed by sleep medicine       Past Medical History:   Diagnosis Date   • Aneurysm artery, celiac (HCC) 2019   • Aneurysm of right renal artery (HCC)    • Atrial fibrillation (HCC)    • Breath shortness     5L O2 all the time   • Chickenpox    • Congestive heart failure (HCC)    • Diastolic heart failure (HCC)    • Citizen of [place] measles    • Heart burn    • Heart valve disease     MV repair   • Hypertension, essential    • Pulmonary hypertension (HCC)    • Sleep apnea     Bipap   • Snoring    • Warfarin anticoagulation      Past Surgical History:   Procedure Laterality Date   • PB TOTAL KNEE ARTHROPLASTY Left 11/23/2021    Procedure: ARTHROPLASTY, KNEE, TOTAL;  Surgeon: Mars Mclean M.D.;  Location: SURGERY [facility];  Service: Orthopedics   • ORIF, ANKLE Left 8/5/2020    Procedure: ORIF, ANKLE;  Surgeon: Tk Humphreys M.D.;  Location: SURGERY [facility];  Service: Orthopedics   • WRIST FUSION Left 2017    ORIF wrist   • MITRAL VALVE REPAIR  2005   • HYSTERECTOMY LAPAROSCOPY     • OTHER      neck surgery Cervical fusion 4, 5, & 6   • TONSILLECTOMY       Family History   Problem Relation Age of Onset   • Heart Disease Maternal Grandfather    • Heart Disease Paternal Grandfather      Social History     Socioeconomic History   • Marital status:      Spouse name: Not on file   • Number of children: Not on  file   • Years of education: Not on file   • Highest education level: 12th grade   Occupational History   • Not on file   Tobacco Use   • Smoking status: Never Smoker   • Smokeless tobacco: Never Used   Vaping Use   • Vaping Use: Never used   Substance and Sexual Activity   • Alcohol use: Yes     Alcohol/week: 4.2 oz     Types: 7 Shots of liquor per week     Comment: 1 drink a night   • Drug use: No   • Sexual activity: Not on file   Other Topics Concern   • Not on file   Social History Narrative   • Not on file     Social Determinants of Health     Financial Resource Strain: Low Risk    • Difficulty of Paying Living Expenses: Not hard at all   Food Insecurity: No Food Insecurity   • Worried About Running Out of Food in the Last Year: Never true   • Ran Out of Food in the Last Year: Never true   Transportation Needs: No Transportation Needs   • Lack of Transportation (Medical): No   • Lack of Transportation (Non-Medical): No   Physical Activity: Inactive   • Days of Exercise per Week: 0 days   • Minutes of Exercise per Session: 0 min   Stress: No Stress Concern Present   • Feeling of Stress : Only a little   Social Connections: Moderately Integrated   • Frequency of Communication with Friends and Family: Three times a week   • Frequency of Social Gatherings with Friends and Family: Once a week   • Attends Restoration Services: More than 4 times per year   • Active Member of Clubs or Organizations: Yes   • Attends Club or Organization Meetings: More than 4 times per year   • Marital Status:    Intimate Partner Violence: Not on file   Housing Stability: Low Risk    • Unable to Pay for Housing in the Last Year: No   • Number of Places Lived in the Last Year: 1   • Unstable Housing in the Last Year: No     Allergies   Allergen Reactions   • Betapace [Sotalol] Anaphylaxis   • Tikosyn [Dofetilide] Swelling     TONGUE SWELL.    • Zolpidem Tartrate Unspecified     Lightheaded and confusion     Outpatient Encounter  Medications as of 4/5/2022   Medication Sig Dispense Refill   • amiodarone (CORDARONE) 200 MG Tab Take 1 Tablet by mouth every day. 90 Tablet 3   • potassium chloride SA (KDUR) 20 MEQ Tab CR Take 2 Tablets by mouth every day. 180 Tablet 3   • bumetanide (BUMEX) 2 MG tablet Take 1-2 Tablets by mouth 2 times a day. Take 4mg in the AM and 2mg  Tablet 3   • rosuvastatin (CRESTOR) 40 MG tablet Take 1 Tablet by mouth every evening. 90 Tablet 3   • omeprazole (PRILOSEC) 40 MG delayed-release capsule Take 1 Capsule by mouth every day. 90 Capsule 3   • oxyCODONE-acetaminophen (PERCOCET) 7.5-325 MG per tablet      • tretinoin (RETIN-A) 0.1 % cream Apply 1 Application topically every evening.     • apixaban (ELIQUIS) 5mg Tab Take 1 tablet by mouth 2 times a day. 180 tablet 3   • metoprolol tartrate (LOPRESSOR) 50 MG Tab Take 1 tablet by mouth 2 times a day. 60 tablet 11   • valacyclovir (VALTREX) 1 GM Tab Take 1 Tablet by mouth every 8 hours as needed.     • PARoxetine (PAXIL) 20 MG Tab Take 20 mg by mouth every morning.     • levothyroxine (SYNTHROID) 75 MCG Tab Take 75 mcg by mouth Every morning on an empty stomach.     • Cholecalciferol (VITAMIN D) 2000 units Cap Take 2,000 Units by mouth every day.     • [DISCONTINUED] potassium chloride SA (KDUR) 20 MEQ Tab CR TAKE 3 TABLETS TWICE DAILY 540 Tablet 3   • NARCAN 4 MG/0.1ML Liquid  (Patient not taking: Reported on 4/5/2022)       No facility-administered encounter medications on file as of 4/5/2022.     Review of Systems   Constitutional: Negative for fever and malaise/fatigue.   Respiratory: Positive for shortness of breath. Negative for cough.    Cardiovascular: Positive for orthopnea. Negative for chest pain, palpitations, claudication, leg swelling and PND.   Gastrointestinal: Negative for abdominal pain and heartburn.        Abd bloating    Musculoskeletal: Positive for joint pain (left knee). Negative for myalgias.   Neurological: Positive for dizziness (slight).  "  All other systems reviewed and are negative.       Objective:   /78 (BP Location: Left arm, Patient Position: Sitting, BP Cuff Size: Adult)   Pulse 72   Resp 16   Ht 1.702 m (5' 7\")   Wt 97.5 kg (215 lb)   SpO2 92%   BMI 33.67 kg/m²     Physical Exam  Vitals reviewed.   Constitutional:       Appearance: She is well-developed.   HENT:      Head: Normocephalic and atraumatic.   Eyes:      Pupils: Pupils are equal, round, and reactive to light.   Neck:      Vascular: No JVD.   Cardiovascular:      Rate and Rhythm: Normal rate and regular rhythm.      Heart sounds: Normal heart sounds.   Pulmonary:      Effort: Pulmonary effort is normal. No respiratory distress.      Breath sounds: Normal breath sounds. No wheezing or rales.      Comments: Using continuous oxygen at 5L  Abdominal:      General: Bowel sounds are normal.      Palpations: Abdomen is soft.   Musculoskeletal:      Cervical back: Normal range of motion and neck supple.      Right lower leg: No edema.      Left lower leg: No edema.   Skin:     General: Skin is warm and dry.   Neurological:      Mental Status: She is alert and oriented to person, place, and time.   Psychiatric:         Behavior: Behavior normal.       Lab Results   Component Value Date/Time    CHOLSTRLTOT 211 (H) 07/26/2021 01:02 PM     (H) 07/26/2021 01:02 PM    HDL 47 07/26/2021 01:02 PM    TRIGLYCERIDE 231 (H) 07/26/2021 01:02 PM       Lab Results   Component Value Date/Time    SODIUM 142 03/31/2022 02:11 PM    POTASSIUM 5.6 (H) 03/31/2022 02:11 PM    CHLORIDE 103 03/31/2022 02:11 PM    CO2 28 03/31/2022 02:11 PM    GLUCOSE 99 03/31/2022 02:11 PM    BUN 26 (H) 03/31/2022 02:11 PM    CREATININE 1.42 (H) 03/31/2022 02:11 PM     Lab Results   Component Value Date/Time    ALKPHOSPHAT 74 03/17/2022 02:30 PM    ASTSGOT 15 03/17/2022 02:30 PM    ALTSGPT 13 03/17/2022 02:30 PM    TBILIRUBIN 0.4 03/17/2022 02:30 PM      Transthoracic Echo Report 1/16/2020  Normal left " ventricular systolic function. Left ventricular ejection fraction is visually estimated to be 65%.  A reliable estimation of diastolic function cannot be made due to mitral valve disease.  Normal inferior vena cava size and inspiratory collapse.  Known mitral valve repair which is functioning normally with   appropriate transvalvular gradient. Mean transvalvular gradient is 3 mmHg at a heart rate of 66 BPM.  Estimated right ventricular systolic pressure  is 32 mmHg.     Transthoracic Echo Report 8/5/2020  Normal left ventricular size, wall thickness, and systolic function.  Dilated right ventricle with preserved systolic function.  No significant valvular pathology.  Estimated right ventricular systolic pressure  is 25 mmHg; normal.  Normal pericardium without effusion.     No prior study is available for comparison.      Transthoracic Echo Report 3/5/2021  Normal left ventricular size and systolic function.  Mild concentric left ventricular hypertrophy.  Left ventricular ejection fraction is visually estimated to be 60%.  Known mitral valve repair which is functioning normally with   appropriate transvalvular gradient.  Mild mitral regurgitation.  Normal inferior vena cava size and inspiratory collapse.    Transesophageal Echo Report 4/22/2021  LV function appears preserved. Sigmoid septum.  Known mitral valve repair, mild MR.  NED has been ligated, no residual NED is seen.  No thrombus is seen in the left atrium.  Moderate tricuspid regurgitation.  Trileaflet aortic valve.  No ASD is seen.    Transthoracic Echo Report 9/2/2021  Patient atrial fibrillation, heart rate 110 bpm.  Left ventricle is small in size.  Left ventricular ejection fraction is visually estimated to be 75%,   hyperdynamic.  Reduced right ventricular systolic function.  Biatrial enlargement.  Mitral valve repair functioning normally: MG 3 mmHg,  BPM.  Aortic valve not well visualized, no significant abnormalities.  Estimated right  ventricular systolic pressure  is 30 mmHg.     Compared to prior echo 03/05/21, no significant change.     Assessment:     1. Paroxysmal atrial fibrillation (HCC)  Basic Metabolic Panel    potassium chloride SA (KDUR) 20 MEQ Tab CR   2. PAH (pulmonary artery hypertension) (HCC)  Basic Metabolic Panel    potassium chloride SA (KDUR) 20 MEQ Tab CR   3. High risk medication use  Basic Metabolic Panel    potassium chloride SA (KDUR) 20 MEQ Tab CR   4. Mixed hyperlipidemia     5. Chronic anticoagulation     6. Long term current use of amiodarone     7. S/P repeat ablation of atrial fibrillation/atrial flutter     8. Chronic respiratory failure with hypoxia (HCC)     9. Other sleep apnea         Medical Decision Making:  Today's Assessment / Status / Plan:   1. Pulmonary hypertension, WHO group 2-3, functional class III:  -Continue Bumex 4 mg in am and 2 mg in PM.   -No spironolactone due to MILAN  -Decrease potassium to 40 mEq daily, can take 20 mEq twice a day  -Continue tadalafil 40 mg daily, patient will verify this with her pulmonologist at [facility] as he continues initial assessment patient has been off of her tadalafil  -Repeat BMP in 1 week to follow-up on kidney function and potassium levels, will notify pt of further testing after results received.  -Unable to take Letairis due to hypotension  -Encourage patient to monitor sodium intake-reinforced importance  -Reinforced s/sx of worsening symptoms with patient and weight monitoring. Pt verbalizes understanding. Pt to call office or RTC if present. Pt encouraged to take scale when traveling  -Continue to follow up with [facility], pending Echo in 6 months     2.  Sleep apnea:  -Continue BiPAP with oxygen bleeding, using 5 L     3.  Atrial fibrillation:    -followed by EP, seen today  -Patient had A. fib ablation on 4/22/2021  -Continue Eliquis 5 mg twice a day  -Amiodarone decreased to 200 mg daily today  -Continue metoprolol 50 mg twice a day  -Patient has  additional labs due for Dr. Galindo in 6 months  -Discontinue digoxin, discussed with patient daughter her symptoms could be low heart rate.  Patient to follow and if she continues to have problems to contact our office.  -Long-term plan per Dr. Galindo, short-term amiodarone use patient may need an ablation and PPM     4.  Hyperlipidemia: Last  on 7/26/2021  -Continue rosuvastatin 40 mg daily, Increased mid August     5.  Hypertension:   -BP stable at this time   -Continue metoprolol 50 mg twice a day     FU in clinic in 3 months with Dr. Eng. Sooner if needed.     Patient verbalizes understanding and agrees with the plan of care.      PLEASE NOTE: This Note was created using voice recognition Software. I have made every reasonable attempt to correct obvious errors, but I expect that there are errors of grammar and possibly content that I did not discover before finalizing the note

## 2022-04-14 ENCOUNTER — TELEPHONE (OUTPATIENT)
Dept: CARDIOLOGY | Facility: MEDICAL CENTER | Age: 77
End: 2022-04-14

## 2022-04-14 ENCOUNTER — HOSPITAL ENCOUNTER (OUTPATIENT)
Dept: LAB | Facility: MEDICAL CENTER | Age: 77
End: 2022-04-14
Attending: NURSE PRACTITIONER
Payer: MEDICARE

## 2022-04-14 DIAGNOSIS — I48.0 PAROXYSMAL ATRIAL FIBRILLATION (HCC): ICD-10-CM

## 2022-04-14 DIAGNOSIS — I27.21 PAH (PULMONARY ARTERY HYPERTENSION) (HCC): ICD-10-CM

## 2022-04-14 DIAGNOSIS — Z79.899 HIGH RISK MEDICATION USE: ICD-10-CM

## 2022-04-14 LAB
ANION GAP SERPL CALC-SCNC: 10 MMOL/L (ref 7–16)
BUN SERPL-MCNC: 25 MG/DL (ref 8–22)
CALCIUM SERPL-MCNC: 10.7 MG/DL (ref 8.5–10.5)
CHLORIDE SERPL-SCNC: 99 MMOL/L (ref 96–112)
CO2 SERPL-SCNC: 34 MMOL/L (ref 20–33)
CREAT SERPL-MCNC: 1.26 MG/DL (ref 0.5–1.4)
FASTING STATUS PATIENT QL REPORTED: NORMAL
GFR SERPLBLD CREATININE-BSD FMLA CKD-EPI: 44 ML/MIN/1.73 M 2
GLUCOSE SERPL-MCNC: 120 MG/DL (ref 65–99)
POTASSIUM SERPL-SCNC: 3.8 MMOL/L (ref 3.6–5.5)
SODIUM SERPL-SCNC: 143 MMOL/L (ref 135–145)

## 2022-04-14 PROCEDURE — 36415 COLL VENOUS BLD VENIPUNCTURE: CPT

## 2022-04-14 PROCEDURE — 80048 BASIC METABOLIC PNL TOTAL CA: CPT

## 2022-04-14 NOTE — TELEPHONE ENCOUNTER
GERALDINE    Pts daughter Vernell regarding the [employer] [facility] form for Pts benjamin. [facility] states they do not have the form and Vernell wanted to see if GERALDINE still had it on file to send. Please call Vernell back at   [phone].  Thank you

## 2022-04-15 NOTE — RESULT ENCOUNTER NOTE
Kidney function better.  No further testing at this time, please have patient repeat a BMP before her visit with Dr. Eng in July

## 2022-04-18 ENCOUNTER — PATIENT MESSAGE (OUTPATIENT)
Dept: CARDIOLOGY | Facility: MEDICAL CENTER | Age: 77
End: 2022-04-18
Payer: MEDICARE

## 2022-04-18 DIAGNOSIS — I50.33 ACUTE ON CHRONIC CONGESTIVE HEART FAILURE WITH RIGHT VENTRICULAR DIASTOLIC DYSFUNCTION (HCC): ICD-10-CM

## 2022-04-18 DIAGNOSIS — I50.82 ACUTE ON CHRONIC CONGESTIVE HEART FAILURE WITH RIGHT VENTRICULAR DIASTOLIC DYSFUNCTION (HCC): ICD-10-CM

## 2022-04-18 DIAGNOSIS — Z79.899 HIGH RISK MEDICATION USE: ICD-10-CM

## 2022-04-18 LAB — EKG IMPRESSION: NORMAL

## 2022-04-18 NOTE — PATIENT COMMUNICATION
TEODORO Hogan.  Moo Thomas R.N.  Kidney function better.  No further testing at this time, please have patient repeat a BMP before her visit with Dr. Eng in July     Pt notified via [facility]

## 2022-04-19 ENCOUNTER — TELEPHONE (OUTPATIENT)
Dept: CARDIOLOGY | Facility: MEDICAL CENTER | Age: 77
End: 2022-04-19
Payer: MEDICARE

## 2022-04-19 DIAGNOSIS — I48.0 PAROXYSMAL ATRIAL FIBRILLATION (HCC): ICD-10-CM

## 2022-04-19 PROCEDURE — RXMED WILLOW AMBULATORY MEDICATION CHARGE: Performed by: NURSE PRACTITIONER

## 2022-04-19 NOTE — TELEPHONE ENCOUNTER
GERALDINE    Pts daughter Vernell called asking if Pt could get a sample of apixaban (ELIQUIS) 5mg Tab. Pt is out of medication. Please call Vernell back at [phone].    Thank you

## 2022-04-20 ENCOUNTER — PHARMACY VISIT (OUTPATIENT)
Dept: PHARMACY | Facility: MEDICAL CENTER | Age: 77
End: 2022-04-20
Payer: COMMERCIAL

## 2022-04-27 ENCOUNTER — TELEPHONE (OUTPATIENT)
Dept: CARDIOLOGY | Facility: MEDICAL CENTER | Age: 77
End: 2022-04-27
Payer: MEDICARE

## 2022-04-27 NOTE — TELEPHONE ENCOUNTER
Rosa Maria Davis from [facility] called and was just following up on the surgical clearance and told her it was sent over this morning to be reviewed.    Dr. Shrestha  Ph. [phone]  FAX: [phone]      Thank you,    JILL

## 2022-04-27 NOTE — LETTER
PROCEDURE/SURGERY CLEARANCE FORM      Encounter Date: 4/27/2022    Patient: Zeinab Loza  YOB: 1945    CARDIOLOGIST:  KIRILL Hogan    REFERRING DOCTOR:  Dr. Parker    The above patient is cleared to have the following procedure/surgery: Spinal Cord simulator Implant.                                            Additional comments: Ok to hold Eliquis for 48 hours prior- or longer if recommended for procedure.        Electronically signed       MD Signature   TEODORO Hogan.

## 2022-04-27 NOTE — TELEPHONE ENCOUNTER
Received stratification request from [facility] fax [phone]    Procedure: Spinal Cord Stimulator Implant    To GERALDINE, ok for patient to proceed and hold xarelto 48hrs prior if needed? Last OV 4/5/22. No pending testing.

## 2022-04-28 ENCOUNTER — PATIENT MESSAGE (OUTPATIENT)
Dept: CARDIOLOGY | Facility: MEDICAL CENTER | Age: 77
End: 2022-04-28
Payer: MEDICARE

## 2022-04-28 NOTE — TELEPHONE ENCOUNTER
Rosa Maria Izaguirre from [facility] called in today and wanted to check on the status of the surgical clearance request for this pt.      Best contact for [facility]:  PH: [phone]  Fax: [phone]      Thank you,  Heather WONG

## 2022-04-29 NOTE — TELEPHONE ENCOUNTER
Yes, she can proceed with spinal stimulator. Ok to hold eliquis for 48 hours or longer if recommended for procedure.

## 2022-04-29 NOTE — TELEPHONE ENCOUNTER
Rosa Maria Li has called again looking for the medical clearance.     PH: [phone]  Fax: [phone]    Thank you,   Yancy BRAVO

## 2022-05-02 NOTE — TELEPHONE ENCOUNTER
Rosa Maria with [facility] states she is needing the Clearance to be completed with Dr. Galindo and GERALDINE.  She has not received one back for Dr. Galindo.     Best contact number:  Rosa Maria PH:  [phone]  Fax : [phone]    Thank You  Ester ROCK

## 2022-05-03 NOTE — TELEPHONE ENCOUNTER
Called [facility], spoke to Rosa Maria and advised MC recommendations and she stated the Anesthesiologist was asking. Answered all questions and concerns, appreciative of call.

## 2022-05-22 DIAGNOSIS — I48.0 PAROXYSMAL ATRIAL FIBRILLATION (HCC): ICD-10-CM

## 2022-05-22 DIAGNOSIS — I49.8 ATRIAL ARRHYTHMIA: ICD-10-CM

## 2022-05-22 DIAGNOSIS — Z86.79 S/P ABLATION OF ATRIAL FIBRILLATION: ICD-10-CM

## 2022-05-22 DIAGNOSIS — Z98.890 S/P ABLATION OF ATRIAL FIBRILLATION: ICD-10-CM

## 2022-05-22 RX ORDER — METOPROLOL TARTRATE 50 MG/1
TABLET, FILM COATED ORAL
Qty: 180 TABLET | OUTPATIENT
Start: 2022-05-22

## 2022-05-24 ENCOUNTER — APPOINTMENT (OUTPATIENT)
Dept: RADIOLOGY | Facility: MEDICAL CENTER | Age: 77
End: 2022-05-24
Attending: SURGERY
Payer: MEDICARE

## 2022-06-13 ENCOUNTER — HOSPITAL ENCOUNTER (OUTPATIENT)
Dept: LAB | Facility: MEDICAL CENTER | Age: 77
End: 2022-06-13
Attending: SPECIALIST
Payer: MEDICARE

## 2022-06-13 ENCOUNTER — HOSPITAL ENCOUNTER (OUTPATIENT)
Dept: LAB | Facility: MEDICAL CENTER | Age: 77
End: 2022-06-13
Attending: NURSE PRACTITIONER
Payer: MEDICARE

## 2022-06-13 ENCOUNTER — HOSPITAL ENCOUNTER (OUTPATIENT)
Dept: LAB | Facility: MEDICAL CENTER | Age: 77
End: 2022-06-13
Attending: INTERNAL MEDICINE
Payer: MEDICARE

## 2022-06-13 ENCOUNTER — HOSPITAL ENCOUNTER (OUTPATIENT)
Dept: LAB | Facility: MEDICAL CENTER | Age: 77
End: 2022-06-13
Attending: FAMILY MEDICINE
Payer: MEDICARE

## 2022-06-13 DIAGNOSIS — I50.82 ACUTE ON CHRONIC CONGESTIVE HEART FAILURE WITH RIGHT VENTRICULAR DIASTOLIC DYSFUNCTION (HCC): ICD-10-CM

## 2022-06-13 DIAGNOSIS — Z79.899 HIGH RISK MEDICATION USE: ICD-10-CM

## 2022-06-13 DIAGNOSIS — I50.33 ACUTE ON CHRONIC CONGESTIVE HEART FAILURE WITH RIGHT VENTRICULAR DIASTOLIC DYSFUNCTION (HCC): ICD-10-CM

## 2022-06-13 LAB
ALBUMIN SERPL BCP-MCNC: 4.4 G/DL (ref 3.2–4.9)
ALBUMIN/GLOB SERPL: 1.6 G/DL
ALBUMIN/GLOB SERPL: 1.7 G/DL
ALBUMIN/GLOB SERPL: 1.7 G/DL
ALP SERPL-CCNC: 78 U/L (ref 30–99)
ALP SERPL-CCNC: 78 U/L (ref 30–99)
ALP SERPL-CCNC: 80 U/L (ref 30–99)
ALT SERPL-CCNC: 10 U/L (ref 2–50)
ALT SERPL-CCNC: 8 U/L (ref 2–50)
ALT SERPL-CCNC: 9 U/L (ref 2–50)
ANION GAP SERPL CALC-SCNC: 10 MMOL/L (ref 7–16)
ANION GAP SERPL CALC-SCNC: 11 MMOL/L (ref 7–16)
APPEARANCE UR: CLEAR
AST SERPL-CCNC: 15 U/L (ref 12–45)
AST SERPL-CCNC: 16 U/L (ref 12–45)
AST SERPL-CCNC: 17 U/L (ref 12–45)
BACTERIA #/AREA URNS HPF: ABNORMAL /HPF
BILIRUB SERPL-MCNC: 0.3 MG/DL (ref 0.1–1.5)
BILIRUB SERPL-MCNC: 0.3 MG/DL (ref 0.1–1.5)
BILIRUB SERPL-MCNC: 0.4 MG/DL (ref 0.1–1.5)
BILIRUB UR QL STRIP.AUTO: NEGATIVE
BUN SERPL-MCNC: 22 MG/DL (ref 8–22)
CALCIUM SERPL-MCNC: 10.1 MG/DL (ref 8.5–10.5)
CALCIUM SERPL-MCNC: 10.1 MG/DL (ref 8.5–10.5)
CALCIUM SERPL-MCNC: 10.2 MG/DL (ref 8.5–10.5)
CALCIUM SERPL-MCNC: 10.2 MG/DL (ref 8.5–10.5)
CAOX CRY #/AREA URNS HPF: ABNORMAL /HPF
CHLORIDE SERPL-SCNC: 102 MMOL/L (ref 96–112)
CHLORIDE SERPL-SCNC: 103 MMOL/L (ref 96–112)
CHOLEST SERPL-MCNC: 184 MG/DL (ref 100–199)
CO2 SERPL-SCNC: 31 MMOL/L (ref 20–33)
CO2 SERPL-SCNC: 32 MMOL/L (ref 20–33)
COLOR UR: YELLOW
CREAT SERPL-MCNC: 1.58 MG/DL (ref 0.5–1.4)
CREAT SERPL-MCNC: 1.61 MG/DL (ref 0.5–1.4)
CREAT SERPL-MCNC: 1.62 MG/DL (ref 0.5–1.4)
CREAT SERPL-MCNC: 1.67 MG/DL (ref 0.5–1.4)
GFR SERPLBLD CREATININE-BSD FMLA CKD-EPI: 32 ML/MIN/1.73 M 2
GFR SERPLBLD CREATININE-BSD FMLA CKD-EPI: 33 ML/MIN/1.73 M 2
GFR SERPLBLD CREATININE-BSD FMLA CKD-EPI: 33 ML/MIN/1.73 M 2
GFR SERPLBLD CREATININE-BSD FMLA CKD-EPI: 34 ML/MIN/1.73 M 2
GLOBULIN SER CALC-MCNC: 2.6 G/DL (ref 1.9–3.5)
GLOBULIN SER CALC-MCNC: 2.6 G/DL (ref 1.9–3.5)
GLOBULIN SER CALC-MCNC: 2.7 G/DL (ref 1.9–3.5)
GLUCOSE SERPL-MCNC: 94 MG/DL (ref 65–99)
GLUCOSE SERPL-MCNC: 94 MG/DL (ref 65–99)
GLUCOSE SERPL-MCNC: 95 MG/DL (ref 65–99)
GLUCOSE SERPL-MCNC: 95 MG/DL (ref 65–99)
GLUCOSE UR STRIP.AUTO-MCNC: NEGATIVE MG/DL
HDLC SERPL-MCNC: 96 MG/DL
INR PPP: 1.35 (ref 0.87–1.13)
KETONES UR STRIP.AUTO-MCNC: NEGATIVE MG/DL
LDLC SERPL CALC-MCNC: 75 MG/DL
LEUKOCYTE ESTERASE UR QL STRIP.AUTO: ABNORMAL
MICRO URNS: ABNORMAL
NITRITE UR QL STRIP.AUTO: NEGATIVE
NT-PROBNP SERPL IA-MCNC: 161 PG/ML (ref 0–125)
PH UR STRIP.AUTO: 7 [PH] (ref 5–8)
POTASSIUM SERPL-SCNC: 3.6 MMOL/L (ref 3.6–5.5)
POTASSIUM SERPL-SCNC: 3.7 MMOL/L (ref 3.6–5.5)
POTASSIUM SERPL-SCNC: 3.7 MMOL/L (ref 3.6–5.5)
POTASSIUM SERPL-SCNC: 3.8 MMOL/L (ref 3.6–5.5)
PROT SERPL-MCNC: 7 G/DL (ref 6–8.2)
PROT SERPL-MCNC: 7 G/DL (ref 6–8.2)
PROT SERPL-MCNC: 7.1 G/DL (ref 6–8.2)
PROT UR QL STRIP: 30 MG/DL
PROTHROMBIN TIME: 16.3 SEC (ref 12–14.6)
RBC UR QL AUTO: NEGATIVE
SODIUM SERPL-SCNC: 144 MMOL/L (ref 135–145)
SODIUM SERPL-SCNC: 144 MMOL/L (ref 135–145)
SODIUM SERPL-SCNC: 145 MMOL/L (ref 135–145)
SODIUM SERPL-SCNC: 146 MMOL/L (ref 135–145)
SP GR UR STRIP.AUTO: 1.01
T4 SERPL-MCNC: 10.3 UG/DL (ref 4–12)
TRIGL SERPL-MCNC: 67 MG/DL (ref 0–149)
TSH SERPL DL<=0.005 MIU/L-ACNC: 0.75 UIU/ML (ref 0.38–5.33)
UROBILINOGEN UR STRIP.AUTO-MCNC: 0.2 MG/DL
WBC #/AREA URNS HPF: ABNORMAL /HPF

## 2022-06-13 PROCEDURE — 80053 COMPREHEN METABOLIC PANEL: CPT | Mod: 91

## 2022-06-13 PROCEDURE — 82043 UR ALBUMIN QUANTITATIVE: CPT

## 2022-06-13 PROCEDURE — 80048 BASIC METABOLIC PNL TOTAL CA: CPT

## 2022-06-13 PROCEDURE — 83880 ASSAY OF NATRIURETIC PEPTIDE: CPT

## 2022-06-13 PROCEDURE — 87077 CULTURE AEROBIC IDENTIFY: CPT

## 2022-06-13 PROCEDURE — 81001 URINALYSIS AUTO W/SCOPE: CPT

## 2022-06-13 PROCEDURE — 84436 ASSAY OF TOTAL THYROXINE: CPT

## 2022-06-13 PROCEDURE — 84443 ASSAY THYROID STIM HORMONE: CPT

## 2022-06-13 PROCEDURE — 87186 SC STD MICRODIL/AGAR DIL: CPT

## 2022-06-13 PROCEDURE — 85025 COMPLETE CBC W/AUTO DIFF WBC: CPT

## 2022-06-13 PROCEDURE — 36415 COLL VENOUS BLD VENIPUNCTURE: CPT

## 2022-06-13 PROCEDURE — 82570 ASSAY OF URINE CREATININE: CPT

## 2022-06-13 PROCEDURE — 85025 COMPLETE CBC W/AUTO DIFF WBC: CPT | Mod: 91

## 2022-06-13 PROCEDURE — 80053 COMPREHEN METABOLIC PANEL: CPT

## 2022-06-13 PROCEDURE — 85027 COMPLETE CBC AUTOMATED: CPT | Mod: GA

## 2022-06-13 PROCEDURE — 80061 LIPID PANEL: CPT

## 2022-06-13 PROCEDURE — 87086 URINE CULTURE/COLONY COUNT: CPT | Mod: GA

## 2022-06-13 PROCEDURE — 85610 PROTHROMBIN TIME: CPT | Mod: GA

## 2022-06-14 LAB
BASOPHILS # BLD AUTO: 0.6 % (ref 0–1.8)
BASOPHILS # BLD AUTO: 0.8 % (ref 0–1.8)
BASOPHILS # BLD: 0.02 K/UL (ref 0–0.12)
BASOPHILS # BLD: 0.03 K/UL (ref 0–0.12)
CREAT UR-MCNC: 36.23 MG/DL
EOSINOPHIL # BLD AUTO: 0.12 K/UL (ref 0–0.51)
EOSINOPHIL # BLD AUTO: 0.13 K/UL (ref 0–0.51)
EOSINOPHIL NFR BLD: 3.4 % (ref 0–6.9)
EOSINOPHIL NFR BLD: 3.6 % (ref 0–6.9)
ERYTHROCYTE [DISTWIDTH] IN BLOOD BY AUTOMATED COUNT: 59.7 FL (ref 35.9–50)
ERYTHROCYTE [DISTWIDTH] IN BLOOD BY AUTOMATED COUNT: 60.3 FL (ref 35.9–50)
ERYTHROCYTE [DISTWIDTH] IN BLOOD BY AUTOMATED COUNT: 60.6 FL (ref 35.9–50)
HCT VFR BLD AUTO: 38.5 % (ref 37–47)
HCT VFR BLD AUTO: 38.7 % (ref 37–47)
HCT VFR BLD AUTO: 38.8 % (ref 37–47)
HGB BLD-MCNC: 12.1 G/DL (ref 12–16)
IMM GRANULOCYTES # BLD AUTO: 0.01 K/UL (ref 0–0.11)
IMM GRANULOCYTES # BLD AUTO: 0.01 K/UL (ref 0–0.11)
IMM GRANULOCYTES NFR BLD AUTO: 0.3 % (ref 0–0.9)
IMM GRANULOCYTES NFR BLD AUTO: 0.3 % (ref 0–0.9)
LYMPHOCYTES # BLD AUTO: 0.9 K/UL (ref 1–4.8)
LYMPHOCYTES # BLD AUTO: 0.91 K/UL (ref 1–4.8)
LYMPHOCYTES NFR BLD: 25.1 % (ref 22–41)
LYMPHOCYTES NFR BLD: 25.7 % (ref 22–41)
MCH RBC QN AUTO: 31.8 PG (ref 27–33)
MCH RBC QN AUTO: 31.9 PG (ref 27–33)
MCH RBC QN AUTO: 32.1 PG (ref 27–33)
MCHC RBC AUTO-ENTMCNC: 31.2 G/DL (ref 33.6–35)
MCHC RBC AUTO-ENTMCNC: 31.3 G/DL (ref 33.6–35)
MCHC RBC AUTO-ENTMCNC: 31.4 G/DL (ref 33.6–35)
MCV RBC AUTO: 102.1 FL (ref 81.4–97.8)
MICROALBUMIN UR-MCNC: 3.7 MG/DL
MICROALBUMIN/CREAT UR: 102 MG/G (ref 0–30)
MONOCYTES # BLD AUTO: 0.47 K/UL (ref 0–0.85)
MONOCYTES # BLD AUTO: 0.47 K/UL (ref 0–0.85)
MONOCYTES NFR BLD AUTO: 13 % (ref 0–13.4)
MONOCYTES NFR BLD AUTO: 13.4 % (ref 0–13.4)
NEUTROPHILS # BLD AUTO: 1.98 K/UL (ref 2–7.15)
NEUTROPHILS # BLD AUTO: 2.07 K/UL (ref 2–7.15)
NEUTROPHILS NFR BLD: 56.6 % (ref 44–72)
NEUTROPHILS NFR BLD: 57.2 % (ref 44–72)
NRBC # BLD AUTO: 0 K/UL
NRBC # BLD AUTO: 0 K/UL
NRBC BLD-RTO: 0 /100 WBC
NRBC BLD-RTO: 0 /100 WBC
PLATELET # BLD AUTO: 208 K/UL (ref 164–446)
PLATELET # BLD AUTO: 210 K/UL (ref 164–446)
PLATELET # BLD AUTO: 218 K/UL (ref 164–446)
PMV BLD AUTO: 9.5 FL (ref 9–12.9)
PMV BLD AUTO: 9.7 FL (ref 9–12.9)
PMV BLD AUTO: 9.8 FL (ref 9–12.9)
RBC # BLD AUTO: 3.77 M/UL (ref 4.2–5.4)
RBC # BLD AUTO: 3.79 M/UL (ref 4.2–5.4)
RBC # BLD AUTO: 3.8 M/UL (ref 4.2–5.4)
WBC # BLD AUTO: 3.5 K/UL (ref 4.8–10.8)
WBC # BLD AUTO: 3.6 K/UL (ref 4.8–10.8)
WBC # BLD AUTO: 3.7 K/UL (ref 4.8–10.8)

## 2022-06-16 LAB
BACTERIA UR CULT: ABNORMAL
BACTERIA UR CULT: ABNORMAL
SIGNIFICANT IND 70042: ABNORMAL
SITE SITE: ABNORMAL
SOURCE SOURCE: ABNORMAL

## 2022-06-17 ENCOUNTER — HOSPITAL ENCOUNTER (OUTPATIENT)
Dept: RADIOLOGY | Facility: MEDICAL CENTER | Age: 77
End: 2022-06-17
Attending: SURGERY
Payer: MEDICARE

## 2022-06-17 DIAGNOSIS — I72.8 ANEURYSM OF SUBCLAVIAN ARTERY (HCC): ICD-10-CM

## 2022-06-17 PROCEDURE — 74174 CTA ABD&PLVS W/CONTRAST: CPT | Mod: MH

## 2022-06-17 PROCEDURE — 700117 HCHG RX CONTRAST REV CODE 255

## 2022-06-17 RX ADMIN — IOHEXOL 100 ML: 350 INJECTION, SOLUTION INTRAVENOUS at 10:30

## 2022-06-23 ENCOUNTER — HOSPITAL ENCOUNTER (OUTPATIENT)
Dept: RADIOLOGY | Facility: MEDICAL CENTER | Age: 77
End: 2022-06-23
Attending: PAIN MEDICINE
Payer: MEDICARE

## 2022-06-23 DIAGNOSIS — Z01.818 PREOP EXAMINATION: ICD-10-CM

## 2022-06-23 PROCEDURE — 71046 X-RAY EXAM CHEST 2 VIEWS: CPT

## 2022-06-27 ASSESSMENT — FIBROSIS 4 INDEX: FIB4 SCORE: 2.17

## 2022-06-28 ENCOUNTER — PATIENT MESSAGE (OUTPATIENT)
Dept: CARDIOLOGY | Facility: MEDICAL CENTER | Age: 77
End: 2022-06-28
Payer: MEDICARE

## 2022-06-28 NOTE — LETTER
PROCEDURE/SURGERY CLEARANCE FORM      Encounter Date: 6/28/2022    Patient: Zeinab Loza  YOB: 1945    CARDIOLOGIST:  KIRILL Hogan    REFERRING DOCTOR:  Mars Mclean M.D.      The above patient is cleared to have the following procedure/surgery: Knee surgery                                           Additional comments: Patient will also need to be cleared from a pulmonology standpoint as well.         Electronically signed        MD Signature   TEODORO Hogan.

## 2022-07-05 ENCOUNTER — TELEPHONE (OUTPATIENT)
Dept: CARDIOLOGY | Facility: MEDICAL CENTER | Age: 77
End: 2022-07-05
Payer: MEDICARE

## 2022-07-06 NOTE — TELEPHONE ENCOUNTER
Received stratification request from [facility] fax [phone]    Procedure: Colonoscopy with Anesthesia on TBD    To GERALDINE, ok to proceed and hold Eliquis for 48hrs prior? Last OV 4/5/22, f/u scheduled 8/17/22, no pending testing.

## 2022-07-11 ENCOUNTER — HOSPITAL ENCOUNTER (OUTPATIENT)
Dept: LAB | Facility: MEDICAL CENTER | Age: 77
End: 2022-07-11
Attending: FAMILY MEDICINE
Payer: MEDICARE

## 2022-07-11 PROBLEM — M17.11 PRIMARY OSTEOARTHRITIS OF RIGHT KNEE: Status: ACTIVE | Noted: 2022-06-27

## 2022-07-11 LAB
ALBUMIN SERPL BCP-MCNC: 4.6 G/DL (ref 3.2–4.9)
ALBUMIN/GLOB SERPL: 1.7 G/DL
ALP SERPL-CCNC: 70 U/L (ref 30–99)
ALT SERPL-CCNC: 10 U/L (ref 2–50)
ANION GAP SERPL CALC-SCNC: 11 MMOL/L (ref 7–16)
AST SERPL-CCNC: 19 U/L (ref 12–45)
BASOPHILS # BLD AUTO: 0.7 % (ref 0–1.8)
BASOPHILS # BLD: 0.04 K/UL (ref 0–0.12)
BILIRUB SERPL-MCNC: 0.5 MG/DL (ref 0.1–1.5)
BUN SERPL-MCNC: 42 MG/DL (ref 8–22)
CALCIUM SERPL-MCNC: 10.6 MG/DL (ref 8.5–10.5)
CHLORIDE SERPL-SCNC: 99 MMOL/L (ref 96–112)
CO2 SERPL-SCNC: 30 MMOL/L (ref 20–33)
CREAT SERPL-MCNC: 1.48 MG/DL (ref 0.5–1.4)
EOSINOPHIL # BLD AUTO: 0.12 K/UL (ref 0–0.51)
EOSINOPHIL NFR BLD: 2.1 % (ref 0–6.9)
ERYTHROCYTE [DISTWIDTH] IN BLOOD BY AUTOMATED COUNT: 63.7 FL (ref 35.9–50)
GFR SERPLBLD CREATININE-BSD FMLA CKD-EPI: 37 ML/MIN/1.73 M 2
GLOBULIN SER CALC-MCNC: 2.7 G/DL (ref 1.9–3.5)
GLUCOSE SERPL-MCNC: 97 MG/DL (ref 65–99)
HCT VFR BLD AUTO: 36.6 % (ref 37–47)
HGB BLD-MCNC: 11.5 G/DL (ref 12–16)
IMM GRANULOCYTES # BLD AUTO: 0.03 K/UL (ref 0–0.11)
IMM GRANULOCYTES NFR BLD AUTO: 0.5 % (ref 0–0.9)
LYMPHOCYTES # BLD AUTO: 1.11 K/UL (ref 1–4.8)
LYMPHOCYTES NFR BLD: 19.4 % (ref 22–41)
MCH RBC QN AUTO: 33 PG (ref 27–33)
MCHC RBC AUTO-ENTMCNC: 31.4 G/DL (ref 33.6–35)
MCV RBC AUTO: 104.9 FL (ref 81.4–97.8)
MONOCYTES # BLD AUTO: 0.67 K/UL (ref 0–0.85)
MONOCYTES NFR BLD AUTO: 11.7 % (ref 0–13.4)
NEUTROPHILS # BLD AUTO: 3.75 K/UL (ref 2–7.15)
NEUTROPHILS NFR BLD: 65.6 % (ref 44–72)
NRBC # BLD AUTO: 0 K/UL
NRBC BLD-RTO: 0 /100 WBC
PLATELET # BLD AUTO: 217 K/UL (ref 164–446)
PMV BLD AUTO: 9.3 FL (ref 9–12.9)
POTASSIUM SERPL-SCNC: 4.6 MMOL/L (ref 3.6–5.5)
PROT SERPL-MCNC: 7.3 G/DL (ref 6–8.2)
RBC # BLD AUTO: 3.49 M/UL (ref 4.2–5.4)
SODIUM SERPL-SCNC: 140 MMOL/L (ref 135–145)
WBC # BLD AUTO: 5.7 K/UL (ref 4.8–10.8)

## 2022-07-11 PROCEDURE — 80053 COMPREHEN METABOLIC PANEL: CPT

## 2022-07-11 PROCEDURE — 85025 COMPLETE CBC W/AUTO DIFF WBC: CPT

## 2022-07-11 PROCEDURE — 36415 COLL VENOUS BLD VENIPUNCTURE: CPT

## 2022-07-27 ENCOUNTER — PRE-ADMISSION TESTING (OUTPATIENT)
Dept: ADMISSIONS | Facility: MEDICAL CENTER | Age: 77
End: 2022-07-27
Attending: INTERNAL MEDICINE
Payer: MEDICARE

## 2022-07-27 DIAGNOSIS — Z01.818 PRE-OP EVALUATION: ICD-10-CM

## 2022-07-27 DIAGNOSIS — R79.1 ABNORMAL COAGULATION PROFILE: ICD-10-CM

## 2022-07-27 LAB
ANION GAP SERPL CALC-SCNC: 11 MMOL/L (ref 7–16)
APTT PPP: 48.1 SEC (ref 24.7–36)
BASOPHILS # BLD AUTO: 0.6 % (ref 0–1.8)
BASOPHILS # BLD: 0.03 K/UL (ref 0–0.12)
BUN SERPL-MCNC: 42 MG/DL (ref 8–22)
CALCIUM SERPL-MCNC: 10.7 MG/DL (ref 8.4–10.2)
CHLORIDE SERPL-SCNC: 99 MMOL/L (ref 96–112)
CO2 SERPL-SCNC: 32 MMOL/L (ref 20–33)
CREAT SERPL-MCNC: 1.19 MG/DL (ref 0.5–1.4)
EOSINOPHIL # BLD AUTO: 0.12 K/UL (ref 0–0.51)
EOSINOPHIL NFR BLD: 2.3 % (ref 0–6.9)
ERYTHROCYTE [DISTWIDTH] IN BLOOD BY AUTOMATED COUNT: 55.1 FL (ref 35.9–50)
GFR SERPLBLD CREATININE-BSD FMLA CKD-EPI: 48 ML/MIN/1.73 M 2
GLUCOSE SERPL-MCNC: 99 MG/DL (ref 65–99)
HCT VFR BLD AUTO: 39.3 % (ref 37–47)
HGB BLD-MCNC: 12.5 G/DL (ref 12–16)
IMM GRANULOCYTES # BLD AUTO: 0.02 K/UL (ref 0–0.11)
IMM GRANULOCYTES NFR BLD AUTO: 0.4 % (ref 0–0.9)
INR PPP: 1.44 (ref 0.87–1.13)
LYMPHOCYTES # BLD AUTO: 1.19 K/UL (ref 1–4.8)
LYMPHOCYTES NFR BLD: 23 % (ref 22–41)
MCH RBC QN AUTO: 32.9 PG (ref 27–33)
MCHC RBC AUTO-ENTMCNC: 31.8 G/DL (ref 33.6–35)
MCV RBC AUTO: 103.4 FL (ref 81.4–97.8)
MONOCYTES # BLD AUTO: 0.55 K/UL (ref 0–0.85)
MONOCYTES NFR BLD AUTO: 10.6 % (ref 0–13.4)
NEUTROPHILS # BLD AUTO: 3.27 K/UL (ref 2–7.15)
NEUTROPHILS NFR BLD: 63.1 % (ref 44–72)
NRBC # BLD AUTO: 0 K/UL
NRBC BLD-RTO: 0 /100 WBC
PLATELET # BLD AUTO: 259 K/UL (ref 164–446)
PMV BLD AUTO: 9.5 FL (ref 9–12.9)
POTASSIUM SERPL-SCNC: 4.1 MMOL/L (ref 3.6–5.5)
PROTHROMBIN TIME: 16.9 SEC (ref 12–14.6)
RBC # BLD AUTO: 3.8 M/UL (ref 4.2–5.4)
SCCMEC + MECA PNL NOSE NAA+PROBE: NEGATIVE
SCCMEC + MECA PNL NOSE NAA+PROBE: NEGATIVE
SODIUM SERPL-SCNC: 142 MMOL/L (ref 135–145)
WBC # BLD AUTO: 5.2 K/UL (ref 4.8–10.8)

## 2022-07-27 PROCEDURE — 80048 BASIC METABOLIC PNL TOTAL CA: CPT

## 2022-07-27 PROCEDURE — 85730 THROMBOPLASTIN TIME PARTIAL: CPT

## 2022-07-27 PROCEDURE — 85610 PROTHROMBIN TIME: CPT

## 2022-07-27 PROCEDURE — 85025 COMPLETE CBC W/AUTO DIFF WBC: CPT

## 2022-07-27 PROCEDURE — 87641 MR-STAPH DNA AMP PROBE: CPT

## 2022-07-27 PROCEDURE — 36415 COLL VENOUS BLD VENIPUNCTURE: CPT

## 2022-07-27 PROCEDURE — 87640 STAPH A DNA AMP PROBE: CPT | Mod: XU

## 2022-07-27 RX ORDER — TADALAFIL 20 MG/1
2 TABLET ORAL
COMMUNITY
Start: 2022-07-14 | End: 2022-12-30 | Stop reason: SDUPTHER

## 2022-07-27 ASSESSMENT — FIBROSIS 4 INDEX: FIB4 SCORE: 2.08

## 2022-07-27 NOTE — PREPROCEDURE INSTRUCTIONS
Pre admit appointment completed for TKA on 8/16/22, questions answered. Pt instructed to continue regularly prescribed meds through day of procedure. Per anesthesia protocol instructed to take these meds the day of procedure- amniodarone, synthroid, metoprolol, omeprazole if needed, percocet if needed and paxil. Will verify with instructions at home but states to hold eliquis 5 days prior to surgery. METs score <4. On 5l NC at all times, to bring port tank DOS. FEI protocol, to bring BiPAP DOS, Fall risk precautions per hx of falls and per score.

## 2022-07-27 NOTE — OR NURSING
Pt stated at this appt that Dr Mclean wanted a cardiac anesthesiologist for her. Dr Lozoya notified and this is his response-    I have left a message with DAVID Hogan from the cardiology service to get some more details regarding this patient’s overall cardiac status. Additionally, I am still unable to see a pulmonary clearance despite this patient having chronic respiratory failure. If a Dr. Mclean needs  a cardiac anesthesiologist for this patient, then we may need to move this case to [facility] where the cardiac service support for this patient already exists and therefore the aftercare may be more streamlined. Let’s await the consultation notes first before making any other decisions at this time. Thank you.     Ash Lozoya M.D.  [facility]  Pulmonary clearance being scanned into chart.

## 2022-07-27 NOTE — OR NURSING
Case message sent to Dr Mclean' office regarding colonoscopy prior to surgery, Per Charly Hodge PA-C it is OK. Called and notified pt as well.

## 2022-07-27 NOTE — DISCHARGE PLANNING
DISCHARGE PLANNING NOTE - TOTAL JOINT    Procedure: Procedure(s):  ARTHROPLASTY, KNEE, TOTAL  Procedure Date: 8/16/2022  Insurance: Payor: MEDICARE / Plan: MEDICARE PART A & B / Product Type: *No Product type* /    Equipment currently available at home?  front-wheel walker and shower chair  Steps into the home? 1  Steps within the home? 0  Toilet height? ADA  Type of shower? walk-in shower  Who will be with you during your recovery? DaughterVernell  Is Outpatient Physical Therapy set up after surgery? Yes  Did you take the Total Joint Class and where? Yes  Planning same day discharge?No     This writer met with pt and her daughter. Pt states she does not want to go home the same day as she did last time secondary to uncontrollable pain at home. Pt does have a pain management doctor and she might see if he will talk with Dr. Mclean to come up with a pain management plan for post op.  All questions answered and verbalizes understanding of all instructions. No dc needs identified at this time. Anticipate dc to home without barriers.

## 2022-07-28 NOTE — PREPROCEDURE INSTRUCTIONS
Pre admit appointment completed for colonoscopy on 8/5/22, questions answered. Pt instructed to continue regularly prescribed meds through day of procedure. Per cardiac  Clearance, pt to hold Eliquis 2 days prior, pt aware.Per anesthesia protocol instructed to take these meds the day of procedure- amniodarone, synthroid, metoprolol, omeprazole if needed, percocet if needed and paxil. METs score <4. On 5l NC at all times, to bring port tank DOS. FEI protocol, to bring BiPAP DOS, Fall risk precautions per hx of falls and per score.

## 2022-08-01 NOTE — OR NURSING
Case message sent to Dr Mclean and his surgery scheduler to inform of Dr Lozoya's replies regarding cardiac/pulmonary clearances, his request to Cardiology NP, and that he will need to discuss with Dr Mclean re options of where pt needs to be taken care of.

## 2022-08-03 NOTE — OR NURSING
Dr Lozoya notified that per Dr Mclean, he talked to pt about getting a cardiology note, but never recommended or requested a cardiac anesthesiologist.

## 2022-08-05 ENCOUNTER — ANESTHESIA (OUTPATIENT)
Dept: SURGERY | Facility: MEDICAL CENTER | Age: 77
End: 2022-08-05
Payer: MEDICARE

## 2022-08-05 ENCOUNTER — ANESTHESIA EVENT (OUTPATIENT)
Dept: SURGERY | Facility: MEDICAL CENTER | Age: 77
End: 2022-08-05
Payer: MEDICARE

## 2022-08-05 ENCOUNTER — HOSPITAL ENCOUNTER (OUTPATIENT)
Facility: MEDICAL CENTER | Age: 77
End: 2022-08-05
Attending: INTERNAL MEDICINE | Admitting: INTERNAL MEDICINE
Payer: MEDICARE

## 2022-08-05 VITALS
WEIGHT: 210.32 LBS | OXYGEN SATURATION: 96 % | HEIGHT: 67 IN | SYSTOLIC BLOOD PRESSURE: 134 MMHG | RESPIRATION RATE: 18 BRPM | TEMPERATURE: 97.7 F | BODY MASS INDEX: 33.01 KG/M2 | DIASTOLIC BLOOD PRESSURE: 61 MMHG | HEART RATE: 63 BPM

## 2022-08-05 LAB — PATHOLOGY CONSULT NOTE: NORMAL

## 2022-08-05 PROCEDURE — 160203 HCHG ENDO MINUTES - 1ST 30 MINS LEVEL 4: Performed by: INTERNAL MEDICINE

## 2022-08-05 PROCEDURE — 00811 ANES LWR INTST NDSC NOS: CPT | Performed by: ANESTHESIOLOGY

## 2022-08-05 PROCEDURE — 160035 HCHG PACU - 1ST 60 MINS PHASE I: Performed by: INTERNAL MEDICINE

## 2022-08-05 PROCEDURE — 700111 HCHG RX REV CODE 636 W/ 250 OVERRIDE (IP): Performed by: ANESTHESIOLOGY

## 2022-08-05 PROCEDURE — 88305 TISSUE EXAM BY PATHOLOGIST: CPT

## 2022-08-05 PROCEDURE — 160046 HCHG PACU - 1ST 60 MINS PHASE II: Performed by: INTERNAL MEDICINE

## 2022-08-05 PROCEDURE — 700101 HCHG RX REV CODE 250: Performed by: ANESTHESIOLOGY

## 2022-08-05 PROCEDURE — 160025 RECOVERY II MINUTES (STATS): Performed by: INTERNAL MEDICINE

## 2022-08-05 PROCEDURE — 160208 HCHG ENDO MINUTES - EA ADDL 1 MIN LEVEL 4: Performed by: INTERNAL MEDICINE

## 2022-08-05 PROCEDURE — 160002 HCHG RECOVERY MINUTES (STAT): Performed by: INTERNAL MEDICINE

## 2022-08-05 PROCEDURE — 700105 HCHG RX REV CODE 258: Performed by: INTERNAL MEDICINE

## 2022-08-05 PROCEDURE — 160048 HCHG OR STATISTICAL LEVEL 1-5: Performed by: INTERNAL MEDICINE

## 2022-08-05 PROCEDURE — 99100 ANES PT EXTEME AGE<1 YR&>70: CPT | Performed by: ANESTHESIOLOGY

## 2022-08-05 PROCEDURE — 160009 HCHG ANES TIME/MIN: Performed by: INTERNAL MEDICINE

## 2022-08-05 RX ORDER — HYDROMORPHONE HYDROCHLORIDE 1 MG/ML
0.1 INJECTION, SOLUTION INTRAMUSCULAR; INTRAVENOUS; SUBCUTANEOUS
Status: DISCONTINUED | OUTPATIENT
Start: 2022-08-05 | End: 2022-08-05 | Stop reason: HOSPADM

## 2022-08-05 RX ORDER — ONDANSETRON 2 MG/ML
4 INJECTION INTRAMUSCULAR; INTRAVENOUS
Status: DISCONTINUED | OUTPATIENT
Start: 2022-08-05 | End: 2022-08-05 | Stop reason: HOSPADM

## 2022-08-05 RX ORDER — SODIUM CHLORIDE, SODIUM LACTATE, POTASSIUM CHLORIDE, CALCIUM CHLORIDE 600; 310; 30; 20 MG/100ML; MG/100ML; MG/100ML; MG/100ML
INJECTION, SOLUTION INTRAVENOUS CONTINUOUS
Status: DISCONTINUED | OUTPATIENT
Start: 2022-08-05 | End: 2022-08-05 | Stop reason: HOSPADM

## 2022-08-05 RX ORDER — MIDAZOLAM HYDROCHLORIDE 1 MG/ML
1 INJECTION INTRAMUSCULAR; INTRAVENOUS
Status: DISCONTINUED | OUTPATIENT
Start: 2022-08-05 | End: 2022-08-05 | Stop reason: HOSPADM

## 2022-08-05 RX ORDER — HYDROMORPHONE HYDROCHLORIDE 1 MG/ML
0.2 INJECTION, SOLUTION INTRAMUSCULAR; INTRAVENOUS; SUBCUTANEOUS
Status: DISCONTINUED | OUTPATIENT
Start: 2022-08-05 | End: 2022-08-05 | Stop reason: HOSPADM

## 2022-08-05 RX ORDER — HYDROMORPHONE HYDROCHLORIDE 1 MG/ML
0.5 INJECTION, SOLUTION INTRAMUSCULAR; INTRAVENOUS; SUBCUTANEOUS
Status: DISCONTINUED | OUTPATIENT
Start: 2022-08-05 | End: 2022-08-05 | Stop reason: HOSPADM

## 2022-08-05 RX ORDER — LIDOCAINE HYDROCHLORIDE 20 MG/ML
INJECTION, SOLUTION EPIDURAL; INFILTRATION; INTRACAUDAL; PERINEURAL PRN
Status: DISCONTINUED | OUTPATIENT
Start: 2022-08-05 | End: 2022-08-05 | Stop reason: SURG

## 2022-08-05 RX ORDER — IPRATROPIUM BROMIDE AND ALBUTEROL SULFATE 2.5; .5 MG/3ML; MG/3ML
3 SOLUTION RESPIRATORY (INHALATION)
Status: DISCONTINUED | OUTPATIENT
Start: 2022-08-05 | End: 2022-08-05 | Stop reason: HOSPADM

## 2022-08-05 RX ORDER — OXYCODONE HCL 5 MG/5 ML
10 SOLUTION, ORAL ORAL
Status: DISCONTINUED | OUTPATIENT
Start: 2022-08-05 | End: 2022-08-05 | Stop reason: HOSPADM

## 2022-08-05 RX ORDER — OXYCODONE HCL 5 MG/5 ML
5 SOLUTION, ORAL ORAL
Status: DISCONTINUED | OUTPATIENT
Start: 2022-08-05 | End: 2022-08-05 | Stop reason: HOSPADM

## 2022-08-05 RX ORDER — MEPERIDINE HYDROCHLORIDE 25 MG/ML
25 INJECTION INTRAMUSCULAR; INTRAVENOUS; SUBCUTANEOUS
Status: DISCONTINUED | OUTPATIENT
Start: 2022-08-05 | End: 2022-08-05 | Stop reason: HOSPADM

## 2022-08-05 RX ORDER — DIPHENHYDRAMINE HYDROCHLORIDE 50 MG/ML
12.5 INJECTION INTRAMUSCULAR; INTRAVENOUS
Status: DISCONTINUED | OUTPATIENT
Start: 2022-08-05 | End: 2022-08-05 | Stop reason: HOSPADM

## 2022-08-05 RX ADMIN — PROPOFOL 100 MG: 10 INJECTION, EMULSION INTRAVENOUS at 09:13

## 2022-08-05 RX ADMIN — SODIUM CHLORIDE, POTASSIUM CHLORIDE, SODIUM LACTATE AND CALCIUM CHLORIDE: 600; 310; 30; 20 INJECTION, SOLUTION INTRAVENOUS at 09:09

## 2022-08-05 RX ADMIN — PROPOFOL 50 MG: 10 INJECTION, EMULSION INTRAVENOUS at 09:50

## 2022-08-05 RX ADMIN — MIDAZOLAM 2 MG: 1 INJECTION INTRAMUSCULAR; INTRAVENOUS at 09:09

## 2022-08-05 RX ADMIN — PROPOFOL 50 MG: 10 INJECTION, EMULSION INTRAVENOUS at 09:21

## 2022-08-05 RX ADMIN — PROPOFOL 50 MG: 10 INJECTION, EMULSION INTRAVENOUS at 09:30

## 2022-08-05 RX ADMIN — LIDOCAINE HYDROCHLORIDE 40 MG: 20 INJECTION, SOLUTION EPIDURAL; INFILTRATION; INTRACAUDAL at 09:13

## 2022-08-05 RX ADMIN — FENTANYL CITRATE 100 MCG: 50 INJECTION, SOLUTION INTRAMUSCULAR; INTRAVENOUS at 09:13

## 2022-08-05 RX ADMIN — PROPOFOL 50 MG: 10 INJECTION, EMULSION INTRAVENOUS at 09:40

## 2022-08-05 ASSESSMENT — PAIN DESCRIPTION - PAIN TYPE: TYPE: CHRONIC PAIN

## 2022-08-05 ASSESSMENT — PAIN SCALES - GENERAL: PAIN_LEVEL: 0

## 2022-08-05 ASSESSMENT — FIBROSIS 4 INDEX: FIB4 SCORE: 1.74

## 2022-08-05 NOTE — ANESTHESIA PROCEDURE NOTES
Airway  Performed by: Tk Quinn M.D.  Authorized by: Tk Quinn M.D.     Location:  OR  Urgency:  Elective  Indications for Airway Management:  Anesthesia      Spontaneous Ventilation: absent    Sedation Level:  Deep  Preoxygenated: Yes    Final Airway Type:  Supraglottic airway  Final Supraglottic Airway:  Standard LMA    SGA Size:  3  Number of Attempts at Approach:  1

## 2022-08-05 NOTE — PROCEDURES
DATE OF PROCEDURE:  08/05/2022     PROCEDURE:  Colonoscopy with biopsy and snare polypectomy report.     ENDOSCOPIST:  Juan Cook MD     INDICATIONS:  Positive Cologuard test.     MEDICATIONS:  The patient received general anesthesia by Dr. Tk Quinn.    Please see anesthesia flow sheets for details.     DESCRIPTION OF PROCEDURE:  The patient was explained in detail the indications   as well as the risks, benefits and alternatives of the procedure, including   increased risk with anesthesia given her underlying congestive heart failure   and pulmonary hypertension and she indicates understanding of all this and   agrees to proceed.  Consent is signed and placed on the chart.  After the   patient was deemed adequately sedated, a standard Olympus variable stiffness   adult colonoscope was lubricated and gently placed in the anus from here   carefully maneuvered throughout the colon and into the cecum.  Cecal landmarks   identified included the ileocecal valve, cecal strap, and appendiceal   orifice.  Photographs were obtained.  The scope was slowly and carefully   withdrawn looking mucosa in a circumferential methodic manner throughout.    Quality of preparation was fair, adequate.  In the cecum, there was a small,   approximately 3 mm sessile polyp right at the opening of the appendiceal   orifice that was removed via cold biopsy forceps.  In addition, in the sigmoid   approximately 8 mm sessile polyp was noted and removed via cold snare   polypectomy technique.  Retroflexion was performed, it was noted on   examination that she does have some internal hemorrhoids and also has what   appears to be a resolving thrombosed external hemorrhoid.  Air was suctioned   out of the colon.  The scope removed, and the procedure terminated.     FINDINGS:  1.  Cecal polyp removed by biopsy.  2.  Sigmoid polyp removed by snare polypectomy.  3.  Thrombosed external hemorrhoid.  4.  Internal hemorrhoids.  5.  Fair  preparation.     COMPLICATIONS:  None.     TISSUE/BIOPSIES:  1.  Cecal polyp.  2.  Sigmoid polyp.     THERAPY:  None.     IMPRESSION, PLAN, AND MEDICAL DECISION MAKING:  Positive Cologuard test, now   status post colonoscopy and I will be sending her a pathology letter with   results with appropriate recommendations based on those results when I receive   them.  Meanwhile, the patient will be discharged to home and understands to   call us with any questions or concerns.        ______________________________  MD AKILAH REINOSO/ZAMORA    DD:  08/05/2022 10:01  DT:  08/05/2022 11:03    Job#:  [id number]

## 2022-08-05 NOTE — OR NURSING
0958: pt to PACU from [facility] via sendy. Report received from anesthesia and RN. Pt awake, answering questions and following commands upon arrival; on 5 L O2 via mask, breathing spontaneous and nonlabored. Abdomen soft, denies pain or nausea.     1005: report to Eli SCHREIBER.     1026: reassumed care of pt from Eli SCHREIBER. Pt sitting up on 5 L O2 via NC (baseline), tolerating sips of water.     1040: no change.     1058: pt meets criteria for stage 2, completed STOP BANG hold. Report called to Jemma SCHREIBER in stage 2,     1104: pt discharged to stage 2 via sendy by CNA.

## 2022-08-05 NOTE — DISCHARGE INSTRUCTIONS
If any questions arise, call your provider.  If your provider is not available, please feel free to call the Surgical Center at [phone].    MEDICATIONS: Resume taking daily medication.  Take prescribed pain medication with food.  If no medication is prescribed, you may take non-aspirin pain medication if needed.  PAIN MEDICATION CAN BE VERY CONSTIPATING.  Take a stool softener or laxative such as senokot, pericolace, or milk of magnesia if needed.    Last pain medication given at ____________________________________    What to Expect Post Anesthesia    Rest and take it easy for the first 24 hours.  A responsible adult is recommended to remain with you during that time.  It is normal to feel sleepy.  We encourage you to not do anything that requires balance, judgment or coordination.    FOR 24 HOURS DO NOT:  Drive, operate machinery or run household appliances.  Drink beer or alcoholic beverages.  Make important decisions or sign legal documents.    To avoid nausea, slowly advance diet as tolerated, avoiding spicy or greasy foods for the first day.  Add more substantial food to your diet according to your provider's instructions.  INCREASE FLUIDS AND FIBER TO AVOID CONSTIPATION.    MILD FLU-LIKE SYMPTOMS ARE NORMAL.  YOU MAY EXPERIENCE GENERALIZED MUSCLE ACHES, THROAT IRRITATION, HEADACHE AND/OR SOME NAUSEA.    Diet  Resume your normal diet as tolerated.  A diet low in cholesterol, fat, and sodium is recommended for good health.     Post Colonoscopy Instructions    You may feel bloated after the procedure because of air that  was introduced during the examination.    MEDICATIONS:    Take all of your prescription medications as before but do not use Aspirin or Anti-inflammatories for 24 hours.    Alcohol Use: Do not drink alcohol for 24 hours. Alcohol potentiates the effects of the sedatives given.  The combination of alcohol and the sedatives has an unpredictable effect on your body that is potentially  dangerous to your health.    Per Dr. Cook, you may resume your Eliquis today.     ACTIVITIES:    Rest. During your procedure you were given sedatives that may make you feel tired.    Do not sign contracts or legal documents until the next day. The sedatives slow down your body and your mind. Your ability to objectively evaluate may be impaired.    DRIVING:    Do not drive or operate machinery until the next day. Driving or operating machinery takes concentration and the ability to respond rapidly; the sedative adversely affects both.  If you have an engagement that cannot be cancelled, we advise that you have someone drive you.    Please contact us if you have an unplanned admission to a hospital within 10 days of this procedure.

## 2022-08-05 NOTE — OR NURSING
1005- Report from Brittany for break, assumed care of pt at this time.  1009- Dr. Cook at  reviewing procedure findings with pt. Procedure images provided to pt  1020- Pt transitioned to 5L NC which is pt's baseline rate/dose of oxygen.  1022- Updates to pt's daughter  1026- Handoff back to Brittany who resumed care of pt at this time.

## 2022-08-05 NOTE — ANESTHESIA TIME REPORT
Anesthesia Start and Stop Event Times     Date Time Event    8/5/2022 0855 Ready for Procedure     0909 Anesthesia Start     1004 Anesthesia Stop        Responsible Staff  08/05/22    Name Role Begin End    Tk Quinn M.D. Anesth 0909 1004        Overtime Reason:  no overtime (within assigned shift)    Comments:

## 2022-08-05 NOTE — ANESTHESIA POSTPROCEDURE EVALUATION
Patient: Zeinab Loza    Procedure Summary     Date: 08/05/22 Room / Location:  ENDOSCOPIC ULTRASOUND ROOM / SURGERY [facility]    Anesthesia Start: 0909 Anesthesia Stop: 1004    Procedure: DIAGNOSTIC COLONOSCOPY (N/A Abdomen) Diagnosis: (POSITIVE COLONOSCOPY CANCER SCREENING USING COLOGUARD TEST)    Surgeons: Juan Cook M.D. Responsible Provider: Tk Quinn M.D.    Anesthesia Type: general ASA Status: 3          Final Anesthesia Type: general  Last vitals  BP   Blood Pressure : 134/61    Temp   36.5 °C (97.7 °F)    Pulse   63   Resp   18    SpO2   96 %      Anesthesia Post Evaluation    Patient location during evaluation: PACU  Patient participation: complete - patient participated  Level of consciousness: awake and alert  Pain score: 0    Airway patency: patent  Anesthetic complications: no  Cardiovascular status: hemodynamically stable  Respiratory status: acceptable  Hydration status: euvolemic    PONV: none          No complications documented.     Nurse Pain Score: 0 (NPRS)

## 2022-08-05 NOTE — OR NURSING
1104: Pt arrived to stage 2 via gurney from PACU.  Pt placed on 5L O2 via NC.  Pt has her own O2 tank.     1124: D/Warren to care of daughter post uneventful stay in PACU 2.

## 2022-08-05 NOTE — OR SURGEON
Immediate Post OP Note    PreOp Diagnosis: Positive Cologuard      PostOp Diagnosis: cecal polyp and sigmoid polyp, Sub-optimal prep      Procedure(s):  DIAGNOSTIC COLONOSCOPY - Wound Class: None    Surgeon(s):  Juan Cook M.D.    Anesthesiologist/Type of Anesthesia:  Anesthesiologist: Tk Quinn M.D./General    Surgical Staff:  Endoscopy Technician: Eliza Marie; Frances Serrano    Specimens removed if any:  ID Type Source Tests Collected by Time Destination   A : Cecal polyp, Biopsy Other Other PATHOLOGY SPECIMEN Juan Cook M.D. 8/5/2022  9:33 AM    B : Sigmoid colon polyp, Biopsy Other Other PATHOLOGY SPECIMEN Juan Cook M.D. 8/5/2022  9:48 AM        Estimated Blood Loss: minimal    Findings: see above    Complications: none        8/5/2022 9:55 AM Juan Cook M.D.

## 2022-08-05 NOTE — ANESTHESIA PREPROCEDURE EVALUATION
Case: [id number] Date/Time: 08/05/22 0845    Procedure: DIAGNOSTIC COLONOSCOPY (N/A Abdomen)    Anesthesia type: General    Pre-op diagnosis: POSITIVE COLONOSCOPY CANCER SCREENING USING COLOGUARD TEST    Location:  ENDOSCOPIC ULTRASOUND ROOM / SURGERY [facility]    Surgeons: Juan Cook M.D.          Relevant Problems   ANESTHESIA   (positive) Other sleep apnea      NEURO   (positive) Personal history of malignant neoplasm of cervix uteri   (positive) S/P repeat ablation of atrial fibrillation/atrial flutter      CARDIAC   (positive) A-fib (HCC)   (positive) Acute on chronic congestive heart failure with right ventricular diastolic dysfunction (HCC)   (positive) Atrial flutter (HCC)   (positive) Essential hypertension   (positive) PAH (pulmonary artery hypertension) (HCC)         (positive) Chronic renal insufficiency      ENDO   (positive) Hypothyroidism   (positive) Other specified hypothyroidism       Physical Exam    Airway   Mallampati: II  TM distance: >3 FB  Neck ROM: full       Cardiovascular - normal exam  Rhythm: regular  Rate: normal  (-) murmur     Dental - normal exam           Pulmonary - normal exam  Breath sounds clear to auscultation     Abdominal    Neurological - normal exam                 Anesthesia Plan    ASA 3       Plan - general       Airway plan will be ETT          Induction: intravenous    Postoperative Plan: Postoperative administration of opioids is intended.    Pertinent diagnostic labs and testing reviewed    Informed Consent:    Anesthetic plan and risks discussed with patient.    Use of blood products discussed with: patient whom consented to blood products.

## 2022-08-16 ENCOUNTER — ANESTHESIA (OUTPATIENT)
Dept: SURGERY | Facility: MEDICAL CENTER | Age: 77
End: 2022-08-16
Payer: MEDICARE

## 2022-08-16 ENCOUNTER — HOSPITAL ENCOUNTER (OUTPATIENT)
Facility: MEDICAL CENTER | Age: 77
End: 2022-08-17
Attending: ORTHOPAEDIC SURGERY | Admitting: ORTHOPAEDIC SURGERY
Payer: MEDICARE

## 2022-08-16 ENCOUNTER — ANESTHESIA EVENT (OUTPATIENT)
Dept: SURGERY | Facility: MEDICAL CENTER | Age: 77
End: 2022-08-16
Payer: MEDICARE

## 2022-08-16 DIAGNOSIS — G62.9 NEUROPATHY: ICD-10-CM

## 2022-08-16 DIAGNOSIS — M17.11 PRIMARY LOCALIZED OSTEOARTHRITIS OF RIGHT KNEE: ICD-10-CM

## 2022-08-16 DIAGNOSIS — M17.11 PRIMARY OSTEOARTHRITIS OF RIGHT KNEE: ICD-10-CM

## 2022-08-16 PROCEDURE — A9270 NON-COVERED ITEM OR SERVICE: HCPCS | Performed by: ORTHOPAEDIC SURGERY

## 2022-08-16 PROCEDURE — 160002 HCHG RECOVERY MINUTES (STAT): Performed by: ORTHOPAEDIC SURGERY

## 2022-08-16 PROCEDURE — 700102 HCHG RX REV CODE 250 W/ 637 OVERRIDE(OP): Performed by: STUDENT IN AN ORGANIZED HEALTH CARE EDUCATION/TRAINING PROGRAM

## 2022-08-16 PROCEDURE — 700111 HCHG RX REV CODE 636 W/ 250 OVERRIDE (IP)

## 2022-08-16 PROCEDURE — 01402 ANES OPN/ARTH TOT KNE ARTHRP: CPT | Performed by: ANESTHESIOLOGY

## 2022-08-16 PROCEDURE — 160009 HCHG ANES TIME/MIN: Performed by: ORTHOPAEDIC SURGERY

## 2022-08-16 PROCEDURE — 700102 HCHG RX REV CODE 250 W/ 637 OVERRIDE(OP): Performed by: ANESTHESIOLOGY

## 2022-08-16 PROCEDURE — 64447 NJX AA&/STRD FEMORAL NRV IMG: CPT | Performed by: ORTHOPAEDIC SURGERY

## 2022-08-16 PROCEDURE — 700105 HCHG RX REV CODE 258: Performed by: ORTHOPAEDIC SURGERY

## 2022-08-16 PROCEDURE — 160029 HCHG SURGERY MINUTES - 1ST 30 MINS LEVEL 4: Performed by: ORTHOPAEDIC SURGERY

## 2022-08-16 PROCEDURE — 700111 HCHG RX REV CODE 636 W/ 250 OVERRIDE (IP): Performed by: ANESTHESIOLOGY

## 2022-08-16 PROCEDURE — 64447 NJX AA&/STRD FEMORAL NRV IMG: CPT | Mod: 59 | Performed by: ANESTHESIOLOGY

## 2022-08-16 PROCEDURE — 27447 TOTAL KNEE ARTHROPLASTY: CPT | Mod: RT | Performed by: ORTHOPAEDIC SURGERY

## 2022-08-16 PROCEDURE — 502000 HCHG MISC OR IMPLANTS RC 0278: Performed by: ORTHOPAEDIC SURGERY

## 2022-08-16 PROCEDURE — 700111 HCHG RX REV CODE 636 W/ 250 OVERRIDE (IP): Performed by: ORTHOPAEDIC SURGERY

## 2022-08-16 PROCEDURE — A9270 NON-COVERED ITEM OR SERVICE: HCPCS | Performed by: ANESTHESIOLOGY

## 2022-08-16 PROCEDURE — G0378 HOSPITAL OBSERVATION PER HR: HCPCS

## 2022-08-16 PROCEDURE — A9270 NON-COVERED ITEM OR SERVICE: HCPCS | Performed by: STUDENT IN AN ORGANIZED HEALTH CARE EDUCATION/TRAINING PROGRAM

## 2022-08-16 PROCEDURE — 160041 HCHG SURGERY MINUTES - EA ADDL 1 MIN LEVEL 4: Performed by: ORTHOPAEDIC SURGERY

## 2022-08-16 PROCEDURE — 700101 HCHG RX REV CODE 250: Performed by: ORTHOPAEDIC SURGERY

## 2022-08-16 PROCEDURE — 700105 HCHG RX REV CODE 258: Performed by: STUDENT IN AN ORGANIZED HEALTH CARE EDUCATION/TRAINING PROGRAM

## 2022-08-16 PROCEDURE — C1776 JOINT DEVICE (IMPLANTABLE): HCPCS | Performed by: ORTHOPAEDIC SURGERY

## 2022-08-16 PROCEDURE — 27447 TOTAL KNEE ARTHROPLASTY: CPT | Mod: ASROC,RT | Performed by: STUDENT IN AN ORGANIZED HEALTH CARE EDUCATION/TRAINING PROGRAM

## 2022-08-16 PROCEDURE — 99100 ANES PT EXTEME AGE<1 YR&>70: CPT | Performed by: ANESTHESIOLOGY

## 2022-08-16 PROCEDURE — 96365 THER/PROPH/DIAG IV INF INIT: CPT | Mod: XU

## 2022-08-16 PROCEDURE — 700101 HCHG RX REV CODE 250: Performed by: ANESTHESIOLOGY

## 2022-08-16 PROCEDURE — 502240 HCHG MISC OR SUPPLY RC 0272: Performed by: ORTHOPAEDIC SURGERY

## 2022-08-16 PROCEDURE — 700102 HCHG RX REV CODE 250 W/ 637 OVERRIDE(OP): Performed by: ORTHOPAEDIC SURGERY

## 2022-08-16 PROCEDURE — 160048 HCHG OR STATISTICAL LEVEL 1-5: Performed by: ORTHOPAEDIC SURGERY

## 2022-08-16 PROCEDURE — 76942 ECHO GUIDE FOR BIOPSY: CPT | Mod: 26 | Performed by: ANESTHESIOLOGY

## 2022-08-16 PROCEDURE — 160035 HCHG PACU - 1ST 60 MINS PHASE I: Performed by: ORTHOPAEDIC SURGERY

## 2022-08-16 PROCEDURE — 700111 HCHG RX REV CODE 636 W/ 250 OVERRIDE (IP): Performed by: STUDENT IN AN ORGANIZED HEALTH CARE EDUCATION/TRAINING PROGRAM

## 2022-08-16 PROCEDURE — 160036 HCHG PACU - EA ADDL 30 MINS PHASE I: Performed by: ORTHOPAEDIC SURGERY

## 2022-08-16 DEVICE — IMPLANTABLE DEVICE: Type: IMPLANTABLE DEVICE | Site: KNEE | Status: FUNCTIONAL

## 2022-08-16 RX ORDER — MAGNESIUM HYDROXIDE 1200 MG/15ML
LIQUID ORAL
Status: COMPLETED | OUTPATIENT
Start: 2022-08-16 | End: 2022-08-16

## 2022-08-16 RX ORDER — LIDOCAINE HYDROCHLORIDE 20 MG/ML
INJECTION, SOLUTION EPIDURAL; INFILTRATION; INTRACAUDAL; PERINEURAL PRN
Status: DISCONTINUED | OUTPATIENT
Start: 2022-08-16 | End: 2022-08-16 | Stop reason: SURG

## 2022-08-16 RX ORDER — DIPHENHYDRAMINE HYDROCHLORIDE 50 MG/ML
6.25 INJECTION INTRAMUSCULAR; INTRAVENOUS
Status: DISCONTINUED | OUTPATIENT
Start: 2022-08-16 | End: 2022-08-16 | Stop reason: HOSPADM

## 2022-08-16 RX ORDER — POLYETHYLENE GLYCOL 3350 17 G/17G
1 POWDER, FOR SOLUTION ORAL 2 TIMES DAILY PRN
Status: DISCONTINUED | OUTPATIENT
Start: 2022-08-16 | End: 2022-08-17 | Stop reason: HOSPADM

## 2022-08-16 RX ORDER — DOCUSATE SODIUM 100 MG/1
100 CAPSULE, LIQUID FILLED ORAL 2 TIMES DAILY
Status: DISCONTINUED | OUTPATIENT
Start: 2022-08-16 | End: 2022-08-17 | Stop reason: HOSPADM

## 2022-08-16 RX ORDER — DEXAMETHASONE SODIUM PHOSPHATE 4 MG/ML
INJECTION, SOLUTION INTRA-ARTICULAR; INTRALESIONAL; INTRAMUSCULAR; INTRAVENOUS; SOFT TISSUE PRN
Status: DISCONTINUED | OUTPATIENT
Start: 2022-08-16 | End: 2022-08-16 | Stop reason: SURG

## 2022-08-16 RX ORDER — DIPHENHYDRAMINE HCL 25 MG
25 TABLET ORAL NIGHTLY PRN
Status: DISCONTINUED | OUTPATIENT
Start: 2022-08-17 | End: 2022-08-17 | Stop reason: HOSPADM

## 2022-08-16 RX ORDER — ONDANSETRON 2 MG/ML
4 INJECTION INTRAMUSCULAR; INTRAVENOUS
Status: DISCONTINUED | OUTPATIENT
Start: 2022-08-16 | End: 2022-08-16 | Stop reason: HOSPADM

## 2022-08-16 RX ORDER — SCOLOPAMINE TRANSDERMAL SYSTEM 1 MG/1
1 PATCH, EXTENDED RELEASE TRANSDERMAL
Status: DISCONTINUED | OUTPATIENT
Start: 2022-08-16 | End: 2022-08-17 | Stop reason: HOSPADM

## 2022-08-16 RX ORDER — HALOPERIDOL 5 MG/ML
1 INJECTION INTRAMUSCULAR
Status: DISCONTINUED | OUTPATIENT
Start: 2022-08-16 | End: 2022-08-16 | Stop reason: HOSPADM

## 2022-08-16 RX ORDER — SODIUM CHLORIDE, SODIUM LACTATE, POTASSIUM CHLORIDE, CALCIUM CHLORIDE 600; 310; 30; 20 MG/100ML; MG/100ML; MG/100ML; MG/100ML
INJECTION, SOLUTION INTRAVENOUS CONTINUOUS
Status: ACTIVE | OUTPATIENT
Start: 2022-08-16 | End: 2022-08-16

## 2022-08-16 RX ORDER — CEFAZOLIN SODIUM 1 G/3ML
2 INJECTION, POWDER, FOR SOLUTION INTRAMUSCULAR; INTRAVENOUS ONCE
Status: DISCONTINUED | OUTPATIENT
Start: 2022-08-16 | End: 2022-08-16 | Stop reason: HOSPADM

## 2022-08-16 RX ORDER — ROPIVACAINE HYDROCHLORIDE 5 MG/ML
INJECTION, SOLUTION EPIDURAL; INFILTRATION; PERINEURAL
Status: DISCONTINUED | OUTPATIENT
Start: 2022-08-16 | End: 2022-08-16 | Stop reason: HOSPADM

## 2022-08-16 RX ORDER — OXYCODONE HCL 5 MG/5 ML
10 SOLUTION, ORAL ORAL
Status: COMPLETED | OUTPATIENT
Start: 2022-08-16 | End: 2022-08-16

## 2022-08-16 RX ORDER — SODIUM CHLORIDE 9 MG/ML
INJECTION, SOLUTION INTRAMUSCULAR; INTRAVENOUS; SUBCUTANEOUS
Status: DISCONTINUED | OUTPATIENT
Start: 2022-08-16 | End: 2022-08-16 | Stop reason: HOSPADM

## 2022-08-16 RX ORDER — DIPHENHYDRAMINE HYDROCHLORIDE 50 MG/ML
25 INJECTION INTRAMUSCULAR; INTRAVENOUS EVERY 6 HOURS PRN
Status: DISCONTINUED | OUTPATIENT
Start: 2022-08-16 | End: 2022-08-17 | Stop reason: HOSPADM

## 2022-08-16 RX ORDER — ROPIVACAINE HYDROCHLORIDE 5 MG/ML
INJECTION, SOLUTION EPIDURAL; INFILTRATION; PERINEURAL
Status: COMPLETED | OUTPATIENT
Start: 2022-08-16 | End: 2022-08-16

## 2022-08-16 RX ORDER — DEXAMETHASONE SODIUM PHOSPHATE 4 MG/ML
4 INJECTION, SOLUTION INTRA-ARTICULAR; INTRALESIONAL; INTRAMUSCULAR; INTRAVENOUS; SOFT TISSUE
Status: DISCONTINUED | OUTPATIENT
Start: 2022-08-16 | End: 2022-08-17 | Stop reason: HOSPADM

## 2022-08-16 RX ORDER — HYDROMORPHONE HYDROCHLORIDE 1 MG/ML
0.5 INJECTION, SOLUTION INTRAMUSCULAR; INTRAVENOUS; SUBCUTANEOUS
Status: DISCONTINUED | OUTPATIENT
Start: 2022-08-16 | End: 2022-08-17 | Stop reason: HOSPADM

## 2022-08-16 RX ORDER — TRAMADOL HYDROCHLORIDE 50 MG/1
50 TABLET ORAL EVERY 4 HOURS PRN
Status: DISCONTINUED | OUTPATIENT
Start: 2022-08-16 | End: 2022-08-17 | Stop reason: HOSPADM

## 2022-08-16 RX ORDER — ONDANSETRON 2 MG/ML
INJECTION INTRAMUSCULAR; INTRAVENOUS PRN
Status: DISCONTINUED | OUTPATIENT
Start: 2022-08-16 | End: 2022-08-16 | Stop reason: SURG

## 2022-08-16 RX ORDER — ACETAMINOPHEN 500 MG
1000 TABLET ORAL EVERY 6 HOURS
Status: DISCONTINUED | OUTPATIENT
Start: 2022-08-16 | End: 2022-08-17 | Stop reason: HOSPADM

## 2022-08-16 RX ORDER — LEVOTHYROXINE SODIUM 0.07 MG/1
75 TABLET ORAL
Status: DISCONTINUED | OUTPATIENT
Start: 2022-08-17 | End: 2022-08-17 | Stop reason: HOSPADM

## 2022-08-16 RX ORDER — OXYCODONE HYDROCHLORIDE 10 MG/1
10 TABLET ORAL
Status: DISCONTINUED | OUTPATIENT
Start: 2022-08-16 | End: 2022-08-17 | Stop reason: HOSPADM

## 2022-08-16 RX ORDER — MIDAZOLAM HYDROCHLORIDE 1 MG/ML
INJECTION INTRAMUSCULAR; INTRAVENOUS PRN
Status: DISCONTINUED | OUTPATIENT
Start: 2022-08-16 | End: 2022-08-16 | Stop reason: SURG

## 2022-08-16 RX ORDER — OXYCODONE HYDROCHLORIDE 5 MG/1
5 TABLET ORAL
Status: DISCONTINUED | OUTPATIENT
Start: 2022-08-16 | End: 2022-08-17 | Stop reason: HOSPADM

## 2022-08-16 RX ORDER — ONDANSETRON 2 MG/ML
4 INJECTION INTRAMUSCULAR; INTRAVENOUS EVERY 4 HOURS PRN
Status: DISCONTINUED | OUTPATIENT
Start: 2022-08-16 | End: 2022-08-17 | Stop reason: HOSPADM

## 2022-08-16 RX ORDER — POTASSIUM CHLORIDE 20 MEQ/1
40 TABLET, EXTENDED RELEASE ORAL DAILY
Status: DISCONTINUED | OUTPATIENT
Start: 2022-08-17 | End: 2022-08-17 | Stop reason: HOSPADM

## 2022-08-16 RX ORDER — CELECOXIB 200 MG/1
200 CAPSULE ORAL
Status: DISCONTINUED | OUTPATIENT
Start: 2022-08-21 | End: 2022-08-17 | Stop reason: HOSPADM

## 2022-08-16 RX ORDER — HALOPERIDOL 5 MG/ML
1 INJECTION INTRAMUSCULAR EVERY 6 HOURS PRN
Status: DISCONTINUED | OUTPATIENT
Start: 2022-08-16 | End: 2022-08-17 | Stop reason: HOSPADM

## 2022-08-16 RX ORDER — AMIODARONE HYDROCHLORIDE 200 MG/1
200 TABLET ORAL DAILY
Status: DISCONTINUED | OUTPATIENT
Start: 2022-08-17 | End: 2022-08-17 | Stop reason: HOSPADM

## 2022-08-16 RX ORDER — AMOXICILLIN 250 MG
1 CAPSULE ORAL NIGHTLY
Status: DISCONTINUED | OUTPATIENT
Start: 2022-08-16 | End: 2022-08-17 | Stop reason: HOSPADM

## 2022-08-16 RX ORDER — HYDROMORPHONE HYDROCHLORIDE 1 MG/ML
0.4 INJECTION, SOLUTION INTRAMUSCULAR; INTRAVENOUS; SUBCUTANEOUS
Status: DISCONTINUED | OUTPATIENT
Start: 2022-08-16 | End: 2022-08-16 | Stop reason: HOSPADM

## 2022-08-16 RX ORDER — TADALAFIL 20 MG/1
20 TABLET ORAL
Status: DISCONTINUED | OUTPATIENT
Start: 2022-08-16 | End: 2022-08-17 | Stop reason: HOSPADM

## 2022-08-16 RX ORDER — VANCOMYCIN HYDROCHLORIDE 1 G/20ML
INJECTION, POWDER, LYOPHILIZED, FOR SOLUTION INTRAVENOUS
Status: COMPLETED | OUTPATIENT
Start: 2022-08-16 | End: 2022-08-16

## 2022-08-16 RX ORDER — ACETAMINOPHEN 500 MG
1000 TABLET ORAL EVERY 6 HOURS PRN
Status: DISCONTINUED | OUTPATIENT
Start: 2022-08-16 | End: 2022-08-16 | Stop reason: HOSPADM

## 2022-08-16 RX ORDER — HYDRALAZINE HYDROCHLORIDE 20 MG/ML
5 INJECTION INTRAMUSCULAR; INTRAVENOUS
Status: DISCONTINUED | OUTPATIENT
Start: 2022-08-16 | End: 2022-08-16 | Stop reason: HOSPADM

## 2022-08-16 RX ORDER — AMOXICILLIN 250 MG
1 CAPSULE ORAL
Status: DISCONTINUED | OUTPATIENT
Start: 2022-08-16 | End: 2022-08-17 | Stop reason: HOSPADM

## 2022-08-16 RX ORDER — BISACODYL 10 MG
10 SUPPOSITORY, RECTAL RECTAL
Status: DISCONTINUED | OUTPATIENT
Start: 2022-08-16 | End: 2022-08-17 | Stop reason: HOSPADM

## 2022-08-16 RX ORDER — ALBUTEROL SULFATE 2.5 MG/3ML
2.5 SOLUTION RESPIRATORY (INHALATION)
Status: DISCONTINUED | OUTPATIENT
Start: 2022-08-16 | End: 2022-08-16 | Stop reason: HOSPADM

## 2022-08-16 RX ORDER — HYDROMORPHONE HYDROCHLORIDE 1 MG/ML
0.1 INJECTION, SOLUTION INTRAMUSCULAR; INTRAVENOUS; SUBCUTANEOUS
Status: DISCONTINUED | OUTPATIENT
Start: 2022-08-16 | End: 2022-08-16 | Stop reason: HOSPADM

## 2022-08-16 RX ORDER — HYDROMORPHONE HYDROCHLORIDE 1 MG/ML
0.2 INJECTION, SOLUTION INTRAMUSCULAR; INTRAVENOUS; SUBCUTANEOUS
Status: DISCONTINUED | OUTPATIENT
Start: 2022-08-16 | End: 2022-08-16 | Stop reason: HOSPADM

## 2022-08-16 RX ORDER — TRANEXAMIC ACID 100 MG/ML
INJECTION, SOLUTION INTRAVENOUS PRN
Status: DISCONTINUED | OUTPATIENT
Start: 2022-08-16 | End: 2022-08-16 | Stop reason: SURG

## 2022-08-16 RX ORDER — ACETAMINOPHEN 500 MG
1000 TABLET ORAL EVERY 6 HOURS PRN
Status: DISCONTINUED | OUTPATIENT
Start: 2022-08-21 | End: 2022-08-17 | Stop reason: HOSPADM

## 2022-08-16 RX ORDER — CELECOXIB 200 MG/1
200 CAPSULE ORAL DAILY
Status: DISCONTINUED | OUTPATIENT
Start: 2022-08-16 | End: 2022-08-17 | Stop reason: HOSPADM

## 2022-08-16 RX ORDER — HYDROMORPHONE HYDROCHLORIDE 2 MG/ML
INJECTION, SOLUTION INTRAMUSCULAR; INTRAVENOUS; SUBCUTANEOUS PRN
Status: DISCONTINUED | OUTPATIENT
Start: 2022-08-16 | End: 2022-08-16 | Stop reason: SURG

## 2022-08-16 RX ORDER — KETOROLAC TROMETHAMINE 30 MG/ML
INJECTION, SOLUTION INTRAMUSCULAR; INTRAVENOUS
Status: DISCONTINUED | OUTPATIENT
Start: 2022-08-16 | End: 2022-08-16 | Stop reason: HOSPADM

## 2022-08-16 RX ORDER — OXYCODONE HCL 5 MG/5 ML
5 SOLUTION, ORAL ORAL
Status: COMPLETED | OUTPATIENT
Start: 2022-08-16 | End: 2022-08-16

## 2022-08-16 RX ORDER — SODIUM CHLORIDE, SODIUM LACTATE, POTASSIUM CHLORIDE, CALCIUM CHLORIDE 600; 310; 30; 20 MG/100ML; MG/100ML; MG/100ML; MG/100ML
INJECTION, SOLUTION INTRAVENOUS CONTINUOUS
Status: DISCONTINUED | OUTPATIENT
Start: 2022-08-16 | End: 2022-08-16 | Stop reason: HOSPADM

## 2022-08-16 RX ORDER — PAROXETINE HYDROCHLORIDE 20 MG/1
20 TABLET, FILM COATED ORAL EVERY MORNING
Status: DISCONTINUED | OUTPATIENT
Start: 2022-08-17 | End: 2022-08-17 | Stop reason: HOSPADM

## 2022-08-16 RX ORDER — DIPHENHYDRAMINE HCL 25 MG
25 TABLET ORAL EVERY 6 HOURS PRN
Status: DISCONTINUED | OUTPATIENT
Start: 2022-08-16 | End: 2022-08-17 | Stop reason: HOSPADM

## 2022-08-16 RX ORDER — ENEMA 19; 7 G/133ML; G/133ML
1 ENEMA RECTAL
Status: DISCONTINUED | OUTPATIENT
Start: 2022-08-16 | End: 2022-08-17 | Stop reason: HOSPADM

## 2022-08-16 RX ORDER — ACETAMINOPHEN 650 MG
TABLET, EXTENDED RELEASE ORAL
Status: DISCONTINUED | OUTPATIENT
Start: 2022-08-16 | End: 2022-08-16 | Stop reason: HOSPADM

## 2022-08-16 RX ORDER — EPINEPHRINE 1 MG/ML(1)
AMPUL (ML) INJECTION
Status: DISCONTINUED | OUTPATIENT
Start: 2022-08-16 | End: 2022-08-16 | Stop reason: HOSPADM

## 2022-08-16 RX ORDER — CEFAZOLIN SODIUM 1 G/3ML
INJECTION, POWDER, FOR SOLUTION INTRAMUSCULAR; INTRAVENOUS PRN
Status: DISCONTINUED | OUTPATIENT
Start: 2022-08-16 | End: 2022-08-16 | Stop reason: SURG

## 2022-08-16 RX ORDER — DEXAMETHASONE SODIUM PHOSPHATE 4 MG/ML
10 INJECTION, SOLUTION INTRA-ARTICULAR; INTRALESIONAL; INTRAMUSCULAR; INTRAVENOUS; SOFT TISSUE ONCE
Status: COMPLETED | OUTPATIENT
Start: 2022-08-17 | End: 2022-08-17

## 2022-08-16 RX ADMIN — LIDOCAINE HYDROCHLORIDE 50 MG: 20 INJECTION, SOLUTION EPIDURAL; INFILTRATION; INTRACAUDAL at 10:05

## 2022-08-16 RX ADMIN — TRANEXAMIC ACID 1000 MG: 100 INJECTION, SOLUTION INTRAVENOUS at 10:07

## 2022-08-16 RX ADMIN — ACETAMINOPHEN 1000 MG: 500 TABLET, FILM COATED ORAL at 19:58

## 2022-08-16 RX ADMIN — ACETAMINOPHEN 1000 MG: 500 TABLET, FILM COATED ORAL at 13:14

## 2022-08-16 RX ADMIN — CEFAZOLIN 2 G: 330 INJECTION, POWDER, FOR SOLUTION INTRAMUSCULAR; INTRAVENOUS at 10:05

## 2022-08-16 RX ADMIN — CEFAZOLIN 2 G: 2 INJECTION, POWDER, FOR SOLUTION INTRAMUSCULAR; INTRAVENOUS at 17:25

## 2022-08-16 RX ADMIN — OXYCODONE HYDROCHLORIDE 5 MG: 5 TABLET ORAL at 13:15

## 2022-08-16 RX ADMIN — HYDROMORPHONE HYDROCHLORIDE 0.2 MG: 1 INJECTION, SOLUTION INTRAMUSCULAR; INTRAVENOUS; SUBCUTANEOUS at 11:36

## 2022-08-16 RX ADMIN — ROPIVACAINE HYDROCHLORIDE 18 ML: 5 INJECTION, SOLUTION EPIDURAL; INFILTRATION; PERINEURAL at 09:49

## 2022-08-16 RX ADMIN — HYDROMORPHONE HYDROCHLORIDE 0.5 MG: 2 INJECTION INTRAMUSCULAR; INTRAVENOUS; SUBCUTANEOUS at 10:05

## 2022-08-16 RX ADMIN — OXYCODONE HYDROCHLORIDE 10 MG: 10 TABLET ORAL at 16:30

## 2022-08-16 RX ADMIN — DEXAMETHASONE SODIUM PHOSPHATE 8 MG: 4 INJECTION, SOLUTION INTRA-ARTICULAR; INTRALESIONAL; INTRAMUSCULAR; INTRAVENOUS; SOFT TISSUE at 10:10

## 2022-08-16 RX ADMIN — DOCUSATE SODIUM 100 MG: 100 CAPSULE, LIQUID FILLED ORAL at 17:24

## 2022-08-16 RX ADMIN — SODIUM CHLORIDE, POTASSIUM CHLORIDE, SODIUM LACTATE AND CALCIUM CHLORIDE: 600; 310; 30; 20 INJECTION, SOLUTION INTRAVENOUS at 10:01

## 2022-08-16 RX ADMIN — FENTANYL CITRATE 50 MCG: 50 INJECTION, SOLUTION INTRAMUSCULAR; INTRAVENOUS at 11:17

## 2022-08-16 RX ADMIN — OXYCODONE HYDROCHLORIDE 10 MG: 10 TABLET ORAL at 19:58

## 2022-08-16 RX ADMIN — PROPOFOL 150 MG: 10 INJECTION, EMULSION INTRAVENOUS at 10:05

## 2022-08-16 RX ADMIN — EPHEDRINE SULFATE 10 MG: 50 INJECTION, SOLUTION INTRAVENOUS at 10:10

## 2022-08-16 RX ADMIN — HYDROMORPHONE HYDROCHLORIDE 0.5 MG: 2 INJECTION INTRAMUSCULAR; INTRAVENOUS; SUBCUTANEOUS at 10:24

## 2022-08-16 RX ADMIN — OXYCODONE HYDROCHLORIDE 10 MG: 10 TABLET ORAL at 23:07

## 2022-08-16 RX ADMIN — CELECOXIB 200 MG: 200 CAPSULE ORAL at 13:15

## 2022-08-16 RX ADMIN — FENTANYL CITRATE 50 MCG: 50 INJECTION, SOLUTION INTRAMUSCULAR; INTRAVENOUS at 11:11

## 2022-08-16 RX ADMIN — ASPIRIN 81 MG: 81 TABLET, COATED ORAL at 23:07

## 2022-08-16 RX ADMIN — OXYCODONE HYDROCHLORIDE 5 MG: 5 SOLUTION ORAL at 11:22

## 2022-08-16 RX ADMIN — MIDAZOLAM HYDROCHLORIDE 2 MG: 1 INJECTION, SOLUTION INTRAMUSCULAR; INTRAVENOUS at 09:49

## 2022-08-16 RX ADMIN — ONDANSETRON 4 MG: 2 INJECTION INTRAMUSCULAR; INTRAVENOUS at 10:32

## 2022-08-16 RX ADMIN — TRANEXAMIC ACID 1000 MG: 100 INJECTION, SOLUTION INTRAVENOUS at 10:32

## 2022-08-16 RX ADMIN — TADALAFIL 20 MG: 20 TABLET ORAL at 20:00

## 2022-08-16 ASSESSMENT — COGNITIVE AND FUNCTIONAL STATUS - GENERAL
WALKING IN HOSPITAL ROOM: A LITTLE
MOVING TO AND FROM BED TO CHAIR: A LITTLE
DRESSING REGULAR LOWER BODY CLOTHING: A LITTLE
CLIMB 3 TO 5 STEPS WITH RAILING: A LITTLE
SUGGESTED CMS G CODE MODIFIER DAILY ACTIVITY: CJ
HELP NEEDED FOR BATHING: A LITTLE
MOBILITY SCORE: 19
DAILY ACTIVITIY SCORE: 22
STANDING UP FROM CHAIR USING ARMS: A LITTLE
MOVING FROM LYING ON BACK TO SITTING ON SIDE OF FLAT BED: A LITTLE
SUGGESTED CMS G CODE MODIFIER MOBILITY: CK

## 2022-08-16 ASSESSMENT — PAIN DESCRIPTION - PAIN TYPE
TYPE: SURGICAL PAIN

## 2022-08-16 ASSESSMENT — LIFESTYLE VARIABLES
TOTAL SCORE: 0
TOTAL SCORE: 0
EVER HAD A DRINK FIRST THING IN THE MORNING TO STEADY YOUR NERVES TO GET RID OF A HANGOVER: NO
TOTAL SCORE: 0
ALCOHOL_USE: YES
HOW MANY TIMES IN THE PAST YEAR HAVE YOU HAD 5 OR MORE DRINKS IN A DAY: 0
HAVE YOU EVER FELT YOU SHOULD CUT DOWN ON YOUR DRINKING: NO
ON A TYPICAL DAY WHEN YOU DRINK ALCOHOL HOW MANY DRINKS DO YOU HAVE: 1
HAVE PEOPLE ANNOYED YOU BY CRITICIZING YOUR DRINKING: NO
AVERAGE NUMBER OF DAYS PER WEEK YOU HAVE A DRINK CONTAINING ALCOHOL: 3
EVER FELT BAD OR GUILTY ABOUT YOUR DRINKING: NO
CONSUMPTION TOTAL: NEGATIVE

## 2022-08-16 ASSESSMENT — FIBROSIS 4 INDEX: FIB4 SCORE: 1.74

## 2022-08-16 NOTE — OP REPORT
Preop Diagnosis: Advanced right knee arthritis  Postop Diagnosis:  Same  Procedure: Right total knee arthroplasty  Surgeon: Dr. Mars Mclean MD  Assistant: Charly Hodge PA-C  Anesthesia: Dr. Rodas. General + Adductor canal block  Estimated Blood Loss: 50cc  Drains: None  Complications: None    Implants: [place] Triathlon CR Cementless, size 3 femur, size 3 tibia, with a 16 mm CS insert and 29 oval patella.    Indications: Zeinab Loza is a 77 y.o. female who has had severe progressive knee pain that failed conservative management.  There were severe degenerative changes on radiographs.  We discussed the options and she was ultimately indicated for knee replacement.  We discussed the risk of bleeding, transfusion, pain, neurovascular injury, stiffness, fracture, infection, wound complication, loosening of the parts over time, blood clots, and medical complication.  No guarantees were made and she elected to proceed.    Pertinent Findings and Releases: There were severe degenerative changes most pronounced in the PF and lateral compartments.  At the end of the case, the knee easily came to full extension and flexed up to calf/thigh impingement with the arthrotomy closed.  The patella tracked centrally.    Informed consent and site marking were confirmed in preop.  An adductor canal block had been performed by the anesthesiologist, and then she was brought back to the OR where anesthesia was performed. Supine positioning was perfmored with all bony prominences well padded with a padded tourniquet on the thigh.  The extremity was prepped and draped in the usual sterile fashion. I performed a pre-procedure timeout to confirm we had the correct patient, side, site, procedure, and presence of necessary personal and equipment.  I confirmed that Ancef and tranexamic acid were given.  The tourniquet was then inflated.    A midline incision was made medial to the tibial tubercle centered over patella taken down to the  deep capsule of the knee. A medial parapatellar arthrotomy was performed.  The fat pad was released. The patella was subluxated and the knee was flexed. The remnants of the anterior horn of the medial and lateral meniscus were removed as well as the ACL from the intercondylar notch. All distal protruding osteophytes were removed. I then drilled and accessed the intramedullary canal of the femur, lavaged it and placed the intramedullary cutting guide. The distal femur was cut in 6 degrees of valgus. I then sized the femur and based rotation off of bony landmarks.  All distal femoral cuts were made.  The tibia was then subluxated forward and cut perpendicular to its long axis.  A spacer block was placed in the knee extension to confirm I had resected enough bone and then sequential releases were performed until there was a rectangular gap.  Collaterals were intact and overall limb alignment was checked and appropriate.  The knee was then tensed at 90 degrees and the meniscal remnants and posterior osteophytes were removed.  The posterior capsule was injected with a local anesthetic cocktail.  The tibia was then subluxed forward, sized, and punched in the appropriate amount of external rotation and mechanical alignment was verified using a drop sofia. Trials were placed, the knee was reduced with a trial insert, it was taken through a range of motion, and found to be stable in the varus/valgus plane 0-30 degrees of flexion and the AP plane at 90 degrees of flexion. Attention was then turned to the patella. A symmetric resection was performed to recreate native patella height and appropriately sized. All extraneous osteophyte and synovium were removed.  With a trial in place, the patella tracked centrally using the no thumbs technique. All trial components were removed. The real components were impacted with a great press-fit.  The tourniquet was let down and hemostasis ensured. The real insert was placed. Stability and  patellar tracking were excellent. The knee was then copiously irrigated. One gram of Vancomycin was left in the wound.  The arthrotomy was closed with number 2 running barbed suture, and the wound was closed in layers. An occlusive silver dressing was applied and the patient was turned over to anesthesia in stable condition without any apparent intraoperative complications.    Plan:  WBAT, PT/OT evaluation, Aspirin transitioning back to Eliquis for DVT prophylaxis, Leave dressing in place until followup.

## 2022-08-16 NOTE — LETTER
July 11, 2022    Patient Name: Zeinab Loza  Surgeon Name: Mars Mclean M.D.  Surgery Facility: [facility] ([address] [facility])  Surgery Date: 8/16/2022    The time of your surgery is not final and may change up to and until the day of your surgery. You will be contacted 24-48 hours prior to your surgery date with your check-in and surgery time.    If you will not be at one of the below numbers please call/text the surgery scheduler at [phone]  Preferred Phone: [phone]    BEFORE YOUR SURGERY   Pre Registration and/or Lab Work must be done within and no earlier than 28 days prior to your surgery date. Please call [facility] at [phone] for an appointment as soon as possible.    Please refrain from smoking any substance after midnight prior to surgery. Smoking may interfere with the anesthetic and frequently produces nausea during the recovery period.    Continue taking all lifesaving medications. Including the morning of your surgery with small sip of water.    Please read the MEDICATION INSTRUCTIONS below completely.    DAY OF YOUR SURGERY  Nothing to eat or drink after midnight     Please arrive at the hospital/surgery center at the check-in time provided.     An adult will need to bring you and take you home after your surgery.     AFTER YOUR SURGERY  Post op Appointment:   Date: 08/31/2022   Time: 11:00AM   With: CHANTELLE MONTES   Location: [facility] [place] [address]    - Therapy- Your first appointment should be 5-7  day(s) after your surgery. For your convenience we have 4 Physical Therapy locations: [facility], [facility], [place], and [facility]. Call our office ASAP to schedule an appointment at [phone] or take the enclosed Therapy Prescription to a facility of your choice.  - No dental work for 3-6 months after your surgery.  - You must have someone provide transportation post surgery and someone to monitor  you for at least 24 hours post-surgery. If you don't have either of these your appointment will be canceled.     TIME OFF WORK  FMLA or Disability forms can be faxed directly to: [phone] or you may drop them off at [address] [place] [address]. Our office charges a $35.00 fee per form. Forms will be completed within 10 business days of drop off and payment received. For the status of your forms you may contact our disability office directly [phone].    MEDICATION INSTRUCTIONS  The following medications should be stopped a minimum of 10 days prior to surgery:  All over the counter, Supplements & Herbal medications    Anorectics: Phentermine (Adipex-P, Lomaira and Suprenza), Phentermine-topiramate (Qsymia), Bupropion-naltrexone (Contrave)    Opiod Partial Agonists/Opioid Antagonists: Buprenorphine (Subocone, Belbuca, Butrans, Probuphine Implant, Sublocade), Naltrexone (ReVia, Vivitrol), Naloxone    Amphetamines: Dextroamphetamine/Amphetamine (Adderall, Mydayis), Methylphenidate Hydrochloride (Concerta, Metadate, Methylin, Ritalin)    The following medications should be stopped 5 days prior to surgery:  Blood Thinners: Any Aspirin, Aspirin products, anti-inflammatories such as ibuprofen and any blood thinners such as Coumadin and Plavix. Please consult your prescribing physician if you are on life saving blood thinners, in regards to when to stop medications prior to surgery.     The following medications should be stopped a minimum of 3 days prior to surgery:  PDE-5 inhibitors: Sildenafil (Viagra), Tadalafil (Cialis), Vardenafil (Levitra), Avanafil (Stendra)    MAO Inhibitors: Rasagiline (Azilect), Selegiline (Eldepryl, Emsam, Selapar), Isocarboxazid (Marplan), Phenelzine (Nardil)

## 2022-08-16 NOTE — OR NURSING
Patient allergies and NPO status verified, home medication reconciliation completed and belongings secured. Patient verbalizes understanding of pain scale, expected course of stay and plan of care. Surgical site verified with patient. IV access established. Sequentials placed on legs. Triple aim completed by CNA.

## 2022-08-16 NOTE — ANESTHESIA PROCEDURE NOTES
Airway    Date/Time: 8/16/2022 10:05 AM  Performed by: Arden Rodas M.D.  Authorized by: Arden Rodas M.D.     Location:  OR  Urgency:  Elective  Difficult Airway: No    Indications for Airway Management:  Anesthesia      Spontaneous Ventilation: absent    Sedation Level:  Deep  Preoxygenated: Yes    Mask Difficulty Assessment:  0 - not attempted  Final Airway Type:  Supraglottic airway  Final Supraglottic Airway:  Standard LMA    SGA Size:  4  Number of Attempts at Approach:  1

## 2022-08-16 NOTE — ANESTHESIA PROCEDURE NOTES
Peripheral Block    Date/Time: 8/16/2022 9:49 AM  Performed by: Arden Rodas M.D.  Authorized by: Arden Rodas M.D.     Patient Location:  Pre-op  Start Time:  8/16/2022 9:49 AM  End Time:  8/16/2022 9:51 AM  Reason for Block: at surgeon's request and post-op pain management ONLY    patient identified, IV checked, site marked, risks and benefits discussed, surgical consent, monitors and equipment checked, pre-op evaluation and timeout performed    Patient Position:  Supine  Prep: ChloraPrep    Monitoring:  Heart rate, continuous pulse ox and cardiac monitor  Block Region:  Lower Extremity  Lower Extremity - Block Type:  Selective FEMORAL nerve block at the Adductor Canal    Laterality:  Right  Procedures: ultrasound guided  Image captured, interpreted and electronically stored.  Local Infiltration:  Lidocaine  Strength:  1 %  Dose:  3 ml  Block Type:  Single-shot  Needle Length:  100mm  Needle Gauge:  21 G  Needle Localization:  Ultrasound guidance  Injection Assessment:  Negative aspiration for heme, no paresthesia on injection, incremental injection and local visualized surrounding nerve on ultrasound  Evidence of intravascular injection: No     US Guided Selective Femoral Nerve Block at Adductor Canal:   US probe placed at mid-thigh level on externally rotated leg and femur identified.  Probe directed medially until Sartorius Muscle (SM), Femoral Artery (FA) and Saphenous Nerve (SN) identified in Adductor Canal (AC).  Needle inserted anterolateral to probe in an in plane approach into a subsartorial perivascular perineural position.  After negative aspiration LA injected with ease and visualized spreading within the AC.

## 2022-08-16 NOTE — ANESTHESIA PREPROCEDURE EVALUATION
Case: [id number] Date/Time: 08/16/22 1015    Procedure: ARTHROPLASTY, KNEE, TOTAL (Right)    Diagnosis: Primary osteoarthritis of right knee [M17.11]    Pre-op diagnosis: Primary osteoarthritis of right knee [M17.11]    Location:  OR  / SURGERY [facility]    Surgeons: Mars Mclean M.D.          Relevant Problems   ANESTHESIA   (positive) Other sleep apnea      NEURO   (positive) Personal history of malignant neoplasm of cervix uteri   (positive) S/P repeat ablation of atrial fibrillation/atrial flutter      CARDIAC   (positive) A-fib (HCC)   (positive) Acute on chronic congestive heart failure with right ventricular diastolic dysfunction (HCC)   (positive) Atrial flutter (HCC)   (positive) Essential hypertension   (positive) PAH (pulmonary artery hypertension) (HCC)         (positive) Chronic renal insufficiency      ENDO   (positive) Hypothyroidism   (positive) Other specified hypothyroidism      Other   (positive) Chronic respiratory failure (HCC)   (positive) Primary osteoarthritis of right knee       Physical Exam    Airway   Mallampati: II  TM distance: >3 FB  Neck ROM: full       Cardiovascular - normal exam  Rhythm: regular  Rate: normal  (-) murmur     Dental - normal exam           Pulmonary - normal exam  Breath sounds clear to auscultation     Abdominal    Neurological - normal exam                 Anesthesia Plan    ASA 3   ASA physical status 3 criteria: respiratory insufficiency or compromise    Plan - general and peripheral nerve block     Peripheral nerve block will be post-op pain control  Airway plan will be LMA          Induction: intravenous    Postoperative Plan: Postoperative administration of opioids is intended.    Pertinent diagnostic labs and testing reviewed    Informed Consent:    Anesthetic plan and risks discussed with patient.

## 2022-08-16 NOTE — ANESTHESIA POSTPROCEDURE EVALUATION
Patient: Zeinab Loza    Procedure Summary     Date: 08/16/22 Room / Location:  OR 03 / SURGERY [facility]    Anesthesia Start: 1001 Anesthesia Stop: 1055    Procedure: ARTHROPLASTY, KNEE, TOTAL (Right: Knee) Diagnosis:       Primary osteoarthritis of right knee      (Primary osteoarthritis of right knee [M17.11])    Surgeons: Mars Mclean M.D. Responsible Provider: Arden Rodas M.D.    Anesthesia Type: general, peripheral nerve block ASA Status: 3          Final Anesthesia Type: general, peripheral nerve block  Last vitals  BP   Blood Pressure : 136/67, NIBP: 131/54    Temp   36.4 °C (97.5 °F)    Pulse   68   Resp   12    SpO2   94 %      Anesthesia Post Evaluation    Patient location during evaluation: PACU  Patient participation: complete - patient participated  Level of consciousness: awake and alert    Airway patency: patent  Anesthetic complications: no  Cardiovascular status: hemodynamically stable  Respiratory status: acceptable  Hydration status: euvolemic    PONV: none          No notable events documented.     Nurse Pain Score: 5 (NPRS)

## 2022-08-16 NOTE — DISCHARGE INSTRUCTIONS
Patient will be discharged home and follow up with Dr. Mclean in 2 weeks, for which the patient already has scheduled.    You may call or text Dr. Mclean' medical assistant during business hours at [phone].  You may call the emergency [facility] line during nights/weekends/holidays if needed at [phone].    Instructions:  -Leave the bandage in place until your followup appointment in 2 weeks.  -May shower with bandage in place, if bandage becomes soaked underneath please remove and call the office immediately.  -No baths/tubs/pools/submersion of the wound for now.  -Call if there is significant drainage beneath the bandage    -Put as much weight as comfortable on the operative leg.  Use a walker to assist with balance to avoid any falls.  -It is important to start moving and stretching right away, otherwise the joint can stiffen.    -BLOOD THINNERS:       1st week: Aspirin 81mg twice per day (No Eliquis)       2nd week: Eliquis once per day (No aspirin)       3rd week: Resume usual twice per day Eliquis    -Use ice frequently to help with pain and swelling control  -Pain management:  Standard pain regimen should be:        -Tylenol 1,000mg three times per day (take this automatically)        -Meloxicam one pill daily (take this automatically)        -Tramadol 1-2 pills every 6 hours (take this automatically to start)        -Oxycodone 1-2 pills as needed IN ADDITION to the meds listed above.  Do not take Oxycodone and Tramadol at the same time (space at least 1 hour apart)        -Try to limit the amount of oxycodone since it tends to cause constipation, drowsiness, etc.  But if you need it, take it.        -100mg of Colace (docusate) will be prescribed. Take this twice a day while still taking Tramadol or Oxycodone. Can discontinue after opiates are no longer being taken     Discharge Instructions    Discharged to home by car with relative. Discharged via wheelchair, hospital escort: Yes.  Special equipment  needed: Not Applicable    Be sure to schedule a follow-up appointment with your primary care doctor or any specialists as instructed.     Discharge Plan:   Diet Plan: Discussed  Activity Level: Discussed  Confirmed Follow up Appointment: Patient to Call and Schedule Appointment  Confirmed Symptoms Management: Discussed  Medication Reconciliation Updated: Yes    I understand that a diet low in cholesterol, fat, and sodium is recommended for good health. Unless I have been given specific instructions below for another diet, I accept this instruction as my diet prescription.   Other diet: Resumed as before    Special Instructions: Discharge instructions for the Orthopedic Patient    Follow up with Primary Care Physician within 2 weeks of discharge to home, regarding:  Review of medications and diagnostic testing.  Surveillance for medical complications.  Workup and treatment of osteoporosis, if appropriate.     -Is this a Hip/Knee/Shoulder Joint Replacement patient? Yes   TOTAL KNEE REPLACEMENT, AFTER-CARE GUIDELINES     These instructions provide you with information on caring for yourself and your knee after surgery. Your health care provider may also give you instructions that are more specific. Your treatment was planned and performed according to current medical practices but problems sometimes occur. Call your health care provider if you have any problems or questions.     WHAT TO EXPECT AFTER THE PROCEDURE   After your procedure, your knee will typically be stiff, sore, and bruised. This will improve over time.     Pain   Follow your home pain management plan as discussed with your nurse and as directed by your provider.   It is important to follow any scheduled pain medications for maximal pain relief.   If prescribed opioid medication, the goal is to use opioids only as needed and to wean off prescription pain medicine as soon as possible.   Ice can be used for pain control.   Put ice in a plastic bag.   Place  a towel between your skin and the bag.   Leave the ice on for 20 minutes, 2-3 times a day at a minimum.   Most patients are off the pain pills by 3 weeks. If your pain continues to be severe, follow up with your provider.     Infection   Knee joint infections occur in fewer than 2% of patients. The most common causes of infection following total knee replacement surgery are from bacteria that enter the bloodstream during dental procedures, urinary tract infections, or skin infections. These bacteria can lodge around your knee replacement and cause an infection.   Keep the incision as clean and dry as possible.   Always wash your hands before touching your incision.   Avoid dental care for 3 months after surgery. Your provider may recommend taking a dose of antibiotics an hour prior to any dental procedure. After 2 years, most providers recommend antibiotics only before an extensive procedure. Ask your provider what they recommend.   Signs and symptoms of infection include low-grade fever, redness, pain, swelling and drainage from your incision. Notify your provider IMMEDIATELY if you develop ANY of these symptoms.     Post op Disturbances   Bowel Habits - Constipation is extremely common and caused by a combination of anesthesia, lack of mobility, dehydration and pain medicine. Use stool softeners or laxatives if necessary. It is important not to ignore this problem as bowel obstructions can be a serious complication after joint replacement surgery.   Mood/Energy Level - Many patients experience a lack of energy and endurance for up to 2-3 months after surgery. Some people feel down and can even become depressed. This is likely due to postoperative anemia, change in activity level, lack of sleep, pain medicine and just the emotional reaction to the surgery itself that is a big disruption in a person’s life. This usually passes. If symptoms persist, follow up with your primary care provider.  Returning to Work - Your  provider will give you specific instructions based on your profession. Generally, if you work a sedentary job requiring little standing or walking, most patients may return within 2-6 weeks. Manual labor jobs involving walking, lifting and standing may take 3-4 months. Your provider’s office can provide a release to part-time or light duty work early on in your recovery and progress you to full duty as able.   Driving - You can begin driving once cleared by your provider, provided you are no longer taking narcotic pain medication or any other medications that impair driving. Discuss the length of time expected with your provider as returning to driving depends on things such as your vehicle, which knee was replaced (right or left), and knee motion, strength and reflexes returning appropriately.   Avoiding falls - A fall during the first few weeks after surgery can damage your new knee and may result in a need for further surgery.  throw rugs and tack down loose carpeting. Be aware of floor hazards such as pets, small objects or uneven surfaces. Notify your provider of any falls.   Airport Metal Detectors - The sensitivity of metal detectors varies and it is likely that your prosthesis will cause an alarm. Inform the  of your artificial joint.     Diet   Resume your normal diet as tolerated.   It is important to achieve a healthy nutritional status by eating a well-balanced diet on a regular basis.   Your provider may recommend that you take iron and vitamin supplements.   Continue to drink plenty of fluids.     Shower/Bathing   You may shower as soon as you get home from the hospital unless otherwise instructed.   Keep your incision out of water to prevent infection. To keep the incision dry when showering, cover it with a plastic bag or plastic wrap. If your bandage is waterproof, this may not be necessary. o Pat incision dry if it gets wet. Do not rub. Notify your provider.   Do not submerge  in a bath until cleared by your provider. Your staples must be out and the incision completely healed.     Dressing Change: Only change your dressing if directed by your provider.   Wash hands.   Open all dressing change materials.   Remove old dressing and discard.   Inspect incision for signs of irritation or infection including redness, increase in clear drainage, yellow/green drainage, odor and surrounding skin hot to touch. Notify your provider if present.    the new dressing by one corner and lay over the incision. Be careful not to touch the inside of the dressing that will lay over the incision.   Secure in place as instructed. Swelling/Bruising   Swelling is normal after knee replacement and can involve the thigh, knee, calf and foot.   Swelling can last from 3-6 months.   To reduce swelling, elevate your leg higher than your heart while reclining. The first week you are home you should elevate your leg an equal amount of time as you are active.   The swelling is usually worse after you go home since you are upright for longer periods of time.   Bruising often does not appear until after you arrive home and can be quite dramatic- appearing purple, black, or green. Bruising is typically not concerning and will subside without any treatment.     Blood Clot Prevention   Your treatment plan includes multiple preventative measures to decrease the risk of blood clots in the legs (DVTs) and the less common, but serious, clots that travel to the lungs (pulmonary emboli). Most patients are at standard risk for them, but people who are at higher risk include those who have had previous clots, a family history of clotting, smoking, diabetes, obesity, advanced age, use estrogen and/or live a sedentary lifestyle.     Signs of blood clots in legs include - Swelling in thigh, calf or ankle that does not go down with elevation. Pain, heat and tenderness in calf, back of calf or groin area. NOTE: blood clots can  occur in either leg.   Signs of blood clots in lungs include - Sudden increased shortness of breath, sudden onset of chest pain, and localized chest pain with coughing.   If you experience any of the above symptoms, notify your provider and seek medical attention immediately.   You received anticoagulant therapy (blood thinners) in the hospital. Continue the prescribed blood-thinning medication at home, as directed by your provider.   Your risk for developing a clot continues for up to 2-3 months after surgery. Avoid prolonged sitting and dehydration (long air trips and car trips). If you do take a trip during this time, please get up, move around every 1-1.5 hours, and discuss all travel plans with your provider.     Activity   Once home, stay active. The key is not to overdo it. While you can expect some good days and some bad days, you should notice a gradual improvement and a gradual increase in your endurance over the next 6 to 12 months. Exercise is a critical component of recovery, particularly during the first few weeks after surgery.     Normal activities of daily living - Expect to resume most within 3 to 6 weeks following surgery. Some pain with activity and at night is common for several weeks after surgery. Walk as much as you like once your doctor gives permission to proceed, but remember that walking is no substitute for the exercises your doctor and physical therapist prescribe. Use a walker, crutches or cane to assist with walking until you can walk smoothly (minimal or no limp) without assistance.   Physical Therapy Exercises - Follow your home exercise program as instructed by your physical therapist during your hospital stay. Call and set up outpatient physical therapy appointments per your provider’s recommendations. Physical therapy after the hospital stay focuses on increasing your range of motion, strengthening your muscles and improving your gait/walking pattern. Contact your provider for  the referral to outpatient physical therapy if you have not yet received this. ?   Riding a stationary bicycle can help maintain muscle tone and keep your knee flexible. Begin stationary bicycling as directed by your physical therapist or provider.   Sexual Activity - Your provider can tell you when it safe to resume sexual activity.   Sleeping Positions - You can safely sleep on your back, on either side, or on your stomach.   Other Activities - Lower impact activities are preferred. Consult your provider if you have specific questions.     When to Call the Doctor   Call the provider if you experience:   Fever over 100.5° F   Increased pain, drainage, redness, odor or heat around the incision area   Shaking chills   Increased knee pain with activity and rest   Increased pain in calf, tenderness or redness above or below the knee   Increased swelling of calf, ankle, foot   Sudden increased shortness of breath, sudden onset of chest pain, localized chest pain with coughing   Incision opening   Or, if there are any questions or concerns about medications or care.     Infection statistic resource:   [email or web address]-[email or web address]-[email or web address]-[email or web address]     -Is this patient being discharged with medication to prevent blood clots?  Yes, Aspirin   Aspirin, ASA oral tablets  What is this medicine?  ASPIRIN (AS pir in) is a pain reliever. It is used to treat mild pain and fever. This medicine is also used as directed by a doctor to prevent and to treat heart attacks, to prevent strokes and blood clots, and to treat arthritis or inflammation.  This medicine may be used for other purposes; ask your health care provider or pharmacist if you have questions.  COMMON BRAND NAME(S): Aspir-Low, Aspir-Marianela, Aspirtab, Ceci Advanced Aspirin, Ceci Aspirin, Ceci Aspirin Extra Strength, Ceci Aspirin Plus, Ceci Extra Strength, Ceci Extra Strength Plus, Ceci  Genuine Aspirin, Ceci Womens Aspirin, Bufferin, Bufferin Extra Strength, Bufferin Low Dose  What should I tell my health care provider before I take this medicine?  They need to know if you have any of these conditions:  anemia  asthma  bleeding problems  child with chickenpox, the flu, or other viral infection  diabetes  gout  if you frequently drink alcohol containing drinks  kidney disease  liver disease  low level of vitamin K  lupus  smoke tobacco  stomach ulcers or other problems  an unusual or allergic reaction to aspirin, tartrazine dye, other medicines, dyes, or preservatives  pregnant or trying to get pregnant  breast-feeding  How should I use this medicine?  Take this medicine by mouth with a glass of water. Follow the directions on the package or prescription label. You can take this medicine with or without food. If it upsets your stomach, take it with food. Do not take your medicine more often than directed.  Talk to your pediatrician regarding the use of this medicine in children. While this drug may be prescribed for children as young as 12 years of age for selected conditions, precautions do apply. Children and teenagers should not use this medicine to treat chicken pox or flu symptoms unless directed by a doctor.  Patients over 65 years old may have a stronger reaction and need a smaller dose.  Overdosage: If you think you have taken too much of this medicine contact a poison control center or emergency room at once.  NOTE: This medicine is only for you. Do not share this medicine with others.  What if I miss a dose?  If you are taking this medicine on a regular schedule and miss a dose, take it as soon as you can. If it is almost time for your next dose, take only that dose. Do not take double or extra doses.  What may interact with this medicine?  Do not take this medicine with any of the following medications:  cidofovir  ketorolac  probenecid  This medicine may also interact with the following  medications:  alcohol  alendronate  bismuth subsalicylate  flavocoxid  herbal supplements like feverfew, garlic, abi, ginkgo biloba, horse chestnut  medicines for diabetes or glaucoma like acetazolamide, methazolamide  medicines for gout  medicines that treat or prevent blood clots like enoxaparin, heparin, ticlopidine, warfarin  other aspirin and aspirin-like medicines  NSAIDs, medicines for pain and inflammation, like ibuprofen or naproxen  pemetrexed  sulfinpyrazone  varicella live vaccine  This list may not describe all possible interactions. Give your health care provider a list of all the medicines, herbs, non-prescription drugs, or dietary supplements you use. Also tell them if you smoke, drink alcohol, or use illegal drugs. Some items may interact with your medicine.  What should I watch for while using this medicine?  If you are treating yourself for pain, tell your doctor or health care professional if the pain lasts more than 10 days, if it gets worse, or if there is a new or different kind of pain. Tell your doctor if you see redness or swelling. Also, check with your doctor if you have a fever that lasts for more than 3 days. Only take this medicine to prevent heart attacks or blood clotting if prescribed by your doctor or health care professional.  Do not take aspirin or aspirin-like medicines with this medicine. Too much aspirin can be dangerous. Always read the labels carefully.  This medicine can irritate your stomach or cause bleeding problems. Do not smoke cigarettes or drink alcohol while taking this medicine. Do not lie down for 30 minutes after taking this medicine to prevent irritation to your throat.  If you are scheduled for any medical or dental procedure, tell your healthcare provider that you are taking this medicine. You may need to stop taking this medicine before the procedure.  This medicine may be used to treat migraines. If you take migraine medicines for 10 or more days a month,  your migraines may get worse. Keep a diary of headache days and medicine use. Contact your healthcare professional if your migraine attacks occur more frequently.  What side effects may I notice from receiving this medicine?  Side effects that you should report to your doctor or health care professional as soon as possible:  allergic reactions like skin rash, itching or hives, swelling of the face, lips, or tongue  breathing problems  changes in hearing, ringing in the ears  confusion  general ill feeling or flu-like symptoms  pain on swallowing  redness, blistering, peeling or loosening of the skin, including inside the mouth or nose  signs and symptoms of bleeding such as bloody or black, tarry stools; red or dark-brown urine; spitting up blood or brown material that looks like coffee grounds; red spots on the skin; unusual bruising or bleeding from the eye, gums, or nose  trouble passing urine or change in the amount of urine  unusually weak or tired  yellowing of the eyes or skin  Side effects that usually do not require medical attention (report to your doctor or health care professional if they continue or are bothersome):  diarrhea or constipation  headache  nausea, vomiting  stomach gas, heartburn  This list may not describe all possible side effects. Call your doctor for medical advice about side effects. You may report side effects to FDA at 2-025-FDA-2434.  Where should I keep my medicine?  Keep out of the reach of children.  Store at room temperature between 15 and 30 degrees C (59 and 86 degrees F). Protect from heat and moisture. Do not use this medicine if it has a strong vinegar smell. Throw away any unused medicine after the expiration date.  NOTE: This sheet is a summary. It may not cover all possible information. If you have questions about this medicine, talk to your doctor, pharmacist, or health care provider.  © 2020 [employer] (2018-01-30 10:42:13)    -Is this patient being  discharged with medication to prevent blood clots?  Yes, Aspirin   Aspirin, ASA oral tablets  What is this medicine?  ASPIRIN (AS pir in) is a pain reliever. It is used to treat mild pain and fever. This medicine is also used as directed by a doctor to prevent and to treat heart attacks, to prevent strokes and blood clots, and to treat arthritis or inflammation.  This medicine may be used for other purposes; ask your health care provider or pharmacist if you have questions.  COMMON BRAND NAME(S): Aspir-Low, Aspir-Marianela, Aspirtab, Ceci Advanced Aspirin, Ceci Aspirin, Ceci Aspirin Extra Strength, Ceci Aspirin Plus, Ceci Extra Strength, Ceci Extra Strength Plus, Ceci Genuine Aspirin, Ceci Womens Aspirin, Bufferin, Bufferin Extra Strength, Bufferin Low Dose  What should I tell my health care provider before I take this medicine?  They need to know if you have any of these conditions:  anemia  asthma  bleeding problems  child with chickenpox, the flu, or other viral infection  diabetes  gout  if you frequently drink alcohol containing drinks  kidney disease  liver disease  low level of vitamin K  lupus  smoke tobacco  stomach ulcers or other problems  an unusual or allergic reaction to aspirin, tartrazine dye, other medicines, dyes, or preservatives  pregnant or trying to get pregnant  breast-feeding  How should I use this medicine?  Take this medicine by mouth with a glass of water. Follow the directions on the package or prescription label. You can take this medicine with or without food. If it upsets your stomach, take it with food. Do not take your medicine more often than directed.  Talk to your pediatrician regarding the use of this medicine in children. While this drug may be prescribed for children as young as 12 years of age for selected conditions, precautions do apply. Children and teenagers should not use this medicine to treat chicken pox or flu symptoms unless directed by a doctor.  Patients over 65  years old may have a stronger reaction and need a smaller dose.  Overdosage: If you think you have taken too much of this medicine contact a poison control center or emergency room at once.  NOTE: This medicine is only for you. Do not share this medicine with others.  What if I miss a dose?  If you are taking this medicine on a regular schedule and miss a dose, take it as soon as you can. If it is almost time for your next dose, take only that dose. Do not take double or extra doses.  What may interact with this medicine?  Do not take this medicine with any of the following medications:  cidofovir  ketorolac  probenecid  This medicine may also interact with the following medications:  alcohol  alendronate  bismuth subsalicylate  flavocoxid  herbal supplements like feverfew, garlic, abi, ginkgo biloba, horse chestnut  medicines for diabetes or glaucoma like acetazolamide, methazolamide  medicines for gout  medicines that treat or prevent blood clots like enoxaparin, heparin, ticlopidine, warfarin  other aspirin and aspirin-like medicines  NSAIDs, medicines for pain and inflammation, like ibuprofen or naproxen  pemetrexed  sulfinpyrazone  varicella live vaccine  This list may not describe all possible interactions. Give your health care provider a list of all the medicines, herbs, non-prescription drugs, or dietary supplements you use. Also tell them if you smoke, drink alcohol, or use illegal drugs. Some items may interact with your medicine.  What should I watch for while using this medicine?  If you are treating yourself for pain, tell your doctor or health care professional if the pain lasts more than 10 days, if it gets worse, or if there is a new or different kind of pain. Tell your doctor if you see redness or swelling. Also, check with your doctor if you have a fever that lasts for more than 3 days. Only take this medicine to prevent heart attacks or blood clotting if prescribed by your doctor or health  care professional.  Do not take aspirin or aspirin-like medicines with this medicine. Too much aspirin can be dangerous. Always read the labels carefully.  This medicine can irritate your stomach or cause bleeding problems. Do not smoke cigarettes or drink alcohol while taking this medicine. Do not lie down for 30 minutes after taking this medicine to prevent irritation to your throat.  If you are scheduled for any medical or dental procedure, tell your healthcare provider that you are taking this medicine. You may need to stop taking this medicine before the procedure.  This medicine may be used to treat migraines. If you take migraine medicines for 10 or more days a month, your migraines may get worse. Keep a diary of headache days and medicine use. Contact your healthcare professional if your migraine attacks occur more frequently.  What side effects may I notice from receiving this medicine?  Side effects that you should report to your doctor or health care professional as soon as possible:  allergic reactions like skin rash, itching or hives, swelling of the face, lips, or tongue  breathing problems  changes in hearing, ringing in the ears  confusion  general ill feeling or flu-like symptoms  pain on swallowing  redness, blistering, peeling or loosening of the skin, including inside the mouth or nose  signs and symptoms of bleeding such as bloody or black, tarry stools; red or dark-brown urine; spitting up blood or brown material that looks like coffee grounds; red spots on the skin; unusual bruising or bleeding from the eye, gums, or nose  trouble passing urine or change in the amount of urine  unusually weak or tired  yellowing of the eyes or skin  Side effects that usually do not require medical attention (report to your doctor or health care professional if they continue or are bothersome):  diarrhea or constipation  headache  nausea, vomiting  stomach gas, heartburn  This list may not describe all possible  side effects. Call your doctor for medical advice about side effects. You may report side effects to FDA at 8-882-IJU-6116.  Where should I keep my medicine?  Keep out of the reach of children.  Store at room temperature between 15 and 30 degrees C (59 and 86 degrees F). Protect from heat and moisture. Do not use this medicine if it has a strong vinegar smell. Throw away any unused medicine after the expiration date.  NOTE: This sheet is a summary. It may not cover all possible information. If you have questions about this medicine, talk to your doctor, pharmacist, or health care provider.  © 2020 [employer]vier/Gold Standard (2018-01-30 10:42:13)    Is patient discharged on Warfarin / Coumadin?   No

## 2022-08-16 NOTE — OR NURSING
1053 PT RECEIVED IN PACU, REPORT RECEIVED.  PT NON RESPONSIVE, OPA IN PLACE NO ACUTE RESPIRATORY DISTRESS OBSERVED.     1055 PT AWAKE, FOLLOWS DIRECTIONS. D/C OPA. VSS    1102 PT REPORTS POSTERIOR R KNEE PAIN 7/10. PLAN ANALGESIA    1110 BIPAP WITH 5L O2 COMMENCED.    1112 PT REPORTS FEELING COLD. LUIGI WARMER APPLIED.     1120 PT REPORTS R KNEE PAIN 6/10.     1124 PT REPORTS R KNEE PAIN 5/10 AND TOLERABLE.     1133 PT REPORTS R KNEE PAIN 6/10.     1153 VSS.     1203 PT TO 5L OXYMASK. PT REPORTS R KNEE PAIN 5/10 AND TOLERABLE.     1213 VSS. PT TOLERATES OXYMASK 5L WELL. PT MEETS CRITERIA FOR TRANSFER TO ROOM.

## 2022-08-16 NOTE — PROGRESS NOTES
4 Eyes Skin Assessment Completed by ABDOUL Rubio and ABDOUL Wood.    Head WDL  Ears WDL  Nose WDL  Mouth WDL  Neck WDL  Breast/Chest WDL  Shoulder Blades WDL  Spine WDL  (R) Arm/Elbow/Hand WDL  (L) Arm/Elbow/Hand WDL  Abdomen WDL  Groin WDL  Scrotum/Coccyx/Buttocks WDL  (R) Leg Incision, dressing in place   (L) Leg WDL  (R) Heel/Foot/Toe WDL  (L) Heel/Foot/Toe WDL          Devices In Places Blood Pressure Cuff, Pulse Ox, and Nasal Cannula      Interventions In Place Pillows and Pressure Redistribution Mattress    Possible Skin Injury No    Pictures Uploaded Into Epic N/A  Wound Consult Placed N/A  RN Wound Prevention Protocol Ordered No

## 2022-08-16 NOTE — PROGRESS NOTES
Received report from PACU. Pt on floor. AAOx4, VSS. Assessment completed, CMS intact. Dressing in place to R knee, CDI with polar ice in place. Pt states pain 6/10; medicated per MAR. Updated pt on plan of care for the day. Call light within reach, bed locked and in lowest position. Fall precautions in place. Pt educated to call for assistance.

## 2022-08-16 NOTE — ANESTHESIA TIME REPORT
Anesthesia Start and Stop Event Times     Date Time Event    8/16/2022 0919 Ready for Procedure     1001 Anesthesia Start     1055 Anesthesia Stop        Responsible Staff  08/16/22    Name Role Begin End    Arden Rodas M.D. Anesth 1001 1055        Overtime Reason:  no overtime (within assigned shift)    Comments:

## 2022-08-17 VITALS
HEART RATE: 70 BPM | BODY MASS INDEX: 33.08 KG/M2 | DIASTOLIC BLOOD PRESSURE: 60 MMHG | OXYGEN SATURATION: 95 % | WEIGHT: 210.76 LBS | SYSTOLIC BLOOD PRESSURE: 128 MMHG | RESPIRATION RATE: 18 BRPM | HEIGHT: 67 IN | TEMPERATURE: 98.2 F

## 2022-08-17 LAB
HCT VFR BLD AUTO: 31.8 % (ref 37–47)
HGB BLD-MCNC: 10 G/DL (ref 12–16)

## 2022-08-17 PROCEDURE — 94760 N-INVAS EAR/PLS OXIMETRY 1: CPT

## 2022-08-17 PROCEDURE — 700111 HCHG RX REV CODE 636 W/ 250 OVERRIDE (IP): Performed by: STUDENT IN AN ORGANIZED HEALTH CARE EDUCATION/TRAINING PROGRAM

## 2022-08-17 PROCEDURE — 85018 HEMOGLOBIN: CPT

## 2022-08-17 PROCEDURE — 99024 POSTOP FOLLOW-UP VISIT: CPT | Performed by: ORTHOPAEDIC SURGERY

## 2022-08-17 PROCEDURE — A9270 NON-COVERED ITEM OR SERVICE: HCPCS | Performed by: STUDENT IN AN ORGANIZED HEALTH CARE EDUCATION/TRAINING PROGRAM

## 2022-08-17 PROCEDURE — 97161 PT EVAL LOW COMPLEX 20 MIN: CPT

## 2022-08-17 PROCEDURE — 85014 HEMATOCRIT: CPT

## 2022-08-17 PROCEDURE — 96375 TX/PRO/DX INJ NEW DRUG ADDON: CPT

## 2022-08-17 PROCEDURE — 36415 COLL VENOUS BLD VENIPUNCTURE: CPT

## 2022-08-17 PROCEDURE — 700105 HCHG RX REV CODE 258: Performed by: STUDENT IN AN ORGANIZED HEALTH CARE EDUCATION/TRAINING PROGRAM

## 2022-08-17 PROCEDURE — 700102 HCHG RX REV CODE 250 W/ 637 OVERRIDE(OP): Performed by: STUDENT IN AN ORGANIZED HEALTH CARE EDUCATION/TRAINING PROGRAM

## 2022-08-17 PROCEDURE — G0378 HOSPITAL OBSERVATION PER HR: HCPCS

## 2022-08-17 PROCEDURE — 94660 CPAP INITIATION&MGMT: CPT

## 2022-08-17 RX ADMIN — OXYCODONE HYDROCHLORIDE 10 MG: 10 TABLET ORAL at 02:06

## 2022-08-17 RX ADMIN — ASPIRIN 81 MG: 81 TABLET, COATED ORAL at 11:39

## 2022-08-17 RX ADMIN — AMIODARONE HYDROCHLORIDE 200 MG: 200 TABLET ORAL at 05:09

## 2022-08-17 RX ADMIN — LEVOTHYROXINE SODIUM 75 MCG: 0.07 TABLET ORAL at 05:09

## 2022-08-17 RX ADMIN — ACETAMINOPHEN 1000 MG: 500 TABLET, FILM COATED ORAL at 11:38

## 2022-08-17 RX ADMIN — OXYCODONE HYDROCHLORIDE 10 MG: 10 TABLET ORAL at 11:38

## 2022-08-17 RX ADMIN — CELECOXIB 200 MG: 200 CAPSULE ORAL at 07:57

## 2022-08-17 RX ADMIN — OXYCODONE HYDROCHLORIDE 10 MG: 10 TABLET ORAL at 08:41

## 2022-08-17 RX ADMIN — POTASSIUM CHLORIDE 40 MEQ: 1500 TABLET, EXTENDED RELEASE ORAL at 05:09

## 2022-08-17 RX ADMIN — PAROXETINE HYDROCHLORIDE 20 MG: 20 TABLET, FILM COATED ORAL at 05:09

## 2022-08-17 RX ADMIN — CEFAZOLIN 2 G: 2 INJECTION, POWDER, FOR SOLUTION INTRAMUSCULAR; INTRAVENOUS at 02:07

## 2022-08-17 RX ADMIN — DEXAMETHASONE SODIUM PHOSPHATE 10 MG: 4 INJECTION, SOLUTION INTRA-ARTICULAR; INTRALESIONAL; INTRAMUSCULAR; INTRAVENOUS; SOFT TISSUE at 05:10

## 2022-08-17 RX ADMIN — OXYCODONE HYDROCHLORIDE 10 MG: 10 TABLET ORAL at 05:09

## 2022-08-17 RX ADMIN — ACETAMINOPHEN 1000 MG: 500 TABLET, FILM COATED ORAL at 05:09

## 2022-08-17 ASSESSMENT — COGNITIVE AND FUNCTIONAL STATUS - GENERAL
STANDING UP FROM CHAIR USING ARMS: A LITTLE
MOVING FROM LYING ON BACK TO SITTING ON SIDE OF FLAT BED: A LITTLE
MOBILITY SCORE: 18
CLIMB 3 TO 5 STEPS WITH RAILING: A LITTLE
MOVING TO AND FROM BED TO CHAIR: A LITTLE
TURNING FROM BACK TO SIDE WHILE IN FLAT BAD: A LITTLE
WALKING IN HOSPITAL ROOM: A LITTLE
SUGGESTED CMS G CODE MODIFIER MOBILITY: CK

## 2022-08-17 ASSESSMENT — PAIN DESCRIPTION - PAIN TYPE
TYPE: SURGICAL PAIN

## 2022-08-17 ASSESSMENT — GAIT ASSESSMENTS
DISTANCE (FEET): 100
ASSISTIVE DEVICE: FRONT WHEEL WALKER
GAIT LEVEL OF ASSIST: SUPERVISED
DEVIATION: DECREASED HEEL STRIKE;DECREASED TOE OFF

## 2022-08-17 NOTE — PROGRESS NOTES
Discharge paper work reviewed with patient and daughter at bedside.Copy given.Questions encouraged and aswered. I.V removed. Patient escorted to ED entrance via wheelchair.Patient discharge to home.

## 2022-08-17 NOTE — PROGRESS NOTES
Received in bed, aox4.  Assessment as per GSU. Call light within reach. Needs attended. Plan of care discussed and understood.

## 2022-08-17 NOTE — PROGRESS NOTES
"Subjective:    No acute events.  Pain reasonably controlled.  Mobilizing well.    Objective:  /48   Pulse (!) 58   Temp 37.1 °C (98.7 °F) (Oral)   Resp 18   Ht 1.702 m (5' 7.01\")   Wt 95.6 kg (210 lb 12.2 oz)   SpO2 98%     Recent Labs     08/17/22  0337   HEMOGLOBIN 10.0*   HEMATOCRIT 31.8*              Intake/Output Summary (Last 24 hours) at 8/17/2022 0713  Last data filed at 8/17/2022 0454  Gross per 24 hour   Intake 1740 ml   Output 1075 ml   Net 665 ml       Comfortable, no distress  Neurologically and vascularly intact with palpable pedal pulses bilaterally.  Dressing C/D/I      Assessment:  Doing well s/p total knee arthroplasty.    Plan:    Diet as tolerated  WBAT  PT/OT  Reviewed knee ROM including terminal flexion and extension exercises  DVT ppx - ASA BID + Sequential Compression Devices  Plan to discharge home  Follow-up in approximately 2 weeks post-op.  [phone]    "

## 2022-08-17 NOTE — DISCHARGE PLANNING
Case Management Discharge Planning    Admission Date: 8/16/2022  GMLOS:    ALOS: 0    6-Clicks ADL Score: 22  6-Clicks Mobility Score: 19      Anticipated Discharge Dispo: Discharge Disposition: Discharged to home/self care (01)    DME Needed: Yes    Action(s) Taken: Updated Provider/Nurse on Discharge Plan    Pending PT//OT evals.  DME choice faxed to [facility].     Escalations Completed: None    Medically Clear: No    Next Steps: PT/OT evals    Barriers to Discharge: Pending PT Evaluation

## 2022-08-17 NOTE — DISCHARGE PLANNING
Received Choice form at 1013  Agency/Facility Name: [facility]  Referral sent per Choice form @ 1169

## 2022-08-17 NOTE — THERAPY
Physical Therapy   Initial Evaluation     Patient Name: Zeinab Loza  Age:  77 y.o., Sex:  female  Medical Record #: [id number]  Today's Date: 8/17/2022     Precautions  Precautions: Weight Bearing As Tolerated Right Lower Extremity    Assessment  Patient is 77 y.o. female s/p R TKA POD#1. Pts pain is well controlled and was able to ambulate safely with FWW.  Reviewed with pt importance of using FWW at home, not recommending 4WW use at this time however pt refusing to use FWW, states she used her 4WW last time and she was fine.   HEP handout issued, pt was able to return demo all exs. No further acute PT needs, pt plans to DC home today with supportive dtr, DC PT.      Plan    Recommend Physical Therapy for Evaluation only     DC Equipment Recommendations: None  Discharge Recommendations: Recommend outpatient physical therapy services to address higher level deficits        08/17/22 1050   Prior Living Situation   Housing / Facility 1 Story House   Steps Into Home 1   Steps In Home 0   Equipment Owned 4-Wheel Walker   Lives with - Patient's Self Care Capacity Adult Children   Comments Pt lives with supportive dtr   Prior Level of Functional Mobility   Bed Mobility Independent   Transfer Status Independent   Ambulation Independent   Assistive Devices Used 4-Wheel Walker   Stairs Independent   Cognition    Level of Consciousness Alert   Comments Oriented x4   Passive ROM Lower Body   Passive ROM Lower Body X   Comments R Knee flex 90 deg   Active ROM Lower Body    Active ROM Lower Body  X   Comments R knee flex 80 deg   Strength Lower Body   Lower Body Strength  X   Comments R knee limited by pain   Sensation Lower Body   Lower Extremity Sensation   WDL   Balance Assessment   Sitting Balance (Static) Fair +   Sitting Balance (Dynamic) Fair   Standing Balance (Static) Fair +   Standing Balance (Dynamic) Fair   Weight Shift Sitting Fair   Weight Shift Standing Fair   Comments stdg with FWW   Gait Analysis   Gait  Level Of Assist Supervised   Assistive Device Front Wheel Walker   Distance (Feet) 100   # of Times Distance was Traveled 1   Deviation Decreased Heel Strike;Decreased Toe Off   # of Stairs Climbed 1   Level of Assist with Stairs Contact Guard Assist   Weight Bearing Status WBAT R LE   Bed Mobility    Supine to Sit Supervised   Sit to Supine Supervised   Functional Mobility   Sit to Stand Supervised   Bed, Chair, Wheelchair Transfer Supervised   Toilet Transfers Supervised

## 2022-08-17 NOTE — THERAPY
Occupational Therapy  Contact Note    Patient Name: Zeinab Loza  Age:  77 y.o., Sex:  female  Medical Record #: [id number]  Today's Date: 8/17/2022      OT eval order received and acknowledged, chart reviewed. Spoke with pt today, who states she recently just underwent L TKA in November 2021, has all equipments at home, and dtr will be providing assistance upon dc home. Discussed missed therapy with RN. Will cancel OT eval order at this time.    Stacey Corona, OT  [id number]

## 2022-08-17 NOTE — DISCHARGE SUMMARY
Patient was admitted for a total knee arthroplasty.  There were no complications during the surgery. Did well post-operatively.         Recent Labs     08/17/22  0337   HEMOGLOBIN 10.0*   HEMATOCRIT 31.8*       Active Hospital Problems    Diagnosis     Primary osteoarthritis of right knee [M17.11]      Added automatically from request for surgery [id number]      Degenerative arthritis of left knee [M17.12]      Added automatically from request for surgery [id number]         Uneventful hospital course.     Medication List        CONTINUE taking these medications        Instructions   acetaminophen 500 MG Tabs  Commonly known as: TYLENOL   Take 2 Tablets by mouth 3 times a day for 30 days.  Dose: 1,000 mg     amiodarone 200 MG Tabs  Commonly known as: Cordarone   Doctor's comments: Dose increased  Take 1 Tablet by mouth every day.  Dose: 200 mg     apixaban 5mg Tabs  Commonly known as: ELIQUIS   Take 1 Tablet by mouth 2 times a day.  Dose: 5 mg     bumetanide 2 MG tablet  Commonly known as: BUMEX   Take 1-2 Tablets by mouth 2 times a day. Take 4mg in the AM and 2mg PM  Dose: 2-4 mg     docusate sodium 100 MG Caps  Commonly known as: COLACE   Take 1 Capsule by mouth 2 times a day for 30 days.  Dose: 100 mg     levothyroxine 75 MCG Tabs  Commonly known as: SYNTHROID   Take 75 mcg by mouth Every morning on an empty stomach.  Dose: 75 mcg     metoprolol tartrate 50 MG Tabs  Commonly known as: LOPRESSOR   Take 1 tablet by mouth 2 times a day.  Dose: 50 mg     oxyCODONE immediate-release 5 MG Tabs  Commonly known as: ROXICODONE   Take 2 Tablets by mouth every four hours as needed for Severe Pain for up to 7 days.  Dose: 10 mg     oxyCODONE-acetaminophen 7.5-325 MG per tablet  Commonly known as: PERCOCET   Take 1 Tablet by mouth every 6 hours as needed.  Dose: 1 Tablet     PARoxetine 20 MG Tabs  Commonly known as: PAXIL   Take 20 mg by mouth every morning.  Dose: 20 mg     potassium chloride SA 20 MEQ Tbcr  Commonly known as: Kdur    Doctor's comments: Dose decrease to 40 mEq daily  Take 2 Tablets by mouth every day.  Dose: 40 mEq     rosuvastatin 40 MG tablet  Commonly known as: CRESTOR   Doctor's comments: Increase to 40 mg daily  Take 1 Tablet by mouth every evening.  Dose: 40 mg     STOOL SOFTENER PO   Take 1 Capsule by mouth as needed.  Dose: 1 Capsule     Tadalafil (PAH) 20 MG Tabs   Take 2 Tablets by mouth at bedtime.  Dose: 2 Tablet     traMADol 50 MG Tabs  Commonly known as: ULTRAM   Take 1-2 Tablets by mouth every 6 hours as needed for Moderate Pain for up to 10 days.  Dose:  mg     tretinoin 0.1 % cream  Commonly known as: RETIN-A   Apply 1 Application topically every evening.  Dose: 1 Application     Vitamin D 50 MCG (2000 UT) Caps   Take 2,000 Units by mouth every day.  Dose: 2,000 Units            ASK your doctor about these medications        Instructions   omeprazole 40 MG delayed-release capsule  Commonly known as: PRILOSEC   Take 1 Capsule by mouth every day.  Dose: 40 mg              See DC instruction sheet

## 2022-08-23 DIAGNOSIS — Z79.899 HIGH RISK MEDICATION USE: ICD-10-CM

## 2022-08-23 DIAGNOSIS — I48.0 PAROXYSMAL ATRIAL FIBRILLATION (HCC): ICD-10-CM

## 2022-08-23 DIAGNOSIS — I27.21 PAH (PULMONARY ARTERY HYPERTENSION) (HCC): ICD-10-CM

## 2022-08-23 RX ORDER — BUMETANIDE 2 MG/1
2-4 TABLET ORAL 2 TIMES DAILY
Qty: 360 TABLET | Refills: 2 | OUTPATIENT
Start: 2022-08-23

## 2022-08-23 NOTE — TELEPHONE ENCOUNTER
Is the patient due for a refill? No    Was the patient seen the past year? Yes    Date of last office visit: 4/5/22    Does the patient have an upcoming appointment?  Yes   If yes, When? 10/11/22    Provider to refill: GERALDINE    Does the patients insurance require a 100 day supply?  No    bumetanide (BUMEX) 2 MG tablet 360 Tablet 3/3 3/21/2022     Sig - Route: Take 1-2 Tablets by mouth 2 times a day. Take 4mg in the AM and 2mg PM - Oral    Sent to pharmacy as: Bumetanide 2 MG Oral Tablet (BUMEX)    Cosign for Ordering: Accepted by KIRILL Hogan on 3/30/2022  4:29 PM    E-Prescribing Status: Receipt confirmed by pharmacy (3/21/2022 11:22 AM PDT)

## 2022-08-23 NOTE — PROGRESS NOTES
Received patient from Night shift RN . Patient is awake and alert.No sign of distress. On 5 liters via NC. Patient denies any nausea.Dressing to Right knee in placed, CDI. Fall precaution in placed, kept bed in lowest position and call light within reach.    Alternatives Discussed Intro (Do Not Add Period): I discussed alternative treatments to include excisional surgery, destructive treatment, topical therapies and Mohs surgery and specifically discussed the risks and benefits of

## 2022-08-24 ENCOUNTER — TELEPHONE (OUTPATIENT)
Dept: CARDIOLOGY | Facility: MEDICAL CENTER | Age: 77
End: 2022-08-24
Payer: MEDICARE

## 2022-08-24 DIAGNOSIS — Z79.899 HIGH RISK MEDICATION USE: ICD-10-CM

## 2022-08-24 DIAGNOSIS — I48.0 PAROXYSMAL ATRIAL FIBRILLATION (HCC): ICD-10-CM

## 2022-08-24 DIAGNOSIS — I27.21 PAH (PULMONARY ARTERY HYPERTENSION) (HCC): ICD-10-CM

## 2022-08-24 NOTE — TELEPHONE ENCOUNTER
GERALDINE    Caller: Vahe Morales from [facility]    Medication Name and Dosage:   bumetanide (BUMEX) 2 MG tablet [[id number]]     Did patient contact pharmacy (If no, please call pharmacy first): yes    Medication amount left: 0    Preferred Pharmacy: - [facility]  Fax - [phone]    Other questions (Topic): N/A    Callback Number (Will only call for issues): [phone]    Thank you,   Yancy BRAVO

## 2022-08-25 ENCOUNTER — TELEPHONE (OUTPATIENT)
Dept: CARDIOLOGY | Facility: MEDICAL CENTER | Age: 77
End: 2022-08-25
Payer: MEDICARE

## 2022-08-25 DIAGNOSIS — I48.0 PAROXYSMAL ATRIAL FIBRILLATION (HCC): ICD-10-CM

## 2022-08-25 DIAGNOSIS — Z79.899 HIGH RISK MEDICATION USE: ICD-10-CM

## 2022-08-25 DIAGNOSIS — I27.21 PAH (PULMONARY ARTERY HYPERTENSION) (HCC): ICD-10-CM

## 2022-08-25 RX ORDER — BUMETANIDE 2 MG/1
2-4 TABLET ORAL 2 TIMES DAILY
Qty: 270 TABLET | Refills: 2 | Status: SHIPPED | OUTPATIENT
Start: 2022-08-25 | End: 2023-01-13 | Stop reason: SDUPTHER

## 2022-08-25 RX ORDER — BUMETANIDE 2 MG/1
2 TABLET ORAL 2 TIMES DAILY
Qty: 30 TABLET | Refills: 0 | Status: SHIPPED | OUTPATIENT
Start: 2022-08-25 | End: 2022-09-14

## 2022-08-25 NOTE — TELEPHONE ENCOUNTER
Called pt [phone]   Pt reports needing approx 7 days of medication until Rx received by mail order pharmacy.   Short script sent to pharmacy per pt request        bumetanide (BUMEX) 2 MG tablet 30 Tablet 0/0 8/25/2022     Sig - Route: Take 1 Tablet by mouth 2 times a day. Take 1-2 Tablets by mouth 2 times a day. Take 4mg in the AM and 2mg PM - Oral    Sent to pharmacy as: Bumetanide 2 MG Oral Tablet (BUMEX)    Notes to Pharmacy: Take 1-2 Tablets by mouth 2 times a day. Take 4mg in the AM and 2mg PM    Cosign for Ordering: Required by KIRILL Hogan    E-Prescribing Status: Receipt confirmed by pharmacy (8/25/2022  1:50 PM PDT)      Pharmacy    [id number] - [place], [place] - [address] AT [facility] [address]  [address]

## 2022-08-25 NOTE — TELEPHONE ENCOUNTER
GERALDINE    Caller: Zeinab Loza    Topic/issue: BUMEX    Patient states that she received confirmation from the mail order pharmacy. However she will run out before she gets the medication in the mail. Patient would like to know if it is possible to get a short fill at her local pharmacy.     [id number] - [place], [place] - [address] AT [facility] [address]    Please adviseMalik    Callback Number: [phone] (home) [phone] (work)

## 2022-09-23 ENCOUNTER — APPOINTMENT (OUTPATIENT)
Dept: LAB | Facility: MEDICAL CENTER | Age: 77
End: 2022-09-23
Payer: MEDICARE

## 2022-09-29 ENCOUNTER — PATIENT MESSAGE (OUTPATIENT)
Dept: CARDIOLOGY | Facility: MEDICAL CENTER | Age: 77
End: 2022-09-29
Payer: MEDICARE

## 2022-09-29 DIAGNOSIS — I48.0 PAROXYSMAL ATRIAL FIBRILLATION (HCC): ICD-10-CM

## 2022-09-29 DIAGNOSIS — I49.8 ATRIAL ARRHYTHMIA: ICD-10-CM

## 2022-09-29 DIAGNOSIS — Z86.79 S/P ABLATION OF ATRIAL FIBRILLATION: ICD-10-CM

## 2022-09-29 DIAGNOSIS — Z98.890 S/P ABLATION OF ATRIAL FIBRILLATION: ICD-10-CM

## 2022-09-29 RX ORDER — METOPROLOL TARTRATE 50 MG/1
50 TABLET, FILM COATED ORAL 2 TIMES DAILY
Qty: 60 TABLET | Refills: 5 | Status: SHIPPED | OUTPATIENT
Start: 2022-09-29 | End: 2022-09-30

## 2022-09-29 NOTE — PATIENT COMMUNICATION
Last OV 4/12/22    5.  Hypertension:   -BP stable at this time   -Continue metoprolol 50 mg twice a day

## 2022-10-11 ENCOUNTER — OFFICE VISIT (OUTPATIENT)
Dept: CARDIOLOGY | Facility: MEDICAL CENTER | Age: 77
End: 2022-10-11
Payer: MEDICARE

## 2022-10-11 VITALS
WEIGHT: 206 LBS | HEART RATE: 75 BPM | HEIGHT: 67 IN | BODY MASS INDEX: 32.33 KG/M2 | DIASTOLIC BLOOD PRESSURE: 76 MMHG | RESPIRATION RATE: 14 BRPM | OXYGEN SATURATION: 97 % | SYSTOLIC BLOOD PRESSURE: 114 MMHG

## 2022-10-11 DIAGNOSIS — Z86.79 S/P ABLATION OF ATRIAL FIBRILLATION: ICD-10-CM

## 2022-10-11 DIAGNOSIS — I48.0 PAROXYSMAL ATRIAL FIBRILLATION (HCC): ICD-10-CM

## 2022-10-11 DIAGNOSIS — Z98.890 S/P ABLATION OF ATRIAL FIBRILLATION: ICD-10-CM

## 2022-10-11 DIAGNOSIS — I49.8 ATRIAL ARRHYTHMIA: ICD-10-CM

## 2022-10-11 DIAGNOSIS — I48.91 ATRIAL FIBRILLATION, UNSPECIFIED TYPE (HCC): ICD-10-CM

## 2022-10-11 PROCEDURE — 93000 ELECTROCARDIOGRAM COMPLETE: CPT | Performed by: INTERNAL MEDICINE

## 2022-10-11 PROCEDURE — 99214 OFFICE O/P EST MOD 30 MIN: CPT | Performed by: INTERNAL MEDICINE

## 2022-10-11 RX ORDER — METOPROLOL TARTRATE 50 MG/1
50 TABLET, FILM COATED ORAL 2 TIMES DAILY
Qty: 180 TABLET | Refills: 3 | Status: SHIPPED | OUTPATIENT
Start: 2022-10-11 | End: 2022-12-30 | Stop reason: SDUPTHER

## 2022-10-11 ASSESSMENT — FIBROSIS 4 INDEX: FIB4 SCORE: 1.74

## 2022-10-11 NOTE — PROGRESS NOTES
Arrhythmia Clinic Note (Established patient)    DOS: 10/11/2022    Chief complaint/Reason for consult: Afib    Interval History: 76 y/o F with h/o Maze and ablation with recurrent atypical flutter now on amiodarone. Doing well no complaints.     ROS (+ highlighted in bold):  Constitutional: Fevers/chills/fatigue/weightloss  HEENT: Blurry vision/eye pain/sore throat/hearing loss  Respiratory: Shortness of breath/cough  Cardiovascular: Chest pain/palpitations/edema/orthopnea/syncope  GI: Nausea/vomitting/diarrhea  MSK: Arthralgias/myagias/muscle weakness  Skin: Rash/sores  Neurological: Numbness/tremors/vertigo  Endocrine: Excessive thirst/polyuria/cold intolerance/heat intolerance  Psych: Depression/anxiety    Past Medical History:   Diagnosis Date    Aneurysm artery, celiac (HCC) 2019    Aneurysm of right renal artery (HCC)     Atrial fibrillation (HCC)     and flutter    Breath shortness     5L O2 all the time    Chickenpox     Chronic renal insufficiency     Chronic respiratory failure (HCC)     5L NC at all times (preferred) has port tank and concentrator    Congestive heart failure (HCC)     Diastolic heart failure (HCC)     Disorder of thyroid     hypo    Wolof measles     Heart burn     Heart valve disease     MV repair    High cholesterol     Hypertension, essential     Macrocytic anemia     Neuropathy     Pulmonary hypertension (HCC)     Sleep apnea     Bipap    Snoring        Past Surgical History:   Procedure Laterality Date    PB TOTAL KNEE ARTHROPLASTY Right 8/16/2022    Procedure: ARTHROPLASTY, KNEE, TOTAL;  Surgeon: Mars Mclean M.D.;  Location: [facility];  Service: Orthopedics    FL COLONOSCOPY,DIAGNOSTIC N/A 8/5/2022    Procedure: DIAGNOSTIC COLONOSCOPY;  Surgeon: Juan Cook M.D.;  Location: [facility];  Service: Gastroenterology    PB TOTAL KNEE ARTHROPLASTY Left 11/23/2021    Procedure: ARTHROPLASTY, KNEE, TOTAL;  Surgeon: Mars Mclean M.D.;  Location: [facility];  Service: Orthopedics    ORIF, ANKLE Left 08/05/2020    Procedure: ORIF, ANKLE;  Surgeon: Tk Humphreys M.D.;  Location: SURGERY [facility];  Service: Orthopedics    WRIST FUSION Left 2017    ORIF wrist    MITRAL VALVE REPAIR  2005    CATARACT EXTRACTION WITH IOL Bilateral     HYSTERECTOMY LAPAROSCOPY      OTHER      neck surgery Cervical fusion 4, 5, & 6    TONSILLECTOMY         Social History     Socioeconomic History    Marital status:      Spouse name: Not on file    Number of children: Not on file    Years of education: Not on file    Highest education level: 12th grade   Occupational History    Not on file   Tobacco Use    Smoking status: Never    Smokeless tobacco: Never   Vaping Use    Vaping Use: Never used   Substance and Sexual Activity    Alcohol use: Not Currently     Alcohol/week: 4.2 oz     Types: 7 Shots of liquor per week    Drug use: No    Sexual activity: Not on file   Other Topics Concern    Not on file   Social History Narrative    Not on file     Social Determinants of Health     Financial Resource Strain: Low Risk     Difficulty of Paying Living Expenses: Not hard at all   Food Insecurity: No Food Insecurity    Worried About Running Out of Food in the Last Year: Never true    Ran Out of Food in the Last Year: Never true   Transportation Needs: No Transportation Needs    Lack of Transportation (Medical): No    Lack of Transportation (Non-Medical): No   Physical Activity: Inactive    Days of Exercise per Week: 0 days    Minutes of Exercise per Session: 0 min   Stress: No Stress Concern Present    Feeling of Stress : Only a little   Social Connections: Moderately Integrated    Frequency of Communication with Friends and Family: Three times a week    Frequency of Social Gatherings with Friends and Family: Once a week    Attends Yazidi Services: More than 4 times per year    Active Member of Clubs or Organizations: Yes    Attends Club or Organization Meetings: More than  4 times per year    Marital Status:    Intimate Partner Violence: Not on file   Housing Stability: Low Risk     Unable to Pay for Housing in the Last Year: No    Number of Places Lived in the Last Year: 1    Unstable Housing in the Last Year: No       Family History   Problem Relation Age of Onset    Heart Disease Maternal Grandfather     Heart Disease Paternal Grandfather        Allergies   Allergen Reactions    Betapace [Sotalol] Anaphylaxis    Tikosyn [Dofetilide] Swelling     TONGUE SWELL.     Zolpidem Tartrate Unspecified     Lightheaded and confusion       Current Outpatient Medications   Medication Sig Dispense Refill    metoprolol tartrate (LOPRESSOR) 50 MG Tab Take 1 Tablet by mouth 2 times a day. 180 Tablet 3    bumetanide (BUMEX) 2 MG tablet Take 1-2 Tablets by mouth 2 times a day. Take 4mg in the AM and 2mg  Tablet 2    Tadalafil, PAH, 20 MG Tab Take 2 Tablets by mouth at bedtime.      Docusate Calcium (STOOL SOFTENER PO) Take 1 Capsule by mouth as needed.      apixaban (ELIQUIS) 5mg Tab Take 1 Tablet by mouth 2 times a day. 180 Tablet 3    amiodarone (CORDARONE) 200 MG Tab Take 1 Tablet by mouth every day. 90 Tablet 3    potassium chloride SA (KDUR) 20 MEQ Tab CR Take 2 Tablets by mouth every day. 180 Tablet 3    rosuvastatin (CRESTOR) 40 MG tablet Take 1 Tablet by mouth every evening. 90 Tablet 3    omeprazole (PRILOSEC) 40 MG delayed-release capsule Take 1 Capsule by mouth every day. (Patient taking differently: Take 40 mg by mouth 1 time a day as needed.) 90 Capsule 3    oxyCODONE-acetaminophen (PERCOCET) 7.5-325 MG per tablet Take 1 Tablet by mouth every 6 hours as needed.      tretinoin (RETIN-A) 0.1 % cream Apply 1 Application topically every evening.      PARoxetine (PAXIL) 20 MG Tab Take 20 mg by mouth every morning.      levothyroxine (SYNTHROID) 75 MCG Tab Take 75 mcg by mouth Every morning on an empty stomach.      Cholecalciferol (VITAMIN D) 2000 units Cap Take 2,000 Units by  "mouth every day.      meloxicam (MOBIC) 7.5 MG Tab TAKE 1 TABLET BY MOUTH EVERY DAY (Patient not taking: Reported on 10/11/2022) 30 Tablet 0     No current facility-administered medications for this visit.       Physical Exam:  Vitals:    10/11/22 1301   BP: 114/76   BP Location: Left arm   Patient Position: Sitting   BP Cuff Size: Adult   Pulse: 75   Resp: 14   SpO2: 97%   Weight: 93.4 kg (206 lb)   Height: 1.702 m (5' 7\")     General appearance: NAD, conversant   Eyes: anicteric sclerae, moist conjunctivae; no lid-lag; PERRLA  HENT: Atraumatic; oropharynx clear with moist mucous membranes and no mucosal ulcerations; normal hard and soft palate  Neck: Trachea midline; FROM, supple, no thyromegaly or lymphadenopathy  Lungs: CTA, with normal respiratory effort and no intercostal retractions  CV: RRR, no MRGs, no JVD  Abdomen: Soft, non-tender; no masses or HSM  Extremities: No peripheral edema or extremity lymphadenopathy  Skin: Normal temperature, turgor and texture; no rash, ulcers or subcutaneous nodules  Psych: Appropriate affect, alert and oriented to person, place and time    Data:  Lipids:   Lab Results   Component Value Date/Time    CHOLSTRLTOT 184 06/13/2022 11:39 AM    TRIGLYCERIDE 67 06/13/2022 11:39 AM    HDL 96 06/13/2022 11:39 AM    LDL 75 06/13/2022 11:39 AM        BMP:  Lab Results   Component Value Date/Time    SODIUM 142 07/27/2022 1447    POTASSIUM 4.1 07/27/2022 1447    CHLORIDE 99 07/27/2022 1447    CO2 32 07/27/2022 1447    GLUCOSE 99 07/27/2022 1447    BUN 42 (H) 07/27/2022 1447    CREATININE 1.19 07/27/2022 1447    CALCIUM 10.7 (H) 07/27/2022 1447    ANION 11.0 07/27/2022 1447        TSH:   Lab Results   Component Value Date/Time    TSHULTRASEN 0.750 06/13/2022 1139        THYROXINE (T4):   No results found for: FREEDIR     CBC:   Lab Results   Component Value Date/Time    WBC 5.2 07/27/2022 02:47 PM    RBC 3.80 (L) 07/27/2022 02:47 PM    HEMOGLOBIN 10.0 (L) 08/17/2022 03:37 AM    HEMATOCRIT " 31.8 (L) 08/17/2022 03:37 AM    .4 (H) 07/27/2022 02:47 PM    MCH 32.9 07/27/2022 02:47 PM    MCHC 31.8 (L) 07/27/2022 02:47 PM    RDW 55.1 (H) 07/27/2022 02:47 PM    PLATELETCT 259 07/27/2022 02:47 PM    MPV 9.5 07/27/2022 02:47 PM    NEUTSPOLYS 63.10 07/27/2022 02:47 PM    LYMPHOCYTES 23.00 07/27/2022 02:47 PM    MONOCYTES 10.60 07/27/2022 02:47 PM    EOSINOPHILS 2.30 07/27/2022 02:47 PM    BASOPHILS 0.60 07/27/2022 02:47 PM    IMMGRAN 0.40 07/27/2022 02:47 PM    NRBC 0.00 07/27/2022 02:47 PM    NEUTS 3.27 07/27/2022 02:47 PM    LYMPHS 1.19 07/27/2022 02:47 PM    LYMPHS 16 07/09/2019 09:30 AM    MONOS 0.55 07/27/2022 02:47 PM    EOS 0.12 07/27/2022 02:47 PM    BASO 0.03 07/27/2022 02:47 PM    IMMGRANAB 0.02 07/27/2022 02:47 PM    NRBCAB 0.00 07/27/2022 02:47 PM        CBC w/o DIFF  Lab Results   Component Value Date/Time    WBC 5.2 07/27/2022 02:47 PM    RBC 3.80 (L) 07/27/2022 02:47 PM    HEMOGLOBIN 10.0 (L) 08/17/2022 03:37 AM    .4 (H) 07/27/2022 02:47 PM    MCH 32.9 07/27/2022 02:47 PM    MCHC 31.8 (L) 07/27/2022 02:47 PM    RDW 55.1 (H) 07/27/2022 02:47 PM    MPV 9.5 07/27/2022 02:47 PM       Prior echo/stress reviewed: EF normal    EKG interpreted by me: SR    Impression/Plan:  1. Atrial fibrillation, unspecified type (HCC)  EKG    TSH WITH REFLEX TO FT4    Comp Metabolic Panel      2. Paroxysmal atrial fibrillation (HCC)  metoprolol tartrate (LOPRESSOR) 50 MG Tab      3. S/P repeat ablation of atrial fibrillation/atrial flutter  metoprolol tartrate (LOPRESSOR) 50 MG Tab      4. Atrial arrhythmia  metoprolol tartrate (LOPRESSOR) 50 MG Tab        AF s/p ablation  Atypical flutter  High risk medication management  Anticoagulation management  Pulmonary HTN    - Continue amiodarone. Check TSH and CMP, ordered.  - FV with Dr Eng for PH  - Continue Eliquis, labs reviewed    FV 6 months    Thee Galindo MD  Cardiac Electrophysiology

## 2022-10-21 ENCOUNTER — HOSPITAL ENCOUNTER (OUTPATIENT)
Dept: CARDIOLOGY | Facility: MEDICAL CENTER | Age: 77
End: 2022-10-21
Attending: INTERNAL MEDICINE
Payer: MEDICARE

## 2022-10-21 DIAGNOSIS — R06.02 SHORTNESS OF BREATH: ICD-10-CM

## 2022-10-21 DIAGNOSIS — I27.20 PULMONARY HYPERTENSION (HCC): ICD-10-CM

## 2022-10-21 LAB — LV EJECT FRACT  99904: 60

## 2022-10-21 PROCEDURE — 93306 TTE W/DOPPLER COMPLETE: CPT

## 2022-10-21 PROCEDURE — 93306 TTE W/DOPPLER COMPLETE: CPT | Mod: 26 | Performed by: INTERNAL MEDICINE

## 2022-10-25 ENCOUNTER — HOSPITAL ENCOUNTER (OUTPATIENT)
Dept: LAB | Facility: MEDICAL CENTER | Age: 77
End: 2022-10-25
Attending: INTERNAL MEDICINE
Payer: MEDICARE

## 2022-10-25 LAB
ALBUMIN SERPL BCP-MCNC: 4.5 G/DL (ref 3.2–4.9)
ALBUMIN/GLOB SERPL: 1.9 G/DL
ALP SERPL-CCNC: 97 U/L (ref 30–99)
ALT SERPL-CCNC: 11 U/L (ref 2–50)
ANION GAP SERPL CALC-SCNC: 10 MMOL/L (ref 7–16)
AST SERPL-CCNC: 14 U/L (ref 12–45)
BASOPHILS # BLD AUTO: 0.7 % (ref 0–1.8)
BASOPHILS # BLD: 0.03 K/UL (ref 0–0.12)
BILIRUB SERPL-MCNC: 0.4 MG/DL (ref 0.1–1.5)
BUN SERPL-MCNC: 26 MG/DL (ref 8–22)
CALCIUM SERPL-MCNC: 10.8 MG/DL (ref 8.5–10.5)
CHLORIDE SERPL-SCNC: 100 MMOL/L (ref 96–112)
CO2 SERPL-SCNC: 33 MMOL/L (ref 20–33)
CREAT SERPL-MCNC: 1.14 MG/DL (ref 0.5–1.4)
EOSINOPHIL # BLD AUTO: 0.1 K/UL (ref 0–0.51)
EOSINOPHIL NFR BLD: 2.2 % (ref 0–6.9)
ERYTHROCYTE [DISTWIDTH] IN BLOOD BY AUTOMATED COUNT: 52.6 FL (ref 35.9–50)
GFR SERPLBLD CREATININE-BSD FMLA CKD-EPI: 50 ML/MIN/1.73 M 2
GLOBULIN SER CALC-MCNC: 2.4 G/DL (ref 1.9–3.5)
GLUCOSE SERPL-MCNC: 104 MG/DL (ref 65–99)
HCT VFR BLD AUTO: 40.2 % (ref 37–47)
HGB BLD-MCNC: 12.7 G/DL (ref 12–16)
IMM GRANULOCYTES # BLD AUTO: 0.02 K/UL (ref 0–0.11)
IMM GRANULOCYTES NFR BLD AUTO: 0.4 % (ref 0–0.9)
LYMPHOCYTES # BLD AUTO: 1.23 K/UL (ref 1–4.8)
LYMPHOCYTES NFR BLD: 27.3 % (ref 22–41)
MCH RBC QN AUTO: 32.5 PG (ref 27–33)
MCHC RBC AUTO-ENTMCNC: 31.6 G/DL (ref 33.6–35)
MCV RBC AUTO: 102.8 FL (ref 81.4–97.8)
MONOCYTES # BLD AUTO: 0.54 K/UL (ref 0–0.85)
MONOCYTES NFR BLD AUTO: 12 % (ref 0–13.4)
NEUTROPHILS # BLD AUTO: 2.58 K/UL (ref 2–7.15)
NEUTROPHILS NFR BLD: 57.4 % (ref 44–72)
NRBC # BLD AUTO: 0 K/UL
NRBC BLD-RTO: 0 /100 WBC
PLATELET # BLD AUTO: 249 K/UL (ref 164–446)
PMV BLD AUTO: 9.2 FL (ref 9–12.9)
POTASSIUM SERPL-SCNC: 3.8 MMOL/L (ref 3.6–5.5)
PROT SERPL-MCNC: 6.9 G/DL (ref 6–8.2)
RBC # BLD AUTO: 3.91 M/UL (ref 4.2–5.4)
SODIUM SERPL-SCNC: 143 MMOL/L (ref 135–145)
WBC # BLD AUTO: 4.5 K/UL (ref 4.8–10.8)

## 2022-10-25 PROCEDURE — 83880 ASSAY OF NATRIURETIC PEPTIDE: CPT

## 2022-10-25 PROCEDURE — 80053 COMPREHEN METABOLIC PANEL: CPT

## 2022-10-25 PROCEDURE — 85025 COMPLETE CBC W/AUTO DIFF WBC: CPT

## 2022-10-25 PROCEDURE — 36415 COLL VENOUS BLD VENIPUNCTURE: CPT

## 2022-10-26 LAB — NT-PROBNP SERPL IA-MCNC: 321 PG/ML (ref 0–125)

## 2022-10-27 ENCOUNTER — TELEPHONE (OUTPATIENT)
Dept: CARDIOLOGY | Facility: MEDICAL CENTER | Age: 77
End: 2022-10-27
Payer: MEDICARE

## 2022-10-27 NOTE — TELEPHONE ENCOUNTER
Caller: Rosa Maria    Office Name, phone number, fax number: [facility], # [phone], fax# [phone]    Fax clearance to [phone]    Procedure Name: Spinal Cord Stimulator Implant    Procedure Scheduled Date: n/a    Callback Number: [phone]      Thank you    -Aguila BABIN

## 2022-10-31 ENCOUNTER — HOSPITAL ENCOUNTER (OUTPATIENT)
Dept: PULMONOLOGY | Facility: MEDICAL CENTER | Age: 77
End: 2022-10-31
Attending: INTERNAL MEDICINE
Payer: MEDICARE

## 2022-10-31 PROCEDURE — 94726 PLETHYSMOGRAPHY LUNG VOLUMES: CPT | Mod: 26 | Performed by: STUDENT IN AN ORGANIZED HEALTH CARE EDUCATION/TRAINING PROGRAM

## 2022-10-31 PROCEDURE — 94729 DIFFUSING CAPACITY: CPT

## 2022-10-31 PROCEDURE — 94726 PLETHYSMOGRAPHY LUNG VOLUMES: CPT

## 2022-10-31 PROCEDURE — 94729 DIFFUSING CAPACITY: CPT | Mod: 26 | Performed by: STUDENT IN AN ORGANIZED HEALTH CARE EDUCATION/TRAINING PROGRAM

## 2022-10-31 PROCEDURE — 94010 BREATHING CAPACITY TEST: CPT

## 2022-10-31 ASSESSMENT — PULMONARY FUNCTION TESTS
FVC_PREDICTED: 2.99
FVC: 2.13
FVC_PERCENT_PREDICTED: 71
FEV1/FVC: 71
FEV1/FVC_PERCENT_PREDICTED: 76
FEV1_PERCENT_PREDICTED: 66
FEV1_PREDICTED: 2.28
FEV1: 1.52
FEV1/FVC_PERCENT_LLN: 64
FEV1/FVC_PERCENT_PREDICTED: 93
FEV1/FVC: 71
FEV1/FVC_PERCENT_PREDICTED: 92
FEV1_LLN: 1.9
FVC_LLN: 2.49
FEV1/FVC_PREDICTED: 77

## 2022-11-02 LAB — EKG IMPRESSION: NORMAL

## 2022-11-02 NOTE — TELEPHONE ENCOUNTER
Thee Galindo M.D.  You 15 hours ago (5:26 PM)     This patient has pulmonary hypertension which I am not able to comment on as an electrophysiologist, does follow with Dr Cutler for this, should have an appointment regardless to see them again so maybe go ahead with that      You  Thee Galindo M.D. 17 hours ago (4:13 PM)     To  - would you like to review clearance or have pt follow up with TEODORO Busch.  You 18 hours ago (2:28 PM)     I have not seen this patient since April, so if she wants a clearance from me, then she needs to come in, but She did see Dr. Galindo about 3 weeks ago. Can he make recommendations?     LVM maynor Crane regarding pt needing appt

## 2022-11-03 ENCOUNTER — PATIENT MESSAGE (OUTPATIENT)
Dept: HEALTH INFORMATION MANAGEMENT | Facility: OTHER | Age: 77
End: 2022-11-03

## 2022-11-07 NOTE — PROCEDURES
DATE OF SERVICE:  10/31/2022     PULMONARY FUNCTION TEST INTERPRETATION     There is no significant obstruction or restriction.  Normal diffusion   capacity.  Bronchodilator testing was not performed.        ______________________________  MD SNEHAL Thorne/IZZY    DD:  11/06/2022 22:26  DT:  11/07/2022 00:00    Job#:  [id number]

## 2022-12-01 ENCOUNTER — TELEPHONE (OUTPATIENT)
Dept: CARDIOLOGY | Facility: MEDICAL CENTER | Age: 77
End: 2022-12-01

## 2022-12-01 NOTE — TELEPHONE ENCOUNTER
Caller: Rosa Maria    Office Name, phone number, fax number: [facility], # [phone], fax# [phone]    Fax clearance to [phone]    Procedure Name: Spinal Cord Stimulator    Procedure Scheduled Date: n/a    Callback Number: [phone]        Thank you    -Aguila BABIN

## 2022-12-05 NOTE — TELEPHONE ENCOUNTER
Olegario letter sent to pt and [facility]    November 2, 2022  Me        9:19 AM  Note   Thee Galindo M.D.  You 15 hours ago (5:26 PM)      This patient has pulmonary hypertension which I am not able to comment on as an electrophysiologist, does follow with Dr Cutler for this, should have an appointment regardless to see them again so maybe go ahead with that       You  Thee Galindo M.D. 17 hours ago (4:13 PM)      To  - would you like to review clearance or have pt follow up with KIRILL Busch  You 18 hours ago (2:28 PM)      I have not seen this patient since April, so if she wants a clearance from me, then she needs to come in, but She did see Dr. Galindo about 3 weeks ago. Can he make recommendations?

## 2022-12-07 NOTE — TELEPHONE ENCOUNTER
Caller: Rosa Maria    Name and Department:     [facility]  T: [phone]    Topic/Issue: CLEARANCE    Rosa Maria would like to know the status of this patients clearance. Please advise.    Thank you,  Malik BETANCOURT    Callback Number or Extension: [phone] (home) [phone] (work)

## 2022-12-28 ENCOUNTER — PATIENT MESSAGE (OUTPATIENT)
Dept: CARDIOLOGY | Facility: MEDICAL CENTER | Age: 77
End: 2022-12-28
Payer: MEDICARE

## 2022-12-30 ENCOUNTER — OFFICE VISIT (OUTPATIENT)
Dept: CARDIOLOGY | Facility: MEDICAL CENTER | Age: 77
End: 2022-12-30
Payer: MEDICARE

## 2022-12-30 VITALS
OXYGEN SATURATION: 92 % | WEIGHT: 221 LBS | DIASTOLIC BLOOD PRESSURE: 76 MMHG | BODY MASS INDEX: 34.69 KG/M2 | RESPIRATION RATE: 16 BRPM | SYSTOLIC BLOOD PRESSURE: 112 MMHG | HEIGHT: 67 IN | HEART RATE: 73 BPM

## 2022-12-30 DIAGNOSIS — Z85.41 PERSONAL HISTORY OF MALIGNANT NEOPLASM OF CERVIX UTERI: ICD-10-CM

## 2022-12-30 DIAGNOSIS — R27.9 INCOORDINATION: ICD-10-CM

## 2022-12-30 DIAGNOSIS — Z86.79 S/P ABLATION OF ATRIAL FIBRILLATION: ICD-10-CM

## 2022-12-30 DIAGNOSIS — I49.8 ATRIAL ARRHYTHMIA: ICD-10-CM

## 2022-12-30 DIAGNOSIS — Z98.890 S/P ABLATION OF ATRIAL FIBRILLATION: ICD-10-CM

## 2022-12-30 DIAGNOSIS — N18.30 CHRONIC RENAL IMPAIRMENT, STAGE 3 (MODERATE), UNSPECIFIED WHETHER STAGE 3A OR 3B CKD: ICD-10-CM

## 2022-12-30 DIAGNOSIS — E78.2 MIXED HYPERLIPIDEMIA: ICD-10-CM

## 2022-12-30 DIAGNOSIS — I27.21 PAH (PULMONARY ARTERY HYPERTENSION) (HCC): ICD-10-CM

## 2022-12-30 DIAGNOSIS — E78.49 OTHER HYPERLIPIDEMIA: ICD-10-CM

## 2022-12-30 DIAGNOSIS — Z79.899 HIGH RISK MEDICATION USE: ICD-10-CM

## 2022-12-30 DIAGNOSIS — I48.0 PAROXYSMAL ATRIAL FIBRILLATION (HCC): ICD-10-CM

## 2022-12-30 DIAGNOSIS — I48.91 ATRIAL FIBRILLATION, UNSPECIFIED TYPE (HCC): ICD-10-CM

## 2022-12-30 DIAGNOSIS — I10 ESSENTIAL HYPERTENSION: ICD-10-CM

## 2022-12-30 DIAGNOSIS — K59.03 DRUG-INDUCED CONSTIPATION: ICD-10-CM

## 2022-12-30 DIAGNOSIS — I48.92 ATRIAL FLUTTER, UNSPECIFIED TYPE (HCC): ICD-10-CM

## 2022-12-30 DIAGNOSIS — J96.11 CHRONIC RESPIRATORY FAILURE WITH HYPOXIA (HCC): ICD-10-CM

## 2022-12-30 DIAGNOSIS — I50.82 ACUTE ON CHRONIC CONGESTIVE HEART FAILURE WITH RIGHT VENTRICULAR DIASTOLIC DYSFUNCTION (HCC): ICD-10-CM

## 2022-12-30 DIAGNOSIS — I50.33 ACUTE ON CHRONIC CONGESTIVE HEART FAILURE WITH RIGHT VENTRICULAR DIASTOLIC DYSFUNCTION (HCC): ICD-10-CM

## 2022-12-30 PROCEDURE — 99214 OFFICE O/P EST MOD 30 MIN: CPT | Performed by: INTERNAL MEDICINE

## 2022-12-30 RX ORDER — ROSUVASTATIN CALCIUM 40 MG/1
40 TABLET, COATED ORAL EVERY EVENING
Qty: 90 TABLET | Refills: 3 | Status: SHIPPED | OUTPATIENT
Start: 2022-12-30 | End: 2023-01-13 | Stop reason: SDUPTHER

## 2022-12-30 RX ORDER — METOPROLOL TARTRATE 50 MG/1
50 TABLET, FILM COATED ORAL 2 TIMES DAILY
Qty: 200 TABLET | Refills: 3 | Status: SHIPPED | OUTPATIENT
Start: 2022-12-30 | End: 2023-01-13 | Stop reason: SDUPTHER

## 2022-12-30 RX ORDER — TADALAFIL 20 MG/1
2 TABLET ORAL
Qty: 100 TABLET | Refills: 3 | Status: SHIPPED | OUTPATIENT
Start: 2022-12-30 | End: 2023-01-13 | Stop reason: SDUPTHER

## 2022-12-30 ASSESSMENT — ENCOUNTER SYMPTOMS
WEAKNESS: 0
MUSCULOSKELETAL NEGATIVE: 1
PALPITATIONS: 0
COUGH: 0
LOSS OF CONSCIOUSNESS: 0
NEUROLOGICAL NEGATIVE: 1
FEVER: 0
CLAUDICATION: 0
GASTROINTESTINAL NEGATIVE: 1
CHILLS: 0
STRIDOR: 0
SHORTNESS OF BREATH: 0
SPUTUM PRODUCTION: 0
WHEEZING: 0
RESPIRATORY NEGATIVE: 1
SORE THROAT: 0
PND: 0
BRUISES/BLEEDS EASILY: 0
ORTHOPNEA: 0
EYES NEGATIVE: 1
CARDIOVASCULAR NEGATIVE: 1
DIZZINESS: 0
CONSTITUTIONAL NEGATIVE: 1
HEMOPTYSIS: 0

## 2022-12-30 ASSESSMENT — FIBROSIS 4 INDEX: FIB4 SCORE: 1.27

## 2022-12-30 NOTE — PROGRESS NOTES
Chief Complaint   Patient presents with    Atrial Fibrillation    Atrial Flutter    Hypertension       Subjective     Zeinab Loza is a 77 y.o. female who presents today as a follow-up for her pulmonary hypertension secondary to her Eisenmenger's syndrome and is status post Amplatzer from years ago.  She continues to have fluid retention.  Is getting it been getting worse this time a year.  She is up to taking 2 Bumex pills twice a day.  She is concerned about going up on this.  She did lose 6 pounds yesterday.  She is having no chest pain.  She has no lower extremity edema.  She had PFTs recently which were normal.    Past Medical History:   Diagnosis Date    Aneurysm artery, celiac (HCC) 2019    Aneurysm of right renal artery (HCC)     Atrial fibrillation (HCC)     and flutter    Breath shortness     5L O2 all the time    Chickenpox     Chronic renal insufficiency     Chronic respiratory failure (HCC)     5L NC at all times (preferred) has port tank and concentrator    Congestive heart failure (HCC)     Diastolic heart failure (HCC)     Disorder of thyroid     hypo    Barb[place] measles     Heart burn     Heart valve disease     MV repair    High cholesterol     Hypertension, essential     Macrocytic anemia     Neuropathy     Pulmonary hypertension (HCC)     Sleep apnea     Bipap    Snoring      Past Surgical History:   Procedure Laterality Date    PB TOTAL KNEE ARTHROPLASTY Right 8/16/2022    Procedure: ARTHROPLASTY, KNEE, TOTAL;  Surgeon: Mars Mclean M.D.;  Location: SURGERY [facility];  Service: Orthopedics    LA COLONOSCOPY,DIAGNOSTIC N/A 8/5/2022    Procedure: DIAGNOSTIC COLONOSCOPY;  Surgeon: Juan Cook M.D.;  Location: SURGERY [facility];  Service: Gastroenterology    PB TOTAL KNEE ARTHROPLASTY Left 11/23/2021    Procedure: ARTHROPLASTY, KNEE, TOTAL;  Surgeon: Mars Mclean M.D.;  Location: SURGERY [facility];  Service: Orthopedics    ORIF, ANKLE Left 08/05/2020    Procedure: ORIF, ANKLE;   Surgeon: Tk Humphreys M.D.;  Location: SURGERY [facility];  Service: Orthopedics    WRIST FUSION Left 2017    ORIF wrist    MITRAL VALVE REPAIR  2005    CATARACT EXTRACTION WITH IOL Bilateral     HYSTERECTOMY LAPAROSCOPY      OTHER      neck surgery Cervical fusion 4, 5, & 6    TONSILLECTOMY       Family History   Problem Relation Age of Onset    Heart Disease Maternal Grandfather     Heart Disease Paternal Grandfather      Social History     Socioeconomic History    Marital status:      Spouse name: Not on file    Number of children: Not on file    Years of education: Not on file    Highest education level: 12th grade   Occupational History    Not on file   Tobacco Use    Smoking status: Never    Smokeless tobacco: Never   Vaping Use    Vaping Use: Never used   Substance and Sexual Activity    Alcohol use: Yes     Alcohol/week: 8.4 oz     Types: 14 Shots of liquor per week    Drug use: No    Sexual activity: Not on file   Other Topics Concern    Not on file   Social History Narrative    Not on file     Social Determinants of Health     Financial Resource Strain: Low Risk     Difficulty of Paying Living Expenses: Not hard at all   Food Insecurity: No Food Insecurity    Worried About Running Out of Food in the Last Year: Never true    Ran Out of Food in the Last Year: Never true   Transportation Needs: No Transportation Needs    Lack of Transportation (Medical): No    Lack of Transportation (Non-Medical): No   Physical Activity: Inactive    Days of Exercise per Week: 0 days    Minutes of Exercise per Session: 0 min   Stress: No Stress Concern Present    Feeling of Stress : Only a little   Social Connections: Moderately Integrated    Frequency of Communication with Friends and Family: Three times a week    Frequency of Social Gatherings with Friends and Family: Once a week    Attends Restoration Services: More than 4 times per year    Active Member of Clubs or Organizations: Yes    Attends Club or  Organization Meetings: More than 4 times per year    Marital Status:    Intimate Partner Violence: Not on file   Housing Stability: Low Risk     Unable to Pay for Housing in the Last Year: No    Number of Places Lived in the Last Year: 1    Unstable Housing in the Last Year: No     Allergies   Allergen Reactions    Betapace [Sotalol] Anaphylaxis    Tikosyn [Dofetilide] Swelling     TONGUE SWELL.     Zolpidem Tartrate Unspecified     Lightheaded and confusion     Outpatient Encounter Medications as of 12/30/2022   Medication Sig Dispense Refill    rosuvastatin (CRESTOR) 40 MG tablet Take 1 Tablet by mouth every evening. 90 Tablet 3    Tadalafil, PAH, 20 MG Tab Take 2 Tablets by mouth at bedtime. 100 Tablet 3    metoprolol tartrate (LOPRESSOR) 50 MG Tab Take 1 Tablet by mouth 2 times a day. 200 Tablet 3    bumetanide (BUMEX) 2 MG tablet Take 1-2 Tablets by mouth 2 times a day. Take 4mg in the AM and 2mg  Tablet 2    Docusate Calcium (STOOL SOFTENER PO) Take 1 Capsule by mouth as needed.      apixaban (ELIQUIS) 5mg Tab Take 1 Tablet by mouth 2 times a day. 180 Tablet 3    amiodarone (CORDARONE) 200 MG Tab Take 1 Tablet by mouth every day. 90 Tablet 3    oxyCODONE-acetaminophen (PERCOCET) 7.5-325 MG per tablet Take 1 Tablet by mouth every 6 hours as needed.      PARoxetine (PAXIL) 20 MG Tab Take 20 mg by mouth every morning.      levothyroxine (SYNTHROID) 75 MCG Tab Take 75 mcg by mouth Every morning on an empty stomach.      Cholecalciferol (VITAMIN D) 2000 units Cap Take 2,000 Units by mouth every day.      [DISCONTINUED] metoprolol tartrate (LOPRESSOR) 50 MG Tab Take 1 Tablet by mouth 2 times a day. 180 Tablet 3    [DISCONTINUED] meloxicam (MOBIC) 7.5 MG Tab TAKE 1 TABLET BY MOUTH EVERY DAY (Patient not taking: Reported on 10/11/2022) 30 Tablet 0    [DISCONTINUED] Tadalafil, PAH, 20 MG Tab Take 2 Tablets by mouth at bedtime.      [DISCONTINUED] potassium chloride SA (KDUR) 20 MEQ Tab CR Take 2 Tablets by  "mouth every day. (Patient taking differently: Take 40 mEq by mouth 2 times a day.) 180 Tablet 3    [DISCONTINUED] rosuvastatin (CRESTOR) 40 MG tablet Take 1 Tablet by mouth every evening. 90 Tablet 3    [DISCONTINUED] omeprazole (PRILOSEC) 40 MG delayed-release capsule Take 1 Capsule by mouth every day. (Patient taking differently: Take 40 mg by mouth 1 time a day as needed.) 90 Capsule 3    [DISCONTINUED] tretinoin (RETIN-A) 0.1 % cream Apply 1 Application topically every evening. (Patient not taking: Reported on 12/30/2022)       No facility-administered encounter medications on file as of 12/30/2022.     Review of Systems   Constitutional: Negative.  Negative for chills, fever and malaise/fatigue.   HENT: Negative.  Negative for sore throat.    Eyes: Negative.    Respiratory: Negative.  Negative for cough, hemoptysis, sputum production, shortness of breath, wheezing and stridor.    Cardiovascular: Negative.  Negative for chest pain, palpitations, orthopnea, claudication, leg swelling and PND.   Gastrointestinal: Negative.    Genitourinary: Negative.    Musculoskeletal: Negative.    Skin: Negative.    Neurological: Negative.  Negative for dizziness, loss of consciousness and weakness.   Endo/Heme/Allergies: Negative.  Does not bruise/bleed easily.   All other systems reviewed and are negative.           Objective     /76 (BP Location: Left arm, Patient Position: Sitting, BP Cuff Size: Adult)   Pulse 73   Resp 16   Ht 1.702 m (5' 7\")   Wt 100 kg (221 lb)   SpO2 92%   BMI 34.61 kg/m²     Physical Exam  Vitals and nursing note reviewed.   Constitutional:       General: She is not in acute distress.     Appearance: She is well-developed. She is not diaphoretic.   HENT:      Head: Normocephalic and atraumatic.      Right Ear: External ear normal.      Left Ear: External ear normal.      Nose: Nose normal.      Mouth/Throat:      Pharynx: No oropharyngeal exudate.   Eyes:      General: No scleral icterus.   "      Right eye: No discharge.         Left eye: No discharge.      Conjunctiva/sclera: Conjunctivae normal.      Pupils: Pupils are equal, round, and reactive to light.   Neck:      Vascular: No JVD.   Cardiovascular:      Rate and Rhythm: Normal rate and regular rhythm.      Heart sounds: No murmur heard.    No friction rub. No gallop.   Pulmonary:      Effort: Pulmonary effort is normal. No respiratory distress.      Breath sounds: No stridor. No wheezing or rales.   Chest:      Chest wall: No tenderness.   Abdominal:      General: There is no distension.      Palpations: Abdomen is soft.      Tenderness: There is no guarding.   Musculoskeletal:         General: No tenderness or deformity. Normal range of motion.      Cervical back: Neck supple.   Skin:     General: Skin is warm and dry.      Coloration: Skin is not pale.      Findings: No erythema or rash.   Neurological:      Mental Status: She is alert.      Cranial Nerves: No cranial nerve deficit.      Motor: No abnormal muscle tone.      Coordination: Coordination normal.      Deep Tendon Reflexes: Reflexes are normal and symmetric. Reflexes normal.   Psychiatric:         Behavior: Behavior normal.         Thought Content: Thought content normal.         Judgment: Judgment normal.              Assessment & Plan     1. Atrial fibrillation, unspecified type (HCC)        2. Essential hypertension        3. Drug-induced constipation        4. High risk medication use        5. Other hyperlipidemia  Tadalafil, PAH, 20 MG Tab      6. Chronic respiratory failure with hypoxia (HCC)  Tadalafil, PAH, 20 MG Tab    Basic Metabolic Panel      7. Chronic renal impairment, stage 3 (moderate), unspecified whether stage 3a or 3b CKD (HCC)  Tadalafil, PAH, 20 MG Tab    Basic Metabolic Panel      8. S/P repeat ablation of atrial fibrillation/atrial flutter  Tadalafil, PAH, 20 MG Tab    metoprolol tartrate (LOPRESSOR) 50 MG Tab      9. Personal history of malignant neoplasm of  cervix uteri  Tadalafil, PAH, 20 MG Tab    Basic Metabolic Panel      10. Acute on chronic congestive heart failure with right ventricular diastolic dysfunction (HCC)  Basic Metabolic Panel      11. Incoordination        12. Atrial flutter, unspecified type (HCC)        13. PAH (pulmonary artery hypertension) (HCC)        14. Mixed hyperlipidemia  rosuvastatin (CRESTOR) 40 MG tablet      15. Paroxysmal atrial fibrillation (HCC)  metoprolol tartrate (LOPRESSOR) 50 MG Tab      16. Atrial arrhythmia  metoprolol tartrate (LOPRESSOR) 50 MG Tab          Medical Decision Making: Today's Assessment/Status/Plan:        77-year-old female with pulmonary hypertension from a corrected Eisenmenger syndrome.  We discussed the risk benefits alternatives proceeding right heart catheterization however she is losing her insurance.  We will consider a way to do this in a few months.  In the meantime she can go up to 4 pills twice a day at her Bumex.  We will recheck her labs in 1 to 2 weeks.  She will stay with in touch with us through [facility].  I refilled her other medications.  I will see her back in 6 months.

## 2023-01-05 ENCOUNTER — PATIENT MESSAGE (OUTPATIENT)
Dept: CARDIOLOGY | Facility: MEDICAL CENTER | Age: 78
End: 2023-01-05
Payer: MEDICARE

## 2023-01-05 DIAGNOSIS — E78.2 MIXED HYPERLIPIDEMIA: ICD-10-CM

## 2023-01-05 DIAGNOSIS — Z79.899 HIGH RISK MEDICATION USE: ICD-10-CM

## 2023-01-05 DIAGNOSIS — I27.21 PAH (PULMONARY ARTERY HYPERTENSION) (HCC): ICD-10-CM

## 2023-01-05 DIAGNOSIS — I49.8 ATRIAL ARRHYTHMIA: ICD-10-CM

## 2023-01-05 DIAGNOSIS — J96.11 CHRONIC RESPIRATORY FAILURE WITH HYPOXIA (HCC): ICD-10-CM

## 2023-01-05 DIAGNOSIS — I48.0 PAROXYSMAL ATRIAL FIBRILLATION (HCC): ICD-10-CM

## 2023-01-05 DIAGNOSIS — Z86.79 S/P ABLATION OF ATRIAL FIBRILLATION: ICD-10-CM

## 2023-01-05 DIAGNOSIS — E78.49 OTHER HYPERLIPIDEMIA: ICD-10-CM

## 2023-01-05 DIAGNOSIS — Z85.41 PERSONAL HISTORY OF MALIGNANT NEOPLASM OF CERVIX UTERI: ICD-10-CM

## 2023-01-05 DIAGNOSIS — Z98.890 S/P ABLATION OF ATRIAL FIBRILLATION: ICD-10-CM

## 2023-01-05 DIAGNOSIS — N18.30 CHRONIC RENAL IMPAIRMENT, STAGE 3 (MODERATE), UNSPECIFIED WHETHER STAGE 3A OR 3B CKD: ICD-10-CM

## 2023-01-05 NOTE — TELEPHONE ENCOUNTER
Peripheral Block    Patient location during procedure: pre-op   Block not for primary anesthetic.  Reason for block: at surgeon's request and post-op pain management   Post-op Pain Location: Left shoulder   Start time: 1/5/2023 7:05 AM  Timeout: 1/5/2023 7:05 AM   End time: 1/5/2023 7:15 AM    Staffing  Authorizing Provider: Arnold Sandoval MD  Performing Provider: Arnold Sandoval MD    Preanesthetic Checklist  Completed: patient identified, IV checked, site marked, risks and benefits discussed, surgical consent, monitors and equipment checked, pre-op evaluation and timeout performed  Peripheral Block  Patient position: right lateral decubitus  Prep: ChloraPrep and site prepped and draped  Patient monitoring: heart rate, cardiac monitor, continuous pulse ox and frequent blood pressure checks  Block type: interscalene  Laterality: left  Injection technique: single shot  Needle  Needle type: Short-bevel   Needle gauge: 22 G  Needle length: 3.5 in  Needle localization: nerve stimulator and ultrasound guidance  Needle insertion depth: 4 cm     Assessment  Injection assessment: negative aspiration, negative parasthesia and local visualized surrounding nerve  Paresthesia pain: none  Heart rate change: no  Slow fractionated injection: yes  Pain Tolerance: comfortable throughout block and no complaints      Additional Notes  Consulted for nerve block for postoperative analgesia. After discussion of risks, benefits, and alternatives, I obtained informed consent from the patient for a interscalene nerve block.  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    Timeout performed. In preop holding area, with full monitoring and oxygen per nasal cannula, patient lightly sedated with Versed 1 mg IV and fentanyl 50 µg IV. Left neck prepped with ChloraPrep and draped. Using  Weight is up 15 pounds in last week.       32oz water  Cup of coffee-     Denies salt.     D/W GERALDINE, stated to have pt increase Bumex to 4mg in AM and 2mg in PM, increase water intake, no salt. Repeat labs prior to follow up.    sterile technique, ultrasound, and nerve stimulator, I advanced a Knutson 22-gauge, 3 inch Stimuplex needle toward the brachial plexus, with good deltoid twitches starting at 1 mA and lost at 0.5 mA.  A mixture of 5 cc bupivacaine 0.5% and 10 cc Exparel injected along the posterolateral aspect of the brachial plexus. Injections done easily and incrementally with frequent negative aspiration, and visualized per ultrasound, with no pain or paresthesias. Patient tolerated procedure well and conversant throughout.

## 2023-01-13 RX ORDER — METOPROLOL TARTRATE 50 MG/1
50 TABLET, FILM COATED ORAL 2 TIMES DAILY
Qty: 200 TABLET | Refills: 3 | Status: SHIPPED | OUTPATIENT
Start: 2023-01-13

## 2023-01-13 RX ORDER — ROSUVASTATIN CALCIUM 40 MG/1
40 TABLET, COATED ORAL EVERY EVENING
Qty: 90 TABLET | Refills: 3 | Status: SHIPPED | OUTPATIENT
Start: 2023-01-13 | End: 2023-10-11 | Stop reason: SDUPTHER

## 2023-01-13 RX ORDER — BUMETANIDE 2 MG/1
2-4 TABLET ORAL 2 TIMES DAILY
Qty: 360 TABLET | Refills: 3 | Status: SHIPPED | OUTPATIENT
Start: 2023-01-13 | End: 2023-01-20 | Stop reason: SDUPTHER

## 2023-01-13 RX ORDER — TADALAFIL 20 MG/1
2 TABLET ORAL
Qty: 100 TABLET | Refills: 3 | Status: SHIPPED | OUTPATIENT
Start: 2023-01-13

## 2023-01-13 RX ORDER — AMIODARONE HYDROCHLORIDE 200 MG/1
200 TABLET ORAL DAILY
Qty: 90 TABLET | Refills: 3 | Status: SHIPPED | OUTPATIENT
Start: 2023-01-13

## 2023-01-18 ENCOUNTER — TELEPHONE (OUTPATIENT)
Dept: CARDIOLOGY | Facility: MEDICAL CENTER | Age: 78
End: 2023-01-18
Payer: MEDICARE

## 2023-01-18 DIAGNOSIS — I48.0 PAROXYSMAL ATRIAL FIBRILLATION (HCC): ICD-10-CM

## 2023-01-18 DIAGNOSIS — Z79.899 HIGH RISK MEDICATION USE: ICD-10-CM

## 2023-01-18 DIAGNOSIS — I27.21 PAH (PULMONARY ARTERY HYPERTENSION) (HCC): ICD-10-CM

## 2023-01-18 LAB
BUN SERPL-MCNC: 23 MG/DL (ref 8–27)
BUN/CREAT SERPL: 16 (ref 12–28)
CALCIUM SERPL-MCNC: 10.5 MG/DL (ref 8.7–10.3)
CHLORIDE SERPL-SCNC: 101 MMOL/L (ref 96–106)
CO2 SERPL-SCNC: 30 MMOL/L (ref 20–29)
CREAT SERPL-MCNC: 1.41 MG/DL (ref 0.57–1)
EGFRCR SERPLBLD CKD-EPI 2021: 39 ML/MIN/1.73
GLUCOSE SERPL-MCNC: 130 MG/DL (ref 70–99)
POTASSIUM SERPL-SCNC: 4.2 MMOL/L (ref 3.5–5.2)
SODIUM SERPL-SCNC: 145 MMOL/L (ref 134–144)

## 2023-01-18 NOTE — TELEPHONE ENCOUNTER
ARBEN          Caller: Irma with [facility] pharmacy    Topic/issue: They were calling about directions and quantity for this patient's bumetanide (BUMEX) 2 MG tablet medication and were asking for a call back    Callback Number: [phone]      Thank you      -Aguila BABIN

## 2023-01-19 NOTE — TELEPHONE ENCOUNTER
ARBEN    Caller: [facility]    Topic/issue: Pharmacy called and stated she got disconnected while speaking with Delonte. Tried Delonte's ext a few times, but went to .     Callback Number: [phone]

## 2023-01-19 NOTE — TELEPHONE ENCOUNTER
Phone Number Called: [phone]    Call outcome: Did not leave a detailed message. Requested patient to call back.    Message: RN called number back. Unable to reach pharmacy. Will follow up again tomorrow.

## 2023-01-20 DIAGNOSIS — I48.0 PAROXYSMAL ATRIAL FIBRILLATION (HCC): ICD-10-CM

## 2023-01-20 DIAGNOSIS — Z79.899 HIGH RISK MEDICATION USE: ICD-10-CM

## 2023-01-20 DIAGNOSIS — I27.21 PAH (PULMONARY ARTERY HYPERTENSION) (HCC): ICD-10-CM

## 2023-01-20 NOTE — TELEPHONE ENCOUNTER
Phone Number Called: [phone]    Call outcome:  Spoke to pharmacy    Message: Called to return phone call regarding medication amount.     RN spoke to pharmacist who clarified ordered for Bumex. Dispensed patient a 90 day supply. No further questions at this time.

## 2023-01-27 ENCOUNTER — PATIENT MESSAGE (OUTPATIENT)
Dept: CARDIOLOGY | Facility: MEDICAL CENTER | Age: 78
End: 2023-01-27
Payer: MEDICARE

## 2023-02-01 ENCOUNTER — PATIENT MESSAGE (OUTPATIENT)
Dept: CARDIOLOGY | Facility: MEDICAL CENTER | Age: 78
End: 2023-02-01

## 2023-02-06 ENCOUNTER — TELEPHONE (OUTPATIENT)
Dept: CARDIOLOGY | Facility: MEDICAL CENTER | Age: 78
End: 2023-02-06
Payer: MEDICARE

## 2023-02-06 NOTE — TELEPHONE ENCOUNTER
PA started and sent to plan.    Fax sent to the plan  Your PA has been faxed to the plan as a paper copy. Please contact the plan directly if you haven't received a determination in a typical timeframe.    You will be notified of the determination via fax.

## 2023-02-08 RX ORDER — BUMETANIDE 2 MG/1
2-4 TABLET ORAL 2 TIMES DAILY
Qty: 270 TABLET | Refills: 3 | Status: SHIPPED | OUTPATIENT
Start: 2023-02-08 | End: 2023-03-16 | Stop reason: SDUPTHER

## 2023-03-06 NOTE — PATIENT COMMUNICATION
Received stratification request from [facility] fax [phone]    Procedure: Request is for two procedures:  1- Spinal Cord Stimulator Trial  2-Spinal Cord Simulator implant if trial is successful- asks about risk of undergoing general anesthesia    To GERALDINE - Called and LVM message for Rosa Maria () to call back to inquire if the trial requires general as well. Requesting patient hold Eliquis for 3 days. Ok to proceed at ASC facility or does patient require hospital setting?

## 2023-03-07 ENCOUNTER — TELEPHONE (OUTPATIENT)
Dept: CARDIOLOGY | Facility: MEDICAL CENTER | Age: 78
End: 2023-03-07
Payer: MEDICARE

## 2023-03-07 NOTE — TELEPHONE ENCOUNTER
ARBEN    Caller:  Vahe Crane    Office Name, phone number, fax number:   [facility]  Fax -  [phone]    Fax clearance to [phone]    Procedure Name: Stem Trial     Procedure Scheduled Date: TBD    Callback Number: [phone]    Thank you,   Yancy BRAVO

## 2023-03-16 ENCOUNTER — TELEPHONE (OUTPATIENT)
Dept: CARDIOLOGY | Facility: MEDICAL CENTER | Age: 78
End: 2023-03-16
Payer: MEDICARE

## 2023-03-16 DIAGNOSIS — I27.21 PAH (PULMONARY ARTERY HYPERTENSION) (HCC): ICD-10-CM

## 2023-03-16 DIAGNOSIS — Z79.899 HIGH RISK MEDICATION USE: ICD-10-CM

## 2023-03-16 DIAGNOSIS — I48.0 PAROXYSMAL ATRIAL FIBRILLATION (HCC): ICD-10-CM

## 2023-03-16 RX ORDER — BUMETANIDE 2 MG/1
4 TABLET ORAL 2 TIMES DAILY
Qty: 360 TABLET | Refills: 0 | Status: SHIPPED | OUTPATIENT
Start: 2023-03-16

## 2023-03-16 NOTE — TELEPHONE ENCOUNTER
"Per RO last note \" In the meantime she can go up to 4 pills twice a day at her Bumex\"    3mo supply sent pt needs to be seen.     Refilled under GERALDINE pt care team  "

## 2023-03-16 NOTE — TELEPHONE ENCOUNTER
ARBEN    Caller: Vernell Loza (Daughter)    Medication Name and Dosage:  bumetanide (BUMEX) 2 MG tablet    Medication amount left: 1 week     Preferred Pharmacy:  [facility]  [facility], [place] [address]  Ph. [phone]    Other questions (Topic): Daughter says Dr. Eng had changed the dosage so the need a new script refecting that change.     Callback Number (Will only call for issues): [phone]    Thank you,  -Alesia BABIN

## 2023-06-26 DIAGNOSIS — I27.21 PAH (PULMONARY ARTERY HYPERTENSION) (HCC): ICD-10-CM

## 2023-06-26 DIAGNOSIS — Z79.899 HIGH RISK MEDICATION USE: ICD-10-CM

## 2023-06-26 DIAGNOSIS — I48.0 PAROXYSMAL ATRIAL FIBRILLATION (HCC): ICD-10-CM

## 2023-06-28 RX ORDER — BUMETANIDE 2 MG/1
8 TABLET ORAL 2 TIMES DAILY
Qty: 240 TABLET | Refills: 0 | OUTPATIENT
Start: 2023-06-28

## 2023-06-28 NOTE — TELEPHONE ENCOUNTER
S/W [facility]- confirmed patient has been seen by Cardiologist there. Denying refill and requested [facility] to take over.

## 2023-07-05 ENCOUNTER — TELEPHONE (OUTPATIENT)
Dept: CARDIOLOGY | Facility: MEDICAL CENTER | Age: 78
End: 2023-07-05
Payer: MEDICARE

## 2023-07-05 NOTE — TELEPHONE ENCOUNTER
ARBEN    Caller: [facility]     Medication Name and Dosage: Tadalafil, PAH, 20 MG Tab [[id number]]-Take 2 Tablets by mouth at bedtime.    Please call your pharmacy and have them send us a refill request or speak to a live representative, RX number may have changed.    Medication amount left: aprox. 5 days    Preferred Pharmacy: [facility]    Other questions: n/a     Callback Number (Will only call for issues): [phone]     Thank You  Kristie MORENO

## 2023-07-19 NOTE — CARE PLAN
The patient is Stable - Low risk of patient condition declining or worsening    Shift Goals  Clinical Goals: Pain tolerable at 7/10 or less, ambulate 50 ft, void within 6 hours postop  Patient Goals: pain tolerable at 7/10 or less, comfort    Progress made toward(s) clinical / shift goals:  Pt has been medicated per MAR for pain management, states pain is comfortable at 6/10 or less. Pt able to ambulate and void within 6 hours postop. Progressing on other goal.s   Problem: Pain - Standard  Goal: Alleviation of pain or a reduction in pain to the patient’s comfort goal  Outcome: Progressing     Problem: Fall Risk  Goal: Patient will remain free from falls  Outcome: Progressing     Problem: Knowledge Deficit - Standard  Goal: Patient and family/care givers will demonstrate understanding of plan of care, disease process/condition, diagnostic tests and medications  Outcome: Progressing     Problem: Post-Operative Knee Replacement  Goal: Patient will demonstrate understanding of knee replacement post-surgical weight bearing restrictions and DME usage during hospital stay and post discharge  Outcome: Progressing  Goal: Patient's neurovascular status will be maintained or improve  Outcome: Progressing  Goal: Early mobilization post surgery  Outcome: Progressing     Problem: Pain - Post Surgery  Goal: Alleviation or reduction of pain post surgery  Outcome: Progressing       Patient is not progressing towards the following goals:      
The patient is Stable - Low risk of patient condition declining or worsening    Shift Goals  Clinical Goals: pain relief 6/10  Patient Goals: sleep for hours  Family Goals: n/a    Progress made toward(s) clinical / shift goals:  slept fairly through the night, pain 6/10, able to ambulate to the bathroom with fww, with minimal discomfort.      Problem: Pain - Standard  Goal: Alleviation of pain or a reduction in pain to the patient’s comfort goal  Outcome: Progressing     Problem: Knowledge Deficit - Standard  Goal: Patient and family/care givers will demonstrate understanding of plan of care, disease process/condition, diagnostic tests and medications  Outcome: Progressing     Problem: Post-Operative Knee Replacement  Goal: Patient will demonstrate understanding of knee replacement post-surgical weight bearing restrictions and DME usage during hospital stay and post discharge  Outcome: Progressing  Goal: Early mobilization post surgery  Outcome: Progressing         
without difficulty

## 2023-08-02 NOTE — H&P
Surgery Orthopedic History & Physical Note    Date  8/16/2022    Primary Care Physician  Ashia Tidwell M.D.    CC  Pre-Op Diagnosis Codes:     * Primary osteoarthritis of right knee [M17.11]    HPI  This is a 77 y.o. female who presents for a TKA.    Past Medical History:   Diagnosis Date    Aneurysm artery, celiac (HCC) 2019    Aneurysm of right renal artery (HCC)     Atrial fibrillation (HCC)     and flutter    Breath shortness     5L O2 all the time    Chickenpox     Chronic renal insufficiency     Chronic respiratory failure (HCC)     5L NC at all times (preferred) has port tank and concentrator    Congestive heart failure (HCC)     Diastolic heart failure (HCC)     Disorder of thyroid     hypo    Belarusian measles     Heart burn     Heart valve disease     MV repair    High cholesterol     Hypertension, essential     Macrocytic anemia     Neuropathy     Pulmonary hypertension (HCC)     Sleep apnea     Bipap    Snoring        Past Surgical History:   Procedure Laterality Date    RI COLONOSCOPY,DIAGNOSTIC N/A 8/5/2022    Procedure: DIAGNOSTIC COLONOSCOPY;  Surgeon: Juan Cook M.D.;  Location: SURGERY [facility];  Service: Gastroenterology    PB TOTAL KNEE ARTHROPLASTY Left 11/23/2021    Procedure: ARTHROPLASTY, KNEE, TOTAL;  Surgeon: Mars Mclean M.D.;  Location: SURGERY [facility];  Service: Orthopedics    ORIF, ANKLE Left 08/05/2020    Procedure: ORIF, ANKLE;  Surgeon: Tk Humphreys M.D.;  Location: [facility];  Service: Orthopedics    WRIST FUSION Left 2017    ORIF wrist    MITRAL VALVE REPAIR  2005    CATARACT EXTRACTION WITH IOL Bilateral     HYSTERECTOMY LAPAROSCOPY      OTHER      neck surgery Cervical fusion 4, 5, & 6    TONSILLECTOMY         No current facility-administered medications for this encounter.     Current Outpatient Medications   Medication Sig Dispense Refill    acetaminophen (TYLENOL) 500 MG Tab Take 2 Tablets by mouth 3 times a day for 30 days. 180 Tablet 0     traMADol (ULTRAM) 50 MG Tab Take 1-2 Tablets by mouth every 6 hours as needed for Moderate Pain for up to 10 days. 80 Tablet 0    oxyCODONE immediate-release (ROXICODONE) 5 MG Tab Take 2 Tablets by mouth every four hours as needed for Severe Pain for up to 7 days. 84 Tablet 0    docusate sodium (COLACE) 100 MG Cap Take 1 Capsule by mouth 2 times a day for 30 days. 60 Capsule 0    Tadalafil, PAH, 20 MG Tab Take 2 Tablets by mouth at bedtime.      Docusate Calcium (STOOL SOFTENER PO) Take 1 Capsule by mouth as needed.      apixaban (ELIQUIS) 5mg Tab Take 1 Tablet by mouth 2 times a day. 180 Tablet 3    amiodarone (CORDARONE) 200 MG Tab Take 1 Tablet by mouth every day. 90 Tablet 3    potassium chloride SA (KDUR) 20 MEQ Tab CR Take 2 Tablets by mouth every day. 180 Tablet 3    bumetanide (BUMEX) 2 MG tablet Take 1-2 Tablets by mouth 2 times a day. Take 4mg in the AM and 2mg  Tablet 3    rosuvastatin (CRESTOR) 40 MG tablet Take 1 Tablet by mouth every evening. 90 Tablet 3    omeprazole (PRILOSEC) 40 MG delayed-release capsule Take 1 Capsule by mouth every day. (Patient taking differently: Take 40 mg by mouth 1 time a day as needed.) 90 Capsule 3    oxyCODONE-acetaminophen (PERCOCET) 7.5-325 MG per tablet Take 1 Tablet by mouth every 6 hours as needed.      tretinoin (RETIN-A) 0.1 % cream Apply 1 Application topically every evening.      metoprolol tartrate (LOPRESSOR) 50 MG Tab Take 1 tablet by mouth 2 times a day. 60 tablet 11    PARoxetine (PAXIL) 20 MG Tab Take 20 mg by mouth every morning.      levothyroxine (SYNTHROID) 75 MCG Tab Take 75 mcg by mouth Every morning on an empty stomach.      Cholecalciferol (VITAMIN D) 2000 units Cap Take 2,000 Units by mouth every day.         Social History     Socioeconomic History    Marital status:      Spouse name: Not on file    Number of children: Not on file    Years of education: Not on file    Highest education level: 12th grade   Occupational History    Not  on file   Tobacco Use    Smoking status: Never    Smokeless tobacco: Never   Vaping Use    Vaping Use: Never used   Substance and Sexual Activity    Alcohol use: Not Currently     Alcohol/week: 4.2 oz     Types: 7 Shots of liquor per week    Drug use: No    Sexual activity: Not on file   Other Topics Concern    Not on file   Social History Narrative    Not on file     Social Determinants of Health     Financial Resource Strain: Low Risk     Difficulty of Paying Living Expenses: Not hard at all   Food Insecurity: No Food Insecurity    Worried About Running Out of Food in the Last Year: Never true    Ran Out of Food in the Last Year: Never true   Transportation Needs: No Transportation Needs    Lack of Transportation (Medical): No    Lack of Transportation (Non-Medical): No   Physical Activity: Inactive    Days of Exercise per Week: 0 days    Minutes of Exercise per Session: 0 min   Stress: No Stress Concern Present    Feeling of Stress : Only a little   Social Connections: Moderately Integrated    Frequency of Communication with Friends and Family: Three times a week    Frequency of Social Gatherings with Friends and Family: Once a week    Attends Buddhist Services: More than 4 times per year    Active Member of Clubs or Organizations: Yes    Attends Club or Organization Meetings: More than 4 times per year    Marital Status:    Intimate Partner Violence: Not on file   Housing Stability: Low Risk     Unable to Pay for Housing in the Last Year: No    Number of Places Lived in the Last Year: 1    Unstable Housing in the Last Year: No       Family History   Problem Relation Age of Onset    Heart Disease Maternal Grandfather     Heart Disease Paternal Grandfather        Allergies  Betapace [sotalol], Tikosyn [dofetilide], and Zolpidem tartrate    Review of Systems  Negative    Physical Exam  Regular rate and rhythm  Nonlabored breathing  Abdomen soft and nontender   Neurovascular intact distally  Skin is in good  condition    Vital Signs                          Labs:                    Radiology:  No orders to display         Assessment/Plan:  Pre-Op Diagnosis Codes:     * Primary osteoarthritis of right knee [M17.11]  Procedure(s):  ARTHROPLASTY, KNEE, TOTAL     has other damage       References:  Basic[employer]. org/articles/healthsheets/2584

## 2023-08-03 NOTE — PROGRESS NOTES
Pt transferred from [facility] with low back pain and headache. Received PRN oxy in ED. Still c/o 7/10 back pain. PRN morphine given as ordered. Vancomycin infusing. On 5L O2 which is her home O2. Out of bed with standby assist. Call bell within reach- calls appropriately. Bed locked and in lowest position. Bed alarm on. Non skid socks on. Rounding maintained.   Refill was sent.  Rosalina Gama RN

## 2023-09-26 ENCOUNTER — TELEPHONE (OUTPATIENT)
Dept: CARDIOLOGY | Facility: MEDICAL CENTER | Age: 78
End: 2023-09-26
Payer: MEDICARE

## 2023-09-26 NOTE — TELEPHONE ENCOUNTER
Last OV: 12.30.2022 (Last seen by Ramiro Eng, created under ADD, )  Proposed Surgery: Bilateral L3-5 facet injections with IV sedation  Surgery Date: 10.11.2023  Requesting Office Name: Gilmar Chaudhary  Fax Number: [phone]  Preference of Location (default is surgery center unless specified by Cardiologist or NANY)  Prior Clearance Addressed: No      Anticoags/Antiplatelets: Apixaban   Outstanding Cardiac Imaging : No  Stent, Cardiac Devices, or Catheterization: No  Ablation, TAVR/Valve (including open heart), Cardioversion: No  Recent Cardiac Hospitalization: No            When: Greater than 6 months since hospitalization.   History (cardiac history):   Past Medical History:   Diagnosis Date    Aneurysm artery, celiac (HCC) 2019    Aneurysm of right renal artery (HCC)     Atrial fibrillation (HCC)     and flutter    Breath shortness     5L O2 all the time    Chickenpox     Chronic renal insufficiency     Chronic respiratory failure (HCC)     5L NC at all times (preferred) has port tank and concentrator    Congestive heart failure (HCC)     Diastolic heart failure (HCC)     Disorder of thyroid     hypo    Tajik measles     Heart burn     Heart valve disease     MV repair    High cholesterol     Hypertension, essential     Macrocytic anemia     Neuropathy     Pulmonary hypertension (HCC)     Sleep apnea     Bipap    Snoring              Surgical Clearance Letter Sent: YES   **Scan clearance request letter into [employer].**

## 2023-09-26 NOTE — LETTER
PROCEDURE/SURGERY CLEARANCE FORM    Date: 9/26/2023   Patient Name: Zeinab Loza    Dear Surgeon or Proceduralist,      Thank you for your request for cardiac stratification of our mutual patient Zeinab Loza 1945. We have reviewed their [facility] records; and to the best of our understanding this patient has not had stenting, ablation, cardiothoracic surgery or hospitalization for cardiovascular reasons in the past 6 months.  Zeinab Loza has been seen within the past 18 months and is considered to have non-modifiable cardiac risk for this low-risk procedure/surgery (Bilateral L3-5 facet injections with IV sedation). They may proceed from a cardiovascular standpoint and may hold their antiplatelet/anticoagulation as briefly as possible. Please have patient resume this medication when hemodynamically stable to do so.     Aspirin or Prasugrel   - hold 7 days prior to procedure/surgery, resume when hemodynamically stable      Clopidrogrel or Ticagrelor  - hold 7 days for all neurological procedures, hold 5 days prior to all other procedure/surgery,  resume when hemodynamically stable     Warfarin - hold 7 days for all neurological procedures, hold 5 days prior to all other procedure/surgery and coordinate with [facility]tico[facility] ([phone]) INR testing and dose management.      Pradaxa/Xarelto/Eliquis/Savesya - hold 1 day prior to procedure for low bleeding risk procedure, 2 days for high bleeding risk procedure, or consider holding 3 days or longer for patients with reduced kidney function (CrCl <30mL/min) or spinal/cranial surgeries/procedures.      If they have a mechanical heart valve, please coordinate with [facility] Anticoagulation Service ([phone]) the proper management of their anticoagulant in the periprocedural or perioperative period.      Some patients have higher risk for cardiovascular complications or holding medication. If our patient has had prior  complications of holding antiplatelet or anticoagulants in the past and we have seen them after these events, we have addressed these concerns with the patient. They are at an unknown degree of increased risk for recurrent complication.  You may hold anticoagulation/antiplatelets for the procedure or surgery if the benefits of the procedure or surgery outweigh this nonmodifiable risk.      If Zeinab Loza 1945 has new symptoms of heart failure decompensation, unstable arrythmia, or angina please reach out and we will assess the patient.      If you have other patient-specific concerns, please feel free to reach out to the patient's cardiologist directly at [phone].     Thank you,       [facility]

## 2023-10-11 DIAGNOSIS — E78.2 MIXED HYPERLIPIDEMIA: ICD-10-CM

## 2023-10-11 RX ORDER — ROSUVASTATIN CALCIUM 40 MG/1
40 TABLET, COATED ORAL EVERY EVENING
Qty: 90 TABLET | Refills: 0 | Status: SHIPPED | OUTPATIENT
Start: 2023-10-11

## 2023-10-11 NOTE — TELEPHONE ENCOUNTER
Is the patient due for a refill? Yes    Was the patient seen the past year? Yes    Date of last office visit: 12/30/22    Does the patient have an upcoming appointment?  No    Provider to refill:MIRTA, ADD    Does the patients insurance require a 100 day supply?  No     normal...

## 2023-11-29 ENCOUNTER — PATIENT MESSAGE (OUTPATIENT)
Dept: HEALTH INFORMATION MANAGEMENT | Facility: OTHER | Age: 78
End: 2023-11-29

## 2024-02-27 ENCOUNTER — TELEPHONE (OUTPATIENT)
Dept: CARDIOLOGY | Facility: MEDICAL CENTER | Age: 79
End: 2024-02-27

## 2024-03-01 ENCOUNTER — TELEPHONE (OUTPATIENT)
Dept: CARDIOLOGY | Facility: MEDICAL CENTER | Age: 79
End: 2024-03-01
Payer: MEDICARE

## 2024-03-01 NOTE — TELEPHONE ENCOUNTER
ARBEN     Caller: Maye @ [facility]     Topic/issue: Needing an appeal on prior auth for the medication Tadalafil, PAH, 20 MG Tab, patient will be out of medication tomorrow     Callback Number: [phone]    Thank You   Krystal MARTIN

## 2024-03-05 NOTE — TELEPHONE ENCOUNTER
Called [phone], and s/w Rochelle regarding findings.  Fax number given from [facility].  She is appreciative of information given.

## 2024-05-01 NOTE — DISCHARGE PLANNING
Anticipated Discharge Disposition: Home with HHC    Action: Obtained choice for HH from patient. Faxed choice form to [facility]. Fax confirmation at [phone].    0. [facility] for resumption of care.    Barriers to Discharge: HHC acceptance.    Plan: Care coordination will continue to follow.      normal (ped)...

## 2024-05-07 NOTE — TELEPHONE ENCOUNTER
S/W Vernell, aware of Xarelto sampels   Benefits, risks, and possible complications of procedure explained to patient/caregiver who verbalized understanding and gave verbal consent.

## 2024-10-02 DIAGNOSIS — I10 ESSENTIAL HYPERTENSION: ICD-10-CM

## 2024-10-02 RX ORDER — SPIRONOLACTONE 50 MG/1
50 TABLET, FILM COATED ORAL 2 TIMES DAILY
Qty: 180 TABLET | Refills: 3 | Status: SHIPPED | OUTPATIENT
Start: 2024-10-02

## 2024-11-19 NOTE — DISCHARGE SUMMARY
HOSPITAL DISCHARGE INSTRUCTIONS    CHIEF COMPLAINT ON ADMISSION  Elective admission for Tikosyn initiation for Paroxysmal/Persistent Atrial Fibrillation.    CODE STATUS  Full Code    HPI & HOSPITAL COURSE  This is a 75 y.o. year old female here for elective admission for Tikosyn initiation for Paroxysmal Atrial Fibrillation.     Past medical history significant for MVR, MAZE and NED 2011 at [facility], chronic hypoxemic respiratory failure 2/2 to PH on tadalafil, paroxysmal atrial fibrillation, previously on sotalol but developed tongue swelling with dose increase. She presents today for direct admission for initiation of Tikosyn, after stopping sotalol with adequate washout period.     This admission she had prolonged QTc on Tikosyn 500mcg bid, therefore her dose was dropped to 250mcg then to 125mcg. At this dose her QTc remained less than 500ms. With the low dose of Tikosyn 125mcg she experienced paroxysmal atrial fibrillation, her rates were controlled with increased Diltiazem, previously on 120mg, increased to 360mg at discharge. While in sinus rhythm the patient denies any chest pain, dizziness, or other complaints.  When she is in atrial fibrillation she experiences increased dyspnea.    The patient has been seen and examined in EP rounds this AM.  Her monitored rhythm was sinus at the time of exam. Overnight she was in paroxysmal atrial fibrillation with RVR.  Labs, vital signs, and imaging have been reviewed, her potassium was below goal of 4 however was repleted prior to discharge. She will have a lab draw 3 days after discharge to check her potassium and magnesium. EKG this morning, after dose initially showed atrial fibrillation, she converted to sinus rhythm and at that point her calculated QTc 470ms was, stable compared to prior while in sinus rhythm.  Therefore she was discharged in stable condition with close outpatient follow-up.     DISCHARGE PROBLEM LIST  1. Paroxysmal Atrial Fibrillation  2. High Risk  .   Medication: Tikosyn Initiation  3. Chronic Anticoagulation with Eliquis   4. Pulmonary hypertension  5. Chronic hypoxic respiratory failure      MEDICATIONS ON DISCHARGE   Zeinab Loza   Home Medication Instructions MIMI:[id number]    Printed on:10/25/20 1410   Medication Information                      apixaban (ELIQUIS) 5mg Tab  Take 5 mg by mouth 2 Times a Day.             Cholecalciferol (VITAMIN D) 2000 units Cap  Take 2,000 Units by mouth every day.             digoxin (LANOXIN) 125 MCG Tab  Take 1 Tab by mouth every evening.             DILTIAZem CD (CARDIZEM CD) 360 MG ER capsule  Take 1 Cap by mouth every day.             dofetilide (TIKOSYN) 125 MCG Cap  Take 1 Cap by mouth every 12 hours.             HYDROcodone-acetaminophen (NORCO) 7.5-325 MG per tablet  Take 1 Tab by mouth 2 times a day. Indications: Pain             levothyroxine (SYNTHROID) 75 MCG Tab  Take 75 mcg by mouth Every morning on an empty stomach.             PARoxetine (PAXIL) 20 MG Tab  Take 20 mg by mouth every morning.             potassium chloride SA (KDUR) 20 MEQ Tab CR  Take 60 mEq by mouth 2 times a day.             rosuvastatin (CRESTOR) 20 MG Tab  Take 1 Tab by mouth every evening.             Tadalafil, PAH, (ADCIRCA) 20 MG Tab  Take 40 mg by mouth every bedtime. Indications: Pulmonary Arterial Hypertension             therapeutic multivitamin-minerals (THERAGRAN-M) Tab  Take 1 Tab by mouth every day.             torsemide (DEMADEX) 20 MG Tab  Take 2 Tabs by mouth 2 Times a Day.                 DIET: CARDIAC DIET    LABORATORY  Lab Results   Component Value Date/Time    SODIUM 138 10/25/2020 07:28 AM    POTASSIUM 4.2 10/25/2020 10:17 AM    CHLORIDE 96 10/25/2020 07:28 AM    CO2 33 10/25/2020 07:28 AM    GLUCOSE 135 (H) 10/25/2020 07:28 AM    BUN 29 (H) 10/25/2020 07:28 AM    CREATININE 0.80 10/25/2020 07:28 AM      Lab Results   Component Value Date/Time    WBC 5.7 10/22/2020 06:06 AM    HEMOGLOBIN 11.9 (L) 10/22/2020  06:06 AM    HEMATOCRIT 35.9 (L) 10/22/2020 06:06 AM    PLATELETCT 220 10/22/2020 06:06 AM        ACTIVITY/SPECIFIC OUTPATIENT DISCHARGE INSTRUCTIONS  Tikosyn Medication Education Instructions:   Please take Tikosyn as prescribed and do not miss any doses.  If you miss a dose, do NOT double dose. Do not take medication less than 11-12 hours apart between doses. Please seek emergency medical attention or go to your nearest ER if you have symptoms of severe dizziness or palpitations, shortness of breath unreleaved by rest, and syncope or near syncope. Please notify us if you have any side effects from the medication or questions/concerns. Please do not stop taking the medication until you contact your provider.       FOLLOW UP  LABS DUE 10/28/20   Patient will follow up with EP in outpatient in 2-3 weeks as arranged or sooner if needed.  Patient instructed to call the office with any questions/concerns.  The above plan and medications were reviewed in detail with the patient at time of discharge.  All patients questions/concerns were answered and patient verbalized understanding and is in agreement.    Future Appointments   Date Time Provider Department Center   11/4/2020 11:40 AM KIRILL Kramer [facility] None        Thank you for allowing me to participate in this patients care.  Please contact me with any questions or concerns.     Shameka Vegas P.A.-C.  10/25/2020 2:10 PM

## (undated) DEVICE — FORCEP RADIAL JAW 4 STANDARD CAPACITY W/NEEDLE 240CM (40EA/BX)

## (undated) DEVICE — SPLINT PLASTER 5 IN X 30 IN - (50EA/BX 6BX/CA)

## (undated) DEVICE — TUBING CLEARLINK DUO-VENT - C-FLO (48EA/CA)

## (undated) DEVICE — HANDPIECE 10FT INTPLS SCT PLS IRRIGATION HAND CONTROL SET (6/PK)

## (undated) DEVICE — SODIUM CHL IRRIGATION 0.9% 1000ML (12EA/CA)

## (undated) DEVICE — WATER IRRIGATION STERILE 1000ML (12EA/CA)

## (undated) DEVICE — STOCKINETTE IMPERVIOUS 12X48 - STERILELF (10/CA)"

## (undated) DEVICE — CANISTER SUCTION RIGID RED 1500CC (40EA/CA)

## (undated) DEVICE — TOURNIQUET CUFF 34 X 4 ONE PORT DISP - STERILE (10/BX)

## (undated) DEVICE — GOWN WARMING STANDARD FLEX - (30/CA)

## (undated) DEVICE — SET LEADWIRE 5 LEAD BEDSIDE DISPOSABLE ECG (1SET OF 5/EA)

## (undated) DEVICE — LACTATED RINGERS INJ 1000 ML - (14EA/CA 60CA/PF)

## (undated) DEVICE — TOWEL STOP TIMEOUT SAFETY FLAG (40EA/CA)

## (undated) DEVICE — SUTURE 2-0 VICRYL PLUS CT-1 - 8 X 18 INCH(12/BX)

## (undated) DEVICE — KIT TAK BIO-SUTURE DISP

## (undated) DEVICE — TUBE E-T HI-LO CUFF 6.5MM (10EA/BX)

## (undated) DEVICE — DRAPE 36X28IN RAD CARM BND BG - (25/CA) O

## (undated) DEVICE — SLEEVE, VASO, THIGH, MED

## (undated) DEVICE — SET EXTENSION WITH 2 PORTS (48EA/CA) ***PART #2C8610 IS A SUBSTITUTE*****

## (undated) DEVICE — MIXER BONE CEMENT REVOLUTION - W/FEMORAL PRESSURIZER (6/CA)

## (undated) DEVICE — BLADE SAGITTAL 34MM

## (undated) DEVICE — SUTURE 2-0 MONOCRYL PLUS UNDYED CT-1 1 X 36 (36EA/BX)"

## (undated) DEVICE — GLOVE BIOGEL PI INDICATOR SZ 8.0 SURGICAL PF LF -(50/BX 4BX/CA)

## (undated) DEVICE — SUCTION INSTRUMENT YANKAUER BULBOUS TIP W/O VENT (50EA/CA)

## (undated) DEVICE — CANNULA O2 COMFORT SOFT EAR ADULT 7 FT TUBING (50/CA)

## (undated) DEVICE — CUFF 30 X 4 TOURNIQUET 2 PORT DISPOSABLE STERILE (10EA/BX)

## (undated) DEVICE — ELECTRODE DUAL RETURN W/ CORD - (50/PK)

## (undated) DEVICE — GLOVE BIOGEL SZ 7.5 SURGICAL PF LTX - (50PR/BX 4BX/CA)

## (undated) DEVICE — GLOVE BIOGEL PI ULTRATOUCH SZ 7.0 SURGICAL PF LF- POWDER FREE (50/BX 4BX/CA)

## (undated) DEVICE — PADDING CAST 6 IN STERILE - 6 X 4 YDS (24/CA)

## (undated) DEVICE — DRAPE LARGE 3 QUARTER - (20/CA)

## (undated) DEVICE — KIT ANESTHESIA W/CIRCUIT & 3/LT BAG W/FILTER (20EA/CA)

## (undated) DEVICE — ELECTRODE 850 FOAM ADHESIVE - HYDROGEL RADIOTRNSPRNT (50/PK)

## (undated) DEVICE — GLOVE SZ 7.5 LF PROTEXIS (50PR/BX)

## (undated) DEVICE — HEAD HOLDER JUNIOR/ADULT

## (undated) DEVICE — SUTURE GENERAL

## (undated) DEVICE — BLADE 90X18X1.27MM SAW SAGITTAL

## (undated) DEVICE — MASK ANESTHESIA ADULT  - (100/CA)

## (undated) DEVICE — GOWN SURGEONS LARGE - (32/CA)

## (undated) DEVICE — CANISTER SUCTION 3000ML MECHANICAL FILTER AUTO SHUTOFF MEDI-VAC NONSTERILE LF DISP  (40EA/CA)

## (undated) DEVICE — LENS/HOOD FOR SPACESUIT - (32/PK) PEEL AWAY FACE

## (undated) DEVICE — GLOVE BIOGEL PI INDICATOR SZ 8.5 SURGICAL PF LF - (50PR/BX 4BX/CA)

## (undated) DEVICE — SUTURE 5 ETHIBOND V-37 (12PK/BX)

## (undated) DEVICE — SYRINGE SAFETY 10 ML 18 GA X 1 1/2 BLUNT LL (100/BX 4BX/CA)

## (undated) DEVICE — PAD PREP 24 X 48 CUFFED - (100/CA)

## (undated) DEVICE — SUTURE 3-0 MONOCRYL PLUS PS-1 - 27 INCH (36/BX)

## (undated) DEVICE — BANDAGE ELASTIC 6 HONEYCOMB - 6X5YD LF (20/CA)"

## (undated) DEVICE — SYRINGE SAFETY 5 ML 18 GA X 1-1/2 BLUNT LL (100/BX 4BX/CA)

## (undated) DEVICE — SYRINGE SAFETY 3 ML 18 GA X 1 1/2 BLUNT LL (100/BX 8BX/CA)

## (undated) DEVICE — SPONGE GAUZE NON-STERILE 4X4 - (2000/CA 10PK/CA)

## (undated) DEVICE — TUBE CONNECTING SUCTION - CLEAR PLASTIC STERILE 72 IN (50EA/CA)

## (undated) DEVICE — SENSOR SPO2 NEO LNCS ADHESIVE (20/BX) SEE USER NOTES

## (undated) DEVICE — GLOVE BIOGEL SZ 7 SURGICAL PF LTX - (50PR/BX 4BX/CA)

## (undated) DEVICE — Device

## (undated) DEVICE — PROTECTOR ULNA NERVE - (36PR/CA)

## (undated) DEVICE — DRILL BIT 1.8MMX110MM GOLD (6TX2=12)

## (undated) DEVICE — BANDAGE ELASTIC STERILE MATRIX 6 X 10 (20EA/CA)

## (undated) DEVICE — PINS

## (undated) DEVICE — GLOVE BIOGEL PI ORTHO SZ 7.5 PF LF (40PR/BX)

## (undated) DEVICE — SUTURE ETHILON 2-0 FSLX 30 (36PK/BX)"

## (undated) DEVICE — GLOVE BIOGEL INDICATOR SZ 7.5 SURGICAL PF LTX - (50PR/BX 4BX/CA)

## (undated) DEVICE — NEEDLE NON SAFETY HYPO 22 GA X 1 1/2 IN (100/BX)

## (undated) DEVICE — BANDAGE ELASTIC 6 IN X 5 YDS - LATEX FREE (10/BX)

## (undated) DEVICE — GLOVE BIOGEL PI INDICATOR SZ 7.0 SURGICAL PF LF - (50/BX 4BX/CA)

## (undated) DEVICE — DRAPE IOBAN II INCISE 23X17 - (10EA/BX 4BX/CA)

## (undated) DEVICE — PACK TOTAL KNEE  (1/CA)

## (undated) DEVICE — DRESSING AQUACEL AG ADVANTAGE 3.5 X 10" (10EA/BX)"

## (undated) DEVICE — DRAPE C-ARM LARGE 41IN X 74 IN - (10/BX 2BX/CA)

## (undated) DEVICE — GLOVE, LITE (PAIR)

## (undated) DEVICE — TUBE E-T HI-LO CUFF 7.5MM (10EA/PK)

## (undated) DEVICE — GLOVE BIOGEL PI ULTRATOUCH SZ 7.5 SURGICAL PF LF -(50/BX 4BX/CA)

## (undated) DEVICE — SENSOR OXIMETER ADULT SPO2 RD SET (20EA/BX)

## (undated) DEVICE — TIP INTPLS HFLO ML ORFC BTRY - (12/CS)  FOR SURGILAV

## (undated) DEVICE — DRILL BIT 2.8MM X 155MM CALIBRATED (8TX2=16)

## (undated) DEVICE — GLOVE BIOGEL SZ 6 PF LATEX - (50EA/BX 4BX/CA)

## (undated) DEVICE — PIN HEADLESS FLUTED 3.2MM X 89MM

## (undated) DEVICE — GLOVE BIOGEL INDICATOR SZ 8 SURGICAL PF LTX - (50/BX 4BX/CA)

## (undated) DEVICE — BLADE SURGICAL #15 - (50/BX 3BX/CA)

## (undated) DEVICE — SUTURE 1 VICRYL PLUS CTX - 8 X 18 INCH (12/BX)

## (undated) DEVICE — DRAPE LOWER EXTREMETY - (6/CA)

## (undated) DEVICE — GLOVE BIOGEL SZ 8 SURGICAL PF LTX - (50PR/BX 4BX/CA)

## (undated) DEVICE — BLADE SAGITTAL SAW DUAL CUT 25.0 X 90.0 X 1.27MM (1/EA)

## (undated) DEVICE — KIT  I.V. START (100EA/CA)

## (undated) DEVICE — NEPTUNE 4 PORT MANIFOLD - (20/PK)

## (undated) DEVICE — CHLORAPREP 26 ML APPLICATOR - ORANGE TINT(25/CA)

## (undated) DEVICE — WRAP COBAN SELF-ADHERENT 6 IN X  5YDS STERILE TAN (12/CA)